# Patient Record
Sex: MALE | Race: BLACK OR AFRICAN AMERICAN | Employment: OTHER | URBAN - NONMETROPOLITAN AREA
[De-identification: names, ages, dates, MRNs, and addresses within clinical notes are randomized per-mention and may not be internally consistent; named-entity substitution may affect disease eponyms.]

---

## 2017-03-19 ENCOUNTER — APPOINTMENT (OUTPATIENT)
Dept: GENERAL RADIOLOGY | Age: 47
End: 2017-03-19
Payer: MEDICAID

## 2017-03-19 ENCOUNTER — HOSPITAL ENCOUNTER (EMERGENCY)
Age: 47
Discharge: HOME | End: 2017-03-19
Attending: EMERGENCY MEDICINE
Payer: MEDICAID

## 2017-03-19 VITALS
HEART RATE: 85 BPM | DIASTOLIC BLOOD PRESSURE: 87 MMHG | OXYGEN SATURATION: 96 % | BODY MASS INDEX: 36.37 KG/M2 | SYSTOLIC BLOOD PRESSURE: 131 MMHG | HEIGHT: 68 IN | RESPIRATION RATE: 18 BRPM | WEIGHT: 240 LBS | TEMPERATURE: 99.7 F

## 2017-03-19 DIAGNOSIS — J06.9 ACUTE UPPER RESPIRATORY INFECTION: Primary | ICD-10-CM

## 2017-03-19 DIAGNOSIS — J20.9 BRONCHITIS, ACUTE, WITH BRONCHOSPASM: ICD-10-CM

## 2017-03-19 PROCEDURE — 71020 XR CHEST STANDARD TWO VW: CPT

## 2017-03-19 PROCEDURE — 94640 AIRWAY INHALATION TREATMENT: CPT

## 2017-03-19 PROCEDURE — 6360000002 HC RX W HCPCS: Performed by: EMERGENCY MEDICINE

## 2017-03-19 PROCEDURE — 6370000000 HC RX 637 (ALT 250 FOR IP): Performed by: EMERGENCY MEDICINE

## 2017-03-19 PROCEDURE — 99283 EMERGENCY DEPT VISIT LOW MDM: CPT | Performed by: EMERGENCY MEDICINE

## 2017-03-19 PROCEDURE — 99283 EMERGENCY DEPT VISIT LOW MDM: CPT

## 2017-03-19 RX ORDER — ALBUTEROL SULFATE 2.5 MG/3ML
2.5 SOLUTION RESPIRATORY (INHALATION) EVERY 4 HOURS PRN
Status: DISCONTINUED | OUTPATIENT
Start: 2017-03-19 | End: 2017-03-19 | Stop reason: HOSPADM

## 2017-03-19 RX ORDER — LISINOPRIL 10 MG/1
40 TABLET ORAL DAILY
COMMUNITY
End: 2017-05-10

## 2017-03-19 RX ORDER — IPRATROPIUM BROMIDE AND ALBUTEROL SULFATE 2.5; .5 MG/3ML; MG/3ML
1 SOLUTION RESPIRATORY (INHALATION) EVERY 4 HOURS
Qty: 175 ML | Refills: 0 | Status: SHIPPED | OUTPATIENT
Start: 2017-03-19 | End: 2020-06-12 | Stop reason: ALTCHOICE

## 2017-03-19 RX ORDER — PREDNISONE 20 MG/1
40 TABLET ORAL ONCE
Status: COMPLETED | OUTPATIENT
Start: 2017-03-19 | End: 2017-03-19

## 2017-03-19 RX ORDER — PREDNISONE 10 MG/1
TABLET ORAL
Qty: 20 TABLET | Refills: 0 | Status: SHIPPED | OUTPATIENT
Start: 2017-03-19 | End: 2017-05-10 | Stop reason: CLARIF

## 2017-03-19 RX ADMIN — PREDNISONE 40 MG: 20 TABLET ORAL at 21:02

## 2017-03-19 RX ADMIN — ALBUTEROL SULFATE 2.5 MG: 2.5 SOLUTION RESPIRATORY (INHALATION) at 21:08

## 2017-03-19 RX ADMIN — IPRATROPIUM BROMIDE 0.5 MG: 0.5 SOLUTION RESPIRATORY (INHALATION) at 21:08

## 2017-03-19 ASSESSMENT — ENCOUNTER SYMPTOMS
SORE THROAT: 0
CHEST TIGHTNESS: 1
EYE REDNESS: 0
ABDOMINAL DISTENTION: 0
TROUBLE SWALLOWING: 0
EYE PAIN: 0
ABDOMINAL PAIN: 0
SINUS PRESSURE: 0
NAUSEA: 0
VOMITING: 1
PHOTOPHOBIA: 0
CONSTIPATION: 0
COUGH: 1
DIARRHEA: 0
COLOR CHANGE: 0
SHORTNESS OF BREATH: 1
RHINORRHEA: 0
BACK PAIN: 0
WHEEZING: 1

## 2017-05-10 ENCOUNTER — OFFICE VISIT (OUTPATIENT)
Dept: PRIMARY CARE CLINIC | Age: 47
End: 2017-05-10
Payer: MEDICAID

## 2017-05-10 VITALS
BODY MASS INDEX: 38.8 KG/M2 | HEIGHT: 68 IN | TEMPERATURE: 98.2 F | SYSTOLIC BLOOD PRESSURE: 130 MMHG | OXYGEN SATURATION: 98 % | RESPIRATION RATE: 18 BRPM | HEART RATE: 60 BPM | WEIGHT: 256 LBS | DIASTOLIC BLOOD PRESSURE: 89 MMHG

## 2017-05-10 DIAGNOSIS — Z11.4 SCREENING FOR HIV (HUMAN IMMUNODEFICIENCY VIRUS): ICD-10-CM

## 2017-05-10 DIAGNOSIS — J45.20 MILD INTERMITTENT ASTHMA WITHOUT COMPLICATION: ICD-10-CM

## 2017-05-10 DIAGNOSIS — E66.01 MORBID OBESITY DUE TO EXCESS CALORIES (HCC): ICD-10-CM

## 2017-05-10 DIAGNOSIS — I10 BENIGN ESSENTIAL HTN: ICD-10-CM

## 2017-05-10 DIAGNOSIS — Z23 NEED FOR STREPTOCOCCUS PNEUMONIAE VACCINATION: ICD-10-CM

## 2017-05-10 DIAGNOSIS — E11.9 TYPE 2 DIABETES MELLITUS WITHOUT COMPLICATION, UNSPECIFIED LONG TERM INSULIN USE STATUS: ICD-10-CM

## 2017-05-10 DIAGNOSIS — R05.8 COUGH DUE TO ACE INHIBITOR: ICD-10-CM

## 2017-05-10 DIAGNOSIS — Z00.00 ROUTINE GENERAL MEDICAL EXAMINATION AT A HEALTH CARE FACILITY: Primary | ICD-10-CM

## 2017-05-10 DIAGNOSIS — Z80.42 FAMILY HISTORY OF PROSTATE CANCER: ICD-10-CM

## 2017-05-10 DIAGNOSIS — T46.4X5A COUGH DUE TO ACE INHIBITOR: ICD-10-CM

## 2017-05-10 LAB
ALBUMIN SERPL-MCNC: 4 G/DL (ref 3.5–5.2)
ALP BLD-CCNC: 47 U/L (ref 40–130)
ALT SERPL-CCNC: 23 U/L (ref 5–41)
ANION GAP SERPL CALCULATED.3IONS-SCNC: 15 MMOL/L (ref 7–19)
AST SERPL-CCNC: 20 U/L (ref 5–40)
BACTERIA: NEGATIVE /HPF
BILIRUB SERPL-MCNC: 0.3 MG/DL (ref 0.2–1.2)
BILIRUBIN URINE: NEGATIVE
BLOOD, URINE: ABNORMAL
BUN BLDV-MCNC: 12 MG/DL (ref 6–20)
CALCIUM SERPL-MCNC: 9.2 MG/DL (ref 8.6–10)
CHLORIDE BLD-SCNC: 106 MMOL/L (ref 98–111)
CHOLESTEROL, TOTAL: 183 MG/DL (ref 160–199)
CLARITY: CLEAR
CO2: 24 MMOL/L (ref 22–29)
COLOR: YELLOW
CREAT SERPL-MCNC: 1.2 MG/DL (ref 0.5–1.2)
CREATININE URINE: 98.4 MG/DL (ref 4.2–622)
EPITHELIAL CELLS, UA: 0 /HPF (ref 0–5)
GFR NON-AFRICAN AMERICAN: >60
GLOBULIN: 2.9 G/DL
GLUCOSE BLD-MCNC: 114 MG/DL (ref 74–109)
GLUCOSE URINE: NEGATIVE MG/DL
HDLC SERPL-MCNC: 45 MG/DL (ref 55–121)
HYALINE CASTS: 2 /HPF (ref 0–8)
KETONES, URINE: NEGATIVE MG/DL
LDL CHOLESTEROL CALCULATED: 111 MG/DL
LEUKOCYTE ESTERASE, URINE: NEGATIVE
MICROALBUMIN UR-MCNC: 64.4 MG/DL (ref 0–19)
MICROALBUMIN/CREAT UR-RTO: 654.5 MG/G
NITRITE, URINE: NEGATIVE
PH UA: 6
POTASSIUM SERPL-SCNC: 4.5 MMOL/L (ref 3.5–5)
PROTEIN UA: 100 MG/DL
RAPID HIV 1&2: NORMAL
RBC UA: 0 /HPF (ref 0–4)
SODIUM BLD-SCNC: 145 MMOL/L (ref 136–145)
SPECIFIC GRAVITY UA: 1.01
TOTAL PROTEIN: 6.9 G/DL (ref 6.6–8.7)
TRIGL SERPL-MCNC: 135 MG/DL (ref 150–199)
TSH SERPL DL<=0.05 MIU/L-ACNC: 2.85 UIU/ML (ref 0.27–4.2)
UROBILINOGEN, URINE: 0.2 E.U./DL
WBC UA: 1 /HPF (ref 0–5)

## 2017-05-10 PROCEDURE — 90732 PPSV23 VACC 2 YRS+ SUBQ/IM: CPT | Performed by: FAMILY MEDICINE

## 2017-05-10 PROCEDURE — 99386 PREV VISIT NEW AGE 40-64: CPT | Performed by: FAMILY MEDICINE

## 2017-05-10 PROCEDURE — 90471 IMMUNIZATION ADMIN: CPT | Performed by: FAMILY MEDICINE

## 2017-05-10 PROCEDURE — 99204 OFFICE O/P NEW MOD 45 MIN: CPT | Performed by: FAMILY MEDICINE

## 2017-05-10 RX ORDER — AMLODIPINE BESYLATE 10 MG/1
10 TABLET ORAL DAILY
COMMUNITY
End: 2017-05-10 | Stop reason: SDUPTHER

## 2017-05-10 RX ORDER — HYDROCHLOROTHIAZIDE 12.5 MG/1
12.5 CAPSULE, GELATIN COATED ORAL DAILY
Qty: 90 CAPSULE | Refills: 1 | Status: SHIPPED | OUTPATIENT
Start: 2017-05-10 | End: 2018-05-20 | Stop reason: SDUPTHER

## 2017-05-10 RX ORDER — SIMVASTATIN 40 MG
40 TABLET ORAL NIGHTLY
Qty: 90 TABLET | Refills: 5 | Status: SHIPPED | OUTPATIENT
Start: 2017-05-10 | End: 2018-05-20 | Stop reason: SDUPTHER

## 2017-05-10 RX ORDER — ALBUTEROL SULFATE 90 UG/1
2 AEROSOL, METERED RESPIRATORY (INHALATION) EVERY 6 HOURS PRN
Qty: 1 INHALER | Refills: 2 | Status: SHIPPED | OUTPATIENT
Start: 2017-05-10 | End: 2022-03-09 | Stop reason: SDUPTHER

## 2017-05-10 RX ORDER — LOSARTAN POTASSIUM 25 MG/1
25 TABLET ORAL DAILY
Qty: 90 TABLET | Refills: 1 | Status: SHIPPED | OUTPATIENT
Start: 2017-05-10 | End: 2018-05-20 | Stop reason: SDUPTHER

## 2017-05-10 RX ORDER — HYDROCHLOROTHIAZIDE 12.5 MG/1
12.5 CAPSULE, GELATIN COATED ORAL DAILY
COMMUNITY
End: 2017-05-10 | Stop reason: SDUPTHER

## 2017-05-10 RX ORDER — AMLODIPINE BESYLATE 10 MG/1
10 TABLET ORAL DAILY
Qty: 90 TABLET | Refills: 1 | Status: SHIPPED | OUTPATIENT
Start: 2017-05-10 | End: 2018-05-20 | Stop reason: SDUPTHER

## 2017-05-10 RX ORDER — SIMVASTATIN 40 MG
40 TABLET ORAL NIGHTLY
COMMUNITY
End: 2017-05-10 | Stop reason: SDUPTHER

## 2017-05-10 RX ORDER — ASPIRIN 81 MG/1
81 TABLET ORAL DAILY
Qty: 90 TABLET | Refills: 5 | Status: SHIPPED | OUTPATIENT
Start: 2017-05-10 | End: 2020-04-14

## 2017-05-10 ASSESSMENT — ENCOUNTER SYMPTOMS
NAUSEA: 0
COUGH: 1
TROUBLE SWALLOWING: 0
CONSTIPATION: 0
ABDOMINAL PAIN: 0
RHINORRHEA: 0
CHEST TIGHTNESS: 0
EYE PAIN: 0
SORE THROAT: 0
DIARRHEA: 0
WHEEZING: 0
VOMITING: 0
PHOTOPHOBIA: 0
COLOR CHANGE: 0
SHORTNESS OF BREATH: 0

## 2017-08-16 ENCOUNTER — OFFICE VISIT (OUTPATIENT)
Dept: PRIMARY CARE CLINIC | Age: 47
End: 2017-08-16
Payer: MEDICAID

## 2017-08-16 VITALS
HEIGHT: 68 IN | BODY MASS INDEX: 41.68 KG/M2 | SYSTOLIC BLOOD PRESSURE: 132 MMHG | OXYGEN SATURATION: 95 % | TEMPERATURE: 97.7 F | DIASTOLIC BLOOD PRESSURE: 88 MMHG | HEART RATE: 65 BPM | WEIGHT: 275 LBS

## 2017-08-16 DIAGNOSIS — E11.9 TYPE 2 DIABETES MELLITUS WITHOUT COMPLICATION, UNSPECIFIED LONG TERM INSULIN USE STATUS: Primary | ICD-10-CM

## 2017-08-16 DIAGNOSIS — Z71.3 WEIGHT LOSS COUNSELING, ENCOUNTER FOR: ICD-10-CM

## 2017-08-16 DIAGNOSIS — I10 BENIGN ESSENTIAL HTN: ICD-10-CM

## 2017-08-16 DIAGNOSIS — E66.01 MORBID OBESITY DUE TO EXCESS CALORIES (HCC): ICD-10-CM

## 2017-08-16 DIAGNOSIS — N52.01 ERECTILE DYSFUNCTION DUE TO ARTERIAL INSUFFICIENCY: ICD-10-CM

## 2017-08-16 LAB — HBA1C MFR BLD: 7 %

## 2017-08-16 PROCEDURE — 83036 HEMOGLOBIN GLYCOSYLATED A1C: CPT | Performed by: FAMILY MEDICINE

## 2017-08-16 PROCEDURE — 99401 PREV MED CNSL INDIV APPRX 15: CPT | Performed by: FAMILY MEDICINE

## 2017-08-16 PROCEDURE — 99213 OFFICE O/P EST LOW 20 MIN: CPT | Performed by: FAMILY MEDICINE

## 2017-08-16 ASSESSMENT — ENCOUNTER SYMPTOMS
ABDOMINAL PAIN: 0
SHORTNESS OF BREATH: 0
SORE THROAT: 0
NAUSEA: 0
COUGH: 0
COLOR CHANGE: 0
RHINORRHEA: 0
EYE ITCHING: 0

## 2017-08-17 ENCOUNTER — TELEPHONE (OUTPATIENT)
Dept: PRIMARY CARE CLINIC | Age: 47
End: 2017-08-17

## 2017-08-18 RX ORDER — SILDENAFIL CITRATE 20 MG/1
20 TABLET ORAL PRN
Qty: 6 TABLET | Refills: 3 | Status: SHIPPED | OUTPATIENT
Start: 2017-08-18 | End: 2020-02-07

## 2017-09-24 ENCOUNTER — HOSPITAL ENCOUNTER (EMERGENCY)
Age: 47
Discharge: HOME | End: 2017-09-24
Attending: EMERGENCY MEDICINE
Payer: MEDICAID

## 2017-09-24 ENCOUNTER — APPOINTMENT (OUTPATIENT)
Dept: GENERAL RADIOLOGY | Age: 47
End: 2017-09-24
Payer: MEDICAID

## 2017-09-24 VITALS
BODY MASS INDEX: 37.13 KG/M2 | RESPIRATION RATE: 18 BRPM | DIASTOLIC BLOOD PRESSURE: 63 MMHG | TEMPERATURE: 98.9 F | HEIGHT: 68 IN | HEART RATE: 94 BPM | SYSTOLIC BLOOD PRESSURE: 132 MMHG | OXYGEN SATURATION: 92 % | WEIGHT: 245 LBS

## 2017-09-24 DIAGNOSIS — J45.20 MILD INTERMITTENT ASTHMA WITHOUT COMPLICATION: Primary | ICD-10-CM

## 2017-09-24 LAB
ALBUMIN SERPL-MCNC: 2.7 G/DL (ref 3.5–5.2)
ALP BLD-CCNC: 46 U/L (ref 40–130)
ALT SERPL-CCNC: 27 U/L (ref 5–41)
ANION GAP SERPL CALCULATED.3IONS-SCNC: 11 MMOL/L (ref 7–19)
AST SERPL-CCNC: 19 U/L (ref 5–40)
BILIRUB SERPL-MCNC: <0.2 MG/DL (ref 0.2–1.2)
BUN BLDV-MCNC: 15 MG/DL (ref 6–20)
CALCIUM SERPL-MCNC: 8.3 MG/DL (ref 8.6–10)
CHLORIDE BLD-SCNC: 105 MMOL/L (ref 98–111)
CO2: 26 MMOL/L (ref 22–29)
CREAT SERPL-MCNC: 1.2 MG/DL (ref 0.5–1.2)
GFR NON-AFRICAN AMERICAN: >60
GLUCOSE BLD-MCNC: 166 MG/DL (ref 74–109)
HCT VFR BLD CALC: 43 % (ref 42–52)
HEMOGLOBIN: 14.4 G/DL (ref 14–18)
MCH RBC QN AUTO: 29.3 PG (ref 27–31)
MCHC RBC AUTO-ENTMCNC: 33.5 G/DL (ref 33–37)
MCV RBC AUTO: 87.6 FL (ref 80–94)
PDW BLD-RTO: 13.1 % (ref 11.5–14.5)
PLATELET # BLD: 216 K/UL (ref 130–400)
PMV BLD AUTO: 9 FL (ref 9.4–12.4)
POTASSIUM SERPL-SCNC: 3.8 MMOL/L (ref 3.5–5)
RBC # BLD: 4.91 M/UL (ref 4.7–6.1)
SODIUM BLD-SCNC: 142 MMOL/L (ref 136–145)
TOTAL PROTEIN: 5.5 G/DL (ref 6.6–8.7)
TROPONIN: <0.01 NG/ML (ref 0–0.03)
WBC # BLD: 7.8 K/UL (ref 4.8–10.8)

## 2017-09-24 PROCEDURE — 94640 AIRWAY INHALATION TREATMENT: CPT

## 2017-09-24 PROCEDURE — 84484 ASSAY OF TROPONIN QUANT: CPT

## 2017-09-24 PROCEDURE — 80053 COMPREHEN METABOLIC PANEL: CPT

## 2017-09-24 PROCEDURE — 99283 EMERGENCY DEPT VISIT LOW MDM: CPT | Performed by: EMERGENCY MEDICINE

## 2017-09-24 PROCEDURE — 85027 COMPLETE CBC AUTOMATED: CPT

## 2017-09-24 PROCEDURE — 6360000002 HC RX W HCPCS: Performed by: EMERGENCY MEDICINE

## 2017-09-24 PROCEDURE — 93005 ELECTROCARDIOGRAM TRACING: CPT

## 2017-09-24 PROCEDURE — 96374 THER/PROPH/DIAG INJ IV PUSH: CPT

## 2017-09-24 PROCEDURE — 36415 COLL VENOUS BLD VENIPUNCTURE: CPT

## 2017-09-24 PROCEDURE — 99285 EMERGENCY DEPT VISIT HI MDM: CPT

## 2017-09-24 PROCEDURE — 71020 XR CHEST STANDARD TWO VW: CPT

## 2017-09-24 RX ORDER — METHYLPREDNISOLONE SODIUM SUCCINATE 125 MG/2ML
125 INJECTION, POWDER, LYOPHILIZED, FOR SOLUTION INTRAMUSCULAR; INTRAVENOUS ONCE
Status: COMPLETED | OUTPATIENT
Start: 2017-09-24 | End: 2017-09-24

## 2017-09-24 RX ORDER — AMOXICILLIN AND CLAVULANATE POTASSIUM 875; 125 MG/1; MG/1
1 TABLET, FILM COATED ORAL 2 TIMES DAILY
Qty: 20 TABLET | Refills: 0 | Status: SHIPPED | OUTPATIENT
Start: 2017-09-24 | End: 2017-10-04

## 2017-09-24 RX ORDER — ALBUTEROL SULFATE 2.5 MG/3ML
2.5 SOLUTION RESPIRATORY (INHALATION) EVERY 4 HOURS PRN
Status: DISCONTINUED | OUTPATIENT
Start: 2017-09-24 | End: 2017-09-24 | Stop reason: HOSPADM

## 2017-09-24 RX ORDER — PREDNISONE 10 MG/1
20 TABLET ORAL DAILY
Qty: 10 TABLET | Refills: 0 | Status: SHIPPED | OUTPATIENT
Start: 2017-09-24 | End: 2017-10-04

## 2017-09-24 RX ADMIN — METHYLPREDNISOLONE SODIUM SUCCINATE 125 MG: 125 INJECTION, POWDER, FOR SOLUTION INTRAMUSCULAR; INTRAVENOUS at 17:34

## 2017-09-24 RX ADMIN — ALBUTEROL SULFATE 2.5 MG: 2.5 SOLUTION RESPIRATORY (INHALATION) at 17:07

## 2017-09-24 RX ADMIN — IPRATROPIUM BROMIDE 0.5 MG: 0.5 SOLUTION RESPIRATORY (INHALATION) at 17:06

## 2017-09-24 ASSESSMENT — ENCOUNTER SYMPTOMS
EYES NEGATIVE: 1
EYE ITCHING: 0
BLOOD IN STOOL: 0
EYE DISCHARGE: 0
EYE PAIN: 0
ALLERGIC/IMMUNOLOGIC NEGATIVE: 1
APNEA: 0
SHORTNESS OF BREATH: 1
GASTROINTESTINAL NEGATIVE: 1
WHEEZING: 1
COUGH: 1
CHOKING: 0

## 2017-09-24 ASSESSMENT — PAIN DESCRIPTION - LOCATION: LOCATION: CHEST

## 2017-09-24 ASSESSMENT — PAIN SCALES - GENERAL: PAINLEVEL_OUTOF10: 4

## 2017-09-26 LAB
EKG P AXIS: 51 DEGREES
EKG P-R INTERVAL: 162 MS
EKG Q-T INTERVAL: 348 MS
EKG QRS DURATION: 84 MS
EKG QTC CALCULATION (BAZETT): 390 MS
EKG T AXIS: 26 DEGREES

## 2018-02-16 ENCOUNTER — OFFICE VISIT (OUTPATIENT)
Dept: PRIMARY CARE CLINIC | Age: 48
End: 2018-02-16
Payer: MEDICAID

## 2018-02-16 VITALS
RESPIRATION RATE: 20 BRPM | SYSTOLIC BLOOD PRESSURE: 150 MMHG | WEIGHT: 261 LBS | BODY MASS INDEX: 39.56 KG/M2 | HEIGHT: 68 IN | DIASTOLIC BLOOD PRESSURE: 88 MMHG | TEMPERATURE: 97.8 F | OXYGEN SATURATION: 96 %

## 2018-02-16 DIAGNOSIS — G57.62 MORTON'S NEUROMA OF LEFT FOOT: ICD-10-CM

## 2018-02-16 DIAGNOSIS — I10 BENIGN ESSENTIAL HTN: ICD-10-CM

## 2018-02-16 DIAGNOSIS — E11.9 TYPE 2 DIABETES MELLITUS WITHOUT COMPLICATION, WITHOUT LONG-TERM CURRENT USE OF INSULIN (HCC): Primary | ICD-10-CM

## 2018-02-16 DIAGNOSIS — E78.2 MIXED HYPERLIPIDEMIA: ICD-10-CM

## 2018-02-16 DIAGNOSIS — J45.20 MILD INTERMITTENT ASTHMA WITHOUT COMPLICATION: ICD-10-CM

## 2018-02-16 LAB — HBA1C MFR BLD: 6.5 %

## 2018-02-16 PROCEDURE — 83036 HEMOGLOBIN GLYCOSYLATED A1C: CPT | Performed by: FAMILY MEDICINE

## 2018-02-16 PROCEDURE — 99214 OFFICE O/P EST MOD 30 MIN: CPT | Performed by: FAMILY MEDICINE

## 2018-02-16 RX ORDER — SILDENAFIL 50 MG/1
TABLET, FILM COATED ORAL
Qty: 10 TABLET | Refills: 5 | Status: SHIPPED | OUTPATIENT
Start: 2018-02-16 | End: 2020-02-07

## 2018-02-16 RX ORDER — HYDROCHLOROTHIAZIDE 25 MG/1
25 TABLET ORAL DAILY
Qty: 90 TABLET | Refills: 3 | Status: SHIPPED | OUTPATIENT
Start: 2018-02-16 | End: 2018-08-22

## 2018-02-16 ASSESSMENT — ENCOUNTER SYMPTOMS
EYE ITCHING: 0
NAUSEA: 0
COLOR CHANGE: 0
COUGH: 0
SORE THROAT: 0
RHINORRHEA: 0
ABDOMINAL PAIN: 0
SHORTNESS OF BREATH: 0

## 2018-02-16 ASSESSMENT — PATIENT HEALTH QUESTIONNAIRE - PHQ9
1. LITTLE INTEREST OR PLEASURE IN DOING THINGS: 0
SUM OF ALL RESPONSES TO PHQ9 QUESTIONS 1 & 2: 0
SUM OF ALL RESPONSES TO PHQ QUESTIONS 1-9: 0
2. FEELING DOWN, DEPRESSED OR HOPELESS: 0

## 2018-02-16 NOTE — PROGRESS NOTES
appointment in 3 months or schedule an appointment sooner if needed. Patient given educational handouts and has had all questions answered. Patient voices understanding and agrees to plans along with risks and benefits of plan. Patient is instructed to continue prior meds, diet, and exercise plans unless instructed otherwise. Patient agrees to follow up as instructed and sooner if needed. Patient agrees to go to ER if condition becomes emergent. Notes may be completed with dictation device and spelling errors may occur. Return for DM, annual, POCT A1C.

## 2018-03-15 DIAGNOSIS — N52.9 ERECTILE DYSFUNCTION, UNSPECIFIED ERECTILE DYSFUNCTION TYPE: Primary | ICD-10-CM

## 2018-03-20 ENCOUNTER — OFFICE VISIT (OUTPATIENT)
Dept: UROLOGY | Age: 48
End: 2018-03-20
Payer: MEDICAID

## 2018-03-20 VITALS
DIASTOLIC BLOOD PRESSURE: 103 MMHG | WEIGHT: 261 LBS | SYSTOLIC BLOOD PRESSURE: 162 MMHG | HEART RATE: 63 BPM | TEMPERATURE: 97.9 F | BODY MASS INDEX: 39.56 KG/M2 | HEIGHT: 68 IN

## 2018-03-20 DIAGNOSIS — N52.9 ERECTILE DYSFUNCTION, UNSPECIFIED ERECTILE DYSFUNCTION TYPE: Primary | ICD-10-CM

## 2018-03-20 LAB
BACTERIA URINE, POC: ABNORMAL
BILIRUBIN URINE: 0 MG/DL
BLOOD, URINE: POSITIVE
CASTS URINE, POC: ABNORMAL
CLARITY: CLEAR
COLOR: YELLOW
CRYSTALS URINE, POC: ABNORMAL
EPI CELLS URINE, POC: ABNORMAL
GLUCOSE URINE: ABNORMAL
KETONES, URINE: NEGATIVE
LEUKOCYTE EST, POC: ABNORMAL
NITRITE, URINE: NEGATIVE
PH UA: 6 (ref 4.5–8)
PROTEIN UA: POSITIVE
RBC URINE, POC: ABNORMAL
SPECIFIC GRAVITY UA: 1.03 (ref 1–1.03)
UROBILINOGEN, URINE: NORMAL
WBC URINE, POC: ABNORMAL
YEAST URINE, POC: ABNORMAL

## 2018-03-20 PROCEDURE — 99213 OFFICE O/P EST LOW 20 MIN: CPT | Performed by: PHYSICIAN ASSISTANT

## 2018-03-20 PROCEDURE — 81001 URINALYSIS AUTO W/SCOPE: CPT | Performed by: PHYSICIAN ASSISTANT

## 2018-03-20 ASSESSMENT — ENCOUNTER SYMPTOMS
EYE PAIN: 0
WHEEZING: 0
SHORTNESS OF BREATH: 0
BACK PAIN: 0
ABDOMINAL PAIN: 0
VOMITING: 0
BLURRED VISION: 0
SINUS PAIN: 0

## 2018-03-20 NOTE — PROGRESS NOTES
time. He appears well-developed and well-nourished. No distress. HENT:   Head: Normocephalic. Mouth/Throat: No oropharyngeal exudate. Eyes: Left eye exhibits no discharge. No scleral icterus. Neck: Normal range of motion. Neck supple. No JVD present. No tracheal deviation present. No thyromegaly present. Cardiovascular: Normal rate and regular rhythm. Exam reveals no gallop and no friction rub. Pulmonary/Chest: Effort normal. No stridor. He has no rales. Abdominal: Soft. He exhibits no distension and no mass. There is no rebound and no guarding. Genitourinary: Rectum normal, prostate normal, testes normal and penis normal. Prostate is not enlarged. Musculoskeletal: Normal range of motion. He exhibits no edema. Neurological: He is alert and oriented to person, place, and time. No cranial nerve deficit. He exhibits normal muscle tone. Coordination normal.   Skin: Skin is warm and dry. He is not diaphoretic. No pallor. Psychiatric: He has a normal mood and affect. Nursing note and vitals reviewed. DATA:  U/A:    Lab Results   Component Value Date    COLORU Yellow 03/20/2018    PROTEINU Positive 03/20/2018    PHUR 6.0 03/20/2018    WBCUA 1 05/10/2017    RBCUA 0 05/10/2017    BACTERIA NEGATIVE 05/10/2017    CLARITYU Clear 03/20/2018    SPECGRAV 1.030 03/20/2018    LEUKOCYTESUR neg 03/20/2018    LEUKOCYTESUR Negative 05/10/2017    UROBILINOGEN Normal 03/20/2018    BILIRUBINUR 0 03/20/2018    BLOODU Positive 03/20/2018    GLUCOSEU neg 03/20/2018           1. Erectile dysfunction, unspecified erectile dysfunction type  Patient  referred to discuss other treatment options for erectile dysfunction. The patient states that he has had some improvement in erectile function after stopping metformin increasing exercise and losing weight which is still things he needs to do.  He was put on sildenafil 20 mg which she states didn't do anything and I feel the other oral options would not be feasible

## 2018-03-30 ENCOUNTER — OFFICE VISIT (OUTPATIENT)
Dept: UROLOGY | Age: 48
End: 2018-03-30
Payer: MEDICAID

## 2018-03-30 VITALS
HEIGHT: 68 IN | HEART RATE: 60 BPM | TEMPERATURE: 96.9 F | SYSTOLIC BLOOD PRESSURE: 137 MMHG | BODY MASS INDEX: 38.95 KG/M2 | DIASTOLIC BLOOD PRESSURE: 77 MMHG | WEIGHT: 257 LBS

## 2018-03-30 DIAGNOSIS — N52.9 ERECTILE DYSFUNCTION, UNSPECIFIED ERECTILE DYSFUNCTION TYPE: Primary | ICD-10-CM

## 2018-03-30 PROCEDURE — 54235 NJX CORPORA CAVERNOSA RX AGT: CPT | Performed by: PHYSICIAN ASSISTANT

## 2018-03-30 PROCEDURE — 99999 PR OFFICE/OUTPT VISIT,PROCEDURE ONLY: CPT | Performed by: PHYSICIAN ASSISTANT

## 2018-03-30 ASSESSMENT — ENCOUNTER SYMPTOMS
DOUBLE VISION: 0
BLURRED VISION: 0
WHEEZING: 0
HEARTBURN: 0
SORE THROAT: 0
SHORTNESS OF BREATH: 0
NAUSEA: 0

## 2018-07-25 ENCOUNTER — HOSPITAL ENCOUNTER (OUTPATIENT)
Dept: GENERAL RADIOLOGY | Facility: HOSPITAL | Age: 48
Discharge: HOME | End: 2018-07-25
Attending: FAMILY MEDICINE | Admitting: FAMILY MEDICINE

## 2018-07-25 PROCEDURE — 87086 URINE CULTURE/COLONY COUNT: CPT | Performed by: FAMILY MEDICINE

## 2018-07-25 PROCEDURE — 74018 RADEX ABDOMEN 1 VIEW: CPT

## 2018-08-03 ENCOUNTER — HOSPITAL ENCOUNTER (EMERGENCY)
Facility: HOSPITAL | Age: 48
Discharge: HOME | End: 2018-08-03
Admitting: EMERGENCY MEDICINE

## 2018-08-03 VITALS
SYSTOLIC BLOOD PRESSURE: 164 MMHG | DIASTOLIC BLOOD PRESSURE: 98 MMHG | HEIGHT: 67 IN | BODY MASS INDEX: 40.81 KG/M2 | RESPIRATION RATE: 16 BRPM | OXYGEN SATURATION: 98 % | WEIGHT: 260 LBS | HEART RATE: 74 BPM | TEMPERATURE: 97 F

## 2018-08-03 DIAGNOSIS — N48.33 DRUG-INDUCED PRIAPISM: Primary | ICD-10-CM

## 2018-08-03 PROCEDURE — 25010000002 PHENYLEPHRINE PER 1 ML: Performed by: PHYSICIAN ASSISTANT

## 2018-08-03 PROCEDURE — 99284 EMERGENCY DEPT VISIT MOD MDM: CPT

## 2018-08-03 RX ORDER — LIDOCAINE HYDROCHLORIDE 10 MG/ML
10 INJECTION, SOLUTION INFILTRATION; PERINEURAL ONCE
Status: COMPLETED | OUTPATIENT
Start: 2018-08-03 | End: 2018-08-03

## 2018-08-03 RX ORDER — PHENYLEPHRINE HCL IN 0.9% NACL 0.5 MG/5ML
2 SYRINGE (ML) INTRAVENOUS
Status: DISCONTINUED | OUTPATIENT
Start: 2018-08-03 | End: 2018-08-03 | Stop reason: SDUPTHER

## 2018-08-03 RX ORDER — PSEUDOEPHEDRINE HCL 30 MG
60 TABLET ORAL ONCE
Status: DISCONTINUED | OUTPATIENT
Start: 2018-08-03 | End: 2018-08-03

## 2018-08-03 RX ADMIN — LIDOCAINE HYDROCHLORIDE 10 ML: 10 INJECTION, SOLUTION INFILTRATION; PERINEURAL at 13:35

## 2018-08-03 RX ADMIN — PHENYLEPHRINE HYDROCHLORIDE 100 MCG: 10 INJECTION INTRAVENOUS at 13:35

## 2018-08-03 NOTE — ED PROVIDER NOTES
Subjective   47-year-old -American male presents to the emergency department with priapism onset 9:15 AM today.  Patient reports taking Trimex injection for erectile dysfunction.  Patient states he sees Dr. Arzate at  [facility] and was prescribed this approximately 3 months prior to arrival.  Patient states he has used the drug multiple times for the last 3 months without any issue.  Patient states he has recently took a 2 week break from the medication. Patient describes erection as painful and throbbing.  Patient admits to history of diabetes, hyperlipidemia, hypertension.  Patient has no other complaints at this time.        History provided by:  Patient   used: No        Review of Systems   Constitutional: Negative for chills, diaphoresis, fatigue and fever.   HENT: Negative for congestion and trouble swallowing.    Respiratory: Negative for shortness of breath and wheezing.    Cardiovascular: Negative for chest pain and palpitations.   Gastrointestinal: Negative for abdominal pain, diarrhea and nausea.   Genitourinary: Positive for penile pain. Negative for dysuria.        Pos: priapism   Musculoskeletal: Negative for arthralgias and myalgias.   Neurological: Negative for dizziness and numbness.   Hematological: Negative for adenopathy. Does not bruise/bleed easily.       Past Medical History:   Diagnosis Date   • Asthma    • Diabetes mellitus (CMS/[facility])    • Hyperlipidemia    • Hypertension        Allergies   Allergen Reactions   • Banana Anaphylaxis   • Lisinopril Other (See Comments)     Cough       History reviewed. No pertinent surgical history.    History reviewed. No pertinent family history.    Social History     Social History   • Marital status: Single     Social History Main Topics   • Smoking status: Never Smoker   • Smokeless tobacco: Never Used   • Alcohol use No   • Drug use: No   • Sexual activity: Defer     Other Topics Concern   • Not on file       Lab Results  "(last 24 hours)     ** No results found for the last 24 hours. **          Objective   Physical Exam   Constitutional: He is oriented to person, place, and time. He appears well-developed and well-nourished. No distress.   HENT:   Head: Normocephalic and atraumatic.   Right Ear: External ear normal.   Left Ear: External ear normal.   Neck: Normal range of motion. Neck supple.   Cardiovascular: Normal rate, regular rhythm, normal heart sounds and intact distal pulses.    No murmur heard.  Pulmonary/Chest: Effort normal and breath sounds normal. No respiratory distress. He has no wheezes.   Abdominal: Soft. Bowel sounds are normal. He exhibits no distension.   Genitourinary:   Genitourinary Comments: Accompanied by MARYBEL Langston and Dr. Smith. Priapism noted.    Musculoskeletal: Normal range of motion.   Neurological: He is alert and oriented to person, place, and time.   Skin: Skin is warm and dry. Capillary refill takes less than 2 seconds. He is not diaphoretic.   Psychiatric: He has a normal mood and affect. His behavior is normal.   Nursing note and vitals reviewed.      Procedures         No orders to display       BP (!) 167/102   Pulse 77   Temp 97 °F (36.1 °C) (Oral)   Resp 16   Ht 170.2 cm (67\")   Wt 118 kg (260 lb)   SpO2 96%   BMI 40.72 kg/m²     ED Course    ED Course as of Aug 03 1442   Fri Aug 03, 2018   1432 Pt observed for 30 minutes after procedure. Pt is doing well and symptoms have resolved. Pt states he is ready for d/c.   [CP]      ED Course User Index  [CP] Christiano Lucas PA-C       Medications   phenylephrine (WILLEM-SYNEPHRINE) injection 100 mcg (not administered)   lidocaine (XYLOCAINE) 1 % injection 10 mL (not administered)     2:00pm: Priapism procedure.  Verbal consent obtained given by patient discussed risks of bleeding pain and infection versus no treatment.  Location is the shaft of the penis skin prepared and claimed with Hibiclens use local infiltration with lidocaine " without epinephrine . The cavernosum was aspirated with 16 gauge needle and approx 20 cc of blood removed from each side. The symptoms began to resolve. .5 ml of phenylephrine mixed into 9.5 ml of saline solution prepared. 2.5 ml were injected into each side of the base of the penis at 9 and 3 oclock. Symptoms improved and patient tolerated procedure well. Catheters were removed and pressure was held for 60 seconds. This procedure was completed by Dr. Smith and Saulo Lucas PA-C.          MDM  Number of Diagnoses or Management Options  Diagnosis management comments: 47-year-old with treated for some after taking Trimex this morning at 9 AM. We were able to aspirate and inject phenylephrine and symptoms resolved here in the emergency department.  Patient tolerated procedure well.  At time of discharge patient is stable with normal vital signs. Dr. Smith was present, examined the patient and completed most of the procedure.  I discussed strict return precautions with the patient twitchy verbally agrees.    Risk of Complications, Morbidity, and/or Mortality  Presenting problems: moderate  Diagnostic procedures: moderate  Management options: moderate    Patient Progress  Patient progress: improved      Final diagnoses:   Drug-induced priapism          Christiano Lucas PA-C  08/03/18 1444

## 2018-08-14 ENCOUNTER — TELEPHONE (OUTPATIENT)
Dept: UROLOGY | Age: 48
End: 2018-08-14

## 2018-08-21 PROCEDURE — 87109 MYCOPLASMA: CPT | Performed by: NURSE PRACTITIONER

## 2018-08-21 PROCEDURE — 87591 N.GONORRHOEAE DNA AMP PROB: CPT | Performed by: NURSE PRACTITIONER

## 2018-08-21 PROCEDURE — 87491 CHLMYD TRACH DNA AMP PROBE: CPT | Performed by: NURSE PRACTITIONER

## 2018-08-21 PROCEDURE — 87661 TRICHOMONAS VAGINALIS AMPLIF: CPT | Performed by: NURSE PRACTITIONER

## 2018-08-22 RX ORDER — SIMVASTATIN 40 MG
TABLET ORAL
Qty: 30 TABLET | Refills: 0 | Status: SHIPPED | OUTPATIENT
Start: 2018-08-22 | End: 2018-10-25 | Stop reason: SDUPTHER

## 2018-08-22 RX ORDER — LOSARTAN POTASSIUM 25 MG/1
TABLET ORAL
Qty: 30 TABLET | Refills: 0 | Status: SHIPPED | OUTPATIENT
Start: 2018-08-22 | End: 2018-10-25 | Stop reason: SDUPTHER

## 2018-08-22 RX ORDER — AMLODIPINE BESYLATE 10 MG/1
TABLET ORAL
Qty: 30 TABLET | Refills: 0 | Status: SHIPPED | OUTPATIENT
Start: 2018-08-22 | End: 2018-10-25 | Stop reason: SDUPTHER

## 2018-08-22 RX ORDER — HYDROCHLOROTHIAZIDE 12.5 MG/1
CAPSULE, GELATIN COATED ORAL
Qty: 30 CAPSULE | Refills: 5 | Status: SHIPPED | OUTPATIENT
Start: 2018-08-22 | End: 2019-09-25 | Stop reason: SDUPTHER

## 2019-05-03 RX ORDER — AMLODIPINE BESYLATE 10 MG/1
TABLET ORAL
Qty: 30 TABLET | Refills: 0 | Status: SHIPPED | OUTPATIENT
Start: 2019-05-03 | End: 2019-07-03 | Stop reason: SDUPTHER

## 2019-05-03 RX ORDER — SIMVASTATIN 40 MG
TABLET ORAL
Qty: 30 TABLET | Refills: 0 | Status: SHIPPED | OUTPATIENT
Start: 2019-05-03 | End: 2019-10-29

## 2019-05-03 RX ORDER — LOSARTAN POTASSIUM 25 MG/1
TABLET ORAL
Qty: 30 TABLET | Refills: 0 | Status: SHIPPED | OUTPATIENT
Start: 2019-05-03 | End: 2019-07-03 | Stop reason: SDUPTHER

## 2019-07-05 RX ORDER — AMLODIPINE BESYLATE 10 MG/1
TABLET ORAL
Qty: 30 TABLET | Refills: 0 | Status: SHIPPED | OUTPATIENT
Start: 2019-07-05 | End: 2019-09-25 | Stop reason: SDUPTHER

## 2019-07-05 RX ORDER — LOSARTAN POTASSIUM 25 MG/1
TABLET ORAL
Qty: 30 TABLET | Refills: 0 | Status: SHIPPED | OUTPATIENT
Start: 2019-07-05 | End: 2019-09-25 | Stop reason: SDUPTHER

## 2019-07-05 NOTE — TELEPHONE ENCOUNTER
Charlena Libman called to request a refill on his medication.       Last office visit : 2/16/2018   Next office visit : Visit date not found     Requested Prescriptions     Signed Prescriptions Disp Refills    losartan (COZAAR) 25 MG tablet 30 tablet 0     Sig: TAKE 1 TABLET BY MOUTH ONCE DAILY     Authorizing Provider: Shyla Acuna     Ordering User: Olimpia MAXWELL    amLODIPine (NORVASC) 10 MG tablet 30 tablet 0     Sig: TAKE 1 TABLET BY MOUTH ONCE DAILY     Authorizing Provider: Shyla Acuna     Ordering User: Goldy Zuñiga

## 2019-09-25 RX ORDER — HYDROCHLOROTHIAZIDE 12.5 MG/1
12.5 CAPSULE, GELATIN COATED ORAL EVERY MORNING
Qty: 30 CAPSULE | Refills: 0 | Status: ON HOLD
Start: 2019-09-25 | End: 2020-02-05 | Stop reason: HOSPADM

## 2019-09-25 RX ORDER — AMLODIPINE BESYLATE 10 MG/1
10 TABLET ORAL DAILY
Qty: 30 TABLET | Refills: 0 | Status: SHIPPED | OUTPATIENT
Start: 2019-09-25 | End: 2019-11-29 | Stop reason: SDUPTHER

## 2019-09-25 RX ORDER — LOSARTAN POTASSIUM 25 MG/1
25 TABLET ORAL DAILY
Qty: 30 TABLET | Refills: 0 | Status: SHIPPED | OUTPATIENT
Start: 2019-09-25 | End: 2019-11-29 | Stop reason: SDUPTHER

## 2019-09-25 NOTE — TELEPHONE ENCOUNTER
Calvin Peabody called to request a refill on his medication.       Last office visit : 2/16/2018   Next office visit : Visit date not found     Requested Prescriptions     Pending Prescriptions Disp Refills    amLODIPine (NORVASC) 10 MG tablet [Pharmacy Med Name: AMLODIPINE 10MG TAB] 30 tablet 0     Sig: TAKE 1 TABLET BY MOUTH ONCE DAILY    hydrochlorothiazide (MICROZIDE) 12.5 MG capsule [Pharmacy Med Name: HYDROCHLOROTHIAZIDE 12.5MG CAP] 30 capsule 5     Sig: TAKE 1 CAPSULE BY MOUTH ONCE DAILY    metFORMIN (GLUCOPHAGE) 1000 MG tablet [Pharmacy Med Name: METFORMIN 1000MG TAB] 60 tablet 5     Sig: TAKE 1 TABLET BY MOUTH TWICE DAILY WITH MEALS    losartan (COZAAR) 25 MG tablet [Pharmacy Med Name: LOSARTAN 25MG   TAB] 30 tablet 0     Sig: TAKE 1 TABLET BY MOUTH ONCE DAILY            Tyra Silverman MA

## 2019-09-26 RX ORDER — SIMVASTATIN 40 MG
TABLET ORAL
Qty: 30 TABLET | Refills: 0 | Status: SHIPPED | OUTPATIENT
Start: 2019-09-26 | End: 2020-02-20

## 2019-10-29 ENCOUNTER — OFFICE VISIT (OUTPATIENT)
Dept: PRIMARY CARE CLINIC | Age: 49
End: 2019-10-29
Payer: COMMERCIAL

## 2019-10-29 VITALS
HEART RATE: 76 BPM | OXYGEN SATURATION: 98 % | RESPIRATION RATE: 14 BRPM | TEMPERATURE: 97.6 F | SYSTOLIC BLOOD PRESSURE: 156 MMHG | HEIGHT: 68 IN | WEIGHT: 275.6 LBS | DIASTOLIC BLOOD PRESSURE: 98 MMHG | BODY MASS INDEX: 41.77 KG/M2

## 2019-10-29 DIAGNOSIS — I10 BENIGN ESSENTIAL HTN: ICD-10-CM

## 2019-10-29 DIAGNOSIS — E11.9 TYPE 2 DIABETES MELLITUS WITHOUT COMPLICATION, WITHOUT LONG-TERM CURRENT USE OF INSULIN (HCC): Primary | ICD-10-CM

## 2019-10-29 DIAGNOSIS — Z80.42 FAMILY HISTORY OF PROSTATE CANCER IN FATHER: ICD-10-CM

## 2019-10-29 DIAGNOSIS — J45.20 MILD INTERMITTENT ASTHMA WITHOUT COMPLICATION: ICD-10-CM

## 2019-10-29 DIAGNOSIS — Z12.11 COLON CANCER SCREENING: ICD-10-CM

## 2019-10-29 LAB — HBA1C MFR BLD: 5.7 %

## 2019-10-29 PROCEDURE — 83036 HEMOGLOBIN GLYCOSYLATED A1C: CPT | Performed by: FAMILY MEDICINE

## 2019-10-29 PROCEDURE — 99214 OFFICE O/P EST MOD 30 MIN: CPT | Performed by: FAMILY MEDICINE

## 2019-10-29 ASSESSMENT — PATIENT HEALTH QUESTIONNAIRE - PHQ9
SUM OF ALL RESPONSES TO PHQ QUESTIONS 1-9: 0
SUM OF ALL RESPONSES TO PHQ9 QUESTIONS 1 & 2: 0
2. FEELING DOWN, DEPRESSED OR HOPELESS: 0
SUM OF ALL RESPONSES TO PHQ QUESTIONS 1-9: 0
1. LITTLE INTEREST OR PLEASURE IN DOING THINGS: 0

## 2019-10-29 ASSESSMENT — ENCOUNTER SYMPTOMS
VOMITING: 0
CONSTIPATION: 0
WHEEZING: 0
DIARRHEA: 0
COUGH: 0
CHEST TIGHTNESS: 0
SHORTNESS OF BREATH: 0
NAUSEA: 0
ABDOMINAL PAIN: 0

## 2019-11-15 ENCOUNTER — TELEPHONE (OUTPATIENT)
Dept: GASTROENTEROLOGY | Age: 49
End: 2019-11-15

## 2019-12-05 ENCOUNTER — ANESTHESIA EVENT (OUTPATIENT)
Dept: ENDOSCOPY | Age: 49
End: 2019-12-05
Payer: COMMERCIAL

## 2019-12-05 ENCOUNTER — HOSPITAL ENCOUNTER (OUTPATIENT)
Age: 49
Setting detail: OUTPATIENT SURGERY
Discharge: HOME | End: 2019-12-05
Attending: INTERNAL MEDICINE | Admitting: INTERNAL MEDICINE
Payer: COMMERCIAL

## 2019-12-05 ENCOUNTER — ANESTHESIA (OUTPATIENT)
Dept: ENDOSCOPY | Age: 49
End: 2019-12-05
Payer: COMMERCIAL

## 2019-12-05 VITALS
HEART RATE: 67 BPM | SYSTOLIC BLOOD PRESSURE: 148 MMHG | TEMPERATURE: 97.9 F | HEIGHT: 67 IN | DIASTOLIC BLOOD PRESSURE: 98 MMHG | RESPIRATION RATE: 18 BRPM | BODY MASS INDEX: 40.02 KG/M2 | WEIGHT: 255 LBS | OXYGEN SATURATION: 98 %

## 2019-12-05 VITALS
RESPIRATION RATE: 18 BRPM | SYSTOLIC BLOOD PRESSURE: 125 MMHG | DIASTOLIC BLOOD PRESSURE: 70 MMHG | OXYGEN SATURATION: 95 %

## 2019-12-05 LAB
GLUCOSE BLD-MCNC: 137 MG/DL (ref 70–99)
PERFORMED ON: ABNORMAL

## 2019-12-05 PROCEDURE — 2580000003 HC RX 258: Performed by: INTERNAL MEDICINE

## 2019-12-05 PROCEDURE — 2709999900 HC NON-CHARGEABLE SUPPLY: Performed by: INTERNAL MEDICINE

## 2019-12-05 PROCEDURE — 3609027000 HC COLONOSCOPY: Performed by: INTERNAL MEDICINE

## 2019-12-05 PROCEDURE — 88305 TISSUE EXAM BY PATHOLOGIST: CPT

## 2019-12-05 PROCEDURE — 82948 REAGENT STRIP/BLOOD GLUCOSE: CPT

## 2019-12-05 PROCEDURE — 3700000001 HC ADD 15 MINUTES (ANESTHESIA): Performed by: INTERNAL MEDICINE

## 2019-12-05 PROCEDURE — 2500000003 HC RX 250 WO HCPCS: Performed by: NURSE ANESTHETIST, CERTIFIED REGISTERED

## 2019-12-05 PROCEDURE — 3700000000 HC ANESTHESIA ATTENDED CARE: Performed by: INTERNAL MEDICINE

## 2019-12-05 PROCEDURE — 7100000010 HC PHASE II RECOVERY - FIRST 15 MIN: Performed by: INTERNAL MEDICINE

## 2019-12-05 PROCEDURE — 45385 COLONOSCOPY W/LESION REMOVAL: CPT | Performed by: INTERNAL MEDICINE

## 2019-12-05 PROCEDURE — 6360000002 HC RX W HCPCS: Performed by: NURSE ANESTHETIST, CERTIFIED REGISTERED

## 2019-12-05 PROCEDURE — 7100000011 HC PHASE II RECOVERY - ADDTL 15 MIN: Performed by: INTERNAL MEDICINE

## 2019-12-05 RX ORDER — SODIUM CHLORIDE, SODIUM LACTATE, POTASSIUM CHLORIDE, CALCIUM CHLORIDE 600; 310; 30; 20 MG/100ML; MG/100ML; MG/100ML; MG/100ML
INJECTION, SOLUTION INTRAVENOUS CONTINUOUS
Status: DISCONTINUED | OUTPATIENT
Start: 2019-12-05 | End: 2019-12-05 | Stop reason: HOSPADM

## 2019-12-05 RX ORDER — LIDOCAINE HYDROCHLORIDE 10 MG/ML
INJECTION, SOLUTION EPIDURAL; INFILTRATION; INTRACAUDAL; PERINEURAL PRN
Status: DISCONTINUED | OUTPATIENT
Start: 2019-12-05 | End: 2019-12-05 | Stop reason: SDUPTHER

## 2019-12-05 RX ORDER — PROPOFOL 10 MG/ML
INJECTION, EMULSION INTRAVENOUS PRN
Status: DISCONTINUED | OUTPATIENT
Start: 2019-12-05 | End: 2019-12-05 | Stop reason: SDUPTHER

## 2019-12-05 RX ADMIN — SODIUM CHLORIDE, POTASSIUM CHLORIDE, SODIUM LACTATE AND CALCIUM CHLORIDE: 600; 310; 30; 20 INJECTION, SOLUTION INTRAVENOUS at 08:40

## 2019-12-05 RX ADMIN — PROPOFOL 225 MG: 10 INJECTION, EMULSION INTRAVENOUS at 10:18

## 2019-12-05 RX ADMIN — LIDOCAINE HYDROCHLORIDE 20 MG: 10 INJECTION, SOLUTION EPIDURAL; INFILTRATION; INTRACAUDAL; PERINEURAL at 10:18

## 2019-12-05 ASSESSMENT — PAIN SCALES - GENERAL: PAINLEVEL_OUTOF10: 0

## 2020-01-29 ENCOUNTER — HOSPITAL ENCOUNTER (INPATIENT)
Age: 50
LOS: 7 days | Discharge: HOME | DRG: 673 | End: 2020-02-05
Attending: INTERNAL MEDICINE | Admitting: INTERNAL MEDICINE
Payer: COMMERCIAL

## 2020-01-29 ENCOUNTER — APPOINTMENT (OUTPATIENT)
Dept: ULTRASOUND IMAGING | Age: 50
DRG: 673 | End: 2020-01-29
Payer: COMMERCIAL

## 2020-01-29 ENCOUNTER — APPOINTMENT (OUTPATIENT)
Dept: GENERAL RADIOLOGY | Age: 50
DRG: 673 | End: 2020-01-29
Payer: COMMERCIAL

## 2020-01-29 PROBLEM — N17.9 ACUTE RENAL FAILURE (HCC): Status: ACTIVE | Noted: 2020-01-29

## 2020-01-29 PROBLEM — E11.9 DIABETES (HCC): Status: ACTIVE | Noted: 2020-01-29

## 2020-01-29 LAB
ABO/RH: NORMAL
ALBUMIN SERPL-MCNC: 3.5 G/DL (ref 3.5–5.2)
ALP BLD-CCNC: 59 U/L (ref 40–130)
ALT SERPL-CCNC: 9 U/L (ref 5–41)
ANION GAP SERPL CALCULATED.3IONS-SCNC: 19 MMOL/L (ref 7–19)
ANTIBODY SCREEN: NORMAL
APTT: 36.5 SEC (ref 26–36.2)
AST SERPL-CCNC: 12 U/L (ref 5–40)
BACTERIA: NEGATIVE /HPF
BASOPHILS ABSOLUTE: 0 K/UL (ref 0–0.2)
BASOPHILS RELATIVE PERCENT: 0.1 % (ref 0–1)
BILIRUB SERPL-MCNC: <0.2 MG/DL (ref 0.2–1.2)
BILIRUBIN URINE: NEGATIVE
BLOOD, URINE: ABNORMAL
BUN BLDV-MCNC: 114 MG/DL (ref 6–20)
CALCIUM SERPL-MCNC: 7.4 MG/DL (ref 8.6–10)
CHLORIDE BLD-SCNC: 103 MMOL/L (ref 98–111)
CHLORIDE URINE RANDOM: 72 MMOL/L
CLARITY: CLEAR
CO2: 17 MMOL/L (ref 22–29)
COLOR: YELLOW
CREAT SERPL-MCNC: 14.8 MG/DL (ref 0.5–1.2)
CREATININE URINE: 45 MG/DL (ref 4.2–622)
EOSINOPHIL,URINE: NORMAL
EOSINOPHILS ABSOLUTE: 0.3 K/UL (ref 0–0.6)
EOSINOPHILS RELATIVE PERCENT: 4.3 % (ref 0–5)
EPITHELIAL CELLS, UA: 1 /HPF (ref 0–5)
GFR NON-AFRICAN AMERICAN: 4
GLUCOSE BLD-MCNC: 103 MG/DL (ref 74–109)
GLUCOSE BLD-MCNC: 130 MG/DL (ref 70–99)
GLUCOSE URINE: 100 MG/DL
HCT VFR BLD CALC: 22.7 % (ref 42–52)
HEMOGLOBIN: 7.3 G/DL (ref 14–18)
HYALINE CASTS: 1 /HPF (ref 0–8)
IMMATURE GRANULOCYTES #: 0.1 K/UL
INR BLD: 1.26 (ref 0.88–1.18)
KETONES, URINE: NEGATIVE MG/DL
LACTIC ACID: 0.9 MMOL/L (ref 0.5–1.9)
LEUKOCYTE ESTERASE, URINE: NEGATIVE
LIPASE: 49 U/L (ref 13–60)
LYMPHOCYTES ABSOLUTE: 1.6 K/UL (ref 1.1–4.5)
LYMPHOCYTES RELATIVE PERCENT: 22 % (ref 20–40)
MCH RBC QN AUTO: 27.5 PG (ref 27–31)
MCHC RBC AUTO-ENTMCNC: 32.2 G/DL (ref 33–37)
MCV RBC AUTO: 85.7 FL (ref 80–94)
MICROALBUMIN UR-MCNC: 151.6 MG/DL (ref 0–19)
MICROALBUMIN/CREAT UR-RTO: 3368.9 MG/G
MONOCYTES ABSOLUTE: 1.3 K/UL (ref 0–0.9)
MONOCYTES RELATIVE PERCENT: 17.9 % (ref 0–10)
NEUTROPHILS ABSOLUTE: 4.1 K/UL (ref 1.5–7.5)
NEUTROPHILS RELATIVE PERCENT: 55 % (ref 50–65)
NITRITE, URINE: NEGATIVE
OSMOLALITY URINE: 305 MOSM/KG (ref 250–1200)
PDW BLD-RTO: 13.2 % (ref 11.5–14.5)
PERFORMED ON: ABNORMAL
PH UA: 6.5 (ref 5–8)
PLATELET # BLD: 175 K/UL (ref 130–400)
PMV BLD AUTO: 9.4 FL (ref 9.4–12.4)
POTASSIUM REFLEX MAGNESIUM: 4.6 MMOL/L (ref 3.5–5)
POTASSIUM, UR: 12.36 MMOL/L
PROTEIN PROTEIN: 247 MG/DL (ref 15–45)
PROTEIN UA: >=300 MG/DL
PROTHROMBIN TIME: 15.2 SEC (ref 12–14.6)
RAPID INFLUENZA  B AGN: NEGATIVE
RAPID INFLUENZA A AGN: NEGATIVE
RBC # BLD: 2.65 M/UL (ref 4.7–6.1)
RBC UA: 2 /HPF (ref 0–4)
S PYO AG THROAT QL: NEGATIVE
SODIUM BLD-SCNC: 139 MMOL/L (ref 136–145)
SODIUM URINE: 91 MMOL/L
SPECIFIC GRAVITY UA: 1.02 (ref 1–1.03)
TOTAL CK: 601 U/L (ref 39–308)
TOTAL PROTEIN: 6.8 G/DL (ref 6.6–8.7)
TROPONIN: 0.06 NG/ML (ref 0–0.03)
UREA NITROGEN, UR: 256 MG/DL
URINE REFLEX TO CULTURE: ABNORMAL
UROBILINOGEN, URINE: 0.2 E.U./DL
WBC # BLD: 7.4 K/UL (ref 4.8–10.8)
WBC UA: 3 /HPF (ref 0–5)

## 2020-01-29 PROCEDURE — 80053 COMPREHEN METABOLIC PANEL: CPT

## 2020-01-29 PROCEDURE — 2580000003 HC RX 258: Performed by: INTERNAL MEDICINE

## 2020-01-29 PROCEDURE — 82570 ASSAY OF URINE CREATININE: CPT

## 2020-01-29 PROCEDURE — 86900 BLOOD TYPING SEROLOGIC ABO: CPT

## 2020-01-29 PROCEDURE — 85730 THROMBOPLASTIN TIME PARTIAL: CPT

## 2020-01-29 PROCEDURE — 83935 ASSAY OF URINE OSMOLALITY: CPT

## 2020-01-29 PROCEDURE — 84156 ASSAY OF PROTEIN URINE: CPT

## 2020-01-29 PROCEDURE — P9016 RBC LEUKOCYTES REDUCED: HCPCS

## 2020-01-29 PROCEDURE — 84133 ASSAY OF URINE POTASSIUM: CPT

## 2020-01-29 PROCEDURE — 87205 SMEAR GRAM STAIN: CPT

## 2020-01-29 PROCEDURE — 87880 STREP A ASSAY W/OPTIC: CPT

## 2020-01-29 PROCEDURE — 84300 ASSAY OF URINE SODIUM: CPT

## 2020-01-29 PROCEDURE — 85610 PROTHROMBIN TIME: CPT

## 2020-01-29 PROCEDURE — 87081 CULTURE SCREEN ONLY: CPT

## 2020-01-29 PROCEDURE — 2100000000 HC CCU R&B

## 2020-01-29 PROCEDURE — 87804 INFLUENZA ASSAY W/OPTIC: CPT

## 2020-01-29 PROCEDURE — 81001 URINALYSIS AUTO W/SCOPE: CPT

## 2020-01-29 PROCEDURE — 2580000003 HC RX 258: Performed by: NURSE PRACTITIONER

## 2020-01-29 PROCEDURE — 86850 RBC ANTIBODY SCREEN: CPT

## 2020-01-29 PROCEDURE — 86901 BLOOD TYPING SEROLOGIC RH(D): CPT

## 2020-01-29 PROCEDURE — 93005 ELECTROCARDIOGRAM TRACING: CPT | Performed by: NURSE PRACTITIONER

## 2020-01-29 PROCEDURE — 6360000002 HC RX W HCPCS: Performed by: INTERNAL MEDICINE

## 2020-01-29 PROCEDURE — 94640 AIRWAY INHALATION TREATMENT: CPT

## 2020-01-29 PROCEDURE — 84540 ASSAY OF URINE/UREA-N: CPT

## 2020-01-29 PROCEDURE — 82436 ASSAY OF URINE CHLORIDE: CPT

## 2020-01-29 PROCEDURE — 83690 ASSAY OF LIPASE: CPT

## 2020-01-29 PROCEDURE — 2500000003 HC RX 250 WO HCPCS: Performed by: INTERNAL MEDICINE

## 2020-01-29 PROCEDURE — 71046 X-RAY EXAM CHEST 2 VIEWS: CPT

## 2020-01-29 PROCEDURE — 6370000000 HC RX 637 (ALT 250 FOR IP): Performed by: INTERNAL MEDICINE

## 2020-01-29 PROCEDURE — 82550 ASSAY OF CK (CPK): CPT

## 2020-01-29 PROCEDURE — 6370000000 HC RX 637 (ALT 250 FOR IP): Performed by: NURSE PRACTITIONER

## 2020-01-29 PROCEDURE — 83605 ASSAY OF LACTIC ACID: CPT

## 2020-01-29 PROCEDURE — 82043 UR ALBUMIN QUANTITATIVE: CPT

## 2020-01-29 PROCEDURE — 36415 COLL VENOUS BLD VENIPUNCTURE: CPT

## 2020-01-29 PROCEDURE — 86923 COMPATIBILITY TEST ELECTRIC: CPT

## 2020-01-29 PROCEDURE — 99285 EMERGENCY DEPT VISIT HI MDM: CPT

## 2020-01-29 PROCEDURE — 82948 REAGENT STRIP/BLOOD GLUCOSE: CPT

## 2020-01-29 PROCEDURE — 84484 ASSAY OF TROPONIN QUANT: CPT

## 2020-01-29 PROCEDURE — 76770 US EXAM ABDO BACK WALL COMP: CPT

## 2020-01-29 PROCEDURE — 85025 COMPLETE CBC W/AUTO DIFF WBC: CPT

## 2020-01-29 RX ORDER — DEXTROSE MONOHYDRATE 50 MG/ML
100 INJECTION, SOLUTION INTRAVENOUS PRN
Status: DISCONTINUED | OUTPATIENT
Start: 2020-01-29 | End: 2020-02-05 | Stop reason: HOSPADM

## 2020-01-29 RX ORDER — SODIUM CHLORIDE 0.9 % (FLUSH) 0.9 %
10 SYRINGE (ML) INJECTION EVERY 12 HOURS SCHEDULED
Status: DISCONTINUED | OUTPATIENT
Start: 2020-01-29 | End: 2020-02-05 | Stop reason: HOSPADM

## 2020-01-29 RX ORDER — HYDRALAZINE HYDROCHLORIDE 50 MG/1
50 TABLET, FILM COATED ORAL EVERY 8 HOURS SCHEDULED
Status: DISCONTINUED | OUTPATIENT
Start: 2020-01-29 | End: 2020-02-05 | Stop reason: HOSPADM

## 2020-01-29 RX ORDER — ONDANSETRON 2 MG/ML
4 INJECTION INTRAMUSCULAR; INTRAVENOUS EVERY 6 HOURS PRN
Status: DISCONTINUED | OUTPATIENT
Start: 2020-01-29 | End: 2020-01-31 | Stop reason: SDUPTHER

## 2020-01-29 RX ORDER — METOPROLOL TARTRATE 5 MG/5ML
10 INJECTION INTRAVENOUS EVERY 6 HOURS PRN
Status: DISCONTINUED | OUTPATIENT
Start: 2020-01-29 | End: 2020-01-29

## 2020-01-29 RX ORDER — AMLODIPINE BESYLATE 5 MG/1
10 TABLET ORAL DAILY
Status: DISCONTINUED | OUTPATIENT
Start: 2020-01-29 | End: 2020-01-30

## 2020-01-29 RX ORDER — 0.9 % SODIUM CHLORIDE 0.9 %
1000 INTRAVENOUS SOLUTION INTRAVENOUS ONCE
Status: COMPLETED | OUTPATIENT
Start: 2020-01-29 | End: 2020-01-29

## 2020-01-29 RX ORDER — DEXTROSE MONOHYDRATE 25 G/50ML
12.5 INJECTION, SOLUTION INTRAVENOUS PRN
Status: DISCONTINUED | OUTPATIENT
Start: 2020-01-29 | End: 2020-02-05 | Stop reason: HOSPADM

## 2020-01-29 RX ORDER — SODIUM CHLORIDE 0.9 % (FLUSH) 0.9 %
10 SYRINGE (ML) INJECTION PRN
Status: DISCONTINUED | OUTPATIENT
Start: 2020-01-29 | End: 2020-02-05 | Stop reason: HOSPADM

## 2020-01-29 RX ORDER — SENNA PLUS 8.6 MG/1
1 TABLET ORAL DAILY PRN
Status: DISCONTINUED | OUTPATIENT
Start: 2020-01-29 | End: 2020-02-05 | Stop reason: HOSPADM

## 2020-01-29 RX ORDER — NICOTINE POLACRILEX 4 MG
15 LOZENGE BUCCAL PRN
Status: DISCONTINUED | OUTPATIENT
Start: 2020-01-29 | End: 2020-02-05 | Stop reason: HOSPADM

## 2020-01-29 RX ORDER — ACETAMINOPHEN 325 MG/1
650 TABLET ORAL EVERY 4 HOURS PRN
Status: DISCONTINUED | OUTPATIENT
Start: 2020-01-29 | End: 2020-02-05 | Stop reason: HOSPADM

## 2020-01-29 RX ORDER — HEPARIN SODIUM 5000 [USP'U]/ML
5000 INJECTION, SOLUTION INTRAVENOUS; SUBCUTANEOUS EVERY 8 HOURS SCHEDULED
Status: DISCONTINUED | OUTPATIENT
Start: 2020-01-29 | End: 2020-02-05 | Stop reason: HOSPADM

## 2020-01-29 RX ORDER — IPRATROPIUM BROMIDE AND ALBUTEROL SULFATE 2.5; .5 MG/3ML; MG/3ML
1 SOLUTION RESPIRATORY (INHALATION)
Status: DISCONTINUED | OUTPATIENT
Start: 2020-01-29 | End: 2020-02-05 | Stop reason: HOSPADM

## 2020-01-29 RX ORDER — SODIUM BICARBONATE 650 MG/1
650 TABLET ORAL 4 TIMES DAILY
Status: DISCONTINUED | OUTPATIENT
Start: 2020-01-29 | End: 2020-01-30

## 2020-01-29 RX ORDER — IPRATROPIUM BROMIDE AND ALBUTEROL SULFATE 2.5; .5 MG/3ML; MG/3ML
1 SOLUTION RESPIRATORY (INHALATION) ONCE
Status: COMPLETED | OUTPATIENT
Start: 2020-01-29 | End: 2020-01-29

## 2020-01-29 RX ADMIN — IPRATROPIUM BROMIDE AND ALBUTEROL SULFATE 1 AMPULE: 2.5; .5 SOLUTION RESPIRATORY (INHALATION) at 19:15

## 2020-01-29 RX ADMIN — SODIUM CHLORIDE 1000 ML: 9 INJECTION, SOLUTION INTRAVENOUS at 17:45

## 2020-01-29 RX ADMIN — ONDANSETRON 4 MG: 2 INJECTION INTRAMUSCULAR; INTRAVENOUS at 22:56

## 2020-01-29 RX ADMIN — IPRATROPIUM BROMIDE AND ALBUTEROL SULFATE 1 AMPULE: .5; 3 SOLUTION RESPIRATORY (INHALATION) at 16:26

## 2020-01-29 RX ADMIN — FAMOTIDINE 20 MG: 10 INJECTION INTRAVENOUS at 22:59

## 2020-01-29 RX ADMIN — SODIUM CHLORIDE, PRESERVATIVE FREE 10 ML: 5 INJECTION INTRAVENOUS at 21:13

## 2020-01-29 RX ADMIN — HEPARIN SODIUM 5000 UNITS: 5000 INJECTION INTRAVENOUS; SUBCUTANEOUS at 21:13

## 2020-01-29 RX ADMIN — AMLODIPINE BESYLATE 10 MG: 5 TABLET ORAL at 18:10

## 2020-01-29 RX ADMIN — SODIUM BICARBONATE 650 MG: 650 TABLET ORAL at 21:14

## 2020-01-29 RX ADMIN — DEXTROSE MONOHYDRATE 2.5 MG/HR: 50 INJECTION, SOLUTION INTRAVENOUS at 20:13

## 2020-01-29 RX ADMIN — HYDRALAZINE HYDROCHLORIDE 50 MG: 50 TABLET, FILM COATED ORAL at 21:13

## 2020-01-29 RX ADMIN — SODIUM CHLORIDE 1000 ML: 9 INJECTION, SOLUTION INTRAVENOUS at 16:00

## 2020-01-29 ASSESSMENT — ENCOUNTER SYMPTOMS
ABDOMINAL PAIN: 0
NAUSEA: 1
VOMITING: 1
WHEEZING: 1
DIARRHEA: 1
COUGH: 1

## 2020-01-29 ASSESSMENT — PAIN SCALES - GENERAL: PAINLEVEL_OUTOF10: 3

## 2020-01-29 NOTE — ED PROVIDER NOTES
[facility] 3 KENN/VAS/MED  eMERGENCY dEPARTMENT eNCOUnter      Pt Name: Bret Kimball  MRN: [id number]  [place] 1970  Date of evaluation: 1/29/2020  Provider: Reji Seymour, [address]       Chief Complaint   Patient presents with    Emesis     Pt to ED with c/o N/V/D with fever/chills x5 days          HISTORY OF PRESENT ILLNESS   (Location/Symptom, Timing/Onset,Context/Setting, Quality, Duration, Modifying Factors, Severity)  Note limiting factors. Shy Holland a 47 y.o. male who presents to the emergency department for evaluation of vomiting. Pt tells me that he has had cough, congestion and sore throat over past 5 days. He relates that he has had intermittent episodes of vomiting associated with paroxysm of cough. He tells me that he had experienced diarrhea at onset of illness. He tells me that diarrhea has stopped over past couple of days. He denies abdominal pain. He has history of asthma tells me that since he has been ill he has had episodes of wheezing. He does not endorse any shortness of breath to me. He tells me that he has chest discomfort with episodes of coughing. He has had subjective fevers. HPI    Nursing Notes were reviewed. REVIEW OF SYSTEMS    (2-9 systems for level 4, 10 or more for level 5)     Review of Systems   Constitutional: Positive for fever. HENT: Positive for congestion. Respiratory: Positive for cough and wheezing. Cardiovascular: Positive for chest pain (associated with cough-currently denies chest pain to me). Gastrointestinal: Positive for diarrhea (resolved per patient ), nausea and vomiting. Negative for abdominal pain. Musculoskeletal: Positive for myalgias (generalized). All other systems reviewed and are negative. A complete review of systems was performed and is negative except as noted above in the HPI.        PAST MEDICAL HISTORY     Past Medical History:   Diagnosis Date    Asthma     Diabetes mellitus (HCC)     Erectile dysfunction Right Ear: External ear normal.      Left Ear: External ear normal.   Eyes:      Conjunctiva/sclera: Conjunctivae normal.      Pupils: Pupils are equal, round, and reactive to light. Neck:      Musculoskeletal: Normal range of motion. Cardiovascular:      Rate and Rhythm: Normal rate and regular rhythm. Heart sounds: Normal heart sounds. Pulmonary:      Effort: Pulmonary effort is normal.      Comments: Decreased breath sounds bilateral bases  Abdominal:      General: Bowel sounds are normal.      Palpations: Abdomen is soft. Genitourinary:     Rectum: Guaiac result negative. Comments: Brown stool on exam glove-chaperoned exam by SHARYN Saldana  Musculoskeletal: Normal range of motion. Comments: 1+ edema bilateral lower extremities    Skin:     General: Skin is warm and dry. Neurological:      Mental Status: He is alert and oriented to person, place, and time. DIAGNOSTIC RESULTS     EKG: All EKG's are interpreted by the Emergency Department Physician who either signs or Co-signs this chart in the absence of acardiologist.    There is a regular rate and rhythm. SR hr 83b/min Normal [place] interval and normal P waves. Normal QRS interval. Normal QT interval. No obvious ST elevation or ST depression. RADIOLOGY:   Non-plain film images such as CT, Ultrasound andMRI are read by the radiologist. Plain radiographic images are visualized and preliminarily interpreted by the emergency physician with the below findings:        Interpretation per the Radiologist below, if available at the time of this note:     [facility]   Final Result      XR CHEST PORTABLE   Final Result   1. Low lung volumes with new bilateral infiltrate. Signed by Dr Gunjan Fry on 2/1/2020 4:47 PM      US RENAL COMPLETE   Final Result   1. Increased echogenicity of the renal cortex bilaterally suggesting   medical renal disease.  There is no evidence of solid mass or hydronephrosis. 2. Small cortical cyst involving the mid to upper pole of the left   kidney. .   Signed by Dr Peggy Woodard on 1/29/2020 8:40 PM      XR CHEST STANDARD (2 VW)   Final Result   No acute findings or significant change. Signed by Dr Jose Orona on 1/29/2020 4:09 PM      IR TUNNELED CATHETER PLACEMENT GREATER THAN 5 YEARS    (Results Pending)         ED BEDSIDE ULTRASOUND:   Performed by ED Physician - none    LABS:  Labs Reviewed   CBC WITH AUTO DIFFERENTIAL - Abnormal; Notable for the following components:       Result Value    RBC 2.65 (*)     Hemoglobin 7.3 (*)     Hematocrit 22.7 (*)     MCHC 32.2 (*)     Monocytes % 17.9 (*)     Monocytes Absolute 1.30 (*)     All other components within normal limits   COMPREHENSIVE METABOLIC PANEL W/ REFLEX TO MG FOR LOW K - Abnormal; Notable for the following components:    CO2 17 (*)      (*)     CREATININE 14.8 (*)     GFR Non-African American 4 (*)     Calcium 7.4 (*)     All other components within normal limits   TROPONIN - Abnormal; Notable for the following components:    Troponin 0.06 (*)     All other components within normal limits   CK - Abnormal; Notable for the following components:     Total  (*)     All other components within normal limits   APTT - Abnormal; Notable for the following components:    aPTT 36.5 (*)     All other components within normal limits   PROTIME-INR - Abnormal; Notable for the following components:    Protime 15.2 (*)     INR 1.26 (*)     All other components within normal limits   PHOSPHORUS - Abnormal; Notable for the following components:    Phosphorus 6.6 (*)     All other components within normal limits   TROPONIN - Abnormal; Notable for the following components:    Troponin 0.05 (*)     All other components within normal limits   IRON AND TIBC - Abnormal; Notable for the following components:    Iron 22 (*)     TIBC 174 (*)     Iron Saturation 13 (*)     All other components within normal limits URINE RT REFLEX TO CULTURE - Abnormal; Notable for the following components:    Glucose, Ur 100 (*)     Blood, Urine MODERATE (*)     Protein, UA >=300 (*)     All other components within normal limits   PROTEIN, URINE, RANDOM - Abnormal; Notable for the following components:    Protein, Ur 247 (*)     All other components within normal limits   MICROALBUMIN / CREATININE URINE RATIO - Abnormal; Notable for the following components:    Microalbumin, Random Urine 151.60 (*)     All other components within normal limits   LIPID PANEL - Abnormal; Notable for the following components:    Cholesterol, Total 124 (*)     HDL 45 (*)     All other components within normal limits   CBC WITH AUTO DIFFERENTIAL - Abnormal; Notable for the following components:    RBC 2.66 (*)     Hemoglobin 7.3 (*)     Hematocrit 22.6 (*)     MCHC 32.3 (*)     MPV 9.3 (*)     Lymphocytes % 15.7 (*)     Monocytes % 18.2 (*)     Monocytes Absolute 1.30 (*)     All other components within normal limits   COMPREHENSIVE METABOLIC PANEL - Abnormal; Notable for the following components:    CO2 13 (*)     Anion Gap 25 (*)     Glucose 143 (*)      (*)     CREATININE 15.2 (*)     GFR Non-African American 3 (*)     Calcium 7.1 (*)     Total Protein 6.0 (*)     All other components within normal limits   BRAIN NATRIURETIC PEPTIDE - Abnormal; Notable for the following components:    Pro-BNP 1,247 (*)     All other components within normal limits   PTH, INTACT - Abnormal; Notable for the following components:    .1 (*)     All other components within normal limits   ELECROPHORESIS PROTEIN, SERUM WITHOUT REFLEX TO IMMUNOFIXATION - Abnormal; Notable for the following components:     Total Protein 6.2 (*)     Alb 3.19 (*)     Alpha-1-Globulin 0.47 (*)     All other components within normal limits   VITAMIN D 25 HYDROXY - Abnormal; Notable for the following components:    Vit D, 25-Hydroxy 7.9 (*)     All other components within normal limits   C4 COMPLEMENT - Abnormal; Notable for the following components:    C4 Complement 44 (*)     All other components within normal limits   CALCIUM - Abnormal; Notable for the following components:    Calcium 7.5 (*)     All other components within normal limits   FERRITIN - Abnormal; Notable for the following components:    Ferritin 425.6 (*)     All other components within normal limits   CBC WITH AUTO DIFFERENTIAL - Abnormal; Notable for the following components:    RBC 2.37 (*)     Hemoglobin 6.5 (*)     Hematocrit 20.4 (*)     MCHC 31.9 (*)     Lymphocytes % 17.7 (*)     Monocytes % 17.5 (*)     Monocytes Absolute 1.40 (*)     All other components within normal limits   COMPREHENSIVE METABOLIC PANEL - Abnormal; Notable for the following components:    CO2 15 (*)     Anion Gap 22 (*)     Glucose 126 (*)      (*)     CREATININE 15.2 (*)     GFR Non-African American 3 (*)     Calcium 7.0 (*)     Total Protein 6.3 (*)     Alb 2.9 (*)     All other components within normal limits    Narrative:     CALL  doctor   tel. [phone],  Chemistry results called to and read back by Fabian Castillo RN in CCU, 01/31/2020  04:52, by [facility]   CBC - Abnormal; Notable for the following components:    RBC 2.88 (*)     Hemoglobin 8.0 (*)     Hematocrit 24.3 (*)     MCHC 32.9 (*)     All other components within normal limits   CBC WITH AUTO DIFFERENTIAL - Abnormal; Notable for the following components:    RBC 2.81 (*)     Hemoglobin 7.9 (*)     Hematocrit 23.4 (*)     Neutrophils % 69.7 (*)     Lymphocytes % 11.8 (*)     Monocytes % 16.5 (*)     Monocytes Absolute 1.50 (*)     All other components within normal limits   COMPREHENSIVE METABOLIC PANEL - Abnormal; Notable for the following components:    Chloride 97 (*)     Anion Gap 20 (*)     Glucose 130 (*)     BUN 72 (*)     CREATININE 10.9 (*)     GFR Non-African American 5 (*)     Calcium 7.5 (*)     Total Protein 5.9 (*)     Alb 3.2 (*)     All other components within normal limits Monocytes % 11.0 (*)     Neutrophils Absolute 9.0 (*)     Monocytes Absolute 1.60 (*)     Polychromasia 1+ (*)     Hypochromia 1+ (*)     Ovalocytes Occasional (*)     All other components within normal limits   COMPREHENSIVE METABOLIC PANEL - Abnormal; Notable for the following components:    Chloride 96 (*)     Glucose 118 (*)     BUN 36 (*)     CREATININE 8.9 (*)     GFR Non-African American 6 (*)     Calcium 8.1 (*)     Total Protein 5.9 (*)     Alb 3.3 (*)     All other components within normal limits   PHOSPHORUS - Abnormal; Notable for the following components:    Phosphorus 5.5 (*)     All other components within normal limits   POCT GLUCOSE - Abnormal; Notable for the following components:    POC Glucose 130 (*)     All other components within normal limits   POCT GLUCOSE - Abnormal; Notable for the following components:    POC Glucose 157 (*)     All other components within normal limits   POCT GLUCOSE - Abnormal; Notable for the following components:    POC Glucose 159 (*)     All other components within normal limits   POCT GLUCOSE - Abnormal; Notable for the following components:    POC Glucose 187 (*)     All other components within normal limits   POCT GLUCOSE - Abnormal; Notable for the following components:    POC Glucose 172 (*)     All other components within normal limits   POCT GLUCOSE - Abnormal; Notable for the following components:    POC Glucose 128 (*)     All other components within normal limits   POCT GLUCOSE - Abnormal; Notable for the following components:    POC Glucose 164 (*)     All other components within normal limits   POCT GLUCOSE - Abnormal; Notable for the following components:    POC Glucose 123 (*)     All other components within normal limits   POCT GLUCOSE - Abnormal; Notable for the following components:    POC Glucose 136 (*)     All other components within normal limits   POCT GLUCOSE - Abnormal; Notable for the following components:    POC Glucose 112 (*)     All URINALYSIS   MELINDA TITER IGG BY IFA REFLEX   ANTI-NEUTROPHILIC CYTOPLASMIC ANTIBODY   C3 COMPLEMENT   HEPATITIS PANEL, ACUTE   HEPATITIS B SURFACE ANTIBODY   MAGNESIUM   ANTI-NUCLEAR AB (MELINDA), IGG   VANCOMYCIN, RANDOM    Narrative:     Collection has been rescheduled by BRAYDEN at 02/03/2020 14:42 Reason:   Collected   PROTEIN / CREATININE RATIO, URINE   TYPE AND SCREEN   PREPARE RBC (CROSSMATCH)       All other labs were within normal range or not returned as of this dictation. RE-ASSESSMENT     Discussed with hospitalist who will admit patient asked to consult nephrology  1700=>It is the end of my shift chart endorsed to Dr. Janice Ledesma. EMERGENCY DEPARTMENT COURSE and DIFFERENTIALDIAGNOSIS/MDM:   Vitals:    Vitals:    02/03/20 1133 02/03/20 1803 02/04/20 0024 02/04/20 0710   BP: 131/88 137/78 118/74 (!) 142/85   Pulse: 100 102 95 91   Resp: 16 18 16 16   Temp: 99 °F (37.2 °C) 99 °F (37.2 °C) 98.7 °F (37.1 °C) 98 °F (36.7 °C)   TempSrc: Temporal Temporal Temporal Temporal   SpO2: 90% (!) 89% 90% 91%   Weight:   248 lb 14.4 oz (112.9 kg)    Height:           MDM      CONSULTS:  IP CONSULT TO NEPHROLOGY  IP CONSULT TO VASCULAR SURGERY  IP CONSULT TO CASE MANAGEMENT  IP CONSULT TO PHARMACY    PROCEDURES:  Unless otherwise notedbelow, none     Procedures    FINAL IMPRESSION     1. RUPAL (acute kidney injury) ([facility] [address]    2. Anemia, unspecified type    3. Elevated troponin    4. Hypocalcemia    5.  Pre-op exam          DISPOSITION/PLAN   DISPOSITION        PATIENT REFERRED TO:  Benjy Pantoja MD  [address] [place] [address] [id number]  [phone]            DISCHARGE MEDICATIONS:       Current Discharge Medication List          (Pleasenote that portions of this note were completed with a voice recognition program.  Efforts were made to edit the dictations but occasionally words are mis-transcribed.)              Chanda Brown, APRN  02/04/20 0626

## 2020-01-29 NOTE — ED NOTES
Called Nephrology, Dr. Micheal Herron for this patient per request by Galen Owusu, [address]  01/29/20 6136

## 2020-01-29 NOTE — LETTER
[facility] 4 ONCOLOGY UNIT  [facility] [address]  Phone: [phone]        February 3, 2020     Patient: Carlene Cottrell   YOB: 1970   Date of Visit: 1/29/2020       To Whom it May Concern:    Carlene Cottrell was admitted to [facility] on 1/29/20 and has yet to be released. Please excuse him from work at this time. If you have any questions or concerns, please don't hesitate to call.     Sincerely,         Krzysztof Cronin RN

## 2020-01-30 LAB
ALBUMIN SERPL-MCNC: 3.7 G/DL (ref 3.5–5.2)
ALP BLD-CCNC: 60 U/L (ref 40–130)
ALT SERPL-CCNC: 10 U/L (ref 5–41)
ANION GAP SERPL CALCULATED.3IONS-SCNC: 25 MMOL/L (ref 7–19)
AST SERPL-CCNC: 13 U/L (ref 5–40)
BASOPHILS ABSOLUTE: 0 K/UL (ref 0–0.2)
BASOPHILS RELATIVE PERCENT: 0.3 % (ref 0–1)
BILIRUB SERPL-MCNC: <0.2 MG/DL (ref 0.2–1.2)
BUN BLDV-MCNC: 113 MG/DL (ref 6–20)
CALCIUM SERPL-MCNC: 7.1 MG/DL (ref 8.6–10)
CALCIUM SERPL-MCNC: 7.5 MG/DL (ref 8.6–10)
CHLORIDE BLD-SCNC: 103 MMOL/L (ref 98–111)
CHOLESTEROL, TOTAL: 124 MG/DL (ref 160–199)
CO2: 13 MMOL/L (ref 22–29)
CREAT SERPL-MCNC: 15.2 MG/DL (ref 0.5–1.2)
EKG P AXIS: 57 DEGREES
EKG P-R INTERVAL: 162 MS
EKG Q-T INTERVAL: 382 MS
EKG QRS DURATION: 86 MS
EKG QTC CALCULATION (BAZETT): 420 MS
EKG T AXIS: 35 DEGREES
EOSINOPHILS ABSOLUTE: 0.2 K/UL (ref 0–0.6)
EOSINOPHILS RELATIVE PERCENT: 2.3 % (ref 0–5)
FERRITIN: 425.6 NG/ML (ref 30–400)
FOLATE: 11.6 NG/ML (ref 4.5–32.2)
GFR NON-AFRICAN AMERICAN: 3
GLUCOSE BLD-MCNC: 143 MG/DL (ref 74–109)
GLUCOSE BLD-MCNC: 157 MG/DL (ref 70–99)
GLUCOSE BLD-MCNC: 159 MG/DL (ref 70–99)
GLUCOSE BLD-MCNC: 172 MG/DL (ref 70–99)
GLUCOSE BLD-MCNC: 187 MG/DL (ref 70–99)
HBA1C MFR BLD: 5.9 % (ref 4–6)
HCT VFR BLD CALC: 22.6 % (ref 42–52)
HDLC SERPL-MCNC: 45 MG/DL (ref 55–121)
HEMOGLOBIN: 7.3 G/DL (ref 14–18)
IMMATURE GRANULOCYTES #: 0 K/UL
IRON SATURATION: 13 % (ref 14–50)
IRON: 22 UG/DL (ref 59–158)
LDL CHOLESTEROL CALCULATED: 69 MG/DL
LV EF: 60 %
LVEF MODALITY: NORMAL
LYMPHOCYTES ABSOLUTE: 1.1 K/UL (ref 1.1–4.5)
LYMPHOCYTES RELATIVE PERCENT: 15.7 % (ref 20–40)
MCH RBC QN AUTO: 27.4 PG (ref 27–31)
MCHC RBC AUTO-ENTMCNC: 32.3 G/DL (ref 33–37)
MCV RBC AUTO: 85 FL (ref 80–94)
MONOCYTES ABSOLUTE: 1.3 K/UL (ref 0–0.9)
MONOCYTES RELATIVE PERCENT: 18.2 % (ref 0–10)
NEUTROPHILS ABSOLUTE: 4.4 K/UL (ref 1.5–7.5)
NEUTROPHILS RELATIVE PERCENT: 62.9 % (ref 50–65)
PARATHYROID HORMONE INTACT: 511.1 PG/ML (ref 15–65)
PDW BLD-RTO: 13.2 % (ref 11.5–14.5)
PERFORMED ON: ABNORMAL
PHOSPHORUS: 6.6 MG/DL (ref 2.5–4.5)
PLATELET # BLD: 178 K/UL (ref 130–400)
PMV BLD AUTO: 9.3 FL (ref 9.4–12.4)
POTASSIUM SERPL-SCNC: 4.4 MMOL/L (ref 3.5–5)
PRO-BNP: 1247 PG/ML (ref 0–900)
RBC # BLD: 2.66 M/UL (ref 4.7–6.1)
SODIUM BLD-SCNC: 141 MMOL/L (ref 136–145)
TOTAL IRON BINDING CAPACITY: 174 UG/DL (ref 250–400)
TOTAL PROTEIN: 6 G/DL (ref 6.6–8.7)
TRIGL SERPL-MCNC: 49 MG/DL (ref 0–149)
TROPONIN: 0.05 NG/ML (ref 0–0.03)
VITAMIN B-12: 448 PG/ML (ref 211–946)
VITAMIN D 25-HYDROXY: 7.9 NG/ML
WBC # BLD: 7 K/UL (ref 4.8–10.8)

## 2020-01-30 PROCEDURE — 6370000000 HC RX 637 (ALT 250 FOR IP): Performed by: INTERNAL MEDICINE

## 2020-01-30 PROCEDURE — 84100 ASSAY OF PHOSPHORUS: CPT

## 2020-01-30 PROCEDURE — 84165 PROTEIN E-PHORESIS SERUM: CPT

## 2020-01-30 PROCEDURE — 82728 ASSAY OF FERRITIN: CPT

## 2020-01-30 PROCEDURE — 83970 ASSAY OF PARATHORMONE: CPT

## 2020-01-30 PROCEDURE — 2500000003 HC RX 250 WO HCPCS: Performed by: INTERNAL MEDICINE

## 2020-01-30 PROCEDURE — 94640 AIRWAY INHALATION TREATMENT: CPT

## 2020-01-30 PROCEDURE — 84484 ASSAY OF TROPONIN QUANT: CPT

## 2020-01-30 PROCEDURE — C8929 TTE W OR WO FOL WCON,DOPPLER: HCPCS

## 2020-01-30 PROCEDURE — 82607 VITAMIN B-12: CPT

## 2020-01-30 PROCEDURE — 86160 COMPLEMENT ANTIGEN: CPT

## 2020-01-30 PROCEDURE — 83036 HEMOGLOBIN GLYCOSYLATED A1C: CPT

## 2020-01-30 PROCEDURE — 2100000000 HC CCU R&B

## 2020-01-30 PROCEDURE — 85025 COMPLETE CBC W/AUTO DIFF WBC: CPT

## 2020-01-30 PROCEDURE — 84155 ASSAY OF PROTEIN SERUM: CPT

## 2020-01-30 PROCEDURE — 82310 ASSAY OF CALCIUM: CPT

## 2020-01-30 PROCEDURE — 82948 REAGENT STRIP/BLOOD GLUCOSE: CPT

## 2020-01-30 PROCEDURE — 80053 COMPREHEN METABOLIC PANEL: CPT

## 2020-01-30 PROCEDURE — 86038 ANTINUCLEAR ANTIBODIES: CPT

## 2020-01-30 PROCEDURE — 36415 COLL VENOUS BLD VENIPUNCTURE: CPT

## 2020-01-30 PROCEDURE — 83550 IRON BINDING TEST: CPT

## 2020-01-30 PROCEDURE — 86431 RHEUMATOID FACTOR QUANT: CPT

## 2020-01-30 PROCEDURE — 82306 VITAMIN D 25 HYDROXY: CPT

## 2020-01-30 PROCEDURE — 83540 ASSAY OF IRON: CPT

## 2020-01-30 PROCEDURE — 82746 ASSAY OF FOLIC ACID SERUM: CPT

## 2020-01-30 PROCEDURE — 86039 ANTINUCLEAR ANTIBODIES (ANA): CPT

## 2020-01-30 PROCEDURE — 2580000003 HC RX 258: Performed by: INTERNAL MEDICINE

## 2020-01-30 PROCEDURE — 83880 ASSAY OF NATRIURETIC PEPTIDE: CPT

## 2020-01-30 PROCEDURE — 6360000002 HC RX W HCPCS: Performed by: INTERNAL MEDICINE

## 2020-01-30 PROCEDURE — 86255 FLUORESCENT ANTIBODY SCREEN: CPT

## 2020-01-30 PROCEDURE — 80061 LIPID PANEL: CPT

## 2020-01-30 RX ORDER — SODIUM CHLORIDE 9 MG/ML
INJECTION, SOLUTION INTRAVENOUS CONTINUOUS
Status: DISCONTINUED | OUTPATIENT
Start: 2020-01-30 | End: 2020-01-30

## 2020-01-30 RX ORDER — CLONIDINE HYDROCHLORIDE 0.1 MG/1
0.1 TABLET ORAL 3 TIMES DAILY
Status: DISCONTINUED | OUTPATIENT
Start: 2020-01-30 | End: 2020-02-03

## 2020-01-30 RX ORDER — NIFEDIPINE 60 MG/1
60 TABLET, EXTENDED RELEASE ORAL 2 TIMES DAILY
Status: DISCONTINUED | OUTPATIENT
Start: 2020-01-30 | End: 2020-02-05 | Stop reason: HOSPADM

## 2020-01-30 RX ADMIN — NIFEDIPINE 60 MG: 60 TABLET, FILM COATED, EXTENDED RELEASE ORAL at 11:19

## 2020-01-30 RX ADMIN — HYDRALAZINE HYDROCHLORIDE 50 MG: 50 TABLET, FILM COATED ORAL at 16:15

## 2020-01-30 RX ADMIN — HYDRALAZINE HYDROCHLORIDE 50 MG: 50 TABLET, FILM COATED ORAL at 07:24

## 2020-01-30 RX ADMIN — INSULIN LISPRO 1 UNITS: 100 INJECTION, SOLUTION INTRAVENOUS; SUBCUTANEOUS at 13:24

## 2020-01-30 RX ADMIN — CLONIDINE HYDROCHLORIDE 0.1 MG: 0.1 TABLET ORAL at 11:19

## 2020-01-30 RX ADMIN — AMLODIPINE BESYLATE 10 MG: 5 TABLET ORAL at 09:00

## 2020-01-30 RX ADMIN — HEPARIN SODIUM 5000 UNITS: 5000 INJECTION INTRAVENOUS; SUBCUTANEOUS at 22:02

## 2020-01-30 RX ADMIN — METOPROLOL TARTRATE 25 MG: 25 TABLET ORAL at 09:01

## 2020-01-30 RX ADMIN — HYDRALAZINE HYDROCHLORIDE 50 MG: 50 TABLET, FILM COATED ORAL at 22:02

## 2020-01-30 RX ADMIN — ACETAMINOPHEN 650 MG: 325 TABLET ORAL at 20:04

## 2020-01-30 RX ADMIN — NIFEDIPINE 60 MG: 60 TABLET, FILM COATED, EXTENDED RELEASE ORAL at 20:04

## 2020-01-30 RX ADMIN — IPRATROPIUM BROMIDE AND ALBUTEROL SULFATE 1 AMPULE: 2.5; .5 SOLUTION RESPIRATORY (INHALATION) at 15:39

## 2020-01-30 RX ADMIN — IPRATROPIUM BROMIDE AND ALBUTEROL SULFATE 1 AMPULE: 2.5; .5 SOLUTION RESPIRATORY (INHALATION) at 12:06

## 2020-01-30 RX ADMIN — CLONIDINE HYDROCHLORIDE 0.1 MG: 0.1 TABLET ORAL at 16:15

## 2020-01-30 RX ADMIN — CLONIDINE HYDROCHLORIDE 0.1 MG: 0.1 TABLET ORAL at 20:04

## 2020-01-30 RX ADMIN — DEXTROSE MONOHYDRATE 7.5 MG/HR: 50 INJECTION, SOLUTION INTRAVENOUS at 00:23

## 2020-01-30 RX ADMIN — Medication: at 18:45

## 2020-01-30 RX ADMIN — HEPARIN SODIUM 5000 UNITS: 5000 INJECTION INTRAVENOUS; SUBCUTANEOUS at 16:15

## 2020-01-30 RX ADMIN — FAMOTIDINE 20 MG: 10 INJECTION INTRAVENOUS at 11:20

## 2020-01-30 RX ADMIN — DEXTROSE MONOHYDRATE 10 MG/HR: 50 INJECTION, SOLUTION INTRAVENOUS at 07:22

## 2020-01-30 RX ADMIN — HEPARIN SODIUM 5000 UNITS: 5000 INJECTION INTRAVENOUS; SUBCUTANEOUS at 07:24

## 2020-01-30 RX ADMIN — Medication: at 11:19

## 2020-01-30 RX ADMIN — INSULIN LISPRO 1 UNITS: 100 INJECTION, SOLUTION INTRAVENOUS; SUBCUTANEOUS at 20:03

## 2020-01-30 RX ADMIN — DEXTROSE MONOHYDRATE 10 MG/HR: 50 INJECTION, SOLUTION INTRAVENOUS at 10:19

## 2020-01-30 RX ADMIN — SODIUM CHLORIDE, PRESERVATIVE FREE 10 ML: 5 INJECTION INTRAVENOUS at 20:04

## 2020-01-30 RX ADMIN — ACETAMINOPHEN 650 MG: 325 TABLET ORAL at 11:37

## 2020-01-30 RX ADMIN — INSULIN LISPRO 1 UNITS: 100 INJECTION, SOLUTION INTRAVENOUS; SUBCUTANEOUS at 17:43

## 2020-01-30 RX ADMIN — IPRATROPIUM BROMIDE AND ALBUTEROL SULFATE 1 AMPULE: 2.5; .5 SOLUTION RESPIRATORY (INHALATION) at 18:16

## 2020-01-30 RX ADMIN — METOPROLOL TARTRATE 25 MG: 25 TABLET ORAL at 20:04

## 2020-01-30 RX ADMIN — SODIUM BICARBONATE 650 MG: 650 TABLET ORAL at 09:01

## 2020-01-30 RX ADMIN — IPRATROPIUM BROMIDE AND ALBUTEROL SULFATE 1 AMPULE: 2.5; .5 SOLUTION RESPIRATORY (INHALATION) at 06:42

## 2020-01-30 ASSESSMENT — PAIN DESCRIPTION - PAIN TYPE
TYPE: ACUTE PAIN

## 2020-01-30 ASSESSMENT — PAIN DESCRIPTION - DESCRIPTORS
DESCRIPTORS: SPASM
DESCRIPTORS: SPASM

## 2020-01-30 ASSESSMENT — PAIN DESCRIPTION - LOCATION
LOCATION: BACK

## 2020-01-30 ASSESSMENT — PAIN SCALES - GENERAL
PAINLEVEL_OUTOF10: 5
PAINLEVEL_OUTOF10: 1
PAINLEVEL_OUTOF10: 5
PAINLEVEL_OUTOF10: 3
PAINLEVEL_OUTOF10: 3
PAINLEVEL_OUTOF10: 0
PAINLEVEL_OUTOF10: 6

## 2020-01-30 NOTE — PROGRESS NOTES
Cardene gtt has been turned off since 1300 today. SBP has been 110's-120's since then. Pt has been c/o back pain, muscle spasms specifically. Have applied heating pad which has provided intermittent relief. Pt has been ambulating around the room and changing positions from the bed to the bedside chair for relief.

## 2020-01-30 NOTE — ED NOTES
Pt BP elevated. Discussing treatment with Dr. Edi Collins. Pt to be medicated prior to leaving .      Sapna Vazquez, MARIE  01/29/20 4335

## 2020-01-30 NOTE — ED PROVIDER NOTES
See NP note. Reviewed HPI, PE, labs, imaging. NP discussed with hospitalist.  Agree with A/P. I personally discussed with nephrology.      Amaury Wagner MD  02/06/20 3727

## 2020-01-30 NOTE — H&P
[facility] - History & Physical    SAY/yOTF  PCP: Elle Jarvis MD  Date of Admission: 1/29/2020   Date of Service: Pt seen/examined on1/29/2020 and Admitted to Inpatient with expected LOS greater than two midnights due to medical therapy. Chief Complaint:  Cough and dyspnea    History Of Present Illness: The patient is a 47 y.o. male who presented to the ED complaining of cough productive of yellow phlegm associated with SOB that has been worsening for the last 2 days associated with 1 day of nonbilious nonbloody nausea and vomiting on Friday. Patient also had multiple episodes of watery nonbloody diarrhea on Friday which has now resolved. Patient otherwise denies chest pain, fevers, chills, sweats, dysuria, changes in urination, abdominal pain, sick contacts, recent travel, changes in medication, or NSAID usage. Patient denies history of kidney disease. Patient states compliance with home medications. Past Medical History:        Diagnosis Date    Asthma     Diabetes mellitus ([address]     Erectile dysfunction     Hypertension     Obesity        Past Surgical History:        Procedure Laterality Date    COLONOSCOPY N/A 12/5/2019    Dr Ana Cooley, internal hemorrhoids-Grade 1-2-HP, 3 yr recall       Home Medications:  Prior to Admission medications    Medication Sig Start Date End Date Taking?  Authorizing Provider   losartan (COZAAR) 25 MG tablet TAKE 1 TABLET BY MOUTH ONCE DAILY 11/30/19  Yes Brando Zee MD   amLODIPine (NORVASC) 10 MG tablet TAKE 1 TABLET BY MOUTH ONCE DAILY 11/30/19  Yes Brando Zee MD   simvastatin (ZOCOR) 40 MG tablet TAKE 1 TABLET BY MOUTH ONCE DAILY AT  NIGHT 9/26/19 1/29/20 Yes Brando Zee MD   hydrochlorothiazide (MICROZIDE) 12.5 MG capsule Take 1 capsule by mouth every morning NO MORE REFILLS UNTIL SEEN IN THE OFFICE 9/25/19  Yes Brando Zee MD   metFORMIN (GLUCOPHAGE) 1000 MG tablet Take 1 tablet by mouth 2 times no organomegaly  Extremities:Pedal edema 1+  Skin: Skin color, texture, turgor normal. No rashes or lesions  Neurologic: Alert and oriented X 3, normal strength and tone. Normal symmetric reflexes. Mental status: Alert, oriented, thought content appropriate  Cranial nerves:II-XII Grossly intact Sensory: normal Motor:grossly normal  Psychiatric:  Alert and oriented, thought content appropriate, normal insight, mood appropriate        Diagnostic Data:     CBC:  Recent Labs     01/29/20  1555   WBC 7.4   HGB 7.3*   HCT 22.7*        BMP:  Recent Labs     01/29/20  1703      K 4.6      CO2 17*   *   CREATININE 14.8*   CALCIUM 7.4*     Recent Labs     01/29/20  1703   AST 12   ALT 9   BILITOT <0.2   ALKPHOS 59     Coag Panel:   Recent Labs     01/29/20  1703   INR 1.26*   PROTIME 15.2*   APTT 36.5*     Cardiac Enzymes:   Recent Labs     01/29/20  1703   CKTOTAL 601*   TROPONINI 0.06*     ABGs:No results found for: PHART, PO2ART, XCK3QDF  Urinalysis:  Lab Results   Component Value Date    NITRU Negative 03/20/2018    WBCUA 1 05/10/2017    BACTERIA NEGATIVE 05/10/2017    RBCUA 0 05/10/2017    BLOODU Positive 03/20/2018    SPECGRAV 1.030 03/20/2018    GLUCOSEU neg 03/20/2018     RAD:     XR CHEST STANDARD (2 VW) [[id number]] Resulted: 01/29/20 1609      Order Status: Completed Specimen: Chest Updated: 01/29/20 1611     Narrative:       XR CHEST (2 VW)   1/29/2020 2:30 PM  Indication: Cough  Comparison: 9/24/2017  Findings:  Cardiac silhouette is borderline prominent but stable. 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 Mild central chronic bronchial wall thickening appears unchanged. No pleural effusion, visible pneumothorax, or focal consolidation. No suspicious focal bone lesion.     Impression:       No acute findings or significant change.   Signed by Dr Katherine Chavez on 1/29/2020 4:09 PM           Active Hospital Problems    Diagnosis Date Noted    Acute renal failure ([facility] [address] [N17.9]

## 2020-01-30 NOTE — CONSULTS
Intravenous Continuous Faina Stanley MD        NIFEdipine (PROCARDIA XL) extended release tablet 60 mg  60 mg Oral BID Faina Stanley MD        cloNIDine (CATAPRES) tablet 0.1 mg  0.1 mg Oral TID Faina Stanley MD        sodium chloride flush 0.9 % injection 10 mL  10 mL Intravenous 2 times per day Rossi Hu MD   10 mL at 01/29/20 2113    sodium chloride flush 0.9 % injection 10 mL  10 mL Intravenous PRN Rossi Hu MD        [facility] injection 4 mg  4 mg Intravenous Q6H PRN Rossi Hu MD   4 mg at 01/29/20 2256    heparin (porcine) injection 5,000 Units  5,000 Units Subcutaneous 3 times per day Rossi Hu MD   5,000 Units at 01/30/20 0724    famotidine (PEPCID) injection 20 mg  20 mg Intravenous Daily Rossi Hu MD   20 mg at 01/29/20 2259    senna (SENOKOT) tablet 8.6 mg  1 tablet Oral Daily PRN Rossi Hu MD        acetaminophen (TYLENOL) tablet 650 mg  650 mg Oral Q4H PRN Rossi Hu MD        insulin lispro (HUMALOG) injection vial 0-6 Units  0-6 Units Subcutaneous TID WC Rossi Hu MD        insulin lispro (HUMALOG) injection vial 0-3 Units  0-3 Units Subcutaneous Nightly Rossi Hu MD        glucose (GLUTOSE) 40 % oral gel 15 g  15 g Oral PRN Rossi Hu MD        dextrose 50 % IV solution  12.5 g Intravenous PRN Rossi Hu MD        glucagon (rDNA) injection 1 mg  1 mg Intramuscular PRN Rossi Hu MD        dextrose 5 % solution  100 mL/hr Intravenous PRN Rossi Hu MD        ipratropium-albuterol (DUONEB) nebulizer solution 1 ampule  1 ampule Inhalation Q4H WA Rossi Hu MD   1 ampule at 01/30/20 9989    hydrALAZINE (APRESOLINE) tablet 50 mg  50 mg Oral 3 times per day Rossi Hu MD   50 mg at 01/30/20 0724    niCARdipine (CARDENE) 25 mg in dextrose 5 % 250 mL infusion  5 mg/hr Intravenous Continuous Rossi Hu  mL/hr at 01/30/20 0722 10 mg/hr at 01/30/20 9556       Past Medical History:  Past Medical History:   Diagnosis Date  Asthma     Diabetes mellitus ([facility] [address]     Erectile dysfunction     Hypertension     Obesity        Past Surgical History:  Past Surgical History:   Procedure Laterality Date    COLONOSCOPY N/A 12/5/2019    Dr Shan Lerma, internal hemorrhoids-Grade 1-2-HP, 3 yr recall       Family History  History reviewed. No pertinent family history.     Social History  Social History     Socioeconomic History    Marital status: Single     Spouse name: Not on file    Number of children: Not on file    Years of education: Not on file    Highest education level: Not on file   Occupational History    Not on file   Social Needs    Financial resource strain: Not on file    Food insecurity:     Worry: Not on file     Inability: Not on file    Transportation needs:     Medical: Not on file     Non-medical: Not on file   Tobacco Use    Smoking status: Never Smoker    Smokeless tobacco: Never Used   Substance and Sexual Activity    Alcohol use: Yes     Comment: occ    Drug use: No    Sexual activity: Not on file   Lifestyle    Physical activity:     Days per week: Not on file     Minutes per session: Not on file    Stress: Not on file   Relationships    Social connections:     Talks on phone: Not on file     Gets together: Not on file     Attends Restorationist service: Not on file     Active member of club or organization: Not on file     Attends meetings of clubs or organizations: Not on file     Relationship status: Not on file    Intimate partner violence:     Fear of current or ex partner: Not on file     Emotionally abused: Not on file     Physically abused: Not on file     Forced sexual activity: Not on file   Other Topics Concern    Not on file   Social History Narrative    Not on file         Review of Systems:  History obtained from chart review and the patient  General ROS: No fever or chills  Respiratory ROS: No cough, shortness of breath, wheezing  Cardiovascular ROS: No chest pain or 01/29/20  2346    141   K 4.6 4.4    103   CO2 17* 13*   PHOS  --  6.6*   * 113*   CREATININE 14.8* 15.2*   CALCIUM 7.4* 7.1*     CBC:   Recent Labs     01/29/20  1555 01/30/20  0554   WBC 7.4 7.0   HGB 7.3* 7.3*   HCT 22.7* 22.6*   MCV 85.7 85.0    178     LIVER PROFILE:   Recent Labs     01/29/20  1703 01/29/20  2346   AST 12 13   ALT 9 10   LIPASE 49  --    BILITOT <0.2 <0.2   ALKPHOS 59 60     PT/INR:   Recent Labs     01/29/20  1703   PROTIME 15.2*   INR 1.26*     APTT:   Recent Labs     01/29/20  1703   APTT 36.5*     BNP:  No results for input(s): BNP in the last 72 hours. Ionized Calcium:No results for input(s): IONCA in the last 72 hours. Magnesium:No results for input(s): MG in the last 72 hours. Phosphorus:  Recent Labs     01/29/20 2346   PHOS 6.6*     HgbA1C:   Recent Labs     01/30/20  0554   LABA1C 5.9     Hepatic:   Recent Labs     01/29/20  1703 01/29/20  2346   ALKPHOS 59 60   ALT 9 10   AST 12 13   PROT 6.8 6.0*   BILITOT <0.2 <0.2   LABALBU 3.5 3.7     Lactic Acid:   Recent Labs     01/29/20  1555   LACTA 0.9     Troponin:   Recent Labs     01/29/20  1703   CKTOTAL 601*     ABGs: No results for input(s): PH, PCO2, PO2, HCO3, O2SAT in the last 72 hours. CRP:  No results for input(s): CRP in the last 72 hours. Sed Rate:  No results for input(s): SEDRATE in the last 72 hours. Cultures:   No results for input(s): CULTURE in the last 72 hours. No results for input(s): BC, Eda Junk in the last 72 hours. No results for input(s): CXSURG in the last 72 hours. Radiology reports as per the Radiologist  Radiology: Xr Chest Standard (2 Vw)    Result Date: 1/29/2020  XR CHEST (2 VW) 1/29/2020 2:30 PM Indication: Cough Comparison: 9/24/2017 Findings: Cardiac silhouette is borderline prominent but stable. 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 Mild central chronic bronchial wall thickening appears unchanged.  No pleural effusion, visible pneumothorax, or focal consolidation. No suspicious focal bone lesion. No acute findings or significant change. Signed by Dr Rohit Wagner on 1/29/2020 4:09 PM    Us Renal Complete    Result Date: 1/29/2020  RENAL ULTRASOUND COMPLETE 1/29/2020 5:00 PM REASON FOR EXAM: AK I  COMPARISON: None  TECHNIQUE: Multiple longitudinal and transverse realtime sonographic images of the kidneys and urinary bladder are obtained. FINDINGS: The right kidney measures 9.1 cm. The cortical thickness within the right kidney is 8 mm and 15 mm respectively in the upper and lower pole. The right kidney is normal in size, shape, contour and position. The cortical thickness is normal, with preservation of the corticomedullary differentiation. There is increased echogenicity of the renal cortex suggesting underlying medical renal disease. The central echo complex is compact with no evidence for hydronephrosis. No nephrolithiasis or abnormal perinephric fluid collections are seen. No hydroureter. The left kidney measures 9.0 cm. The cortical thickness within the left kidney is 13 mm  and 17 mm respectively in the upper and lower pole. The left kidney is normal in size, shape, contour and position. There is increased echogenicity of the renal parenchyma suggesting underlying medical renal disease. There is a cortical cyst involving the upper pole of the left kidney measuring 1.8 x 1.3 x 1.6 cm in size. The cortical thickness is normal, with preservation of the corticomedullary differentiation. The central echo complex is compact with no evidence for hydronephrosis. No nephrolithiasis or abnormal perinephric fluid collections are seen. No hydroureter. Scanning through the pelvis reveals the bladder to be moderately distended with anechoic urine. The wall thickness and contour are normal. There is no surrounding ascites. 1. Increased echogenicity of the renal cortex bilaterally suggesting medical renal disease.  There is no evidence of solid mass or

## 2020-01-31 ENCOUNTER — APPOINTMENT (OUTPATIENT)
Dept: INTERVENTIONAL RADIOLOGY/VASCULAR | Age: 50
DRG: 673 | End: 2020-01-31
Payer: COMMERCIAL

## 2020-01-31 PROBLEM — I50.31 ACUTE DIASTOLIC (CONGESTIVE) HEART FAILURE (HCC): Status: ACTIVE | Noted: 2020-01-31

## 2020-01-31 PROBLEM — E11.9 TYPE 2 DIABETES MELLITUS WITHOUT COMPLICATION, WITHOUT LONG-TERM CURRENT USE OF INSULIN (HCC): Chronic | Status: ACTIVE | Noted: 2020-01-29

## 2020-01-31 PROBLEM — I10 UNCONTROLLED HYPERTENSION: Chronic | Status: ACTIVE | Noted: 2017-05-10

## 2020-01-31 LAB
ALBUMIN SERPL-MCNC: 2.9 G/DL (ref 3.5–5.2)
ALP BLD-CCNC: 51 U/L (ref 40–130)
ALT SERPL-CCNC: 11 U/L (ref 5–41)
ANION GAP SERPL CALCULATED.3IONS-SCNC: 22 MMOL/L (ref 7–19)
AST SERPL-CCNC: 14 U/L (ref 5–40)
BASOPHILS ABSOLUTE: 0 K/UL (ref 0–0.2)
BASOPHILS RELATIVE PERCENT: 0.1 % (ref 0–1)
BILIRUB SERPL-MCNC: <0.2 MG/DL (ref 0.2–1.2)
BLOOD BANK DISPENSE STATUS: NORMAL
BLOOD BANK PRODUCT CODE: NORMAL
BPU ID: NORMAL
BUN BLDV-MCNC: 114 MG/DL (ref 6–20)
CALCIUM SERPL-MCNC: 7 MG/DL (ref 8.6–10)
CHLORIDE BLD-SCNC: 99 MMOL/L (ref 98–111)
CO2: 15 MMOL/L (ref 22–29)
CREAT SERPL-MCNC: 15.2 MG/DL (ref 0.5–1.2)
DESCRIPTION BLOOD BANK: NORMAL
EOSINOPHILS ABSOLUTE: 0.2 K/UL (ref 0–0.6)
EOSINOPHILS RELATIVE PERCENT: 2.8 % (ref 0–5)
GFR NON-AFRICAN AMERICAN: 3
GLUCOSE BLD-MCNC: 126 MG/DL (ref 74–109)
GLUCOSE BLD-MCNC: 128 MG/DL (ref 70–99)
GLUCOSE BLD-MCNC: 134 MG/DL (ref 70–99)
GLUCOSE BLD-MCNC: 136 MG/DL (ref 70–99)
GLUCOSE BLD-MCNC: 164 MG/DL (ref 70–99)
HAV IGM SER IA-ACNC: NORMAL
HBV SURFACE AB TITR SER: NORMAL {TITER}
HCT VFR BLD CALC: 20.4 % (ref 42–52)
HCT VFR BLD CALC: 24.3 % (ref 42–52)
HEMOGLOBIN: 6.5 G/DL (ref 14–18)
HEMOGLOBIN: 8 G/DL (ref 14–18)
HEPATITIS B CORE IGM ANTIBODY: NORMAL
HEPATITIS B SURFACE ANTIGEN INTERPRETATION: NORMAL
HEPATITIS C ANTIBODY INTERPRETATION: NORMAL
IMMATURE GRANULOCYTES #: 0.1 K/UL
LYMPHOCYTES ABSOLUTE: 1.4 K/UL (ref 1.1–4.5)
LYMPHOCYTES RELATIVE PERCENT: 17.7 % (ref 20–40)
MCH RBC QN AUTO: 27.4 PG (ref 27–31)
MCH RBC QN AUTO: 27.8 PG (ref 27–31)
MCHC RBC AUTO-ENTMCNC: 31.9 G/DL (ref 33–37)
MCHC RBC AUTO-ENTMCNC: 32.9 G/DL (ref 33–37)
MCV RBC AUTO: 84.4 FL (ref 80–94)
MCV RBC AUTO: 86.1 FL (ref 80–94)
MONOCYTES ABSOLUTE: 1.4 K/UL (ref 0–0.9)
MONOCYTES RELATIVE PERCENT: 17.5 % (ref 0–10)
NEUTROPHILS ABSOLUTE: 4.9 K/UL (ref 1.5–7.5)
NEUTROPHILS RELATIVE PERCENT: 61.1 % (ref 50–65)
PDW BLD-RTO: 13.1 % (ref 11.5–14.5)
PDW BLD-RTO: 13.2 % (ref 11.5–14.5)
PERFORMED ON: ABNORMAL
PLATELET # BLD: 188 K/UL (ref 130–400)
PLATELET # BLD: 213 K/UL (ref 130–400)
PMV BLD AUTO: 10 FL (ref 9.4–12.4)
PMV BLD AUTO: 9.9 FL (ref 9.4–12.4)
POTASSIUM SERPL-SCNC: 4.6 MMOL/L (ref 3.5–5)
RBC # BLD: 2.37 M/UL (ref 4.7–6.1)
RBC # BLD: 2.88 M/UL (ref 4.7–6.1)
S PYO THROAT QL CULT: NORMAL
SODIUM BLD-SCNC: 136 MMOL/L (ref 136–145)
TOTAL PROTEIN: 6.3 G/DL (ref 6.6–8.7)
WBC # BLD: 7.9 K/UL (ref 4.8–10.8)
WBC # BLD: 9.2 K/UL (ref 4.8–10.8)

## 2020-01-31 PROCEDURE — 6370000000 HC RX 637 (ALT 250 FOR IP): Performed by: INTERNAL MEDICINE

## 2020-01-31 PROCEDURE — 2580000003 HC RX 258: Performed by: INTERNAL MEDICINE

## 2020-01-31 PROCEDURE — 2100000000 HC CCU R&B

## 2020-01-31 PROCEDURE — 8010000000 HC HEMODIALYSIS ACUTE INPT

## 2020-01-31 PROCEDURE — 36558 INSERT TUNNELED CV CATH: CPT | Performed by: SURGERY

## 2020-01-31 PROCEDURE — 2580000003 HC RX 258: Performed by: NURSE PRACTITIONER

## 2020-01-31 PROCEDURE — 2500000003 HC RX 250 WO HCPCS: Performed by: INTERNAL MEDICINE

## 2020-01-31 PROCEDURE — 36430 TRANSFUSION BLD/BLD COMPNT: CPT

## 2020-01-31 PROCEDURE — 94640 AIRWAY INHALATION TREATMENT: CPT

## 2020-01-31 PROCEDURE — 85025 COMPLETE CBC W/AUTO DIFF WBC: CPT

## 2020-01-31 PROCEDURE — 02HV33Z INSERTION OF INFUSION DEVICE INTO SUPERIOR VENA CAVA, PERCUTANEOUS APPROACH: ICD-10-PCS | Performed by: SURGERY

## 2020-01-31 PROCEDURE — P9016 RBC LEUKOCYTES REDUCED: HCPCS

## 2020-01-31 PROCEDURE — 86706 HEP B SURFACE ANTIBODY: CPT

## 2020-01-31 PROCEDURE — 80053 COMPREHEN METABOLIC PANEL: CPT

## 2020-01-31 PROCEDURE — 80074 ACUTE HEPATITIS PANEL: CPT

## 2020-01-31 PROCEDURE — C1769 GUIDE WIRE: HCPCS

## 2020-01-31 PROCEDURE — 6360000002 HC RX W HCPCS: Performed by: SURGERY

## 2020-01-31 PROCEDURE — 77001 FLUOROGUIDE FOR VEIN DEVICE: CPT | Performed by: SURGERY

## 2020-01-31 PROCEDURE — 0JH63XZ INSERTION OF TUNNELED VASCULAR ACCESS DEVICE INTO CHEST SUBCUTANEOUS TISSUE AND FASCIA, PERCUTANEOUS APPROACH: ICD-10-PCS | Performed by: SURGERY

## 2020-01-31 PROCEDURE — 85027 COMPLETE CBC AUTOMATED: CPT

## 2020-01-31 PROCEDURE — 5A1D70Z PERFORMANCE OF URINARY FILTRATION, INTERMITTENT, LESS THAN 6 HOURS PER DAY: ICD-10-PCS | Performed by: INTERNAL MEDICINE

## 2020-01-31 PROCEDURE — 82948 REAGENT STRIP/BLOOD GLUCOSE: CPT

## 2020-01-31 PROCEDURE — 6360000002 HC RX W HCPCS: Performed by: INTERNAL MEDICINE

## 2020-01-31 PROCEDURE — 6360000002 HC RX W HCPCS

## 2020-01-31 PROCEDURE — 99254 IP/OBS CNSLTJ NEW/EST MOD 60: CPT | Performed by: NURSE PRACTITIONER

## 2020-01-31 PROCEDURE — 36415 COLL VENOUS BLD VENIPUNCTURE: CPT

## 2020-01-31 PROCEDURE — 6360000002 HC RX W HCPCS: Performed by: NURSE PRACTITIONER

## 2020-01-31 PROCEDURE — 2500000003 HC RX 250 WO HCPCS: Performed by: SURGERY

## 2020-01-31 PROCEDURE — 2700000000 HC OXYGEN THERAPY PER DAY

## 2020-01-31 PROCEDURE — 76937 US GUIDE VASCULAR ACCESS: CPT | Performed by: SURGERY

## 2020-01-31 RX ORDER — CHLORHEXIDINE GLUCONATE 4 G/100ML
SOLUTION TOPICAL ONCE
Status: DISCONTINUED | OUTPATIENT
Start: 2020-01-31 | End: 2020-02-05 | Stop reason: HOSPADM

## 2020-01-31 RX ORDER — FUROSEMIDE 10 MG/ML
INJECTION INTRAMUSCULAR; INTRAVENOUS
Status: COMPLETED
Start: 2020-01-31 | End: 2020-01-31

## 2020-01-31 RX ORDER — FUROSEMIDE 10 MG/ML
80 INJECTION INTRAMUSCULAR; INTRAVENOUS ONCE
Status: COMPLETED | OUTPATIENT
Start: 2020-01-31 | End: 2020-01-31

## 2020-01-31 RX ORDER — HYDROCODONE BITARTRATE AND ACETAMINOPHEN 5; 325 MG/1; MG/1
2 TABLET ORAL EVERY 4 HOURS PRN
Status: DISCONTINUED | OUTPATIENT
Start: 2020-01-31 | End: 2020-02-05 | Stop reason: HOSPADM

## 2020-01-31 RX ORDER — HYDROCODONE BITARTRATE AND ACETAMINOPHEN 5; 325 MG/1; MG/1
1 TABLET ORAL EVERY 4 HOURS PRN
Status: DISCONTINUED | OUTPATIENT
Start: 2020-01-31 | End: 2020-02-05 | Stop reason: HOSPADM

## 2020-01-31 RX ORDER — ONDANSETRON 2 MG/ML
4 INJECTION INTRAMUSCULAR; INTRAVENOUS EVERY 8 HOURS PRN
Status: DISCONTINUED | OUTPATIENT
Start: 2020-01-31 | End: 2020-02-05 | Stop reason: HOSPADM

## 2020-01-31 RX ORDER — HEPARIN SODIUM 5000 [USP'U]/ML
INJECTION, SOLUTION INTRAVENOUS; SUBCUTANEOUS
Status: COMPLETED | OUTPATIENT
Start: 2020-01-31 | End: 2020-01-31

## 2020-01-31 RX ORDER — 0.9 % SODIUM CHLORIDE 0.9 %
20 INTRAVENOUS SOLUTION INTRAVENOUS ONCE
Status: DISCONTINUED | OUTPATIENT
Start: 2020-01-31 | End: 2020-02-05 | Stop reason: HOSPADM

## 2020-01-31 RX ORDER — LIDOCAINE HYDROCHLORIDE 20 MG/ML
INJECTION, SOLUTION INFILTRATION; PERINEURAL
Status: COMPLETED | OUTPATIENT
Start: 2020-01-31 | End: 2020-01-31

## 2020-01-31 RX ADMIN — FAMOTIDINE 20 MG: 10 INJECTION INTRAVENOUS at 09:19

## 2020-01-31 RX ADMIN — IPRATROPIUM BROMIDE AND ALBUTEROL SULFATE 1 AMPULE: 2.5; .5 SOLUTION RESPIRATORY (INHALATION) at 06:28

## 2020-01-31 RX ADMIN — IPRATROPIUM BROMIDE AND ALBUTEROL SULFATE 1 AMPULE: 2.5; .5 SOLUTION RESPIRATORY (INHALATION) at 14:32

## 2020-01-31 RX ADMIN — IPRATROPIUM BROMIDE AND ALBUTEROL SULFATE 1 AMPULE: 2.5; .5 SOLUTION RESPIRATORY (INHALATION) at 18:02

## 2020-01-31 RX ADMIN — HEPARIN SODIUM 5000 UNITS: 5000 INJECTION, SOLUTION INTRAVENOUS; SUBCUTANEOUS at 15:43

## 2020-01-31 RX ADMIN — IPRATROPIUM BROMIDE AND ALBUTEROL SULFATE 1 AMPULE: 2.5; .5 SOLUTION RESPIRATORY (INHALATION) at 10:32

## 2020-01-31 RX ADMIN — CLONIDINE HYDROCHLORIDE 0.1 MG: 0.1 TABLET ORAL at 23:36

## 2020-01-31 RX ADMIN — HYDRALAZINE HYDROCHLORIDE 50 MG: 50 TABLET, FILM COATED ORAL at 07:48

## 2020-01-31 RX ADMIN — HEPARIN SODIUM 5000 UNITS: 5000 INJECTION INTRAVENOUS; SUBCUTANEOUS at 23:37

## 2020-01-31 RX ADMIN — HYDRALAZINE HYDROCHLORIDE 50 MG: 50 TABLET, FILM COATED ORAL at 14:22

## 2020-01-31 RX ADMIN — Medication: at 02:57

## 2020-01-31 RX ADMIN — FUROSEMIDE 40 MG: 10 INJECTION, SOLUTION INTRAMUSCULAR; INTRAVENOUS at 15:08

## 2020-01-31 RX ADMIN — FUROSEMIDE 80 MG: 10 INJECTION, SOLUTION INTRAMUSCULAR; INTRAVENOUS at 15:08

## 2020-01-31 RX ADMIN — HYDRALAZINE HYDROCHLORIDE 50 MG: 50 TABLET, FILM COATED ORAL at 23:36

## 2020-01-31 RX ADMIN — SODIUM CHLORIDE, PRESERVATIVE FREE 10 ML: 5 INJECTION INTRAVENOUS at 23:36

## 2020-01-31 RX ADMIN — HEPARIN SODIUM 5000 UNITS: 5000 INJECTION INTRAVENOUS; SUBCUTANEOUS at 07:48

## 2020-01-31 RX ADMIN — CLONIDINE HYDROCHLORIDE 0.1 MG: 0.1 TABLET ORAL at 09:19

## 2020-01-31 RX ADMIN — METOPROLOL TARTRATE 25 MG: 25 TABLET ORAL at 23:36

## 2020-01-31 RX ADMIN — CLONIDINE HYDROCHLORIDE 0.1 MG: 0.1 TABLET ORAL at 14:22

## 2020-01-31 RX ADMIN — NIFEDIPINE 60 MG: 60 TABLET, FILM COATED, EXTENDED RELEASE ORAL at 09:19

## 2020-01-31 RX ADMIN — NIFEDIPINE 60 MG: 60 TABLET, FILM COATED, EXTENDED RELEASE ORAL at 23:36

## 2020-01-31 RX ADMIN — METOPROLOL TARTRATE 25 MG: 25 TABLET ORAL at 09:19

## 2020-01-31 RX ADMIN — VANCOMYCIN HYDROCHLORIDE 1250 MG: 10 INJECTION, POWDER, LYOPHILIZED, FOR SOLUTION INTRAVENOUS at 14:45

## 2020-01-31 RX ADMIN — CALCIUM GLUCONATE 2 G: 98 INJECTION, SOLUTION INTRAVENOUS at 06:04

## 2020-01-31 RX ADMIN — HEPARIN SODIUM 5000 UNITS: 5000 INJECTION INTRAVENOUS; SUBCUTANEOUS at 14:22

## 2020-01-31 RX ADMIN — LIDOCAINE HYDROCHLORIDE 20 ML: 20 INJECTION, SOLUTION INFILTRATION; PERINEURAL at 15:47

## 2020-01-31 ASSESSMENT — PAIN SCALES - GENERAL
PAINLEVEL_OUTOF10: 0

## 2020-01-31 NOTE — PLAN OF CARE
Problem: Nausea/Vomiting:  Goal: Absence of nausea/vomiting  Description  Absence of nausea/vomiting  Outcome: Met This Shift  Goal: Able to drink  Description  Able to drink  Outcome: Met This Shift  Goal: Ability to achieve adequate nutritional intake will improve  Description  Ability to achieve adequate nutritional intake will improve  Outcome: Met This Shift     Problem: Pain:  Goal: Pain level will decrease  Description  Pain level will decrease  Outcome: Met This Shift  Goal: Control of chronic pain  Description  Control of chronic pain  Outcome: Met This Shift     Problem: Tissue Perfusion - Renal, Altered:  Goal: Electrolytes within specified parameters  Description  Electrolytes within specified parameters  Outcome: Ongoing  Goal: Urine creatinine clearance will be within specified parameters  Description  Urine creatinine clearance will be within specified parameters  Outcome: Ongoing  Goal: Serum creatinine will be within specified parameters  Description  Serum creatinine will be within specified parameters  Outcome: Ongoing  Goal: Ability to achieve a balanced intake and output will improve  Description  Ability to achieve a balanced intake and output will improve  Outcome: Ongoing     Problem: Nausea/Vomiting:  Goal: Able to eat  Description  Able to eat  Outcome: Ongoing     Problem: Pain:  Goal: Control of acute pain  Description  Control of acute pain  Outcome: Ongoing

## 2020-01-31 NOTE — OP NOTE
subcutaneous, 3-0 nylon sutures and dermabond skin adhesive. The exit site was secured around the catheter with 3-0 vicryl subcutaneous purstring suture. The catheter itself was secured to the chest with two 2-0 nylon sutures. Sterile dressings were placed. Both ports of the catheter aspirated and flushed easily with heparinized saline and heparin lock. The catheter is ready for use.

## 2020-01-31 NOTE — CONSULTS
ondansetron (ZOFRAN) injection 4 mg  4 mg Intravenous Q6H PRN Yogesh Tinoco MD   4 mg at 01/29/20 2256    heparin (porcine) injection 5,000 Units  5,000 Units Subcutaneous 3 times per day Yogesh Tinoco MD   5,000 Units at 01/31/20 0748    famotidine (PEPCID) injection 20 mg  20 mg Intravenous Daily Yogesh Tinoco MD   20 mg at 01/30/20 1120    senna (SENOKOT) tablet 8.6 mg  1 tablet Oral Daily PRN Yogesh Tinoco MD        acetaminophen (TYLENOL) tablet 650 mg  650 mg Oral Q4H PRN Yogesh Tinoco MD   650 mg at 01/30/20 2004    insulin lispro (HUMALOG) injection vial 0-6 Units  0-6 Units Subcutaneous TID WC Yogesh Tinoco MD   1 Units at 01/30/20 1743    insulin lispro (HUMALOG) injection vial 0-3 Units  0-3 Units Subcutaneous Nightly Yogesh Tinoco MD   1 Units at 01/30/20 2003    glucose (GLUTOSE) 40 % oral gel 15 g  15 g Oral PRN Yogesh Tinoco MD        dextrose 50 % IV solution  12.5 g Intravenous PRN Yogesh Tinoco MD        glucagon (rDNA) injection 1 mg  1 mg Intramuscular PRN Yogesh Tinoco MD        dextrose 5 % solution  100 mL/hr Intravenous PRN Yogesh Tinoco MD        ipratropium-albuterol (DUONEB) nebulizer solution 1 ampule  1 ampule Inhalation Q4H WA Yogesh Tinoco MD   1 ampule at 01/31/20 0664    hydrALAZINE (APRESOLINE) tablet 50 mg  50 mg Oral 3 times per day Yogesh Tinoco MD   50 mg at 01/31/20 2009     Allergies: Banana and Lisinopril  Past Medical History:   Diagnosis Date    Asthma     Diabetes mellitus ([facility] [address]     Erectile dysfunction     Hypertension     Obesity      Past Surgical History:   Procedure Laterality Date    COLONOSCOPY N/A 12/5/2019    Dr Zackary Alejo, internal hemorrhoids-Grade 1-2-HP, 3 yr recall     History reviewed. No pertinent family history.   Social History     Tobacco Use    Smoking status: Never Smoker    Smokeless tobacco: Never Used   Substance Use Topics    Alcohol use: Yes     Comment: occ       Old records have been obtained from the referring provider. These records have been reviewed and summarized. Review of Systems    Constitutional - No significant activity change, appetite change, or unexpected weight change. No fever or chills. No diaphoresis or significant fatigue. HENT - No significant rhinorrhea or epistaxis. No tinnitus or significant hearing loss. Eyes - No sudden vision change or amaurosis. Respiratory - No wheezing, or stridor. No apnea, cough, or chest tightness associated with shortness of breath. Shortness of breath on admission. Cardiovascular - No chest pain, syncope, or significant dizziness. No palpitations or significant leg swelling. No claudication. Gastrointestinal - No abdominal swelling or pain. No blood in stool. No further nausea/vomiting or diarrhea since admission. Genitourinary - No dysuria, frequency, or urgency. No flank pain or hematuria. Musculoskeletal - No back pain, gait disturbance, or myalgia. Skin - No color change, rash, pallor, or new wound. Neurologic - No dizziness, facial asymmetry, or light headedness. No seizures. No speech difficulty or lateralizing weakness. Hematologic - No easy bruising or excessive bleeding. Psychiatric - No severe anxiety or nervousness. No confusion. All other review of systems are negative. Physical Exam    /64   Pulse 95   Temp 98.3 °F (36.8 °C) (Temporal)   Resp 21   Ht 5' 8\" (1.727 m)   Wt 270 lb 8 oz (122.7 kg)   SpO2 92%   BMI 41.13 kg/m²     Constitutional - Well developed, well nourished. No diaphoresis or acute distress. HEENT - Normocephalic. Atraumatic. NIHARIKA. Neck - Supple. No JVD. Trachea midline. No carotid bruits. Chest - Clear bilateral breath sounds without wheezes or rhonchi. Cardiovascular - Normal HT with RRR. No murmurs, gallops or rubs. Abdomen - Large, soft, non-tender with active bowel sounds. Extremities - No cyanosis, clubbing, or significant edema. Pedal pulses palpated.   Musculoskeletal

## 2020-01-31 NOTE — PROGRESS NOTES
[facility]        Hospitalist Progress Note  1/31/2020 7:36 AM  Subjective:   Admit Date: 1/29/2020  PCP: Kimberly Shaikh MD    Chief Complaint: Cough/Dyspnea    Subjective: Patient seen and examined at bedside in the CCU. Denies chest pain or shortness of breath. NAD. Cumulative Hospital History: 58-year-old male presenting for cough and dyspnea found to have acute renal failure and hypertensive urgency requiring Cardene drip on admission. Nephrology consulted on admission. Weaned off cardene 01/30/2020 and transferred out of CCU.    ROS: 14 point review of systems is negative except as specifically addressed above. DIET RENAL;     Intake/Output Summary (Last 24 hours) at 1/31/2020 0736  Last data filed at 1/31/2020 0400  Gross per 24 hour   Intake 4879.83 ml   Output 1650 ml   Net 3229.83 ml     Medications:   IV infusion builder 150 mL/hr at 01/31/20 0257    dextrose       Current Facility-Administered Medications   Medication Dose Route Frequency Provider Last Rate Last Dose    0.9 % sodium chloride bolus  20 mL Intravenous Once Naveed Sexton MD        calcium gluconate 2 g in dextrose 5 % 100 mL IVPB  2 g Intravenous Once Naveed Sexton MD 50 mL/hr at 01/31/20 0604 2 g at 01/31/20 0604    metoprolol tartrate (LOPRESSOR) tablet 25 mg  25 mg Oral BID Alissa Cuellar MD   25 mg at 01/30/20 2004    NIFEdipine (PROCARDIA XL) extended release tablet 60 mg  60 mg Oral BID Sandor Chow MD   60 mg at 01/30/20 2004    cloNIDine (CATAPRES) tablet 0.1 mg  0.1 mg Oral TID Sandor Chow MD   0.1 mg at 01/30/20 2004    sodium bicarbonate 75 mEq in sodium chloride 0.45 % 1,000 mL infusion   Intravenous Continuous Sandor Chow  mL/hr at 01/31/20 0257      sodium chloride flush 0.9 % injection 10 mL  10 mL Intravenous 2 times per day Alissa Cuellar MD   10 mL at 01/30/20 2004    sodium chloride flush 0.9 % injection 10 mL  10 mL Intravenous PRN Alissa Cuellar MD        ondansetron [facility] injection 4 mg  4 mg Intravenous Q6H PRN Jennifer Suarez MD   4 mg at 01/29/20 2256    heparin (porcine) injection 5,000 Units  5,000 Units Subcutaneous 3 times per day Jennifer Suarez MD   5,000 Units at 01/30/20 2202    famotidine (PEPCID) injection 20 mg  20 mg Intravenous Daily Jennifer Suarez MD   20 mg at 01/30/20 1120    senna (SENOKOT) tablet 8.6 mg  1 tablet Oral Daily PRN Jennifer Suarez MD        acetaminophen (TYLENOL) tablet 650 mg  650 mg Oral Q4H PRN Jennifer Suarez MD   650 mg at 01/30/20 2004    insulin lispro (HUMALOG) injection vial 0-6 Units  0-6 Units Subcutaneous TID WC Jennifer Suarez MD   1 Units at 01/30/20 1743    insulin lispro (HUMALOG) injection vial 0-3 Units  0-3 Units Subcutaneous Nightly Jennifer Suarez MD   1 Units at 01/30/20 2003    glucose (GLUTOSE) 40 % oral gel 15 g  15 g Oral PRN Jennifer Suarez MD        dextrose 50 % IV solution  12.5 g Intravenous PRN Jennifer Suarez MD        glucagon (rDNA) injection 1 mg  1 mg Intramuscular PRN Jennifer Suarez MD        dextrose 5 % solution  100 mL/hr Intravenous PRN Jennifer Suarez MD        ipratropium-albuterol (DUONEB) nebulizer solution 1 ampule  1 ampule Inhalation Q4H WA Jennifer Suarez MD   1 ampule at 01/31/20 1268    hydrALAZINE (APRESOLINE) tablet 50 mg  50 mg Oral 3 times per day Jennifer Suarez MD   50 mg at 01/30/20 2202        Labs:     Recent Labs     01/29/20  1555 01/30/20  0554 01/31/20  0310   WBC 7.4 7.0 7.9   RBC 2.65* 2.66* 2.37*   HGB 7.3* 7.3* 6.5*   HCT 22.7* 22.6* 20.4*   MCV 85.7 85.0 86.1   MCH 27.5 27.4 27.4   MCHC 32.2* 32.3* 31.9*    178 188     Recent Labs     01/29/20  1703 01/29/20  2346 01/30/20  0944 01/31/20  0310    141  --  136   K 4.6 4.4  --  4.6   ANIONGAP 19 25*  --  22*    103  --  99   CO2 17* 13*  --  15*   * 113*  --  114*   CREATININE 14.8* 15.2*  --  15.2*   GLUCOSE 103 143*  --  126*   CALCIUM 7.4* 7.1* 7.5* 7.0*     Recent Labs     01/29/20  2346   PHOS 6.6*     Recent Labs

## 2020-01-31 NOTE — PROGRESS NOTES
Nephrology ([facility]) Progress Note      Patient:  Claudia Bray  YOB: 1970  Date of Service: 1/31/2020  MRN: [id number]   Acct: [de-identified]   Primary Care Physician: Nolan Celaya MD  Advance Directive: Full Code  Admit Date: 1/29/2020       Hospital Day: 2  Referring Provider: Jennifer Suarez MD    Patient independently seen and examined, Chart, Consults, Notes, Operative notes, Labs, Cardiology, and Radiology studies reviewed as able. Chief complaint: Abnormal labs. Subjective:  Claudia Bray is a 47 y.o. male  whom we were consulted for acute kidney injury. Patient denies any history of chronic kidney disease. He has history of benign essential hypertension, type 2 diabetes and hyperlipidemia. Presented to the emergency room complaining of fever productive cough and yellow sputum. Chest x-ray did not show any evidence of pneumonia or fluid overload. Patient denies any recent use of nonsteroidal agents. He reports watery bowel movements/diarrhea for couple days associated with nausea and vomiting. On arrival in the emergency room his blood pressure was 200/110 mmHg. Patient was also found to have a serum creatinine of 18 mg with blood urea nitrogen more than 100. Hospital course remarkable for IV fluid resuscitation in the emergency room and continuous IV fluid during the night. He has good urine output but no improvement of renal function. Renal ultrasound consistent with bilateral echogenic kidneys explaining chronic kidney disease. Patient has no respons to aggressive IV fluid resuscitation. This morning he is complaining of mild shortness of breath. His blood pressure has improved and has been off the Cardene. He agreed to proceed with dialysis.     Allergies:  Banana and Lisinopril    Medicines:  Current Facility-Administered Medications   Medication Dose Route Frequency Provider Last Rate Last Dose    0.9 % sodium chloride bolus  20 mL Mayelin Omalley MD        dextrose 5 % solution  100 mL/hr Intravenous PRN Rosette Osborne MD        ipratropium-albuterol (DUONEB) nebulizer solution 1 ampule  1 ampule Inhalation Q4H WA Rosette Osborne MD   1 ampule at 01/31/20 7531    hydrALAZINE (APRESOLINE) tablet 50 mg  50 mg Oral 3 times per day Rosette Osborne MD   50 mg at 01/31/20 8351       Past Medical History:  Past Medical History:   Diagnosis Date    Asthma     Diabetes mellitus ([facility] [address]     Erectile dysfunction     Hypertension     Obesity        Past Surgical History:  Past Surgical History:   Procedure Laterality Date    COLONOSCOPY N/A 12/5/2019    Dr Ender Barnard, internal hemorrhoids-Grade 1-2-HP, 3 yr recall       Family History  History reviewed. No pertinent family history.     Social History  Social History     Socioeconomic History    Marital status: Single     Spouse name: Not on file    Number of children: Not on file    Years of education: Not on file    Highest education level: Not on file   Occupational History    Not on file   Social Needs    Financial resource strain: Not on file    Food insecurity:     Worry: Not on file     Inability: Not on file    Transportation needs:     Medical: Not on file     Non-medical: Not on file   Tobacco Use    Smoking status: Never Smoker    Smokeless tobacco: Never Used   Substance and Sexual Activity    Alcohol use: Yes     Comment: occ    Drug use: No    Sexual activity: Not on file   Lifestyle    Physical activity:     Days per week: Not on file     Minutes per session: Not on file    Stress: Not on file   Relationships    Social connections:     Talks on phone: Not on file     Gets together: Not on file     Attends Baptism service: Not on file     Active member of club or organization: Not on file     Attends meetings of clubs or organizations: Not on file     Relationship status: Not on file    Intimate partner violence:     Fear of current or ex partner: Not on file Emotionally abused: Not on file     Physically abused: Not on file     Forced sexual activity: Not on file   Other Topics Concern    Not on file   Social History Narrative    Not on file         Review of Systems:  History obtained from chart review and the patient  General ROS: No fever or chills  Respiratory ROS: No cough, shortness of breath, wheezing  Cardiovascular ROS: No chest pain or palpitations  Gastrointestinal ROS: positive for - abdominal pain and nausea/vomiting  Genito-Urinary ROS: No dysuria or hematuria  Musculoskeletal ROS: No joint pain or swelling   14 point ROS reviewed with the patient and negative except as noted above and in the HPI unless unable to obtain.     Objective:  Patient Vitals for the past 24 hrs:   BP Temp Temp src Pulse Resp SpO2 Weight   01/31/20 0800 128/64 98.3 °F (36.8 °C) Temporal 95 21 92 % --   01/31/20 0721 (!) 168/72 98.1 °F (36.7 °C) -- 96 18 -- --   01/31/20 0700 (!) 165/74 -- -- 96 20 93 % --   01/31/20 0600 (!) 147/71 -- -- 89 19 92 % --   01/31/20 0500 122/68 -- -- 83 21 96 % --   01/31/20 0400 131/68 97.6 °F (36.4 °C) Temporal 85 19 91 % 270 lb 8 oz (122.7 kg)   01/31/20 0300 123/63 -- -- 78 15 92 % --   01/31/20 0200 132/62 -- -- 80 20 96 % --   01/31/20 0100 113/68 -- -- 85 23 94 % --   01/31/20 0000 102/62 97.8 °F (36.6 °C) Temporal 93 24 90 % --   01/30/20 2300 (!) 110/52 -- -- 82 19 92 % --   01/30/20 2245 -- -- -- -- -- (!) 88 % --   01/30/20 2202 121/67 -- -- -- -- -- --   01/30/20 2200 121/67 -- -- 77 26 (!) 89 % --   01/30/20 2100 122/68 -- -- 87 25 93 % --   01/30/20 2003 (!) 121/59 -- -- -- -- -- --   01/30/20 2000 (!) 121/59 98.2 °F (36.8 °C) Temporal 90 25 92 % --   01/30/20 1900 (!) 143/71 -- -- 92 23 95 % --   01/30/20 1800 (!) 130/98 -- -- 97 18 91 % --   01/30/20 1600 126/60 98.5 °F (36.9 °C) Temporal 86 26 91 % --   01/30/20 1500 (!) 113/57 -- -- 82 26 94 % --   01/30/20 1400 125/62 -- -- 90 15 90 % --   01/30/20 1300 116/69 -- -- 90 18 95 % --   01/30/20 1200 136/80 100.4 °F (38 °C) Temporal 96 24 95 % --   01/30/20 1100 134/72 -- -- 92 22 95 % --       Intake/Output Summary (Last 24 hours) at 1/31/2020 1022  Last data filed at 1/31/2020 0800  Gross per 24 hour   Intake 5339.83 ml   Output 1950 ml   Net 3389.83 ml     General: awake/alert   HEENT: Normocephalic atraumatic head  Neck: Supple with no JVD or carotid bruits. Chest:  clear to auscultation bilaterally without respiratory distress  CVS: regular rate and rhythm  Abdominal: soft, nontender, normal bowel sounds  Extremities: no cyanosis or edema  Skin: warm and dry without rash      Labs:  BMP:   Recent Labs     01/29/20 1703 01/29/20 2346 01/30/20  0944 01/31/20  0310    141  --  136   K 4.6 4.4  --  4.6    103  --  99   CO2 17* 13*  --  15*   PHOS  --  6.6*  --   --    * 113*  --  114*   CREATININE 14.8* 15.2*  --  15.2*   CALCIUM 7.4* 7.1* 7.5* 7.0*     CBC:   Recent Labs     01/29/20  1555 01/30/20  0554 01/31/20  0310   WBC 7.4 7.0 7.9   HGB 7.3* 7.3* 6.5*   HCT 22.7* 22.6* 20.4*   MCV 85.7 85.0 86.1    178 188     LIVER PROFILE:   Recent Labs     01/29/20 1703 01/29/20 2346 01/31/20  0310   AST 12 13 14   ALT 9 10 11   LIPASE 49  --   --    BILITOT <0.2 <0.2 <0.2   ALKPHOS 59 60 51     PT/INR:   Recent Labs     01/29/20 1703   PROTIME 15.2*   INR 1.26*     APTT:   Recent Labs     01/29/20 1703   APTT 36.5*     BNP:  No results for input(s): BNP in the last 72 hours. Ionized Calcium:No results for input(s): IONCA in the last 72 hours. Magnesium:No results for input(s): MG in the last 72 hours.   Phosphorus:  Recent Labs     01/29/20  2346   PHOS 6.6*     HgbA1C:   Recent Labs     01/30/20  0554   LABA1C 5.9     Hepatic:   Recent Labs     01/29/20  1703 01/29/20  2346 01/31/20  0310   ALKPHOS 59 60 51   ALT 9 10 11   AST 12 13 14   PROT 6.8 6.0* 6.3*   BILITOT <0.2 <0.2 <0.2   LABALBU 3.5 3.7 2.9*     Lactic Acid:   Recent Labs     01/29/20  1555   LACTA 0.9 Troponin:   Recent Labs     01/29/20  1703   CKTOTAL 601*     ABGs: No results for input(s): PH, PCO2, PO2, HCO3, O2SAT in the last 72 hours. CRP:  No results for input(s): CRP in the last 72 hours. Sed Rate:  No results for input(s): SEDRATE in the last 72 hours. Cultures:   No results for input(s): CULTURE in the last 72 hours. No results for input(s): BC, [place]  in the last 72 hours. No results for input(s): CXSURG in the last 72 hours. Radiology reports as per the Radiologist  Radiology: Xr Chest Standard (2 Vw)    Result Date: 1/29/2020  XR CHEST (2 VW) 1/29/2020 2:30 PM Indication: Cough Comparison: 9/24/2017 Findings: Cardiac silhouette is borderline prominent but stable. 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 Mild central chronic bronchial wall thickening appears unchanged. No pleural effusion, visible pneumothorax, or focal consolidation. No suspicious focal bone lesion. No acute findings or significant change. Signed by Dr Ivette Bui on 1/29/2020 4:09 PM    Us Renal Complete    Result Date: 1/29/2020  RENAL ULTRASOUND COMPLETE 1/29/2020 5:00 PM REASON FOR EXAM: AK I  COMPARISON: None  TECHNIQUE: Multiple longitudinal and transverse realtime sonographic images of the kidneys and urinary bladder are obtained. FINDINGS: The right kidney measures 9.1 cm. The cortical thickness within the right kidney is 8 mm and 15 mm respectively in the upper and lower pole. The right kidney is normal in size, shape, contour and position. The cortical thickness is normal, with preservation of the corticomedullary differentiation. There is increased echogenicity of the renal cortex suggesting underlying medical renal disease. The central echo complex is compact with no evidence for hydronephrosis. No nephrolithiasis or abnormal perinephric fluid collections are seen. No hydroureter. The left kidney measures 9.0 cm.  The cortical thickness within the left kidney is 13 mm  and 17 mm

## 2020-02-01 ENCOUNTER — APPOINTMENT (OUTPATIENT)
Dept: GENERAL RADIOLOGY | Age: 50
DRG: 673 | End: 2020-02-01
Payer: COMMERCIAL

## 2020-02-01 LAB
ALBUMIN SERPL-MCNC: 3.2 G/DL (ref 3.5–5.2)
ALP BLD-CCNC: 58 U/L (ref 40–130)
ALT SERPL-CCNC: 14 U/L (ref 5–41)
ANA IGG, ELISA: NORMAL
ANION GAP SERPL CALCULATED.3IONS-SCNC: 17 MMOL/L (ref 7–19)
ANION GAP SERPL CALCULATED.3IONS-SCNC: 20 MMOL/L (ref 7–19)
AST SERPL-CCNC: 19 U/L (ref 5–40)
BASOPHILS ABSOLUTE: 0 K/UL (ref 0–0.2)
BASOPHILS RELATIVE PERCENT: 0.1 % (ref 0–1)
BILIRUB SERPL-MCNC: 0.4 MG/DL (ref 0.2–1.2)
BUN BLDV-MCNC: 35 MG/DL (ref 6–20)
BUN BLDV-MCNC: 72 MG/DL (ref 6–20)
C3 COMPLEMENT: 119 MG/DL (ref 88–201)
C4 COMPLEMENT: 44 MG/DL (ref 10–40)
CALCIUM SERPL-MCNC: 7.5 MG/DL (ref 8.6–10)
CALCIUM SERPL-MCNC: 8.2 MG/DL (ref 8.6–10)
CHLORIDE BLD-SCNC: 95 MMOL/L (ref 98–111)
CHLORIDE BLD-SCNC: 97 MMOL/L (ref 98–111)
CO2: 23 MMOL/L (ref 22–29)
CO2: 23 MMOL/L (ref 22–29)
CREAT SERPL-MCNC: 10.9 MG/DL (ref 0.5–1.2)
CREAT SERPL-MCNC: 7 MG/DL (ref 0.5–1.2)
EOSINOPHILS ABSOLUTE: 0.1 K/UL (ref 0–0.6)
EOSINOPHILS RELATIVE PERCENT: 0.8 % (ref 0–5)
GFR NON-AFRICAN AMERICAN: 5
GFR NON-AFRICAN AMERICAN: 8
GLUCOSE BLD-MCNC: 112 MG/DL (ref 70–99)
GLUCOSE BLD-MCNC: 123 MG/DL (ref 70–99)
GLUCOSE BLD-MCNC: 130 MG/DL (ref 74–109)
GLUCOSE BLD-MCNC: 165 MG/DL (ref 70–99)
GLUCOSE BLD-MCNC: 197 MG/DL (ref 74–109)
GLUCOSE BLD-MCNC: 198 MG/DL (ref 70–99)
HCT VFR BLD CALC: 23.4 % (ref 42–52)
HEMOGLOBIN: 7.9 G/DL (ref 14–18)
IMMATURE GRANULOCYTES #: 0.1 K/UL
LYMPHOCYTES ABSOLUTE: 1.1 K/UL (ref 1.1–4.5)
LYMPHOCYTES RELATIVE PERCENT: 11.8 % (ref 20–40)
MAGNESIUM: 1.6 MG/DL (ref 1.6–2.6)
MCH RBC QN AUTO: 28.1 PG (ref 27–31)
MCHC RBC AUTO-ENTMCNC: 33.8 G/DL (ref 33–37)
MCV RBC AUTO: 83.3 FL (ref 80–94)
MONOCYTES ABSOLUTE: 1.5 K/UL (ref 0–0.9)
MONOCYTES RELATIVE PERCENT: 16.5 % (ref 0–10)
NEUTROPHILS ABSOLUTE: 6.3 K/UL (ref 1.5–7.5)
NEUTROPHILS RELATIVE PERCENT: 69.7 % (ref 50–65)
PDW BLD-RTO: 13.1 % (ref 11.5–14.5)
PERFORMED ON: ABNORMAL
PLATELET # BLD: 213 K/UL (ref 130–400)
PMV BLD AUTO: 10.2 FL (ref 9.4–12.4)
POTASSIUM SERPL-SCNC: 4 MMOL/L (ref 3.5–5)
POTASSIUM SERPL-SCNC: 4 MMOL/L (ref 3.5–5)
RBC # BLD: 2.81 M/UL (ref 4.7–6.1)
RHEUMATOID FACTOR: 15 IU/ML (ref 0–14)
SODIUM BLD-SCNC: 135 MMOL/L (ref 136–145)
SODIUM BLD-SCNC: 140 MMOL/L (ref 136–145)
TOTAL PROTEIN: 5.9 G/DL (ref 6.6–8.7)
WBC # BLD: 9 K/UL (ref 4.8–10.8)

## 2020-02-01 PROCEDURE — 6370000000 HC RX 637 (ALT 250 FOR IP): Performed by: INTERNAL MEDICINE

## 2020-02-01 PROCEDURE — 83735 ASSAY OF MAGNESIUM: CPT

## 2020-02-01 PROCEDURE — 93970 EXTREMITY STUDY: CPT

## 2020-02-01 PROCEDURE — 80053 COMPREHEN METABOLIC PANEL: CPT

## 2020-02-01 PROCEDURE — 6360000002 HC RX W HCPCS

## 2020-02-01 PROCEDURE — 2700000000 HC OXYGEN THERAPY PER DAY

## 2020-02-01 PROCEDURE — 6360000002 HC RX W HCPCS: Performed by: INTERNAL MEDICINE

## 2020-02-01 PROCEDURE — 8010000000 HC HEMODIALYSIS ACUTE INPT

## 2020-02-01 PROCEDURE — 2100000000 HC CCU R&B

## 2020-02-01 PROCEDURE — 85025 COMPLETE CBC W/AUTO DIFF WBC: CPT

## 2020-02-01 PROCEDURE — 82948 REAGENT STRIP/BLOOD GLUCOSE: CPT

## 2020-02-01 PROCEDURE — 99024 POSTOP FOLLOW-UP VISIT: CPT | Performed by: NURSE PRACTITIONER

## 2020-02-01 PROCEDURE — 94640 AIRWAY INHALATION TREATMENT: CPT

## 2020-02-01 PROCEDURE — 2580000003 HC RX 258: Performed by: INTERNAL MEDICINE

## 2020-02-01 PROCEDURE — 2500000003 HC RX 250 WO HCPCS: Performed by: INTERNAL MEDICINE

## 2020-02-01 PROCEDURE — 36415 COLL VENOUS BLD VENIPUNCTURE: CPT

## 2020-02-01 PROCEDURE — 71045 X-RAY EXAM CHEST 1 VIEW: CPT

## 2020-02-01 RX ORDER — CYCLOBENZAPRINE HCL 10 MG
10 TABLET ORAL 3 TIMES DAILY PRN
Status: DISCONTINUED | OUTPATIENT
Start: 2020-02-01 | End: 2020-02-05 | Stop reason: HOSPADM

## 2020-02-01 RX ORDER — MORPHINE SULFATE 4 MG/ML
INJECTION, SOLUTION INTRAMUSCULAR; INTRAVENOUS
Status: COMPLETED
Start: 2020-02-01 | End: 2020-02-01

## 2020-02-01 RX ORDER — BUDESONIDE 0.5 MG/2ML
0.5 INHALANT ORAL 2 TIMES DAILY
Status: DISCONTINUED | OUTPATIENT
Start: 2020-02-01 | End: 2020-02-05 | Stop reason: HOSPADM

## 2020-02-01 RX ORDER — MORPHINE SULFATE 4 MG/ML
2 INJECTION, SOLUTION INTRAMUSCULAR; INTRAVENOUS EVERY 4 HOURS PRN
Status: DISCONTINUED | OUTPATIENT
Start: 2020-02-01 | End: 2020-02-05 | Stop reason: HOSPADM

## 2020-02-01 RX ORDER — CARBOXYMETHYLCELLULOSE SODIUM 5 MG/ML
1 SOLUTION/ DROPS OPHTHALMIC 3 TIMES DAILY
Status: DISCONTINUED | OUTPATIENT
Start: 2020-02-01 | End: 2020-02-05 | Stop reason: HOSPADM

## 2020-02-01 RX ADMIN — CYCLOBENZAPRINE HYDROCHLORIDE 10 MG: 10 TABLET, FILM COATED ORAL at 20:20

## 2020-02-01 RX ADMIN — IPRATROPIUM BROMIDE AND ALBUTEROL SULFATE 1 AMPULE: 2.5; .5 SOLUTION RESPIRATORY (INHALATION) at 18:44

## 2020-02-01 RX ADMIN — IPRATROPIUM BROMIDE AND ALBUTEROL SULFATE 1 AMPULE: 2.5; .5 SOLUTION RESPIRATORY (INHALATION) at 14:03

## 2020-02-01 RX ADMIN — METOPROLOL TARTRATE 25 MG: 25 TABLET ORAL at 11:54

## 2020-02-01 RX ADMIN — VANCOMYCIN HYDROCHLORIDE 1500 MG: 1 INJECTION, POWDER, LYOPHILIZED, FOR SOLUTION INTRAVENOUS at 17:45

## 2020-02-01 RX ADMIN — FAMOTIDINE 20 MG: 10 INJECTION INTRAVENOUS at 11:57

## 2020-02-01 RX ADMIN — MORPHINE SULFATE 2 MG: 4 INJECTION, SOLUTION INTRAMUSCULAR; INTRAVENOUS at 15:58

## 2020-02-01 RX ADMIN — Medication 1 G: at 17:45

## 2020-02-01 RX ADMIN — NIFEDIPINE 60 MG: 60 TABLET, FILM COATED, EXTENDED RELEASE ORAL at 21:13

## 2020-02-01 RX ADMIN — NIFEDIPINE 60 MG: 60 TABLET, FILM COATED, EXTENDED RELEASE ORAL at 11:54

## 2020-02-01 RX ADMIN — IPRATROPIUM BROMIDE AND ALBUTEROL SULFATE 1 AMPULE: 2.5; .5 SOLUTION RESPIRATORY (INHALATION) at 09:45

## 2020-02-01 RX ADMIN — BUDESONIDE 500 MCG: 0.5 INHALANT RESPIRATORY (INHALATION) at 18:44

## 2020-02-01 RX ADMIN — CLONIDINE HYDROCHLORIDE 0.1 MG: 0.1 TABLET ORAL at 13:38

## 2020-02-01 RX ADMIN — CARBOXYMETHYLCELLULOSE SODIUM 1 DROP: 5 SOLUTION/ DROPS OPHTHALMIC at 21:14

## 2020-02-01 RX ADMIN — MORPHINE SULFATE 2 MG: 4 INJECTION, SOLUTION INTRAMUSCULAR; INTRAVENOUS at 21:04

## 2020-02-01 RX ADMIN — IPRATROPIUM BROMIDE AND ALBUTEROL SULFATE 1 AMPULE: 2.5; .5 SOLUTION RESPIRATORY (INHALATION) at 06:05

## 2020-02-01 RX ADMIN — SODIUM CHLORIDE, PRESERVATIVE FREE 10 ML: 5 INJECTION INTRAVENOUS at 21:10

## 2020-02-01 RX ADMIN — HEPARIN SODIUM 5000 UNITS: 5000 INJECTION INTRAVENOUS; SUBCUTANEOUS at 21:14

## 2020-02-01 RX ADMIN — METOPROLOL TARTRATE 25 MG: 25 TABLET ORAL at 21:14

## 2020-02-01 RX ADMIN — HYDRALAZINE HYDROCHLORIDE 50 MG: 50 TABLET, FILM COATED ORAL at 13:38

## 2020-02-01 RX ADMIN — HYDRALAZINE HYDROCHLORIDE 50 MG: 50 TABLET, FILM COATED ORAL at 05:56

## 2020-02-01 RX ADMIN — ACETAMINOPHEN 650 MG: 325 TABLET ORAL at 13:38

## 2020-02-01 RX ADMIN — CYCLOBENZAPRINE HYDROCHLORIDE 10 MG: 10 TABLET, FILM COATED ORAL at 14:13

## 2020-02-01 RX ADMIN — SODIUM CHLORIDE, PRESERVATIVE FREE 10 ML: 5 INJECTION INTRAVENOUS at 12:11

## 2020-02-01 RX ADMIN — HEPARIN SODIUM 5000 UNITS: 5000 INJECTION INTRAVENOUS; SUBCUTANEOUS at 05:56

## 2020-02-01 RX ADMIN — HEPARIN SODIUM 5000 UNITS: 5000 INJECTION INTRAVENOUS; SUBCUTANEOUS at 13:39

## 2020-02-01 ASSESSMENT — PAIN SCALES - GENERAL
PAINLEVEL_OUTOF10: 0
PAINLEVEL_OUTOF10: 9
PAINLEVEL_OUTOF10: 0
PAINLEVEL_OUTOF10: 0
PAINLEVEL_OUTOF10: 7
PAINLEVEL_OUTOF10: 8
PAINLEVEL_OUTOF10: 2
PAINLEVEL_OUTOF10: 0
PAINLEVEL_OUTOF10: 0

## 2020-02-01 ASSESSMENT — PAIN DESCRIPTION - DESCRIPTORS
DESCRIPTORS: SPASM;CRAMPING
DESCRIPTORS: CRAMPING;SPASM

## 2020-02-01 ASSESSMENT — PAIN DESCRIPTION - LOCATION: LOCATION: CHEST

## 2020-02-01 ASSESSMENT — PAIN DESCRIPTION - PAIN TYPE
TYPE: ACUTE PAIN
TYPE: ACUTE PAIN

## 2020-02-01 ASSESSMENT — PAIN DESCRIPTION - ONSET: ONSET: PROGRESSIVE

## 2020-02-01 ASSESSMENT — PAIN DESCRIPTION - FREQUENCY: FREQUENCY: INTERMITTENT

## 2020-02-01 NOTE — PROGRESS NOTES
Pt's cramps/pain to chest/back and sides progressively worsening despite tylenol and Flexeril. No pain or cramps noted to extremities. EKG indicated no acute changes. Dr. Edi Collins updated, orders received. .Electronically signed by Ashwini Asif RN on 2/1/2020 at 3:55 PM

## 2020-02-01 NOTE — PROGRESS NOTES
Pharmacy Note  Vancomycin Consult    Matt Mason is a 47 y.o. male started on Vancomycin for pneumonia; consult received from Dr. Atilio Mason to manage therapy. Also receiving the following antibiotics: Cefepime. Patient Active Problem List   Diagnosis    Uncontrolled hypertension    Mild intermittent asthma without complication    Family history of prostate cancer    Morbid obesity due to excess calories ([facility] [address]    Acute renal failure ([facility] [address]    Type 2 diabetes mellitus without complication, without long-term current use of insulin (HCC)    Acute diastolic (congestive) heart failure (HCC)    RUPAL (acute kidney injury) ([facility] [address]       Allergies:  Banana and Lisinopril     Temp max: 99.6    Recent Labs     02/01/20  0124 02/01/20  1555   BUN 72* 35*       Recent Labs     02/01/20  0124 02/01/20  1555   CREATININE 10.9* 7.0*       Recent Labs     01/31/20  1350 02/01/20  0304   WBC 9.2 9.0         Intake/Output Summary (Last 24 hours) at 2/1/2020 1715  Last data filed at 2/1/2020 1300  Gross per 24 hour   Intake 3400 ml   Output 5750 ml   Net -2350 ml       Culture Date Source Results    Respiratory                  Ht Readings from Last 1 Encounters:   01/30/20 5' 8\" (1.727 m)        Wt Readings from Last 1 Encounters:   02/01/20 268 lb 14.4 oz (122 kg)         Body mass index is 40.89 kg/m². Estimated Creatinine Clearance: 16 mL/min (A) (based on SCr of 7 mg/dL (H)). Assessment/Plan:  Will initiate vancomycin 1500 mg IV x 1 dose. Timing of trough level will be determined based on culture results, renal function, and clinical response. Thank you for the consult. Will continue to follow.     Electronically signed by Manohar Lozano [facility] on 2/1/2020 at 5:15 PM

## 2020-02-01 NOTE — PROGRESS NOTES
HD in progress at bedside in . VSS. Pt tolerating treatment well. Electronically signed by Anish Bear RN on 2/1/2020 at 9:04 AM

## 2020-02-01 NOTE — PROGRESS NOTES
AM BP meds held for HD. 3500 ml pulled off. pts SBP remained in 140's throughout HD. AM BP meds staggered after treatment. Clonidine held to evaluate impact on BP. Currently 136/66 . Second doses due at 1400. Siomara Mcdowell Electronically signed by Axel Lopez RN on 2/1/2020 at 1:14 PM

## 2020-02-01 NOTE — PROGRESS NOTES
ex partner: Not on file     Emotionally abused: Not on file     Physically abused: Not on file     Forced sexual activity: Not on file   Other Topics Concern    Not on file   Social History Narrative    Not on file         Review of Systems:  History obtained from chart review and the patient  General ROS: No fever or chills  Respiratory ROS: No cough, shortness of breath, wheezing  Cardiovascular ROS: No chest pain or palpitations  Gastrointestinal ROS: positive for - abdominal pain and nausea/vomiting  Genito-Urinary ROS: No dysuria or hematuria  Musculoskeletal ROS: No joint pain or swelling   14 point ROS reviewed with the patient and negative except as noted above and in the HPI unless unable to obtain.     Objective:  Patient Vitals for the past 24 hrs:   BP Temp Temp src Pulse Resp SpO2 Weight   02/01/20 0900 (!) 148/67 -- -- 94 -- 92 % --   02/01/20 0800 (!) 142/74 99.4 °F (37.4 °C) Temporal 100 20 92 % --   02/01/20 0700 122/75 99.4 °F (37.4 °C) Temporal 100 19 94 % --   02/01/20 0600 (!) 149/74 -- -- 98 21 90 % --   02/01/20 0556 119/61 -- -- -- -- -- --   02/01/20 0500 (!) 111/55 -- -- 90 29 90 % 268 lb 14.4 oz (122 kg)   02/01/20 0400 125/84 98.2 °F (36.8 °C) Temporal 98 25 92 % --   02/01/20 0300 (!) 140/66 -- -- 94 27 91 % --   02/01/20 0200 (!) 135/52 -- -- 92 28 91 % --   02/01/20 0100 (!) 123/56 -- -- 89 27 91 % --   02/01/20 0000 120/65 98.2 °F (36.8 °C) Temporal 101 25 93 % --   01/31/20 2336 (!) 167/43 -- -- 103 -- -- --   01/31/20 2300 129/73 -- -- 96 25 93 % --   01/31/20 2200 (!) 141/70 -- -- 95 25 (!) 89 % --   01/31/20 2100 126/87 -- -- 100 27 91 % --   01/31/20 2000 122/60 98.9 °F (37.2 °C) Temporal 92 29 90 % --   01/31/20 1900 (!) 153/82 -- -- 97 30 90 % --   01/31/20 1800 126/60 -- -- 96 23 (!) 88 % --   01/31/20 1700 (!) 143/83 -- -- 98 26 (!) 87 % --   01/31/20 1611 (!) 155/92 98.9 °F (37.2 °C) Temporal -- -- -- --   01/31/20 1601 -- -- -- 103 21 -- --   01/31/20 1600 -- -- -- 103 19 to 3500 cc. 3.  Renal biopsy on Monday. 4.  Follow-up on serology. 5.  P.o. calcitriol. 6.  Intravenous Venofer.         Jamie Salcedo MD  02/01/20  10:47 AM

## 2020-02-01 NOTE — PROGRESS NOTES
[facility]        Hospitalist Progress Note  2/1/2020 7:50 AM  Subjective:   Admit Date: 1/29/2020  PCP: Marco A Sidhu MD    Chief Complaint: Cough/Dyspnea    Subjective: Patient seen and examined at bedside in the CCU - receiving HD. Denies chest pain or shortness of breath. NAD. Cumulative Hospital History: 26-year-old male presenting for cough and dyspnea found to have acute renal failure and hypertensive urgency requiring Cardene drip on admission. Nephrology consulted on admission. Weaned off cardene 01/30/2020. Patient with perm-a-cath placed with vascular surgery 01/31/2020 with first HD session occurring the same day. ROS: 14 point review of systems is negative except as specifically addressed above. DIET RENAL;     Intake/Output Summary (Last 24 hours) at 2/1/2020 0750  Last data filed at 2/1/2020 0600  Gross per 24 hour   Intake 3393.33 ml   Output 2200 ml   Net 1193.33 ml     Medications:   dextrose       Current Facility-Administered Medications   Medication Dose Route Frequency Provider Last Rate Last Dose    0.9 % sodium chloride bolus  20 mL Intravenous Once Joe Perez MD        chlorhexidine (HIBICLENS) 4 % liquid   Topical Once [facility] [place]        HYDROcodone-acetaminophen (NORCO) 5-325 MG per tablet 1 tablet  1 tablet Oral Q4H PRN Axel Torrez MD        Or    HYDROcodone-acetaminophen (NORCO) 5-325 MG per tablet 2 tablet  2 tablet Oral Q4H PRN Axel Torrez MD        ondansetron [facility]) injection 4 mg  4 mg Intravenous Q8H PRN Axel Torrez MD        metoprolol tartrate (LOPRESSOR) tablet 25 mg  25 mg Oral BID Maria G Gonzalez MD   25 mg at 01/31/20 2336    NIFEdipine (PROCARDIA XL) extended release tablet 60 mg  60 mg Oral BID Connor Gonzalez MD   60 mg at 01/31/20 2336    cloNIDine (CATAPRES) tablet 0.1 mg  0.1 mg Oral TID Connor Gonzalez MD   0.1 mg at 01/31/20 2336    sodium chloride flush 0.9 % injection 10 mL  10 mL Intravenous 2 times per day Liz Nobles MD   10 mL at 01/31/20 2336    sodium chloride flush 0.9 % injection 10 mL  10 mL Intravenous PRN Liz Nobles MD        heparin (porcine) injection 5,000 Units  5,000 Units Subcutaneous 3 times per day Liz Nobles MD   5,000 Units at 02/01/20 0556    famotidine (PEPCID) injection 20 mg  20 mg Intravenous Daily Liz Nobles MD   20 mg at 01/31/20 0919    senna (SENOKOT) tablet 8.6 mg  1 tablet Oral Daily PRN Liz Nobles MD        acetaminophen (TYLENOL) tablet 650 mg  650 mg Oral Q4H PRN Liz Nobles MD   650 mg at 01/30/20 2004    insulin lispro (HUMALOG) injection vial 0-6 Units  0-6 Units Subcutaneous TID WC Liz Nobles MD   1 Units at 01/30/20 1743    insulin lispro (HUMALOG) injection vial 0-3 Units  0-3 Units Subcutaneous Nightly Liz Nobles MD   1 Units at 01/30/20 2003    glucose (GLUTOSE) 40 % oral gel 15 g  15 g Oral PRN Liz Nobles MD        dextrose 50 % IV solution  12.5 g Intravenous PRN Liz Nobles MD        glucagon (rDNA) injection 1 mg  1 mg Intramuscular PRN Liz Nobles MD        dextrose 5 % solution  100 mL/hr Intravenous PRN Liz Nobles MD        ipratropium-albuterol (DUONEB) nebulizer solution 1 ampule  1 ampule Inhalation Q4H WA Liz Nobles MD   1 ampule at 02/01/20 9679    hydrALAZINE (APRESOLINE) tablet 50 mg  50 mg Oral 3 times per day Liz Nobles MD   50 mg at 02/01/20 0556        Labs:     Recent Labs     01/31/20  0310 01/31/20  1350 02/01/20  0304   WBC 7.9 9.2 9.0   RBC 2.37* 2.88* 2.81*   HGB 6.5* 8.0* 7.9*   HCT 20.4* 24.3* 23.4*   MCV 86.1 84.4 83.3   MCH 27.4 27.8 28.1   MCHC 31.9* 32.9* 33.8    213 213     Recent Labs     01/29/20  2346 01/30/20  0944 01/31/20  0310 02/01/20  0124     --  136 140   K 4.4  --  4.6 4.0   ANIONGAP 25*  --  22* 20*     --  99 97*   CO2 13*  --  15* 23   *  --  114* 72*   CREATININE 15.2*  --  15.2* 10.9*   GLUCOSE 143*  --  126* 130*   CALCIUM 7.1* 7.5* 7.0* 7.5* Recent Labs     01/29/20  2346   PHOS 6.6*     Recent Labs     01/29/20  2346 01/31/20  0310 02/01/20  0124   AST 13 14 19   ALT 10 11 14   BILITOT <0.2 <0.2 0.4   ALKPHOS 60 51 58     ABGs:No results for input(s): PH, PO2, PCO2, HCO3, BE, O2SAT in the last 72 hours. Troponin T:   Recent Labs     01/29/20  1703 01/29/20  2346   TROPONINI 0.06* 0.05*     INR:   Recent Labs     01/29/20  1703   INR 1.26*     Lactic Acid:   Recent Labs     01/29/20  1555   LACTA 0.9       Objective:   Vitals: /75   Pulse 100   Temp 99.4 °F (37.4 °C) (Temporal)   Resp 19   Ht 5' 8\" (1.727 m)   Wt 268 lb 14.4 oz (122 kg)   SpO2 94%   BMI 40.89 kg/m²   24HR INTAKE/OUTPUT:      Intake/Output Summary (Last 24 hours) at 2/1/2020 0750  Last data filed at 2/1/2020 0600  Gross per 24 hour   Intake 3393.33 ml   Output 2200 ml   Net 1193.33 ml     General appearance: alert and cooperative with exam  HEENT: atraumatic, eyes with clear conjunctiva and normal lids, pupils and irises normal, external ears and nose are normal, lips normal  Neck without masses, lympadenopathy, bruit, thyroid normal  Lungs: no increased work of breathing, \"clear to auscultation bilaterally\" without rales, rhonchi or wheezes  Heart: tachycardia, regular rhythm, S1/S2+  Abdomen: soft, non-tender; bowel sounds normal; no masses,  no organomegaly  Extremities: edema 1+  Neurologic: no focal neurologic deficits, normal sensation, alert and oriented, affect and mood appropriate  Skin: warm    Assessment and Plan:   Principal Problem:    Acute renal failure (HCC)  Active Problems:    Uncontrolled hypertension    Mild intermittent asthma without complication    Morbid obesity due to excess calories (HCC)    Type 2 diabetes mellitus without complication, without long-term current use of insulin (HCC)    Acute diastolic (congestive) heart failure (HCC)    RUPAL (acute kidney injury) ([facility] [address]  Resolved Problems:    * No resolved hospital problems.  *    Acute renal failure/Metabolic Acidosis:  Permacath placed with vascular surgery 1/31/2020. HD as per nephrology. .     Normocytic Anemia: Guaiac negative in ED. Monitor H/H and transfuse PRN.     Hypertension: Nifedipine/Lopressor/Clonidine. Monitor BP and adjust medications as needed     Diabetes: HbA1c 5.9. ISS. Hypoglycemia protocol in place.     Asthma: Not in acute exacerbation. Duonebs PRN. O2 PRN. Supportive management. PT/OT. Case management for outpatient dialysis chair. Advance Directive: Full Code    DVT prophylaxis: Heparin    Discharge planning: Transfer out of CCU when bed available.       Signed:  Nereyda James MD 2/1/2020 7:50 AM  Rounding Hospitalist

## 2020-02-02 LAB
ALBUMIN SERPL-MCNC: 3.2 G/DL (ref 3.5–5.2)
ALP BLD-CCNC: 58 U/L (ref 40–130)
ALT SERPL-CCNC: 15 U/L (ref 5–41)
ANCA IFA: NORMAL
ANION GAP SERPL CALCULATED.3IONS-SCNC: 18 MMOL/L (ref 7–19)
ANTINUCLEAR AB INTERPRETIVE COMMENT: NORMAL
ANTINUCLEAR ANTIBODY, HEP-2, IGG: NORMAL
AST SERPL-CCNC: 24 U/L (ref 5–40)
BANDED NEUTROPHILS RELATIVE PERCENT: 2 % (ref 0–5)
BASOPHILS ABSOLUTE: 0 K/UL (ref 0–0.2)
BASOPHILS RELATIVE PERCENT: 0 % (ref 0–1)
BILIRUB SERPL-MCNC: 0.3 MG/DL (ref 0.2–1.2)
BUN BLDV-MCNC: 41 MG/DL (ref 6–20)
CALCIUM SERPL-MCNC: 8.2 MG/DL (ref 8.6–10)
CHLORIDE BLD-SCNC: 93 MMOL/L (ref 98–111)
CO2: 24 MMOL/L (ref 22–29)
CREAT SERPL-MCNC: 8.1 MG/DL (ref 0.5–1.2)
EOSINOPHILS ABSOLUTE: 0.11 K/UL (ref 0–0.6)
EOSINOPHILS RELATIVE PERCENT: 1 % (ref 0–5)
GFR NON-AFRICAN AMERICAN: 7
GLUCOSE BLD-MCNC: 125 MG/DL (ref 70–99)
GLUCOSE BLD-MCNC: 132 MG/DL (ref 70–99)
GLUCOSE BLD-MCNC: 132 MG/DL (ref 74–109)
GLUCOSE BLD-MCNC: 175 MG/DL (ref 70–99)
GLUCOSE BLD-MCNC: 229 MG/DL (ref 70–99)
HCT VFR BLD CALC: 23.8 % (ref 42–52)
HEMOGLOBIN: 7.7 G/DL (ref 14–18)
HYPOCHROMIA: ABNORMAL
IMMATURE GRANULOCYTES #: 0.2 K/UL
LYMPHOCYTES ABSOLUTE: 2.1 K/UL (ref 1.1–4.5)
LYMPHOCYTES RELATIVE PERCENT: 17 % (ref 20–40)
MCH RBC QN AUTO: 27.8 PG (ref 27–31)
MCHC RBC AUTO-ENTMCNC: 32.4 G/DL (ref 33–37)
MCV RBC AUTO: 85.9 FL (ref 80–94)
MONOCYTES ABSOLUTE: 0.7 K/UL (ref 0–0.9)
MONOCYTES RELATIVE PERCENT: 6 % (ref 0–10)
NEUTROPHILS ABSOLUTE: 8.4 K/UL (ref 1.5–7.5)
NEUTROPHILS RELATIVE PERCENT: 72 % (ref 50–65)
OVALOCYTES: ABNORMAL
PDW BLD-RTO: 13.2 % (ref 11.5–14.5)
PERFORMED ON: ABNORMAL
PLATELET # BLD: 221 K/UL (ref 130–400)
PLATELET SLIDE REVIEW: ADEQUATE
PMV BLD AUTO: 10.2 FL (ref 9.4–12.4)
POLYCHROMASIA: ABNORMAL
POTASSIUM SERPL-SCNC: 4.4 MMOL/L (ref 3.5–5)
RBC # BLD: 2.77 M/UL (ref 4.7–6.1)
SODIUM BLD-SCNC: 135 MMOL/L (ref 136–145)
TOTAL PROTEIN: 6.9 G/DL (ref 6.6–8.7)
WBC # BLD: 11.3 K/UL (ref 4.8–10.8)

## 2020-02-02 PROCEDURE — 6370000000 HC RX 637 (ALT 250 FOR IP): Performed by: SURGERY

## 2020-02-02 PROCEDURE — 2700000000 HC OXYGEN THERAPY PER DAY

## 2020-02-02 PROCEDURE — 94640 AIRWAY INHALATION TREATMENT: CPT

## 2020-02-02 PROCEDURE — 80053 COMPREHEN METABOLIC PANEL: CPT

## 2020-02-02 PROCEDURE — 6370000000 HC RX 637 (ALT 250 FOR IP): Performed by: INTERNAL MEDICINE

## 2020-02-02 PROCEDURE — 36415 COLL VENOUS BLD VENIPUNCTURE: CPT

## 2020-02-02 PROCEDURE — 1210000000 HC MED SURG R&B

## 2020-02-02 PROCEDURE — 6360000002 HC RX W HCPCS: Performed by: INTERNAL MEDICINE

## 2020-02-02 PROCEDURE — 2500000003 HC RX 250 WO HCPCS: Performed by: INTERNAL MEDICINE

## 2020-02-02 PROCEDURE — 2580000003 HC RX 258: Performed by: SURGERY

## 2020-02-02 PROCEDURE — 85025 COMPLETE CBC W/AUTO DIFF WBC: CPT

## 2020-02-02 PROCEDURE — 2580000003 HC RX 258: Performed by: INTERNAL MEDICINE

## 2020-02-02 PROCEDURE — 82948 REAGENT STRIP/BLOOD GLUCOSE: CPT

## 2020-02-02 RX ADMIN — METOPROLOL TARTRATE 25 MG: 25 TABLET ORAL at 22:34

## 2020-02-02 RX ADMIN — INSULIN LISPRO 1 UNITS: 100 INJECTION, SOLUTION INTRAVENOUS; SUBCUTANEOUS at 22:34

## 2020-02-02 RX ADMIN — IPRATROPIUM BROMIDE AND ALBUTEROL SULFATE 1 AMPULE: 2.5; .5 SOLUTION RESPIRATORY (INHALATION) at 19:46

## 2020-02-02 RX ADMIN — IPRATROPIUM BROMIDE AND ALBUTEROL SULFATE 1 AMPULE: 2.5; .5 SOLUTION RESPIRATORY (INHALATION) at 06:54

## 2020-02-02 RX ADMIN — HEPARIN SODIUM 5000 UNITS: 5000 INJECTION INTRAVENOUS; SUBCUTANEOUS at 05:37

## 2020-02-02 RX ADMIN — INSULIN LISPRO 1 UNITS: 100 INJECTION, SOLUTION INTRAVENOUS; SUBCUTANEOUS at 17:31

## 2020-02-02 RX ADMIN — HYDRALAZINE HYDROCHLORIDE 50 MG: 50 TABLET, FILM COATED ORAL at 13:51

## 2020-02-02 RX ADMIN — Medication 1 G: at 17:24

## 2020-02-02 RX ADMIN — HYDRALAZINE HYDROCHLORIDE 50 MG: 50 TABLET, FILM COATED ORAL at 22:34

## 2020-02-02 RX ADMIN — CLONIDINE HYDROCHLORIDE 0.1 MG: 0.1 TABLET ORAL at 22:34

## 2020-02-02 RX ADMIN — IPRATROPIUM BROMIDE AND ALBUTEROL SULFATE 1 AMPULE: 2.5; .5 SOLUTION RESPIRATORY (INHALATION) at 02:19

## 2020-02-02 RX ADMIN — BUDESONIDE 500 MCG: 0.5 INHALANT RESPIRATORY (INHALATION) at 06:53

## 2020-02-02 RX ADMIN — FAMOTIDINE 20 MG: 10 INJECTION INTRAVENOUS at 08:24

## 2020-02-02 RX ADMIN — HYDROCODONE BITARTRATE AND ACETAMINOPHEN 2 TABLET: 5; 325 TABLET ORAL at 17:29

## 2020-02-02 RX ADMIN — CLONIDINE HYDROCHLORIDE 0.1 MG: 0.1 TABLET ORAL at 17:24

## 2020-02-02 RX ADMIN — CARBOXYMETHYLCELLULOSE SODIUM 1 DROP: 5 SOLUTION/ DROPS OPHTHALMIC at 13:51

## 2020-02-02 RX ADMIN — NIFEDIPINE 60 MG: 60 TABLET, FILM COATED, EXTENDED RELEASE ORAL at 08:24

## 2020-02-02 RX ADMIN — HYDRALAZINE HYDROCHLORIDE 50 MG: 50 TABLET, FILM COATED ORAL at 06:34

## 2020-02-02 RX ADMIN — NIFEDIPINE 60 MG: 60 TABLET, FILM COATED, EXTENDED RELEASE ORAL at 22:33

## 2020-02-02 RX ADMIN — SODIUM CHLORIDE, PRESERVATIVE FREE 10 ML: 5 INJECTION INTRAVENOUS at 08:26

## 2020-02-02 RX ADMIN — IPRATROPIUM BROMIDE AND ALBUTEROL SULFATE 1 AMPULE: 2.5; .5 SOLUTION RESPIRATORY (INHALATION) at 10:06

## 2020-02-02 RX ADMIN — BUDESONIDE 500 MCG: 0.5 INHALANT RESPIRATORY (INHALATION) at 19:47

## 2020-02-02 RX ADMIN — SODIUM CHLORIDE, PRESERVATIVE FREE 10 ML: 5 INJECTION INTRAVENOUS at 22:35

## 2020-02-02 RX ADMIN — METOPROLOL TARTRATE 25 MG: 25 TABLET ORAL at 08:24

## 2020-02-02 RX ADMIN — CARBOXYMETHYLCELLULOSE SODIUM 1 DROP: 5 SOLUTION/ DROPS OPHTHALMIC at 08:24

## 2020-02-02 RX ADMIN — IPRATROPIUM BROMIDE AND ALBUTEROL SULFATE 1 AMPULE: 2.5; .5 SOLUTION RESPIRATORY (INHALATION) at 14:26

## 2020-02-02 RX ADMIN — CARBOXYMETHYLCELLULOSE SODIUM 1 DROP: 5 SOLUTION/ DROPS OPHTHALMIC at 22:41

## 2020-02-02 RX ADMIN — HEPARIN SODIUM 5000 UNITS: 5000 INJECTION INTRAVENOUS; SUBCUTANEOUS at 13:51

## 2020-02-02 ASSESSMENT — PAIN SCALES - GENERAL
PAINLEVEL_OUTOF10: 0
PAINLEVEL_OUTOF10: 7
PAINLEVEL_OUTOF10: 0
PAINLEVEL_OUTOF10: 0

## 2020-02-02 ASSESSMENT — PAIN DESCRIPTION - PAIN TYPE: TYPE: ACUTE PAIN

## 2020-02-02 NOTE — PROGRESS NOTES
[facility]        Hospitalist Progress Note  2/2/2020 9:17 AM  Subjective:   Admit Date: 1/29/2020  PCP: Kimberly Shaikh MD    Chief Complaint: Cough/Dyspnea    Subjective: Patient seen and examined at bedside in the CCU. Denies chest pain or shortness of breath. Still has NC O2 on. Cumulative Hospital History: 50-year-old male presenting for cough and dyspnea found to have acute renal failure and hypertensive urgency requiring Cardene drip on admission. Nephrology consulted on admission. Weaned off cardene 01/30/2020. Patient with perm-a-cath placed with vascular surgery 01/31/2020 with first HD session occurring the same day. ROS: 14 point review of systems is negative except as specifically addressed above. DIET RENAL;     Intake/Output Summary (Last 24 hours) at 2/2/2020 0045  Last data filed at 2/2/2020 0600  Gross per 24 hour   Intake 700 ml   Output 4950 ml   Net -4250 ml     Medications:   dextrose       Current Facility-Administered Medications   Medication Dose Route Frequency Provider Last Rate Last Dose    cyclobenzaprine (FLEXERIL) tablet 10 mg  10 mg Oral TID PRN Alissa Cuellar MD   10 mg at 02/01/20 2020    carboxymethylcellulose (REFRESH PLUS) 0.5 % ophthalmic solution 1 drop  1 drop Both Eyes TID Alissa Cuellar MD   1 drop at 02/02/20 0824    morphine injection 2 mg  2 mg Intravenous Q4H PRN Alissa Cuellar MD   2 mg at 02/01/20 2104    budesonide (PULMICORT) nebulizer suspension 500 mcg  0.5 mg Nebulization BID Alissa Cuellar MD   500 mcg at 02/02/20 2661    cefepime (MAXIPIME) 1 g in sodium chloride (PF) 10 mL IV syringe  1 g Intravenous Q24H Alissa Cuellar MD   1 g at 02/01/20 1745    vancomycin (VANCOCIN) intermittent dosing (placeholder)   Other RX Placeholder Alissa Cuellar MD        0.9 % sodium chloride bolus  20 mL Intravenous Once Naveed Sexton MD        chlorhexidine (HIBICLENS) 4 % liquid   Topical Once Dewaine Cap, APRN        HYDROcodone-acetaminophen

## 2020-02-02 NOTE — PROGRESS NOTES
(FLEXERIL) tablet 10 mg  10 mg Oral TID PRN Amalia Gaxiola MD   10 mg at 02/01/20 2020    carboxymethylcellulose (REFRESH PLUS) 0.5 % ophthalmic solution 1 drop  1 drop Both Eyes TID Amalia Gaxiola MD   1 drop at 02/02/20 0824    morphine injection 2 mg  2 mg Intravenous Q4H PRN Amalia Gaxiola MD   2 mg at 02/01/20 2104    budesonide (PULMICORT) nebulizer suspension 500 mcg  0.5 mg Nebulization BID Amalia Gaxiola MD   500 mcg at 02/02/20 5555    cefepime (MAXIPIME) 1 g in sodium chloride (PF) 10 mL IV syringe  1 g Intravenous Q24H Amalia Gaxiola MD   1 g at 02/01/20 1745    vancomycin (VANCOCIN) intermittent dosing (placeholder)   Other RX Placeholder Amalia Gaxiola MD        0.9 % sodium chloride bolus  20 mL Intravenous Once Donaciano Bamberger, MD        chlorhexidine (HIBICLENS) 4 % liquid   Topical Once [facility]        HYDROcodone-acetaminophen (NORCO) 5-325 MG per tablet 1 tablet  1 tablet Oral Q4H PRN Mendez Daniels MD        Or    HYDROcodone-acetaminophen (NORCO) 5-325 MG per tablet 2 tablet  2 tablet Oral Q4H PRN Mendez Daniels MD        ondansetron [facility]) injection 4 mg  4 mg Intravenous Q8H PRN Mendez Daniels MD        metoprolol tartrate (LOPRESSOR) tablet 25 mg  25 mg Oral BID Amalia Gaxiola MD   25 mg at 02/02/20 0824    NIFEdipine (PROCARDIA XL) extended release tablet 60 mg  60 mg Oral BID Goldy Camejo MD   60 mg at 02/02/20 4134    cloNIDine (CATAPRES) tablet 0.1 mg  0.1 mg Oral TID Goldy Camejo MD   0.1 mg at 02/01/20 1338    sodium chloride flush 0.9 % injection 10 mL  10 mL Intravenous 2 times per day Amalai Gaxiola MD   10 mL at 02/02/20 0826    sodium chloride flush 0.9 % injection 10 mL  10 mL Intravenous PRN Amalia Gaxiola MD        heparin (porcine) injection 5,000 Units  5,000 Units Subcutaneous 3 times per day Amalia Gaxiola MD   5,000 Units at 02/02/20 0537    famotidine (PEPCID) injection 20 mg  20 mg Intravenous Daily Amalia Gxaiola MD   20 mg at 02/02/20 8117  senna (SENOKOT) tablet 8.6 mg  1 tablet Oral Daily PRN Sabine Valle MD        acetaminophen (TYLENOL) tablet 650 mg  650 mg Oral Q4H PRN Sabine Valle MD   650 mg at 02/01/20 1338    insulin lispro (HUMALOG) injection vial 0-6 Units  0-6 Units Subcutaneous TID WC Sabine Valle MD   1 Units at 01/30/20 1743    insulin lispro (HUMALOG) injection vial 0-3 Units  0-3 Units Subcutaneous Nightly Sabine Valle MD   1 Units at 01/30/20 2003    glucose (GLUTOSE) 40 % oral gel 15 g  15 g Oral PRN Sabine Valle MD        dextrose 50 % IV solution  12.5 g Intravenous PRN Sabine Valle MD        glucagon (rDNA) injection 1 mg  1 mg Intramuscular PRN Sabine Valle MD        dextrose 5 % solution  100 mL/hr Intravenous PRN Sabine Valle MD        ipratropium-albuterol (DUONEB) nebulizer solution 1 ampule  1 ampule Inhalation Q4H WA Sabine Valle MD   1 ampule at 02/02/20 1006    hydrALAZINE (APRESOLINE) tablet 50 mg  50 mg Oral 3 times per day Sabine Valle MD   50 mg at 02/02/20 9425       Past Medical History:  Past Medical History:   Diagnosis Date    Asthma     Diabetes mellitus ([facility] [address]     Erectile dysfunction     Hypertension     Obesity        Past Surgical History:  Past Surgical History:   Procedure Laterality Date    COLONOSCOPY N/A 12/5/2019    Dr Joe Zendejas, internal hemorrhoids-Grade 1-2-HP, 3 yr recall       Family History  History reviewed. No pertinent family history.     Social History  Social History     Socioeconomic History    Marital status: Single     Spouse name: Not on file    Number of children: Not on file    Years of education: Not on file    Highest education level: Not on file   Occupational History    Not on file   Social Needs    Financial resource strain: Not on file    Food insecurity:     Worry: Not on file     Inability: Not on file    Transportation needs:     Medical: Not on file     Non-medical: Not on file   Tobacco Use    Smoking status: Never Smoker    Smokeless tobacco: Never Used   Substance and Sexual Activity    Alcohol use: Yes     Comment: occ    Drug use: No    Sexual activity: Not on file   Lifestyle    Physical activity:     Days per week: Not on file     Minutes per session: Not on file    Stress: Not on file   Relationships    Social connections:     Talks on phone: Not on file     Gets together: Not on file     Attends Hindu service: Not on file     Active member of club or organization: Not on file     Attends meetings of clubs or organizations: Not on file     Relationship status: Not on file    Intimate partner violence:     Fear of current or ex partner: Not on file     Emotionally abused: Not on file     Physically abused: Not on file     Forced sexual activity: Not on file   Other Topics Concern    Not on file   Social History Narrative    Not on file         Review of Systems:  History obtained from chart review and the patient  General ROS: No fever or chills  Respiratory ROS: No cough, shortness of breath, wheezing  Cardiovascular ROS: No chest pain or palpitations  Gastrointestinal ROS: positive for - abdominal pain and nausea/vomiting  Genito-Urinary ROS: No dysuria or hematuria  Musculoskeletal ROS: No joint pain or swelling   14 point ROS reviewed with the patient and negative except as noted above and in the HPI unless unable to obtain.     Objective:  Patient Vitals for the past 24 hrs:   BP Temp Temp src Pulse Resp SpO2 Weight   02/02/20 1000 123/73 -- -- 96 20 (!) 87 % --   02/02/20 0900 (!) 146/86 -- -- 98 18 (!) 88 % --   02/02/20 0800 (!) 149/61 100.4 °F (38 °C) Temporal 86 20 92 % --   02/02/20 0700 121/66 100.4 °F (38 °C) Temporal 88 14 100 % --   02/02/20 0634 135/75 -- -- -- -- -- --   02/02/20 0600 118/78 -- -- 90 26 92 % --   02/02/20 0556 -- -- -- -- -- -- 264 lb (119.7 kg)   02/02/20 0500 117/71 -- -- 89 23 91 % --   02/02/20 0400 121/67 99.1 °F (37.3 °C) Temporal 90 26 92 % --   02/02/20 0300 126/75 -- -- 90 25 91 % --   02/02/20 0219 -- -- -- -- 29 90 % --   02/02/20 0200 132/71 -- -- 90 30 92 % --   02/02/20 0100 116/64 -- -- 89 (!) 36 92 % --   02/02/20 0000 127/76 98.3 °F (36.8 °C) Temporal 88 (!) 31 91 % --   02/01/20 2333 118/74 -- -- -- -- -- --   02/01/20 2300 128/70 -- -- 81 26 95 % --   02/01/20 2205 117/64 -- -- 80 21 91 % --   02/01/20 2110 134/89 -- -- 95 -- -- --   02/01/20 2100 134/89 -- -- 101 24 90 % --   02/01/20 2036 -- -- -- -- 25 91 % --   02/01/20 2000 129/62 98.8 °F (37.1 °C) Temporal 92 21 91 % --   02/01/20 1902 (!) 123/58 -- -- 94 25 90 % --   02/01/20 1700 (!) 116/53 -- -- 93 25 90 % --   02/01/20 1604 132/61 99.6 °F (37.6 °C) Temporal 96 24 90 % --   02/01/20 1500 (!) 169/83 -- -- 101 -- 92 % --   02/01/20 1415 (!) 113/59 -- -- 100 -- 97 % --   02/01/20 1300 136/66 -- -- 106 -- (!) 89 % --   02/01/20 1200 (!) 161/70 -- -- 109 -- 92 % --   02/01/20 1100 (!) 144/68 -- -- 101 -- 93 % --       Intake/Output Summary (Last 24 hours) at 2/2/2020 1029  Last data filed at 2/2/2020 0600  Gross per 24 hour   Intake 700 ml   Output 4700 ml   Net -4000 ml     General: awake/alert   HEENT: Normocephalic atraumatic head  Neck: Supple with no JVD or carotid bruits.   Chest:  clear to auscultation bilaterally without respiratory distress  CVS: regular rate and rhythm  Abdominal: soft, nontender, normal bowel sounds  Extremities: no cyanosis or edema  Skin: warm and dry without rash      Labs:  BMP:   Recent Labs     02/01/20  0124 02/01/20  1555 02/02/20  0132    135* 135*   K 4.0 4.0 4.4   CL 97* 95* 93*   CO2 23 23 24   BUN 72* 35* 41*   CREATININE 10.9* 7.0* 8.1*   CALCIUM 7.5* 8.2* 8.2*     CBC:   Recent Labs     01/31/20  1350 02/01/20  0304 02/02/20  0132   WBC 9.2 9.0 11.3*   HGB 8.0* 7.9* 7.7*   HCT 24.3* 23.4* 23.8*   MCV 84.4 83.3 85.9    213 221     LIVER PROFILE:   Recent Labs     01/31/20  0310 02/01/20  0124 02/02/20  0132   AST 14 19 24   ALT 11 14 15   BILITOT <0.2 0.4 0.3 realtime sonographic images of the kidneys and urinary bladder are obtained. FINDINGS: The right kidney measures 9.1 cm. The cortical thickness within the right kidney is 8 mm and 15 mm respectively in the upper and lower pole. The right kidney is normal in size, shape, contour and position. The cortical thickness is normal, with preservation of the corticomedullary differentiation. There is increased echogenicity of the renal cortex suggesting underlying medical renal disease. The central echo complex is compact with no evidence for hydronephrosis. No nephrolithiasis or abnormal perinephric fluid collections are seen. No hydroureter. The left kidney measures 9.0 cm. The cortical thickness within the left kidney is 13 mm  and 17 mm respectively in the upper and lower pole. The left kidney is normal in size, shape, contour and position. There is increased echogenicity of the renal parenchyma suggesting underlying medical renal disease. There is a cortical cyst involving the upper pole of the left kidney measuring 1.8 x 1.3 x 1.6 cm in size. The cortical thickness is normal, with preservation of the corticomedullary differentiation. The central echo complex is compact with no evidence for hydronephrosis. No nephrolithiasis or abnormal perinephric fluid collections are seen. No hydroureter. Scanning through the pelvis reveals the bladder to be moderately distended with anechoic urine. The wall thickness and contour are normal. There is no surrounding ascites. 1. Increased echogenicity of the renal cortex bilaterally suggesting medical renal disease. There is no evidence of solid mass or hydronephrosis. 2. Small cortical cyst involving the mid to upper pole of the left kidney. . Signed by Dr Belen Naik on 1/29/2020 8:40 PM       Assessment   1. Acute kidney injury/ATN versus GN. He has dialysis dependent. 2.  Possible chronic kidney disease baseline. 3.  Severe systolic and diastolic hypertension.   4.

## 2020-02-03 LAB
ALBUMIN SERPL-MCNC: 2.8 G/DL (ref 3.5–5.2)
ALBUMIN SERPL-MCNC: 3.19 G/DL (ref 3.75–5.01)
ALP BLD-CCNC: 51 U/L (ref 40–130)
ALPHA-1-GLOBULIN: 0.47 G/DL (ref 0.19–0.46)
ALPHA-2-GLOBULIN: 0.91 G/DL (ref 0.48–1.05)
ALT SERPL-CCNC: 15 U/L (ref 5–41)
ANION GAP SERPL CALCULATED.3IONS-SCNC: 17 MMOL/L (ref 7–19)
AST SERPL-CCNC: 20 U/L (ref 5–40)
BASOPHILS ABSOLUTE: 0 K/UL (ref 0–0.2)
BASOPHILS RELATIVE PERCENT: 0.2 % (ref 0–1)
BETA GLOBULIN: 0.74 G/DL (ref 0.48–1.1)
BILIRUB SERPL-MCNC: <0.2 MG/DL (ref 0.2–1.2)
BUN BLDV-MCNC: 53 MG/DL (ref 6–20)
CALCIUM SERPL-MCNC: 8 MG/DL (ref 8.6–10)
CHLORIDE BLD-SCNC: 96 MMOL/L (ref 98–111)
CO2: 22 MMOL/L (ref 22–29)
CREAT SERPL-MCNC: 10.6 MG/DL (ref 0.5–1.2)
EOSINOPHILS ABSOLUTE: 0.4 K/UL (ref 0–0.6)
EOSINOPHILS RELATIVE PERCENT: 3.3 % (ref 0–5)
GAMMA GLOBULIN: 0.89 G/DL (ref 0.62–1.51)
GFR NON-AFRICAN AMERICAN: 5
GLUCOSE BLD-MCNC: 111 MG/DL (ref 70–99)
GLUCOSE BLD-MCNC: 112 MG/DL (ref 74–109)
GLUCOSE BLD-MCNC: 123 MG/DL (ref 70–99)
GLUCOSE BLD-MCNC: 142 MG/DL (ref 70–99)
HCT VFR BLD CALC: 22.8 % (ref 42–52)
HEMOGLOBIN: 7.3 G/DL (ref 14–18)
IMMATURE GRANULOCYTES #: 0.4 K/UL
LYMPHOCYTES ABSOLUTE: 2.2 K/UL (ref 1.1–4.5)
LYMPHOCYTES RELATIVE PERCENT: 16.8 % (ref 20–40)
MCH RBC QN AUTO: 28.1 PG (ref 27–31)
MCHC RBC AUTO-ENTMCNC: 32 G/DL (ref 33–37)
MCV RBC AUTO: 87.7 FL (ref 80–94)
MONOCYTES ABSOLUTE: 1.9 K/UL (ref 0–0.9)
MONOCYTES RELATIVE PERCENT: 14.7 % (ref 0–10)
NEUTROPHILS ABSOLUTE: 8 K/UL (ref 1.5–7.5)
NEUTROPHILS RELATIVE PERCENT: 62.1 % (ref 50–65)
PDW BLD-RTO: 13.4 % (ref 11.5–14.5)
PERFORMED ON: ABNORMAL
PLATELET # BLD: 208 K/UL (ref 130–400)
PMV BLD AUTO: 10 FL (ref 9.4–12.4)
POTASSIUM SERPL-SCNC: 4.6 MMOL/L (ref 3.5–5)
PROTEIN ELECTROPHORESIS, SERUM: ABNORMAL
RBC # BLD: 2.6 M/UL (ref 4.7–6.1)
SODIUM BLD-SCNC: 135 MMOL/L (ref 136–145)
SPE/IFE INTERPRETATION: ABNORMAL
TOTAL PROTEIN: 6.2 G/DL (ref 6.3–8.2)
TOTAL PROTEIN: 6.2 G/DL (ref 6.6–8.7)
VANCOMYCIN RANDOM: 10 UG/ML
WBC # BLD: 12.9 K/UL (ref 4.8–10.8)

## 2020-02-03 PROCEDURE — 1210000000 HC MED SURG R&B

## 2020-02-03 PROCEDURE — 2580000003 HC RX 258: Performed by: INTERNAL MEDICINE

## 2020-02-03 PROCEDURE — 2580000003 HC RX 258: Performed by: SURGERY

## 2020-02-03 PROCEDURE — 6360000002 HC RX W HCPCS: Performed by: SURGERY

## 2020-02-03 PROCEDURE — 6370000000 HC RX 637 (ALT 250 FOR IP): Performed by: SURGERY

## 2020-02-03 PROCEDURE — 6370000000 HC RX 637 (ALT 250 FOR IP): Performed by: INTERNAL MEDICINE

## 2020-02-03 PROCEDURE — 2500000003 HC RX 250 WO HCPCS: Performed by: SURGERY

## 2020-02-03 PROCEDURE — 6360000002 HC RX W HCPCS: Performed by: INTERNAL MEDICINE

## 2020-02-03 PROCEDURE — 97165 OT EVAL LOW COMPLEX 30 MIN: CPT

## 2020-02-03 PROCEDURE — 8010000000 HC HEMODIALYSIS ACUTE INPT

## 2020-02-03 PROCEDURE — 80202 ASSAY OF VANCOMYCIN: CPT

## 2020-02-03 PROCEDURE — 80053 COMPREHEN METABOLIC PANEL: CPT

## 2020-02-03 PROCEDURE — 97161 PT EVAL LOW COMPLEX 20 MIN: CPT

## 2020-02-03 PROCEDURE — 82948 REAGENT STRIP/BLOOD GLUCOSE: CPT

## 2020-02-03 PROCEDURE — 85025 COMPLETE CBC W/AUTO DIFF WBC: CPT

## 2020-02-03 PROCEDURE — 36415 COLL VENOUS BLD VENIPUNCTURE: CPT

## 2020-02-03 PROCEDURE — 94640 AIRWAY INHALATION TREATMENT: CPT

## 2020-02-03 RX ORDER — FERROUS SULFATE 325(65) MG
325 TABLET ORAL 2 TIMES DAILY WITH MEALS
Status: DISCONTINUED | OUTPATIENT
Start: 2020-02-03 | End: 2020-02-05 | Stop reason: HOSPADM

## 2020-02-03 RX ORDER — HEPARIN SODIUM 1000 [USP'U]/ML
3200 INJECTION, SOLUTION INTRAVENOUS; SUBCUTANEOUS ONCE
Status: COMPLETED | OUTPATIENT
Start: 2020-02-03 | End: 2020-02-03

## 2020-02-03 RX ORDER — CLONIDINE HYDROCHLORIDE 0.1 MG/1
0.1 TABLET ORAL 2 TIMES DAILY
Status: DISCONTINUED | OUTPATIENT
Start: 2020-02-03 | End: 2020-02-05 | Stop reason: HOSPADM

## 2020-02-03 RX ADMIN — CLONIDINE HYDROCHLORIDE 0.1 MG: 0.1 TABLET ORAL at 20:44

## 2020-02-03 RX ADMIN — SODIUM CHLORIDE, PRESERVATIVE FREE 10 ML: 5 INJECTION INTRAVENOUS at 11:57

## 2020-02-03 RX ADMIN — CARBOXYMETHYLCELLULOSE SODIUM 1 DROP: 5 SOLUTION/ DROPS OPHTHALMIC at 20:45

## 2020-02-03 RX ADMIN — HEPARIN SODIUM 5000 UNITS: 5000 INJECTION INTRAVENOUS; SUBCUTANEOUS at 14:33

## 2020-02-03 RX ADMIN — BUDESONIDE 500 MCG: 0.5 INHALANT RESPIRATORY (INHALATION) at 07:32

## 2020-02-03 RX ADMIN — METOPROLOL TARTRATE 25 MG: 25 TABLET ORAL at 20:44

## 2020-02-03 RX ADMIN — HEPARIN SODIUM 3200 UNITS: 1000 INJECTION, SOLUTION INTRAVENOUS; SUBCUTANEOUS at 11:10

## 2020-02-03 RX ADMIN — IPRATROPIUM BROMIDE AND ALBUTEROL SULFATE 1 AMPULE: 2.5; .5 SOLUTION RESPIRATORY (INHALATION) at 07:32

## 2020-02-03 RX ADMIN — VANCOMYCIN HYDROCHLORIDE 1250 MG: 10 INJECTION, POWDER, LYOPHILIZED, FOR SOLUTION INTRAVENOUS at 20:45

## 2020-02-03 RX ADMIN — Medication 1 G: at 16:37

## 2020-02-03 RX ADMIN — METOPROLOL TARTRATE 25 MG: 25 TABLET ORAL at 11:57

## 2020-02-03 RX ADMIN — IPRATROPIUM BROMIDE AND ALBUTEROL SULFATE 1 AMPULE: 2.5; .5 SOLUTION RESPIRATORY (INHALATION) at 11:50

## 2020-02-03 RX ADMIN — HYDRALAZINE HYDROCHLORIDE 50 MG: 50 TABLET, FILM COATED ORAL at 23:20

## 2020-02-03 RX ADMIN — IRON SUCROSE 200 MG: 20 INJECTION, SOLUTION INTRAVENOUS at 11:57

## 2020-02-03 RX ADMIN — HEPARIN SODIUM 5000 UNITS: 5000 INJECTION INTRAVENOUS; SUBCUTANEOUS at 22:08

## 2020-02-03 RX ADMIN — BUDESONIDE 500 MCG: 0.5 INHALANT RESPIRATORY (INHALATION) at 18:22

## 2020-02-03 RX ADMIN — NIFEDIPINE 60 MG: 60 TABLET, FILM COATED, EXTENDED RELEASE ORAL at 20:44

## 2020-02-03 RX ADMIN — SODIUM CHLORIDE, PRESERVATIVE FREE 10 ML: 5 INJECTION INTRAVENOUS at 20:44

## 2020-02-03 RX ADMIN — FAMOTIDINE 20 MG: 10 INJECTION INTRAVENOUS at 11:57

## 2020-02-03 RX ADMIN — IPRATROPIUM BROMIDE AND ALBUTEROL SULFATE 1 AMPULE: 2.5; .5 SOLUTION RESPIRATORY (INHALATION) at 14:51

## 2020-02-03 RX ADMIN — IPRATROPIUM BROMIDE AND ALBUTEROL SULFATE 1 AMPULE: 2.5; .5 SOLUTION RESPIRATORY (INHALATION) at 18:22

## 2020-02-03 RX ADMIN — FERROUS SULFATE TAB 325 MG (65 MG ELEMENTAL FE) 325 MG: 325 (65 FE) TAB at 16:37

## 2020-02-03 RX ADMIN — FERROUS SULFATE TAB 325 MG (65 MG ELEMENTAL FE) 325 MG: 325 (65 FE) TAB at 11:57

## 2020-02-03 RX ADMIN — HYDRALAZINE HYDROCHLORIDE 50 MG: 50 TABLET, FILM COATED ORAL at 14:34

## 2020-02-03 RX ADMIN — CARBOXYMETHYLCELLULOSE SODIUM 1 DROP: 5 SOLUTION/ DROPS OPHTHALMIC at 14:34

## 2020-02-03 ASSESSMENT — PAIN SCALES - GENERAL: PAINLEVEL_OUTOF10: 0

## 2020-02-03 NOTE — PROGRESS NOTES
[facility]        Hospitalist Progress Note  2/3/2020 10:10 AM  Subjective:   Admit Date: 1/29/2020  PCP: Nolan Celaya MD    Chief Complaint: Cough/Dyspnea    Subjective: Patient seen and examined at bedside in HD. Denies chest pain or shortness of breath. Cumulative Hospital History: 59-year-old male presenting for cough and dyspnea found to have acute renal failure and hypertensive urgency requiring Cardene drip on admission. Nephrology consulted on admission. Weaned off cardene 01/30/2020. Patient with perm-a-cath placed with vascular surgery 01/31/2020 with first HD session occurring the same day. ROS: 14 point review of systems is negative except as specifically addressed above. DIET RENAL;     Intake/Output Summary (Last 24 hours) at 2/3/2020 1010  Last data filed at 2/3/2020 0630  Gross per 24 hour   Intake 240 ml   Output 875 ml   Net -635 ml     Medications:   dextrose       Current Facility-Administered Medications   Medication Dose Route Frequency Provider Last Rate Last Dose    heparin (porcine) injection 3,200 Units  3,200 Units Intracatheter Once Merlinda Hawking, MD        cloNIDine (CATAPRES) tablet 0.1 mg  0.1 mg Oral BID Jennifer Suarez MD        ferrous sulfate tablet 325 mg  325 mg Oral BID WC Merlinda Hawking, MD        iron sucrose (VENOFER) injection 200 mg  200 mg Intravenous Once Merlinda Hawking, MD        cyclobenzaprine (FLEXERIL) tablet 10 mg  10 mg Oral TID PRN Jennifer Suarez MD   10 mg at 02/01/20 2020    carboxymethylcellulose (REFRESH PLUS) 0.5 % ophthalmic solution 1 drop  1 drop Both Eyes TID Jennifer Suarez MD   1 drop at 02/02/20 2241    morphine injection 2 mg  2 mg Intravenous Q4H PRN Jennifer Suarez MD   2 mg at 02/01/20 2104    budesonide (PULMICORT) nebulizer suspension 500 mcg  0.5 mg Nebulization BID eJnnifer Suarez MD   500 mcg at 02/03/20 0732    cefepime (MAXIPIME) 1 g in sodium chloride (PF) 10 mL IV syringe  1 g Intravenous Q24H Galen Merritt Rupesh Rodríguez MD        glucagon (rDNA) injection 1 mg  1 mg Intramuscular PRN Cathy Alejandra MD        dextrose 5 % solution  100 mL/hr Intravenous PRN Cathy Alejandra MD        ipratropium-albuterol (DUONEB) nebulizer solution 1 ampule  1 ampule Inhalation Q4H Jie Gaviria MD   1 ampule at 02/03/20 0732    hydrALAZINE (APRESOLINE) tablet 50 mg  50 mg Oral 3 times per day Cathy Alejandra MD   50 mg at 02/02/20 2234        Labs:     Recent Labs     02/01/20  0304 02/02/20  0132 02/03/20  0153   WBC 9.0 11.3* 12.9*   RBC 2.81* 2.77* 2.60*   HGB 7.9* 7.7* 7.3*   HCT 23.4* 23.8* 22.8*   MCV 83.3 85.9 87.7   MCH 28.1 27.8 28.1   MCHC 33.8 32.4* 32.0*    221 208     Recent Labs     02/01/20  1555 02/02/20  0132 02/03/20  0153   * 135* 135*   K 4.0 4.4 4.6   ANIONGAP 17 18 17   CL 95* 93* 96*   CO2 23 24 22   BUN 35* 41* 53*   CREATININE 7.0* 8.1* 10.6*   GLUCOSE 197* 132* 112*   CALCIUM 8.2* 8.2* 8.0*     Recent Labs     02/01/20  1555   MG 1.6     Recent Labs     02/01/20  0124 02/02/20  0132 02/03/20  0153   AST 19 24 20   ALT 14 15 15   BILITOT 0.4 0.3 <0.2   ALKPHOS 58 58 51     ABGs:No results for input(s): PH, PO2, PCO2, HCO3, BE, O2SAT in the last 72 hours. Troponin T:   No results for input(s): TROPONINI in the last 72 hours. INR:   No results for input(s): INR in the last 72 hours. Lactic Acid:   No results for input(s): LACTA in the last 72 hours.     Objective:   Vitals: BP (!) 146/78   Pulse 96   Temp 97.6 °F (36.4 °C)   Resp 20   Ht 5' 8\" (1.727 m)   Wt 264 lb 8 oz (120 kg)   SpO2 90%   BMI 40.22 kg/m²   24HR INTAKE/OUTPUT:      Intake/Output Summary (Last 24 hours) at 2/3/2020 1010  Last data filed at 2/3/2020 0630  Gross per 24 hour   Intake 240 ml   Output 875 ml   Net -635 ml     General appearance: alert and cooperative with exam  HEENT: atraumatic, eyes with clear conjunctiva and normal lids, pupils and irises normal, external ears and nose are normal, lips normal  Neck without masses, lympadenopathy, bruit, thyroid normal  Lungs: no increased work of breathing, \"clear to auscultation bilaterally\" without rales, rhonchi or wheezes  Heart: tachycardia, regular rhythm, S1/S2+  Abdomen: soft, non-tender; bowel sounds normal; no masses,  no organomegaly  Extremities: edema 1+  Neurologic: no focal neurologic deficits, normal sensation, alert and oriented, affect and mood appropriate  Skin: warm    Assessment and Plan:   Principal Problem:    Acute renal failure (HCC)  Active Problems:    Uncontrolled hypertension    Mild intermittent asthma without complication    Morbid obesity due to excess calories ([facility] [address]    Type 2 diabetes mellitus without complication, without long-term current use of insulin (HCC)    Acute diastolic (congestive) heart failure (HCC)    RUPAL (acute kidney injury) ([facility] [address]  Resolved Problems:    * No resolved hospital problems. *    Acute renal failure/Metabolic Acidosis:  Permacath placed with vascular surgery 1/31/2020. HD as per nephrology. Possible need for renal biopsy. HAP: Antibiotics. Cultures.     Normocytic Anemia: Guaiac negative in ED. Monitor H/H and transfuse PRN.     Hypertension: Nifedipine/Lopressor/Clonidine. Monitor BP and adjust medications as needed     Diabetes: HbA1c 5.9. ISS. Hypoglycemia protocol in place.     Asthma: Not in acute exacerbation. Duonebs PRN. O2 PRN. Supportive management. PT/OT. Case management for outpatient dialysis chair. Advance Directive: Full Code    DVT prophylaxis: Heparin    Discharge planning: TBD - Needs outpatient HD chair.       Signed:  Mark Anthony Saenz MD 2/3/2020 10:10 AM  Rounding Hospitalist

## 2020-02-03 NOTE — CARE COORDINATION
2/3/20: HD set-up in progress. [facility] Intake (dialysis)  [phone]  [phone]  Electronically signed by CARMEN Garcia on 2/3/2020 at 10:38 AM    **referral has been entered into the [employer] portal and faxed to central intake.   Electronically signed by CARMEN Garcia on 2/3/2020 at 12:12 PM

## 2020-02-03 NOTE — PROGRESS NOTES
Balance  Sitting Balance: Independent  Standing Balance: Independent  Functional Mobility  Functional - Mobility Device: No device  Assist Level: Independent  Toilet Transfers  Toilet Transfer: Independent  Shower Transfers  Shower Transfers: Independent  ADL  Feeding: Independent  Grooming: Independent  UE Bathing: Independent  LE Bathing: Independent  UE Dressing: Independent  LE Dressing: Independent  Toileting: Independent  Additional Comments: no ADAPTIVE EQUIPMENT needs identified  Quality of Movement Other  Comment: No FM deficits noted        Transfers  Stand Step Transfers: Independent     Cognition  Overall Cognitive Status: WNL        Sensation  Overall Sensation Status: WFL        LUE PROM (degrees)  LUE PROM: WNL  LUE AROM (degrees)  LUE AROM : WNL  RUE PROM (degrees)  RUE PROM: WNL  RUE AROM (degrees)  RUE AROM : WNL  LUE Strength  Gross LUE Strength: WNL  RUE Strength  Gross RUE Strength:  WNL                   Plan   Plan  Plan Comment: No further visits needed    G-Code     OutComes Score                                                  AM-PAC Score             Goals          Therapy Time   Individual Concurrent Group Co-treatment   Time In           Time Out 5000 Kristen Olmos OT Electronically signed by Margaret Crump OT on 2/3/2020 at 2:53 PM

## 2020-02-03 NOTE — PROGRESS NOTES
Nephrology ([facility]) Progress Note    Patient:  Claudia Bray  YOB: 1970  Date of Service: 2/3/2020  MRN: [id number]   Acct: [de-identified]   Primary Care Physician: Nolan Celaya MD  Advance Directive: Full Code  Admit Date: 1/29/2020       Hospital Day: 5  Referring Provider: Jennifer Suarez MD    Patient Seen, Chart, Consults, Notes, Labs, Radiology studies reviewed. Subjective:  Claudia Bray is a 47 y.o. male  whom we were consulted for acute kidney injury. Patient denies any history of chronic kidney disease. He has history of benign essential hypertension, type 2 diabetes and hyperlipidemia. Presented to the emergency room complaining of fever productive cough and yellow sputum. Patient denies any recent use of nonsteroidal agents. He reports watery bowel movements/diarrhea for couple days associated with nausea and vomiting. On arrival in the emergency room his blood pressure was 200/110 mmHg. Patient was also found to have a serum creatinine of 18 mg with blood urea nitrogen more than 100. Hospital course remarkable for starting dialysis. On January 31, patient underwent permacatheter placement followed by dialysis as he has developed pulmonary edema. Patient was seen and examined today on dialysis. He is non-oliguric. He offered no new complaints and denied much dyspnea.         Dialysis   Pt was seen on RRT  Modality: Hemodialysis  Access: Catheter  Location: right upper  QB: 400  QD: 600  UF: 4 liters    Allergies:  Banana and Lisinopril    Medicines:  Current Facility-Administered Medications   Medication Dose Route Frequency Provider Last Rate Last Dose    heparin (porcine) injection 3,200 Units  3,200 Units Intracatheter Once Merlinda Hawking, MD        cloNIDine (CATAPRES) tablet 0.1 mg  0.1 mg Oral BID Jennifer Suarez MD        cyclobenzaprine (FLEXERIL) tablet 10 mg  10 mg Oral TID PRN Jennifer Suarez MD   10 mg at 02/01/20 2020    hours.  Magnesium:  Recent Labs     02/01/20  1555   MG 1.6     Phosphorus:No results for input(s): PHOS in the last 72 hours. HgbA1C: No results for input(s): LABA1C in the last 72 hours. Hepatic:   Recent Labs     02/01/20  0124 02/02/20  0132 02/03/20  0153   ALKPHOS 58 58 51   ALT 14 15 15   AST 19 24 20   PROT 5.9* 6.9 6.2*   BILITOT 0.4 0.3 <0.2   LABALBU 3.2* 3.2* 2.8*     Lactic Acid: No results for input(s): LACTA in the last 72 hours. Troponin: No results for input(s): CKTOTAL, CKMB, TROPONINT in the last 72 hours. ABGs: No results for input(s): PH, PCO2, PO2, HCO3, O2SAT in the last 72 hours. CRP:  No results for input(s): CRP in the last 72 hours. Sed Rate:  No results for input(s): SEDRATE in the last 72 hours. Cultures:   No results for input(s): CULTURE in the last 72 hours. No results for input(s): BC, [place] in the last 72 hours. No results for input(s): CXSURG in the last 72 hours. Radiology reports as per the Radiologist  Radiology: Xr Chest Standard (2 Vw)    Result Date: 1/29/2020  XR CHEST (2 VW) 1/29/2020 2:30 PM Indication: Cough Comparison: 9/24/2017 Findings: Cardiac silhouette is borderline prominent but stable. 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 Mild central chronic bronchial wall thickening appears unchanged. No pleural effusion, visible pneumothorax, or focal consolidation. No suspicious focal bone lesion. No acute findings or significant change. Signed by Dr Pablito Mccartney on 1/29/2020 4:09 PM    Us Renal Complete    Result Date: 1/29/2020  RENAL ULTRASOUND COMPLETE 1/29/2020 5:00 PM REASON FOR EXAM: AK I  COMPARISON: None  TECHNIQUE: Multiple longitudinal and transverse realtime sonographic images of the kidneys and urinary bladder are obtained. FINDINGS: The right kidney measures 9.1 cm. The cortical thickness within the right kidney is 8 mm and 15 mm respectively in the upper and lower pole.   The right kidney is normal in size, shape, contour and

## 2020-02-03 NOTE — PROGRESS NOTES
Physical Therapy    Facility/Department: [facility] 4 ONCOLOGY UNIT  Initial Assessment    NAME: Laure Castillo  : 1970  MRN: [id number]    Date of Service: 2/3/2020    Discharge Recommendations:  Continue to assess pending progress   PT Equipment Recommendations  Other: NO NEEDS IDENTIFIED    Assessment   Assessment: Pt DEMONSTRATES ABILITY TO RETURN HOME AND CARE FOR SELF AS PREVIOUS, USING ENERGY CONSERVATION TECHNIQUES AS NEEDED  Prognosis: Good  Decision Making: Low Complexity  PT Education: General Safety  REQUIRES PT FOLLOW UP: No  Activity Tolerance  Activity Tolerance: Patient Tolerated treatment well       Patient Diagnosis(es): The primary encounter diagnosis was RUPAL (acute kidney injury) ([facility] [address] Diagnoses of Anemia, unspecified type, Elevated troponin, Hypocalcemia, and Pre-op exam were also pertinent to this visit. has a past medical history of Asthma, Diabetes mellitus ([facility] [address] Erectile dysfunction, Hypertension, and Obesity. has a past surgical history that includes Colonoscopy (N/A, 2019). Restrictions  Restrictions/Precautions  Restrictions/Precautions: Fall Risk  Vision/Hearing        Subjective  General  Patient assessed for rehabilitation services?: Yes  Diagnosis: ARF  Follows Commands: Within Functional Limits  General Comment  Comments: pt WAS NOT WEARING 02 WHEN PT ENTERED ROOM AND pt STATES HE DOES NOT NEED AT THIS TIME  Subjective  Subjective: pt STATES HE PLANS TO RETURN HOME UPON D/C AND DOES NOT ANTCIPATE HAVING ANY DIFFICULTY RETURNING TO PLOF. NOTED HGB TO BE 7.3 BUT pt HAD NO C/O DIZZINESS OR SOA WITH AMB.   Pain Screening  Patient Currently in Pain: No          Orientation  Orientation  Overall Orientation Status: Within Functional Limits  Social/Functional History  Social/Functional History  Lives With: Daughter  Type of Home: House  Home Layout: One level  Home Access: Stairs to enter with rails  Entrance Stairs - Number of Steps: 3  Bathroom Shower/Tub: Tub/Shower unit  Bathroom Toilet: Standard  ADL Assistance: Independent  Homemaking Assistance: Independent  Ambulation Assistance: Independent  Transfer Assistance: Independent  Active : Yes  Cognition        Objective          AROM RLE (degrees)  RLE AROM: WFL  AROM LLE (degrees)  LLE AROM : WFL  Strength Other  Other: SUPPORTS WEIGHT WELL THRU LE'S      Sensation  Overall Sensation Status: WFL  Bed mobility  Supine to Sit: Independent  Sit to Supine: Independent  Transfers  Sit to Stand: Supervision  Stand to sit: Supervision  Comment: SUPERIVSED FOR EVAL BUT APPEARS READY TO BE IND AS ALLOWED BY NURSING STAFF  Ambulation  Ambulation?: Yes  Ambulation 1  Surface: level tile  Device: No Device  Assistance: Supervision  Quality of Gait: NO DEVIATIONS NOTED. NO LOB.  Pt APPEARS READY TO BEGIN IND AMB   Distance: 130 FT  Comments: NO SOA OR FATIGUE NOTED     Balance  Comments: pt ABLE TO REACH OVER HEAD AND DOWN TO KNEES TO SIMULATE DRESSING WITHOUT LOB OR DIZZINESS        Plan   Plan  Times per week: EVAL ONLY  Safety Devices  Type of devices: Call light within reach    G-Code       OutComes Score                                                  AM-PAC Score             Goals  Short term goals  Short term goal 1: GOAL MET TO EVAL FOR NEED FOR SKILLED PT SERVICES IN THIS SETTING       Therapy Time   Individual Concurrent Group Co-treatment   Time In           Time Out           Minutes                   Gini Trimble PT   Electronically signed by Gini Trimble PT on 2/3/2020 at 1:52 PM

## 2020-02-03 NOTE — PLAN OF CARE
Problem: Tissue Perfusion - Renal, Altered:  Goal: Electrolytes within specified parameters  Description  Electrolytes within specified parameters  Outcome: Ongoing  Goal: Urine creatinine clearance will be within specified parameters  Description  Urine creatinine clearance will be within specified parameters  Outcome: Ongoing  Goal: Serum creatinine will be within specified parameters  Description  Serum creatinine will be within specified parameters  Outcome: Ongoing  Goal: Ability to achieve a balanced intake and output will improve  Description  Ability to achieve a balanced intake and output will improve  Outcome: Ongoing     Problem: Nausea/Vomiting:  Goal: Absence of nausea/vomiting  Description  Absence of nausea/vomiting  Outcome: Ongoing  Goal: Able to drink  Description  Able to drink  Outcome: Ongoing  Goal: Able to eat  Description  Able to eat  Outcome: Ongoing  Goal: Ability to achieve adequate nutritional intake will improve  Description  Ability to achieve adequate nutritional intake will improve  Outcome: Ongoing     Problem: Pain:  Description  Pain management should include both nonpharmacologic and pharmacologic interventions.   Goal: Pain level will decrease  Description  Pain level will decrease  Outcome: Ongoing  Goal: Control of acute pain  Description  Control of acute pain  Outcome: Ongoing  Goal: Control of chronic pain  Description  Control of chronic pain  Outcome: Ongoing

## 2020-02-04 LAB
ALBUMIN SERPL-MCNC: 3.3 G/DL (ref 3.5–5.2)
ALP BLD-CCNC: 58 U/L (ref 40–130)
ALT SERPL-CCNC: 17 U/L (ref 5–41)
ANION GAP SERPL CALCULATED.3IONS-SCNC: 19 MMOL/L (ref 7–19)
AST SERPL-CCNC: 21 U/L (ref 5–40)
BASOPHILS ABSOLUTE: 0.1 K/UL (ref 0–0.2)
BASOPHILS RELATIVE PERCENT: 1 % (ref 0–1)
BILIRUB SERPL-MCNC: <0.2 MG/DL (ref 0.2–1.2)
BUN BLDV-MCNC: 36 MG/DL (ref 6–20)
CALCIUM SERPL-MCNC: 8.1 MG/DL (ref 8.6–10)
CHLORIDE BLD-SCNC: 96 MMOL/L (ref 98–111)
CO2: 23 MMOL/L (ref 22–29)
CREAT SERPL-MCNC: 8.9 MG/DL (ref 0.5–1.2)
EOSINOPHILS ABSOLUTE: 0.42 K/UL (ref 0–0.6)
EOSINOPHILS RELATIVE PERCENT: 3 % (ref 0–5)
GFR NON-AFRICAN AMERICAN: 6
GLUCOSE BLD-MCNC: 114 MG/DL (ref 70–99)
GLUCOSE BLD-MCNC: 118 MG/DL (ref 74–109)
GLUCOSE BLD-MCNC: 121 MG/DL (ref 70–99)
GLUCOSE BLD-MCNC: 136 MG/DL (ref 70–99)
GLUCOSE BLD-MCNC: 178 MG/DL (ref 70–99)
HCT VFR BLD CALC: 24.7 % (ref 42–52)
HEMOGLOBIN: 7.7 G/DL (ref 14–18)
HYPOCHROMIA: ABNORMAL
IMMATURE GRANULOCYTES #: 0.5 K/UL
LYMPHOCYTES ABSOLUTE: 3 K/UL (ref 1.1–4.5)
LYMPHOCYTES RELATIVE PERCENT: 20 % (ref 20–40)
MCH RBC QN AUTO: 27.7 PG (ref 27–31)
MCHC RBC AUTO-ENTMCNC: 31.2 G/DL (ref 33–37)
MCV RBC AUTO: 88.8 FL (ref 80–94)
MONOCYTES ABSOLUTE: 1.6 K/UL (ref 0–0.9)
MONOCYTES RELATIVE PERCENT: 11 % (ref 0–10)
NEUTROPHILS ABSOLUTE: 9 K/UL (ref 1.5–7.5)
NEUTROPHILS RELATIVE PERCENT: 64 % (ref 50–65)
OVALOCYTES: ABNORMAL
PDW BLD-RTO: 13.2 % (ref 11.5–14.5)
PERFORMED ON: ABNORMAL
PHOSPHORUS: 5.5 MG/DL (ref 2.5–4.5)
PLATELET # BLD: 246 K/UL (ref 130–400)
PLATELET SLIDE REVIEW: ADEQUATE
PMV BLD AUTO: 10.1 FL (ref 9.4–12.4)
POLYCHROMASIA: ABNORMAL
POTASSIUM SERPL-SCNC: 4.4 MMOL/L (ref 3.5–5)
RBC # BLD: 2.78 M/UL (ref 4.7–6.1)
SODIUM BLD-SCNC: 138 MMOL/L (ref 136–145)
TOTAL PROTEIN: 5.9 G/DL (ref 6.6–8.7)
WBC # BLD: 14.1 K/UL (ref 4.8–10.8)

## 2020-02-04 PROCEDURE — 6370000000 HC RX 637 (ALT 250 FOR IP): Performed by: SURGERY

## 2020-02-04 PROCEDURE — 87040 BLOOD CULTURE FOR BACTERIA: CPT

## 2020-02-04 PROCEDURE — 84100 ASSAY OF PHOSPHORUS: CPT

## 2020-02-04 PROCEDURE — 2580000003 HC RX 258: Performed by: SURGERY

## 2020-02-04 PROCEDURE — 6360000002 HC RX W HCPCS: Performed by: INTERNAL MEDICINE

## 2020-02-04 PROCEDURE — 85025 COMPLETE CBC W/AUTO DIFF WBC: CPT

## 2020-02-04 PROCEDURE — 80053 COMPREHEN METABOLIC PANEL: CPT

## 2020-02-04 PROCEDURE — 82948 REAGENT STRIP/BLOOD GLUCOSE: CPT

## 2020-02-04 PROCEDURE — 2580000003 HC RX 258: Performed by: INTERNAL MEDICINE

## 2020-02-04 PROCEDURE — 94640 AIRWAY INHALATION TREATMENT: CPT

## 2020-02-04 PROCEDURE — 6370000000 HC RX 637 (ALT 250 FOR IP): Performed by: INTERNAL MEDICINE

## 2020-02-04 PROCEDURE — 2700000000 HC OXYGEN THERAPY PER DAY

## 2020-02-04 PROCEDURE — 6360000002 HC RX W HCPCS: Performed by: SURGERY

## 2020-02-04 PROCEDURE — 1210000000 HC MED SURG R&B

## 2020-02-04 PROCEDURE — 36415 COLL VENOUS BLD VENIPUNCTURE: CPT

## 2020-02-04 PROCEDURE — 2500000003 HC RX 250 WO HCPCS: Performed by: SURGERY

## 2020-02-04 RX ADMIN — BUDESONIDE 500 MCG: 0.5 INHALANT RESPIRATORY (INHALATION) at 20:03

## 2020-02-04 RX ADMIN — IPRATROPIUM BROMIDE AND ALBUTEROL SULFATE 1 AMPULE: 2.5; .5 SOLUTION RESPIRATORY (INHALATION) at 20:03

## 2020-02-04 RX ADMIN — FAMOTIDINE 20 MG: 10 INJECTION INTRAVENOUS at 09:34

## 2020-02-04 RX ADMIN — HEPARIN SODIUM 5000 UNITS: 5000 INJECTION INTRAVENOUS; SUBCUTANEOUS at 16:06

## 2020-02-04 RX ADMIN — SODIUM CHLORIDE, PRESERVATIVE FREE 10 ML: 5 INJECTION INTRAVENOUS at 20:26

## 2020-02-04 RX ADMIN — FERROUS SULFATE TAB 325 MG (65 MG ELEMENTAL FE) 325 MG: 325 (65 FE) TAB at 16:06

## 2020-02-04 RX ADMIN — HEPARIN SODIUM 5000 UNITS: 5000 INJECTION INTRAVENOUS; SUBCUTANEOUS at 22:07

## 2020-02-04 RX ADMIN — Medication 1 G: at 19:16

## 2020-02-04 RX ADMIN — HEPARIN SODIUM 5000 UNITS: 5000 INJECTION INTRAVENOUS; SUBCUTANEOUS at 05:42

## 2020-02-04 RX ADMIN — SODIUM CHLORIDE, PRESERVATIVE FREE 10 ML: 5 INJECTION INTRAVENOUS at 09:35

## 2020-02-04 RX ADMIN — METOPROLOL TARTRATE 25 MG: 25 TABLET ORAL at 09:34

## 2020-02-04 RX ADMIN — HYDRALAZINE HYDROCHLORIDE 50 MG: 50 TABLET, FILM COATED ORAL at 05:42

## 2020-02-04 RX ADMIN — CLONIDINE HYDROCHLORIDE 0.1 MG: 0.1 TABLET ORAL at 20:26

## 2020-02-04 RX ADMIN — FERROUS SULFATE TAB 325 MG (65 MG ELEMENTAL FE) 325 MG: 325 (65 FE) TAB at 09:34

## 2020-02-04 RX ADMIN — IPRATROPIUM BROMIDE AND ALBUTEROL SULFATE 1 AMPULE: 2.5; .5 SOLUTION RESPIRATORY (INHALATION) at 15:24

## 2020-02-04 RX ADMIN — CLONIDINE HYDROCHLORIDE 0.1 MG: 0.1 TABLET ORAL at 09:34

## 2020-02-04 RX ADMIN — NIFEDIPINE 60 MG: 60 TABLET, FILM COATED, EXTENDED RELEASE ORAL at 20:26

## 2020-02-04 RX ADMIN — NIFEDIPINE 60 MG: 60 TABLET, FILM COATED, EXTENDED RELEASE ORAL at 09:34

## 2020-02-04 RX ADMIN — IPRATROPIUM BROMIDE AND ALBUTEROL SULFATE 1 AMPULE: 2.5; .5 SOLUTION RESPIRATORY (INHALATION) at 07:28

## 2020-02-04 RX ADMIN — HYDRALAZINE HYDROCHLORIDE 50 MG: 50 TABLET, FILM COATED ORAL at 22:07

## 2020-02-04 RX ADMIN — INSULIN LISPRO 1 UNITS: 100 INJECTION, SOLUTION INTRAVENOUS; SUBCUTANEOUS at 20:26

## 2020-02-04 RX ADMIN — HYDRALAZINE HYDROCHLORIDE 50 MG: 50 TABLET, FILM COATED ORAL at 16:06

## 2020-02-04 RX ADMIN — IPRATROPIUM BROMIDE AND ALBUTEROL SULFATE 1 AMPULE: 2.5; .5 SOLUTION RESPIRATORY (INHALATION) at 11:03

## 2020-02-04 RX ADMIN — BUDESONIDE 500 MCG: 0.5 INHALANT RESPIRATORY (INHALATION) at 07:28

## 2020-02-04 RX ADMIN — CARBOXYMETHYLCELLULOSE SODIUM 1 DROP: 5 SOLUTION/ DROPS OPHTHALMIC at 16:41

## 2020-02-04 RX ADMIN — METOPROLOL TARTRATE 25 MG: 25 TABLET ORAL at 20:26

## 2020-02-04 ASSESSMENT — PAIN SCALES - GENERAL
PAINLEVEL_OUTOF10: 0
PAINLEVEL_OUTOF10: 0

## 2020-02-04 NOTE — PROGRESS NOTES
glucagon (rDNA) injection 1 mg  1 mg Intramuscular PRN Caryle Likes, MD        dextrose 5 % solution  100 mL/hr Intravenous PRN Caryle Likes, MD        ipratropium-albuterol (DUONEB) nebulizer solution 1 ampule  1 ampule Inhalation Q4H Millie Guerrero MD   1 ampule at 02/04/20 4154    hydrALAZINE (APRESOLINE) tablet 50 mg  50 mg Oral 3 times per day Caryle Likes, MD   50 mg at 02/04/20 0542        Labs:     Recent Labs     02/02/20  0132 02/03/20  0153 02/04/20  0335   WBC 11.3* 12.9* 14.1*   RBC 2.77* 2.60* 2.78*   HGB 7.7* 7.3* 7.7*   HCT 23.8* 22.8* 24.7*   MCV 85.9 87.7 88.8   MCH 27.8 28.1 27.7   MCHC 32.4* 32.0* 31.2*    208 246     Recent Labs     02/02/20  0132 02/03/20  0153 02/04/20  0335   * 135* 138   K 4.4 4.6 4.4   ANIONGAP 18 17 19   CL 93* 96* 96*   CO2 24 22 23   BUN 41* 53* 36*   CREATININE 8.1* 10.6* 8.9*   GLUCOSE 132* 112* 118*   CALCIUM 8.2* 8.0* 8.1*     Recent Labs     02/01/20  1555 02/04/20  0335   MG 1.6  --    PHOS  --  5.5*     Recent Labs     02/02/20  0132 02/03/20  0153 02/04/20  0335   AST 24 20 21   ALT 15 15 17   BILITOT 0.3 <0.2 <0.2   ALKPHOS 58 51 58     ABGs:No results for input(s): PH, PO2, PCO2, HCO3, BE, O2SAT in the last 72 hours. Troponin T:   No results for input(s): TROPONINI in the last 72 hours. INR:   No results for input(s): INR in the last 72 hours. Lactic Acid:   No results for input(s): LACTA in the last 72 hours.     Objective:   Vitals: BP (!) 142/85   Pulse 91   Temp 98 °F (36.7 °C) (Temporal)   Resp 16   Ht 5' 8\" (1.727 m)   Wt 248 lb 14.4 oz (112.9 kg)   SpO2 91%   BMI 37.85 kg/m²   24HR INTAKE/OUTPUT:      Intake/Output Summary (Last 24 hours) at 2/4/2020 0910  Last data filed at 2/3/2020 1530  Gross per 24 hour   Intake --   Output 4800 ml   Net -4800 ml     General appearance: alert and cooperative with exam  HEENT: atraumatic, eyes with clear conjunctiva and normal lids, pupils and irises normal, external ears and nose are normal, lips normal  Neck without masses, lympadenopathy, bruit, thyroid normal  Lungs: no increased work of breathing, \"clear to auscultation bilaterally\" without rales, rhonchi or wheezes  Heart: RRR  Abdomen: soft, non-tender; bowel sounds normal; no masses,  no organomegaly  Extremities: edema 1+  Neurologic: no focal neurologic deficits, normal sensation, alert and oriented, affect and mood appropriate  Skin: warm    Assessment and Plan:   Principal Problem:    Acute renal failure (HCC)  Active Problems:    Uncontrolled hypertension    Mild intermittent asthma without complication    Morbid obesity due to excess calories ([facility] [address]    Type 2 diabetes mellitus without complication, without long-term current use of insulin (HCC)    Acute diastolic (congestive) heart failure (HCC)    RUPAL (acute kidney injury) ([facility] [address]  Resolved Problems:    * No resolved hospital problems. *    Acute renal failure/Metabolic Acidosis:  Permacath placed with vascular surgery 1/31/2020. HD as per nephrology. Possible need for renal biopsy. HAP: Antibiotics. Cultures.     Normocytic Anemia: Guaiac negative in ED. Monitor H/H and transfuse PRN.     Hypertension: Nifedipine/Lopressor/Clonidine. Monitor BP and adjust medications as needed     Diabetes: HbA1c 5.9. ISS. Hypoglycemia protocol in place.     Asthma: Not in acute exacerbation. Duonebs PRN. O2 PRN. Supportive management. PT/OT. Case management for outpatient dialysis chair.     Advance Directive: Full Code    DVT prophylaxis: Heparin    Discharge planning: TBD - needs outpatient HD chair      Signed:  Laura Brennan MD 2/4/2020 9:10 AM  Rounding Hospitalist

## 2020-02-04 NOTE — PROGRESS NOTES
Nephrology ([facility]) Progress Note    Patient:  Matt Mason  YOB: 1970  Date of Service: 2/4/2020  MRN: [id number]   Acct: [de-identified]   Primary Care Physician: Rochelle Whittaker MD  Advance Directive: Full Code  Admit Date: 1/29/2020       Hospital Day: 6  Referring Provider: Sabine Valle MD    Patient Seen, Chart, Consults notes, Labs, Radiology studies reviewed. Subjective:  Jonna Aceves a 47 y. o. male  whom we were consulted for acute kidney injury.  Patient denies any history of chronic kidney disease.  He has history of benign essential hypertension, type 2 diabetes and hyperlipidemia.  Presented to the emergency room complaining of fever productive cough and yellow sputum.  Patient denies any recent use of nonsteroidal agents.  He reports watery bowel movements/diarrhea for couple days associated with nausea and vomiting.  On arrival in the emergency room his blood pressure was 200/110 mmHg.  Patient was also found to have a serum creatinine of 18 mg with blood urea nitrogen more than 100.  Hospital course remarkable for starting dialysis. On January 31, patient underwent permacatheter placement followed by dialysis as he has developed pulmonary edema. He is non-oliguric. He offered no new complaints and denied much dyspnea. He is s/p dialysis on 2/3.     Allergies:  Banana and Lisinopril    Medicines:  Current Facility-Administered Medications   Medication Dose Route Frequency Provider Last Rate Last Dose    cloNIDine (CATAPRES) tablet 0.1 mg  0.1 mg Oral BID Sabine Valle MD   0.1 mg at 02/04/20 0934    ferrous sulfate tablet 325 mg  325 mg Oral BID  Janessa Luna MD   325 mg at 02/04/20 7056    cyclobenzaprine (FLEXERIL) tablet 10 mg  10 mg Oral TID PRN Sabine Valle MD   10 mg at 02/01/20 2020    carboxymethylcellulose (REFRESH PLUS) 0.5 % ophthalmic solution 1 drop  1 drop Both Eyes TID Sabine Valle MD   1 drop at 02/03/20 2045    morphine MCV 85.9 87.7 88.8    208 246     LIVER PROFILE:   Recent Labs     02/02/20  0132 02/03/20  0153 02/04/20  0335   AST 24 20 21   ALT 15 15 17   BILITOT 0.3 <0.2 <0.2   ALKPHOS 58 51 58     PT/INR: No results for input(s): PROTIME, INR in the last 72 hours. APTT: No results for input(s): APTT in the last 72 hours. BNP:  No results for input(s): BNP in the last 72 hours. Ionized Calcium:No results for input(s): IONCA in the last 72 hours. Magnesium:  Recent Labs     02/01/20  1555   MG 1.6     Phosphorus:  Recent Labs     02/04/20  0335   PHOS 5.5*     HgbA1C: No results for input(s): LABA1C in the last 72 hours. Hepatic:   Recent Labs     02/02/20  0132 02/03/20  0153 02/04/20  0335   ALKPHOS 58 51 58   ALT 15 15 17   AST 24 20 21   PROT 6.9 6.2* 5.9*   BILITOT 0.3 <0.2 <0.2   LABALBU 3.2* 2.8* 3.3*     Lactic Acid: No results for input(s): LACTA in the last 72 hours. Troponin: No results for input(s): CKTOTAL, CKMB, TROPONINT in the last 72 hours. ABGs: No results for input(s): PH, PCO2, PO2, HCO3, O2SAT in the last 72 hours. CRP:  No results for input(s): CRP in the last 72 hours. Sed Rate:  No results for input(s): SEDRATE in the last 72 hours. Cultures:   No results for input(s): CULTURE in the last 72 hours. Radiology reports as per the Radiologist  Radiology: Xr Chest Standard (2 Vw)    Result Date: 1/29/2020  XR CHEST (2 VW) 1/29/2020 2:30 PM Indication: Cough Comparison: 9/24/2017 Findings: Cardiac silhouette is borderline prominent but stable. 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 Mild central chronic bronchial wall thickening appears unchanged. No pleural effusion, visible pneumothorax, or focal consolidation. No suspicious focal bone lesion. No acute findings or significant change.  Signed by Dr Parvez Nguyen on 1/29/2020 4:09 PM    Us Renal Complete    Result Date: 1/29/2020  RENAL ULTRASOUND COMPLETE 1/29/2020 5:00 PM REASON FOR EXAM: AK I  COMPARISON: None  TECHNIQUE: diastolic hypertension. 4.  Metabolic acidemia. 5.  Hyperphosphatemia. 6.  Nephrotic range proteinuria. 7.  Iron deficiency anemia. 8.  Secondary hyperparathyroidism      Plan: Will order a renal biopsy at this stage. Arrange dialysis in AM and follow up labs.

## 2020-02-04 NOTE — CARE COORDINATION
ROMINA visited with Pt this AM to verify his work schedule; he stated he works 9:30PM-7:30AM [employer]; [employer] has options of TTS 1:15 or 3:45; MWF won't be until 4:55 or 6PM, thus not compatible with his work schedule; ROMINA visited with Pt re: choices; he decided on TTS at [facility] mound 3:45PM; advised he can work with the clinic to see if any other times become available there or at the [address] [place] [address] clinic that would be more accommodating with his work schedule.     Electronically signed by CARMEN Lozada on 2/4/2020 at 10:25 AM

## 2020-02-05 VITALS
WEIGHT: 248.2 LBS | OXYGEN SATURATION: 90 % | RESPIRATION RATE: 16 BRPM | DIASTOLIC BLOOD PRESSURE: 90 MMHG | HEART RATE: 102 BPM | HEIGHT: 68 IN | TEMPERATURE: 97.8 F | BODY MASS INDEX: 37.62 KG/M2 | SYSTOLIC BLOOD PRESSURE: 162 MMHG

## 2020-02-05 LAB
ALBUMIN SERPL-MCNC: 3.3 G/DL (ref 3.5–5.2)
ALP BLD-CCNC: 51 U/L (ref 40–130)
ALT SERPL-CCNC: 19 U/L (ref 5–41)
ANION GAP SERPL CALCULATED.3IONS-SCNC: 19 MMOL/L (ref 7–19)
APTT: 36.8 SEC (ref 26–36.2)
AST SERPL-CCNC: 24 U/L (ref 5–40)
BASOPHILS ABSOLUTE: 0.1 K/UL (ref 0–0.2)
BASOPHILS RELATIVE PERCENT: 0.5 % (ref 0–1)
BILIRUB SERPL-MCNC: <0.2 MG/DL (ref 0.2–1.2)
BUN BLDV-MCNC: 50 MG/DL (ref 6–20)
CALCIUM SERPL-MCNC: 8.5 MG/DL (ref 8.6–10)
CHLORIDE BLD-SCNC: 96 MMOL/L (ref 98–111)
CO2: 23 MMOL/L (ref 22–29)
CREAT SERPL-MCNC: 11.2 MG/DL (ref 0.5–1.2)
EOSINOPHILS ABSOLUTE: 0.5 K/UL (ref 0–0.6)
EOSINOPHILS RELATIVE PERCENT: 3.9 % (ref 0–5)
GFR NON-AFRICAN AMERICAN: 5
GLUCOSE BLD-MCNC: 121 MG/DL (ref 70–99)
GLUCOSE BLD-MCNC: 128 MG/DL (ref 74–109)
HCT VFR BLD CALC: 24.1 % (ref 42–52)
HEMOGLOBIN: 7.6 G/DL (ref 14–18)
IMMATURE GRANULOCYTES #: 0.4 K/UL
INR BLD: 1.12 (ref 0.88–1.18)
LYMPHOCYTES ABSOLUTE: 2.4 K/UL (ref 1.1–4.5)
LYMPHOCYTES RELATIVE PERCENT: 19 % (ref 20–40)
MCH RBC QN AUTO: 27.6 PG (ref 27–31)
MCHC RBC AUTO-ENTMCNC: 31.5 G/DL (ref 33–37)
MCV RBC AUTO: 87.6 FL (ref 80–94)
MONOCYTES ABSOLUTE: 1.2 K/UL (ref 0–0.9)
MONOCYTES RELATIVE PERCENT: 9.8 % (ref 0–10)
NEUTROPHILS ABSOLUTE: 8 K/UL (ref 1.5–7.5)
NEUTROPHILS RELATIVE PERCENT: 63.3 % (ref 50–65)
PDW BLD-RTO: 13.1 % (ref 11.5–14.5)
PERFORMED ON: ABNORMAL
PLATELET # BLD: 258 K/UL (ref 130–400)
PMV BLD AUTO: 9.8 FL (ref 9.4–12.4)
POTASSIUM SERPL-SCNC: 4.4 MMOL/L (ref 3.5–5)
PROTHROMBIN TIME: 13.8 SEC (ref 12–14.6)
RBC # BLD: 2.75 M/UL (ref 4.7–6.1)
SODIUM BLD-SCNC: 138 MMOL/L (ref 136–145)
TOTAL PROTEIN: 6.7 G/DL (ref 6.6–8.7)
VANCOMYCIN RANDOM: 16.6 UG/ML
WBC # BLD: 12.6 K/UL (ref 4.8–10.8)

## 2020-02-05 PROCEDURE — 80202 ASSAY OF VANCOMYCIN: CPT

## 2020-02-05 PROCEDURE — 82948 REAGENT STRIP/BLOOD GLUCOSE: CPT

## 2020-02-05 PROCEDURE — 6370000000 HC RX 637 (ALT 250 FOR IP): Performed by: SURGERY

## 2020-02-05 PROCEDURE — 6360000002 HC RX W HCPCS: Performed by: INTERNAL MEDICINE

## 2020-02-05 PROCEDURE — 80053 COMPREHEN METABOLIC PANEL: CPT

## 2020-02-05 PROCEDURE — 85610 PROTHROMBIN TIME: CPT

## 2020-02-05 PROCEDURE — 85730 THROMBOPLASTIN TIME PARTIAL: CPT

## 2020-02-05 PROCEDURE — 36415 COLL VENOUS BLD VENIPUNCTURE: CPT

## 2020-02-05 PROCEDURE — 94640 AIRWAY INHALATION TREATMENT: CPT

## 2020-02-05 PROCEDURE — 85025 COMPLETE CBC W/AUTO DIFF WBC: CPT

## 2020-02-05 PROCEDURE — 8010000000 HC HEMODIALYSIS ACUTE INPT

## 2020-02-05 RX ORDER — FERROUS SULFATE 325(65) MG
325 TABLET ORAL 2 TIMES DAILY WITH MEALS
Qty: 30 TABLET | Refills: 3 | Status: SHIPPED | OUTPATIENT
Start: 2020-02-05 | End: 2020-04-14

## 2020-02-05 RX ORDER — HYDRALAZINE HYDROCHLORIDE 50 MG/1
50 TABLET, FILM COATED ORAL EVERY 8 HOURS SCHEDULED
Qty: 90 TABLET | Refills: 3 | Status: SHIPPED | OUTPATIENT
Start: 2020-02-05 | End: 2020-08-11

## 2020-02-05 RX ORDER — LEVOFLOXACIN 500 MG/1
500 TABLET, FILM COATED ORAL
Qty: 5 TABLET | Refills: 0 | Status: SHIPPED | OUTPATIENT
Start: 2020-02-05 | End: 2020-02-15

## 2020-02-05 RX ORDER — CLONIDINE HYDROCHLORIDE 0.1 MG/1
0.1 TABLET ORAL 2 TIMES DAILY
Qty: 60 TABLET | Refills: 3 | Status: SHIPPED | OUTPATIENT
Start: 2020-02-05 | End: 2020-08-11

## 2020-02-05 RX ORDER — HEPARIN SODIUM 1000 [USP'U]/ML
3600 INJECTION, SOLUTION INTRAVENOUS; SUBCUTANEOUS
Status: DISCONTINUED | OUTPATIENT
Start: 2020-02-05 | End: 2020-02-05 | Stop reason: HOSPADM

## 2020-02-05 RX ORDER — NIFEDIPINE 60 MG/1
60 TABLET, FILM COATED, EXTENDED RELEASE ORAL 2 TIMES DAILY
Qty: 30 TABLET | Refills: 3 | Status: SHIPPED | OUTPATIENT
Start: 2020-02-05 | End: 2020-08-11

## 2020-02-05 RX ADMIN — BUDESONIDE 500 MCG: 0.5 INHALANT RESPIRATORY (INHALATION) at 06:51

## 2020-02-05 RX ADMIN — IPRATROPIUM BROMIDE AND ALBUTEROL SULFATE 1 AMPULE: 2.5; .5 SOLUTION RESPIRATORY (INHALATION) at 06:51

## 2020-02-05 RX ADMIN — IPRATROPIUM BROMIDE AND ALBUTEROL SULFATE 1 AMPULE: 2.5; .5 SOLUTION RESPIRATORY (INHALATION) at 11:10

## 2020-02-05 RX ADMIN — IPRATROPIUM BROMIDE AND ALBUTEROL SULFATE 1 AMPULE: 2.5; .5 SOLUTION RESPIRATORY (INHALATION) at 15:23

## 2020-02-05 NOTE — PROGRESS NOTES
Nephrology ([facility]) Progress Note    Patient:  Judd Jefferson  YOB: 1970  Date of Service: 2/5/2020  MRN: [id number]   Acct: [de-identified]   Primary Care Physician: Fátima Lawler MD  Advance Directive: Full Code  Admit Date: 1/29/2020       Hospital Day: 7  Referring Provider: Mariposa Carmona MD    Patient Seen, Chart, Consults notes, Labs, Radiology studies reviewed. Subjective:  Pablito Devoid a 47 y. o. male  whom we were consulted for acute kidney injury.  Patient denies any history of chronic kidney disease.  He has history of benign essential hypertension, type 2 diabetes and hyperlipidemia.  Presented to the emergency room complaining of fever productive cough and yellow sputum.  Patient denies any recent use of nonsteroidal agents.  He reports watery bowel movements/diarrhea for couple days associated with nausea and vomiting.  On arrival in the emergency room his blood pressure was 200/110 mmHg.  Patient was also found to have a serum creatinine of 18 mg with blood urea nitrogen more than 100.  Hospital course remarkable for starting dialysis. On January 31, patient underwent permacatheter placement followed by dialysis as he has developed pulmonary edema. He is non-oliguric. He offered no new complaints and denied much dyspnea. He is s/p dialysis today.     Allergies:  Banana and Lisinopril    Medicines:  Current Facility-Administered Medications   Medication Dose Route Frequency Provider Last Rate Last Dose    heparin (porcine) injection 3,600 Units  3,600 Units Intracatheter Once in dialysis Lucia Pollack MD        cloNIDine (CATAPRES) tablet 0.1 mg  0.1 mg Oral BID Mariposa Carmona MD   0.1 mg at 02/04/20 2026    ferrous sulfate tablet 325 mg  325 mg Oral BID  Lucia Pollack MD   325 mg at 02/04/20 1606    cyclobenzaprine (FLEXERIL) tablet 10 mg  10 mg Oral TID PRN Mariposa Carmona MD   10 mg at 02/01/20 2020    carboxymethylcellulose (REFRESH PLUS) 0.5 % ophthalmic solution 1 drop  1 drop Both Eyes TID Meena Mendosa MD   1 drop at 02/04/20 1641    morphine injection 2 mg  2 mg Intravenous Q4H PRN Meena Mendosa MD   2 mg at 02/01/20 2104    budesonide (PULMICORT) nebulizer suspension 500 mcg  0.5 mg Nebulization BID Meena Mendosa MD   500 mcg at 02/05/20 5939    cefepime (MAXIPIME) 1 g in sodium chloride (PF) 10 mL IV syringe  1 g Intravenous Q24H Meena Mendosa MD   1 g at 02/04/20 1916    vancomycin (VANCOCIN) intermittent dosing (placeholder)   Other RX Cindy Holm MD        0.9 % sodium chloride bolus  20 mL Intravenous Once Mely Bailey MD        chlorhexidine (HIBICLENS) 4 % liquid   Topical Once Mely Bailey MD        HYDROcodone-acetaminophen [facility]) 5-325 MG per tablet 1 tablet  1 tablet Oral Q4H PRN Mely Bailey MD        Or    HYDROcodone-acetaminophen [facility]) 5-325 MG per tablet 2 tablet  2 tablet Oral Q4H PRN Mely Bailey MD   2 tablet at 02/02/20 1729    ondansetron (ZOFRAN) injection 4 mg  4 mg Intravenous Q8H PRN Mely Bailey MD        metoprolol tartrate (LOPRESSOR) tablet 25 mg  25 mg Oral BID Mely Bailey MD   25 mg at 02/04/20 2026    NIFEdipine (PROCARDIA XL) extended release tablet 60 mg  60 mg Oral BID Mely Bailey MD   60 mg at 02/04/20 2026    sodium chloride flush 0.9 % injection 10 mL  10 mL Intravenous 2 times per day Mely Bailey MD   10 mL at 02/04/20 2026    sodium chloride flush 0.9 % injection 10 mL  10 mL Intravenous PRN Mely Bailey MD        heparin (porcine) injection 5,000 Units  5,000 Units Subcutaneous 3 times per day Mely Bailey MD   Stopped at 02/05/20 0447    famotidine (PEPCID) injection 20 mg  20 mg Intravenous Daily Mely Bailey MD   20 mg at 02/04/20 4038    senna (SENOKOT) tablet 8.6 mg  1 tablet Oral Daily PRN Mely Bailey MD        acetaminophen (TYLENOL) tablet 650 mg  650 mg Oral Q4H PRN Mely Form, MD   650 mg at 02/01/20 6069    Lifestyle    Physical activity:     Days per week: Not on file     Minutes per session: Not on file    Stress: Not on file   Relationships    Social connections:     Talks on phone: Not on file     Gets together: Not on file     Attends Zoroastrian service: Not on file     Active member of club or organization: Not on file     Attends meetings of clubs or organizations: Not on file     Relationship status: Not on file    Intimate partner violence:     Fear of current or ex partner: Not on file     Emotionally abused: Not on file     Physically abused: Not on file     Forced sexual activity: Not on file   Other Topics Concern    Not on file   Social History Narrative    Not on file         Review of Systems:  History obtained from chart review and the patient  General ROS: No fever or chills  Respiratory ROS: No cough, shortness of breath, wheezing  Cardiovascular ROS: no chest pain or dyspnea on exertion  Gastrointestinal ROS: No abdominal pain or melena  Genito-Urinary ROS: No dysuria or hematuria  Musculoskeletal ROS: No joint pain or swelling         Objective:  Blood pressure 134/86, pulse 87, temperature 97.1 °F (36.2 °C), temperature source Temporal, resp. rate 16, height 5' 8\" (1.727 m), weight 248 lb 3.2 oz (112.6 kg), SpO2 97 %.     Intake/Output Summary (Last 24 hours) at 2/5/2020 1551  Last data filed at 2/4/2020 1900  Gross per 24 hour   Intake 240 ml   Output --   Net 240 ml     General: alert and oriented x3   Chest:  clear to auscultation bilaterally without respiratory distress  CVS: regular rate and rhythm  Abdominal: soft, nontender, normal bowel sounds  Extremities: no cyanosis or edema  Skin: warm and dry without rash    Labs:  BMP:   Recent Labs     02/03/20  0153 02/04/20  0335 02/05/20  0412   * 138 138   K 4.6 4.4 4.4   CL 96* 96* 96*   CO2 22 23 23   PHOS  --  5.5*  --    BUN 53* 36* 50*   CREATININE 10.6* 8.9* 11.2*   CALCIUM 8.0* 8.1* 8.5*     CBC:   Recent Labs Acute kidney injury/GN versus hypertensive renal disease/   2. Possible chronic kidney disease baseline. 3.  Severe systolic and diastolic hypertension. 4.  Metabolic acidemia. 5.  Hyperphosphatemia. 6.  Nephrotic range proteinuria. 7.  Iron deficiency anemia. 8.  Secondary hyperparathyroidism      Plan:  Renal biopsy was cancelled due to severe anemia. May continue dialysis as outpatient and arrange biopsy once more stable.

## 2020-02-05 NOTE — PROGRESS NOTES
nose are normal, lips normal  Neck without masses, lympadenopathy, bruit, thyroid normal  Lungs: no increased work of breathing, \"clear to auscultation bilaterally\" without rales, rhonchi or wheezes  Heart: RRR  Abdomen: soft, non-tender; bowel sounds normal; no masses,  no organomegaly  Extremities: edema 1+  Neurologic: no focal neurologic deficits, normal sensation, alert and oriented, affect and mood appropriate  Skin: warm    Assessment and Plan:   Principal Problem:    Acute renal failure (HCC)  Active Problems:    Uncontrolled hypertension    Mild intermittent asthma without complication    Morbid obesity due to excess calories ([facility] [address]    Type 2 diabetes mellitus without complication, without long-term current use of insulin (HCC)    Acute diastolic (congestive) heart failure (HCC)    RUPAL (acute kidney injury) ([facility] [address]  Resolved Problems:    * No resolved hospital problems. *    Acute renal failure/Metabolic Acidosis:  Permacath placed with vascular surgery 1/31/2020. HD as per nephrology. Renal biopsy planned for today. HAP: Antibiotics. Cultures.     Normocytic Anemia: Guaiac negative in ED. Monitor H/H and transfuse PRN.     Hypertension: Nifedipine/Lopressor/Clonidine. Monitor BP and adjust medications as needed     Diabetes: HbA1c 5.9. ISS. Hypoglycemia protocol in place.     Asthma: Not in acute exacerbation. Duonebs PRN. O2 PRN. Supportive management. PT/OT. HD chair has been set-up. Patient currently awaiting renal biopsy. Advance Directive: Full Code    DVT prophylaxis: Heparin    Discharge planning: After renal biopsy.       Signed:  Mariposa Carmona MD 2/5/2020 9:02 AM  Rounding Hospitalist

## 2020-02-05 NOTE — DISCHARGE SUMMARY
Home Medication Instructions JESSEE:[id number]    Printed on:02/05/20 1030   Medication Information                      albuterol sulfate  (90 BASE) MCG/ACT inhaler  Inhale 2 puffs into the lungs every 6 hours as needed for Wheezing             albuterol-ipratropium (COMBIVENT RESPIMAT)  MCG/ACT AERS inhaler  Inhale 1 puff into the lungs every 6 hours             aspirin EC 81 MG EC tablet  Take 1 tablet by mouth daily             cloNIDine (CATAPRES) 0.1 MG tablet  Take 1 tablet by mouth 2 times daily             ferrous sulfate 325 (65 Fe) MG tablet  Take 1 tablet by mouth 2 times daily (with meals)             hydrALAZINE (APRESOLINE) 50 MG tablet  Take 1 tablet by mouth every 8 hours             ipratropium-albuterol (DUONEB) 0.5-2.5 (3) MG/3ML SOLN nebulizer solution  Take 3 mLs by nebulization every 4 hours             levofloxacin (LEVAQUIN) 500 MG tablet  Take 1 tablet by mouth every 48 hours for 10 days             metoprolol tartrate (LOPRESSOR) 25 MG tablet  Take 1 tablet by mouth 2 times daily             NIFEdipine (ADALAT CC) 60 MG extended release tablet  Take 1 tablet by mouth 2 times daily             sildenafil (REVATIO) 20 MG tablet  Take 1 tablet by mouth as needed (sexual activity)             sildenafil (VIAGRA) 50 MG tablet  Substitute for garth, highest mg             simvastatin (ZOCOR) 40 MG tablet  TAKE 1 TABLET BY MOUTH ONCE DAILY AT  NIGHT                 Discharge Instructions: Follow up with Cara Esparza MD in 7 days. Take medications as directed. Resume activity as tolerated. Diet: Diet NPO, After Midnight     Disposition: Patient is medically stable and will be discharged Home. Time spent on discharge 35 minutes.     Signed:  Lee Pierce MD 2/5/2020 10:30 AM

## 2020-02-05 NOTE — CARE COORDINATION
2/5/20: Per Janell Donis at [employer], they are putting Pt into their TCU program, thus Pt will be [facility] MWF 4:50P and TT 4:00PM- for 2.5 hour runs; Pt is aware and agreeable.    Electronically signed by Annabel Cheadle, LSW on 2/5/2020 at 10:43 AM

## 2020-02-05 NOTE — PROGRESS NOTES
Nutrition Assessment    Type and Reason for Visit: Initial    Nutrition Recommendations: follow for increased diet and tolerance    Nutrition Assessment: Following for LOS x 7 days. Pt appears adequately nourished AEB fat and muscle mass. Pt does not appear to be at risk for nutritional compromise d/t adequately po intake -75 to 100%, weight decrease with dialysis. Pt NPO at present time for possible biopsy. Malnutrition Assessment:  · Malnutrition Status: No malnutrition  · Context: Acute illness or injury  · Findings of the 6 clinical characteristics of malnutrition (Minimum of 2 out of 6 clinical characteristics is required to make the diagnosis of moderate or severe Protein Calorie Malnutrition based on AND/ASPEN Guidelines):  1. Energy Intake-Greater than 75% of estimated energy requirement, Greater than or equal to 5 days    2. Weight Loss-10% loss or greater, (4 months)  3. Fat Loss-No significant subcutaneous fat loss,    4. Muscle Loss-No significant muscle mass loss,    5. Fluid Accumulation-No significant fluid accumulation, Extremities  6.   Strength-Not measured    Nutrition Risk Level: Low    Nutrition Diagnosis:   · Problem: Altered nutrition-related lab values  · Etiology: related to Cardiac dysfunction, Endocrine dysfunction     Signs and symptoms:  as evidenced by Lab values    Objective Information:  · Nutrition-Focused Physical Findings: well nourished  · Wound Type: None  · Current Nutrition Therapies:  · Oral Diet Orders: NPO   · Oral Diet intake: %  · Oral Nutrition Supplement (ONS) Orders: None    · Anthropometric Measures:  · Ht: 5' 8\" (172.7 cm)   · Current Body Wt: 248 lb 3 oz (112.6 kg)  · Admission Body Wt: 260 lb (117.9 kg)(stated)  · Usual Body Wt: 279 lb 5 oz (126.7 kg)  · % Weight Change:  ,  11.2% weight decreased--now receiving dialysis  · Ideal Body Wt: 154 lb (69.9 kg), % Ideal Body 161.2%  · BMI Classification: BMI 35.0 - 39.9 Obese Class II    Nutrition Interventions:   Continue NPO  Continued Inpatient Monitoring    Nutrition Evaluation:   · Evaluation: Goals set   · Goals: po intake 75% or greater. Weight stable.   Accuchek's 150 or less    · Monitoring: Nutrition Progression, Diet Tolerance, Skin Integrity, Weight, Pertinent Labs      Electronically signed by Kathy Arellano MS, RD, LD on 2/5/20 at 10:43 AM    Contact Number: [phone]

## 2020-02-05 NOTE — PLAN OF CARE
Problem: Tissue Perfusion - Renal, Altered:  Goal: Electrolytes within specified parameters  Description  Electrolytes within specified parameters  Outcome: Ongoing  Goal: Urine creatinine clearance will be within specified parameters  Description  Urine creatinine clearance will be within specified parameters  Outcome: Ongoing  Goal: Serum creatinine will be within specified parameters  Description  Serum creatinine will be within specified parameters  Outcome: Ongoing  Goal: Ability to achieve a balanced intake and output will improve  Description  Ability to achieve a balanced intake and output will improve  Outcome: Ongoing

## 2020-02-05 NOTE — PROGRESS NOTES
CLINICAL PHARMACY NOTE: MEDS TO [address] [place] [address] Drive Select Patient?: No  Total # of Prescriptions Filled: 6   The following medications were delivered to the patient:  · Ferrous sulfate 325  · Nifedipine er 60mg  · Clonidine 0.1mg  · Levofloxacin 500  · Hydralazine 50mg  · Metoprolol tartrate 25mg  Total # of Interventions Completed: 0  Time Spent (min): 45    Additional Documentation:

## 2020-02-06 ENCOUNTER — TELEPHONE (OUTPATIENT)
Dept: PRIMARY CARE CLINIC | Age: 50
End: 2020-02-06

## 2020-02-06 ENCOUNTER — TELEPHONE (OUTPATIENT)
Dept: INTERNAL MEDICINE | Age: 50
End: 2020-02-06

## 2020-02-06 NOTE — TELEPHONE ENCOUNTER
Pt contacted office stating that he had just gotten out of the hospital 8 days ago and was needing Dr. Shayne Kirkpatrick to fill out [place] paperwork and didn't know if he dropped it off if it would be completed today. I advised pt that it would not, explained policy within office that we have 2 weeks for any paperwork but that if he would bring it in I would get a message to his medical assistant. Pt verbalized understanding.

## 2020-02-06 NOTE — TELEPHONE ENCOUNTER
[facility] Transitions Initial Follow Up Call    Outreach made within 2 business days of discharge: Yes    Patient: Suman Gonzales Patient : 1970   MRN: [id number]  Reason for Admission: Patient was admitted on 2020 due to acute renal failure. Discharge Diagnoses:  Principal Problem:    Acute renal failure ([facility]  Active Problems:    Uncontrolled hypertension    Mild intermittent asthma without complication    Morbid obesity due to excess calories (HCC)    Type 2 diabetes mellitus without complication, without long-term current use of insulin (HCC)    Acute diastolic (congestive) heart failure (HCC)    RUPAL (acute kidney injury) ([facility]  Resolved Problems:    * No resolved hospital problems. *    Discharge Date: 20       Spoke with: patient. [facility] [address] Interactive Patient Contact:  Was patient able to fill all prescriptions: Yes  Was patient instructed to bring all medications to the follow-up visit: Yes  Is patient taking all medications as directed in the discharge summary? Yes  Does patient understand their discharge instructions: Yes  Does patient have questions or concerns that need addressed prior to 7-14 day follow up office visit: no  Patient states he feels great. No problems today. Patient states he has been going to dialysis as scheduled. Bowels working. Appetite is good. Slept well. Patient to bring all medications to HFU and will contact office with any concerns.      Scheduled appointment with PCP within 7-14 days    Follow Up  Future Appointments   Date Time Provider Hakeem Thomas   2020 11:00 AM [facility] -  [facility] Trang Tolentino LPN

## 2020-02-07 ENCOUNTER — OFFICE VISIT (OUTPATIENT)
Dept: PRIMARY CARE CLINIC | Age: 50
End: 2020-02-07
Payer: COMMERCIAL

## 2020-02-07 VITALS
WEIGHT: 253 LBS | TEMPERATURE: 98 F | BODY MASS INDEX: 39.71 KG/M2 | SYSTOLIC BLOOD PRESSURE: 118 MMHG | DIASTOLIC BLOOD PRESSURE: 70 MMHG | HEIGHT: 67 IN | OXYGEN SATURATION: 93 % | HEART RATE: 85 BPM

## 2020-02-07 PROCEDURE — 99495 TRANSJ CARE MGMT MOD F2F 14D: CPT | Performed by: NURSE PRACTITIONER

## 2020-02-07 PROCEDURE — 1111F DSCHRG MED/CURRENT MED MERGE: CPT | Performed by: NURSE PRACTITIONER

## 2020-02-07 ASSESSMENT — ENCOUNTER SYMPTOMS
SINUS PRESSURE: 0
VOMITING: 0
SORE THROAT: 0
DIARRHEA: 0
COUGH: 0
EYE DISCHARGE: 0
GASTROINTESTINAL NEGATIVE: 1
BLOOD IN STOOL: 0
TROUBLE SWALLOWING: 0
EYES NEGATIVE: 1
NAUSEA: 0
SHORTNESS OF BREATH: 0
RESPIRATORY NEGATIVE: 1
WHEEZING: 0
ALLERGIC/IMMUNOLOGIC NEGATIVE: 1

## 2020-02-07 ASSESSMENT — PATIENT HEALTH QUESTIONNAIRE - PHQ9
1. LITTLE INTEREST OR PLEASURE IN DOING THINGS: 0
2. FEELING DOWN, DEPRESSED OR HOPELESS: 0
SUM OF ALL RESPONSES TO PHQ9 QUESTIONS 1 & 2: 0
SUM OF ALL RESPONSES TO PHQ QUESTIONS 1-9: 0
SUM OF ALL RESPONSES TO PHQ QUESTIONS 1-9: 0

## 2020-02-07 NOTE — PROGRESS NOTES
kidneys       Inpatient course: Discharge summary reviewed- see chart. Interval history/Current status: stable   Patient reports he was seen in the ER for fatigue and muscles aches. Patient reports he thought he had influenza. After labs returned, it appears creatinine and BUN where significantly elevated, while GFR was low. Patient denies history of kidney disease. Patient was discharged home with dialysis access in there right internal jugular vein. Patient reports he elected to have dialysis completed Monday through Friday for two hours. Patient reports he started dialysis yesterday. Review of Systems   Constitutional: Negative. Negative for chills, fever and unexpected weight change. HENT: Negative. Negative for sinus pressure, sore throat and trouble swallowing. Eyes: Negative. Negative for discharge and visual disturbance. Respiratory: Negative. Negative for cough, shortness of breath and wheezing. Cardiovascular: Negative. Negative for chest pain and palpitations. Gastrointestinal: Negative. Negative for blood in stool, diarrhea, nausea and vomiting. Endocrine: Negative. Negative for polydipsia, polyphagia and polyuria. Genitourinary: Negative for difficulty urinating, dysuria and flank pain. Dark yellow urine   Musculoskeletal: Negative. Negative for gait problem, neck pain and neck stiffness. Skin: Negative for rash and wound. Dialysis access right internal jugular vein   Allergic/Immunologic: Negative. Negative for environmental allergies. Neurological: Negative. Negative for syncope, speech difficulty, weakness, light-headedness and headaches. Hematological: Negative. Does not bruise/bleed easily. Psychiatric/Behavioral: Negative. Negative for self-injury and suicidal ideas.        Vitals:    02/07/20 1056   BP: 118/70   Pulse: 85   Temp: 98 °F (36.7 °C)   SpO2: 93%   Weight: 253 lb (114.8 kg)   Height: 5' 7\" (1.702 m)     Body mass index is 39.63 kg/m². Wt Readings from Last 3 Encounters:   02/07/20 253 lb (114.8 kg)   02/05/20 248 lb 3.2 oz (112.6 kg)   12/05/19 255 lb (115.7 kg)     BP Readings from Last 3 Encounters:   02/07/20 118/70   02/05/20 (!) 162/90   12/05/19 (!) 148/98       Physical Exam  Vitals signs and nursing note reviewed. Constitutional:       General: He is not in acute distress. Appearance: He is well-developed. He is obese. He is not diaphoretic. HENT:      Head: Normocephalic and atraumatic. Right Ear: External ear normal.      Left Ear: External ear normal.      Nose: Nose normal. No congestion or rhinorrhea. Mouth/Throat:      Mouth: Mucous membranes are moist.      Pharynx: Oropharynx is clear. No oropharyngeal exudate. Eyes:      General:         Right eye: No discharge. Left eye: No discharge. Conjunctiva/sclera: Conjunctivae normal.      Pupils: Pupils are equal, round, and reactive to light. Neck:      Musculoskeletal: Normal range of motion. Trachea: No tracheal deviation. Cardiovascular:      Rate and Rhythm: Normal rate and regular rhythm. Pulses: Normal pulses. Heart sounds: Normal heart sounds. No murmur. Pulmonary:      Effort: Pulmonary effort is normal. No respiratory distress. Breath sounds: Normal breath sounds. No stridor. Abdominal:      General: Bowel sounds are normal.      Palpations: Abdomen is soft. Tenderness: There is no abdominal tenderness. Musculoskeletal: Normal range of motion. General: No tenderness or deformity. Lymphadenopathy:      Cervical: No cervical adenopathy. Skin:     General: Skin is warm and dry. Capillary Refill: Capillary refill takes less than 2 seconds. Findings: No rash. Neurological:      Mental Status: He is alert and oriented to person, place, and time. Cranial Nerves: No cranial nerve deficit.    Psychiatric:         Behavior: Behavior normal.         Thought Content: Thought content normal.         Judgment: Judgment normal.             Assessment/Plan:  1. Hospital discharge follow-up  - TX DISCHARGE MEDS RECONCILED W/ CURRENT OUTPATIENT MED LIST    2. Acute renal failure, unspecified acute renal failure type ([facility]  - Continue to follow-up with nephrologist     3.  Hemodialysis patient [facility])  - Continue dialysis as recommended by nephrologist  - Patient will be seen by [facility] NP or MD during dialysis         Medical Decision Making: moderate complexity

## 2020-02-07 NOTE — TELEPHONE ENCOUNTER
Pt will need an appointment to get these [facility] paperwork filled out      Pt has not been seen in the office since 10/2019 by Dr Devi Overall.      Will forward to  to schedule

## 2020-02-09 LAB
BLOOD CULTURE, ROUTINE: NORMAL
CULTURE, BLOOD 2: NORMAL

## 2020-02-10 ENCOUNTER — TELEPHONE (OUTPATIENT)
Dept: PRIMARY CARE CLINIC | Age: 50
End: 2020-02-10

## 2020-02-13 ENCOUNTER — HOSPITAL ENCOUNTER (EMERGENCY)
Age: 50
Discharge: HOME | End: 2020-02-13
Payer: COMMERCIAL

## 2020-02-13 VITALS
HEART RATE: 79 BPM | TEMPERATURE: 98 F | DIASTOLIC BLOOD PRESSURE: 75 MMHG | WEIGHT: 240 LBS | BODY MASS INDEX: 36.37 KG/M2 | RESPIRATION RATE: 16 BRPM | HEIGHT: 68 IN | OXYGEN SATURATION: 95 % | SYSTOLIC BLOOD PRESSURE: 116 MMHG

## 2020-02-13 PROCEDURE — 99282 EMERGENCY DEPT VISIT SF MDM: CPT

## 2020-02-13 RX ORDER — HYDROXYZINE PAMOATE 25 MG/1
25 CAPSULE ORAL 3 TIMES DAILY PRN
Qty: 15 CAPSULE | Refills: 0 | Status: SHIPPED | OUTPATIENT
Start: 2020-02-13 | End: 2021-01-24 | Stop reason: ALTCHOICE

## 2020-02-13 RX ORDER — TRIAMCINOLONE ACETONIDE 1 MG/G
CREAM TOPICAL
Qty: 45 G | Refills: 0 | Status: SHIPPED | OUTPATIENT
Start: 2020-02-13 | End: 2020-04-14

## 2020-02-13 NOTE — ED PROVIDER NOTES
[facility] EM[facility] DE[facility]  eMERGENCY dEPARTMENT eNCOUnter      Pt Name: Gabe Aburto  MRN: [id number]  Gradygfjuan 1970  Date of evaluation: 2/13/2020  Provider: Sade Renee, [address]       Chief Complaint   Patient presents with    Rash         HISTORY OF PRESENT ILLNESS   (Location/Symptom, Timing/Onset,Context/Setting, Quality, Duration, Modifying Factors, Severity)  Note limiting factors. Christopher Ramirez a 47 y.o. male who presents to the emergency department for evaluation of rash. Pt tells me that he developed pruritic \"bumps\" to right thumb area and right neck two days ago. He denies shortness of breath, swelling of tongue as well as any problems swallowing. He relates that he is a diabetic. He tells me that he has recently started a new medication and thought that he may be having an allergic reaction. He denies fever as well as vomiting. He tells me that he was treated last week for PNA. He relates that symptoms are improving. HPI    Nursing Notes were reviewed. REVIEW OF SYSTEMS    (2-9 systems for level 4, 10 or more for level 5)     Review of Systems   Constitutional: Negative. Skin: Positive for rash (localized rash to right thumb and left side of anterior neck). A complete review of systems was performed and is negative except as noted above in the HPI.        PAST MEDICAL HISTORY     Past Medical History:   Diagnosis Date    Asthma     Diabetes mellitus ([address]     Erectile dysfunction     Hypertension     Obesity          SURGICAL HISTORY       Past Surgical History:   Procedure Laterality Date    COLONOSCOPY N/A 12/5/2019    Dr Cynthia Buckley, internal hemorrhoids-Grade 1-2-HP, 3 yr recall         CURRENT MEDICATIONS       Discharge Medication List as of 2/13/2020 11:33 AM      CONTINUE these medications which have NOT CHANGED    Details   cloNIDine (CATAPRES) 0.1 MG tablet Take 1 tablet by mouth 2 times daily, Disp-60 tablet, R-3Normal      hydrALAZINE ULTRASOUND:   Performed by ED Physician - none    LABS:  Labs Reviewed - No data to display    All other labs were within normal range or not returned as of this dictation. RE-ASSESSMENT           EMERGENCY DEPARTMENT COURSE and DIFFERENTIALDIAGNOSIS/MDM:   Vitals:    Vitals:    02/13/20 1017 02/13/20 1019   BP:  116/75   Pulse:  79   Resp:  16   Temp:  98 °F (36.7 °C)   SpO2:  95%   Weight: 240 lb (108.9 kg)    Height: 5' 8\" (1.727 m)        MDM      CONSULTS:  None    PROCEDURES:  Unless otherwise notedbelow, none     Procedures    FINAL IMPRESSION     1. Rash and other nonspecific skin eruption          DISPOSITION/PLAN   DISPOSITION Decision To Discharge 02/13/2020 11:16:49 AM      PATIENT REFERRED TO:  Yolanda Warner MD  [address] [place] [address] [address]  [employer]  [phone]    Schedule an appointment as soon as possible for a visit in 5 days  fail to improve      DISCHARGE MEDICATIONS:       Discharge Medication List as of 2/13/2020 11:33 AM           Medication List      START taking these medications    hydrOXYzine 25 MG capsule  Commonly known as:  Vistaril  Take 1 capsule by mouth 3 times daily as needed for Itching     triamcinolone 0.1 % cream  Commonly known as:  KENALOG  Apply topically 2 times daily affected area of rash until healed.         ASK your doctor about these medications    albuterol sulfate  (90 Base) MCG/ACT inhaler  Inhale 2 puffs into the lungs every 6 hours as needed for Wheezing     aspirin EC 81 MG EC tablet  Take 1 tablet by mouth daily     cloNIDine 0.1 MG tablet  Commonly known as:  CATAPRES  Take 1 tablet by mouth 2 times daily     ferrous sulfate 325 (65 Fe) MG tablet  Take 1 tablet by mouth 2 times daily (with meals)     hydrALAZINE 50 MG tablet  Commonly known as:  APRESOLINE  Take 1 tablet by mouth every 8 hours     * ipratropium-albuterol 0.5-2.5 (3) MG/3ML Soln nebulizer solution  Commonly known as:  DuoNeb  Take 3 mLs by nebulization every 4 hours     *

## 2020-02-20 ENCOUNTER — OFFICE VISIT (OUTPATIENT)
Dept: UROLOGY | Age: 50
End: 2020-02-20
Payer: COMMERCIAL

## 2020-02-20 VITALS
HEART RATE: 76 BPM | BODY MASS INDEX: 36.68 KG/M2 | HEIGHT: 68 IN | DIASTOLIC BLOOD PRESSURE: 74 MMHG | TEMPERATURE: 96.6 F | SYSTOLIC BLOOD PRESSURE: 117 MMHG | WEIGHT: 242 LBS

## 2020-02-20 PROCEDURE — 99213 OFFICE O/P EST LOW 20 MIN: CPT | Performed by: PHYSICIAN ASSISTANT

## 2020-02-20 ASSESSMENT — ENCOUNTER SYMPTOMS
COUGH: 0
SHORTNESS OF BREATH: 0
BACK PAIN: 0
EYE REDNESS: 0
DIARRHEA: 0
EYE DISCHARGE: 0
ABDOMINAL PAIN: 0
CONSTIPATION: 0
WHEEZING: 0

## 2020-02-20 NOTE — PROGRESS NOTES
Thais Calvin is a 47 y.o. male who presents today   Chief Complaint   Patient presents with    Follow-up     I am here for a follow up on ED     Erectile Dysfunction        Erectile Dysfunction  Patient complains of erectile dysfunction. Onset of dysfunction was a few years ago and was gradual in onset. Patient states the nature of difficulty is both attaining and maintaining erection. Full erections occur never. Partial erections occur spontaneously. Libido is not affected. Risk factors for ED include diabetes mellitus and beta blocker use. Patient failed oral erectile dysfunction medications when I saw him in 2018 he did penile injection trial and when he tried it he had increase the dose and had to go to [facility] emergency department due to prior prism. This was August 2018. Injections and is at risk for prior prism with patients on dialysis. He is interested in penile prosthesis as an option. Past Medical History:   Diagnosis Date    Asthma     Diabetes mellitus ([address]     Erectile dysfunction     Hypertension     Obesity        Past Surgical History:   Procedure Laterality Date    COLONOSCOPY N/A 12/5/2019    Dr Hanane Church, internal hemorrhoids-Grade 1-2-HP, 3 yr recall       Current Outpatient Medications   Medication Sig Dispense Refill    triamcinolone (KENALOG) 0.1 % cream Apply topically 2 times daily affected area of rash until healed.  45 g 0    hydrOXYzine (VISTARIL) 25 MG capsule Take 1 capsule by mouth 3 times daily as needed for Itching 15 capsule 0    cloNIDine (CATAPRES) 0.1 MG tablet Take 1 tablet by mouth 2 times daily 60 tablet 3    hydrALAZINE (APRESOLINE) 50 MG tablet Take 1 tablet by mouth every 8 hours 90 tablet 3    metoprolol tartrate (LOPRESSOR) 25 MG tablet Take 1 tablet by mouth 2 times daily 60 tablet 3    NIFEdipine (ADALAT CC) 60 MG extended release tablet Take 1 tablet by mouth 2 times daily 30 tablet 3    ferrous sulfate 325 (65 Fe) MG tablet Take 1 tablet by mouth 2 times daily (with meals) 30 tablet 3    albuterol-ipratropium (COMBIVENT RESPIMAT)  MCG/ACT AERS inhaler Inhale 1 puff into the lungs every 6 hours 1 Inhaler 0    albuterol sulfate  (90 BASE) MCG/ACT inhaler Inhale 2 puffs into the lungs every 6 hours as needed for Wheezing 1 Inhaler 2    ipratropium-albuterol (DUONEB) 0.5-2.5 (3) MG/3ML SOLN nebulizer solution Take 3 mLs by nebulization every 4 hours 175 mL 0    aspirin EC 81 MG EC tablet Take 1 tablet by mouth daily (Patient not taking: Reported on 2/20/2020) 90 tablet 5     No current facility-administered medications for this visit.         Allergies   Allergen Reactions    Banana     Lisinopril Other (See Comments)     Cough       Social History     Socioeconomic History    Marital status: Single     Spouse name: None    Number of children: None    Years of education: None    Highest education level: None   Occupational History    None   Social Needs    Financial resource strain: None    Food insecurity:     Worry: None     Inability: None    Transportation needs:     Medical: None     Non-medical: None   Tobacco Use    Smoking status: Never Smoker    Smokeless tobacco: Never Used   Substance and Sexual Activity    Alcohol use: Yes     Comment: occ    Drug use: No    Sexual activity: None   Lifestyle    Physical activity:     Days per week: None     Minutes per session: None    Stress: None   Relationships    Social connections:     Talks on phone: None     Gets together: None     Attends Taoism service: None     Active member of club or organization: None     Attends meetings of clubs or organizations: None     Relationship status: None    Intimate partner violence:     Fear of current or ex partner: None     Emotionally abused: None     Physically abused: None     Forced sexual activity: None   Other Topics Concern    None   Social History Narrative    None       No family history on file.    REVIEW OF SYSTEMS:  Review of Systems   Constitutional: Negative for chills and fever. HENT: Negative for congestion and hearing loss. Eyes: Negative for discharge and redness. Respiratory: Negative for cough, shortness of breath and wheezing. Cardiovascular: Negative for leg swelling. Gastrointestinal: Negative for abdominal pain, constipation and diarrhea. Endocrine: Negative for cold intolerance and heat intolerance. Genitourinary: Negative for decreased urine volume, difficulty urinating, dysuria, enuresis, flank pain, frequency, genital sores, hematuria and urgency. Musculoskeletal: Negative for back pain and gait problem. Skin: Negative for rash. Allergic/Immunologic: Negative for environmental allergies. Neurological: Negative for dizziness, weakness and headaches. Hematological: Does not bruise/bleed easily. Psychiatric/Behavioral: Negative for behavioral problems, confusion and sleep disturbance. PHYSICAL EXAM:  /74   Pulse 76   Temp 96.6 °F (35.9 °C) (Temporal)   Ht 5' 8\" (1.727 m)   Wt 242 lb (109.8 kg)   BMI 36.80 kg/m²   Physical Exam        DATA:  U/A:    Lab Results   Component Value Date    COLORU Yellow 01/29/2020    PROTEINU >=300 01/29/2020    PHUR 6.5 01/29/2020    WBCUA 3 01/29/2020    RBCUA 2 01/29/2020    BACTERIA NEGATIVE 01/29/2020    CLARITYU Clear 01/29/2020    SPECGRAV 1.020 01/29/2020    LEUKOCYTESUR Negative 01/29/2020    UROBILINOGEN 0.2 01/29/2020    BILIRUBINUR Negative 01/29/2020    BLOODU MODERATE 01/29/2020    GLUCOSEU 100 01/29/2020           1. Erectile dysfunction, unspecified erectile dysfunction type  Patient with several years of erectile dysfunction he had failed on oral medications I had seen him in 2018 for penile injection trial he had used it at home however he developed a prior prism after injecting and had to go to [facility] emergency department this was in 2018.   Probably not a good option for him due to the risk

## 2020-03-25 PROBLEM — N17.9 ACUTE RENAL FAILURE (HCC): Status: RESOLVED | Noted: 2020-01-29 | Resolved: 2020-03-24

## 2020-04-14 ENCOUNTER — APPOINTMENT (OUTPATIENT)
Dept: GENERAL RADIOLOGY | Age: 50
End: 2020-04-14
Payer: COMMERCIAL

## 2020-04-14 ENCOUNTER — HOSPITAL ENCOUNTER (EMERGENCY)
Age: 50
Discharge: HOME | End: 2020-04-14
Attending: EMERGENCY MEDICINE
Payer: COMMERCIAL

## 2020-04-14 VITALS
DIASTOLIC BLOOD PRESSURE: 59 MMHG | TEMPERATURE: 98.9 F | HEIGHT: 68 IN | RESPIRATION RATE: 20 BRPM | OXYGEN SATURATION: 95 % | HEART RATE: 88 BPM | SYSTOLIC BLOOD PRESSURE: 113 MMHG | BODY MASS INDEX: 36.37 KG/M2 | WEIGHT: 240 LBS

## 2020-04-14 LAB
ALBUMIN SERPL-MCNC: 3.5 G/DL (ref 3.5–5.2)
ALP BLD-CCNC: 57 U/L (ref 40–130)
ALT SERPL-CCNC: 9 U/L (ref 5–41)
ANION GAP SERPL CALCULATED.3IONS-SCNC: 15 MMOL/L (ref 7–19)
AST SERPL-CCNC: 13 U/L (ref 5–40)
BASOPHILS ABSOLUTE: 0 K/UL (ref 0–0.2)
BASOPHILS RELATIVE PERCENT: 0.2 % (ref 0–1)
BILIRUB SERPL-MCNC: 0.4 MG/DL (ref 0.2–1.2)
BUN BLDV-MCNC: 28 MG/DL (ref 6–20)
CALCIUM SERPL-MCNC: 9 MG/DL (ref 8.6–10)
CHLORIDE BLD-SCNC: 94 MMOL/L (ref 98–111)
CO2: 25 MMOL/L (ref 22–29)
CREAT SERPL-MCNC: 6.8 MG/DL (ref 0.5–1.2)
EOSINOPHILS ABSOLUTE: 0 K/UL (ref 0–0.6)
EOSINOPHILS RELATIVE PERCENT: 0.1 % (ref 0–5)
GFR NON-AFRICAN AMERICAN: 9
GLUCOSE BLD-MCNC: 140 MG/DL (ref 74–109)
HCT VFR BLD CALC: 29.8 % (ref 42–52)
HEMOGLOBIN: 10.1 G/DL (ref 14–18)
IMMATURE GRANULOCYTES #: 0.1 K/UL
LACTIC ACID: 1.1 MMOL/L (ref 0.5–1.9)
LYMPHOCYTES ABSOLUTE: 0.8 K/UL (ref 1.1–4.5)
LYMPHOCYTES RELATIVE PERCENT: 6.1 % (ref 20–40)
MCH RBC QN AUTO: 29.4 PG (ref 27–31)
MCHC RBC AUTO-ENTMCNC: 33.9 G/DL (ref 33–37)
MCV RBC AUTO: 86.6 FL (ref 80–94)
MONOCYTES ABSOLUTE: 1.7 K/UL (ref 0–0.9)
MONOCYTES RELATIVE PERCENT: 13.3 % (ref 0–10)
NEUTROPHILS ABSOLUTE: 10.2 K/UL (ref 1.5–7.5)
NEUTROPHILS RELATIVE PERCENT: 79.8 % (ref 50–65)
PDW BLD-RTO: 14.6 % (ref 11.5–14.5)
PLATELET # BLD: 169 K/UL (ref 130–400)
PMV BLD AUTO: 10.2 FL (ref 9.4–12.4)
POTASSIUM SERPL-SCNC: 4.2 MMOL/L (ref 3.5–5)
RBC # BLD: 3.44 M/UL (ref 4.7–6.1)
SODIUM BLD-SCNC: 134 MMOL/L (ref 136–145)
TOTAL PROTEIN: 7.2 G/DL (ref 6.6–8.7)
WBC # BLD: 12.8 K/UL (ref 4.8–10.8)

## 2020-04-14 PROCEDURE — 99283 EMERGENCY DEPT VISIT LOW MDM: CPT

## 2020-04-14 PROCEDURE — 71045 X-RAY EXAM CHEST 1 VIEW: CPT

## 2020-04-14 PROCEDURE — 36415 COLL VENOUS BLD VENIPUNCTURE: CPT

## 2020-04-14 PROCEDURE — 83605 ASSAY OF LACTIC ACID: CPT

## 2020-04-14 PROCEDURE — 85025 COMPLETE CBC W/AUTO DIFF WBC: CPT

## 2020-04-14 PROCEDURE — 80053 COMPREHEN METABOLIC PANEL: CPT

## 2020-04-14 NOTE — ED NOTES
Bed: 03  Expected date:   Expected time:   Means of arrival:   Comments:  [address] Nguyen RN  04/14/20 0766

## 2020-04-14 NOTE — ED PROVIDER NOTES
[facility] EMERGENCY DEPT  eMERGENCY dEPARTMENT eNCOUnter      Pt Name: Vinod Davis  MRN: [id number]  [place] 1970  Date of evaluation: 4/14/2020  Provider: Brody Vaz MD    [address] [place] [address]       Chief Complaint   Patient presents with    Fatigue     yesterday, and post dialysis today    Fever     100.8 at dialysis, 104 with ems         HISTORY OF PRESENT ILLNESS   (Location/Symptom, Timing/Onset,Context/Setting, Quality, Duration, Modifying Factors, Severity)  Note limiting factors. Vinod Davis is a 47 y.o. male who presents to the emergency department valuation of fever that was noted while he was undergoing hemodialysis. It was reported the patient had a temperature of 100.8 while he was at dialysis. States that he has not had any recent illnesses, fever or chills that he is aware of. Reports he is not had any vomiting or diarrhea, cough or shortness of breath. States that other than going to dialysis he is really been staying at home. Reports that he is not felt any chills. He underwent his usual hemodialysis treatment today. HPI    NursingNotes were reviewed. REVIEW OF SYSTEMS    (2-9 systems for level 4, 10 or more for level 5)     Review of Systems   Constitutional: Negative for chills and fever. Respiratory: Negative for cough and shortness of breath. Cardiovascular: Negative for chest pain and palpitations. Gastrointestinal: Negative for abdominal pain and vomiting. Neurological: Negative for dizziness and syncope. All other systems reviewed and are negative.            PAST MEDICALHISTORY     Past Medical History:   Diagnosis Date    Asthma     Diabetes mellitus ([address]     Erectile dysfunction     Hypertension     Obesity          SURGICAL HISTORY       Past Surgical History:   Procedure Laterality Date    COLONOSCOPY N/A 12/5/2019    Dr Debrah Gosselin, internal hemorrhoids-Grade 1-2-HP, 3 yr recall         CURRENT MEDICATIONS     Discharge Medication RADIOLOGY:  Non-plain film images such as CT, Ultrasound and MRI are read by the radiologist. Plain radiographic images are visualized and preliminarily interpreted bythe emergency physician with the below findings:        XR CHEST PORTABLE   Final Result   1. Very mild hazy opacities are likely due to soft tissue attenuation. There is no focal consolidation or sizable pleural effusion. Signed by Dr Samuel Macias on 4/14/2020 4:28 PM              LABS:  Labs Reviewed   COMPREHENSIVE METABOLIC PANEL - Abnormal; Notable for the following components:       Result Value    Sodium 134 (*)     Chloride 94 (*)     Glucose 140 (*)     BUN 28 (*)     CREATININE 6.8 (*)     GFR Non- 9 (*)     All other components within normal limits   CBC WITH AUTO DIFFERENTIAL - Abnormal; Notable for the following components:    WBC 12.8 (*)     RBC 3.44 (*)     Hemoglobin 10.1 (*)     Hematocrit 29.8 (*)     RDW 14.6 (*)     Neutrophils % 79.8 (*)     Lymphocytes % 6.1 (*)     Monocytes % 13.3 (*)     Neutrophils Absolute 10.2 (*)     Lymphocytes Absolute 0.8 (*)     Monocytes Absolute 1.70 (*)     All other components within normal limits   LACTIC ACID, PLASMA       All other labs were within normal range or not returned as of this dictation. EMERGENCY DEPARTMENT COURSE and DIFFERENTIAL DIAGNOSIS/MDM:   Vitals:    Vitals:    04/14/20 1523 04/14/20 1905   BP: 129/68 (!) 113/59   Pulse: 103 88   Resp: 18 20   Temp: 98.9 °F (37.2 °C) 98.9 °F (37.2 °C)   TempSrc: Oral Oral   SpO2: 94% 95%   Weight: 240 lb (108.9 kg)    Height: 5' 8\" (1.727 m)        MDM    Reassessment    Patient is afebrile here in the ED. Her serum lactic acid and WBC count are normal.  He does not appear ill, his other vital signs are stable. We did perform a chest radiograph which was negative for evidence of pneumonia. I do not see an indication for hospitalization at this time. We will plan to discharge him home.       PROCEDURES:  Unless

## 2020-04-15 ENCOUNTER — APPOINTMENT (OUTPATIENT)
Dept: GENERAL RADIOLOGY | Facility: HOSPITAL | Age: 50
End: 2020-04-15

## 2020-04-15 ENCOUNTER — HOSPITAL ENCOUNTER (INPATIENT)
Facility: HOSPITAL | Age: 50
LOS: 10 days | Discharge: HOME | End: 2020-04-25
Attending: EMERGENCY MEDICINE | Admitting: FAMILY MEDICINE

## 2020-04-15 ENCOUNTER — CARE COORDINATION (OUTPATIENT)
Dept: CARE COORDINATION | Age: 50
End: 2020-04-15

## 2020-04-15 DIAGNOSIS — I10 ESSENTIAL HYPERTENSION: ICD-10-CM

## 2020-04-15 DIAGNOSIS — Z20.822 SUSPECTED 2019 NOVEL CORONAVIRUS INFECTION: ICD-10-CM

## 2020-04-15 DIAGNOSIS — R78.81 STAPHYLOCOCCUS AUREUS BACTEREMIA: ICD-10-CM

## 2020-04-15 DIAGNOSIS — R50.9 FEBRILE ILLNESS: Primary | ICD-10-CM

## 2020-04-15 DIAGNOSIS — Z74.09 IMPAIRED FUNCTIONAL MOBILITY, BALANCE, GAIT, AND ENDURANCE: ICD-10-CM

## 2020-04-15 DIAGNOSIS — N18.6 ESRD (END STAGE RENAL DISEASE) (HCC): ICD-10-CM

## 2020-04-15 DIAGNOSIS — A49.02 MRSA INFECTION: ICD-10-CM

## 2020-04-15 DIAGNOSIS — B95.61 STAPHYLOCOCCUS AUREUS BACTEREMIA: ICD-10-CM

## 2020-04-15 DIAGNOSIS — N18.9 CHRONIC KIDNEY DISEASE, UNSPECIFIED CKD STAGE: ICD-10-CM

## 2020-04-15 DIAGNOSIS — E66.9 OBESITY (BMI 30-39.9): ICD-10-CM

## 2020-04-15 PROBLEM — R65.10 SIRS (SYSTEMIC INFLAMMATORY RESPONSE SYNDROME) (HCC): Status: ACTIVE | Noted: 2020-04-15

## 2020-04-15 PROBLEM — E11.9 TYPE 2 DIABETES MELLITUS: Status: ACTIVE | Noted: 2020-04-15

## 2020-04-15 PROBLEM — D64.9 ANEMIA: Status: ACTIVE | Noted: 2020-04-15

## 2020-04-15 LAB
ALBUMIN SERPL-MCNC: 3.2 G/DL (ref 3.5–5.2)
ALBUMIN/GLOB SERPL: 1 G/DL
ALP SERPL-CCNC: 58 U/L (ref 39–117)
ALT SERPL W P-5'-P-CCNC: 13 U/L (ref 1–41)
AMORPH URATE CRY URNS QL MICRO: ABNORMAL /HPF
AMPHET+METHAMPHET UR QL: NEGATIVE
AMPHETAMINES UR QL: POSITIVE
ANION GAP SERPL CALCULATED.3IONS-SCNC: 16 MMOL/L (ref 5–15)
AST SERPL-CCNC: 20 U/L (ref 1–40)
B PARAPERT DNA SPEC QL NAA+PROBE: NOT DETECTED
B PERT DNA SPEC QL NAA+PROBE: NOT DETECTED
BACTERIA UR QL AUTO: ABNORMAL /HPF
BARBITURATES UR QL SCN: NEGATIVE
BENZODIAZ UR QL SCN: NEGATIVE
BILIRUB SERPL-MCNC: 0.4 MG/DL (ref 0.2–1.2)
BILIRUB UR QL STRIP: NEGATIVE
BUN BLD-MCNC: 46 MG/DL (ref 6–20)
BUN/CREAT SERPL: 4.7 (ref 7–25)
BUPRENORPHINE SERPL-MCNC: NEGATIVE NG/ML
C PNEUM DNA NPH QL NAA+NON-PROBE: NOT DETECTED
CALCIUM SPEC-SCNC: 8.7 MG/DL (ref 8.6–10.5)
CANNABINOIDS SERPL QL: NEGATIVE
CHLORIDE SERPL-SCNC: 95 MMOL/L (ref 98–107)
CLARITY UR: ABNORMAL
CO2 SERPL-SCNC: 24 MMOL/L (ref 22–29)
COCAINE UR QL: NEGATIVE
COLOR UR: YELLOW
CREAT BLD-MCNC: 9.84 MG/DL (ref 0.76–1.27)
CRP SERPL-MCNC: 29.65 MG/DL (ref 0–0.5)
D-LACTATE SERPL-SCNC: 1.6 MMOL/L (ref 0.5–2)
DEPRECATED RDW RBC AUTO: 44.9 FL (ref 37–54)
ERYTHROCYTE [DISTWIDTH] IN BLOOD BY AUTOMATED COUNT: 14.6 % (ref 12.3–15.4)
FERRITIN SERPL-MCNC: 317.7 NG/ML (ref 30–400)
FLUAV H1 2009 PAND RNA NPH QL NAA+PROBE: NOT DETECTED
FLUAV H1 HA GENE NPH QL NAA+PROBE: NOT DETECTED
FLUAV H3 RNA NPH QL NAA+PROBE: NOT DETECTED
FLUAV SUBTYP SPEC NAA+PROBE: NOT DETECTED
FLUBV RNA ISLT QL NAA+PROBE: NOT DETECTED
GFR SERPL CREATININE-BSD FRML MDRD: 7 ML/MIN/1.73
GFR SERPL CREATININE-BSD FRML MDRD: ABNORMAL ML/MIN/{1.73_M2}
GIANT PLATELETS: ABNORMAL
GLOBULIN UR ELPH-MCNC: 3.3 GM/DL
GLUCOSE BLD-MCNC: 127 MG/DL (ref 65–99)
GLUCOSE UR STRIP-MCNC: NEGATIVE MG/DL
HADV DNA SPEC NAA+PROBE: NOT DETECTED
HCOV 229E RNA SPEC QL NAA+PROBE: NOT DETECTED
HCOV HKU1 RNA SPEC QL NAA+PROBE: NOT DETECTED
HCOV NL63 RNA SPEC QL NAA+PROBE: NOT DETECTED
HCOV OC43 RNA SPEC QL NAA+PROBE: NOT DETECTED
HCT VFR BLD AUTO: 26.8 % (ref 37.5–51)
HGB BLD-MCNC: 9.2 G/DL (ref 13–17.7)
HGB UR QL STRIP.AUTO: ABNORMAL
HMPV RNA NPH QL NAA+NON-PROBE: NOT DETECTED
HOLD SPECIMEN: NORMAL
HPIV1 RNA SPEC QL NAA+PROBE: NOT DETECTED
HPIV2 RNA SPEC QL NAA+PROBE: NOT DETECTED
HPIV3 RNA NPH QL NAA+PROBE: NOT DETECTED
HPIV4 P GENE NPH QL NAA+PROBE: NOT DETECTED
HYALINE CASTS UR QL AUTO: ABNORMAL /LPF
KETONES UR QL STRIP: ABNORMAL
L PNEUMO1 AG UR QL IA: NEGATIVE
LDH SERPL-CCNC: 182 U/L (ref 135–225)
LEUKOCYTE ESTERASE UR QL STRIP.AUTO: NEGATIVE
LYMPHOCYTES # BLD MANUAL: 0.54 10*3/MM3 (ref 0.7–3.1)
LYMPHOCYTES NFR BLD MANUAL: 4 % (ref 5–12)
LYMPHOCYTES NFR BLD MANUAL: 5 % (ref 19.6–45.3)
M PNEUMO IGG SER IA-ACNC: NOT DETECTED
MCH RBC QN AUTO: 28.8 PG (ref 26.6–33)
MCHC RBC AUTO-ENTMCNC: 34.3 G/DL (ref 31.5–35.7)
MCV RBC AUTO: 84 FL (ref 79–97)
METHADONE UR QL SCN: NEGATIVE
MONOCYTES # BLD AUTO: 0.43 10*3/MM3 (ref 0.1–0.9)
NEUTROPHILS # BLD AUTO: 9.86 10*3/MM3 (ref 1.7–7)
NEUTROPHILS NFR BLD MANUAL: 90 % (ref 42.7–76)
NEUTS BAND NFR BLD MANUAL: 1 % (ref 0–5)
NEUTS VAC BLD QL SMEAR: ABNORMAL
NITRITE UR QL STRIP: NEGATIVE
OPIATES UR QL: NEGATIVE
OXYCODONE UR QL SCN: NEGATIVE
PCP UR QL SCN: NEGATIVE
PH UR STRIP.AUTO: 5.5 [PH] (ref 5–8)
PLATELET # BLD AUTO: 170 10*3/MM3 (ref 140–450)
PMV BLD AUTO: 10.3 FL (ref 6–12)
POTASSIUM BLD-SCNC: 4.1 MMOL/L (ref 3.5–5.2)
PROCALCITONIN SERPL-MCNC: 192.48 NG/ML (ref 0.1–0.25)
PROPOXYPH UR QL: NEGATIVE
PROT SERPL-MCNC: 6.5 G/DL (ref 6–8.5)
PROT UR QL STRIP: ABNORMAL
RBC # BLD AUTO: 3.19 10*6/MM3 (ref 4.14–5.8)
RBC # UR: ABNORMAL /HPF
RBC MORPH BLD: NORMAL
REF LAB TEST METHOD: ABNORMAL
RHINOVIRUS RNA SPEC NAA+PROBE: NOT DETECTED
RSV RNA NPH QL NAA+NON-PROBE: NOT DETECTED
S PNEUM AG SPEC QL LA: NEGATIVE
SODIUM BLD-SCNC: 135 MMOL/L (ref 136–145)
SP GR UR STRIP: 1.02 (ref 1–1.03)
SQUAMOUS #/AREA URNS HPF: ABNORMAL /HPF
TRICYCLICS UR QL SCN: NEGATIVE
UROBILINOGEN UR QL STRIP: ABNORMAL
WBC NRBC COR # BLD: 10.83 10*3/MM3 (ref 3.4–10.8)
WBC UR QL AUTO: ABNORMAL /HPF
WHOLE BLOOD HOLD SPECIMEN: NORMAL
WHOLE BLOOD HOLD SPECIMEN: NORMAL

## 2020-04-15 PROCEDURE — 25010000002 ENOXAPARIN PER 10 MG: Performed by: INTERNAL MEDICINE

## 2020-04-15 PROCEDURE — 80306 DRUG TEST PRSMV INSTRMNT: CPT | Performed by: EMERGENCY MEDICINE

## 2020-04-15 PROCEDURE — 85025 COMPLETE CBC W/AUTO DIFF WBC: CPT | Performed by: EMERGENCY MEDICINE

## 2020-04-15 PROCEDURE — 80053 COMPREHEN METABOLIC PANEL: CPT | Performed by: EMERGENCY MEDICINE

## 2020-04-15 PROCEDURE — 25010000002 CEFEPIME PER 500 MG: Performed by: INTERNAL MEDICINE

## 2020-04-15 PROCEDURE — 0100U HC BIOFIRE FILMARRAY RESP PANEL 2: CPT | Performed by: EMERGENCY MEDICINE

## 2020-04-15 PROCEDURE — 85007 BL SMEAR W/DIFF WBC COUNT: CPT | Performed by: EMERGENCY MEDICINE

## 2020-04-15 PROCEDURE — 87040 BLOOD CULTURE FOR BACTERIA: CPT | Performed by: EMERGENCY MEDICINE

## 2020-04-15 PROCEDURE — 87077 CULTURE AEROBIC IDENTIFY: CPT | Performed by: EMERGENCY MEDICINE

## 2020-04-15 PROCEDURE — 87147 CULTURE TYPE IMMUNOLOGIC: CPT | Performed by: EMERGENCY MEDICINE

## 2020-04-15 PROCEDURE — 87899 AGENT NOS ASSAY W/OPTIC: CPT | Performed by: EMERGENCY MEDICINE

## 2020-04-15 PROCEDURE — 87150 DNA/RNA AMPLIFIED PROBE: CPT | Performed by: EMERGENCY MEDICINE

## 2020-04-15 PROCEDURE — 83605 ASSAY OF LACTIC ACID: CPT | Performed by: EMERGENCY MEDICINE

## 2020-04-15 PROCEDURE — 84145 PROCALCITONIN (PCT): CPT | Performed by: EMERGENCY MEDICINE

## 2020-04-15 PROCEDURE — 93010 ELECTROCARDIOGRAM REPORT: CPT | Performed by: INTERNAL MEDICINE

## 2020-04-15 PROCEDURE — 82728 ASSAY OF FERRITIN: CPT | Performed by: EMERGENCY MEDICINE

## 2020-04-15 PROCEDURE — 83615 LACTATE (LD) (LDH) ENZYME: CPT | Performed by: EMERGENCY MEDICINE

## 2020-04-15 PROCEDURE — 71045 X-RAY EXAM CHEST 1 VIEW: CPT

## 2020-04-15 PROCEDURE — 25010000002 PIPERACILLIN SOD-TAZOBACTAM PER 1 G: Performed by: EMERGENCY MEDICINE

## 2020-04-15 PROCEDURE — 81001 URINALYSIS AUTO W/SCOPE: CPT | Performed by: EMERGENCY MEDICINE

## 2020-04-15 PROCEDURE — 25010000002 VANCOMYCIN 1 G RECONSTITUTED SOLUTION: Performed by: EMERGENCY MEDICINE

## 2020-04-15 PROCEDURE — 87635 SARS-COV-2 COVID-19 AMP PRB: CPT | Performed by: INTERNAL MEDICINE

## 2020-04-15 PROCEDURE — 99285 EMERGENCY DEPT VISIT HI MDM: CPT

## 2020-04-15 PROCEDURE — 87186 SC STD MICRODIL/AGAR DIL: CPT | Performed by: EMERGENCY MEDICINE

## 2020-04-15 PROCEDURE — 93005 ELECTROCARDIOGRAM TRACING: CPT | Performed by: EMERGENCY MEDICINE

## 2020-04-15 PROCEDURE — 86140 C-REACTIVE PROTEIN: CPT | Performed by: EMERGENCY MEDICINE

## 2020-04-15 RX ORDER — SODIUM CHLORIDE 0.9 % (FLUSH) 0.9 %
10 SYRINGE (ML) INJECTION AS NEEDED
Status: DISCONTINUED | OUTPATIENT
Start: 2020-04-15 | End: 2020-04-25 | Stop reason: HOSPADM

## 2020-04-15 RX ORDER — CLONIDINE HYDROCHLORIDE 0.1 MG/1
0.1 TABLET ORAL 2 TIMES DAILY
COMMUNITY

## 2020-04-15 RX ORDER — ACETAMINOPHEN 500 MG
1000 TABLET ORAL ONCE
Status: COMPLETED | OUTPATIENT
Start: 2020-04-15 | End: 2020-04-15

## 2020-04-15 RX ORDER — FERROUS SULFATE 325(65) MG
325 TABLET ORAL
COMMUNITY
End: 2020-07-13

## 2020-04-15 RX ORDER — NICOTINE POLACRILEX 4 MG
15 LOZENGE BUCCAL
Status: DISCONTINUED | OUTPATIENT
Start: 2020-04-15 | End: 2020-04-25 | Stop reason: HOSPADM

## 2020-04-15 RX ORDER — CYCLOBENZAPRINE HCL 10 MG
10 TABLET ORAL 3 TIMES DAILY PRN
Status: DISCONTINUED | OUTPATIENT
Start: 2020-04-15 | End: 2020-04-25 | Stop reason: HOSPADM

## 2020-04-15 RX ORDER — ONDANSETRON 2 MG/ML
4 INJECTION INTRAMUSCULAR; INTRAVENOUS EVERY 6 HOURS PRN
Status: DISCONTINUED | OUTPATIENT
Start: 2020-04-15 | End: 2020-04-25 | Stop reason: HOSPADM

## 2020-04-15 RX ORDER — ATORVASTATIN CALCIUM 10 MG/1
20 TABLET, FILM COATED ORAL NIGHTLY
Status: DISCONTINUED | OUTPATIENT
Start: 2020-04-15 | End: 2020-04-25 | Stop reason: HOSPADM

## 2020-04-15 RX ORDER — DEXTROSE MONOHYDRATE 25 G/50ML
25 INJECTION, SOLUTION INTRAVENOUS
Status: DISCONTINUED | OUTPATIENT
Start: 2020-04-15 | End: 2020-04-25 | Stop reason: HOSPADM

## 2020-04-15 RX ORDER — ASPIRIN 81 MG/1
81 TABLET ORAL DAILY
Status: DISCONTINUED | OUTPATIENT
Start: 2020-04-15 | End: 2020-04-25 | Stop reason: HOSPADM

## 2020-04-15 RX ORDER — NIFEDIPINE 60 MG/1
60 TABLET, EXTENDED RELEASE ORAL 2 TIMES DAILY
COMMUNITY

## 2020-04-15 RX ORDER — SODIUM CHLORIDE 0.9 % (FLUSH) 0.9 %
10 SYRINGE (ML) INJECTION EVERY 12 HOURS SCHEDULED
Status: DISCONTINUED | OUTPATIENT
Start: 2020-04-15 | End: 2020-04-25 | Stop reason: HOSPADM

## 2020-04-15 RX ORDER — ACETAMINOPHEN 325 MG/1
650 TABLET ORAL EVERY 4 HOURS PRN
Status: DISCONTINUED | OUTPATIENT
Start: 2020-04-15 | End: 2020-04-25 | Stop reason: HOSPADM

## 2020-04-15 RX ORDER — HYDRALAZINE HYDROCHLORIDE 50 MG/1
50 TABLET, FILM COATED ORAL EVERY 8 HOURS
COMMUNITY

## 2020-04-15 RX ADMIN — ACETAMINOPHEN 650 MG: 325 TABLET, FILM COATED ORAL at 22:21

## 2020-04-15 RX ADMIN — CEFEPIME HYDROCHLORIDE 2 G: 2 INJECTION, POWDER, FOR SOLUTION INTRAVENOUS at 16:40

## 2020-04-15 RX ADMIN — ASPIRIN 81 MG: 81 TABLET ORAL at 16:40

## 2020-04-15 RX ADMIN — SODIUM CHLORIDE 500 ML: 9 INJECTION, SOLUTION INTRAVENOUS at 11:05

## 2020-04-15 RX ADMIN — SODIUM CHLORIDE, PRESERVATIVE FREE 10 ML: 5 INJECTION INTRAVENOUS at 20:07

## 2020-04-15 RX ADMIN — SODIUM CHLORIDE 1000 ML: 9 INJECTION, SOLUTION INTRAVENOUS at 13:43

## 2020-04-15 RX ADMIN — ENOXAPARIN SODIUM 30 MG: 30 INJECTION SUBCUTANEOUS at 16:41

## 2020-04-15 RX ADMIN — VANCOMYCIN HYDROCHLORIDE 1000 MG: 1 INJECTION, POWDER, LYOPHILIZED, FOR SOLUTION INTRAVENOUS at 11:17

## 2020-04-15 RX ADMIN — PIPERACILLIN SODIUM AND TAZOBACTAM SODIUM 4.5 G: 4; .5 INJECTION, POWDER, LYOPHILIZED, FOR SOLUTION INTRAVENOUS at 11:17

## 2020-04-15 RX ADMIN — ACETAMINOPHEN 1000 MG: 500 TABLET, FILM COATED ORAL at 11:16

## 2020-04-16 ENCOUNTER — APPOINTMENT (OUTPATIENT)
Dept: MRI IMAGING | Facility: HOSPITAL | Age: 50
End: 2020-04-16

## 2020-04-16 PROBLEM — R78.81 STAPHYLOCOCCUS AUREUS BACTEREMIA: Status: ACTIVE | Noted: 2020-04-16

## 2020-04-16 PROBLEM — B95.61 STAPHYLOCOCCUS AUREUS BACTEREMIA: Status: ACTIVE | Noted: 2020-04-16

## 2020-04-16 PROBLEM — A41.9 SEPSIS: Status: ACTIVE | Noted: 2020-04-16

## 2020-04-16 PROBLEM — M54.9 BACK PAIN: Status: ACTIVE | Noted: 2020-04-16

## 2020-04-16 LAB
ANION GAP SERPL CALCULATED.3IONS-SCNC: 17 MMOL/L (ref 5–15)
BACTERIA BLD CULT: ABNORMAL
BASOPHILS # BLD AUTO: 0.03 10*3/MM3 (ref 0–0.2)
BASOPHILS NFR BLD AUTO: 0.2 % (ref 0–1.5)
BUN BLD-MCNC: 57 MG/DL (ref 6–20)
BUN/CREAT SERPL: 5.1 (ref 7–25)
CALCIUM SPEC-SCNC: 9.1 MG/DL (ref 8.6–10.5)
CHLORIDE SERPL-SCNC: 96 MMOL/L (ref 98–107)
CO2 SERPL-SCNC: 22 MMOL/L (ref 22–29)
CREAT BLD-MCNC: 11.15 MG/DL (ref 0.76–1.27)
DEPRECATED RDW RBC AUTO: 47.5 FL (ref 37–54)
EOSINOPHIL # BLD AUTO: 0.02 10*3/MM3 (ref 0–0.4)
EOSINOPHIL NFR BLD AUTO: 0.1 % (ref 0.3–6.2)
ERYTHROCYTE [DISTWIDTH] IN BLOOD BY AUTOMATED COUNT: 15 % (ref 12.3–15.4)
GFR SERPL CREATININE-BSD FRML MDRD: 6 ML/MIN/1.73
GFR SERPL CREATININE-BSD FRML MDRD: ABNORMAL ML/MIN/{1.73_M2}
GLUCOSE BLD-MCNC: 128 MG/DL (ref 65–99)
GLUCOSE BLDC GLUCOMTR-MCNC: 124 MG/DL (ref 70–130)
GLUCOSE BLDC GLUCOMTR-MCNC: 175 MG/DL (ref 70–130)
HAV IGM SERPL QL IA: NORMAL
HBV CORE IGM SERPL QL IA: NORMAL
HBV SURFACE AG SERPL QL IA: NORMAL
HBV SURFACE AG SERPL QL IA: NORMAL
HCT VFR BLD AUTO: 28.2 % (ref 37.5–51)
HCV AB SER DONR QL: NORMAL
HGB BLD-MCNC: 9.4 G/DL (ref 13–17.7)
IMM GRANULOCYTES # BLD AUTO: 0.07 10*3/MM3 (ref 0–0.05)
IMM GRANULOCYTES NFR BLD AUTO: 0.5 % (ref 0–0.5)
LYMPHOCYTES # BLD AUTO: 1.65 10*3/MM3 (ref 0.7–3.1)
LYMPHOCYTES NFR BLD AUTO: 12.2 % (ref 19.6–45.3)
MCH RBC QN AUTO: 28.7 PG (ref 26.6–33)
MCHC RBC AUTO-ENTMCNC: 33.3 G/DL (ref 31.5–35.7)
MCV RBC AUTO: 86 FL (ref 79–97)
MONOCYTES # BLD AUTO: 1.81 10*3/MM3 (ref 0.1–0.9)
MONOCYTES NFR BLD AUTO: 13.4 % (ref 5–12)
NEUTROPHILS # BLD AUTO: 9.95 10*3/MM3 (ref 1.7–7)
NEUTROPHILS NFR BLD AUTO: 73.6 % (ref 42.7–76)
NRBC BLD AUTO-RTO: 0 /100 WBC (ref 0–0.2)
PLATELET # BLD AUTO: 166 10*3/MM3 (ref 140–450)
PMV BLD AUTO: 10.4 FL (ref 6–12)
POTASSIUM BLD-SCNC: 4.4 MMOL/L (ref 3.5–5.2)
RBC # BLD AUTO: 3.28 10*6/MM3 (ref 4.14–5.8)
SARS-COV-2 RNA RESP QL NAA+PROBE: NOT DETECTED
SODIUM BLD-SCNC: 135 MMOL/L (ref 136–145)
WBC NRBC COR # BLD: 13.53 10*3/MM3 (ref 3.4–10.8)

## 2020-04-16 PROCEDURE — 80048 BASIC METABOLIC PNL TOTAL CA: CPT | Performed by: INTERNAL MEDICINE

## 2020-04-16 PROCEDURE — 87147 CULTURE TYPE IMMUNOLOGIC: CPT | Performed by: INTERNAL MEDICINE

## 2020-04-16 PROCEDURE — 87186 SC STD MICRODIL/AGAR DIL: CPT | Performed by: INTERNAL MEDICINE

## 2020-04-16 PROCEDURE — 87340 HEPATITIS B SURFACE AG IA: CPT | Performed by: INTERNAL MEDICINE

## 2020-04-16 PROCEDURE — 87040 BLOOD CULTURE FOR BACTERIA: CPT | Performed by: INTERNAL MEDICINE

## 2020-04-16 PROCEDURE — 85025 COMPLETE CBC W/AUTO DIFF WBC: CPT | Performed by: INTERNAL MEDICINE

## 2020-04-16 PROCEDURE — 72148 MRI LUMBAR SPINE W/O DYE: CPT

## 2020-04-16 PROCEDURE — 80074 ACUTE HEPATITIS PANEL: CPT | Performed by: INTERNAL MEDICINE

## 2020-04-16 PROCEDURE — 82962 GLUCOSE BLOOD TEST: CPT

## 2020-04-16 PROCEDURE — 25010000002 ENOXAPARIN PER 10 MG: Performed by: INTERNAL MEDICINE

## 2020-04-16 RX ORDER — CLONIDINE HYDROCHLORIDE 0.1 MG/1
0.1 TABLET ORAL EVERY 12 HOURS SCHEDULED
Status: DISCONTINUED | OUTPATIENT
Start: 2020-04-16 | End: 2020-04-25 | Stop reason: HOSPADM

## 2020-04-16 RX ORDER — HYDROCODONE BITARTRATE AND ACETAMINOPHEN 7.5; 325 MG/1; MG/1
1 TABLET ORAL EVERY 4 HOURS PRN
Status: DISCONTINUED | OUTPATIENT
Start: 2020-04-16 | End: 2020-04-19 | Stop reason: SDUPTHER

## 2020-04-16 RX ORDER — HEPARIN SODIUM 1000 [USP'U]/ML
4000 INJECTION, SOLUTION INTRAVENOUS; SUBCUTANEOUS ONCE
Status: COMPLETED | OUTPATIENT
Start: 2020-04-16 | End: 2020-04-18

## 2020-04-16 RX ADMIN — HYDROCODONE BITARTRATE AND ACETAMINOPHEN 1 TABLET: 7.5; 325 TABLET ORAL at 20:34

## 2020-04-16 RX ADMIN — NAFCILLIN SODIUM 2 G: 2 INJECTION, POWDER, LYOPHILIZED, FOR SOLUTION INTRAMUSCULAR; INTRAVENOUS at 20:35

## 2020-04-16 RX ADMIN — METOPROLOL TARTRATE 25 MG: 25 TABLET, FILM COATED ORAL at 20:35

## 2020-04-16 RX ADMIN — CYCLOBENZAPRINE 10 MG: 10 TABLET, FILM COATED ORAL at 02:49

## 2020-04-16 RX ADMIN — METOPROLOL TARTRATE 25 MG: 25 TABLET, FILM COATED ORAL at 00:43

## 2020-04-16 RX ADMIN — ACETAMINOPHEN 650 MG: 325 TABLET, FILM COATED ORAL at 01:00

## 2020-04-16 RX ADMIN — ASPIRIN 81 MG: 81 TABLET ORAL at 08:02

## 2020-04-16 RX ADMIN — ATORVASTATIN CALCIUM 20 MG: 10 TABLET, FILM COATED ORAL at 20:35

## 2020-04-16 RX ADMIN — NAFCILLIN SODIUM 2 G: 2 INJECTION, POWDER, LYOPHILIZED, FOR SOLUTION INTRAMUSCULAR; INTRAVENOUS at 11:09

## 2020-04-16 RX ADMIN — CLONIDINE HYDROCHLORIDE 0.1 MG: 0.1 TABLET ORAL at 20:35

## 2020-04-16 RX ADMIN — CLONIDINE HYDROCHLORIDE 0.1 MG: 0.1 TABLET ORAL at 00:42

## 2020-04-16 RX ADMIN — SODIUM CHLORIDE, PRESERVATIVE FREE 10 ML: 5 INJECTION INTRAVENOUS at 08:02

## 2020-04-16 RX ADMIN — METOPROLOL TARTRATE 25 MG: 25 TABLET, FILM COATED ORAL at 08:02

## 2020-04-16 RX ADMIN — NAFCILLIN SODIUM 2 G: 2 INJECTION, POWDER, LYOPHILIZED, FOR SOLUTION INTRAMUSCULAR; INTRAVENOUS at 18:55

## 2020-04-16 RX ADMIN — HYDROCODONE BITARTRATE AND ACETAMINOPHEN 1 TABLET: 7.5; 325 TABLET ORAL at 11:21

## 2020-04-16 RX ADMIN — ENOXAPARIN SODIUM 30 MG: 30 INJECTION SUBCUTANEOUS at 18:55

## 2020-04-16 RX ADMIN — SODIUM CHLORIDE, PRESERVATIVE FREE 10 ML: 5 INJECTION INTRAVENOUS at 20:35

## 2020-04-16 RX ADMIN — CLONIDINE HYDROCHLORIDE 0.1 MG: 0.1 TABLET ORAL at 08:01

## 2020-04-17 LAB
ANION GAP SERPL CALCULATED.3IONS-SCNC: 17 MMOL/L (ref 5–15)
BACTERIA SPEC AEROBE CULT: ABNORMAL
BACTERIA SPEC AEROBE CULT: ABNORMAL
BUN BLD-MCNC: 37 MG/DL (ref 6–20)
BUN/CREAT SERPL: 4.9 (ref 7–25)
CALCIUM SPEC-SCNC: 8.9 MG/DL (ref 8.6–10.5)
CHLORIDE SERPL-SCNC: 93 MMOL/L (ref 98–107)
CO2 SERPL-SCNC: 22 MMOL/L (ref 22–29)
CREAT BLD-MCNC: 7.51 MG/DL (ref 0.76–1.27)
DEPRECATED RDW RBC AUTO: 46.5 FL (ref 37–54)
ERYTHROCYTE [DISTWIDTH] IN BLOOD BY AUTOMATED COUNT: 15 % (ref 12.3–15.4)
GFR SERPL CREATININE-BSD FRML MDRD: 9 ML/MIN/1.73
GFR SERPL CREATININE-BSD FRML MDRD: ABNORMAL ML/MIN/{1.73_M2}
GLUCOSE BLD-MCNC: 137 MG/DL (ref 65–99)
GLUCOSE BLDC GLUCOMTR-MCNC: 129 MG/DL (ref 70–130)
GLUCOSE BLDC GLUCOMTR-MCNC: 139 MG/DL (ref 70–130)
GLUCOSE BLDC GLUCOMTR-MCNC: 141 MG/DL (ref 70–130)
GLUCOSE BLDC GLUCOMTR-MCNC: 167 MG/DL (ref 70–130)
GRAM STN SPEC: ABNORMAL
HCT VFR BLD AUTO: 27.8 % (ref 37.5–51)
HGB BLD-MCNC: 9.4 G/DL (ref 13–17.7)
ISOLATED FROM: ABNORMAL
ISOLATED FROM: ABNORMAL
MCH RBC QN AUTO: 28.7 PG (ref 26.6–33)
MCHC RBC AUTO-ENTMCNC: 33.8 G/DL (ref 31.5–35.7)
MCV RBC AUTO: 84.8 FL (ref 79–97)
PLATELET # BLD AUTO: 176 10*3/MM3 (ref 140–450)
PMV BLD AUTO: 10.3 FL (ref 6–12)
POTASSIUM BLD-SCNC: 4.2 MMOL/L (ref 3.5–5.2)
RBC # BLD AUTO: 3.28 10*6/MM3 (ref 4.14–5.8)
SODIUM BLD-SCNC: 132 MMOL/L (ref 136–145)
WBC NRBC COR # BLD: 11.87 10*3/MM3 (ref 3.4–10.8)

## 2020-04-17 PROCEDURE — 25010000002 ENOXAPARIN PER 10 MG: Performed by: INTERNAL MEDICINE

## 2020-04-17 PROCEDURE — 87147 CULTURE TYPE IMMUNOLOGIC: CPT | Performed by: INTERNAL MEDICINE

## 2020-04-17 PROCEDURE — 80048 BASIC METABOLIC PNL TOTAL CA: CPT | Performed by: INTERNAL MEDICINE

## 2020-04-17 PROCEDURE — 82962 GLUCOSE BLOOD TEST: CPT

## 2020-04-17 PROCEDURE — 85027 COMPLETE CBC AUTOMATED: CPT | Performed by: INTERNAL MEDICINE

## 2020-04-17 PROCEDURE — 82803 BLOOD GASES ANY COMBINATION: CPT

## 2020-04-17 PROCEDURE — 87040 BLOOD CULTURE FOR BACTERIA: CPT | Performed by: INTERNAL MEDICINE

## 2020-04-17 PROCEDURE — 36600 WITHDRAWAL OF ARTERIAL BLOOD: CPT

## 2020-04-17 RX ORDER — ALBUTEROL SULFATE 2.5 MG/3ML
2.5 SOLUTION RESPIRATORY (INHALATION)
Status: DISCONTINUED | OUTPATIENT
Start: 2020-04-18 | End: 2020-04-24

## 2020-04-17 RX ORDER — ALBUTEROL SULFATE 90 UG/1
2 AEROSOL, METERED RESPIRATORY (INHALATION)
Status: DISCONTINUED | OUTPATIENT
Start: 2020-04-18 | End: 2020-04-17 | Stop reason: CLARIF

## 2020-04-17 RX ADMIN — HYDROCODONE BITARTRATE AND ACETAMINOPHEN 1 TABLET: 7.5; 325 TABLET ORAL at 23:15

## 2020-04-17 RX ADMIN — NAFCILLIN SODIUM 2 G: 2 INJECTION, POWDER, LYOPHILIZED, FOR SOLUTION INTRAMUSCULAR; INTRAVENOUS at 17:53

## 2020-04-17 RX ADMIN — NAFCILLIN SODIUM 2 G: 2 INJECTION, POWDER, LYOPHILIZED, FOR SOLUTION INTRAMUSCULAR; INTRAVENOUS at 04:46

## 2020-04-17 RX ADMIN — HYDROCODONE BITARTRATE AND ACETAMINOPHEN 1 TABLET: 7.5; 325 TABLET ORAL at 04:45

## 2020-04-17 RX ADMIN — ATORVASTATIN CALCIUM 20 MG: 10 TABLET, FILM COATED ORAL at 20:17

## 2020-04-17 RX ADMIN — NAFCILLIN SODIUM 2 G: 2 INJECTION, POWDER, LYOPHILIZED, FOR SOLUTION INTRAMUSCULAR; INTRAVENOUS at 12:17

## 2020-04-17 RX ADMIN — SODIUM CHLORIDE, PRESERVATIVE FREE 10 ML: 5 INJECTION INTRAVENOUS at 08:15

## 2020-04-17 RX ADMIN — SODIUM CHLORIDE, PRESERVATIVE FREE 10 ML: 5 INJECTION INTRAVENOUS at 20:18

## 2020-04-17 RX ADMIN — ASPIRIN 81 MG: 81 TABLET ORAL at 08:15

## 2020-04-17 RX ADMIN — CLONIDINE HYDROCHLORIDE 0.1 MG: 0.1 TABLET ORAL at 20:18

## 2020-04-17 RX ADMIN — HYDROCODONE BITARTRATE AND ACETAMINOPHEN 1 TABLET: 7.5; 325 TABLET ORAL at 09:12

## 2020-04-17 RX ADMIN — METOPROLOL TARTRATE 25 MG: 25 TABLET, FILM COATED ORAL at 20:18

## 2020-04-17 RX ADMIN — NAFCILLIN SODIUM 2 G: 2 INJECTION, POWDER, LYOPHILIZED, FOR SOLUTION INTRAMUSCULAR; INTRAVENOUS at 20:18

## 2020-04-17 RX ADMIN — CLONIDINE HYDROCHLORIDE 0.1 MG: 0.1 TABLET ORAL at 08:15

## 2020-04-17 RX ADMIN — NAFCILLIN SODIUM 2 G: 2 INJECTION, POWDER, LYOPHILIZED, FOR SOLUTION INTRAMUSCULAR; INTRAVENOUS at 08:15

## 2020-04-17 RX ADMIN — METOPROLOL TARTRATE 25 MG: 25 TABLET, FILM COATED ORAL at 08:15

## 2020-04-17 RX ADMIN — NAFCILLIN SODIUM 2 G: 2 INJECTION, POWDER, LYOPHILIZED, FOR SOLUTION INTRAMUSCULAR; INTRAVENOUS at 00:37

## 2020-04-17 RX ADMIN — ACETAMINOPHEN 650 MG: 325 TABLET, FILM COATED ORAL at 20:18

## 2020-04-17 RX ADMIN — HYDROCODONE BITARTRATE AND ACETAMINOPHEN 1 TABLET: 7.5; 325 TABLET ORAL at 17:53

## 2020-04-17 RX ADMIN — ENOXAPARIN SODIUM 30 MG: 30 INJECTION SUBCUTANEOUS at 17:53

## 2020-04-17 NOTE — CARE COORDINATION
Ambulatory Care Coordination Note  4/15/2020  CM Risk Score: 3  Charlson 10 Year Mortality Risk Score: 47%     ACC: Marianne Kate, RN    Summary Note: ACM attempted to reach patient for ER follow up. Unable to reach pt. Prior to Admission medications    Medication Sig Start Date End Date Taking?  Authorizing Provider   albuterol-ipratropium (COMBIVENT RESPIMAT)  MCG/ACT AERS inhaler Inhale 1 puff into the lungs every 6 hours 3/9/20   Barrera Dickinson MD   hydrOXYzine (VISTARIL) 25 MG capsule Take 1 capsule by mouth 3 times daily as needed for Itching 2/13/20   SOSA Hobbs   cloNIDine (CATAPRES) 0.1 MG tablet Take 1 tablet by mouth 2 times daily 2/5/20   Danay Del Castillo MD   hydrALAZINE (APRESOLINE) 50 MG tablet Take 1 tablet by mouth every 8 hours 2/5/20   Danay Del Castillo MD   metoprolol tartrate (LOPRESSOR) 25 MG tablet Take 1 tablet by mouth 2 times daily 2/5/20   Danay Del Castillo MD   NIFEdipine (ADALAT CC) 60 MG extended release tablet Take 1 tablet by mouth 2 times daily 2/5/20   Danay Del Castillo MD   albuterol sulfate  (90 BASE) MCG/ACT inhaler Inhale 2 puffs into the lungs every 6 hours as needed for Wheezing 5/10/17   Barrera Dickinson MD   ipratropium-albuterol (DUONEB) 0.5-2.5 (3) MG/3ML SOLN nebulizer solution Take 3 mLs by nebulization every 4 hours 3/19/17   Carson Howard MD       Future Appointments   Date Time Provider Hakeem Thomas   6/4/2020 11:15 AM Laura Gaviria MD [facility]

## 2020-04-18 ENCOUNTER — APPOINTMENT (OUTPATIENT)
Dept: GENERAL RADIOLOGY | Facility: HOSPITAL | Age: 50
End: 2020-04-18

## 2020-04-18 LAB
ANION GAP SERPL CALCULATED.3IONS-SCNC: 18 MMOL/L (ref 5–15)
ARTERIAL PATENCY WRIST A: POSITIVE
ATMOSPHERIC PRESS: 752 MMHG
BASE EXCESS BLDA CALC-SCNC: -0.2 MMOL/L (ref 0–2)
BDY SITE: ABNORMAL
BODY TEMPERATURE: 37 C
BUN BLD-MCNC: 55 MG/DL (ref 6–20)
BUN/CREAT SERPL: 5.3 (ref 7–25)
CALCIUM SPEC-SCNC: 9 MG/DL (ref 8.6–10.5)
CHLORIDE SERPL-SCNC: 93 MMOL/L (ref 98–107)
CO2 SERPL-SCNC: 21 MMOL/L (ref 22–29)
CREAT BLD-MCNC: 10.41 MG/DL (ref 0.76–1.27)
CRP SERPL-MCNC: 26.11 MG/DL (ref 0–0.5)
DEPRECATED RDW RBC AUTO: 46.5 FL (ref 37–54)
ERYTHROCYTE [DISTWIDTH] IN BLOOD BY AUTOMATED COUNT: 15.1 % (ref 12.3–15.4)
GAS FLOW AIRWAY: 1 LPM
GFR SERPL CREATININE-BSD FRML MDRD: 6 ML/MIN/1.73
GFR SERPL CREATININE-BSD FRML MDRD: ABNORMAL ML/MIN/{1.73_M2}
GLUCOSE BLD-MCNC: 122 MG/DL (ref 65–99)
GLUCOSE BLDC GLUCOMTR-MCNC: 113 MG/DL (ref 70–130)
GLUCOSE BLDC GLUCOMTR-MCNC: 129 MG/DL (ref 70–130)
GLUCOSE BLDC GLUCOMTR-MCNC: 171 MG/DL (ref 70–130)
HCO3 BLDA-SCNC: 23.7 MMOL/L (ref 20–26)
HCT VFR BLD AUTO: 28.1 % (ref 37.5–51)
HGB BLD-MCNC: 9.4 G/DL (ref 13–17.7)
Lab: ABNORMAL
MCH RBC QN AUTO: 28.3 PG (ref 26.6–33)
MCHC RBC AUTO-ENTMCNC: 33.5 G/DL (ref 31.5–35.7)
MCV RBC AUTO: 84.6 FL (ref 79–97)
MODALITY: ABNORMAL
PCO2 BLDA: 34.9 MM HG (ref 35–45)
PH BLDA: 7.44 PH UNITS (ref 7.35–7.45)
PLATELET # BLD AUTO: 198 10*3/MM3 (ref 140–450)
PMV BLD AUTO: 11.1 FL (ref 6–12)
PO2 BLDA: 79.6 MM HG (ref 83–108)
POTASSIUM BLD-SCNC: 4.2 MMOL/L (ref 3.5–5.2)
PROCALCITONIN SERPL-MCNC: 333.12 NG/ML (ref 0.1–0.25)
RBC # BLD AUTO: 3.32 10*6/MM3 (ref 4.14–5.8)
SAO2 % BLDCOA: 96.4 % (ref 94–99)
SODIUM BLD-SCNC: 132 MMOL/L (ref 136–145)
VENTILATOR MODE: ABNORMAL
WBC NRBC COR # BLD: 10.14 10*3/MM3 (ref 3.4–10.8)

## 2020-04-18 PROCEDURE — 94799 UNLISTED PULMONARY SVC/PX: CPT

## 2020-04-18 PROCEDURE — 87147 CULTURE TYPE IMMUNOLOGIC: CPT | Performed by: INTERNAL MEDICINE

## 2020-04-18 PROCEDURE — 71045 X-RAY EXAM CHEST 1 VIEW: CPT

## 2020-04-18 PROCEDURE — 85027 COMPLETE CBC AUTOMATED: CPT | Performed by: INTERNAL MEDICINE

## 2020-04-18 PROCEDURE — 84145 PROCALCITONIN (PCT): CPT | Performed by: INTERNAL MEDICINE

## 2020-04-18 PROCEDURE — 25010000002 HEPARIN (PORCINE) PER 1000 UNITS: Performed by: INTERNAL MEDICINE

## 2020-04-18 PROCEDURE — 80048 BASIC METABOLIC PNL TOTAL CA: CPT | Performed by: INTERNAL MEDICINE

## 2020-04-18 PROCEDURE — 94640 AIRWAY INHALATION TREATMENT: CPT

## 2020-04-18 PROCEDURE — 82962 GLUCOSE BLOOD TEST: CPT

## 2020-04-18 PROCEDURE — 86140 C-REACTIVE PROTEIN: CPT | Performed by: INTERNAL MEDICINE

## 2020-04-18 PROCEDURE — 87040 BLOOD CULTURE FOR BACTERIA: CPT | Performed by: INTERNAL MEDICINE

## 2020-04-18 PROCEDURE — 25010000002 ENOXAPARIN PER 10 MG: Performed by: INTERNAL MEDICINE

## 2020-04-18 RX ORDER — FAMOTIDINE 20 MG/1
20 TABLET, FILM COATED ORAL DAILY
Status: DISCONTINUED | OUTPATIENT
Start: 2020-04-18 | End: 2020-04-25 | Stop reason: HOSPADM

## 2020-04-18 RX ORDER — HEPARIN SODIUM 1000 [USP'U]/ML
4000 INJECTION, SOLUTION INTRAVENOUS; SUBCUTANEOUS ONCE
Status: DISCONTINUED | OUTPATIENT
Start: 2020-04-18 | End: 2020-04-18 | Stop reason: HOSPADM

## 2020-04-18 RX ADMIN — ALBUTEROL SULFATE 2.5 MG: 2.5 SOLUTION RESPIRATORY (INHALATION) at 00:20

## 2020-04-18 RX ADMIN — ENOXAPARIN SODIUM 30 MG: 30 INJECTION SUBCUTANEOUS at 17:57

## 2020-04-18 RX ADMIN — ALBUTEROL SULFATE 2.5 MG: 2.5 SOLUTION RESPIRATORY (INHALATION) at 06:47

## 2020-04-18 RX ADMIN — ALBUTEROL SULFATE 2.5 MG: 2.5 SOLUTION RESPIRATORY (INHALATION) at 20:15

## 2020-04-18 RX ADMIN — NAFCILLIN SODIUM 2 G: 2 INJECTION, POWDER, LYOPHILIZED, FOR SOLUTION INTRAMUSCULAR; INTRAVENOUS at 20:40

## 2020-04-18 RX ADMIN — METOPROLOL TARTRATE 25 MG: 25 TABLET, FILM COATED ORAL at 20:40

## 2020-04-18 RX ADMIN — HYDROCODONE BITARTRATE AND ACETAMINOPHEN 1 TABLET: 7.5; 325 TABLET ORAL at 20:39

## 2020-04-18 RX ADMIN — METOPROLOL TARTRATE 25 MG: 25 TABLET, FILM COATED ORAL at 09:12

## 2020-04-18 RX ADMIN — HEPARIN SODIUM 4000 UNITS: 1000 INJECTION, SOLUTION INTRAVENOUS; SUBCUTANEOUS at 17:09

## 2020-04-18 RX ADMIN — SODIUM CHLORIDE, PRESERVATIVE FREE 10 ML: 5 INJECTION INTRAVENOUS at 20:39

## 2020-04-18 RX ADMIN — NAFCILLIN SODIUM 2 G: 2 INJECTION, POWDER, LYOPHILIZED, FOR SOLUTION INTRAMUSCULAR; INTRAVENOUS at 00:54

## 2020-04-18 RX ADMIN — ATORVASTATIN CALCIUM 20 MG: 10 TABLET, FILM COATED ORAL at 20:39

## 2020-04-18 RX ADMIN — ASPIRIN 81 MG: 81 TABLET ORAL at 09:12

## 2020-04-18 RX ADMIN — SODIUM CHLORIDE, PRESERVATIVE FREE 10 ML: 5 INJECTION INTRAVENOUS at 09:13

## 2020-04-18 RX ADMIN — NAFCILLIN SODIUM 2 G: 2 INJECTION, POWDER, LYOPHILIZED, FOR SOLUTION INTRAMUSCULAR; INTRAVENOUS at 12:33

## 2020-04-18 RX ADMIN — ALBUTEROL SULFATE 2.5 MG: 2.5 SOLUTION RESPIRATORY (INHALATION) at 10:58

## 2020-04-18 RX ADMIN — NAFCILLIN SODIUM 2 G: 2 INJECTION, POWDER, LYOPHILIZED, FOR SOLUTION INTRAMUSCULAR; INTRAVENOUS at 09:12

## 2020-04-18 RX ADMIN — FAMOTIDINE 20 MG: 20 TABLET, FILM COATED ORAL at 17:57

## 2020-04-18 RX ADMIN — NAFCILLIN SODIUM 2 G: 2 INJECTION, POWDER, LYOPHILIZED, FOR SOLUTION INTRAMUSCULAR; INTRAVENOUS at 17:57

## 2020-04-18 RX ADMIN — CLONIDINE HYDROCHLORIDE 0.1 MG: 0.1 TABLET ORAL at 09:12

## 2020-04-18 RX ADMIN — NAFCILLIN SODIUM 2 G: 2 INJECTION, POWDER, LYOPHILIZED, FOR SOLUTION INTRAMUSCULAR; INTRAVENOUS at 04:12

## 2020-04-18 RX ADMIN — CLONIDINE HYDROCHLORIDE 0.1 MG: 0.1 TABLET ORAL at 20:39

## 2020-04-19 ENCOUNTER — ANESTHESIA (OUTPATIENT)
Dept: PERIOP | Facility: HOSPITAL | Age: 50
End: 2020-04-19

## 2020-04-19 ENCOUNTER — ANESTHESIA EVENT (OUTPATIENT)
Dept: PERIOP | Facility: HOSPITAL | Age: 50
End: 2020-04-19

## 2020-04-19 ENCOUNTER — APPOINTMENT (OUTPATIENT)
Dept: GENERAL RADIOLOGY | Facility: HOSPITAL | Age: 50
End: 2020-04-19

## 2020-04-19 PROBLEM — T82.7XXA SEPSIS DUE TO INFECTED INTRAVENOUS CATHETER: Status: ACTIVE | Noted: 2020-04-19

## 2020-04-19 PROBLEM — A41.9 SEPSIS DUE TO INFECTED INTRAVENOUS CATHETER (HCC): Status: ACTIVE | Noted: 2020-04-19

## 2020-04-19 LAB
ALBUMIN SERPL-MCNC: 2.8 G/DL (ref 3.5–5.2)
ALBUMIN/GLOB SERPL: 0.7 G/DL
ALP SERPL-CCNC: 59 U/L (ref 39–117)
ALT SERPL W P-5'-P-CCNC: 14 U/L (ref 1–41)
ANION GAP SERPL CALCULATED.3IONS-SCNC: 18 MMOL/L (ref 5–15)
AST SERPL-CCNC: 20 U/L (ref 1–40)
BASOPHILS # BLD AUTO: 0.05 10*3/MM3 (ref 0–0.2)
BASOPHILS NFR BLD AUTO: 0.4 % (ref 0–1.5)
BILIRUB SERPL-MCNC: 0.3 MG/DL (ref 0.2–1.2)
BUN BLD-MCNC: 36 MG/DL (ref 6–20)
BUN/CREAT SERPL: 4.5 (ref 7–25)
CALCIUM SPEC-SCNC: 8.7 MG/DL (ref 8.6–10.5)
CHLORIDE SERPL-SCNC: 95 MMOL/L (ref 98–107)
CHOLEST SERPL-MCNC: 127 MG/DL (ref 0–200)
CO2 SERPL-SCNC: 22 MMOL/L (ref 22–29)
CREAT BLD-MCNC: 8 MG/DL (ref 0.76–1.27)
DEPRECATED RDW RBC AUTO: 45.9 FL (ref 37–54)
EOSINOPHIL # BLD AUTO: 0.25 10*3/MM3 (ref 0–0.4)
EOSINOPHIL NFR BLD AUTO: 2.2 % (ref 0.3–6.2)
ERYTHROCYTE [DISTWIDTH] IN BLOOD BY AUTOMATED COUNT: 15.1 % (ref 12.3–15.4)
GFR SERPL CREATININE-BSD FRML MDRD: 9 ML/MIN/1.73
GFR SERPL CREATININE-BSD FRML MDRD: ABNORMAL ML/MIN/{1.73_M2}
GLOBULIN UR ELPH-MCNC: 4.2 GM/DL
GLUCOSE BLD-MCNC: 136 MG/DL (ref 65–99)
GLUCOSE BLDC GLUCOMTR-MCNC: 143 MG/DL (ref 70–130)
GLUCOSE BLDC GLUCOMTR-MCNC: 160 MG/DL (ref 70–130)
GLUCOSE BLDC GLUCOMTR-MCNC: 170 MG/DL (ref 70–130)
HBA1C MFR BLD: 5.6 % (ref 4.8–5.6)
HCT VFR BLD AUTO: 28.3 % (ref 37.5–51)
HDLC SERPL-MCNC: 12 MG/DL (ref 40–60)
HGB BLD-MCNC: 9.5 G/DL (ref 13–17.7)
IMM GRANULOCYTES # BLD AUTO: 0.29 10*3/MM3 (ref 0–0.05)
IMM GRANULOCYTES NFR BLD AUTO: 2.5 % (ref 0–0.5)
LDLC SERPL CALC-MCNC: 42 MG/DL (ref 0–100)
LDLC/HDLC SERPL: 3.5 {RATIO}
LYMPHOCYTES # BLD AUTO: 2.13 10*3/MM3 (ref 0.7–3.1)
LYMPHOCYTES NFR BLD AUTO: 18.6 % (ref 19.6–45.3)
MCH RBC QN AUTO: 28.3 PG (ref 26.6–33)
MCHC RBC AUTO-ENTMCNC: 33.6 G/DL (ref 31.5–35.7)
MCV RBC AUTO: 84.2 FL (ref 79–97)
MONOCYTES # BLD AUTO: 1.92 10*3/MM3 (ref 0.1–0.9)
MONOCYTES NFR BLD AUTO: 16.8 % (ref 5–12)
NEUTROPHILS # BLD AUTO: 6.79 10*3/MM3 (ref 1.7–7)
NEUTROPHILS NFR BLD AUTO: 59.5 % (ref 42.7–76)
NRBC BLD AUTO-RTO: 0 /100 WBC (ref 0–0.2)
PLATELET # BLD AUTO: 215 10*3/MM3 (ref 140–450)
PMV BLD AUTO: 11 FL (ref 6–12)
POTASSIUM BLD-SCNC: 4.3 MMOL/L (ref 3.5–5.2)
PROT SERPL-MCNC: 7 G/DL (ref 6–8.5)
RBC # BLD AUTO: 3.36 10*6/MM3 (ref 4.14–5.8)
SODIUM BLD-SCNC: 135 MMOL/L (ref 136–145)
TRIGL SERPL-MCNC: 365 MG/DL (ref 0–150)
TSH SERPL DL<=0.05 MIU/L-ACNC: 1.26 UIU/ML (ref 0.27–4.2)
VLDLC SERPL-MCNC: 73 MG/DL
WBC NRBC COR # BLD: 11.43 10*3/MM3 (ref 3.4–10.8)

## 2020-04-19 PROCEDURE — 84443 ASSAY THYROID STIM HORMONE: CPT | Performed by: FAMILY MEDICINE

## 2020-04-19 PROCEDURE — 25010000002 PROPOFOL 10 MG/ML EMULSION: Performed by: NURSE ANESTHETIST, CERTIFIED REGISTERED

## 2020-04-19 PROCEDURE — 87186 SC STD MICRODIL/AGAR DIL: CPT | Performed by: SPECIALIST

## 2020-04-19 PROCEDURE — 05PY03Z REMOVAL OF INFUSION DEVICE FROM UPPER VEIN, OPEN APPROACH: ICD-10-PCS | Performed by: SPECIALIST

## 2020-04-19 PROCEDURE — 94799 UNLISTED PULMONARY SVC/PX: CPT

## 2020-04-19 PROCEDURE — 87070 CULTURE OTHR SPECIMN AEROBIC: CPT | Performed by: SPECIALIST

## 2020-04-19 PROCEDURE — 82962 GLUCOSE BLOOD TEST: CPT

## 2020-04-19 PROCEDURE — 85025 COMPLETE CBC W/AUTO DIFF WBC: CPT | Performed by: FAMILY MEDICINE

## 2020-04-19 PROCEDURE — 87147 CULTURE TYPE IMMUNOLOGIC: CPT | Performed by: SPECIALIST

## 2020-04-19 PROCEDURE — 71046 X-RAY EXAM CHEST 2 VIEWS: CPT

## 2020-04-19 PROCEDURE — 87040 BLOOD CULTURE FOR BACTERIA: CPT | Performed by: INTERNAL MEDICINE

## 2020-04-19 PROCEDURE — 80061 LIPID PANEL: CPT | Performed by: FAMILY MEDICINE

## 2020-04-19 PROCEDURE — 97161 PT EVAL LOW COMPLEX 20 MIN: CPT | Performed by: PHYSICAL THERAPIST

## 2020-04-19 PROCEDURE — 80053 COMPREHEN METABOLIC PANEL: CPT | Performed by: FAMILY MEDICINE

## 2020-04-19 PROCEDURE — 25010000002 ENOXAPARIN PER 10 MG: Performed by: SPECIALIST

## 2020-04-19 PROCEDURE — 83036 HEMOGLOBIN GLYCOSYLATED A1C: CPT | Performed by: FAMILY MEDICINE

## 2020-04-19 RX ORDER — MIDAZOLAM HYDROCHLORIDE 1 MG/ML
1 INJECTION INTRAMUSCULAR; INTRAVENOUS
Status: CANCELLED | OUTPATIENT
Start: 2020-04-19

## 2020-04-19 RX ORDER — ACETAMINOPHEN 500 MG
1000 TABLET ORAL ONCE
Status: CANCELLED | OUTPATIENT
Start: 2020-04-19 | End: 2020-04-19

## 2020-04-19 RX ORDER — HYDRALAZINE HYDROCHLORIDE 50 MG/1
50 TABLET, FILM COATED ORAL EVERY 8 HOURS SCHEDULED
Status: DISCONTINUED | OUTPATIENT
Start: 2020-04-19 | End: 2020-04-25 | Stop reason: HOSPADM

## 2020-04-19 RX ORDER — SODIUM CHLORIDE 0.9 % (FLUSH) 0.9 %
3 SYRINGE (ML) INJECTION EVERY 12 HOURS SCHEDULED
Status: CANCELLED | OUTPATIENT
Start: 2020-04-19

## 2020-04-19 RX ORDER — ONDANSETRON 2 MG/ML
4 INJECTION INTRAMUSCULAR; INTRAVENOUS AS NEEDED
Status: DISCONTINUED | OUTPATIENT
Start: 2020-04-19 | End: 2020-04-19 | Stop reason: HOSPADM

## 2020-04-19 RX ORDER — OXYCODONE AND ACETAMINOPHEN 10; 325 MG/1; MG/1
1 TABLET ORAL ONCE AS NEEDED
Status: DISCONTINUED | OUTPATIENT
Start: 2020-04-19 | End: 2020-04-19 | Stop reason: HOSPADM

## 2020-04-19 RX ORDER — SIMETHICONE 80 MG
80 TABLET,CHEWABLE ORAL 4 TIMES DAILY PRN
Status: DISCONTINUED | OUTPATIENT
Start: 2020-04-19 | End: 2020-04-25 | Stop reason: HOSPADM

## 2020-04-19 RX ORDER — FENTANYL CITRATE 50 UG/ML
25 INJECTION, SOLUTION INTRAMUSCULAR; INTRAVENOUS
Status: DISCONTINUED | OUTPATIENT
Start: 2020-04-19 | End: 2020-04-19 | Stop reason: HOSPADM

## 2020-04-19 RX ORDER — FERROUS SULFATE 325(65) MG
325 TABLET ORAL
Status: DISCONTINUED | OUTPATIENT
Start: 2020-04-20 | End: 2020-04-25 | Stop reason: HOSPADM

## 2020-04-19 RX ORDER — SODIUM CHLORIDE 9 MG/ML
INJECTION, SOLUTION INTRAVENOUS CONTINUOUS PRN
Status: DISCONTINUED | OUTPATIENT
Start: 2020-04-19 | End: 2020-04-19 | Stop reason: SURG

## 2020-04-19 RX ORDER — SUCRALFATE ORAL 1 G/10ML
1 SUSPENSION ORAL EVERY 6 HOURS SCHEDULED
Status: DISCONTINUED | OUTPATIENT
Start: 2020-04-20 | End: 2020-04-25 | Stop reason: HOSPADM

## 2020-04-19 RX ORDER — NALOXONE HCL 0.4 MG/ML
0.04 VIAL (ML) INJECTION AS NEEDED
Status: DISCONTINUED | OUTPATIENT
Start: 2020-04-19 | End: 2020-04-19 | Stop reason: HOSPADM

## 2020-04-19 RX ORDER — LABETALOL HYDROCHLORIDE 5 MG/ML
5 INJECTION, SOLUTION INTRAVENOUS
Status: DISCONTINUED | OUTPATIENT
Start: 2020-04-19 | End: 2020-04-19 | Stop reason: HOSPADM

## 2020-04-19 RX ORDER — HYDROCODONE BITARTRATE AND ACETAMINOPHEN 7.5; 325 MG/1; MG/1
1 TABLET ORAL EVERY 4 HOURS PRN
Status: DISCONTINUED | OUTPATIENT
Start: 2020-04-19 | End: 2020-04-25 | Stop reason: HOSPADM

## 2020-04-19 RX ORDER — KETAMINE HYDROCHLORIDE 50 MG/ML
INJECTION, SOLUTION, CONCENTRATE INTRAMUSCULAR; INTRAVENOUS AS NEEDED
Status: DISCONTINUED | OUTPATIENT
Start: 2020-04-19 | End: 2020-04-19 | Stop reason: SURG

## 2020-04-19 RX ORDER — MAGNESIUM HYDROXIDE 1200 MG/15ML
LIQUID ORAL AS NEEDED
Status: DISCONTINUED | OUTPATIENT
Start: 2020-04-19 | End: 2020-04-19 | Stop reason: HOSPADM

## 2020-04-19 RX ORDER — LIDOCAINE HYDROCHLORIDE 10 MG/ML
0.5 INJECTION, SOLUTION EPIDURAL; INFILTRATION; INTRACAUDAL; PERINEURAL ONCE AS NEEDED
Status: CANCELLED | OUTPATIENT
Start: 2020-04-19

## 2020-04-19 RX ORDER — SODIUM CHLORIDE, SODIUM LACTATE, POTASSIUM CHLORIDE, CALCIUM CHLORIDE 600; 310; 30; 20 MG/100ML; MG/100ML; MG/100ML; MG/100ML
100 INJECTION, SOLUTION INTRAVENOUS CONTINUOUS
Status: CANCELLED | OUTPATIENT
Start: 2020-04-19

## 2020-04-19 RX ORDER — MORPHINE SULFATE 2 MG/ML
2 INJECTION, SOLUTION INTRAMUSCULAR; INTRAVENOUS
Status: DISCONTINUED | OUTPATIENT
Start: 2020-04-19 | End: 2020-04-19 | Stop reason: HOSPADM

## 2020-04-19 RX ORDER — LIDOCAINE HYDROCHLORIDE AND EPINEPHRINE 10; 10 MG/ML; UG/ML
INJECTION, SOLUTION INFILTRATION; PERINEURAL AS NEEDED
Status: DISCONTINUED | OUTPATIENT
Start: 2020-04-19 | End: 2020-04-19 | Stop reason: HOSPADM

## 2020-04-19 RX ORDER — OXYCODONE AND ACETAMINOPHEN 7.5; 325 MG/1; MG/1
2 TABLET ORAL ONCE AS NEEDED
Status: DISCONTINUED | OUTPATIENT
Start: 2020-04-19 | End: 2020-04-19 | Stop reason: HOSPADM

## 2020-04-19 RX ORDER — SODIUM CHLORIDE 0.9 % (FLUSH) 0.9 %
3-10 SYRINGE (ML) INJECTION AS NEEDED
Status: CANCELLED | OUTPATIENT
Start: 2020-04-19

## 2020-04-19 RX ORDER — PROPOFOL 10 MG/ML
VIAL (ML) INTRAVENOUS AS NEEDED
Status: DISCONTINUED | OUTPATIENT
Start: 2020-04-19 | End: 2020-04-19 | Stop reason: SURG

## 2020-04-19 RX ORDER — FLUMAZENIL 0.1 MG/ML
0.2 INJECTION INTRAVENOUS AS NEEDED
Status: DISCONTINUED | OUTPATIENT
Start: 2020-04-19 | End: 2020-04-19 | Stop reason: HOSPADM

## 2020-04-19 RX ORDER — NIFEDIPINE 60 MG/1
60 TABLET, EXTENDED RELEASE ORAL 2 TIMES DAILY
Status: DISCONTINUED | OUTPATIENT
Start: 2020-04-19 | End: 2020-04-25 | Stop reason: HOSPADM

## 2020-04-19 RX ORDER — MIDAZOLAM HYDROCHLORIDE 1 MG/ML
2 INJECTION INTRAMUSCULAR; INTRAVENOUS
Status: CANCELLED | OUTPATIENT
Start: 2020-04-19

## 2020-04-19 RX ADMIN — NAFCILLIN SODIUM 2 G: 2 INJECTION, POWDER, LYOPHILIZED, FOR SOLUTION INTRAMUSCULAR; INTRAVENOUS at 04:16

## 2020-04-19 RX ADMIN — ALBUTEROL SULFATE 2.5 MG: 2.5 SOLUTION RESPIRATORY (INHALATION) at 06:54

## 2020-04-19 RX ADMIN — METOPROLOL TARTRATE 25 MG: 25 TABLET, FILM COATED ORAL at 20:12

## 2020-04-19 RX ADMIN — PROPOFOL 25 MG: 10 INJECTION, EMULSION INTRAVENOUS at 17:25

## 2020-04-19 RX ADMIN — ASPIRIN 81 MG: 81 TABLET ORAL at 08:50

## 2020-04-19 RX ADMIN — SODIUM CHLORIDE, PRESERVATIVE FREE 10 ML: 5 INJECTION INTRAVENOUS at 08:49

## 2020-04-19 RX ADMIN — ALBUTEROL SULFATE 2.5 MG: 2.5 SOLUTION RESPIRATORY (INHALATION) at 11:04

## 2020-04-19 RX ADMIN — NIFEDIPINE 60 MG: 60 TABLET, FILM COATED, EXTENDED RELEASE ORAL at 23:04

## 2020-04-19 RX ADMIN — METOPROLOL TARTRATE 25 MG: 25 TABLET, FILM COATED ORAL at 08:50

## 2020-04-19 RX ADMIN — PROPOFOL 25 MG: 10 INJECTION, EMULSION INTRAVENOUS at 17:30

## 2020-04-19 RX ADMIN — LIDOCAINE HYDROCHLORIDE 200 MG: 20 INJECTION, SOLUTION INTRAVENOUS at 17:25

## 2020-04-19 RX ADMIN — METHYLCELLULOSE 1000 MG: 500 TABLET ORAL at 23:04

## 2020-04-19 RX ADMIN — KETAMINE HYDROCHLORIDE 25 MG: 50 INJECTION, SOLUTION INTRAMUSCULAR; INTRAVENOUS at 17:25

## 2020-04-19 RX ADMIN — SUCRALFATE 1 G: 1 SUSPENSION ORAL at 23:03

## 2020-04-19 RX ADMIN — HYDRALAZINE HYDROCHLORIDE 50 MG: 50 TABLET ORAL at 23:04

## 2020-04-19 RX ADMIN — SODIUM CHLORIDE: 9 INJECTION, SOLUTION INTRAVENOUS at 17:17

## 2020-04-19 RX ADMIN — NAFCILLIN SODIUM 2 G: 2 INJECTION, POWDER, LYOPHILIZED, FOR SOLUTION INTRAMUSCULAR; INTRAVENOUS at 20:11

## 2020-04-19 RX ADMIN — LABETALOL HYDROCHLORIDE 5 MG: 5 INJECTION, SOLUTION INTRAVENOUS at 18:42

## 2020-04-19 RX ADMIN — LABETALOL HYDROCHLORIDE 5 MG: 5 INJECTION, SOLUTION INTRAVENOUS at 18:21

## 2020-04-19 RX ADMIN — NAFCILLIN SODIUM 2 G: 2 INJECTION, POWDER, LYOPHILIZED, FOR SOLUTION INTRAMUSCULAR; INTRAVENOUS at 14:31

## 2020-04-19 RX ADMIN — LABETALOL HYDROCHLORIDE 5 MG: 5 INJECTION, SOLUTION INTRAVENOUS at 18:30

## 2020-04-19 RX ADMIN — ALBUTEROL SULFATE 2.5 MG: 2.5 SOLUTION RESPIRATORY (INHALATION) at 19:37

## 2020-04-19 RX ADMIN — ATORVASTATIN CALCIUM 20 MG: 10 TABLET, FILM COATED ORAL at 20:12

## 2020-04-19 RX ADMIN — NAFCILLIN SODIUM 2 G: 2 INJECTION, POWDER, LYOPHILIZED, FOR SOLUTION INTRAMUSCULAR; INTRAVENOUS at 08:50

## 2020-04-19 RX ADMIN — NAFCILLIN SODIUM 2 G: 2 INJECTION, POWDER, LYOPHILIZED, FOR SOLUTION INTRAMUSCULAR; INTRAVENOUS at 23:03

## 2020-04-19 RX ADMIN — ENOXAPARIN SODIUM 30 MG: 30 INJECTION SUBCUTANEOUS at 20:11

## 2020-04-19 RX ADMIN — FAMOTIDINE 20 MG: 20 TABLET, FILM COATED ORAL at 08:50

## 2020-04-19 RX ADMIN — NAFCILLIN SODIUM 2 G: 2 INJECTION, POWDER, LYOPHILIZED, FOR SOLUTION INTRAMUSCULAR; INTRAVENOUS at 00:29

## 2020-04-19 RX ADMIN — ALBUTEROL SULFATE 2.5 MG: 2.5 SOLUTION RESPIRATORY (INHALATION) at 14:41

## 2020-04-19 RX ADMIN — CLONIDINE HYDROCHLORIDE 0.1 MG: 0.1 TABLET ORAL at 20:12

## 2020-04-19 RX ADMIN — CLONIDINE HYDROCHLORIDE 0.1 MG: 0.1 TABLET ORAL at 08:50

## 2020-04-19 NOTE — ANESTHESIA POSTPROCEDURE EVALUATION
"Patient: Craig Swanson    Procedure Summary     Date:  04/19/20 Room / Location:   [facility] OR  /  [facility] OR    Anesthesia Start:  1717 Anesthesia Stop:  1809    Procedure:  REMOVAL OF PERMA CATH (N/A Chest) Diagnosis:      Surgeon:  Christopher Son MD Provider:      Anesthesia Type:  MAC ASA Status:  3 - Emergent          Anesthesia Type: MAC    Vitals  No vitals data found for the desired time range.          Post Anesthesia Care and Evaluation    Patient location during evaluation: PACU  Patient participation: complete - patient participated  Level of consciousness: awake and alert  Pain management: adequate  Airway patency: patent  Anesthetic complications: No anesthetic complications    Cardiovascular status: acceptable  Respiratory status: acceptable  Hydration status: acceptable    Comments: Blood pressure 146/73, pulse 82, temperature 99.2 °F (37.3 °C), temperature source Oral, resp. rate 16, height 172.7 cm (67.99\"), weight 110 kg (242 lb 9.6 oz), SpO2 100 %.    Pt discharged from PACU based on rogelio score >8      "

## 2020-04-19 NOTE — ANESTHESIA PREPROCEDURE EVALUATION
Anesthesia Evaluation     Patient summary reviewed and Nursing notes reviewed   NPO Solid Status: > 6 hours  NPO Liquid Status: > 6 hours           Airway   Mallampati: I  TM distance: >3 FB  Neck ROM: full  No difficulty expected  Dental - normal exam     Pulmonary    (+) asthma,decreased breath sounds,   Cardiovascular - normal exam  Exercise tolerance: poor (<4 METS)    Patient on routine beta blocker and Beta blocker given within 24 hours of surgery    (+) hypertension well controlled 2 medications or greater, hyperlipidemia,       Neuro/Psych- negative ROS  GI/Hepatic/Renal/Endo    (+) morbid obesity,  renal disease dialysis and ESRD, diabetes mellitus type 2 well controlled,     Musculoskeletal     (+) back pain,   Abdominal   (+) obese,    Substance History - negative use     OB/GYN          Other - negative ROS                       Anesthesia Plan    ASA 3 - emergent     MAC   (Surgeon declares case emergency due to infection and potential sepsis. Proceed with mild sedation)  intravenous induction     Anesthetic plan, all risks, benefits, and alternatives have been provided, discussed and informed consent has been obtained with: patient and spouse/significant other.

## 2020-04-20 ENCOUNTER — APPOINTMENT (OUTPATIENT)
Dept: CARDIOLOGY | Facility: HOSPITAL | Age: 50
End: 2020-04-20

## 2020-04-20 LAB
ALBUMIN SERPL-MCNC: 3 G/DL (ref 3.5–5.2)
ALBUMIN/GLOB SERPL: 0.8 G/DL
ALP SERPL-CCNC: 61 U/L (ref 39–117)
ALT SERPL W P-5'-P-CCNC: 12 U/L (ref 1–41)
ANION GAP SERPL CALCULATED.3IONS-SCNC: 16 MMOL/L (ref 5–15)
AST SERPL-CCNC: 16 U/L (ref 1–40)
BACTERIA SPEC AEROBE CULT: ABNORMAL
BASOPHILS # BLD AUTO: 0.03 10*3/MM3 (ref 0–0.2)
BASOPHILS NFR BLD AUTO: 0.2 % (ref 0–1.5)
BH CV ECHO MEAS - AO MAX PG (FULL): 5.3 MMHG
BH CV ECHO MEAS - AO MAX PG: 12 MMHG
BH CV ECHO MEAS - AO MEAN PG (FULL): 3 MMHG
BH CV ECHO MEAS - AO MEAN PG: 7 MMHG
BH CV ECHO MEAS - AO ROOT AREA (BSA CORRECTED): 1.6
BH CV ECHO MEAS - AO ROOT AREA: 10.2 CM^2
BH CV ECHO MEAS - AO ROOT DIAM: 3.6 CM
BH CV ECHO MEAS - AO V2 MAX: 173 CM/SEC
BH CV ECHO MEAS - AO V2 MEAN: 125 CM/SEC
BH CV ECHO MEAS - AO V2 VTI: 33.1 CM
BH CV ECHO MEAS - AVA(I,A): 2.8 CM^2
BH CV ECHO MEAS - AVA(I,D): 2.8 CM^2
BH CV ECHO MEAS - AVA(V,A): 2.6 CM^2
BH CV ECHO MEAS - AVA(V,D): 2.6 CM^2
BH CV ECHO MEAS - BSA(HAYCOCK): 2.3 M^2
BH CV ECHO MEAS - BSA: 2.2 M^2
BH CV ECHO MEAS - BZI_BMI: 37.9 KILOGRAMS/M^2
BH CV ECHO MEAS - BZI_METRIC_HEIGHT: 170.2 CM
BH CV ECHO MEAS - BZI_METRIC_WEIGHT: 109.8 KG
BH CV ECHO MEAS - EDV(CUBED): 73 ML
BH CV ECHO MEAS - EDV(MOD-SP4): 141 ML
BH CV ECHO MEAS - EDV(TEICH): 77.7 ML
BH CV ECHO MEAS - EF(CUBED): 76.5 %
BH CV ECHO MEAS - EF(MOD-SP4): 63.7 %
BH CV ECHO MEAS - EF(TEICH): 68.9 %
BH CV ECHO MEAS - ESV(CUBED): 17.2 ML
BH CV ECHO MEAS - ESV(MOD-SP4): 51.2 ML
BH CV ECHO MEAS - ESV(TEICH): 24.1 ML
BH CV ECHO MEAS - FS: 38.3 %
BH CV ECHO MEAS - IVS/LVPW: 1.1
BH CV ECHO MEAS - IVSD: 1.8 CM
BH CV ECHO MEAS - LA DIMENSION: 4 CM
BH CV ECHO MEAS - LA/AO: 1.1
BH CV ECHO MEAS - LAT PEAK E' VEL: 9.7 CM/SEC
BH CV ECHO MEAS - LV DIASTOLIC VOL/BSA (35-75): 64.3 ML/M^2
BH CV ECHO MEAS - LV MASS(C)D: 296.5 GRAMS
BH CV ECHO MEAS - LV MASS(C)DI: 135.2 GRAMS/M^2
BH CV ECHO MEAS - LV MAX PG: 6.7 MMHG
BH CV ECHO MEAS - LV MEAN PG: 4 MMHG
BH CV ECHO MEAS - LV SYSTOLIC VOL/BSA (12-30): 23.4 ML/M^2
BH CV ECHO MEAS - LV V1 MAX: 129 CM/SEC
BH CV ECHO MEAS - LV V1 MEAN: 95.8 CM/SEC
BH CV ECHO MEAS - LV V1 VTI: 26.5 CM
BH CV ECHO MEAS - LVIDD: 4.2 CM
BH CV ECHO MEAS - LVIDS: 2.6 CM
BH CV ECHO MEAS - LVLD AP4: 9.2 CM
BH CV ECHO MEAS - LVLS AP4: 8.1 CM
BH CV ECHO MEAS - LVOT AREA (M): 3.5 CM^2
BH CV ECHO MEAS - LVOT AREA: 3.5 CM^2
BH CV ECHO MEAS - LVOT DIAM: 2.1 CM
BH CV ECHO MEAS - LVPWD: 1.6 CM
BH CV ECHO MEAS - MED PEAK E' VEL: 7.8 CM/SEC
BH CV ECHO MEAS - MV A MAX VEL: 70.8 CM/SEC
BH CV ECHO MEAS - MV DEC SLOPE: 388 CM/SEC^2
BH CV ECHO MEAS - MV DEC TIME: 0.24 SEC
BH CV ECHO MEAS - MV E MAX VEL: 94.2 CM/SEC
BH CV ECHO MEAS - MV E/A: 1.3
BH CV ECHO MEAS - RAP SYSTOLE: 10 MMHG
BH CV ECHO MEAS - RVSP: 36.6 MMHG
BH CV ECHO MEAS - SI(AO): 153.7 ML/M^2
BH CV ECHO MEAS - SI(CUBED): 25.5 ML/M^2
BH CV ECHO MEAS - SI(LVOT): 41.9 ML/M^2
BH CV ECHO MEAS - SI(MOD-SP4): 41 ML/M^2
BH CV ECHO MEAS - SI(TEICH): 24.4 ML/M^2
BH CV ECHO MEAS - SV(AO): 336.9 ML
BH CV ECHO MEAS - SV(CUBED): 55.9 ML
BH CV ECHO MEAS - SV(LVOT): 91.8 ML
BH CV ECHO MEAS - SV(MOD-SP4): 89.8 ML
BH CV ECHO MEAS - SV(TEICH): 53.6 ML
BH CV ECHO MEAS - TR MAX VEL: 258 CM/SEC
BH CV ECHO MEASUREMENTS AVERAGE E/E' RATIO: 10.77
BILIRUB SERPL-MCNC: 0.2 MG/DL (ref 0.2–1.2)
BUN BLD-MCNC: 48 MG/DL (ref 6–20)
BUN/CREAT SERPL: 4.3 (ref 7–25)
CALCIUM SPEC-SCNC: 9.1 MG/DL (ref 8.6–10.5)
CHLORIDE SERPL-SCNC: 97 MMOL/L (ref 98–107)
CO2 SERPL-SCNC: 22 MMOL/L (ref 22–29)
CREAT BLD-MCNC: 11.11 MG/DL (ref 0.76–1.27)
DEPRECATED RDW RBC AUTO: 47.8 FL (ref 37–54)
EOSINOPHIL # BLD AUTO: 0.21 10*3/MM3 (ref 0–0.4)
EOSINOPHIL NFR BLD AUTO: 1.4 % (ref 0.3–6.2)
ERYTHROCYTE [DISTWIDTH] IN BLOOD BY AUTOMATED COUNT: 15.3 % (ref 12.3–15.4)
GFR SERPL CREATININE-BSD FRML MDRD: 6 ML/MIN/1.73
GFR SERPL CREATININE-BSD FRML MDRD: ABNORMAL ML/MIN/{1.73_M2}
GLOBULIN UR ELPH-MCNC: 4 GM/DL
GLUCOSE BLD-MCNC: 132 MG/DL (ref 65–99)
GLUCOSE BLDC GLUCOMTR-MCNC: 111 MG/DL (ref 70–130)
GLUCOSE BLDC GLUCOMTR-MCNC: 134 MG/DL (ref 70–130)
GLUCOSE BLDC GLUCOMTR-MCNC: 140 MG/DL (ref 70–130)
GLUCOSE BLDC GLUCOMTR-MCNC: 147 MG/DL (ref 70–130)
GRAM STN SPEC: ABNORMAL
HCT VFR BLD AUTO: 25.1 % (ref 37.5–51)
HGB BLD-MCNC: 8.3 G/DL (ref 13–17.7)
IMM GRANULOCYTES # BLD AUTO: 0.45 10*3/MM3 (ref 0–0.05)
IMM GRANULOCYTES NFR BLD AUTO: 3 % (ref 0–0.5)
ISOLATED FROM: ABNORMAL
LEFT ATRIUM VOLUME INDEX: 17.5 ML/M2
LEFT ATRIUM VOLUME: 38.4 CM3
LYMPHOCYTES # BLD AUTO: 2.38 10*3/MM3 (ref 0.7–3.1)
LYMPHOCYTES NFR BLD AUTO: 15.9 % (ref 19.6–45.3)
MAXIMAL PREDICTED HEART RATE: 170 BPM
MCH RBC QN AUTO: 28.1 PG (ref 26.6–33)
MCHC RBC AUTO-ENTMCNC: 33.1 G/DL (ref 31.5–35.7)
MCV RBC AUTO: 85.1 FL (ref 79–97)
MONOCYTES # BLD AUTO: 1.69 10*3/MM3 (ref 0.1–0.9)
MONOCYTES NFR BLD AUTO: 11.3 % (ref 5–12)
NEUTROPHILS # BLD AUTO: 10.25 10*3/MM3 (ref 1.7–7)
NEUTROPHILS NFR BLD AUTO: 68.2 % (ref 42.7–76)
NRBC BLD AUTO-RTO: 0 /100 WBC (ref 0–0.2)
PLATELET # BLD AUTO: 247 10*3/MM3 (ref 140–450)
PMV BLD AUTO: 10.6 FL (ref 6–12)
POTASSIUM BLD-SCNC: 3.8 MMOL/L (ref 3.5–5.2)
PROT SERPL-MCNC: 7 G/DL (ref 6–8.5)
RBC # BLD AUTO: 2.95 10*6/MM3 (ref 4.14–5.8)
SODIUM BLD-SCNC: 135 MMOL/L (ref 136–145)
STRESS TARGET HR: 145 BPM
WBC NRBC COR # BLD: 15.01 10*3/MM3 (ref 3.4–10.8)

## 2020-04-20 PROCEDURE — 94799 UNLISTED PULMONARY SVC/PX: CPT

## 2020-04-20 PROCEDURE — 85025 COMPLETE CBC W/AUTO DIFF WBC: CPT | Performed by: SPECIALIST

## 2020-04-20 PROCEDURE — 82962 GLUCOSE BLOOD TEST: CPT

## 2020-04-20 PROCEDURE — 93306 TTE W/DOPPLER COMPLETE: CPT

## 2020-04-20 PROCEDURE — 97116 GAIT TRAINING THERAPY: CPT

## 2020-04-20 PROCEDURE — 80053 COMPREHEN METABOLIC PANEL: CPT | Performed by: SPECIALIST

## 2020-04-20 PROCEDURE — 93306 TTE W/DOPPLER COMPLETE: CPT | Performed by: INTERNAL MEDICINE

## 2020-04-20 PROCEDURE — 87040 BLOOD CULTURE FOR BACTERIA: CPT | Performed by: SPECIALIST

## 2020-04-20 PROCEDURE — 25010000002 PERFLUTREN 6.52 MG/ML SUSPENSION: Performed by: FAMILY MEDICINE

## 2020-04-20 PROCEDURE — 99253 IP/OBS CNSLTJ NEW/EST LOW 45: CPT | Performed by: SURGERY

## 2020-04-20 PROCEDURE — 25010000002 ENOXAPARIN PER 10 MG: Performed by: SPECIALIST

## 2020-04-20 RX ADMIN — CLONIDINE HYDROCHLORIDE 0.1 MG: 0.1 TABLET ORAL at 09:20

## 2020-04-20 RX ADMIN — NIFEDIPINE 60 MG: 60 TABLET, FILM COATED, EXTENDED RELEASE ORAL at 09:20

## 2020-04-20 RX ADMIN — FERROUS SULFATE TAB 325 MG (65 MG ELEMENTAL FE) 325 MG: 325 (65 FE) TAB at 09:21

## 2020-04-20 RX ADMIN — NAFCILLIN SODIUM 2 G: 2 INJECTION, POWDER, LYOPHILIZED, FOR SOLUTION INTRAMUSCULAR; INTRAVENOUS at 11:48

## 2020-04-20 RX ADMIN — METOPROLOL TARTRATE 25 MG: 25 TABLET, FILM COATED ORAL at 21:02

## 2020-04-20 RX ADMIN — ALBUTEROL SULFATE 2.5 MG: 2.5 SOLUTION RESPIRATORY (INHALATION) at 20:27

## 2020-04-20 RX ADMIN — NIFEDIPINE 60 MG: 60 TABLET, FILM COATED, EXTENDED RELEASE ORAL at 21:03

## 2020-04-20 RX ADMIN — ACETAMINOPHEN 650 MG: 325 TABLET, FILM COATED ORAL at 00:58

## 2020-04-20 RX ADMIN — NAFCILLIN SODIUM 2 G: 2 INJECTION, POWDER, LYOPHILIZED, FOR SOLUTION INTRAMUSCULAR; INTRAVENOUS at 04:42

## 2020-04-20 RX ADMIN — ENOXAPARIN SODIUM 30 MG: 30 INJECTION SUBCUTANEOUS at 17:24

## 2020-04-20 RX ADMIN — NAFCILLIN SODIUM 2 G: 2 INJECTION, POWDER, LYOPHILIZED, FOR SOLUTION INTRAMUSCULAR; INTRAVENOUS at 08:47

## 2020-04-20 RX ADMIN — CLONIDINE HYDROCHLORIDE 0.1 MG: 0.1 TABLET ORAL at 21:02

## 2020-04-20 RX ADMIN — NAFCILLIN SODIUM 2 G: 2 INJECTION, POWDER, LYOPHILIZED, FOR SOLUTION INTRAMUSCULAR; INTRAVENOUS at 22:13

## 2020-04-20 RX ADMIN — ASPIRIN 81 MG: 81 TABLET ORAL at 08:47

## 2020-04-20 RX ADMIN — SUCRALFATE 1 G: 1 SUSPENSION ORAL at 17:25

## 2020-04-20 RX ADMIN — HYDRALAZINE HYDROCHLORIDE 50 MG: 50 TABLET ORAL at 16:41

## 2020-04-20 RX ADMIN — SUCRALFATE 1 G: 1 SUSPENSION ORAL at 04:43

## 2020-04-20 RX ADMIN — HYDROCODONE BITARTRATE AND ACETAMINOPHEN 1 TABLET: 7.5; 325 TABLET ORAL at 09:20

## 2020-04-20 RX ADMIN — METOPROLOL TARTRATE 25 MG: 25 TABLET, FILM COATED ORAL at 09:20

## 2020-04-20 RX ADMIN — ATORVASTATIN CALCIUM 20 MG: 10 TABLET, FILM COATED ORAL at 21:02

## 2020-04-20 RX ADMIN — HYDROCODONE BITARTRATE AND ACETAMINOPHEN 1 TABLET: 7.5; 325 TABLET ORAL at 21:01

## 2020-04-20 RX ADMIN — ALBUTEROL SULFATE 2.5 MG: 2.5 SOLUTION RESPIRATORY (INHALATION) at 06:23

## 2020-04-20 RX ADMIN — HYDRALAZINE HYDROCHLORIDE 50 MG: 50 TABLET ORAL at 21:02

## 2020-04-20 RX ADMIN — ALBUTEROL SULFATE 2.5 MG: 2.5 SOLUTION RESPIRATORY (INHALATION) at 14:17

## 2020-04-20 RX ADMIN — CYCLOBENZAPRINE 10 MG: 10 TABLET, FILM COATED ORAL at 18:19

## 2020-04-20 RX ADMIN — FAMOTIDINE 20 MG: 20 TABLET, FILM COATED ORAL at 09:20

## 2020-04-20 RX ADMIN — METHYLCELLULOSE 1000 MG: 500 TABLET ORAL at 09:20

## 2020-04-20 RX ADMIN — HYDRALAZINE HYDROCHLORIDE 50 MG: 50 TABLET ORAL at 04:42

## 2020-04-20 RX ADMIN — HYDROCODONE BITARTRATE AND ACETAMINOPHEN 1 TABLET: 7.5; 325 TABLET ORAL at 14:59

## 2020-04-20 RX ADMIN — SODIUM CHLORIDE, PRESERVATIVE FREE 10 ML: 5 INJECTION INTRAVENOUS at 21:03

## 2020-04-20 RX ADMIN — METHYLCELLULOSE 1000 MG: 500 TABLET ORAL at 21:04

## 2020-04-20 RX ADMIN — PERFLUTREN 8.48 MG: 6.52 INJECTION, SUSPENSION INTRAVENOUS at 11:11

## 2020-04-20 RX ADMIN — NAFCILLIN SODIUM 2 G: 2 INJECTION, POWDER, LYOPHILIZED, FOR SOLUTION INTRAMUSCULAR; INTRAVENOUS at 16:42

## 2020-04-21 ENCOUNTER — APPOINTMENT (OUTPATIENT)
Dept: GENERAL RADIOLOGY | Facility: HOSPITAL | Age: 50
End: 2020-04-21

## 2020-04-21 LAB
ALBUMIN SERPL-MCNC: 2.9 G/DL (ref 3.5–5.2)
ALBUMIN/GLOB SERPL: 0.7 G/DL
ALP SERPL-CCNC: 53 U/L (ref 39–117)
ALT SERPL W P-5'-P-CCNC: 9 U/L (ref 1–41)
ANION GAP SERPL CALCULATED.3IONS-SCNC: 19 MMOL/L (ref 5–15)
AST SERPL-CCNC: 13 U/L (ref 1–40)
BASOPHILS # BLD AUTO: 0.02 10*3/MM3 (ref 0–0.2)
BASOPHILS NFR BLD AUTO: 0.1 % (ref 0–1.5)
BILIRUB SERPL-MCNC: 0.2 MG/DL (ref 0.2–1.2)
BUN BLD-MCNC: 61 MG/DL (ref 6–20)
BUN/CREAT SERPL: 4.6 (ref 7–25)
CALCIUM SPEC-SCNC: 8.6 MG/DL (ref 8.6–10.5)
CHLORIDE SERPL-SCNC: 99 MMOL/L (ref 98–107)
CO2 SERPL-SCNC: 18 MMOL/L (ref 22–29)
CREAT BLD-MCNC: 13.13 MG/DL (ref 0.76–1.27)
DEPRECATED RDW RBC AUTO: 46.3 FL (ref 37–54)
EOSINOPHIL # BLD AUTO: 0.29 10*3/MM3 (ref 0–0.4)
EOSINOPHIL NFR BLD AUTO: 1.9 % (ref 0.3–6.2)
ERYTHROCYTE [DISTWIDTH] IN BLOOD BY AUTOMATED COUNT: 15.2 % (ref 12.3–15.4)
GFR SERPL CREATININE-BSD FRML MDRD: 5 ML/MIN/1.73
GFR SERPL CREATININE-BSD FRML MDRD: ABNORMAL ML/MIN/{1.73_M2}
GLOBULIN UR ELPH-MCNC: 4 GM/DL
GLUCOSE BLD-MCNC: 143 MG/DL (ref 65–99)
GLUCOSE BLDC GLUCOMTR-MCNC: 110 MG/DL (ref 70–130)
GLUCOSE BLDC GLUCOMTR-MCNC: 124 MG/DL (ref 70–130)
GLUCOSE BLDC GLUCOMTR-MCNC: 232 MG/DL (ref 70–130)
HCT VFR BLD AUTO: 22.4 % (ref 37.5–51)
HGB BLD-MCNC: 7.5 G/DL (ref 13–17.7)
IMM GRANULOCYTES # BLD AUTO: 0.31 10*3/MM3 (ref 0–0.05)
IMM GRANULOCYTES NFR BLD AUTO: 2.1 % (ref 0–0.5)
LYMPHOCYTES # BLD AUTO: 1.81 10*3/MM3 (ref 0.7–3.1)
LYMPHOCYTES NFR BLD AUTO: 12.1 % (ref 19.6–45.3)
MCH RBC QN AUTO: 28.2 PG (ref 26.6–33)
MCHC RBC AUTO-ENTMCNC: 33.5 G/DL (ref 31.5–35.7)
MCV RBC AUTO: 84.2 FL (ref 79–97)
MONOCYTES # BLD AUTO: 1.39 10*3/MM3 (ref 0.1–0.9)
MONOCYTES NFR BLD AUTO: 9.3 % (ref 5–12)
NEUTROPHILS # BLD AUTO: 11.18 10*3/MM3 (ref 1.7–7)
NEUTROPHILS NFR BLD AUTO: 74.5 % (ref 42.7–76)
NRBC BLD AUTO-RTO: 0 /100 WBC (ref 0–0.2)
PLATELET # BLD AUTO: 291 10*3/MM3 (ref 140–450)
PMV BLD AUTO: 10.2 FL (ref 6–12)
POTASSIUM BLD-SCNC: 4 MMOL/L (ref 3.5–5.2)
PROT SERPL-MCNC: 6.9 G/DL (ref 6–8.5)
RBC # BLD AUTO: 2.66 10*6/MM3 (ref 4.14–5.8)
SODIUM BLD-SCNC: 136 MMOL/L (ref 136–145)
WBC NRBC COR # BLD: 15 10*3/MM3 (ref 3.4–10.8)

## 2020-04-21 PROCEDURE — 97116 GAIT TRAINING THERAPY: CPT

## 2020-04-21 PROCEDURE — 82962 GLUCOSE BLOOD TEST: CPT

## 2020-04-21 PROCEDURE — 85025 COMPLETE CBC W/AUTO DIFF WBC: CPT | Performed by: INTERNAL MEDICINE

## 2020-04-21 PROCEDURE — 25010000002 MORPHINE PER 10 MG: Performed by: FAMILY MEDICINE

## 2020-04-21 PROCEDURE — 87040 BLOOD CULTURE FOR BACTERIA: CPT | Performed by: INTERNAL MEDICINE

## 2020-04-21 PROCEDURE — 99232 SBSQ HOSP IP/OBS MODERATE 35: CPT | Performed by: SURGERY

## 2020-04-21 PROCEDURE — 94799 UNLISTED PULMONARY SVC/PX: CPT

## 2020-04-21 PROCEDURE — 06HY33Z INSERTION OF INFUSION DEVICE INTO LOWER VEIN, PERCUTANEOUS APPROACH: ICD-10-PCS | Performed by: INTERNAL MEDICINE

## 2020-04-21 PROCEDURE — 71046 X-RAY EXAM CHEST 2 VIEWS: CPT

## 2020-04-21 PROCEDURE — 97110 THERAPEUTIC EXERCISES: CPT

## 2020-04-21 PROCEDURE — 80053 COMPREHEN METABOLIC PANEL: CPT | Performed by: INTERNAL MEDICINE

## 2020-04-21 RX ORDER — ACETAMINOPHEN 325 MG/1
650 TABLET ORAL ONCE AS NEEDED
Status: COMPLETED | OUTPATIENT
Start: 2020-04-21 | End: 2020-04-21

## 2020-04-21 RX ADMIN — SODIUM CHLORIDE, PRESERVATIVE FREE 10 ML: 5 INJECTION INTRAVENOUS at 09:37

## 2020-04-21 RX ADMIN — CYCLOBENZAPRINE 10 MG: 10 TABLET, FILM COATED ORAL at 06:43

## 2020-04-21 RX ADMIN — ALBUTEROL SULFATE 2.5 MG: 2.5 SOLUTION RESPIRATORY (INHALATION) at 06:14

## 2020-04-21 RX ADMIN — MORPHINE SULFATE 4 MG: 4 INJECTION, SOLUTION INTRAMUSCULAR; INTRAVENOUS at 00:08

## 2020-04-21 RX ADMIN — NAFCILLIN SODIUM 2 G: 2 INJECTION, POWDER, LYOPHILIZED, FOR SOLUTION INTRAMUSCULAR; INTRAVENOUS at 09:37

## 2020-04-21 RX ADMIN — CLONIDINE HYDROCHLORIDE 0.1 MG: 0.1 TABLET ORAL at 09:38

## 2020-04-21 RX ADMIN — SUCRALFATE 1 G: 1 SUSPENSION ORAL at 23:28

## 2020-04-21 RX ADMIN — HYDROCODONE BITARTRATE AND ACETAMINOPHEN 1 TABLET: 7.5; 325 TABLET ORAL at 06:43

## 2020-04-21 RX ADMIN — NAFCILLIN SODIUM 2 G: 2 INJECTION, POWDER, LYOPHILIZED, FOR SOLUTION INTRAMUSCULAR; INTRAVENOUS at 21:37

## 2020-04-21 RX ADMIN — ALBUTEROL SULFATE 2.5 MG: 2.5 SOLUTION RESPIRATORY (INHALATION) at 10:06

## 2020-04-21 RX ADMIN — ALBUTEROL SULFATE 2.5 MG: 2.5 SOLUTION RESPIRATORY (INHALATION) at 19:27

## 2020-04-21 RX ADMIN — ASPIRIN 81 MG: 81 TABLET ORAL at 09:38

## 2020-04-21 RX ADMIN — METOPROLOL TARTRATE 25 MG: 25 TABLET, FILM COATED ORAL at 09:38

## 2020-04-21 RX ADMIN — METHYLCELLULOSE 1000 MG: 500 TABLET ORAL at 20:35

## 2020-04-21 RX ADMIN — METOPROLOL TARTRATE 25 MG: 25 TABLET, FILM COATED ORAL at 20:35

## 2020-04-21 RX ADMIN — HYDRALAZINE HYDROCHLORIDE 50 MG: 50 TABLET ORAL at 21:37

## 2020-04-21 RX ADMIN — NIFEDIPINE 60 MG: 60 TABLET, FILM COATED, EXTENDED RELEASE ORAL at 20:35

## 2020-04-21 RX ADMIN — ATORVASTATIN CALCIUM 20 MG: 10 TABLET, FILM COATED ORAL at 20:35

## 2020-04-21 RX ADMIN — SUCRALFATE 1 G: 1 SUSPENSION ORAL at 06:44

## 2020-04-21 RX ADMIN — FAMOTIDINE 20 MG: 20 TABLET, FILM COATED ORAL at 17:21

## 2020-04-21 RX ADMIN — SUCRALFATE 1 G: 1 SUSPENSION ORAL at 17:21

## 2020-04-21 RX ADMIN — SODIUM CHLORIDE, PRESERVATIVE FREE 10 ML: 5 INJECTION INTRAVENOUS at 20:35

## 2020-04-21 RX ADMIN — FERROUS SULFATE TAB 325 MG (65 MG ELEMENTAL FE) 325 MG: 325 (65 FE) TAB at 09:38

## 2020-04-21 RX ADMIN — MORPHINE SULFATE 4 MG: 4 INJECTION, SOLUTION INTRAMUSCULAR; INTRAVENOUS at 04:27

## 2020-04-21 RX ADMIN — NAFCILLIN SODIUM 2 G: 2 INJECTION, POWDER, LYOPHILIZED, FOR SOLUTION INTRAMUSCULAR; INTRAVENOUS at 17:21

## 2020-04-21 RX ADMIN — ACETAMINOPHEN 650 MG: 325 TABLET, FILM COATED ORAL at 21:36

## 2020-04-21 RX ADMIN — CLONIDINE HYDROCHLORIDE 0.1 MG: 0.1 TABLET ORAL at 20:35

## 2020-04-21 RX ADMIN — NIFEDIPINE 60 MG: 60 TABLET, FILM COATED, EXTENDED RELEASE ORAL at 09:38

## 2020-04-21 RX ADMIN — SUCRALFATE 1 G: 1 SUSPENSION ORAL at 11:56

## 2020-04-21 RX ADMIN — ALBUTEROL SULFATE 2.5 MG: 2.5 SOLUTION RESPIRATORY (INHALATION) at 14:25

## 2020-04-21 RX ADMIN — NAFCILLIN SODIUM 2 G: 2 INJECTION, POWDER, LYOPHILIZED, FOR SOLUTION INTRAMUSCULAR; INTRAVENOUS at 03:02

## 2020-04-21 RX ADMIN — HYDRALAZINE HYDROCHLORIDE 50 MG: 50 TABLET ORAL at 06:44

## 2020-04-21 RX ADMIN — SUCRALFATE 1 G: 1 SUSPENSION ORAL at 03:03

## 2020-04-21 RX ADMIN — NAFCILLIN SODIUM 2 G: 2 INJECTION, POWDER, LYOPHILIZED, FOR SOLUTION INTRAMUSCULAR; INTRAVENOUS at 06:44

## 2020-04-22 ENCOUNTER — APPOINTMENT (OUTPATIENT)
Dept: CT IMAGING | Facility: HOSPITAL | Age: 50
End: 2020-04-22

## 2020-04-22 LAB
CATHETER CULTURE: ABNORMAL
GLUCOSE BLDC GLUCOMTR-MCNC: 174 MG/DL (ref 70–130)
GLUCOSE BLDC GLUCOMTR-MCNC: 94 MG/DL (ref 70–130)

## 2020-04-22 PROCEDURE — 74177 CT ABD & PELVIS W/CONTRAST: CPT

## 2020-04-22 PROCEDURE — 94799 UNLISTED PULMONARY SVC/PX: CPT

## 2020-04-22 PROCEDURE — 82962 GLUCOSE BLOOD TEST: CPT

## 2020-04-22 PROCEDURE — 25010000002 IOPAMIDOL 61 % SOLUTION: Performed by: FAMILY MEDICINE

## 2020-04-22 PROCEDURE — 97116 GAIT TRAINING THERAPY: CPT

## 2020-04-22 PROCEDURE — 97110 THERAPEUTIC EXERCISES: CPT

## 2020-04-22 PROCEDURE — 25010000002 IOPAMIDOL 61 % SOLUTION: Performed by: INTERNAL MEDICINE

## 2020-04-22 PROCEDURE — 25010000002 ENOXAPARIN PER 10 MG: Performed by: SPECIALIST

## 2020-04-22 RX ADMIN — ALBUTEROL SULFATE 2.5 MG: 2.5 SOLUTION RESPIRATORY (INHALATION) at 14:12

## 2020-04-22 RX ADMIN — METOPROLOL TARTRATE 25 MG: 25 TABLET, FILM COATED ORAL at 11:45

## 2020-04-22 RX ADMIN — NAFCILLIN SODIUM 2 G: 2 INJECTION, POWDER, LYOPHILIZED, FOR SOLUTION INTRAMUSCULAR; INTRAVENOUS at 13:10

## 2020-04-22 RX ADMIN — HYDRALAZINE HYDROCHLORIDE 50 MG: 50 TABLET ORAL at 05:26

## 2020-04-22 RX ADMIN — NIFEDIPINE 60 MG: 60 TABLET, FILM COATED, EXTENDED RELEASE ORAL at 20:53

## 2020-04-22 RX ADMIN — SUCRALFATE 1 G: 1 SUSPENSION ORAL at 11:45

## 2020-04-22 RX ADMIN — CLONIDINE HYDROCHLORIDE 0.1 MG: 0.1 TABLET ORAL at 20:52

## 2020-04-22 RX ADMIN — NIFEDIPINE 60 MG: 60 TABLET, FILM COATED, EXTENDED RELEASE ORAL at 11:45

## 2020-04-22 RX ADMIN — NAFCILLIN SODIUM 2 G: 2 INJECTION, POWDER, LYOPHILIZED, FOR SOLUTION INTRAMUSCULAR; INTRAVENOUS at 02:21

## 2020-04-22 RX ADMIN — ENOXAPARIN SODIUM 30 MG: 30 INJECTION SUBCUTANEOUS at 16:36

## 2020-04-22 RX ADMIN — METOPROLOL TARTRATE 25 MG: 25 TABLET, FILM COATED ORAL at 20:52

## 2020-04-22 RX ADMIN — NAFCILLIN SODIUM 2 G: 2 INJECTION, POWDER, LYOPHILIZED, FOR SOLUTION INTRAMUSCULAR; INTRAVENOUS at 16:37

## 2020-04-22 RX ADMIN — HYDRALAZINE HYDROCHLORIDE 50 MG: 50 TABLET ORAL at 20:53

## 2020-04-22 RX ADMIN — SODIUM CHLORIDE, PRESERVATIVE FREE 10 ML: 5 INJECTION INTRAVENOUS at 20:52

## 2020-04-22 RX ADMIN — ALBUTEROL SULFATE 2.5 MG: 2.5 SOLUTION RESPIRATORY (INHALATION) at 06:22

## 2020-04-22 RX ADMIN — ASPIRIN 81 MG: 81 TABLET ORAL at 11:44

## 2020-04-22 RX ADMIN — FERROUS SULFATE TAB 325 MG (65 MG ELEMENTAL FE) 325 MG: 325 (65 FE) TAB at 11:45

## 2020-04-22 RX ADMIN — IOPAMIDOL 100 ML: 612 INJECTION, SOLUTION INTRAVENOUS at 15:47

## 2020-04-22 RX ADMIN — IOPAMIDOL 50 ML: 612 INJECTION, SOLUTION INTRAVENOUS at 12:32

## 2020-04-22 RX ADMIN — NAFCILLIN SODIUM 2 G: 2 INJECTION, POWDER, LYOPHILIZED, FOR SOLUTION INTRAMUSCULAR; INTRAVENOUS at 23:54

## 2020-04-22 RX ADMIN — NAFCILLIN SODIUM 2 G: 2 INJECTION, POWDER, LYOPHILIZED, FOR SOLUTION INTRAMUSCULAR; INTRAVENOUS at 20:52

## 2020-04-22 RX ADMIN — SUCRALFATE 1 G: 1 SUSPENSION ORAL at 23:54

## 2020-04-22 RX ADMIN — SUCRALFATE 1 G: 1 SUSPENSION ORAL at 05:26

## 2020-04-22 RX ADMIN — HYDRALAZINE HYDROCHLORIDE 50 MG: 50 TABLET ORAL at 13:14

## 2020-04-22 RX ADMIN — SODIUM CHLORIDE, PRESERVATIVE FREE 10 ML: 5 INJECTION INTRAVENOUS at 11:46

## 2020-04-22 RX ADMIN — ALBUTEROL SULFATE 2.5 MG: 2.5 SOLUTION RESPIRATORY (INHALATION) at 19:37

## 2020-04-22 RX ADMIN — SUCRALFATE 1 G: 1 SUSPENSION ORAL at 17:04

## 2020-04-22 RX ADMIN — ATORVASTATIN CALCIUM 20 MG: 10 TABLET, FILM COATED ORAL at 20:52

## 2020-04-22 RX ADMIN — NAFCILLIN SODIUM 2 G: 2 INJECTION, POWDER, LYOPHILIZED, FOR SOLUTION INTRAMUSCULAR; INTRAVENOUS at 05:26

## 2020-04-22 RX ADMIN — FAMOTIDINE 20 MG: 20 TABLET, FILM COATED ORAL at 17:04

## 2020-04-22 RX ADMIN — CLONIDINE HYDROCHLORIDE 0.1 MG: 0.1 TABLET ORAL at 11:45

## 2020-04-23 ENCOUNTER — ANESTHESIA EVENT (OUTPATIENT)
Dept: PERIOP | Facility: HOSPITAL | Age: 50
End: 2020-04-23

## 2020-04-23 LAB
ALBUMIN SERPL-MCNC: 2.7 G/DL (ref 3.5–5.2)
ALBUMIN/GLOB SERPL: 0.5 G/DL
ALP SERPL-CCNC: 58 U/L (ref 39–117)
ALT SERPL W P-5'-P-CCNC: 10 U/L (ref 1–41)
ANION GAP SERPL CALCULATED.3IONS-SCNC: 20 MMOL/L (ref 5–15)
AST SERPL-CCNC: 17 U/L (ref 1–40)
BASOPHILS # BLD AUTO: 0.03 10*3/MM3 (ref 0–0.2)
BASOPHILS NFR BLD AUTO: 0.2 % (ref 0–1.5)
BILIRUB SERPL-MCNC: 0.2 MG/DL (ref 0.2–1.2)
BUN BLD-MCNC: 50 MG/DL (ref 6–20)
BUN/CREAT SERPL: 4.5 (ref 7–25)
CALCIUM SPEC-SCNC: 9 MG/DL (ref 8.6–10.5)
CHLORIDE SERPL-SCNC: 95 MMOL/L (ref 98–107)
CO2 SERPL-SCNC: 20 MMOL/L (ref 22–29)
CREAT BLD-MCNC: 11.01 MG/DL (ref 0.76–1.27)
DEPRECATED RDW RBC AUTO: 46.5 FL (ref 37–54)
EOSINOPHIL # BLD AUTO: 0.26 10*3/MM3 (ref 0–0.4)
EOSINOPHIL NFR BLD AUTO: 1.9 % (ref 0.3–6.2)
ERYTHROCYTE [DISTWIDTH] IN BLOOD BY AUTOMATED COUNT: 15.4 % (ref 12.3–15.4)
GFR SERPL CREATININE-BSD FRML MDRD: 6 ML/MIN/1.73
GFR SERPL CREATININE-BSD FRML MDRD: ABNORMAL ML/MIN/{1.73_M2}
GLOBULIN UR ELPH-MCNC: 5 GM/DL
GLUCOSE BLD-MCNC: 127 MG/DL (ref 65–99)
GLUCOSE BLDC GLUCOMTR-MCNC: 179 MG/DL (ref 70–130)
GLUCOSE BLDC GLUCOMTR-MCNC: 209 MG/DL (ref 70–130)
HCT VFR BLD AUTO: 23.7 % (ref 37.5–51)
HGB BLD-MCNC: 8 G/DL (ref 13–17.7)
IMM GRANULOCYTES # BLD AUTO: 0.11 10*3/MM3 (ref 0–0.05)
IMM GRANULOCYTES NFR BLD AUTO: 0.8 % (ref 0–0.5)
LYMPHOCYTES # BLD AUTO: 1.46 10*3/MM3 (ref 0.7–3.1)
LYMPHOCYTES NFR BLD AUTO: 10.5 % (ref 19.6–45.3)
MCH RBC QN AUTO: 27.9 PG (ref 26.6–33)
MCHC RBC AUTO-ENTMCNC: 33.8 G/DL (ref 31.5–35.7)
MCV RBC AUTO: 82.6 FL (ref 79–97)
MONOCYTES # BLD AUTO: 1.24 10*3/MM3 (ref 0.1–0.9)
MONOCYTES NFR BLD AUTO: 8.9 % (ref 5–12)
NEUTROPHILS # BLD AUTO: 10.81 10*3/MM3 (ref 1.7–7)
NEUTROPHILS NFR BLD AUTO: 77.7 % (ref 42.7–76)
NRBC BLD AUTO-RTO: 0 /100 WBC (ref 0–0.2)
PLATELET # BLD AUTO: 438 10*3/MM3 (ref 140–450)
PMV BLD AUTO: 9.2 FL (ref 6–12)
POTASSIUM BLD-SCNC: 3.9 MMOL/L (ref 3.5–5.2)
PROT SERPL-MCNC: 7.7 G/DL (ref 6–8.5)
RBC # BLD AUTO: 2.87 10*6/MM3 (ref 4.14–5.8)
SODIUM BLD-SCNC: 135 MMOL/L (ref 136–145)
WBC NRBC COR # BLD: 13.91 10*3/MM3 (ref 3.4–10.8)

## 2020-04-23 PROCEDURE — 80053 COMPREHEN METABOLIC PANEL: CPT | Performed by: FAMILY MEDICINE

## 2020-04-23 PROCEDURE — 25010000002 ALTEPLASE 2 MG RECONSTITUTED SOLUTION: Performed by: INTERNAL MEDICINE

## 2020-04-23 PROCEDURE — 94799 UNLISTED PULMONARY SVC/PX: CPT

## 2020-04-23 PROCEDURE — 25010000002 HEPARIN (PORCINE) PER 1000 UNITS: Performed by: INTERNAL MEDICINE

## 2020-04-23 PROCEDURE — 85025 COMPLETE CBC W/AUTO DIFF WBC: CPT | Performed by: FAMILY MEDICINE

## 2020-04-23 PROCEDURE — 99232 SBSQ HOSP IP/OBS MODERATE 35: CPT | Performed by: SURGERY

## 2020-04-23 PROCEDURE — 25010000002 ENOXAPARIN PER 10 MG: Performed by: SPECIALIST

## 2020-04-23 PROCEDURE — 82962 GLUCOSE BLOOD TEST: CPT

## 2020-04-23 RX ORDER — HEPARIN SODIUM 1000 [USP'U]/ML
3000 INJECTION, SOLUTION INTRAVENOUS; SUBCUTANEOUS AS NEEDED
Status: DISCONTINUED | OUTPATIENT
Start: 2020-04-23 | End: 2020-04-25 | Stop reason: HOSPADM

## 2020-04-23 RX ORDER — CEFAZOLIN SODIUM IN 0.9 % NACL 3 G/100 ML
3 INTRAVENOUS SOLUTION, PIGGYBACK (ML) INTRAVENOUS
Status: DISCONTINUED | OUTPATIENT
Start: 2020-04-25 | End: 2020-04-25 | Stop reason: HOSPADM

## 2020-04-23 RX ORDER — CEFAZOLIN SODIUM 2 G/50ML
2 SOLUTION INTRAVENOUS
Status: DISCONTINUED | OUTPATIENT
Start: 2020-04-23 | End: 2020-04-25 | Stop reason: HOSPADM

## 2020-04-23 RX ADMIN — HEPARIN SODIUM 3000 UNITS: 1000 INJECTION, SOLUTION INTRAVENOUS; SUBCUTANEOUS at 09:59

## 2020-04-23 RX ADMIN — SUCRALFATE 1 G: 1 SUSPENSION ORAL at 17:22

## 2020-04-23 RX ADMIN — ALBUTEROL SULFATE 2.5 MG: 2.5 SOLUTION RESPIRATORY (INHALATION) at 10:24

## 2020-04-23 RX ADMIN — HYDRALAZINE HYDROCHLORIDE 50 MG: 50 TABLET ORAL at 13:47

## 2020-04-23 RX ADMIN — SODIUM CHLORIDE, PRESERVATIVE FREE 10 ML: 5 INJECTION INTRAVENOUS at 20:45

## 2020-04-23 RX ADMIN — CLONIDINE HYDROCHLORIDE 0.1 MG: 0.1 TABLET ORAL at 20:45

## 2020-04-23 RX ADMIN — METOPROLOL TARTRATE 25 MG: 25 TABLET, FILM COATED ORAL at 10:22

## 2020-04-23 RX ADMIN — SUCRALFATE 1 G: 1 SUSPENSION ORAL at 06:06

## 2020-04-23 RX ADMIN — ALBUTEROL SULFATE 2.5 MG: 2.5 SOLUTION RESPIRATORY (INHALATION) at 18:45

## 2020-04-23 RX ADMIN — SODIUM CHLORIDE, PRESERVATIVE FREE 10 ML: 5 INJECTION INTRAVENOUS at 10:22

## 2020-04-23 RX ADMIN — NIFEDIPINE 60 MG: 60 TABLET, FILM COATED, EXTENDED RELEASE ORAL at 10:21

## 2020-04-23 RX ADMIN — ATORVASTATIN CALCIUM 20 MG: 10 TABLET, FILM COATED ORAL at 20:44

## 2020-04-23 RX ADMIN — METOPROLOL TARTRATE 25 MG: 25 TABLET, FILM COATED ORAL at 20:45

## 2020-04-23 RX ADMIN — ENOXAPARIN SODIUM 30 MG: 30 INJECTION SUBCUTANEOUS at 17:22

## 2020-04-23 RX ADMIN — FAMOTIDINE 20 MG: 20 TABLET, FILM COATED ORAL at 17:22

## 2020-04-23 RX ADMIN — CLONIDINE HYDROCHLORIDE 0.1 MG: 0.1 TABLET ORAL at 10:22

## 2020-04-23 RX ADMIN — NAFCILLIN SODIUM 2 G: 2 INJECTION, POWDER, LYOPHILIZED, FOR SOLUTION INTRAMUSCULAR; INTRAVENOUS at 17:22

## 2020-04-23 RX ADMIN — HYDRALAZINE HYDROCHLORIDE 50 MG: 50 TABLET ORAL at 06:06

## 2020-04-23 RX ADMIN — NAFCILLIN SODIUM 2 G: 2 INJECTION, POWDER, LYOPHILIZED, FOR SOLUTION INTRAMUSCULAR; INTRAVENOUS at 06:06

## 2020-04-23 RX ADMIN — ALTEPLASE: 2.2 INJECTION, POWDER, LYOPHILIZED, FOR SOLUTION INTRAVENOUS at 08:15

## 2020-04-23 RX ADMIN — FERROUS SULFATE TAB 325 MG (65 MG ELEMENTAL FE) 325 MG: 325 (65 FE) TAB at 10:22

## 2020-04-23 RX ADMIN — ALBUTEROL SULFATE 2.5 MG: 2.5 SOLUTION RESPIRATORY (INHALATION) at 14:20

## 2020-04-23 RX ADMIN — NAFCILLIN SODIUM 2 G: 2 INJECTION, POWDER, LYOPHILIZED, FOR SOLUTION INTRAMUSCULAR; INTRAVENOUS at 10:22

## 2020-04-23 RX ADMIN — SUCRALFATE 1 G: 1 SUSPENSION ORAL at 11:43

## 2020-04-23 RX ADMIN — ASPIRIN 81 MG: 81 TABLET ORAL at 10:22

## 2020-04-23 RX ADMIN — HYDRALAZINE HYDROCHLORIDE 50 MG: 50 TABLET ORAL at 20:44

## 2020-04-23 RX ADMIN — ALBUTEROL SULFATE 2.5 MG: 2.5 SOLUTION RESPIRATORY (INHALATION) at 06:38

## 2020-04-23 RX ADMIN — NIFEDIPINE 60 MG: 60 TABLET, FILM COATED, EXTENDED RELEASE ORAL at 20:45

## 2020-04-23 RX ADMIN — NAFCILLIN SODIUM 2 G: 2 INJECTION, POWDER, LYOPHILIZED, FOR SOLUTION INTRAMUSCULAR; INTRAVENOUS at 13:47

## 2020-04-23 NOTE — ADDENDUM NOTE
Addendum  created 04/23/20 1204 by Chucky Bains CRNA    Visit Navigator Flowsheet section accepted

## 2020-04-24 ENCOUNTER — APPOINTMENT (OUTPATIENT)
Dept: INTERVENTIONAL RADIOLOGY/VASCULAR | Facility: HOSPITAL | Age: 50
End: 2020-04-24

## 2020-04-24 ENCOUNTER — APPOINTMENT (OUTPATIENT)
Dept: GENERAL RADIOLOGY | Facility: HOSPITAL | Age: 50
End: 2020-04-24

## 2020-04-24 ENCOUNTER — ANESTHESIA (OUTPATIENT)
Dept: PERIOP | Facility: HOSPITAL | Age: 50
End: 2020-04-24

## 2020-04-24 PROBLEM — R04.2 HEMOPTYSIS: Status: ACTIVE | Noted: 2020-04-24

## 2020-04-24 LAB
ALBUMIN SERPL-MCNC: 2.8 G/DL (ref 3.5–5.2)
ALBUMIN/GLOB SERPL: 0.6 G/DL
ALP SERPL-CCNC: 57 U/L (ref 39–117)
ALT SERPL W P-5'-P-CCNC: 8 U/L (ref 1–41)
ANION GAP SERPL CALCULATED.3IONS-SCNC: 21 MMOL/L (ref 5–15)
APTT PPP: 40.7 SECONDS (ref 24.1–35)
ARTERIAL PATENCY WRIST A: POSITIVE
ARTERIAL PATENCY WRIST A: POSITIVE
AST SERPL-CCNC: 16 U/L (ref 1–40)
ATMOSPHERIC PRESS: 746 MMHG
ATMOSPHERIC PRESS: 747 MMHG
BACTERIA SPEC AEROBE CULT: NORMAL
BASE EXCESS BLDA CALC-SCNC: -2.6 MMOL/L (ref 0–2)
BASE EXCESS BLDA CALC-SCNC: 0.7 MMOL/L (ref 0–2)
BASOPHILS # BLD AUTO: 0.03 10*3/MM3 (ref 0–0.2)
BASOPHILS NFR BLD AUTO: 0.2 % (ref 0–1.5)
BDY SITE: ABNORMAL
BDY SITE: ABNORMAL
BILIRUB SERPL-MCNC: 0.2 MG/DL (ref 0.2–1.2)
BODY TEMPERATURE: 37 C
BODY TEMPERATURE: 37 C
BUN BLD-MCNC: 56 MG/DL (ref 6–20)
BUN/CREAT SERPL: 4.2 (ref 7–25)
CALCIUM SPEC-SCNC: 9 MG/DL (ref 8.6–10.5)
CHLORIDE SERPL-SCNC: 95 MMOL/L (ref 98–107)
CO2 SERPL-SCNC: 19 MMOL/L (ref 22–29)
CREAT BLD-MCNC: 13.35 MG/DL (ref 0.76–1.27)
DEPRECATED RDW RBC AUTO: 45.7 FL (ref 37–54)
EOSINOPHIL # BLD AUTO: 0.37 10*3/MM3 (ref 0–0.4)
EOSINOPHIL NFR BLD AUTO: 2.8 % (ref 0.3–6.2)
ERYTHROCYTE [DISTWIDTH] IN BLOOD BY AUTOMATED COUNT: 15.4 % (ref 12.3–15.4)
GAS FLOW AIRWAY: 3 LPM
GAS FLOW AIRWAY: 4 LPM
GFR SERPL CREATININE-BSD FRML MDRD: 5 ML/MIN/1.73
GFR SERPL CREATININE-BSD FRML MDRD: ABNORMAL ML/MIN/{1.73_M2}
GLOBULIN UR ELPH-MCNC: 4.5 GM/DL
GLUCOSE BLD-MCNC: 114 MG/DL (ref 65–99)
GLUCOSE BLDC GLUCOMTR-MCNC: 134 MG/DL (ref 70–130)
GLUCOSE BLDC GLUCOMTR-MCNC: 137 MG/DL (ref 70–130)
HCO3 BLDA-SCNC: 21.2 MMOL/L (ref 20–26)
HCO3 BLDA-SCNC: 24.6 MMOL/L (ref 20–26)
HCT VFR BLD AUTO: 21.1 % (ref 37.5–51)
HCT VFR BLD AUTO: 25.8 % (ref 37.5–51)
HGB BLD-MCNC: 7.2 G/DL (ref 13–17.7)
HGB BLD-MCNC: 8.7 G/DL (ref 13–17.7)
IMM GRANULOCYTES # BLD AUTO: 0.11 10*3/MM3 (ref 0–0.05)
IMM GRANULOCYTES NFR BLD AUTO: 0.8 % (ref 0–0.5)
INR PPP: 1.23 (ref 0.91–1.09)
IRON 24H UR-MRATE: 30 MCG/DL (ref 59–158)
IRON SATN MFR SERPL: 19 % (ref 20–50)
LYMPHOCYTES # BLD AUTO: 1.7 10*3/MM3 (ref 0.7–3.1)
LYMPHOCYTES NFR BLD AUTO: 12.8 % (ref 19.6–45.3)
Lab: ABNORMAL
MCH RBC QN AUTO: 28 PG (ref 26.6–33)
MCHC RBC AUTO-ENTMCNC: 34.1 G/DL (ref 31.5–35.7)
MCV RBC AUTO: 82.1 FL (ref 79–97)
MODALITY: ABNORMAL
MODALITY: ABNORMAL
MONOCYTES # BLD AUTO: 1.05 10*3/MM3 (ref 0.1–0.9)
MONOCYTES NFR BLD AUTO: 7.9 % (ref 5–12)
NEUTROPHILS # BLD AUTO: 10.07 10*3/MM3 (ref 1.7–7)
NEUTROPHILS NFR BLD AUTO: 75.5 % (ref 42.7–76)
NOTIFIED BY: ABNORMAL
NOTIFIED WHO: ABNORMAL
NRBC BLD AUTO-RTO: 0 /100 WBC (ref 0–0.2)
PCO2 BLDA: 31.9 MM HG (ref 35–45)
PCO2 BLDA: 35.3 MM HG (ref 35–45)
PH BLDA: 7.43 PH UNITS (ref 7.35–7.45)
PH BLDA: 7.45 PH UNITS (ref 7.35–7.45)
PLATELET # BLD AUTO: 451 10*3/MM3 (ref 140–450)
PMV BLD AUTO: 9.3 FL (ref 6–12)
PO2 BLDA: 50.9 MM HG (ref 83–108)
PO2 BLDA: 68.9 MM HG (ref 83–108)
POTASSIUM BLD-SCNC: 4 MMOL/L (ref 3.5–5.2)
PROT SERPL-MCNC: 7.3 G/DL (ref 6–8.5)
PROTHROMBIN TIME: 15.4 SECONDS (ref 11.9–14.6)
RBC # BLD AUTO: 2.57 10*6/MM3 (ref 4.14–5.8)
SAO2 % BLDCOA: 85.7 % (ref 94–99)
SAO2 % BLDCOA: 94.2 % (ref 94–99)
SODIUM BLD-SCNC: 135 MMOL/L (ref 136–145)
TIBC SERPL-MCNC: 161 MCG/DL (ref 298–536)
TRANSFERRIN SERPL-MCNC: 108 MG/DL (ref 200–360)
VENTILATOR MODE: ABNORMAL
VENTILATOR MODE: ABNORMAL
WBC NRBC COR # BLD: 13.33 10*3/MM3 (ref 3.4–10.8)

## 2020-04-24 PROCEDURE — 94799 UNLISTED PULMONARY SVC/PX: CPT

## 2020-04-24 PROCEDURE — 80053 COMPREHEN METABOLIC PANEL: CPT | Performed by: FAMILY MEDICINE

## 2020-04-24 PROCEDURE — 85014 HEMATOCRIT: CPT | Performed by: FAMILY MEDICINE

## 2020-04-24 PROCEDURE — 25010000002 MIDAZOLAM PER 1 MG: Performed by: NURSE ANESTHETIST, CERTIFIED REGISTERED

## 2020-04-24 PROCEDURE — 25010000002 PROPOFOL 10 MG/ML EMULSION: Performed by: NURSE ANESTHETIST, CERTIFIED REGISTERED

## 2020-04-24 PROCEDURE — 0JH63XZ INSERTION OF TUNNELED VASCULAR ACCESS DEVICE INTO CHEST SUBCUTANEOUS TISSUE AND FASCIA, PERCUTANEOUS APPROACH: ICD-10-PCS | Performed by: SURGERY

## 2020-04-24 PROCEDURE — 84466 ASSAY OF TRANSFERRIN: CPT | Performed by: INTERNAL MEDICINE

## 2020-04-24 PROCEDURE — 76937 US GUIDE VASCULAR ACCESS: CPT | Performed by: SURGERY

## 2020-04-24 PROCEDURE — C1769 GUIDE WIRE: HCPCS | Performed by: SURGERY

## 2020-04-24 PROCEDURE — 71045 X-RAY EXAM CHEST 1 VIEW: CPT

## 2020-04-24 PROCEDURE — 76000 FLUOROSCOPY <1 HR PHYS/QHP: CPT

## 2020-04-24 PROCEDURE — 83540 ASSAY OF IRON: CPT | Performed by: INTERNAL MEDICINE

## 2020-04-24 PROCEDURE — 25010000002 HEPARIN (PORCINE) PER 1000 UNITS: Performed by: SURGERY

## 2020-04-24 PROCEDURE — 36558 INSERT TUNNELED CV CATH: CPT | Performed by: SURGERY

## 2020-04-24 PROCEDURE — B5181ZA FLUOROSCOPY OF SUPERIOR VENA CAVA USING LOW OSMOLAR CONTRAST, GUIDANCE: ICD-10-PCS | Performed by: SURGERY

## 2020-04-24 PROCEDURE — 25010000002 EPOETIN ALFA-EPBX 10000 UNIT/ML SOLUTION: Performed by: INTERNAL MEDICINE

## 2020-04-24 PROCEDURE — 36558 INSERT TUNNELED CV CATH: CPT

## 2020-04-24 PROCEDURE — C1750 CATH, HEMODIALYSIS,LONG-TERM: HCPCS | Performed by: SURGERY

## 2020-04-24 PROCEDURE — 02HV33Z INSERTION OF INFUSION DEVICE INTO SUPERIOR VENA CAVA, PERCUTANEOUS APPROACH: ICD-10-PCS | Performed by: SURGERY

## 2020-04-24 PROCEDURE — 85610 PROTHROMBIN TIME: CPT | Performed by: FAMILY MEDICINE

## 2020-04-24 PROCEDURE — 85025 COMPLETE CBC W/AUTO DIFF WBC: CPT | Performed by: FAMILY MEDICINE

## 2020-04-24 PROCEDURE — 82962 GLUCOSE BLOOD TEST: CPT

## 2020-04-24 PROCEDURE — 25010000002 METHYLPREDNISOLONE PER 125 MG: Performed by: FAMILY MEDICINE

## 2020-04-24 PROCEDURE — 82803 BLOOD GASES ANY COMBINATION: CPT

## 2020-04-24 PROCEDURE — 77001 FLUOROGUIDE FOR VEIN DEVICE: CPT

## 2020-04-24 PROCEDURE — 76937 US GUIDE VASCULAR ACCESS: CPT

## 2020-04-24 PROCEDURE — 85018 HEMOGLOBIN: CPT | Performed by: FAMILY MEDICINE

## 2020-04-24 PROCEDURE — 36600 WITHDRAWAL OF ARTERIAL BLOOD: CPT

## 2020-04-24 PROCEDURE — 85730 THROMBOPLASTIN TIME PARTIAL: CPT | Performed by: FAMILY MEDICINE

## 2020-04-24 PROCEDURE — 25010000002 HEPARIN (PORCINE) PER 1000 UNITS: Performed by: INTERNAL MEDICINE

## 2020-04-24 RX ORDER — SODIUM CHLORIDE 0.9 % (FLUSH) 0.9 %
3 SYRINGE (ML) INJECTION EVERY 12 HOURS SCHEDULED
Status: DISCONTINUED | OUTPATIENT
Start: 2020-04-24 | End: 2020-04-24 | Stop reason: HOSPADM

## 2020-04-24 RX ORDER — SODIUM CHLORIDE 9 MG/ML
50 INJECTION, SOLUTION INTRAVENOUS CONTINUOUS
Status: DISCONTINUED | OUTPATIENT
Start: 2020-04-24 | End: 2020-04-24

## 2020-04-24 RX ORDER — MIDAZOLAM HYDROCHLORIDE 1 MG/ML
INJECTION INTRAMUSCULAR; INTRAVENOUS AS NEEDED
Status: DISCONTINUED | OUTPATIENT
Start: 2020-04-24 | End: 2020-04-24 | Stop reason: SURG

## 2020-04-24 RX ORDER — ONDANSETRON 2 MG/ML
4 INJECTION INTRAMUSCULAR; INTRAVENOUS AS NEEDED
Status: DISCONTINUED | OUTPATIENT
Start: 2020-04-24 | End: 2020-04-24 | Stop reason: HOSPADM

## 2020-04-24 RX ORDER — GUAIFENESIN 600 MG/1
1200 TABLET, EXTENDED RELEASE ORAL EVERY 12 HOURS SCHEDULED
Status: DISCONTINUED | OUTPATIENT
Start: 2020-04-24 | End: 2020-04-25 | Stop reason: HOSPADM

## 2020-04-24 RX ORDER — OXYCODONE AND ACETAMINOPHEN 10; 325 MG/1; MG/1
1 TABLET ORAL ONCE AS NEEDED
Status: DISCONTINUED | OUTPATIENT
Start: 2020-04-24 | End: 2020-04-24 | Stop reason: HOSPADM

## 2020-04-24 RX ORDER — SODIUM CHLORIDE 0.9 % (FLUSH) 0.9 %
3-10 SYRINGE (ML) INJECTION AS NEEDED
Status: DISCONTINUED | OUTPATIENT
Start: 2020-04-24 | End: 2020-04-24 | Stop reason: HOSPADM

## 2020-04-24 RX ORDER — NALOXONE HCL 0.4 MG/ML
0.04 VIAL (ML) INJECTION AS NEEDED
Status: DISCONTINUED | OUTPATIENT
Start: 2020-04-24 | End: 2020-04-24 | Stop reason: HOSPADM

## 2020-04-24 RX ORDER — HEPARIN SODIUM 1000 [USP'U]/ML
4200 INJECTION, SOLUTION INTRAVENOUS; SUBCUTANEOUS AS NEEDED
Status: DISCONTINUED | OUTPATIENT
Start: 2020-04-24 | End: 2020-04-25 | Stop reason: HOSPADM

## 2020-04-24 RX ORDER — FLUMAZENIL 0.1 MG/ML
0.2 INJECTION INTRAVENOUS AS NEEDED
Status: DISCONTINUED | OUTPATIENT
Start: 2020-04-24 | End: 2020-04-24 | Stop reason: HOSPADM

## 2020-04-24 RX ORDER — METHYLPREDNISOLONE SODIUM SUCCINATE 125 MG/2ML
80 INJECTION, POWDER, LYOPHILIZED, FOR SOLUTION INTRAMUSCULAR; INTRAVENOUS EVERY 8 HOURS
Status: DISCONTINUED | OUTPATIENT
Start: 2020-04-24 | End: 2020-04-24

## 2020-04-24 RX ORDER — MIDAZOLAM HYDROCHLORIDE 1 MG/ML
1 INJECTION INTRAMUSCULAR; INTRAVENOUS
Status: DISCONTINUED | OUTPATIENT
Start: 2020-04-24 | End: 2020-04-24 | Stop reason: HOSPADM

## 2020-04-24 RX ORDER — MIDAZOLAM HYDROCHLORIDE 1 MG/ML
2 INJECTION INTRAMUSCULAR; INTRAVENOUS
Status: DISCONTINUED | OUTPATIENT
Start: 2020-04-24 | End: 2020-04-24 | Stop reason: HOSPADM

## 2020-04-24 RX ORDER — OXYCODONE AND ACETAMINOPHEN 7.5; 325 MG/1; MG/1
2 TABLET ORAL ONCE AS NEEDED
Status: COMPLETED | OUTPATIENT
Start: 2020-04-24 | End: 2020-04-24

## 2020-04-24 RX ORDER — HYDROMORPHONE HYDROCHLORIDE 1 MG/ML
0.25 INJECTION, SOLUTION INTRAMUSCULAR; INTRAVENOUS; SUBCUTANEOUS
Status: DISCONTINUED | OUTPATIENT
Start: 2020-04-24 | End: 2020-04-24 | Stop reason: HOSPADM

## 2020-04-24 RX ORDER — LIDOCAINE HYDROCHLORIDE 10 MG/ML
INJECTION, SOLUTION EPIDURAL; INFILTRATION; INTRACAUDAL; PERINEURAL AS NEEDED
Status: DISCONTINUED | OUTPATIENT
Start: 2020-04-24 | End: 2020-04-24 | Stop reason: HOSPADM

## 2020-04-24 RX ORDER — LIDOCAINE HYDROCHLORIDE 10 MG/ML
0.5 INJECTION, SOLUTION EPIDURAL; INFILTRATION; INTRACAUDAL; PERINEURAL ONCE AS NEEDED
Status: DISCONTINUED | OUTPATIENT
Start: 2020-04-24 | End: 2020-04-24 | Stop reason: HOSPADM

## 2020-04-24 RX ORDER — METHYLPREDNISOLONE SODIUM SUCCINATE 125 MG/2ML
125 INJECTION, POWDER, LYOPHILIZED, FOR SOLUTION INTRAMUSCULAR; INTRAVENOUS ONCE
Status: COMPLETED | OUTPATIENT
Start: 2020-04-24 | End: 2020-04-24

## 2020-04-24 RX ORDER — LABETALOL HYDROCHLORIDE 5 MG/ML
5 INJECTION, SOLUTION INTRAVENOUS
Status: DISCONTINUED | OUTPATIENT
Start: 2020-04-24 | End: 2020-04-24 | Stop reason: HOSPADM

## 2020-04-24 RX ORDER — IPRATROPIUM BROMIDE AND ALBUTEROL SULFATE 2.5; .5 MG/3ML; MG/3ML
3 SOLUTION RESPIRATORY (INHALATION)
Status: DISCONTINUED | OUTPATIENT
Start: 2020-04-24 | End: 2020-04-25 | Stop reason: HOSPADM

## 2020-04-24 RX ORDER — HEPARIN SODIUM 1000 [USP'U]/ML
INJECTION, SOLUTION INTRAVENOUS; SUBCUTANEOUS AS NEEDED
Status: DISCONTINUED | OUTPATIENT
Start: 2020-04-24 | End: 2020-04-24 | Stop reason: HOSPADM

## 2020-04-24 RX ORDER — SODIUM CHLORIDE, SODIUM LACTATE, POTASSIUM CHLORIDE, CALCIUM CHLORIDE 600; 310; 30; 20 MG/100ML; MG/100ML; MG/100ML; MG/100ML
100 INJECTION, SOLUTION INTRAVENOUS CONTINUOUS
Status: DISCONTINUED | OUTPATIENT
Start: 2020-04-24 | End: 2020-04-24

## 2020-04-24 RX ORDER — OXYMETAZOLINE HYDROCHLORIDE 0.05 G/100ML
2 SPRAY NASAL 2 TIMES DAILY
Status: DISCONTINUED | OUTPATIENT
Start: 2020-04-24 | End: 2020-04-25 | Stop reason: HOSPADM

## 2020-04-24 RX ORDER — PROPOFOL 10 MG/ML
VIAL (ML) INTRAVENOUS AS NEEDED
Status: DISCONTINUED | OUTPATIENT
Start: 2020-04-24 | End: 2020-04-24 | Stop reason: SURG

## 2020-04-24 RX ORDER — FENTANYL CITRATE 50 UG/ML
25 INJECTION, SOLUTION INTRAMUSCULAR; INTRAVENOUS
Status: DISCONTINUED | OUTPATIENT
Start: 2020-04-24 | End: 2020-04-24 | Stop reason: HOSPADM

## 2020-04-24 RX ORDER — CEFAZOLIN SODIUM 2 G/50ML
2 SOLUTION INTRAVENOUS ONCE
Status: DISCONTINUED | OUTPATIENT
Start: 2020-04-24 | End: 2020-04-24 | Stop reason: HOSPADM

## 2020-04-24 RX ADMIN — PROPOFOL 50 MG: 10 INJECTION, EMULSION INTRAVENOUS at 08:17

## 2020-04-24 RX ADMIN — SUCRALFATE 1 G: 1 SUSPENSION ORAL at 00:13

## 2020-04-24 RX ADMIN — METHYLPREDNISOLONE SODIUM SUCCINATE 125 MG: 125 INJECTION, POWDER, FOR SOLUTION INTRAMUSCULAR; INTRAVENOUS at 15:42

## 2020-04-24 RX ADMIN — HYDRALAZINE HYDROCHLORIDE 50 MG: 50 TABLET ORAL at 20:01

## 2020-04-24 RX ADMIN — HYDRALAZINE HYDROCHLORIDE 50 MG: 50 TABLET ORAL at 15:42

## 2020-04-24 RX ADMIN — CEFAZOLIN 2 G: 1 INJECTION, POWDER, FOR SOLUTION INTRAMUSCULAR; INTRAVENOUS; PARENTERAL at 07:50

## 2020-04-24 RX ADMIN — Medication 2 SPRAY: at 14:26

## 2020-04-24 RX ADMIN — CLONIDINE HYDROCHLORIDE 0.1 MG: 0.1 TABLET ORAL at 20:01

## 2020-04-24 RX ADMIN — PROPOFOL 50 MG: 10 INJECTION, EMULSION INTRAVENOUS at 08:02

## 2020-04-24 RX ADMIN — HYDROCODONE BITARTRATE AND ACETAMINOPHEN 1 TABLET: 7.5; 325 TABLET ORAL at 22:52

## 2020-04-24 RX ADMIN — PROPOFOL 50 MG: 10 INJECTION, EMULSION INTRAVENOUS at 08:42

## 2020-04-24 RX ADMIN — MIDAZOLAM HYDROCHLORIDE 3 MG: 1 INJECTION, SOLUTION INTRAMUSCULAR; INTRAVENOUS at 08:22

## 2020-04-24 RX ADMIN — METOPROLOL TARTRATE 25 MG: 25 TABLET, FILM COATED ORAL at 20:01

## 2020-04-24 RX ADMIN — Medication 1 APPLICATION: at 07:11

## 2020-04-24 RX ADMIN — ALBUTEROL SULFATE 2.5 MG: 2.5 SOLUTION RESPIRATORY (INHALATION) at 06:38

## 2020-04-24 RX ADMIN — PROPOFOL 20 MG: 10 INJECTION, EMULSION INTRAVENOUS at 07:58

## 2020-04-24 RX ADMIN — MIDAZOLAM HYDROCHLORIDE 2 MG: 1 INJECTION, SOLUTION INTRAMUSCULAR; INTRAVENOUS at 08:45

## 2020-04-24 RX ADMIN — ATORVASTATIN CALCIUM 20 MG: 10 TABLET, FILM COATED ORAL at 20:01

## 2020-04-24 RX ADMIN — EPOETIN ALFA-EPBX 10000 UNITS: 10000 INJECTION, SOLUTION INTRAVENOUS; SUBCUTANEOUS at 15:43

## 2020-04-24 RX ADMIN — Medication 2 SPRAY: at 20:01

## 2020-04-24 RX ADMIN — NIFEDIPINE 60 MG: 60 TABLET, FILM COATED, EXTENDED RELEASE ORAL at 20:01

## 2020-04-24 RX ADMIN — OXYCODONE HYDROCHLORIDE AND ACETAMINOPHEN 2 TABLET: 7.5; 325 TABLET ORAL at 09:38

## 2020-04-24 RX ADMIN — PROPOFOL 30 MG: 10 INJECTION, EMULSION INTRAVENOUS at 07:50

## 2020-04-24 RX ADMIN — PROPOFOL 50 MG: 10 INJECTION, EMULSION INTRAVENOUS at 08:58

## 2020-04-24 RX ADMIN — LIDOCAINE HYDROCHLORIDE 60 MG: 20 INJECTION, SOLUTION INTRAVENOUS at 07:50

## 2020-04-24 RX ADMIN — SUCRALFATE 1 G: 1 SUSPENSION ORAL at 17:30

## 2020-04-24 RX ADMIN — PROPOFOL 50 MG: 10 INJECTION, EMULSION INTRAVENOUS at 07:53

## 2020-04-24 RX ADMIN — FAMOTIDINE 20 MG: 20 TABLET, FILM COATED ORAL at 17:30

## 2020-04-24 RX ADMIN — PROPOFOL 50 MG: 10 INJECTION, EMULSION INTRAVENOUS at 08:36

## 2020-04-24 RX ADMIN — PROPOFOL 50 MG: 10 INJECTION, EMULSION INTRAVENOUS at 08:34

## 2020-04-24 RX ADMIN — SODIUM CHLORIDE 50 ML/HR: 9 INJECTION, SOLUTION INTRAVENOUS at 07:09

## 2020-04-24 RX ADMIN — IPRATROPIUM BROMIDE AND ALBUTEROL SULFATE 3 ML: 2.5; .5 SOLUTION RESPIRATORY (INHALATION) at 20:11

## 2020-04-24 RX ADMIN — PROPOFOL 50 MG: 10 INJECTION, EMULSION INTRAVENOUS at 08:00

## 2020-04-24 RX ADMIN — PROPOFOL 50 MG: 10 INJECTION, EMULSION INTRAVENOUS at 08:13

## 2020-04-24 RX ADMIN — PROPOFOL 50 MG: 10 INJECTION, EMULSION INTRAVENOUS at 08:20

## 2020-04-24 RX ADMIN — GUAIFENESIN 1200 MG: 600 TABLET, EXTENDED RELEASE ORAL at 20:01

## 2020-04-24 RX ADMIN — PROPOFOL 50 MG: 10 INJECTION, EMULSION INTRAVENOUS at 08:24

## 2020-04-24 RX ADMIN — PROPOFOL 50 MG: 10 INJECTION, EMULSION INTRAVENOUS at 08:06

## 2020-04-24 RX ADMIN — HEPARIN SODIUM 4200 UNITS: 1000 INJECTION, SOLUTION INTRAVENOUS; SUBCUTANEOUS at 12:53

## 2020-04-24 RX ADMIN — PROPOFOL 50 MG: 10 INJECTION, EMULSION INTRAVENOUS at 08:30

## 2020-04-24 RX ADMIN — SODIUM CHLORIDE, PRESERVATIVE FREE 10 ML: 5 INJECTION INTRAVENOUS at 20:02

## 2020-04-24 RX ADMIN — PROPOFOL 50 MG: 10 INJECTION, EMULSION INTRAVENOUS at 07:56

## 2020-04-24 RX ADMIN — IPRATROPIUM BROMIDE AND ALBUTEROL SULFATE 3 ML: 2.5; .5 SOLUTION RESPIRATORY (INHALATION) at 14:31

## 2020-04-24 RX ADMIN — PROPOFOL 50 MG: 10 INJECTION, EMULSION INTRAVENOUS at 08:50

## 2020-04-24 NOTE — ANESTHESIA PREPROCEDURE EVALUATION
Anesthesia Evaluation     Patient summary reviewed   no history of anesthetic complications:  NPO Solid Status: > 8 hours  NPO Liquid Status: > 8 hours           Airway   Mallampati: II  TM distance: >3 FB  Neck ROM: full  Dental      Comment: Gold crowns x3    Pulmonary    (+) pneumonia , asthma,  (-) sleep apnea, not a smoker  Cardiovascular   Exercise tolerance: good (4-7 METS)    ECG reviewed  Patient on routine beta blocker and Beta blocker given within 24 hours of surgery    (+) hypertension, hyperlipidemia,   (-) past MI, CAD, dysrhythmias, cardiac stents    ROS comment: Echo 04/20/20:  ·Left ventricular wall thickness is consistent with mild-to-moderate concentric hypertrophy.  ·Left ventricular systolic function is normal. Estimated ejection fraction is 61 to 65%.  ·There is no significant (greater than mild) valvular dysfunction.  ·There is no evidence of any valvular vegetations.    Neuro/Psych  (-) seizures, TIA, CVA  GI/Hepatic/Renal/Endo    (+) obesity,   renal disease dialysis and ESRD, diabetes mellitus,   (-) liver disease    Musculoskeletal     Abdominal    Substance History      OB/GYN          Other   blood dyscrasia anemia,     ROS/Med Hx Other: S/P staph aureus bacteremia from infected permacath at presentation, 4/20 no growth x 3 days.                 Anesthesia Plan    ASA 3     MAC     intravenous induction     Anesthetic plan, all risks, benefits, and alternatives have been provided, discussed and informed consent has been obtained with: patient.

## 2020-04-24 NOTE — ANESTHESIA POSTPROCEDURE EVALUATION
"Patient: Craig Swanson    Procedure Summary     Date:  04/24/20 Room / Location:  [facility] OR  /  [facility] HYBRID OR 12    Anesthesia Start:  0741 Anesthesia Stop:  0919    Procedure:  HEMODIALYSIS CATHETER INSERTION (Right Neck) Diagnosis:       Febrile illness      ESRD (end stage renal disease) (CMS/[facility])      Essential hypertension      Obesity (BMI 30-39.9)      Staphylococcus aureus bacteremia      (Febrile illness [R50.9])      (ESRD (end stage renal disease) (CMS/[facility]) [N18.6])      (Essential hypertension [I10])      (Obesity (BMI 30-39.9) [E66.9])      (Staphylococcus aureus bacteremia [R78.81])    Surgeon:  Robbin Cook MD Provider:  Eve Bauer CRNA    Anesthesia Type:  MAC ASA Status:  3          Anesthesia Type: MAC    Vitals  Vitals Value Taken Time   /69 4/24/2020  9:35 AM   Temp 97.1 °F (36.2 °C) 4/24/2020  9:40 AM   Pulse 93 4/24/2020  9:42 AM   Resp 16 4/24/2020  9:40 AM   SpO2 91 % 4/24/2020  9:42 AM   Vitals shown include unvalidated device data.        Post Anesthesia Care and Evaluation    Patient location during evaluation: PACU  Patient participation: complete - patient participated  Level of consciousness: awake and alert  Pain management: adequate  Airway patency: patent  Anesthetic complications: No anesthetic complications  PONV Status: none  Cardiovascular status: acceptable and hemodynamically stable  Respiratory status: acceptable  Hydration status: acceptable    Comments: Blood pressure 122/69, pulse 94, temperature 97.1 °F (36.2 °C), temperature source Temporal, resp. rate 16, height 172.7 cm (68\"), weight 109 kg (240 lb), SpO2 93 %.    Patient discharged from PACU based upon Abiel score. Please see RN notes for further details      "

## 2020-04-25 ENCOUNTER — READMISSION MANAGEMENT (OUTPATIENT)
Dept: CALL CENTER | Facility: HOSPITAL | Age: 50
End: 2020-04-25

## 2020-04-25 VITALS
HEIGHT: 68 IN | BODY MASS INDEX: 36.37 KG/M2 | RESPIRATION RATE: 16 BRPM | DIASTOLIC BLOOD PRESSURE: 60 MMHG | TEMPERATURE: 97.7 F | WEIGHT: 240 LBS | HEART RATE: 89 BPM | SYSTOLIC BLOOD PRESSURE: 126 MMHG | OXYGEN SATURATION: 96 %

## 2020-04-25 LAB
ABO GROUP BLD: NORMAL
ALBUMIN SERPL-MCNC: 2.8 G/DL (ref 3.5–5.2)
ALBUMIN/GLOB SERPL: 0.6 G/DL
ALP SERPL-CCNC: 50 U/L (ref 39–117)
ALT SERPL W P-5'-P-CCNC: 6 U/L (ref 1–41)
ANION GAP SERPL CALCULATED.3IONS-SCNC: 20 MMOL/L (ref 5–15)
AST SERPL-CCNC: 18 U/L (ref 1–40)
BACTERIA SPEC AEROBE CULT: NORMAL
BASOPHILS # BLD AUTO: 0.03 10*3/MM3 (ref 0–0.2)
BASOPHILS NFR BLD AUTO: 0.2 % (ref 0–1.5)
BILIRUB SERPL-MCNC: 0.2 MG/DL (ref 0.2–1.2)
BLD GP AB SCN SERPL QL: NEGATIVE
BUN BLD-MCNC: 47 MG/DL (ref 6–20)
BUN/CREAT SERPL: 4.5 (ref 7–25)
CALCIUM SPEC-SCNC: 8.9 MG/DL (ref 8.6–10.5)
CHLORIDE SERPL-SCNC: 95 MMOL/L (ref 98–107)
CO2 SERPL-SCNC: 22 MMOL/L (ref 22–29)
CREAT BLD-MCNC: 10.36 MG/DL (ref 0.76–1.27)
DEPRECATED RDW RBC AUTO: 46 FL (ref 37–54)
EOSINOPHIL # BLD AUTO: 0.35 10*3/MM3 (ref 0–0.4)
EOSINOPHIL NFR BLD AUTO: 2.5 % (ref 0.3–6.2)
ERYTHROCYTE [DISTWIDTH] IN BLOOD BY AUTOMATED COUNT: 15.5 % (ref 12.3–15.4)
GFR SERPL CREATININE-BSD FRML MDRD: 6 ML/MIN/1.73
GFR SERPL CREATININE-BSD FRML MDRD: ABNORMAL ML/MIN/{1.73_M2}
GLOBULIN UR ELPH-MCNC: 4.7 GM/DL
GLUCOSE BLD-MCNC: 118 MG/DL (ref 65–99)
GLUCOSE BLDC GLUCOMTR-MCNC: 131 MG/DL (ref 70–130)
GLUCOSE BLDC GLUCOMTR-MCNC: 165 MG/DL (ref 70–130)
HCT VFR BLD AUTO: 20.5 % (ref 37.5–51)
HCT VFR BLD AUTO: 24.9 % (ref 37.5–51)
HGB BLD-MCNC: 7 G/DL (ref 13–17.7)
HGB BLD-MCNC: 8.5 G/DL (ref 13–17.7)
IMM GRANULOCYTES # BLD AUTO: 0.1 10*3/MM3 (ref 0–0.05)
IMM GRANULOCYTES NFR BLD AUTO: 0.7 % (ref 0–0.5)
LYMPHOCYTES # BLD AUTO: 1.79 10*3/MM3 (ref 0.7–3.1)
LYMPHOCYTES NFR BLD AUTO: 12.7 % (ref 19.6–45.3)
MCH RBC QN AUTO: 28.1 PG (ref 26.6–33)
MCHC RBC AUTO-ENTMCNC: 34.1 G/DL (ref 31.5–35.7)
MCV RBC AUTO: 82.3 FL (ref 79–97)
MONOCYTES # BLD AUTO: 1.01 10*3/MM3 (ref 0.1–0.9)
MONOCYTES NFR BLD AUTO: 7.2 % (ref 5–12)
NEUTROPHILS # BLD AUTO: 10.8 10*3/MM3 (ref 1.7–7)
NEUTROPHILS NFR BLD AUTO: 76.7 % (ref 42.7–76)
NRBC BLD AUTO-RTO: 0 /100 WBC (ref 0–0.2)
PLATELET # BLD AUTO: 471 10*3/MM3 (ref 140–450)
PMV BLD AUTO: 9.7 FL (ref 6–12)
POTASSIUM BLD-SCNC: 3.7 MMOL/L (ref 3.5–5.2)
PROT SERPL-MCNC: 7.5 G/DL (ref 6–8.5)
RBC # BLD AUTO: 2.49 10*6/MM3 (ref 4.14–5.8)
RH BLD: POSITIVE
SODIUM BLD-SCNC: 137 MMOL/L (ref 136–145)
T&S EXPIRATION DATE: NORMAL
WBC NRBC COR # BLD: 14.08 10*3/MM3 (ref 3.4–10.8)

## 2020-04-25 PROCEDURE — 86900 BLOOD TYPING SEROLOGIC ABO: CPT

## 2020-04-25 PROCEDURE — 86901 BLOOD TYPING SEROLOGIC RH(D): CPT | Performed by: FAMILY MEDICINE

## 2020-04-25 PROCEDURE — 85025 COMPLETE CBC W/AUTO DIFF WBC: CPT | Performed by: SURGERY

## 2020-04-25 PROCEDURE — 94799 UNLISTED PULMONARY SVC/PX: CPT

## 2020-04-25 PROCEDURE — P9016 RBC LEUKOCYTES REDUCED: HCPCS

## 2020-04-25 PROCEDURE — 86900 BLOOD TYPING SEROLOGIC ABO: CPT | Performed by: FAMILY MEDICINE

## 2020-04-25 PROCEDURE — 80053 COMPREHEN METABOLIC PANEL: CPT | Performed by: SURGERY

## 2020-04-25 PROCEDURE — 85014 HEMATOCRIT: CPT | Performed by: FAMILY MEDICINE

## 2020-04-25 PROCEDURE — 85018 HEMOGLOBIN: CPT | Performed by: FAMILY MEDICINE

## 2020-04-25 PROCEDURE — 86920 COMPATIBILITY TEST SPIN: CPT

## 2020-04-25 PROCEDURE — 86850 RBC ANTIBODY SCREEN: CPT | Performed by: FAMILY MEDICINE

## 2020-04-25 PROCEDURE — 82962 GLUCOSE BLOOD TEST: CPT

## 2020-04-25 RX ORDER — SUCRALFATE ORAL 1 G/10ML
1 SUSPENSION ORAL EVERY 6 HOURS SCHEDULED
Qty: 420 ML | Refills: 0 | Status: SHIPPED | OUTPATIENT
Start: 2020-04-25

## 2020-04-25 RX ORDER — OXYMETAZOLINE HYDROCHLORIDE 0.05 G/100ML
2 SPRAY NASAL 2 TIMES DAILY PRN
Qty: 1 ML | Refills: 0 | Status: SHIPPED | OUTPATIENT
Start: 2020-04-25

## 2020-04-25 RX ORDER — CEFAZOLIN SODIUM IN 0.9 % NACL 3 G/100 ML
INTRAVENOUS SOLUTION, PIGGYBACK (ML) INTRAVENOUS
Qty: 3000 ML | Refills: 0
Start: 2020-04-25 | End: 2020-07-13

## 2020-04-25 RX ORDER — ATORVASTATIN CALCIUM 20 MG/1
20 TABLET, FILM COATED ORAL NIGHTLY
Qty: 30 TABLET | Refills: 0 | Status: SHIPPED | OUTPATIENT
Start: 2020-04-25

## 2020-04-25 RX ADMIN — SUCRALFATE 1 G: 1 SUSPENSION ORAL at 06:25

## 2020-04-25 RX ADMIN — IPRATROPIUM BROMIDE AND ALBUTEROL SULFATE 3 ML: 2.5; .5 SOLUTION RESPIRATORY (INHALATION) at 06:33

## 2020-04-25 RX ADMIN — HYDRALAZINE HYDROCHLORIDE 50 MG: 50 TABLET ORAL at 06:25

## 2020-04-25 RX ADMIN — Medication 2 SPRAY: at 08:08

## 2020-04-25 RX ADMIN — GUAIFENESIN 1200 MG: 600 TABLET, EXTENDED RELEASE ORAL at 08:07

## 2020-04-25 RX ADMIN — SODIUM CHLORIDE, PRESERVATIVE FREE 10 ML: 5 INJECTION INTRAVENOUS at 08:09

## 2020-04-25 RX ADMIN — FERROUS SULFATE TAB 325 MG (65 MG ELEMENTAL FE) 325 MG: 325 (65 FE) TAB at 08:07

## 2020-04-25 RX ADMIN — SUCRALFATE 1 G: 1 SUSPENSION ORAL at 11:56

## 2020-04-25 RX ADMIN — SUCRALFATE 1 G: 1 SUSPENSION ORAL at 00:29

## 2020-04-25 RX ADMIN — HYDRALAZINE HYDROCHLORIDE 50 MG: 50 TABLET ORAL at 14:02

## 2020-04-25 RX ADMIN — ASPIRIN 81 MG: 81 TABLET ORAL at 08:07

## 2020-04-25 RX ADMIN — CLONIDINE HYDROCHLORIDE 0.1 MG: 0.1 TABLET ORAL at 08:07

## 2020-04-25 RX ADMIN — METOPROLOL TARTRATE 25 MG: 25 TABLET, FILM COATED ORAL at 08:07

## 2020-04-25 RX ADMIN — NIFEDIPINE 60 MG: 60 TABLET, FILM COATED, EXTENDED RELEASE ORAL at 08:07

## 2020-04-25 NOTE — OUTREACH NOTE
Prep Survey      Responses   [facility] facility patient discharged from?  [place]   Is LACE score < 7 ?  No   Eligibility  Readm Mgmt   Discharge diagnosis  Sepsis due to infected IV   COVID-19 Test Status  Not tested   Does the patient have one of the following disease processes/diagnoses(primary or secondary)?  Sepsis   Does the patient have Home health ordered?  No   Is there a DME ordered?  No   Prep survey completed?  Yes          Zoila Garcia RN

## 2020-04-26 LAB
BACTERIA SPEC AEROBE CULT: NORMAL
BH BB BLOOD EXPIRATION DATE: NORMAL
BH BB BLOOD TYPE BARCODE: 6200
BH BB DISPENSE STATUS: NORMAL
BH BB PRODUCT CODE: NORMAL
BH BB UNIT NUMBER: NORMAL
CROSSMATCH INTERPRETATION: NORMAL
UNIT  ABO: NORMAL
UNIT  RH: NORMAL

## 2020-04-27 ENCOUNTER — READMISSION MANAGEMENT (OUTPATIENT)
Dept: CALL CENTER | Facility: HOSPITAL | Age: 50
End: 2020-04-27

## 2020-04-27 ENCOUNTER — TELEPHONE (OUTPATIENT)
Dept: PRIMARY CARE CLINIC | Age: 50
End: 2020-04-27

## 2020-04-27 ENCOUNTER — TELEPHONE (OUTPATIENT)
Dept: INTERNAL MEDICINE | Age: 50
End: 2020-04-27

## 2020-04-27 NOTE — OUTREACH NOTE
Sepsis Week 1 Survey      Responses   [facility] patient discharged from?  [place]   COVID-19 Test Status  Not tested   Does the patient have one of the following disease processes/diagnoses(primary or secondary)?  Sepsis   Is there a successful TCM telephone encounter documented?  Yes          Roberth Gonzalez RN

## 2020-04-28 ENCOUNTER — VIRTUAL VISIT (OUTPATIENT)
Dept: PRIMARY CARE CLINIC | Age: 50
End: 2020-04-28
Payer: COMMERCIAL

## 2020-04-28 PROCEDURE — 1111F DSCHRG MED/CURRENT MED MERGE: CPT | Performed by: FAMILY MEDICINE

## 2020-04-28 PROCEDURE — 99496 TRANSJ CARE MGMT HIGH F2F 7D: CPT | Performed by: FAMILY MEDICINE

## 2020-04-28 RX ORDER — ATORVASTATIN CALCIUM 20 MG/1
20 TABLET, FILM COATED ORAL DAILY
Qty: 90 TABLET | Refills: 1
Start: 2020-04-28 | End: 2020-06-12

## 2020-04-28 NOTE — PROGRESS NOTES
2020    TELEHEALTH EVALUATION -- Audio/Visual (During LCJER-42 public health emergency)    HPI:    Aurora Novoa (:  1970) has requested an audio/video evaluation for the following concern(s):    Patient presents today via video visit for sepsis from infected catheter. His HD catheter became in fected in R neck. Had it removed and placed temporary femoral and then had placed in L jugular. He notes he is feeling better and was weak when dc. He is done w/ antibiotics. incsion is healing. He is getting his appetite back. He was placed on atorvastatin and colace for other maintenance. Had S. Aureus bacteremia and was tx w/ IV abx. He is eating, drinking, and voiding BMs well. Back to usual HD via L IJ    Review of Systems   Constitutional: Positive for fatigue. Negative for chills and fever. Respiratory: Negative for cough, chest tightness, shortness of breath and wheezing. Cardiovascular: Negative for chest pain, palpitations and leg swelling. Gastrointestinal: Negative for abdominal pain, constipation, diarrhea, nausea and vomiting. Genitourinary: Negative for difficulty urinating, dysuria and frequency. Skin: Positive for wound (L neck). Negative for rash. Prior to Visit Medications    Medication Sig Taking?  Authorizing Provider   atorvastatin (LIPITOR) 20 MG tablet Take 1 tablet by mouth daily Yes Maricarmen Herring MD   albuterol-ipratropium (COMBIVENT RESPIMAT)  MCG/ACT AERS inhaler Inhale 1 puff into the lungs every 6 hours Yes Maricarmen Herring MD   hydrOXYzine (VISTARIL) 25 MG capsule Take 1 capsule by mouth 3 times daily as needed for Itching Yes SOSA Duong   cloNIDine (CATAPRES) 0.1 MG tablet Take 1 tablet by mouth 2 times daily Yes Rosario Lowry MD   hydrALAZINE (APRESOLINE) 50 MG tablet Take 1 tablet by mouth every 8 hours Yes Rosario Lowry MD   metoprolol tartrate (LOPRESSOR) 25 MG tablet Take 1 tablet by mouth 2 times daily Yes Rosario Lowry MD   NIFEdipine Patient and provider were located at their individual homes or provider at secure site for business. --Benigno Chang MD on 4/30/2020 at 6:33 PM    An electronic signature was used to authenticate this note. Post-Discharge Transitional Care Management Services or Hospital Follow Up      Bailee He   YOB: 1970    Date of Office Visit:  4/28/2020  Date of Hospital Admission: 4/14/20  Date of Hospital Discharge: 4/14/20  Risk of hospital readmission (high >=14%.  Medium >=10%) :Readmission Risk Score: 22      Care management risk score Rising risk (score 2-5) and Complex Care (Scores >=6): 3     Non face to face  following discharge, date last encounter closed (first attempt may have been earlier): 4/27/2020 11:53 AM    Call initiated 2 business days of discharge: Yes    Patient Active Problem List   Diagnosis    Uncontrolled hypertension    Mild intermittent asthma without complication    Family history of prostate cancer    Morbid obesity due to excess calories ([address]    Type 2 diabetes mellitus with chronic kidney disease on chronic dialysis, without long-term current use of insulin ([address]    RUPAL (acute kidney injury) ([address]    Chronic diastolic congestive heart failure ([address]    Hemodialysis catheter infection, sequela       Allergies   Allergen Reactions    Banana     Lisinopril Other (See Comments)     Cough       Medications listed as ordered at the time of discharge from [facility] Medication Instructions JESSEE:    Printed on:04/30/20 5744   Medication Information                      albuterol sulfate  (90 BASE) MCG/ACT inhaler  Inhale 2 puffs into the lungs every 6 hours as needed for Wheezing             albuterol-ipratropium (COMBIVENT RESPIMAT)  MCG/ACT AERS inhaler  Inhale 1 puff into the lungs every 6 hours             atorvastatin (LIPITOR) 20 MG tablet  Take 1 tablet by mouth daily             cloNIDine (CATAPRES) 0.1 MG tablet  Take 1 tablet by mouth 2 times daily             hydrALAZINE (APRESOLINE) 50 MG tablet  Take 1 tablet by mouth every 8 hours             hydrOXYzine (VISTARIL) 25 MG capsule  Take 1 capsule by mouth 3 times daily as needed for Itching             ipratropium-albuterol (DUONEB) 0.5-2.5 (3) MG/3ML SOLN nebulizer solution  Take 3 mLs by nebulization every 4 hours             metoprolol tartrate (LOPRESSOR) 25 MG tablet  Take 1 tablet by mouth 2 times daily             NIFEdipine (ADALAT CC) 60 MG extended release tablet  Take 1 tablet by mouth 2 times daily                   Medications marked \"taking\" at this time  Outpatient Medications Marked as Taking for the 4/28/20 encounter (Virtual Visit) with Becky Jeffery MD   Medication Sig Dispense Refill    atorvastatin (LIPITOR) 20 MG tablet Take 1 tablet by mouth daily 90 tablet 1    albuterol-ipratropium (COMBIVENT RESPIMAT)  MCG/ACT AERS inhaler Inhale 1 puff into the lungs every 6 hours 1 Inhaler 0    hydrOXYzine (VISTARIL) 25 MG capsule Take 1 capsule by mouth 3 times daily as needed for Itching 15 capsule 0    cloNIDine (CATAPRES) 0.1 MG tablet Take 1 tablet by mouth 2 times daily 60 tablet 3    hydrALAZINE (APRESOLINE) 50 MG tablet Take 1 tablet by mouth every 8 hours 90 tablet 3    metoprolol tartrate (LOPRESSOR) 25 MG tablet Take 1 tablet by mouth 2 times daily 60 tablet 3    NIFEdipine (ADALAT CC) 60 MG extended release tablet Take 1 tablet by mouth 2 times daily 30 tablet 3    ipratropium-albuterol (DUONEB) 0.5-2.5 (3) MG/3ML SOLN nebulizer solution Take 3 mLs by nebulization every 4 hours 175 mL 0        Medications patient taking as of now reconciled against medications ordered at time of hospital discharge: Yes    Chief Complaint   Patient presents with    Follow-Up from [address] [place] [address]       History of Present illness - Follow up of Hospital diagnosis(es): sepsis,     Inpatient course: Discharge summary reviewed- see chart.     Interval history/Current status: fair, stable        There were no vitals filed for this visit. There is no height or weight on file to calculate BMI.    Wt Readings from Last 3 Encounters:   04/14/20 240 lb (108.9 kg)   02/20/20 242 lb (109.8 kg)   02/13/20 240 lb (108.9 kg)     BP Readings from Last 3 Encounters:   04/14/20 (!) 113/59   02/20/20 117/74   02/13/20 116/75        Medical Decision Making: high complexity

## 2020-04-30 PROBLEM — I50.32 CHRONIC DIASTOLIC CONGESTIVE HEART FAILURE (HCC): Status: ACTIVE | Noted: 2020-04-30

## 2020-04-30 PROBLEM — I50.31 ACUTE DIASTOLIC (CONGESTIVE) HEART FAILURE (HCC): Status: RESOLVED | Noted: 2020-01-31 | Resolved: 2020-04-30

## 2020-04-30 PROBLEM — E66.01 MORBID OBESITY DUE TO EXCESS CALORIES (HCC): Chronic | Status: ACTIVE | Noted: 2017-05-10

## 2020-04-30 PROBLEM — N18.6 TYPE 2 DIABETES MELLITUS WITH CHRONIC KIDNEY DISEASE ON CHRONIC DIALYSIS, WITHOUT LONG-TERM CURRENT USE OF INSULIN (HCC): Chronic | Status: ACTIVE | Noted: 2020-01-29

## 2020-04-30 PROBLEM — Z99.2 TYPE 2 DIABETES MELLITUS WITH CHRONIC KIDNEY DISEASE ON CHRONIC DIALYSIS, WITHOUT LONG-TERM CURRENT USE OF INSULIN (HCC): Chronic | Status: ACTIVE | Noted: 2020-01-29

## 2020-04-30 PROBLEM — I50.32 CHRONIC DIASTOLIC CONGESTIVE HEART FAILURE (HCC): Chronic | Status: ACTIVE | Noted: 2020-04-30

## 2020-04-30 PROBLEM — E11.22 TYPE 2 DIABETES MELLITUS WITH CHRONIC KIDNEY DISEASE ON CHRONIC DIALYSIS, WITHOUT LONG-TERM CURRENT USE OF INSULIN (HCC): Chronic | Status: ACTIVE | Noted: 2020-01-29

## 2020-04-30 PROBLEM — T82.7XXS: Chronic | Status: ACTIVE | Noted: 2020-04-30

## 2020-04-30 PROBLEM — T82.7XXS: Status: ACTIVE | Noted: 2020-04-30

## 2020-04-30 ASSESSMENT — ENCOUNTER SYMPTOMS
VOMITING: 0
CHEST TIGHTNESS: 0
NAUSEA: 0
WHEEZING: 0
DIARRHEA: 0
SHORTNESS OF BREATH: 0
CONSTIPATION: 0
COUGH: 0
ABDOMINAL PAIN: 0

## 2020-05-02 LAB — HEMOGLOBIN: 7 G/DL (ref 14–18)

## 2020-05-04 ENCOUNTER — READMISSION MANAGEMENT (OUTPATIENT)
Dept: CALL CENTER | Facility: HOSPITAL | Age: 50
End: 2020-05-04

## 2020-05-05 ENCOUNTER — HOSPITAL ENCOUNTER (OUTPATIENT)
Dept: INFUSION THERAPY | Age: 50
Setting detail: INFUSION SERIES
Discharge: HOME | End: 2020-05-05
Payer: COMMERCIAL

## 2020-05-05 VITALS
SYSTOLIC BLOOD PRESSURE: 121 MMHG | TEMPERATURE: 98.4 F | HEART RATE: 71 BPM | OXYGEN SATURATION: 98 % | DIASTOLIC BLOOD PRESSURE: 77 MMHG | RESPIRATION RATE: 16 BRPM

## 2020-05-05 LAB
ABO/RH: NORMAL
ANTIBODY SCREEN: NORMAL
BLOOD BANK DISPENSE STATUS: NORMAL
BLOOD BANK PRODUCT CODE: NORMAL
BPU ID: NORMAL
DESCRIPTION BLOOD BANK: NORMAL

## 2020-05-05 PROCEDURE — 36430 TRANSFUSION BLD/BLD COMPNT: CPT

## 2020-05-05 PROCEDURE — 86901 BLOOD TYPING SEROLOGIC RH(D): CPT

## 2020-05-05 PROCEDURE — 86900 BLOOD TYPING SEROLOGIC ABO: CPT

## 2020-05-05 PROCEDURE — P9016 RBC LEUKOCYTES REDUCED: HCPCS

## 2020-05-05 PROCEDURE — 86923 COMPATIBILITY TEST ELECTRIC: CPT

## 2020-05-05 PROCEDURE — 86850 RBC ANTIBODY SCREEN: CPT

## 2020-05-05 PROCEDURE — 2580000003 HC RX 258: Performed by: CLINICAL NURSE SPECIALIST

## 2020-05-05 RX ORDER — 0.9 % SODIUM CHLORIDE 0.9 %
20 INTRAVENOUS SOLUTION INTRAVENOUS ONCE
Status: COMPLETED | OUTPATIENT
Start: 2020-05-05 | End: 2020-05-05

## 2020-05-05 RX ADMIN — SODIUM CHLORIDE 250 ML: 9 INJECTION, SOLUTION INTRAVENOUS at 08:27

## 2020-05-05 ASSESSMENT — ENCOUNTER SYMPTOMS
ABDOMINAL PAIN: 0
VOMITING: 0
COUGH: 0
SHORTNESS OF BREATH: 0

## 2020-05-09 ENCOUNTER — HOSPITAL ENCOUNTER (OUTPATIENT)
Dept: LAB | Age: 50
Discharge: HOME | End: 2020-05-09
Payer: COMMERCIAL

## 2020-05-09 LAB — HEMOGLOBIN: 7.1 G/DL (ref 14–18)

## 2020-05-09 PROCEDURE — 36415 COLL VENOUS BLD VENIPUNCTURE: CPT

## 2020-05-09 PROCEDURE — 85018 HEMOGLOBIN: CPT

## 2020-05-11 ENCOUNTER — HOSPITAL ENCOUNTER (OUTPATIENT)
Dept: INFUSION THERAPY | Age: 50
Setting detail: INFUSION SERIES
Discharge: HOME | End: 2020-05-11
Payer: COMMERCIAL

## 2020-05-11 VITALS
TEMPERATURE: 98.8 F | SYSTOLIC BLOOD PRESSURE: 172 MMHG | RESPIRATION RATE: 16 BRPM | DIASTOLIC BLOOD PRESSURE: 88 MMHG | OXYGEN SATURATION: 98 % | HEART RATE: 72 BPM

## 2020-05-11 PROCEDURE — 86923 COMPATIBILITY TEST ELECTRIC: CPT

## 2020-05-11 PROCEDURE — P9016 RBC LEUKOCYTES REDUCED: HCPCS

## 2020-05-11 PROCEDURE — 86850 RBC ANTIBODY SCREEN: CPT

## 2020-05-11 PROCEDURE — 86901 BLOOD TYPING SEROLOGIC RH(D): CPT

## 2020-05-11 PROCEDURE — 2580000003 HC RX 258: Performed by: NURSE PRACTITIONER

## 2020-05-11 PROCEDURE — 86900 BLOOD TYPING SEROLOGIC ABO: CPT

## 2020-05-11 PROCEDURE — 36430 TRANSFUSION BLD/BLD COMPNT: CPT

## 2020-05-11 RX ORDER — 0.9 % SODIUM CHLORIDE 0.9 %
250 INTRAVENOUS SOLUTION INTRAVENOUS ONCE
Status: COMPLETED | OUTPATIENT
Start: 2020-05-11 | End: 2020-05-11

## 2020-05-11 RX ADMIN — SODIUM CHLORIDE 250 ML: 9 INJECTION, SOLUTION INTRAVENOUS at 10:10

## 2020-05-12 ENCOUNTER — READMISSION MANAGEMENT (OUTPATIENT)
Dept: CALL CENTER | Facility: HOSPITAL | Age: 50
End: 2020-05-12

## 2020-05-12 NOTE — OUTREACH NOTE
Sepsis Week 3 Survey      Responses   [facility] patient discharged from?  [place]   COVID-19 Test Status  Not tested   Does the patient have one of the following disease processes/diagnoses(primary or secondary)?  Sepsis   Week 3 attempt successful?  Yes   Call start time  1435   Call end time  1438   Discharge diagnosis  Sepsis due to infected IV   Meds reviewed with patient/caregiver?  Yes   Is the patient taking all medications as directed (includes completed medication regime)?  Yes   Has the patient kept scheduled appointments due by today?  Yes   Pulse Ox monitoring  None   Did the patient receive a copy of their discharge instructions?  Yes   Nursing interventions  Reviewed instructions with patient   What is the patient's perception of their health status since discharge?  Improving   Week 3 call completed?  Yes   Wrap up additional comments  At dialysis today.  Feels tired sometimes but overall is better he states.  Denies fever or other symptoms of sepsis.  Still taking IV ATBX.           Licha Khan RN

## 2020-05-19 ENCOUNTER — READMISSION MANAGEMENT (OUTPATIENT)
Dept: CALL CENTER | Facility: HOSPITAL | Age: 50
End: 2020-05-19

## 2020-05-19 NOTE — OUTREACH NOTE
Sepsis Week 4 Survey      Responses   [facility] patient discharged from?  [place]   COVID-19 Test Status  Not tested   Does the patient have one of the following disease processes/diagnoses(primary or secondary)?  Sepsis   Week 4 attempt successful?  Yes   Call start time  1703   Call end time  1705   Discharge diagnosis  Sepsis due to infected IV   Meds reviewed with patient/caregiver?  Yes   Is the patient having any side effects they believe may be caused by any medication additions or changes?  No   Is the patient taking all medications as directed (includes completed medication regime)?  Yes   Has the patient kept scheduled appointments due by today?  Yes   Is the patient still receiving Home Health Services?  N/A   Pulse Ox monitoring  None   Psychosocial issues?  No   What is the patient's perception of their health status since discharge?  Improving   Nursing interventions  Nurse provided patient education   Is the patient/caregiver able to teach back Sepsis?  S - Shivering,fever or very cold, E - Extreme pain or generalized discomfort (worst ever,especially abdomen), S - Short of breath   Nursing interventions  Nurse provided patient education   Is patient/caregiver able to teach back steps to recovery at home?  Set small, achievable goals for return to baseline health, Rest and regain strength   Is the patient/caregiver able to teach back signs and symptoms of worsening condition:  Fever, Hyperthermia, Shortness of breath/rapid respiratory rate, Rapid heart rate (>90)   Is the patient/caregiver able to teach back the hierarchy of who to call/visit for symptoms/problems? PCP, Specialist, Home health nurse, Urgent Care, ED, 911  Yes   Additional teach back comments  Patient reports he is doing well.    Week 4 call completed?  Yes   Would the patient like one additional call?  No   Graduated  Yes   Did the patient feel the follow up calls were helpful during their recovery period?  Yes   Was the number  of calls appropriate?  Yes          King Medley RN

## 2020-05-21 ENCOUNTER — VIRTUAL VISIT (OUTPATIENT)
Dept: VASCULAR SURGERY | Age: 50
End: 2020-05-21
Payer: COMMERCIAL

## 2020-05-21 PROBLEM — N18.6 ESRD (END STAGE RENAL DISEASE) (HCC): Status: ACTIVE | Noted: 2020-05-21

## 2020-05-21 PROCEDURE — 99214 OFFICE O/P EST MOD 30 MIN: CPT | Performed by: NURSE PRACTITIONER

## 2020-05-21 RX ORDER — SUCRALFATE 1 G/1
1 TABLET ORAL 2 TIMES DAILY
COMMUNITY
End: 2021-01-24 | Stop reason: ALTCHOICE

## 2020-05-21 RX ORDER — FERROUS SULFATE 325(65) MG
325 TABLET ORAL
COMMUNITY
End: 2020-08-11

## 2020-05-21 RX ORDER — IBUPROFEN 200 MG
1 CAPSULE ORAL DAILY
COMMUNITY

## 2020-05-28 ENCOUNTER — VIRTUAL VISIT (OUTPATIENT)
Dept: PRIMARY CARE CLINIC | Age: 50
End: 2020-05-28
Payer: COMMERCIAL

## 2020-05-28 PROBLEM — I10 UNCONTROLLED HYPERTENSION: Chronic | Status: RESOLVED | Noted: 2017-05-10 | Resolved: 2020-05-28

## 2020-05-28 PROCEDURE — 99396 PREV VISIT EST AGE 40-64: CPT | Performed by: FAMILY MEDICINE

## 2020-05-28 RX ORDER — SIMVASTATIN 10 MG
10 TABLET ORAL NIGHTLY
Qty: 90 TABLET | Refills: 1 | Status: SHIPPED | OUTPATIENT
Start: 2020-05-28 | End: 2021-02-08 | Stop reason: SDUPTHER

## 2020-05-28 ASSESSMENT — ENCOUNTER SYMPTOMS
NAUSEA: 0
COUGH: 0
DIARRHEA: 0
CONSTIPATION: 0
SHORTNESS OF BREATH: 0
WHEEZING: 0
CHEST TIGHTNESS: 0
ABDOMINAL PAIN: 0
VOMITING: 0

## 2020-06-04 ENCOUNTER — TELEPHONE (OUTPATIENT)
Dept: VASCULAR SURGERY | Age: 50
End: 2020-06-04

## 2020-06-12 ENCOUNTER — OFFICE VISIT (OUTPATIENT)
Dept: UROLOGY | Age: 50
End: 2020-06-12
Payer: COMMERCIAL

## 2020-06-12 VITALS
DIASTOLIC BLOOD PRESSURE: 113 MMHG | HEIGHT: 68 IN | BODY MASS INDEX: 37.74 KG/M2 | HEART RATE: 64 BPM | WEIGHT: 249 LBS | SYSTOLIC BLOOD PRESSURE: 178 MMHG | TEMPERATURE: 97.7 F

## 2020-06-12 PROCEDURE — 99214 OFFICE O/P EST MOD 30 MIN: CPT | Performed by: UROLOGY

## 2020-06-12 ASSESSMENT — ENCOUNTER SYMPTOMS
COLOR CHANGE: 0
COUGH: 0
VOMITING: 0
BLOOD IN STOOL: 0
SINUS PRESSURE: 0
BACK PAIN: 0
CONSTIPATION: 0
ABDOMINAL DISTENTION: 0
ABDOMINAL PAIN: 0
DIARRHEA: 0
SORE THROAT: 0
NAUSEA: 0
EYE DISCHARGE: 0
RHINORRHEA: 0
FACIAL SWELLING: 0
EYE REDNESS: 0
WHEEZING: 0
SHORTNESS OF BREATH: 0
EYE PAIN: 0

## 2020-06-12 NOTE — PROGRESS NOTES
not limited to: mechanical failure and need for revision,  malposition of pump and need for revision, infection and need for removal of the device. Chronic pain. Floppy glans, and STS deformity. Erosion of cylinders. Loss of penile length or shortening of penis. He also understands after placing a device he will not be able to have erection with any other means without an implanted prosthesis. Post OP Hematoma. Deformity and scaring.  - NC INSERT,INFLATABLE PENILE PROSTHESIS  - COVID-19 Ambulatory; Future      Orders Placed This Encounter   Procedures    COVID-19 Ambulatory     Standing Status:   Future     Standing Expiration Date:   6/12/2021     Scheduling Instructions:      Saline media preferred given current shortage of viral transport media but both acceptable     Order Specific Question:   Status     Answer:   Asymptomatic/Surveillance (e.g. pre-op/pre-procedure, pre-delivery, transfer)     Order Specific Question:   Reason for Test     Answer:   Upcoming elective surgery/procedure/delivery, return to work, or discharge to another facility   [address] [place] [address]     Placement of  LGX inflatable penile prosthesis infrapubic approach        Return for PT to be scheduled for Surgery. The patient was counseled about the risks of tariq Covid-19 during their perioperative period and any recovery window from their procedure. The patient was made aware that tariq Covid-19 may worsen their prognosis for recovering from their procedure and lend to a higher morbidity and/or mortality risk. All material risks, benefits, and reasonable alternatives including postponing the procedure were discussed.  The patient does wish to proceed with their procedure at this time    All information inputted into the note by the MA to include chief complaint, past medical history, past surgical history, medications, allergies, social and family history and review of systems has been reviewed and updated as needed by me. EMR Dragon/transcription disclaimer: Much of this documentt is electronic  transcription/translation of spoken language to printed text. The  electronic translation of spoken language may be erroneous, or at times,  nonsensical words or phrases may be inadvertently transcribed.  Although I  have reviewed the document for such errors, some may still exist.

## 2020-06-14 ENCOUNTER — OFFICE VISIT (OUTPATIENT)
Age: 50
End: 2020-06-14

## 2020-06-14 VITALS — TEMPERATURE: 98.1 F | OXYGEN SATURATION: 96 %

## 2020-06-14 NOTE — PATIENT INSTRUCTIONS
Preventing the Spread of Coronavirus Disease 2019 in Homes and Residential Communities   For the most recent information go to [email or web address]    Prevention steps for People with confirmed or suspected COVID-19 (including persons under investigation) who do not need to be hospitalized  and   People with confirmed COVID-19 who were hospitalized and determined to be medically stable to go home    Your healthcare provider and public health staff will evaluate whether you can be cared for at home. If it is determined that you do not need to be hospitalized and can be isolated at home, you will be monitored by staff from your local or state health department. You should follow the prevention steps below until a healthcare provider or local or state health department says you can return to your normal activities. Stay home except to get medical care  People who are mildly ill with COVID-19 are able to isolate at home during their illness. You should restrict activities outside your home, except for getting medical care. Do not go to work, school, or public areas. Avoid using public transportation, ride-sharing, or taxis. Separate yourself from other people and animals in your home  People: As much as possible, you should stay in a specific room and away from other people in your home. Also, you should use a separate bathroom, if available. Animals: You should restrict contact with pets and other animals while you are sick with COVID-19, just like you would around other people. Although there have not been reports of pets or other animals becoming sick with COVID-19, it is still recommended that people sick with COVID-19 limit contact with animals until more information is known about the virus. When possible, have another member of your household care for your animals while you are sick.  If you are sick with COVID-19, avoid contact with your pet, including petting, snuggling, being kissed or licked, and sharing food. If you must care for your pet or be around animals while you are sick, wash your hands before and after you interact with pets and wear a facemask. Call ahead before visiting your doctor  If you have a medical appointment, call the healthcare provider and tell them that you have or may have COVID-19. This will help the healthcare providers office take steps to keep other people from getting infected or exposed. Wear a facemask  You should wear a facemask when you are around other people (e.g., sharing a room or vehicle) or pets and before you enter a healthcare providers office. If you are not able to wear a facemask (for example, because it causes trouble breathing), then people who live with you should not stay in the same room with you, or they should wear a facemask if they enter your room. Cover your coughs and sneezes  Cover your mouth and nose with a tissue when you cough or sneeze. Throw used tissues in a lined trash can. Immediately wash your hands with soap and water for at least 20 seconds or, if soap and water are not available, clean your hands with an alcohol-based hand  that contains at least 60% alcohol. Clean your hands often  Wash your hands often with soap and water for at least 20 seconds, especially after blowing your nose, coughing, or sneezing; going to the bathroom; and before eating or preparing food. If soap and water are not readily available, use an alcohol-based hand  with at least 60% alcohol, covering all surfaces of your hands and rubbing them together until they feel dry. Soap and water are the best option if hands are visibly dirty. Avoid touching your eyes, nose, and mouth with unwashed hands. Avoid sharing personal household items  You should not share dishes, drinking glasses, cups, eating utensils, towels, or bedding with other people or pets in your home.  After using these items, they should

## 2020-06-14 NOTE — PROGRESS NOTES
2020    Randi Rodriguez (:  1970) is a 47 y.o. male, here requesting COVID-19 testing    History of Present Illness  Pt presents today for pre-op testing for COVID-19. Pt not evaluated in clinic. Vitals:    20 1248   Temp: 98.1 °F (36.7 °C)   SpO2: 96%       ASSESSMENT  Screening for COVID-19/ Viral disease    PLAN  COVID-19 sample collected and submitted  Patient given detailed CDC instructions contained within After Visit Summary       An  electronic signature was used to authenticate this note.     --SOSA Kingston on 2020 at 12:55 PM

## 2020-06-16 ENCOUNTER — HOSPITAL ENCOUNTER (OUTPATIENT)
Dept: PREADMISSION TESTING | Age: 50
Discharge: HOME | End: 2020-06-20
Payer: COMMERCIAL

## 2020-06-16 ENCOUNTER — HOSPITAL ENCOUNTER (OUTPATIENT)
Dept: GENERAL RADIOLOGY | Age: 50
Discharge: HOME | End: 2020-06-16
Payer: COMMERCIAL

## 2020-06-16 VITALS — BODY MASS INDEX: 38.92 KG/M2 | HEIGHT: 67 IN | WEIGHT: 248 LBS

## 2020-06-16 LAB
ANION GAP SERPL CALCULATED.3IONS-SCNC: 12 MMOL/L (ref 7–19)
APTT: 28.4 SEC (ref 26–36.2)
BACTERIA: ABNORMAL /HPF
BASOPHILS ABSOLUTE: 0 K/UL (ref 0–0.2)
BASOPHILS RELATIVE PERCENT: 0.4 % (ref 0–1)
BILIRUBIN URINE: NEGATIVE
BLOOD, URINE: ABNORMAL
BUN BLDV-MCNC: 37 MG/DL (ref 6–20)
CALCIUM SERPL-MCNC: 8.8 MG/DL (ref 8.6–10)
CHLORIDE BLD-SCNC: 98 MMOL/L (ref 98–111)
CLARITY: ABNORMAL
CO2: 30 MMOL/L (ref 22–29)
COLOR: YELLOW
CREAT SERPL-MCNC: 7.8 MG/DL (ref 0.5–1.2)
EKG P AXIS: 56 DEGREES
EKG P-R INTERVAL: 174 MS
EKG Q-T INTERVAL: 450 MS
EKG QRS DURATION: 92 MS
EKG QTC CALCULATION (BAZETT): 453 MS
EKG T AXIS: 33 DEGREES
EOSINOPHILS ABSOLUTE: 0.4 K/UL (ref 0–0.6)
EOSINOPHILS RELATIVE PERCENT: 6.5 % (ref 0–5)
EPITHELIAL CELLS, UA: ABNORMAL /HPF
GFR NON-AFRICAN AMERICAN: 7
GLUCOSE BLD-MCNC: 125 MG/DL (ref 74–109)
GLUCOSE URINE: NEGATIVE MG/DL
HCT VFR BLD CALC: 29.8 % (ref 42–52)
HEMOGLOBIN: 9.7 G/DL (ref 14–18)
IMMATURE GRANULOCYTES #: 0 K/UL
INR BLD: 0.96 (ref 0.88–1.18)
KETONES, URINE: NEGATIVE MG/DL
LEUKOCYTE ESTERASE, URINE: ABNORMAL
LYMPHOCYTES ABSOLUTE: 2.1 K/UL (ref 1.1–4.5)
LYMPHOCYTES RELATIVE PERCENT: 38.8 % (ref 20–40)
MCH RBC QN AUTO: 28.4 PG (ref 27–31)
MCHC RBC AUTO-ENTMCNC: 32.6 G/DL (ref 33–37)
MCV RBC AUTO: 87.1 FL (ref 80–94)
MONOCYTES ABSOLUTE: 0.5 K/UL (ref 0–0.9)
MONOCYTES RELATIVE PERCENT: 9.9 % (ref 0–10)
NEUTROPHILS ABSOLUTE: 2.4 K/UL (ref 1.5–7.5)
NEUTROPHILS RELATIVE PERCENT: 44 % (ref 50–65)
NITRITE, URINE: NEGATIVE
PDW BLD-RTO: 17.2 % (ref 11.5–14.5)
PH UA: 7.5 (ref 5–8)
PLATELET # BLD: 212 K/UL (ref 130–400)
PMV BLD AUTO: 9.6 FL (ref 9.4–12.4)
POTASSIUM SERPL-SCNC: 4.1 MMOL/L (ref 3.5–5)
PROTEIN UA: >=1000 MG/DL
PROTHROMBIN TIME: 12.6 SEC (ref 12–14.6)
RBC # BLD: 3.42 M/UL (ref 4.7–6.1)
RBC UA: ABNORMAL /HPF (ref 0–2)
REPORT: NORMAL
SARS-COV-2: NOT DETECTED
SODIUM BLD-SCNC: 140 MMOL/L (ref 136–145)
SPECIFIC GRAVITY UA: 1.02 (ref 1–1.03)
THIS TEST SENT TO: NORMAL
UROBILINOGEN, URINE: 0.2 E.U./DL
WBC # BLD: 5.4 K/UL (ref 4.8–10.8)
WBC UA: ABNORMAL /HPF (ref 0–5)

## 2020-06-16 PROCEDURE — 87086 URINE CULTURE/COLONY COUNT: CPT

## 2020-06-16 PROCEDURE — 85730 THROMBOPLASTIN TIME PARTIAL: CPT

## 2020-06-16 PROCEDURE — 85025 COMPLETE CBC W/AUTO DIFF WBC: CPT

## 2020-06-16 PROCEDURE — 81001 URINALYSIS AUTO W/SCOPE: CPT

## 2020-06-16 PROCEDURE — 87081 CULTURE SCREEN ONLY: CPT

## 2020-06-16 PROCEDURE — 93010 ELECTROCARDIOGRAM REPORT: CPT | Performed by: INTERNAL MEDICINE

## 2020-06-16 PROCEDURE — 85610 PROTHROMBIN TIME: CPT

## 2020-06-16 PROCEDURE — 71046 X-RAY EXAM CHEST 2 VIEWS: CPT

## 2020-06-16 PROCEDURE — 93005 ELECTROCARDIOGRAM TRACING: CPT

## 2020-06-16 PROCEDURE — 80048 BASIC METABOLIC PNL TOTAL CA: CPT

## 2020-06-17 ENCOUNTER — ANESTHESIA (OUTPATIENT)
Dept: OPERATING ROOM | Age: 50
End: 2020-06-17
Payer: COMMERCIAL

## 2020-06-17 ENCOUNTER — ANESTHESIA EVENT (OUTPATIENT)
Dept: OPERATING ROOM | Age: 50
End: 2020-06-17
Payer: COMMERCIAL

## 2020-06-17 ENCOUNTER — HOSPITAL ENCOUNTER (OUTPATIENT)
Age: 50
Setting detail: OUTPATIENT SURGERY
Discharge: HOME | End: 2020-06-17
Attending: UROLOGY | Admitting: UROLOGY
Payer: COMMERCIAL

## 2020-06-17 VITALS
OXYGEN SATURATION: 98 % | SYSTOLIC BLOOD PRESSURE: 152 MMHG | BODY MASS INDEX: 38.92 KG/M2 | DIASTOLIC BLOOD PRESSURE: 96 MMHG | WEIGHT: 248 LBS | RESPIRATION RATE: 16 BRPM | HEIGHT: 67 IN | HEART RATE: 65 BPM | TEMPERATURE: 97.8 F

## 2020-06-17 LAB
ABO/RH: NORMAL
ANION GAP SERPL CALCULATED.3IONS-SCNC: 15 MMOL/L (ref 7–19)
ANTIBODY SCREEN: NORMAL
APTT: 29.2 SEC (ref 26–36.2)
BUN BLDV-MCNC: 56 MG/DL (ref 6–20)
CALCIUM SERPL-MCNC: 8.9 MG/DL (ref 8.6–10)
CHLORIDE BLD-SCNC: 99 MMOL/L (ref 98–111)
CO2: 29 MMOL/L (ref 22–29)
CREAT SERPL-MCNC: 10.3 MG/DL (ref 0.5–1.2)
GFR NON-AFRICAN AMERICAN: 5
GLUCOSE BLD-MCNC: 105 MG/DL (ref 74–109)
HCT VFR BLD CALC: 31.1 % (ref 42–52)
HEMOGLOBIN: 10.4 G/DL (ref 14–18)
INR BLD: 1.01 (ref 0.88–1.18)
MCH RBC QN AUTO: 28.7 PG (ref 27–31)
MCHC RBC AUTO-ENTMCNC: 33.4 G/DL (ref 33–37)
MCV RBC AUTO: 85.7 FL (ref 80–94)
PDW BLD-RTO: 17.1 % (ref 11.5–14.5)
PLATELET # BLD: 198 K/UL (ref 130–400)
PMV BLD AUTO: 8.8 FL (ref 9.4–12.4)
POTASSIUM SERPL-SCNC: 4.7 MMOL/L (ref 3.5–4.9)
PROTHROMBIN TIME: 13.2 SEC (ref 12–14.6)
RBC # BLD: 3.63 M/UL (ref 4.7–6.1)
SODIUM BLD-SCNC: 143 MMOL/L (ref 136–145)
WBC # BLD: 7.8 K/UL (ref 4.8–10.8)

## 2020-06-17 PROCEDURE — 2580000003 HC RX 258: Performed by: UROLOGY

## 2020-06-17 PROCEDURE — 86901 BLOOD TYPING SEROLOGIC RH(D): CPT

## 2020-06-17 PROCEDURE — 80048 BASIC METABOLIC PNL TOTAL CA: CPT

## 2020-06-17 PROCEDURE — 2580000003 HC RX 258: Performed by: ANESTHESIOLOGY

## 2020-06-17 PROCEDURE — 85730 THROMBOPLASTIN TIME PARTIAL: CPT

## 2020-06-17 PROCEDURE — 85027 COMPLETE CBC AUTOMATED: CPT

## 2020-06-17 PROCEDURE — 85610 PROTHROMBIN TIME: CPT

## 2020-06-17 PROCEDURE — 6360000002 HC RX W HCPCS: Performed by: UROLOGY

## 2020-06-17 PROCEDURE — 86900 BLOOD TYPING SEROLOGIC ABO: CPT

## 2020-06-17 PROCEDURE — 86850 RBC ANTIBODY SCREEN: CPT

## 2020-06-17 PROCEDURE — 36415 COLL VENOUS BLD VENIPUNCTURE: CPT

## 2020-06-17 RX ORDER — SODIUM CHLORIDE 9 MG/ML
INJECTION, SOLUTION INTRAVENOUS CONTINUOUS
Status: DISCONTINUED | OUTPATIENT
Start: 2020-06-17 | End: 2020-06-22 | Stop reason: HOSPADM

## 2020-06-17 RX ORDER — MIDAZOLAM HYDROCHLORIDE 1 MG/ML
2 INJECTION INTRAMUSCULAR; INTRAVENOUS
Status: DISCONTINUED | OUTPATIENT
Start: 2020-06-17 | End: 2020-06-17 | Stop reason: HOSPADM

## 2020-06-17 RX ORDER — LIDOCAINE HYDROCHLORIDE 10 MG/ML
1 INJECTION, SOLUTION EPIDURAL; INFILTRATION; INTRACAUDAL; PERINEURAL
Status: DISCONTINUED | OUTPATIENT
Start: 2020-06-17 | End: 2020-06-17 | Stop reason: HOSPADM

## 2020-06-17 RX ORDER — SODIUM CHLORIDE 450 MG/100ML
INJECTION, SOLUTION INTRAVENOUS CONTINUOUS
Status: DISCONTINUED | OUTPATIENT
Start: 2020-06-17 | End: 2020-06-22 | Stop reason: HOSPADM

## 2020-06-17 RX ORDER — FENTANYL CITRATE 50 UG/ML
25 INJECTION, SOLUTION INTRAMUSCULAR; INTRAVENOUS
Status: DISCONTINUED | OUTPATIENT
Start: 2020-06-17 | End: 2020-06-22 | Stop reason: HOSPADM

## 2020-06-17 RX ORDER — SODIUM CHLORIDE 0.9 % (FLUSH) 0.9 %
10 SYRINGE (ML) INJECTION EVERY 12 HOURS SCHEDULED
Status: DISCONTINUED | OUTPATIENT
Start: 2020-06-17 | End: 2020-06-22 | Stop reason: HOSPADM

## 2020-06-17 RX ORDER — FENTANYL CITRATE 50 UG/ML
50 INJECTION, SOLUTION INTRAMUSCULAR; INTRAVENOUS
Status: DISCONTINUED | OUTPATIENT
Start: 2020-06-17 | End: 2020-06-22 | Stop reason: HOSPADM

## 2020-06-17 RX ORDER — SODIUM CHLORIDE, SODIUM LACTATE, POTASSIUM CHLORIDE, CALCIUM CHLORIDE 600; 310; 30; 20 MG/100ML; MG/100ML; MG/100ML; MG/100ML
INJECTION, SOLUTION INTRAVENOUS CONTINUOUS
Status: DISCONTINUED | OUTPATIENT
Start: 2020-06-17 | End: 2020-06-22 | Stop reason: HOSPADM

## 2020-06-17 RX ORDER — SODIUM CHLORIDE 0.9 % (FLUSH) 0.9 %
10 SYRINGE (ML) INJECTION PRN
Status: DISCONTINUED | OUTPATIENT
Start: 2020-06-17 | End: 2020-06-22 | Stop reason: HOSPADM

## 2020-06-17 RX ADMIN — SODIUM CHLORIDE: 4.5 INJECTION, SOLUTION INTRAVENOUS at 09:27

## 2020-06-17 RX ADMIN — DEXTROSE MONOHYDRATE 1250 MG: 50 INJECTION, SOLUTION INTRAVENOUS at 09:28

## 2020-06-17 ASSESSMENT — PAIN - FUNCTIONAL ASSESSMENT: PAIN_FUNCTIONAL_ASSESSMENT: 0-10

## 2020-06-17 ASSESSMENT — LIFESTYLE VARIABLES: SMOKING_STATUS: 0

## 2020-06-17 ASSESSMENT — ENCOUNTER SYMPTOMS: SHORTNESS OF BREATH: 0

## 2020-06-17 NOTE — PROGRESS NOTES
Pt requesting to leave and cancel surgery. Doctor notified and here to speak with patient. Patient will be discharged and consult with the office when ready to proceed with the surgery. Patient verbalized understanding of this. Iv d/c'd and patient discharged in care of father.

## 2020-06-17 NOTE — ANESTHESIA PRE PROCEDURE
Department of Anesthesiology  Preprocedure Note       Name:  Ana Lilia Ivory   Age:  47 y.o.  :  1970                                          MRN:  [id number]         Date:  2020      Surgeon: Preeti Sotelo):  Nae Villegas MD    Procedure: Procedure(s):  PLACEMENT  INFLATABLE PENILE PROSTHESIS    Medications prior to admission:   Prior to Admission medications    Medication Sig Start Date End Date Taking?  Authorizing Provider   simvastatin (ZOCOR) 10 MG tablet Take 1 tablet by mouth nightly 20   Yaneli Laws MD   ferrous sulfate (IRON 325) 325 (65 Fe) MG tablet Take 325 mg by mouth daily (with breakfast)    Historical Provider, MD   sucralfate (CARAFATE) 1 GM tablet Take 1 g by mouth 2 times daily     Historical Provider, MD   VITAMIN D PO Take 1 capsule by mouth daily     Historical Provider, MD   Cyanocobalamin (VITAMIN B 12 PO) Take 1 tablet by mouth daily     Historical Provider, MD   Ferric Citrate (AURYXIA PO) Take 1 tablet by mouth daily     Historical Provider, MD   calcium carbonate (OYSTER SHELL CALCIUM 500 MG) 1250 (500 Ca) MG tablet Take 1 tablet by mouth daily    Historical Provider, MD   hydrOXYzine (VISTARIL) 25 MG capsule Take 1 capsule by mouth 3 times daily as needed for Itching 20   SOSA Pugh   cloNIDine (CATAPRES) 0.1 MG tablet Take 1 tablet by mouth 2 times daily 20   Justin Arevalo MD   hydrALAZINE (APRESOLINE) 50 MG tablet Take 1 tablet by mouth every 8 hours 20   Justin Arevalo MD   metoprolol tartrate (LOPRESSOR) 25 MG tablet Take 1 tablet by mouth 2 times daily 20   Justin Arevalo MD   NIFEdipine (ADALAT CC) 60 MG extended release tablet Take 1 tablet by mouth 2 times daily 20   Justin Arevalo MD   albuterol sulfate  (90 BASE) MCG/ACT inhaler Inhale 2 puffs into the lungs every 6 hours as needed for Wheezing 5/10/17   Yaneli Laws MD       Current medications:    Current Facility-Administered Medications   Medication Dose Route Frequency Provider Last Rate Last Dose    piperacillin-tazobactam (ZOSYN) 3.375 g in dextrose 5 % 50 mL IVPB (mini-bag)  3.375 g Intravenous Once Fadia Brown MD        vancomycin (VANCOCIN) 1,250 mg in dextrose 5 % 250 mL IVPB  1,250 mg Intravenous Once Fadia Brown MD           Allergies: Allergies   Allergen Reactions    Atorvastatin     Banana     Lisinopril Other (See Comments)     Cough  Cough       Problem List:    Patient Active Problem List   Diagnosis Code    Mild intermittent asthma without complication G06.30    Family history of prostate cancer Z80.42    Morbid obesity due to excess calories ([address] E66.01    Type 2 diabetes mellitus with chronic kidney disease on chronic dialysis, without long-term current use of insulin (HCC) E11.22, N18.6, Z99.2    RUPAL (acute kidney injury) ([address] N17.9    Chronic diastolic congestive heart failure ([address] I50.32    Hemodialysis catheter infection, sequela T82. 7XXS    ESRD (end stage renal disease) ([address] N18.6       Past Medical History:        Diagnosis Date    Asthma     Diabetes mellitus ([address]     [facility]    Erectile dysfunction     Hypertension     Obesity        Past Surgical History:        Procedure Laterality Date    COLONOSCOPY N/A 12/5/2019    Dr Teresa Luque, internal hemorrhoids-Grade 1-2-HP, 3 yr recall    TUNNELED VENOUS CATHETER PLACEMENT      [facility] - removal of first cath due to infection and placement of new one    TUNNELED VENOUS CATHETER PLACEMENT  01/2020    sjs       Social History:    Social History     Tobacco Use    Smoking status: Never Smoker    Smokeless tobacco: Never Used   Substance Use Topics    Alcohol use: Yes     Comment: occ                                Counseling given: Not Answered      Vital Signs (Current): There were no vitals filed for this visit.                                            BP Readings from Last 3 Encounters:   06/12/20 (!) 178/113   05/11/20 (!) patient. Use of blood products discussed with patient whom consented to blood products.                    Naman Novoa DO   6/17/2020

## 2020-06-17 NOTE — H&P
Vinod Davis is a 47 y.o. male who presents today   Chief Complaint   Patient presents with    Follow-up       patient here to discuss a penile prosthesis for ED      Erectile Dysfunction:  Patient is here today for erectile dysfunction which started  year(s) ago. Recently his ED symptoms: are worsening  Currently sexually active? Yes  Sex drive/libido: normal  Current medical Rx for ED: Patient has tried oral medication without success. He tried penile injections which has worked but he has had problems with priapism. He comes in today to discuss other options for management. He has trouble attaining and maintaining erections he only gets partial erections. He denies any curvature or pain  He has underlying risk factors of hypertension end-stage renal disease on dialysis and diabetes mellitus.   He is on a beta-blocker     Past Medical History        Past Medical History:   Diagnosis Date    Asthma      Diabetes mellitus ([address]      Erectile dysfunction      Hypertension      Obesity              Past Surgical History         Past Surgical History:   Procedure Laterality Date    COLONOSCOPY N/A 12/5/2019     Dr Debrah Gosselin, internal hemorrhoids-Grade 1-2-HP, 3 yr recall    TUNNELED VENOUS CATHETER PLACEMENT         [facility] - removal of first cath due to infection and placement of new one    TUNNELED VENOUS CATHETER PLACEMENT   01/2020     sjs            Current Facility-Administered Medications          Current Outpatient Medications   Medication Sig Dispense Refill    simvastatin (ZOCOR) 10 MG tablet Take 1 tablet by mouth nightly 90 tablet 1    ferrous sulfate (IRON 325) 325 (65 Fe) MG tablet Take 325 mg by mouth daily (with breakfast)        sucralfate (CARAFATE) 1 GM tablet Take 1 g by mouth 4 times daily        VITAMIN D PO Take by mouth        Cyanocobalamin (VITAMIN B 12 PO) Take by mouth        Ferric Citrate (AURYXIA PO) Take by mouth        calcium carbonate (OYSTER SHELL CALCIUM 500 MG) 1250 (500 Ca) MG tablet Take 1 tablet by mouth daily        hydrOXYzine (VISTARIL) 25 MG capsule Take 1 capsule by mouth 3 times daily as needed for Itching 15 capsule 0    cloNIDine (CATAPRES) 0.1 MG tablet Take 1 tablet by mouth 2 times daily 60 tablet 3    hydrALAZINE (APRESOLINE) 50 MG tablet Take 1 tablet by mouth every 8 hours 90 tablet 3    metoprolol tartrate (LOPRESSOR) 25 MG tablet Take 1 tablet by mouth 2 times daily 60 tablet 3    NIFEdipine (ADALAT CC) 60 MG extended release tablet Take 1 tablet by mouth 2 times daily 30 tablet 3    albuterol sulfate  (90 BASE) MCG/ACT inhaler Inhale 2 puffs into the lungs every 6 hours as needed for Wheezing 1 Inhaler 2      No current facility-administered medications for this visit.                   Allergies   Allergen Reactions    Atorvastatin      Banana      Lisinopril Other (See Comments)       Cough  Cough         Social History   Social History            Socioeconomic History    Marital status: Single       Spouse name: None    Number of children: None    Years of education: None    Highest education level: None   Occupational History    None   Social Needs    Financial resource strain: None    Food insecurity       Worry: None       Inability: None    Transportation needs       Medical: None       Non-medical: None   Tobacco Use    Smoking status: Never Smoker    Smokeless tobacco: Never Used   Substance and Sexual Activity    Alcohol use:  Yes       Comment: occ    Drug use: No    Sexual activity: None   Lifestyle    Physical activity       Days per week: None       Minutes per session: None    Stress: None   Relationships    Social connections       Talks on phone: None       Gets together: None       Attends Quaker service: None       Active member of club or organization: None       Attends meetings of clubs or organizations: None       Relationship status: None    Intimate partner violence       Fear insufficiency  Patient has failed surgical treatment options for ED he would like to proceed with a penile prosthesis. We will plan for an AMS 700LGX infrapubic.     We discussed at length the risk, complication, and alternative to placement of penile prosthesis for ED. Patient understands the risk to include but not limited to: mechanical failure and need for revision,  malposition of pump and need for revision, infection and need for removal of the device. Chronic pain. Floppy glans, and STS deformity. Erosion of cylinders. Loss of penile length or shortening of penis. He also understands after placing a device he will not be able to have erection with any other means without an implanted prosthesis. Post OP Hematoma. Deformity and scaring.  - [place] INSERT,INFLATABLE PENILE PROSTHESIS  - COVID-19 Ambulatory; Future        Orders Placed This Encounter   Procedures    COVID-19 Ambulatory       Standing Status:   Future       Standing Expiration Date:   6/12/2021       Scheduling Instructions:         [place] media preferred given current shortage of viral transport media but both acceptable       Order Specific Question:   Status       Answer:   Asymptomatic/Surveillance (e.g. pre-op/pre-procedure, pre-delivery, transfer)       Order Specific Question:   Reason for Test       Answer:   Upcoming elective surgery/procedure/delivery, return to work, or discharge to another facility    [place] [address]       Placement of  LGX inflatable penile prosthesis infrapubic approach         Return for PT to be scheduled for Surgery.      The patient was counseled about the risks of tariq Covid-19 during their perioperative period and any recovery window from their procedure. The patient was made aware that tariq Covid-19 may worsen their prognosis for recovering from their procedure and lend to a higher morbidity and/or mortality risk.  All material risks, benefits, and reasonable alternatives including postponing the procedure were discussed. The patient does wish to proceed with their procedure at this time     All information inputted into the note by the MA to include chief complaint, past medical history, past surgical history, medications, allergies, social and family history and review of systems has been reviewed and updated as needed by me.     EMR Dragon/transcription disclaimer: Much of this documentt is electronic  transcription/translation of spoken language to printed text. The  electronic translation of spoken language may be erroneous, or at times,  nonsensical words or phrases may be inadvertently transcribed.  Although I  have reviewed the document for such errors, some may still exist.

## 2020-06-18 LAB
MRSA CULTURE ONLY: NORMAL
URINE CULTURE, ROUTINE: NORMAL

## 2020-06-22 ENCOUNTER — OFFICE VISIT (OUTPATIENT)
Age: 50
End: 2020-06-22

## 2020-06-22 VITALS — OXYGEN SATURATION: 97 % | HEART RATE: 67 BPM | TEMPERATURE: 97.8 F

## 2020-06-22 NOTE — PROGRESS NOTES
2020    Maria Luisa Mac (:  1970) is a 47 y.o. male, here requesting COVID-19 testing    History of Present Illness  Pt is here for pre-op COVID, not evaluated in this clinic    Vitals:    20 1408   Pulse: 67   Temp: 97.8 °F (36.6 °C)   TempSrc: Infrared   SpO2: 97%       ASSESSMENT  Screening for COVID-19/ Viral disease    PLAN    COVID-19 sample collected and submitted  Patient given detailed CDC instructions contained within After Visit Summary       An  electronic signature was used to authenticate this note.     --SOSA Scales - CNP on 2020 at 2:09 PM

## 2020-06-24 LAB
REPORT: NORMAL
SARS-COV-2: NOT DETECTED
THIS TEST SENT TO: NORMAL

## 2020-06-25 ENCOUNTER — OFFICE VISIT (OUTPATIENT)
Dept: UROLOGY | Age: 50
End: 2020-06-25
Payer: COMMERCIAL

## 2020-06-25 ENCOUNTER — PREP FOR PROCEDURE (OUTPATIENT)
Dept: VASCULAR SURGERY | Age: 50
End: 2020-06-25

## 2020-06-25 VITALS — WEIGHT: 249 LBS | HEIGHT: 68 IN | TEMPERATURE: 97.1 F | BODY MASS INDEX: 37.74 KG/M2

## 2020-06-25 LAB
BACTERIA URINE, POC: 0
BILIRUBIN URINE: 0 MG/DL
BLOOD, URINE: POSITIVE
CASTS URINE, POC: 0
CLARITY: CLEAR
COLOR: YELLOW
CRYSTALS URINE, POC: 0
EPI CELLS URINE, POC: 0
GLUCOSE URINE: ABNORMAL
KETONES, URINE: NEGATIVE
LEUKOCYTE EST, POC: ABNORMAL
NITRITE, URINE: NEGATIVE
PH UA: 8.5 (ref 4.5–8)
PROTEIN UA: POSITIVE
RBC URINE, POC: 3
SPECIFIC GRAVITY UA: 1.02 (ref 1–1.03)
UROBILINOGEN, URINE: NORMAL
WBC URINE, POC: 2
YEAST URINE, POC: 0

## 2020-06-25 PROCEDURE — 81001 URINALYSIS AUTO W/SCOPE: CPT | Performed by: UROLOGY

## 2020-06-25 PROCEDURE — 99214 OFFICE O/P EST MOD 30 MIN: CPT | Performed by: UROLOGY

## 2020-06-25 RX ORDER — SODIUM CHLORIDE 0.9 % (FLUSH) 0.9 %
10 SYRINGE (ML) INJECTION EVERY 12 HOURS SCHEDULED
Status: CANCELLED | OUTPATIENT
Start: 2020-06-25

## 2020-06-25 RX ORDER — SODIUM CHLORIDE 0.9 % (FLUSH) 0.9 %
10 SYRINGE (ML) INJECTION PRN
Status: CANCELLED | OUTPATIENT
Start: 2020-06-25

## 2020-06-25 RX ORDER — ASPIRIN 81 MG/1
81 TABLET ORAL ONCE
Status: CANCELLED | OUTPATIENT
Start: 2020-06-25 | End: 2020-06-25

## 2020-06-25 ASSESSMENT — ENCOUNTER SYMPTOMS
CONSTIPATION: 0
DIARRHEA: 0
BACK PAIN: 0
WHEEZING: 0
EYE DISCHARGE: 0
EYE REDNESS: 0
SORE THROAT: 0
COUGH: 0
SINUS PRESSURE: 0
ABDOMINAL PAIN: 0
NAUSEA: 0
EYE PAIN: 0
ABDOMINAL DISTENTION: 0
VOMITING: 0
FACIAL SWELLING: 0
RHINORRHEA: 0
SHORTNESS OF BREATH: 0
BLOOD IN STOOL: 0
COLOR CHANGE: 0

## 2020-06-25 NOTE — PROGRESS NOTES
Hypertension     Obesity        Past Surgical History:   Procedure Laterality Date    COLONOSCOPY N/A 12/5/2019    Dr Kay Wagner, internal hemorrhoids-Grade 1-2-HP, 3 yr recall    TUNNELED VENOUS CATHETER PLACEMENT      [facility] - removal of first cath due to infection and placement of new one    TUNNELED VENOUS CATHETER PLACEMENT  01/2020    sjs       Current Outpatient Medications   Medication Sig Dispense Refill    simvastatin (ZOCOR) 10 MG tablet Take 1 tablet by mouth nightly 90 tablet 1    ferrous sulfate (IRON 325) 325 (65 Fe) MG tablet Take 325 mg by mouth daily (with breakfast)      sucralfate (CARAFATE) 1 GM tablet Take 1 g by mouth 2 times daily       VITAMIN D PO Take 1 capsule by mouth daily       Cyanocobalamin (VITAMIN B 12 PO) Take 1 tablet by mouth daily       Ferric Citrate (AURYXIA PO) Take 1 tablet by mouth daily       calcium carbonate (OYSTER SHELL CALCIUM 500 MG) 1250 (500 Ca) MG tablet Take 1 tablet by mouth daily      hydrOXYzine (VISTARIL) 25 MG capsule Take 1 capsule by mouth 3 times daily as needed for Itching 15 capsule 0    cloNIDine (CATAPRES) 0.1 MG tablet Take 1 tablet by mouth 2 times daily 60 tablet 3    hydrALAZINE (APRESOLINE) 50 MG tablet Take 1 tablet by mouth every 8 hours 90 tablet 3    metoprolol tartrate (LOPRESSOR) 25 MG tablet Take 1 tablet by mouth 2 times daily 60 tablet 3    NIFEdipine (ADALAT CC) 60 MG extended release tablet Take 1 tablet by mouth 2 times daily 30 tablet 3    albuterol sulfate  (90 BASE) MCG/ACT inhaler Inhale 2 puffs into the lungs every 6 hours as needed for Wheezing 1 Inhaler 2     No current facility-administered medications for this visit.         Allergies   Allergen Reactions    Atorvastatin     Banana     Lisinopril Other (See Comments)     Cough  Cough       Social History     Socioeconomic History    Marital status: Single     Spouse name: None    Number of children: None    Years of education: None  Highest education level: None   Occupational History    None   Social Needs    Financial resource strain: None    Food insecurity     Worry: None     Inability: None    Transportation needs     Medical: None     Non-medical: None   Tobacco Use    Smoking status: Never Smoker    Smokeless tobacco: Never Used   Substance and Sexual Activity    Alcohol use: Yes     Comment: occ    Drug use: No    Sexual activity: None   Lifestyle    Physical activity     Days per week: None     Minutes per session: None    Stress: None   Relationships    Social connections     Talks on phone: None     Gets together: None     Attends Mandaen service: None     Active member of club or organization: None     Attends meetings of clubs or organizations: None     Relationship status: None    Intimate partner violence     Fear of current or ex partner: None     Emotionally abused: None     Physically abused: None     Forced sexual activity: None   Other Topics Concern    None   Social History Narrative    None       Family History   Problem Relation Age of Onset    Cancer Father         prostate    Kidney Disease Brother        REVIEW OF SYSTEMS:  Review of Systems   Constitutional: Negative for activity change, chills, fatigue and fever. HENT: Negative for congestion, ear discharge, ear pain, facial swelling, mouth sores, rhinorrhea, sinus pressure and sore throat. Eyes: Negative for pain, discharge and redness. Respiratory: Negative for cough, shortness of breath and wheezing. Cardiovascular: Negative for chest pain, palpitations and leg swelling. Gastrointestinal: Negative for abdominal distention, abdominal pain, blood in stool, constipation, diarrhea, nausea and vomiting. Endocrine: Negative for polydipsia, polyphagia and polyuria. Genitourinary: Negative for decreased urine volume, difficulty urinating, dysuria, enuresis, flank pain, frequency, genital sores, hematuria and urgency. Musculoskeletal: Negative for back pain, gait problem, joint swelling, neck pain and neck stiffness. Skin: Negative for color change, rash and wound. Allergic/Immunologic: Negative for environmental allergies and immunocompromised state. Neurological: Negative for dizziness, syncope, weakness, light-headedness, numbness and headaches. Psychiatric/Behavioral: Negative for agitation, confusion, dysphoric mood, self-injury, sleep disturbance and suicidal ideas. The patient is not hyperactive. PHYSICAL EXAM:  Temp 97.1 °F (36.2 °C) (Temporal)   Ht 5' 8\" (1.727 m)   Wt 249 lb (112.9 kg)   BMI 37.86 kg/m²   Physical Exam  Constitutional:       General: He is not in acute distress. Appearance: Normal appearance. He is well-developed. HENT:      Head: Normocephalic and atraumatic. Nose: Nose normal.   Eyes:      General: No scleral icterus. Conjunctiva/sclera: Conjunctivae normal.      Pupils: Pupils are equal, round, and reactive to light. Neck:      Musculoskeletal: Normal range of motion and neck supple. Trachea: No tracheal deviation. Cardiovascular:      Rate and Rhythm: Normal rate and regular rhythm. Pulmonary:      Effort: Pulmonary effort is normal. No respiratory distress. Breath sounds: No stridor. Abdominal:      General: There is no distension. Palpations: Abdomen is soft. There is no mass. Tenderness: There is no abdominal tenderness. Musculoskeletal: Normal range of motion. General: No tenderness. Lymphadenopathy:      Cervical: No cervical adenopathy. Skin:     General: Skin is warm and dry. Findings: No erythema. Neurological:      Mental Status: He is alert and oriented to person, place, and time.    Psychiatric:         Behavior: Behavior normal.         Judgment: Judgment normal.             DATA:  CBC:   Lab Results   Component Value Date    WBC 7.8 06/17/2020    RBC 3.63 06/17/2020    HGB 10.4 06/17/2020    HCT 31.1 06/17/2020    MCV 85.7 06/17/2020    MCH 28.7 06/17/2020    MCHC 33.4 06/17/2020    RDW 17.1 06/17/2020     06/17/2020    MPV 8.8 06/17/2020     CMP:    Lab Results   Component Value Date     06/17/2020    K 4.7 06/17/2020    K 4.6 01/29/2020    CL 99 06/17/2020    CO2 29 06/17/2020    BUN 56 06/17/2020    CREATININE 10.3 06/17/2020    LABGLOM 5 06/17/2020    GLUCOSE 105 06/17/2020    PROT 7.2 04/14/2020    LABALBU 3.5 04/14/2020    CALCIUM 8.9 06/17/2020    BILITOT 0.4 04/14/2020    ALKPHOS 57 04/14/2020    AST 13 04/14/2020    ALT 9 04/14/2020     Results for orders placed or performed in visit on 06/25/20   POCT Urinalysis Dipstick w/ Micro (Auto)   Result Value Ref Range    Color, UA Yellow     Clarity, UA Clear Clear    Glucose, Ur 100mg/dl     Bilirubin Urine 0 mg/dL    Ketones, Urine Negative     Specific Gravity, UA 1.020 1.005 - 1.030    Blood, Urine Positive     pH, UA 8.5 (A) 4.5 - 8.0    Protein, UA Positive (A) Negative    Nitrite, Urine Negative     Leukocytes, UA neg     Urobilinogen, Urine Normal     rbc urine, poc 3     wbc urine, poc 2     bacteria urine, poc 0     yeast urine, poc 0     casts urine, poc 0     epi cells urine, poc 0     crystals urine, poc 0      No results found for: PSA  No results found for: PSAFREEPCT       1. Erectile dysfunction due to arterial insufficiency  Patient decided he did not want to proceed with penile prosthesis and want to go back to intracorporeal injection. He had stopped doing these previously because he had one episode of priapism but this is because he self escalated the dosage. He was prescribed 0.15 mL Trimix injection by Jameson Thomas back in 2018 we will start him on the same dose. He was given instructions where to inject he understands that should he have a erection lasting more than 4 hours he needs to come and have this reversed. He signed the consent and was given the instructions. - POCT Urinalysis Dipstick w/ Micro (Auto)    2.

## 2020-06-25 NOTE — H&P (VIEW-ONLY)
Vaccine (1 of 2) 11/15/2019    Lipid screen  01/29/2021    A1C test (Diabetic or Prediabetic)  01/30/2021    Colon cancer screen colonoscopy  12/05/2029    Flu vaccine  Completed    HIV screen  Completed    Hepatitis A vaccine  Aged Out    Hib vaccine  Aged Out    Meningococcal (ACWY) vaccine  Aged Out         Review of Systems     Constitutional - no significant activity change, appetite change, or unexpected weight change. No fever or chills. No diaphoresis or significant fatigue. HENT - no significant rhinorrhea or epistaxis. No tinnitus or significant hearing loss. Eyes - no sudden vision change or amaurosis. Respiratory - no significant shortness of breath, wheezing, or stridor. No apnea, cough, or chest tightness associated with shortness of breath. Cardiovascular - no chest pain, syncope, or significant dizziness. No palpitations or significant leg swelling.  has not had claudication. Gastrointestinal -  has not had abdominal swelling or pain. No blood in stool. No severe constipation, diarrhea, nausea, or vomiting. Genitourinary - No difficulty urinating, dysuria, frequency, or urgency. No flank pain or hematuria. Musculoskeletal - has not had back pain, gait disturbance, or myalgia. Skin - no color change, rash, pallor, or new wound. Neurologic - no dizziness, facial asymmetry, or light headedness. No seizures. No speech difficulty or lateralizing weakness. Hematologic - no easy bruising or excessive bleeding. Psychiatric - no severe anxiety or nervousness. No confusion. All other review of systems are negative.     PHYSICAL EXAMINATION:     Due to this being a TeleHealth encounter, evaluation of the following organ systems is limited: Vitals/Constitutional/EENT/Resp/CV/GI//MS/Neuro/Skin/Heme-Lymph-Imm. Constitutional - well developed, well nourished. No diaphoresis or acute distress. HENT - head normocephalic.   Right external ear canal appears normal.  Left external ear

## 2020-06-25 NOTE — H&P
TELEHEALTH EVALUATION -- Audio/Visual (During UXXHQ-26 public health emergency)     HPI:     Patient is located at home  Provider is located at [facility]   Also present during call is friend     Amanda Randall (:  1970) has requested an audio/video evaluation for the following concern(s):     Serg Neri has a history of chronic kidney disease for a duration of < 1 year. This is believed to be caused by hypertensive nephrosclerosis. He has experienced no problems. Serg Neri is now stage V and is on hemodiaylsis. He is right handed. He has had previous permacath. He is in need of permanent access.                 Prior to Visit Medications    Medication Sig Taking?  Authorizing Provider   ferrous sulfate (IRON 325) 325 (65 Fe) MG tablet Take 325 mg by mouth daily (with breakfast) Yes Historical Provider, MD   sucralfate (CARAFATE) 1 GM tablet Take 1 g by mouth 4 times daily Yes Historical Provider, MD   VITAMIN D PO Take by mouth Yes Historical Provider, MD   Cyanocobalamin (VITAMIN B 12 PO) Take by mouth Yes Historical Provider, MD   Ferric Citrate (AURYXIA PO) Take by mouth Yes Historical Provider, MD   calcium carbonate (OYSTER SHELL CALCIUM 500 MG) 1250 (500 Ca) MG tablet Take 1 tablet by mouth daily Yes Historical Provider, MD   albuterol-ipratropium (COMBIVENT RESPIMAT)  MCG/ACT AERS inhaler Inhale 1 puff into the lungs every 6 hours Yes Segundo Hills MD   cloNIDine (CATAPRES) 0.1 MG tablet Take 1 tablet by mouth 2 times daily Yes Jaguar Cervantes MD   hydrALAZINE (APRESOLINE) 50 MG tablet Take 1 tablet by mouth every 8 hours Yes Jaguar Cervantes MD   metoprolol tartrate (LOPRESSOR) 25 MG tablet Take 1 tablet by mouth 2 times daily Yes Jaguar Cervantes MD   NIFEdipine (ADALAT CC) 60 MG extended release tablet Take 1 tablet by mouth 2 times daily Yes Jaguar Cervantes MD   albuterol sulfate  (90 BASE) MCG/ACT inhaler Inhale 2 puffs into the lungs every 6 hours as needed for Wheezing Yes Segundo Hills MD ipratropium-albuterol (DUONEB) 0.5-2.5 (3) MG/3ML SOLN nebulizer solution Take 3 mLs by nebulization every 4 hours Yes Sharri Samuel MD   atorvastatin (LIPITOR) 20 MG tablet Take 1 tablet by mouth daily  Patient not taking: Reported on 5/21/2020   Segundo Hills MD   hydrOXYzine (VISTARIL) 25 MG capsule Take 1 capsule by mouth 3 times daily as needed for Itching  Patient not taking: Reported on 5/21/2020   SOSA Bush         Social History            Tobacco Use    Smoking status: Never Smoker    Smokeless tobacco: Never Used   Substance Use Topics    Alcohol use: Yes       Comment: occ    Drug use: No               Allergies   Allergen Reactions    Banana      Lisinopril Other (See Comments)       Cough  Cough   ,   Past Medical History        Past Medical History:   Diagnosis Date    Asthma      Diabetes mellitus ([address]      Erectile dysfunction      Hypertension      Obesity        ,   Past Surgical History         Past Surgical History:   Procedure Laterality Date    COLONOSCOPY N/A 12/5/2019     Dr Han Messina, internal hemorrhoids-Grade 1-2-HP, 3 yr recall    TUNNELED VENOUS CATHETER PLACEMENT         [facility] - removal of first cath due to infection and placement of new one    TUNNELED VENOUS CATHETER PLACEMENT   01/2020     sjs      ,   Social History            Tobacco Use    Smoking status: Never Smoker    Smokeless tobacco: Never Used   Substance Use Topics    Alcohol use:  Yes       Comment: occ    Drug use: No   ,   Family History         Family History   Problem Relation Age of Onset    Cancer Father           prostate    Kidney Disease Brother        ,        Health Maintenance   Topic Date Due    Diabetic retinal exam  11/15/1979    Hepatitis B vaccine (1 of 3 - Risk 3-dose series) 11/15/1988    DTaP/Tdap/Td vaccine (1 - Tdap) 11/15/1988    Pneumococcal 0-64 years Vaccine (2 of 3 - PCV13) 05/10/2018    Diabetic foot exam  02/16/2019    Shingles canal appears normal.  Septum appears midline. Eyes - conjunctiva normal.   No exudate. No icterus. Neck- ROM appears normal, no tracheal deviation. Extremities -No cyanosis, clubbing, no edema. No signs atheroembolic event. Pulmonary - effort appears normal.  No respiratory distress. No accessory muscle use  Musculoskeletal -   Motor grossly intact in visible lower extremities and upper extremities  Neurologic - alert and oriented X 3. Cranial Nerves II-XII grossly intact  Skin - intact. No rash, erythema, or pallor. Psychiatric - mood, affect, and behavior appear normal.  Judgment and thought processes appear normal.     Vein mapping - reviewed all images     Options have been discussed with the patient including continued medical management vs. proceeding with left arm av access. Patient has opted to proceed with this. Risks have been discussed with the patient including but not limited to MI, death, CVA, bleed, nerve injury, and infection.          ASSESSMENT/PLAN:  1. ESRD (end stage renal disease) ([facility]          No follow-ups on file.           Proceed with left arm brachial cephalic vs brachial basilic av fistula  Strongly encouraged start/continue statin therapy  Recommended no smoking  An  electronic signature was used to authenticate this note.     --SOSA Nguyễn on 5/21/2020 at 9:20 AM        Pursuant to the emergency declaration under the [facility] waiver authority and the [employer] Act, this Virtual  Visit was conducted, with patient's consent, to reduce the patient's risk of exposure to COVID-19 and provide continuity of care for an established patient.     Services were provided through a video synchronous discussion virtually to substitute for in-person clinic visit.

## 2020-06-26 ENCOUNTER — HOSPITAL ENCOUNTER (OUTPATIENT)
Age: 50
Setting detail: OUTPATIENT SURGERY
Discharge: HOME | End: 2020-06-26
Attending: SURGERY | Admitting: SURGERY
Payer: COMMERCIAL

## 2020-06-26 ENCOUNTER — ANESTHESIA (OUTPATIENT)
Dept: OPERATING ROOM | Age: 50
End: 2020-06-26
Payer: COMMERCIAL

## 2020-06-26 ENCOUNTER — ANESTHESIA EVENT (OUTPATIENT)
Dept: OPERATING ROOM | Age: 50
End: 2020-06-26
Payer: COMMERCIAL

## 2020-06-26 VITALS
DIASTOLIC BLOOD PRESSURE: 55 MMHG | OXYGEN SATURATION: 100 % | TEMPERATURE: 98.6 F | RESPIRATION RATE: 18 BRPM | SYSTOLIC BLOOD PRESSURE: 104 MMHG

## 2020-06-26 VITALS
RESPIRATION RATE: 16 BRPM | WEIGHT: 248 LBS | OXYGEN SATURATION: 95 % | DIASTOLIC BLOOD PRESSURE: 72 MMHG | HEIGHT: 67 IN | HEART RATE: 60 BPM | SYSTOLIC BLOOD PRESSURE: 128 MMHG | TEMPERATURE: 97.6 F | BODY MASS INDEX: 38.92 KG/M2

## 2020-06-26 PROBLEM — T82.7XXS: Chronic | Status: RESOLVED | Noted: 2020-04-30 | Resolved: 2020-06-26

## 2020-06-26 LAB
ANION GAP SERPL CALCULATED.3IONS-SCNC: 14 MMOL/L (ref 7–19)
APTT: 28.7 SEC (ref 26–36.2)
BUN BLDV-MCNC: 39 MG/DL (ref 6–20)
CALCIUM SERPL-MCNC: 9.5 MG/DL (ref 8.6–10)
CHLORIDE BLD-SCNC: 99 MMOL/L (ref 98–111)
CO2: 30 MMOL/L (ref 22–29)
CREAT SERPL-MCNC: 9.6 MG/DL (ref 0.5–1.2)
GFR NON-AFRICAN AMERICAN: 6
GLUCOSE BLD-MCNC: 113 MG/DL (ref 74–109)
HCT VFR BLD CALC: 33.4 % (ref 42–52)
HEMOGLOBIN: 11 G/DL (ref 14–18)
INR BLD: 1.03 (ref 0.88–1.18)
MCH RBC QN AUTO: 28.9 PG (ref 27–31)
MCHC RBC AUTO-ENTMCNC: 32.9 G/DL (ref 33–37)
MCV RBC AUTO: 87.9 FL (ref 80–94)
PDW BLD-RTO: 17.3 % (ref 11.5–14.5)
PLATELET # BLD: 272 K/UL (ref 130–400)
PMV BLD AUTO: 9.1 FL (ref 9.4–12.4)
POTASSIUM REFLEX MAGNESIUM: 4.4 MMOL/L (ref 3.5–4.9)
PROTHROMBIN TIME: 13.4 SEC (ref 12–14.6)
RBC # BLD: 3.8 M/UL (ref 4.7–6.1)
SODIUM BLD-SCNC: 143 MMOL/L (ref 136–145)
WBC # BLD: 8.3 K/UL (ref 4.8–10.8)

## 2020-06-26 PROCEDURE — 3700000001 HC ADD 15 MINUTES (ANESTHESIA): Performed by: SURGERY

## 2020-06-26 PROCEDURE — 80048 BASIC METABOLIC PNL TOTAL CA: CPT

## 2020-06-26 PROCEDURE — 2709999900 HC NON-CHARGEABLE SUPPLY: Performed by: SURGERY

## 2020-06-26 PROCEDURE — 6360000002 HC RX W HCPCS: Performed by: SURGERY

## 2020-06-26 PROCEDURE — 36415 COLL VENOUS BLD VENIPUNCTURE: CPT

## 2020-06-26 PROCEDURE — 3700000000 HC ANESTHESIA ATTENDED CARE: Performed by: SURGERY

## 2020-06-26 PROCEDURE — 7100000011 HC PHASE II RECOVERY - ADDTL 15 MIN: Performed by: SURGERY

## 2020-06-26 PROCEDURE — 64415 NJX AA&/STRD BRCH PLXS IMG: CPT | Performed by: NURSE ANESTHETIST, CERTIFIED REGISTERED

## 2020-06-26 PROCEDURE — 7100000010 HC PHASE II RECOVERY - FIRST 15 MIN: Performed by: SURGERY

## 2020-06-26 PROCEDURE — 2580000003 HC RX 258: Performed by: SURGERY

## 2020-06-26 PROCEDURE — 3600000004 HC SURGERY LEVEL 4 BASE: Performed by: SURGERY

## 2020-06-26 PROCEDURE — 6360000002 HC RX W HCPCS: Performed by: ANESTHESIOLOGY

## 2020-06-26 PROCEDURE — 36821 AV FUSION DIRECT ANY SITE: CPT | Performed by: SURGERY

## 2020-06-26 PROCEDURE — 2500000003 HC RX 250 WO HCPCS: Performed by: NURSE ANESTHETIST, CERTIFIED REGISTERED

## 2020-06-26 PROCEDURE — 2580000003 HC RX 258: Performed by: NURSE ANESTHETIST, CERTIFIED REGISTERED

## 2020-06-26 PROCEDURE — 7100000000 HC PACU RECOVERY - FIRST 15 MIN: Performed by: SURGERY

## 2020-06-26 PROCEDURE — 6360000002 HC RX W HCPCS: Performed by: NURSE ANESTHETIST, CERTIFIED REGISTERED

## 2020-06-26 PROCEDURE — 7100000001 HC PACU RECOVERY - ADDTL 15 MIN: Performed by: SURGERY

## 2020-06-26 PROCEDURE — 6370000000 HC RX 637 (ALT 250 FOR IP): Performed by: NURSE PRACTITIONER

## 2020-06-26 PROCEDURE — 3600000014 HC SURGERY LEVEL 4 ADDTL 15MIN: Performed by: SURGERY

## 2020-06-26 PROCEDURE — 85730 THROMBOPLASTIN TIME PARTIAL: CPT

## 2020-06-26 PROCEDURE — 85027 COMPLETE CBC AUTOMATED: CPT

## 2020-06-26 PROCEDURE — 6360000002 HC RX W HCPCS: Performed by: NURSE PRACTITIONER

## 2020-06-26 PROCEDURE — 85610 PROTHROMBIN TIME: CPT

## 2020-06-26 PROCEDURE — 2580000003 HC RX 258: Performed by: NURSE PRACTITIONER

## 2020-06-26 RX ORDER — ASPIRIN 81 MG/1
81 TABLET ORAL ONCE
Status: COMPLETED | OUTPATIENT
Start: 2020-06-26 | End: 2020-06-26

## 2020-06-26 RX ORDER — PROPOFOL 10 MG/ML
INJECTION, EMULSION INTRAVENOUS PRN
Status: DISCONTINUED | OUTPATIENT
Start: 2020-06-26 | End: 2020-06-26 | Stop reason: SDUPTHER

## 2020-06-26 RX ORDER — MEPERIDINE HYDROCHLORIDE 50 MG/ML
12.5 INJECTION INTRAMUSCULAR; INTRAVENOUS; SUBCUTANEOUS EVERY 5 MIN PRN
Status: DISCONTINUED | OUTPATIENT
Start: 2020-06-26 | End: 2020-06-26 | Stop reason: HOSPADM

## 2020-06-26 RX ORDER — SODIUM CHLORIDE 9 MG/ML
INJECTION, SOLUTION INTRAVENOUS CONTINUOUS
Status: DISCONTINUED | OUTPATIENT
Start: 2020-06-26 | End: 2020-06-26 | Stop reason: HOSPADM

## 2020-06-26 RX ORDER — DEXAMETHASONE SODIUM PHOSPHATE 10 MG/ML
INJECTION, SOLUTION INTRAMUSCULAR; INTRAVENOUS PRN
Status: DISCONTINUED | OUTPATIENT
Start: 2020-06-26 | End: 2020-06-26 | Stop reason: SDUPTHER

## 2020-06-26 RX ORDER — HYDROMORPHONE HYDROCHLORIDE 1 MG/ML
0.25 INJECTION, SOLUTION INTRAMUSCULAR; INTRAVENOUS; SUBCUTANEOUS EVERY 5 MIN PRN
Status: DISCONTINUED | OUTPATIENT
Start: 2020-06-26 | End: 2020-06-26 | Stop reason: HOSPADM

## 2020-06-26 RX ORDER — SODIUM CHLORIDE 0.9 % (FLUSH) 0.9 %
10 SYRINGE (ML) INJECTION EVERY 12 HOURS SCHEDULED
Status: DISCONTINUED | OUTPATIENT
Start: 2020-06-26 | End: 2020-06-26 | Stop reason: HOSPADM

## 2020-06-26 RX ORDER — FENTANYL CITRATE 50 UG/ML
50 INJECTION, SOLUTION INTRAMUSCULAR; INTRAVENOUS
Status: DISCONTINUED | OUTPATIENT
Start: 2020-06-26 | End: 2020-06-26 | Stop reason: HOSPADM

## 2020-06-26 RX ORDER — ROPIVACAINE HYDROCHLORIDE 5 MG/ML
INJECTION, SOLUTION EPIDURAL; INFILTRATION; PERINEURAL
Status: COMPLETED | OUTPATIENT
Start: 2020-06-26 | End: 2020-06-26

## 2020-06-26 RX ORDER — ONDANSETRON 2 MG/ML
INJECTION INTRAMUSCULAR; INTRAVENOUS PRN
Status: DISCONTINUED | OUTPATIENT
Start: 2020-06-26 | End: 2020-06-26 | Stop reason: SDUPTHER

## 2020-06-26 RX ORDER — DIPHENHYDRAMINE HYDROCHLORIDE 50 MG/ML
12.5 INJECTION INTRAMUSCULAR; INTRAVENOUS
Status: DISCONTINUED | OUTPATIENT
Start: 2020-06-26 | End: 2020-06-26 | Stop reason: HOSPADM

## 2020-06-26 RX ORDER — HYDROCODONE BITARTRATE AND ACETAMINOPHEN 5; 325 MG/1; MG/1
1 TABLET ORAL EVERY 4 HOURS PRN
Status: DISCONTINUED | OUTPATIENT
Start: 2020-06-26 | End: 2020-06-26 | Stop reason: HOSPADM

## 2020-06-26 RX ORDER — HYDRALAZINE HYDROCHLORIDE 20 MG/ML
5 INJECTION INTRAMUSCULAR; INTRAVENOUS EVERY 10 MIN PRN
Status: DISCONTINUED | OUTPATIENT
Start: 2020-06-26 | End: 2020-06-26 | Stop reason: HOSPADM

## 2020-06-26 RX ORDER — MIDAZOLAM HYDROCHLORIDE 1 MG/ML
INJECTION INTRAMUSCULAR; INTRAVENOUS
Status: DISCONTINUED
Start: 2020-06-26 | End: 2020-06-26 | Stop reason: HOSPADM

## 2020-06-26 RX ORDER — SODIUM CHLORIDE 450 MG/100ML
INJECTION, SOLUTION INTRAVENOUS CONTINUOUS
Status: DISCONTINUED | OUTPATIENT
Start: 2020-06-26 | End: 2020-06-26 | Stop reason: HOSPADM

## 2020-06-26 RX ORDER — HYDROCODONE BITARTRATE AND ACETAMINOPHEN 5; 325 MG/1; MG/1
1 TABLET ORAL EVERY 8 HOURS PRN
Qty: 20 TABLET | Refills: 0 | Status: SHIPPED | OUTPATIENT
Start: 2020-06-26 | End: 2020-07-03

## 2020-06-26 RX ORDER — HYDROMORPHONE HYDROCHLORIDE 1 MG/ML
0.5 INJECTION, SOLUTION INTRAMUSCULAR; INTRAVENOUS; SUBCUTANEOUS EVERY 5 MIN PRN
Status: DISCONTINUED | OUTPATIENT
Start: 2020-06-26 | End: 2020-06-26 | Stop reason: HOSPADM

## 2020-06-26 RX ORDER — ACETAMINOPHEN 325 MG/1
650 TABLET ORAL EVERY 4 HOURS PRN
Status: DISCONTINUED | OUTPATIENT
Start: 2020-06-26 | End: 2020-06-26 | Stop reason: HOSPADM

## 2020-06-26 RX ORDER — MORPHINE SULFATE 4 MG/ML
4 INJECTION, SOLUTION INTRAMUSCULAR; INTRAVENOUS EVERY 5 MIN PRN
Status: DISCONTINUED | OUTPATIENT
Start: 2020-06-26 | End: 2020-06-26 | Stop reason: HOSPADM

## 2020-06-26 RX ORDER — LABETALOL HYDROCHLORIDE 5 MG/ML
5 INJECTION, SOLUTION INTRAVENOUS EVERY 10 MIN PRN
Status: DISCONTINUED | OUTPATIENT
Start: 2020-06-26 | End: 2020-06-26 | Stop reason: HOSPADM

## 2020-06-26 RX ORDER — FENTANYL CITRATE 50 UG/ML
25 INJECTION, SOLUTION INTRAMUSCULAR; INTRAVENOUS
Status: DISCONTINUED | OUTPATIENT
Start: 2020-06-26 | End: 2020-06-26 | Stop reason: HOSPADM

## 2020-06-26 RX ORDER — LIDOCAINE HYDROCHLORIDE 10 MG/ML
INJECTION, SOLUTION EPIDURAL; INFILTRATION; INTRACAUDAL; PERINEURAL PRN
Status: DISCONTINUED | OUTPATIENT
Start: 2020-06-26 | End: 2020-06-26 | Stop reason: SDUPTHER

## 2020-06-26 RX ORDER — LIDOCAINE HYDROCHLORIDE 10 MG/ML
1 INJECTION, SOLUTION EPIDURAL; INFILTRATION; INTRACAUDAL; PERINEURAL
Status: DISCONTINUED | OUTPATIENT
Start: 2020-06-26 | End: 2020-06-26 | Stop reason: HOSPADM

## 2020-06-26 RX ORDER — MORPHINE SULFATE 4 MG/ML
2 INJECTION, SOLUTION INTRAMUSCULAR; INTRAVENOUS EVERY 5 MIN PRN
Status: DISCONTINUED | OUTPATIENT
Start: 2020-06-26 | End: 2020-06-26 | Stop reason: HOSPADM

## 2020-06-26 RX ORDER — HYDROCODONE BITARTRATE AND ACETAMINOPHEN 5; 325 MG/1; MG/1
2 TABLET ORAL EVERY 4 HOURS PRN
Status: DISCONTINUED | OUTPATIENT
Start: 2020-06-26 | End: 2020-06-26 | Stop reason: HOSPADM

## 2020-06-26 RX ORDER — MIDAZOLAM HYDROCHLORIDE 1 MG/ML
2 INJECTION INTRAMUSCULAR; INTRAVENOUS
Status: DISCONTINUED | OUTPATIENT
Start: 2020-06-26 | End: 2020-06-26 | Stop reason: HOSPADM

## 2020-06-26 RX ORDER — MIDAZOLAM HYDROCHLORIDE 1 MG/ML
2 INJECTION INTRAMUSCULAR; INTRAVENOUS ONCE
Status: COMPLETED | OUTPATIENT
Start: 2020-06-26 | End: 2020-06-26

## 2020-06-26 RX ORDER — SODIUM CHLORIDE 0.9 % (FLUSH) 0.9 %
10 SYRINGE (ML) INJECTION PRN
Status: DISCONTINUED | OUTPATIENT
Start: 2020-06-26 | End: 2020-06-26 | Stop reason: HOSPADM

## 2020-06-26 RX ORDER — PROMETHAZINE HYDROCHLORIDE 25 MG/ML
6.25 INJECTION, SOLUTION INTRAMUSCULAR; INTRAVENOUS
Status: DISCONTINUED | OUTPATIENT
Start: 2020-06-26 | End: 2020-06-26 | Stop reason: HOSPADM

## 2020-06-26 RX ORDER — ENALAPRILAT 2.5 MG/2ML
1.25 INJECTION INTRAVENOUS
Status: DISCONTINUED | OUTPATIENT
Start: 2020-06-26 | End: 2020-06-26 | Stop reason: HOSPADM

## 2020-06-26 RX ORDER — SODIUM CHLORIDE 450 MG/100ML
INJECTION, SOLUTION INTRAVENOUS CONTINUOUS PRN
Status: DISCONTINUED | OUTPATIENT
Start: 2020-06-26 | End: 2020-06-26 | Stop reason: SDUPTHER

## 2020-06-26 RX ORDER — HEPARIN SODIUM 1000 [USP'U]/ML
INJECTION, SOLUTION INTRAVENOUS; SUBCUTANEOUS PRN
Status: DISCONTINUED | OUTPATIENT
Start: 2020-06-26 | End: 2020-06-26 | Stop reason: SDUPTHER

## 2020-06-26 RX ORDER — KETAMINE HYDROCHLORIDE 50 MG/ML
INJECTION, SOLUTION, CONCENTRATE INTRAMUSCULAR; INTRAVENOUS PRN
Status: DISCONTINUED | OUTPATIENT
Start: 2020-06-26 | End: 2020-06-26 | Stop reason: SDUPTHER

## 2020-06-26 RX ORDER — ONDANSETRON 2 MG/ML
4 INJECTION INTRAMUSCULAR; INTRAVENOUS EVERY 8 HOURS PRN
Status: DISCONTINUED | OUTPATIENT
Start: 2020-06-26 | End: 2020-06-26 | Stop reason: HOSPADM

## 2020-06-26 RX ORDER — METOCLOPRAMIDE HYDROCHLORIDE 5 MG/ML
10 INJECTION INTRAMUSCULAR; INTRAVENOUS
Status: DISCONTINUED | OUTPATIENT
Start: 2020-06-26 | End: 2020-06-26 | Stop reason: HOSPADM

## 2020-06-26 RX ORDER — SODIUM CHLORIDE, SODIUM LACTATE, POTASSIUM CHLORIDE, CALCIUM CHLORIDE 600; 310; 30; 20 MG/100ML; MG/100ML; MG/100ML; MG/100ML
INJECTION, SOLUTION INTRAVENOUS CONTINUOUS
Status: DISCONTINUED | OUTPATIENT
Start: 2020-06-26 | End: 2020-06-26 | Stop reason: HOSPADM

## 2020-06-26 RX ADMIN — PROPOFOL 100 MG: 10 INJECTION, EMULSION INTRAVENOUS at 08:15

## 2020-06-26 RX ADMIN — PHENYLEPHRINE HYDROCHLORIDE 80 MCG: 10 INJECTION INTRAVENOUS at 08:44

## 2020-06-26 RX ADMIN — Medication 25 MG: at 08:15

## 2020-06-26 RX ADMIN — PHENYLEPHRINE HYDROCHLORIDE 80 MCG: 10 INJECTION INTRAVENOUS at 08:57

## 2020-06-26 RX ADMIN — VANCOMYCIN HYDROCHLORIDE 1500 MG: 10 INJECTION, POWDER, LYOPHILIZED, FOR SOLUTION INTRAVENOUS at 07:42

## 2020-06-26 RX ADMIN — DEXAMETHASONE SODIUM PHOSPHATE 5 MG: 10 INJECTION, SOLUTION INTRAMUSCULAR; INTRAVENOUS at 08:29

## 2020-06-26 RX ADMIN — MIDAZOLAM HYDROCHLORIDE 2 MG: 2 INJECTION, SOLUTION INTRAMUSCULAR; INTRAVENOUS at 07:59

## 2020-06-26 RX ADMIN — SODIUM CHLORIDE: 4.5 INJECTION, SOLUTION INTRAVENOUS at 08:24

## 2020-06-26 RX ADMIN — ROPIVACAINE HYDROCHLORIDE 15 ML: 5 INJECTION, SOLUTION EPIDURAL; INFILTRATION; PERINEURAL at 08:04

## 2020-06-26 RX ADMIN — HEPARIN SODIUM 3000 UNITS: 1000 INJECTION, SOLUTION INTRAVENOUS; SUBCUTANEOUS at 08:41

## 2020-06-26 RX ADMIN — LIDOCAINE HYDROCHLORIDE 50 MG: 10 INJECTION, SOLUTION EPIDURAL; INFILTRATION; INTRACAUDAL; PERINEURAL at 08:15

## 2020-06-26 RX ADMIN — SODIUM CHLORIDE: 4.5 INJECTION, SOLUTION INTRAVENOUS at 07:36

## 2020-06-26 RX ADMIN — ASPIRIN 81 MG: 81 TABLET, COATED ORAL at 07:36

## 2020-06-26 RX ADMIN — Medication 25 MG: at 08:29

## 2020-06-26 RX ADMIN — ONDANSETRON HYDROCHLORIDE 4 MG: 2 INJECTION, SOLUTION INTRAMUSCULAR; INTRAVENOUS at 09:06

## 2020-06-26 RX ADMIN — VANCOMYCIN HYDROCHLORIDE 1500 MG: 10 INJECTION, POWDER, LYOPHILIZED, FOR SOLUTION INTRAVENOUS at 08:11

## 2020-06-26 RX ADMIN — SODIUM CHLORIDE: 4.5 INJECTION, SOLUTION INTRAVENOUS at 07:57

## 2020-06-26 ASSESSMENT — LIFESTYLE VARIABLES: SMOKING_STATUS: 0

## 2020-06-26 ASSESSMENT — ENCOUNTER SYMPTOMS: SHORTNESS OF BREATH: 0

## 2020-06-26 NOTE — ANESTHESIA POSTPROCEDURE EVALUATION
Department of Anesthesiology  Postprocedure Note    Patient: Josh Zaidi  MRN: [id number]  YOB: 1970  Date of evaluation: 6/26/2020  Time:  9:18 AM     Procedure Summary     Date:  06/26/20 Room / Location:  [address]    Anesthesia Start:  7962 Anesthesia Stop:      Procedure:  LEFT BRACHIAL / CEPHALIC AV FISTULA (Left ) Diagnosis:  (N18.6)    Surgeon:  Brenna Diaz MD Responsible Provider:  SOSA Baron CRNA    Anesthesia Type:  general, regional ASA Status:  3          Anesthesia Type: general, regional    Kendall Phase I: Kendall Score: 10    Kendall Phase II:      Last vitals: Reviewed and per EMR flowsheets.        Anesthesia Post Evaluation    Patient location during evaluation: PACU  Patient participation: complete - patient participated  Level of consciousness: awake  Pain score: 0  Airway patency: patent  Nausea & Vomiting: no nausea and no vomiting  Complications: no  Cardiovascular status: hemodynamically stable  Respiratory status: acceptable and room air  Hydration status: euvolemic

## 2020-06-26 NOTE — ANESTHESIA PRE PROCEDURE
current outpatient medications on file. Facility-Administered Medications Ordered in Other Visits   Medication Dose Route Frequency Provider Last Rate Last Dose    sodium chloride flush 0.9 % injection 10 mL  10 mL Intravenous 2 times per day SOSA Sanchez        sodium chloride flush 0.9 % injection 10 mL  10 mL Intravenous PRN SOSA Sanchez        vancomycin (VANCOCIN) 1,500 mg in dextrose 5 % 500 mL IVPB  1,500 mg Intravenous On Call to [facility], APRN        aspirin EC tablet 81 mg  81 mg Oral Once SOSA Sanchez        0.45 % sodium chloride infusion   Intravenous Continuous Phyllis Valdez MD        midazolam (VERSED) injection 2 mg  2 mg Intravenous Once Domitila Red, DO        midazolam (VERSED) 2 MG/2ML injection                Allergies: Allergies   Allergen Reactions    Atorvastatin     Banana     Lisinopril Other (See Comments)     Cough  Cough       Problem List:    Patient Active Problem List   Diagnosis Code    Mild intermittent asthma without complication C68.90    Family history of prostate cancer Z80.42    Morbid obesity due to excess calories ([facility] E66.01    Type 2 diabetes mellitus with chronic kidney disease on chronic dialysis, without long-term current use of insulin ([facility]) E11.22, N18.6, Z99.2    RUPAL (acute kidney injury) ([facility] N17.9    Chronic diastolic congestive heart failure ([address] I50.32    Hemodialysis catheter infection, sequela T82. 7XXS    ESRD (end stage renal disease) ([address] N18.6       Past Medical History:        Diagnosis Date    Asthma     Diabetes mellitus ([facility]     [facility]sat at [facility]    Erectile dysfunction     Hemodialysis patient ([facility]     t,th,s at [facility]    Hypertension     Obesity        Past Surgical History:        Procedure Laterality Date    COLONOSCOPY N/A 12/5/2019    Dr Ivelisse Lamb, internal hemorrhoids-Grade 1-2-HP, 3 yr recall    TUNNELED VENOUS CATHETER PLACEMENT [facility] - removal of first cath due to infection and placement of new one    TUNNELED VENOUS CATHETER PLACEMENT  01/2020    sjs       Social History:    Social History     Tobacco Use    Smoking status: Never Smoker    Smokeless tobacco: Never Used   Substance Use Topics    Alcohol use: Yes     Comment: occ                                Counseling given: Not Answered      Vital Signs (Current): There were no vitals filed for this visit. BP Readings from Last 3 Encounters:   06/26/20 124/87   06/17/20 (!) 152/96   06/12/20 (!) 178/113       NPO Status:                                                                                 BMI:   Wt Readings from Last 3 Encounters:   06/26/20 248 lb (112.5 kg)   06/25/20 249 lb (112.9 kg)   06/16/20 248 lb (112.5 kg)     There is no height or weight on file to calculate BMI.    CBC:   Lab Results   Component Value Date    WBC 7.8 06/17/2020    RBC 3.63 06/17/2020    HGB 10.4 06/17/2020    HCT 31.1 06/17/2020    MCV 85.7 06/17/2020    RDW 17.1 06/17/2020     06/17/2020       CMP:   Lab Results   Component Value Date     06/17/2020    K 4.7 06/17/2020    K 4.6 01/29/2020    CL 99 06/17/2020    CO2 29 06/17/2020    BUN 56 06/17/2020    CREATININE 10.3 06/17/2020    LABGLOM 5 06/17/2020    GLUCOSE 105 06/17/2020    PROT 7.2 04/14/2020    CALCIUM 8.9 06/17/2020    BILITOT 0.4 04/14/2020    ALKPHOS 57 04/14/2020    AST 13 04/14/2020    ALT 9 04/14/2020       POC Tests: No results for input(s): POCGLU, POCNA, POCK, POCCL, POCBUN, POCHEMO, POCHCT in the last 72 hours.     Coags:   Lab Results   Component Value Date    PROTIME 13.2 06/17/2020    INR 1.01 06/17/2020    APTT 29.2 06/17/2020       HCG (If Applicable): No results found for: PREGTESTUR, PREGSERUM, HCG, HCGQUANT     ABGs: No results found for: PHART, PO2ART, NLJ2JRK, LHT5MBL, BEART, B7HOJJWB     Type & Screen (If Applicable):  No results found for: LABABO,

## 2020-06-26 NOTE — ANESTHESIA POSTPROCEDURE EVALUATION
Department of Anesthesiology  Postprocedure Note    Patient: Ana Lilia Ivory  MRN: [id number]  YOB: 1970  Date of evaluation: 6/26/2020  Time:  9:20 AM     Procedure Summary     Date:  06/26/20 Room / Location:  [address]    Anesthesia Start:  7319 Anesthesia Stop:  6824    Procedure:  LEFT BRACHIAL / CEPHALIC AV FISTULA (Left ) Diagnosis:  (N18.6)    Surgeon:  Patrice Estrada MD Responsible Provider:  SOSA Brambila CRNA    Anesthesia Type:  general, regional ASA Status:  3          Anesthesia Type: general, regional    Kendall Phase I: Kendall Score: 10    Kendall Phase II:      Last vitals: Reviewed and per EMR flowsheets.        Anesthesia Post Evaluation

## 2020-06-26 NOTE — ANESTHESIA PROCEDURE NOTES
Peripheral Block    Patient location during procedure: holding area  Start time: 6/26/2020 8:04 AM  End time: 6/26/2020 8:04 AM  Staffing  Anesthesiologist: Alee Rosen DO  Performed: anesthesiologist   Preanesthetic Checklist  Completed: patient identified, site marked, surgical consent, pre-op evaluation, timeout performed, IV checked, risks and benefits discussed, monitors and equipment checked, anesthesia consent given, oxygen available and patient being monitored  Peripheral Block  Patient position: supine  Prep: ChloraPrep  Patient monitoring: cardiac monitor, continuous pulse ox, frequent blood pressure checks and IV access  Block type: Brachial plexus  Laterality: left  Injection technique: single-shot  Procedures: ultrasound guided  Local infiltration: lidocaine  Infiltration strength: 2 %  Dose: 15 mL  Supraclavicular  Provider prep: mask and sterile gloves  Local infiltration: lidocaine  Needle  Needle type: combined needle/nerve stimulator   Needle gauge: 22 G  Needle length: 5 cm  Needle localization: ultrasound guidance  Assessment  Injection assessment: negative aspiration for heme, no paresthesia on injection and local visualized surrounding nerve on ultrasound  Paresthesia pain: none  Slow fractionated injection: yes  Hemodynamics: stable  Additional Notes  Normal appearing plexus without evidence of pathology.   Medications Administered  Ropivacaine (NAROPIN) injection 0.5%, 15 mL  Reason for block: post-op pain management and at surgeon's request

## 2020-06-26 NOTE — OP NOTE
from the native vessels. Hemostasis was excellent and there is a nicely palpable thrill in the fistula. The wound is closed in layers with 3-0 PDS subcutaneous sutures, 4-0 monocryl subcuticular running suture and dermabond skin adhesive. He tolerated the procedure well and was brought to the recovery room in good condition.

## 2020-07-13 ENCOUNTER — OFFICE VISIT (OUTPATIENT)
Dept: VASCULAR SURGERY | Facility: CLINIC | Age: 50
End: 2020-07-13

## 2020-07-13 VITALS
WEIGHT: 248 LBS | BODY MASS INDEX: 37.59 KG/M2 | OXYGEN SATURATION: 98 % | SYSTOLIC BLOOD PRESSURE: 126 MMHG | DIASTOLIC BLOOD PRESSURE: 82 MMHG | HEIGHT: 68 IN | HEART RATE: 60 BPM

## 2020-07-13 DIAGNOSIS — N18.6 ESRD (END STAGE RENAL DISEASE) (HCC): Primary | ICD-10-CM

## 2020-07-13 DIAGNOSIS — T82.41XA HEMODIALYSIS CATHETER DYSFUNCTION, INITIAL ENCOUNTER (HCC): ICD-10-CM

## 2020-07-13 PROCEDURE — 99213 OFFICE O/P EST LOW 20 MIN: CPT | Performed by: SURGERY

## 2020-07-13 NOTE — PATIENT INSTRUCTIONS

## 2020-07-13 NOTE — PROGRESS NOTES
"07/13/2020      Aron Medley MD  [address] [place] [address] [address] [place] [address]        Craig Swanson  1970    Chief Complaint   Patient presents with   • Follow-up     Pinhole in catheter just above the HUB.  Dialysis called and stated that it has blood coming out of it.        Dear Aron Medley MD:    HPI     I had the pleasure of seeing your patient in the office today for follow up.  As you recall, the patient is a 49 y.o. male who we are currently following for end-stage renal disease.  He had previously been admitted here at [facility] and had worsening renal failure and required hemodialysis and so a left internal jugular vein permacath was placed.  He never followed up here with our office.  He has been undergoing outpatient dialysis but also about 3 weeks ago I been seen by the vascular surgeons across [facility] at [facility] and a left brachiocephalic AV fistula has been created.  However this fistula has not yet been accessed.  Dialysis center called last week stating that at 1 of his dialysis sessions he was noted to have some bleeding from a \"pinhole\" in 1 of the catheter arms just above the connection site.  He returns here today for evaluation of this.  The patient reports however dialysis on Thursday and Saturday he had no issues with the catheter and there was no bleeding.  He reports the day there was some bleeding he felt as though the catheter was placed under excessive tension at the time of dialysis and that is when the small amount of bleeding occurred.  Otherwise the catheter is been functioning well with no issue.  He has no other acute complaints today.      Review of Systems   Constitutional: Negative.  Negative for activity change, appetite change, chills, diaphoresis, fatigue and fever.   HENT: Negative.  Negative for congestion, sneezing, sore throat and trouble swallowing.    Eyes: Negative.  Negative for visual disturbance.   Respiratory: Negative.  Negative for chest tightness and " "shortness of breath.    Cardiovascular: Negative.  Negative for chest pain, palpitations and leg swelling.   Gastrointestinal: Negative.  Negative for abdominal distention, abdominal pain, nausea and vomiting.   Endocrine: Negative.    Genitourinary: Negative.    Musculoskeletal: Negative.    Skin: Negative.    Allergic/Immunologic: Negative.    Neurological: Negative.    Hematological: Negative.    Psychiatric/Behavioral: Negative.        /82   Pulse 60   Ht 172.7 cm (68\")   Wt 112 kg (248 lb)   SpO2 98%   BMI 37.71 kg/m²   Physical Exam   Constitutional: He is oriented to person, place, and time. He appears well-developed and well-nourished.   HENT:   Head: Normocephalic and atraumatic.   Eyes: Pupils are equal, round, and reactive to light. EOM are normal.   Neck: Normal range of motion. Neck supple. No JVD present.   Cardiovascular: Normal rate, regular rhythm, intact distal pulses and normal pulses.   Pulses:       Carotid pulses are 2+ on the right side, and 2+ on the left side.       Radial pulses are 2+ on the right side, and 2+ on the left side.   He has a left brachiocephalic AV fistula in place with good thrill along the fistula.  The incision site just distal to the antecubital fossa appears well-healed.    He has a left IJ permacath in place.  The exit site of the skin is clean and there is no other issue apparent with the catheter.   Pulmonary/Chest: Effort normal. No respiratory distress.   Abdominal: Soft. He exhibits no distension and no mass. There is no tenderness.   Musculoskeletal: Normal range of motion. He exhibits no edema, tenderness or deformity.   Neurological: He is alert and oriented to person, place, and time. No sensory deficit. He exhibits normal muscle tone.   Skin: Skin is warm and dry. Capillary refill takes less than 2 seconds.   Psychiatric: He has a normal mood and affect. His behavior is normal. Judgment and thought content normal.   Vitals reviewed.      DIAGNOSTIC " DATA:    No results found.    Patient Active Problem List   Diagnosis   • Febrile illness   • ESRD (end stage renal disease) (CMS/[facility])   • Type 2 diabetes mellitus (CMS/[facility])   • Essential hypertension   • SIRS (systemic inflammatory response syndrome) (CMS/[facility])   • Anemia   • Obesity (BMI 30-39.9)   • Staphylococcus aureus bacteremia   • Sepsis (CMS/[facility])   • Back pain   • Sepsis due to infected intravenous catheter (CMS/[facility])   • Hemoptysis         ICD-10-CM ICD-9-CM   1. ESRD (end stage renal disease) (CMS/[facility]) N18.6 585.6   2. Hemodialysis catheter dysfunction, initial encounter (CMS/HCC) T82.41XA 996.1           PLAN: After thoroughly evaluating Craig Swanson, I believe the best course of action is to remain conservative from a vascular standpoint.  His catheter appears intact today with no issue.  There was a report of some bleeding from the catheter at one dialysis session last week however in subsequent sessions on Thursday and Saturday last week the patient reports that there was no further bleeding and no other issue and the catheter worked well.  As such no plan for any invention at this point.  He can continue using the catheter as needed for hemodialysis moving forward.  He did have a recently created fistula in the left arm at an outside institution and has follow-up with that provider coming up in once that access has been successfully used and the permacath can plan to be removed.  The patient is to continue taking their medications as previously discussed.   I did discuss vascular risk factors as they pertain to the progression of vascular disease including controlling hypertension, and hyperlipidemia. These factors remain stable. Patient's Body mass index is 37.71 kg/m². BMI is above normal parameters. Recommendations include: educational material. This was all discussed in full with complete understanding.  Thank you for allowing me to participate in the care of your patient.  Please do not hesitate  to call with any questions or concerns.  We will keep you aware of any further encounters with Craig Swanson.      Sincerely Yours,      Robbin Cook MD

## 2020-07-20 ENCOUNTER — OFFICE VISIT (OUTPATIENT)
Dept: VASCULAR SURGERY | Age: 50
End: 2020-07-20

## 2020-07-20 VITALS
OXYGEN SATURATION: 98 % | RESPIRATION RATE: 16 BRPM | TEMPERATURE: 97.3 F | WEIGHT: 248 LBS | HEIGHT: 68 IN | SYSTOLIC BLOOD PRESSURE: 183 MMHG | DIASTOLIC BLOOD PRESSURE: 94 MMHG | HEART RATE: 70 BPM | BODY MASS INDEX: 37.59 KG/M2

## 2020-07-20 PROCEDURE — 99024 POSTOP FOLLOW-UP VISIT: CPT | Performed by: PHYSICIAN ASSISTANT

## 2020-07-27 ENCOUNTER — TELEPHONE (OUTPATIENT)
Dept: VASCULAR SURGERY | Age: 50
End: 2020-07-27

## 2020-07-27 NOTE — TELEPHONE ENCOUNTER
I spoke with Anna Brandt this morning to go over instructions for his upcomming Fistulogram/BAM. He will come to [facility] on Monday 8/3/20 to have this procedure done with Dr. Nan Higgins. He will need to having his COVID test done at the [facility] on Thursday 7/30/20. He will then strict self quarantine until his procedure with the exception of his Dialysis treatments. Patient verbally understood all instructions given (Please see letter for instructions). Anna Brandt will call if he has any questions. I faxed a copy if his instructions to [facility] to give Anna Brandt a copy of the instructions.

## 2020-07-31 ENCOUNTER — TELEPHONE (OUTPATIENT)
Dept: VASCULAR SURGERY | Age: 50
End: 2020-07-31

## 2020-08-04 ENCOUNTER — TELEPHONE (OUTPATIENT)
Dept: UROLOGY | Age: 50
End: 2020-08-04

## 2020-08-04 NOTE — TELEPHONE ENCOUNTER
Called patient to let him know we were going to have to reschd his cysto due to him not having his CT scan done. He said that was fine and asked that I mailed him the new appts. I did let him know it would be sometimes in Sept when he was schd he confirmed understanding.

## 2020-08-06 ENCOUNTER — TELEPHONE (OUTPATIENT)
Dept: VASCULAR SURGERY | Age: 50
End: 2020-08-06

## 2020-08-06 NOTE — TELEPHONE ENCOUNTER
Magi Emanuel with [facility] called wanting to know the status of when they can use his AV access. Per Rajendra Moncada, he is scheduled for f'gram/BAM on 8/10/20 and she needs to do a VV 2 weeks after the fistuogram/BAM. Determination will be made after the VV. Informed Magi Emanuel that I would call the patient to schedule the VV.    8/6/20 11:20am Spoke with patient to inform him that we need to schedule a VV visit with SOSA Summers 2 weeks after his fistulogram on 8/10/20.  VV appointment was given to patient for 8/24/20 at 1:30pm.

## 2020-08-07 ENCOUNTER — TELEPHONE (OUTPATIENT)
Dept: VASCULAR SURGERY | Age: 50
End: 2020-08-07

## 2020-08-07 NOTE — TELEPHONE ENCOUNTER
Spoke with patient, he did not go for his COVID-19 test yesterday, informed him that we will need to cancel the procedure for Monday 8/10/20. We will call him next week to get it rescheduled. He states that he has an appointment in [place] on [facility] [address]. 8/12/20 so do not schedule anything to be done on that day.

## 2020-08-11 RX ORDER — NIFEDIPINE 60 MG/1
TABLET, FILM COATED, EXTENDED RELEASE ORAL
Qty: 60 TABLET | Refills: 3 | Status: SHIPPED | OUTPATIENT
Start: 2020-08-11 | End: 2021-02-08 | Stop reason: SDUPTHER

## 2020-08-11 RX ORDER — FERROUS SULFATE 325(65) MG
TABLET ORAL
Qty: 60 TABLET | Refills: 3 | Status: SHIPPED | OUTPATIENT
Start: 2020-08-11 | End: 2021-02-19 | Stop reason: SDUPTHER

## 2020-08-11 RX ORDER — HYDRALAZINE HYDROCHLORIDE 50 MG/1
TABLET, FILM COATED ORAL
Qty: 90 TABLET | Refills: 3 | Status: SHIPPED | OUTPATIENT
Start: 2020-08-11 | End: 2021-02-08 | Stop reason: SDUPTHER

## 2020-08-11 RX ORDER — CLONIDINE HYDROCHLORIDE 0.1 MG/1
TABLET ORAL
Qty: 60 TABLET | Refills: 3 | Status: SHIPPED | OUTPATIENT
Start: 2020-08-11 | End: 2021-02-08 | Stop reason: SDUPTHER

## 2020-08-11 NOTE — TELEPHONE ENCOUNTER
Rajendralakhwinder  called to request a refill on his medication.       Last office visit : 5/28/2020   Next office visit : 8/11/2020     Requested Prescriptions     Pending Prescriptions Disp Refills    metoprolol tartrate (LOPRESSOR) 25 MG tablet [Pharmacy Med Name: METOPROLOL TARTRATE 25MG TABS] 60 tablet 3     Sig: TAKE ONE TABLET TWO TIMES A DAY    hydrALAZINE (APRESOLINE) 50 MG tablet [Pharmacy Med Name: hydrALAZINE 50MG TAB] 90 tablet 3     Sig: TAKE ONE TABLET EVERY 8 HOURS    ferrous sulfate (IRON 325) 325 (65 Fe) MG tablet [Pharmacy Med Name: FERROUS SULFATE 325 (65 FE)MG TABS] 60 tablet 3     Sig: TAKE ONE TABLET TWO TIMES A DAY WITH MEALS    cloNIDine (CATAPRES) 0.1 MG tablet [Pharmacy Med Name: CLONIDINE HCL 0.1MG TABS] 60 tablet 3     Sig: TAKE ONE TABLET TWO TIMES A DAY    NIFEdipine (ADALAT CC) 60 MG extended release tablet [Pharmacy Med Name: NIFEDIPINE ER 60MG TB24] 60 tablet 3     Sig: TAKE ONE TABLET TWO TIMES A DAY            Tyra Antonino Matsu, [place]

## 2020-08-24 ENCOUNTER — VIRTUAL VISIT (OUTPATIENT)
Dept: VASCULAR SURGERY | Age: 50
End: 2020-08-24

## 2020-08-24 ENCOUNTER — TELEPHONE (OUTPATIENT)
Dept: VASCULAR SURGERY | Age: 50
End: 2020-08-24

## 2020-08-24 PROCEDURE — 99024 POSTOP FOLLOW-UP VISIT: CPT | Performed by: NURSE PRACTITIONER

## 2020-08-24 RX ORDER — CLONIDINE HYDROCHLORIDE 0.1 MG/1
0.1 TABLET ORAL PRN
Status: CANCELLED | OUTPATIENT
Start: 2020-08-24

## 2020-08-24 RX ORDER — SODIUM CHLORIDE 0.9 % (FLUSH) 0.9 %
10 SYRINGE (ML) INJECTION PRN
Status: CANCELLED | OUTPATIENT
Start: 2020-08-24

## 2020-08-24 RX ORDER — ASPIRIN 81 MG/1
81 TABLET ORAL ONCE
Status: CANCELLED | OUTPATIENT
Start: 2020-08-24 | End: 2020-08-24

## 2020-08-24 RX ORDER — SODIUM CHLORIDE 9 MG/ML
INJECTION, SOLUTION INTRAVENOUS CONTINUOUS
Status: CANCELLED | OUTPATIENT
Start: 2020-08-24

## 2020-08-24 NOTE — H&P (VIEW-ONLY)
2020    TELEHEALTH EVALUATION -- Audio/Visual (During [id number] public health emergency)    HPI:    Patient is located at home  Provider is located at [facility] [place]   Also present during call is no one    Charity Craig (:  1970) has requested an audio/video evaluation for the following concern(s):    He has a history of chronic kidney disease for a duration of 1 - 5 years. This is believed to be caused by unknown etiology. He has experienced no problems. He is now stage V and is on hemodialysis. He has had previous permacath and previous  Left AV fistula. They dialyze at [facility]. They have not infiltrations. They have not had increased venous pressure. They have not had increased arterial pressure. They have not post procedure bleeding. They have not had inadequate flow rates. They report has not had increased arm swelling. Prior to Visit Medications    Medication Sig Taking?  Authorizing Provider   metoprolol tartrate (LOPRESSOR) 25 MG tablet TAKE ONE TABLET TWO TIMES A DAY Yes Daniel Neri MD   hydrALAZINE (APRESOLINE) 50 MG tablet TAKE ONE TABLET EVERY 8 HOURS Yes Daniel Neri MD   ferrous sulfate (IRON 325) 325 (65 Fe) MG tablet TAKE ONE TABLET TWO TIMES A DAY WITH MEALS Yes Daniel Neri MD   cloNIDine (CATAPRES) 0.1 MG tablet TAKE ONE TABLET TWO TIMES A DAY Yes Daniel Neri MD   NIFEdipine (ADALAT CC) 60 MG extended release tablet TAKE ONE TABLET TWO TIMES A DAY Yes Daniel Neri MD   simvastatin (ZOCOR) 10 MG tablet Take 1 tablet by mouth nightly Yes Daniel Neri MD   sucralfate (CARAFATE) 1 GM tablet Take 1 g by mouth 2 times daily  Yes Historical Provider, MD   VITAMIN D PO Take 1 capsule by mouth daily  Yes Historical Provider, MD   Cyanocobalamin (VITAMIN B 12 PO) Take 1 tablet by mouth daily  Yes Historical Provider, MD   Ferric Citrate (AURYXIA PO) Take 1 tablet by mouth daily  Yes Historical Provider, MD   calcium carbonate (OYSTER SHELL CALCIUM 500 MG) 1250 (500 Ca) MG tablet Take 1 tablet by mouth daily Yes Historical Provider, MD   hydrOXYzine (VISTARIL) 25 MG capsule Take 1 capsule by mouth 3 times daily as needed for Itching Yes SOSA Whiting   albuterol sulfate  (90 BASE) MCG/ACT inhaler Inhale 2 puffs into the lungs every 6 hours as needed for Wheezing Yes Can Omalley MD       Social History     Tobacco Use    Smoking status: Never Smoker    Smokeless tobacco: Never Used   Substance Use Topics    Alcohol use: Not Currently     Comment: occ    Drug use: No        Allergies   Allergen Reactions    Atorvastatin     Banana     Lisinopril Other (See Comments)     Cough  Cough   ,   Past Medical History:   Diagnosis Date    Asthma     Diabetes mellitus ([facility] [address]     [facility]    Erectile dysfunction     Hemodialysis patient ([facility]     t,th,s at [facility]    Hypertension     Obesity    ,   Past Surgical History:   Procedure Laterality Date    COLONOSCOPY N/A 12/5/2019    Dr Zuleima Butler, internal hemorrhoids-Grade 1-2-HP, 3 yr recall    DIALYSIS FISTULA CREATION Left 6/26/2020    LEFT BRACHIAL / CEPHALIC AV FISTULA performed by Cecil Holland MD at [address] TUNNELED VENOUS CATHETER PLACEMENT      [facility] - removal of first cath due to infection and placement of new one    TUNNELED VENOUS CATHETER PLACEMENT  01/2020    sjs   ,   Social History     Tobacco Use    Smoking status: Never Smoker    Smokeless tobacco: Never Used   Substance Use Topics    Alcohol use: Not Currently     Comment: occ    Drug use: No   ,   Family History   Problem Relation Age of Onset    Cancer Father         prostate    Kidney Disease Brother    ,   Health Maintenance   Topic Date Due    Diabetic retinal exam  11/15/1979    Hepatitis B vaccine (1 of 3 - Risk 3-dose series) 11/15/1988    DTaP/Tdap/Td vaccine (1 - Tdap) 11/15/1988    Pneumococcal 0-64 years Vaccine (2 of 3 - PCV13) 05/10/2018    Diabetic foot exam  02/16/2019    Shingles clubbing, no edema. No signs atheroembolic event. Slightly pulsatile left upper arm av fistula (brachial cephalic)   Pulmonary - effort appears normal.  No respiratory distress. No accessory muscle use  Musculoskeletal -   Motor grossly intact in visible lower extremities and upper extremities  Neurologic - alert and oriented X 3. Cranial Nerves II-XII grossly intact  Skin - intact. No rash, erythema, or pallor.  Fistula is not mature  Psychiatric - mood, affect, and behavior appear normal.  Judgment and thought processes appear normal.      ASSESSMENT/PLAN:  ESRD  Non mature, slightly pulsatile left upper arm av fistula      Left upper extremity arteriovenous fistulogram with possible angioplasty or stent

## 2020-08-24 NOTE — PROGRESS NOTES
2020    TELEHEALTH EVALUATION -- Audio/Visual (During [id number]-37 public health emergency)    HPI:    Patient is located at home  Provider is located at [facility] [place]   Also present during call is no one    James Weiner (:  1970) has requested an audio/video evaluation for the following concern(s):    He has a history of chronic kidney disease for a duration of 1 - 5 years. This is believed to be caused by unknown etiology. He has experienced no problems. He is now stage V and is on hemodialysis. He has had previous permacath and previous  Left AV fistula. They dialyze at [place]. They have not infiltrations. They have not had increased venous pressure. They have not had increased arterial pressure. They have not post procedure bleeding. They have not had inadequate flow rates. They report has not had increased arm swelling. Patient reports they have not had hand pain. He is in need of BAM.     Prior to Visit Medications    Medication Sig Taking?  Authorizing Provider   metoprolol tartrate (LOPRESSOR) 25 MG tablet TAKE ONE TABLET TWO TIMES A DAY Yes Claudetta Devon, MD   hydrALAZINE (APRESOLINE) 50 MG tablet TAKE ONE TABLET EVERY 8 HOURS Yes Claudetta Devon, MD   ferrous sulfate (IRON 325) 325 (65 Fe) MG tablet TAKE ONE TABLET TWO TIMES A DAY WITH MEALS Yes Claudetta Devon, MD   cloNIDine (CATAPRES) 0.1 MG tablet TAKE ONE TABLET TWO TIMES A DAY Yes Claudetta Devon, MD   NIFEdipine (ADALAT CC) 60 MG extended release tablet TAKE ONE TABLET TWO TIMES A DAY Yes Claudetta Devon, MD   simvastatin (ZOCOR) 10 MG tablet Take 1 tablet by mouth nightly Yes Claudetta Devon, MD   sucralfate (CARAFATE) 1 GM tablet Take 1 g by mouth 2 times daily  Yes Historical Provider, MD   VITAMIN D PO Take 1 capsule by mouth daily  Yes Historical Provider, MD   Cyanocobalamin (VITAMIN B 12 PO) Take 1 tablet by mouth daily  Yes Historical Provider, MD   Ferric Citrate (AURYXIA PO) Take 1 tablet by mouth daily  Yes Historical Provider, MD   calcium carbonate (OYSTER SHELL CALCIUM 500 MG) 1250 (500 Ca) MG tablet Take 1 tablet by mouth daily Yes Historical Provider, MD   hydrOXYzine (VISTARIL) 25 MG capsule Take 1 capsule by mouth 3 times daily as needed for Itching Yes SOSA Mcfarland   albuterol sulfate  (90 BASE) MCG/ACT inhaler Inhale 2 puffs into the lungs every 6 hours as needed for Wheezing Yes Navin Antunez MD       Social History     Tobacco Use    Smoking status: Never Smoker    Smokeless tobacco: Never Used   Substance Use Topics    Alcohol use: Not Currently     Comment: occ    Drug use: No        Allergies   Allergen Reactions    Atorvastatin     Banana     Lisinopril Other (See Comments)     Cough  Cough   ,   Past Medical History:   Diagnosis Date    Asthma     Diabetes mellitus ([facility] [address]     [facility]sat [facility]    Erectile dysfunction     Hemodialysis patient ([facility] [address]     t[facility]th,s at [facility]    Hypertension     Obesity    ,   Past Surgical History:   Procedure Laterality Date    COLONOSCOPY N/A 12/5/2019    Dr Anjali Ugalde, internal hemorrhoids-Grade 1-2-HP, 3 yr recall    DIALYSIS FISTULA CREATION Left 6/26/2020    LEFT BRACHIAL / CEPHALIC AV FISTULA performed by Roland Jansen MD at [address] TUNNELED VENOUS CATHETER PLACEMENT      [facility] - removal of first cath due to infection and placement of new one    TUNNELED VENOUS CATHETER PLACEMENT  01/2020    sjs   ,   Social History     Tobacco Use    Smoking status: Never Smoker    Smokeless tobacco: Never Used   Substance Use Topics    Alcohol use: Not Currently     Comment: occ    Drug use: No   ,   Family History   Problem Relation Age of Onset    Cancer Father         prostate    Kidney Disease Brother    ,   Health Maintenance   Topic Date Due    Diabetic retinal exam  11/15/1979    Hepatitis B vaccine (1 of 3 - Risk 3-dose series) 11/15/1988    DTaP/Tdap/Td vaccine (1 - Tdap) 11/15/1988    Pneumococcal 0-64 years Vaccine (2 of 3 - PCV13) 05/10/2018    Diabetic foot exam  02/16/2019    Shingles Vaccine (1 of 2) 11/15/2019    Flu vaccine (1) 09/01/2020    Lipid screen  01/29/2021    A1C test (Diabetic or Prediabetic)  01/30/2021    Colon cancer screen colonoscopy  12/05/2029    HIV screen  Completed    Hepatitis A vaccine  Aged Out    Hib vaccine  Aged Out    Meningococcal (ACWY) vaccine  Aged Out       Review of Systems    Constitutional - no significant activity change, appetite change, or unexpected weight change. No fever or chills. No diaphoresis or significant fatigue. HENT - no significant rhinorrhea or epistaxis. No tinnitus or significant hearing loss. Eyes - no sudden vision change or amaurosis. Respiratory - no significant shortness of breath, wheezing, or stridor. No apnea, cough, or chest tightness associated with shortness of breath. Cardiovascular - no chest pain, syncope, or significant dizziness. No palpitations or significant leg swelling. No claudication. Gastrointestinal -  has not had abdominal swelling or pain. No blood in stool. No severe constipation, diarrhea, nausea, or vomiting. Genitourinary - No difficulty urinating, dysuria, frequency, or urgency. No flank pain or hematuria. Musculoskeletal - has not had back pain, gait disturbance, or myalgia. Skin - no color change, rash, pallor, or new wound. Neurologic - no dizziness, facial asymmetry, or light headedness. No seizures. No speech difficulty or lateralizing weakness. Hematologic - no easy bruising or excessive bleeding. Psychiatric - no severe anxiety or nervousness. No confusion. All other review of systems are negative. PHYSICAL EXAMINATION:    Constitutional - well developed, well nourished. No diaphoresis or acute distress. HENT - head normocephalic. Right external ear canal appears normal.  Left external ear canal appears normal.  Septum appears midline. Eyes - conjunctiva normal.   No exudate.   No icterus. Neck- ROM appears normal, no tracheal deviation. Extremities -No cyanosis, clubbing, no edema. No signs atheroembolic event. Pulmonary - effort appears normal.  No respiratory distress. No accessory muscle use  Musculoskeletal -   Motor grossly intact in visible lower extremities and upper extremities  Neurologic - alert and oriented X 3. Cranial Nerves II-XII grossly intact  Skin - intact. No rash, erythema, or pallor. Fistula is not mature  Psychiatric - mood, affect, and behavior appear normal.  Judgment and thought processes appear normal.    Due to this being a TeleHealth encounter, evaluation of the following organ systems is limited: Vitals/Constitutional/EENT/Resp/CV/GI//MS/Neuro/Skin/Heme-Lymph-Imm. Options were discussed with the patient including continued medical management versus proceeding with fistulogram and possible angioplasty/stent. Patient has opted to proceed with this. Risks have been discussed with the patient including but not limited to MI, death, CVA, bleed, nerve injury, infection, contrast nephropathy, possible need for dialysis, and need for further surgery. ASSESSMENT/PLAN:  No diagnosis found. No follow-ups on file. fistulogram with possible angioplasty or stent           Strongly encouraged start/continue statin therapy  Recommended no smoking  An  electronic signature was used to authenticate this note. --SOSA Brambila on 8/24/2020 at 2:12 PM        Pursuant to the emergency declaration under the [facility] waiver authority and the [employer] Act, this Virtual  Visit was conducted, with patient's consent, to reduce the patient's risk of exposure to COVID-19 and provide continuity of care for an established patient. Services were provided through a video synchronous discussion virtually to substitute for in-person clinic visit.

## 2020-08-24 NOTE — H&P
2020    TELEHEALTH EVALUATION -- Audio/Visual (During OHBSV-61 public health emergency)    HPI:    Patient is located at home  Provider is located at [facility] [place]   Also present during call is no one    Hubert Singletary (:  1970) has requested an audio/video evaluation for the following concern(s):    He has a history of chronic kidney disease for a duration of 1 - 5 years. This is believed to be caused by unknown etiology. He has experienced no problems. He is now stage V and is on hemodialysis. He has had previous permacath and previous  Left AV fistula. They dialyze at [facility]. They have not infiltrations. They have not had increased venous pressure. They have not had increased arterial pressure. They have not post procedure bleeding. They have not had inadequate flow rates. They report has not had increased arm swelling. Prior to Visit Medications    Medication Sig Taking?  Authorizing Provider   metoprolol tartrate (LOPRESSOR) 25 MG tablet TAKE ONE TABLET TWO TIMES A DAY Yes Liam Donnelly MD   hydrALAZINE (APRESOLINE) 50 MG tablet TAKE ONE TABLET EVERY 8 HOURS Yes Liam Donnelly MD   ferrous sulfate (IRON 325) 325 (65 Fe) MG tablet TAKE ONE TABLET TWO TIMES A DAY WITH MEALS Yes Liam Donnelly MD   cloNIDine (CATAPRES) 0.1 MG tablet TAKE ONE TABLET TWO TIMES A DAY Yes Liam Donnelly MD   NIFEdipine (ADALAT CC) 60 MG extended release tablet TAKE ONE TABLET TWO TIMES A DAY Yes Liam Donnelly MD   simvastatin (ZOCOR) 10 MG tablet Take 1 tablet by mouth nightly Yes Liam Donnelly MD   sucralfate (CARAFATE) 1 GM tablet Take 1 g by mouth 2 times daily  Yes Historical Provider, MD   VITAMIN D PO Take 1 capsule by mouth daily  Yes Historical Provider, MD   Cyanocobalamin (VITAMIN B 12 PO) Take 1 tablet by mouth daily  Yes Historical Provider, MD   Ferric Citrate (AURYXIA PO) Take 1 tablet by mouth daily  Yes Historical Provider, MD   calcium carbonate (OYSTER SHELL CALCIUM 500 MG) 1250 (500 Ca) MG tablet Take 1 tablet by mouth daily Yes Historical Provider, MD   hydrOXYzine (VISTARIL) 25 MG capsule Take 1 capsule by mouth 3 times daily as needed for Itching Yes SOSA Silva   albuterol sulfate  (90 BASE) MCG/ACT inhaler Inhale 2 puffs into the lungs every 6 hours as needed for Wheezing Yes Sean García MD       Social History     Tobacco Use    Smoking status: Never Smoker    Smokeless tobacco: Never Used   Substance Use Topics    Alcohol use: Not Currently     Comment: occ    Drug use: No        Allergies   Allergen Reactions    Atorvastatin     Banana     Lisinopril Other (See Comments)     Cough  Cough   ,   Past Medical History:   Diagnosis Date    Asthma     Diabetes mellitus ([facility] [address]     [facility]    Erectile dysfunction     Hemodialysis patient ([facility] [address]     t[facility]th,s at [facility]    Hypertension     Obesity    ,   Past Surgical History:   Procedure Laterality Date    COLONOSCOPY N/A 12/5/2019    Dr Dave Lobo, internal hemorrhoids-Grade 1-2-HP, 3 yr recall    DIALYSIS FISTULA CREATION Left 6/26/2020    LEFT BRACHIAL / CEPHALIC AV FISTULA performed by Delmar Jeffries MD at [address] [facility] TUNNELED VENOUS CATHETER PLACEMENT      [facility] - removal of first cath due to infection and placement of new one    TUNNELED VENOUS CATHETER PLACEMENT  01/2020    sjs   ,   Social History     Tobacco Use    Smoking status: Never Smoker    Smokeless tobacco: Never Used   Substance Use Topics    Alcohol use: Not Currently     Comment: occ    Drug use: No   ,   Family History   Problem Relation Age of Onset    Cancer Father         prostate    Kidney Disease Brother    ,   Health Maintenance   Topic Date Due    Diabetic retinal exam  11/15/1979    Hepatitis B vaccine (1 of 3 - Risk 3-dose series) 11/15/1988    DTaP/Tdap/Td vaccine (1 - Tdap) 11/15/1988    Pneumococcal 0-64 years Vaccine (2 of 3 - PCV13) 05/10/2018    Diabetic foot exam  02/16/2019    Shingles clubbing, no edema. No signs atheroembolic event. Slightly pulsatile left upper arm av fistula (brachial cephalic)   Pulmonary - effort appears normal.  No respiratory distress. No accessory muscle use  Musculoskeletal -   Motor grossly intact in visible lower extremities and upper extremities  Neurologic - alert and oriented X 3. Cranial Nerves II-XII grossly intact  Skin - intact. No rash, erythema, or pallor.  Fistula is not mature  Psychiatric - mood, affect, and behavior appear normal.  Judgment and thought processes appear normal.      ASSESSMENT/PLAN:  ESRD  Non mature, slightly pulsatile left upper arm av fistula      Left upper extremity arteriovenous fistulogram with possible angioplasty or stent

## 2020-08-25 ENCOUNTER — TELEPHONE (OUTPATIENT)
Dept: VASCULAR SURGERY | Age: 50
End: 2020-08-25

## 2020-08-25 NOTE — TELEPHONE ENCOUNTER
I have been unsuccessful at getting a hold of patient. I have left VM's with no response. I sent patients procedure instructions to his dialysis clinic. Alissa Villalobos from [facility] will give patient a copy of instructions and go over instructions with pt. I have addressed medications and instructions in the letter (please see letter for instructions).

## 2020-08-26 ENCOUNTER — TELEPHONE (OUTPATIENT)
Dept: VASCULAR SURGERY | Facility: CLINIC | Age: 50
End: 2020-08-26

## 2020-08-27 ENCOUNTER — OFFICE VISIT (OUTPATIENT)
Age: 50
End: 2020-08-27

## 2020-08-27 VITALS — OXYGEN SATURATION: 98 % | HEART RATE: 64 BPM | TEMPERATURE: 97.8 F

## 2020-08-28 ENCOUNTER — TELEPHONE (OUTPATIENT)
Dept: VASCULAR SURGERY | Facility: CLINIC | Age: 50
End: 2020-08-28

## 2020-08-28 LAB — SARS-COV-2, NAA: NOT DETECTED

## 2020-08-28 NOTE — TELEPHONE ENCOUNTER
Called the patient to see if he wanted to reschedule his appt he missed today.  Pt stated he couldn't come in on Monday because he was having surgery on his arm.  I asked him who was doing surgery.  He said some doctor at [facility].  He said that it was for a fistula.  I told him that is what we have been seeing him for.  I told him that if he was going to be seeing them, there was no reason to see us.  He said that he would just be going there.

## 2020-08-31 ENCOUNTER — HOSPITAL ENCOUNTER (OUTPATIENT)
Dept: INTERVENTIONAL RADIOLOGY/VASCULAR | Age: 50
Discharge: HOME | End: 2020-08-31
Payer: COMMERCIAL

## 2020-08-31 VITALS
BODY MASS INDEX: 36.83 KG/M2 | DIASTOLIC BLOOD PRESSURE: 87 MMHG | RESPIRATION RATE: 16 BRPM | SYSTOLIC BLOOD PRESSURE: 174 MMHG | OXYGEN SATURATION: 100 % | HEIGHT: 68 IN | WEIGHT: 243 LBS | TEMPERATURE: 97.2 F | HEART RATE: 57 BPM

## 2020-08-31 PROCEDURE — C1894 INTRO/SHEATH, NON-LASER: HCPCS

## 2020-08-31 PROCEDURE — 2580000003 HC RX 258: Performed by: NURSE PRACTITIONER

## 2020-08-31 PROCEDURE — 6360000004 HC RX CONTRAST MEDICATION: Performed by: SURGERY

## 2020-08-31 PROCEDURE — 36901 INTRO CATH DIALYSIS CIRCUIT: CPT | Performed by: SURGERY

## 2020-08-31 PROCEDURE — 2500000003 HC RX 250 WO HCPCS: Performed by: SURGERY

## 2020-08-31 PROCEDURE — 6360000002 HC RX W HCPCS: Performed by: SURGERY

## 2020-08-31 PROCEDURE — 6370000000 HC RX 637 (ALT 250 FOR IP): Performed by: NURSE PRACTITIONER

## 2020-08-31 PROCEDURE — 6360000002 HC RX W HCPCS: Performed by: NURSE PRACTITIONER

## 2020-08-31 RX ORDER — ACETAMINOPHEN 325 MG/1
650 TABLET ORAL EVERY 4 HOURS PRN
Status: DISCONTINUED | OUTPATIENT
Start: 2020-08-31 | End: 2020-09-02 | Stop reason: HOSPADM

## 2020-08-31 RX ORDER — FENTANYL CITRATE 50 UG/ML
INJECTION, SOLUTION INTRAMUSCULAR; INTRAVENOUS
Status: COMPLETED | OUTPATIENT
Start: 2020-08-31 | End: 2020-08-31

## 2020-08-31 RX ORDER — CLONIDINE HYDROCHLORIDE 0.1 MG/1
0.1 TABLET ORAL PRN
Status: DISCONTINUED | OUTPATIENT
Start: 2020-08-31 | End: 2020-09-02 | Stop reason: HOSPADM

## 2020-08-31 RX ORDER — ONDANSETRON 2 MG/ML
4 INJECTION INTRAMUSCULAR; INTRAVENOUS EVERY 8 HOURS PRN
Status: DISCONTINUED | OUTPATIENT
Start: 2020-08-31 | End: 2020-09-02 | Stop reason: HOSPADM

## 2020-08-31 RX ORDER — MIDAZOLAM HYDROCHLORIDE 1 MG/ML
INJECTION INTRAMUSCULAR; INTRAVENOUS
Status: COMPLETED | OUTPATIENT
Start: 2020-08-31 | End: 2020-08-31

## 2020-08-31 RX ORDER — HYDROCODONE BITARTRATE AND ACETAMINOPHEN 5; 325 MG/1; MG/1
2 TABLET ORAL EVERY 4 HOURS PRN
Status: DISCONTINUED | OUTPATIENT
Start: 2020-08-31 | End: 2020-09-02 | Stop reason: HOSPADM

## 2020-08-31 RX ORDER — HYDROCODONE BITARTRATE AND ACETAMINOPHEN 5; 325 MG/1; MG/1
1 TABLET ORAL EVERY 4 HOURS PRN
Status: DISCONTINUED | OUTPATIENT
Start: 2020-08-31 | End: 2020-09-02 | Stop reason: HOSPADM

## 2020-08-31 RX ORDER — SODIUM CHLORIDE 0.9 % (FLUSH) 0.9 %
10 SYRINGE (ML) INJECTION PRN
Status: DISCONTINUED | OUTPATIENT
Start: 2020-08-31 | End: 2020-09-02 | Stop reason: HOSPADM

## 2020-08-31 RX ORDER — SODIUM CHLORIDE 9 MG/ML
INJECTION, SOLUTION INTRAVENOUS CONTINUOUS
Status: DISCONTINUED | OUTPATIENT
Start: 2020-08-31 | End: 2020-09-02 | Stop reason: HOSPADM

## 2020-08-31 RX ORDER — HEPARIN SODIUM 5000 [USP'U]/ML
INJECTION, SOLUTION INTRAVENOUS; SUBCUTANEOUS
Status: COMPLETED | OUTPATIENT
Start: 2020-08-31 | End: 2020-08-31

## 2020-08-31 RX ORDER — ASPIRIN 81 MG/1
81 TABLET ORAL ONCE
Status: COMPLETED | OUTPATIENT
Start: 2020-08-31 | End: 2020-08-31

## 2020-08-31 RX ORDER — LIDOCAINE HYDROCHLORIDE 20 MG/ML
INJECTION, SOLUTION INFILTRATION; PERINEURAL
Status: COMPLETED | OUTPATIENT
Start: 2020-08-31 | End: 2020-08-31

## 2020-08-31 RX ORDER — SEVELAMER CARBONATE 800 MG/1
800 TABLET, FILM COATED ORAL
COMMUNITY
Start: 2020-08-17 | End: 2022-07-05 | Stop reason: ALTCHOICE

## 2020-08-31 RX ADMIN — IOPAMIDOL 15 ML: 612 INJECTION, SOLUTION INTRATHECAL at 16:17

## 2020-08-31 RX ADMIN — HEPARIN SODIUM 5000 UNITS: 5000 INJECTION, SOLUTION INTRAVENOUS; SUBCUTANEOUS at 16:05

## 2020-08-31 RX ADMIN — FENTANYL CITRATE 25 MCG: 50 INJECTION, SOLUTION INTRAMUSCULAR; INTRAVENOUS at 16:05

## 2020-08-31 RX ADMIN — MIDAZOLAM 1 MG: 1 INJECTION INTRAMUSCULAR; INTRAVENOUS at 16:05

## 2020-08-31 RX ADMIN — ASPIRIN 81 MG: 81 TABLET, COATED ORAL at 11:47

## 2020-08-31 RX ADMIN — CEFAZOLIN SODIUM 1 G: 1 INJECTION, POWDER, FOR SOLUTION INTRAMUSCULAR; INTRAVENOUS at 15:42

## 2020-08-31 RX ADMIN — LIDOCAINE HYDROCHLORIDE 5 ML: 20 INJECTION, SOLUTION INFILTRATION; PERINEURAL at 16:05

## 2020-08-31 RX ADMIN — SODIUM CHLORIDE: 9 INJECTION, SOLUTION INTRAVENOUS at 11:43

## 2020-08-31 NOTE — INTERVAL H&P NOTE
ASA 3, Mallepati score 3  I agree with the history and physical exam in chart. In addition, today's complete ROS was performed and the only positives are noted in the HPI. I examined the patient preoperatively and discussed the surgery, including the risks, benefits, and alternatives. They seem to understand and agree to proceed.

## 2020-08-31 NOTE — OP NOTE
Preprocedure diagnosis:  1. End-stage renal disease on hemodialysis  2. Pulsatile left upper extremity arteriovenous fistula    Post procedure diagnosis: Same    Procedure:  1. Left upper extremity fistulogram's including venography the superior vena cava    Surgeon: Prince Reyes MD    Anesthesia: Intravenous/moderate sedation plus local    Estimated blood loss: Less than 50 mL    Findings:  1. The patient has a patent left brachial artery to cephalic vein arteriovenous fistula. The arteriovenous anastomosis is widely patent. The cephalic vein is around 6 to 7 mm in diameter. There are 230% stenoses in the proximal upper arm cephalic vein. The left subclavian, innominate, and superior vena cava are all fairly widely patent. Procedure in detail:    After the patient was consented and given intravenous antibiotics, he was brought to angiography and placed on the angiography suite table supine position. Intravenous/moderate sedation was administered and the patient's left arm was prepped and draped in usual sterile procedure. Skin the subcutaneous tissues around the elbow were anesthetized lidocaine. Micropuncture needle was used to cannulate cephalic vein without difficulty. Seldinger technique was utilized to place a 4 [place] glide sheath. Left upper extremity fistulogram's including venography to the superior vena cava were performed with the above findings. The sheath was removed and pressure applied for hemostasis. Sterile dressings were placed. He was brought back to cath holding in good condition. The fistula should be ready for use later this week. After its functioning for a couple of weeks, we will remove his left internal jugular vein tunneled dialysis catheter.

## 2020-08-31 NOTE — PROGRESS NOTES
Returned post fistulogram.  Awake and alert. Puncture site to left arm clear with bandaid in place. Denies any c/o pain. Mother at bedside.

## 2020-09-16 ENCOUNTER — HOSPITAL ENCOUNTER (OUTPATIENT)
Dept: CT IMAGING | Age: 50
Discharge: HOME | End: 2020-09-16
Payer: COMMERCIAL

## 2020-09-16 PROCEDURE — 74178 CT ABD&PLV WO CNTR FLWD CNTR: CPT

## 2020-09-16 PROCEDURE — 6360000004 HC RX CONTRAST MEDICATION: Performed by: UROLOGY

## 2020-09-16 RX ADMIN — IOPAMIDOL 90 ML: 755 INJECTION, SOLUTION INTRAVENOUS at 10:01

## 2020-09-21 ENCOUNTER — PROCEDURE VISIT (OUTPATIENT)
Dept: UROLOGY | Age: 50
End: 2020-09-21
Payer: COMMERCIAL

## 2020-09-21 VITALS — TEMPERATURE: 97 F | BODY MASS INDEX: 38.61 KG/M2 | WEIGHT: 246 LBS | HEIGHT: 67 IN

## 2020-09-21 LAB
BACTERIA URINE, POC: 0
BILIRUBIN URINE: 0 MG/DL
BLOOD, URINE: POSITIVE
CASTS URINE, POC: 0
CLARITY: CLEAR
COLOR: YELLOW
CRYSTALS URINE, POC: 0
EPI CELLS URINE, POC: 0
GLUCOSE URINE: ABNORMAL
KETONES, URINE: NEGATIVE
LEUKOCYTE EST, POC: ABNORMAL
NITRITE, URINE: NEGATIVE
PH UA: 8.5 (ref 4.5–8)
PROTEIN UA: POSITIVE
RBC URINE, POC: 5
SPECIFIC GRAVITY UA: 1.01 (ref 1–1.03)
UROBILINOGEN, URINE: NORMAL
WBC URINE, POC: 0
YEAST URINE, POC: 0

## 2020-09-21 PROCEDURE — 81001 URINALYSIS AUTO W/SCOPE: CPT | Performed by: UROLOGY

## 2020-09-21 PROCEDURE — 52000 CYSTOURETHROSCOPY: CPT | Performed by: UROLOGY

## 2020-09-21 NOTE — PROGRESS NOTES
Hematuria  Patient complains of microscopic hematuria. Onset of hematuria was 1 month ago and was unknown in onset. There is not a history of nephrolithiasis. There is not a history of urologic trauma. Other urologic symptoms include none. Patient admits to history of no risk factors for cancer. Patient denies history of occupational exposure and tobacco use. Prior workup has been UA'S. Patient has end-stage renal disease on hemodialysis  Patient here for cystoscopy for evaluation of microscopic hematuria he is otherwise asymptomatic he had a CT urogram unfortunately delayed images were not part of the protocol for what ever reason    Cystoscopy Procedure Note    Indications: Diagnosis    Pre-operative Diagnosis: Hematuria    Post-operative Diagnosis: Hematuria    Surgeon: Christina Banerjee MD     Assistants: staff    Anesthesia: Local anesthesia topical 2% lidocaine gel    Procedure Details   The risks, benefits, complications, treatment options, and expected outcomes were discussed with the patient. The patient concurred with the proposed plan, giving informed consent. Cystoscopy was performed today under local anesthesia, using sterile technique. The patient was placed in the supine position, prepped with Hibiclens, and draped in the usual sterile fashion. A 17 [place] sheath flexible cystoscope was used to inspect both the urethra and bladder using the flexible scope. Findings:  Anterior urethra: normal without strictures and without scarring. Prostate:  Prostatic urethra: 1+ mild prostatic hyperplasia. Bladder: Normal mucosa, without lesions no papillary tumors seen normal-appearing bladder. Ureteral orifice(s) was/were seen both. Ureteral orifice(s) both were in the normal location and both ureteral orifices were effluxing clear urine. Unremarkable cystoscopy                            Complications:  None; patient tolerated the procedure well. Disposition: To home after observation. Condition: stable      DATA:    Results for orders placed or performed in visit on 09/21/20   POCT Urinalysis Dipstick w/ Micro (Auto)   Result Value Ref Range    Color, UA Yellow     Clarity, UA Clear Clear    Glucose, Ur 100mg     Bilirubin Urine 0 mg/dL    Ketones, Urine Negative     Specific Gravity, UA 1.015 1.005 - 1.030    Blood, Urine Positive     pH, UA 8.5 (A) 4.5 - 8.0    Protein, UA Positive (A) Negative    Nitrite, Urine Negative     Leukocytes, UA neg     Urobilinogen, Urine Normal     rbc urine, poc 5     wbc urine, poc 0     bacteria urine, poc 0     yeast urine, poc 0     casts urine, poc 0     epi cells urine, poc 0     crystals urine, poc 0            IMAGING:  CT urogram reviewed. He has small kidneys bilaterally he has end-stage renal disease so there is delayed excretion so I did not get a good feel for his collecting system but there is no hydronephrosis in the course of the ureters or is no stones and there is no dilation or other soft tissue abnormality seen. He does have a cyst off the lower pole the right kidney measures 7 Hounsfield units without contrast on the delayed images the goes up to 14. This is consistent with a nonenhancing probable cyst.  In the upper pole of the left kidney there is a hyperdense lesion measuring 55 Hounsfield units without contrast immediate arterial phase goes up to 63 but on the delayed images it goes back down to 58 again no significant enhancement consistent with probably hyperdense cyst.    1. Asymptomatic microscopic hematuria  We will monitor otherwise unremarkable work-up so far  - Cystoscopy  - POCT Urinalysis Dipstick w/ Micro (Auto)    2. Hyperdense renal cyst  These lesions seen in the right and left kidney do not represent solid enhancing masses most likely are Bosniak type II hyperdense cyst can be monitored with serial imaging.   We will repeat CT scan in 3 months  - CT ABDOMEN PELVIS W WO CONTRAST Additional Contrast? Radiologist Recommendation (renal mass protocol); Future        Orders Placed This Encounter   Procedures    CT ABDOMEN PELVIS W WO CONTRAST Additional Contrast? Radiologist Recommendation (renal mass protocol)     Renal mass protocol     Standing Status:   Future     Standing Expiration Date:   9/21/2021     Order Specific Question:   Additional Contrast?     Answer:   Radiologist Recommendation     Comments:   renal mass protocol     Order Specific Question:   Reason for exam:     Answer:   renal mass, left hyperdens cyst, ESRD    POCT Urinalysis Dipstick w/ Micro (Auto)        Return in about 3 months (around 12/21/2020) for office visit after xray study.

## 2020-10-08 ENCOUNTER — TELEPHONE (OUTPATIENT)
Dept: VASCULAR SURGERY | Age: 50
End: 2020-10-08

## 2020-10-08 NOTE — TELEPHONE ENCOUNTER
I left a VM letting Andra Ventura know that I have sent his instructions for his perm-cath removal to his Dialysis clinic, they will go over it with him when he goes to dialysis again. I asked that he call our office back with any questions or concerns (Please see letter for instructions given).

## 2020-10-11 ENCOUNTER — OFFICE VISIT (OUTPATIENT)
Age: 50
End: 2020-10-11

## 2020-10-11 VITALS — HEART RATE: 74 BPM | TEMPERATURE: 97.4 F | OXYGEN SATURATION: 95 %

## 2020-10-12 LAB — SARS-COV-2, PCR: NOT DETECTED

## 2020-10-13 ENCOUNTER — TELEPHONE (OUTPATIENT)
Dept: VASCULAR SURGERY | Age: 50
End: 2020-10-13

## 2020-10-26 ENCOUNTER — TELEPHONE (OUTPATIENT)
Dept: UROLOGY | Age: 50
End: 2020-10-26

## 2020-10-26 ENCOUNTER — TELEPHONE (OUTPATIENT)
Dept: VASCULAR SURGERY | Age: 50
End: 2020-10-26

## 2020-10-26 NOTE — TELEPHONE ENCOUNTER
Juany Salazar called requesting PSA, needs order. Advised by leanne to be checked due to high levels.  Please call pt to clarify

## 2020-10-27 ENCOUNTER — TELEPHONE (OUTPATIENT)
Dept: VASCULAR SURGERY | Age: 50
End: 2020-10-27

## 2020-10-27 NOTE — TELEPHONE ENCOUNTER
I left a  for Tiffanie Leblanc asking him to call our office back at his earliest convenience I would like to go over the new date/time for his perm-cath removal we have scheduled pt for with Dr. Naman Mays.

## 2020-10-29 ENCOUNTER — TELEPHONE (OUTPATIENT)
Dept: VASCULAR SURGERY | Age: 50
End: 2020-10-29

## 2020-10-29 NOTE — TELEPHONE ENCOUNTER
I spoke with Todd Patten and made him aware we have rescheuled his perm-cath removal with Dr Masoud Archibald to [facility]. 11/11/20, I will call him the day before with an arrival time. I also made pt aware he will need to have a COVID test done at [facility] on Friday 11/6/20 by 11:00am. I addressed medications and instructions via phone (Please see letter for inst given). Patient verbally understood all instructions and will call with any questions or concerns. I have faxed a copy of instructions to patients dialysis clinic.

## 2020-11-07 ENCOUNTER — OFFICE VISIT (OUTPATIENT)
Age: 50
End: 2020-11-07

## 2020-11-07 VITALS — OXYGEN SATURATION: 97 % | TEMPERATURE: 97.5 F | HEART RATE: 67 BPM

## 2020-11-07 LAB — SARS-COV-2, PCR: NOT DETECTED

## 2020-11-07 PROCEDURE — 99999 PR OFFICE/OUTPT VISIT,PROCEDURE ONLY: CPT | Performed by: NURSE PRACTITIONER

## 2020-11-10 ENCOUNTER — PREP FOR PROCEDURE (OUTPATIENT)
Dept: VASCULAR SURGERY | Age: 50
End: 2020-11-10

## 2020-11-10 ENCOUNTER — TELEPHONE (OUTPATIENT)
Dept: VASCULAR SURGERY | Age: 50
End: 2020-11-10

## 2020-11-10 RX ORDER — LIDOCAINE HYDROCHLORIDE 10 MG/ML
20 INJECTION, SOLUTION EPIDURAL; INFILTRATION; INTRACAUDAL; PERINEURAL ONCE
Status: CANCELLED | OUTPATIENT
Start: 2020-11-10 | End: 2020-11-10

## 2020-11-10 NOTE — H&P
Patient Care Team:  Alicia Ruiz MD as PCP - General (Family Medicine)  Alicia Ruiz MD as PCP - [facility]        Kala Dennis 47 y.o. male with a history of renal failure requiring hemodialysis access. Patient is in need of permacath removal    Patient Active Problem List   Diagnosis    Mild intermittent asthma without complication    Family history of prostate cancer    Morbid obesity due to excess calories ([address]    Type 2 diabetes mellitus with chronic kidney disease on chronic dialysis, without long-term current use of insulin ([address]    Chronic diastolic congestive heart failure ([address]    ESRD (end stage renal disease) ([address]      See attached meds  Allergies:  Atorvastatin; Banana; and Lisinopril  Past Medical History:   Diagnosis Date    Asthma     CHF (congestive heart failure) ([address]     Diabetes mellitus ([address]     [facility]sat [facility]    Erectile dysfunction     Hemodialysis patient ([address]     [facility],th,s at [facility]    History of blood transfusion     Hypertension     Obesity       Past Surgical History:   Procedure Laterality Date    COLONOSCOPY N/A 12/5/2019    Dr Kiki Rodriguez, internal hemorrhoids-Grade 1-2-HP, 3 yr recall    DIALYSIS FISTULA CREATION Left 6/26/2020    LEFT BRACHIAL / CEPHALIC AV FISTULA performed by Thanh Ledezma MD at [address] TUNNELED VENOUS CATHETER PLACEMENT      [facility] - removal of first cath due to infection and placement of new one    TUNNELED VENOUS CATHETER PLACEMENT  01/2020    sjs      Family History   Problem Relation Age of Onset    Cancer Father         prostate    Kidney Disease Brother       Social History     Tobacco Use    Smoking status: Never Smoker    Smokeless tobacco: Never Used   Substance Use Topics    Alcohol use: Not Currently     Comment: occ     Thrill in fistula    A/P:    Diagnosis Stage V CKD  Functional left upper extremity av fistula.   No longer needs permcath    Permit for removal of permcath  Risks have been reviewed with the patient including but not limited to heart attack, death, CVA, bleeding, nerve injury, infection, pain, and need for further surgery.       _______________________________________________________________________  Signature     Date    Time

## 2020-11-10 NOTE — TELEPHONE ENCOUNTER
I spoke with Lenward Goodpasture and confirmed his arrival time for his perm-cath removal as 6:00am and patient verbally confirmed.

## 2020-11-10 NOTE — H&P (VIEW-ONLY)
Patient Care Team:  Leesa Keller MD as PCP - General (Family Medicine)  Leesa Kellre MD as PCP - [facility] Empaneled Provider        Jonah Dominguez 47 y.o. male with a history of renal failure requiring hemodialysis access. Patient is in need of permacath removal    Patient Active Problem List   Diagnosis    Mild intermittent asthma without complication    Family history of prostate cancer    Morbid obesity due to excess calories ([address]    Type 2 diabetes mellitus with chronic kidney disease on chronic dialysis, without long-term current use of insulin ([address]    Chronic diastolic congestive heart failure ([address]    ESRD (end stage renal disease) ([address]      See attached meds  Allergies:  Atorvastatin; Banana; and Lisinopril  Past Medical History:   Diagnosis Date    Asthma     CHF (congestive heart failure) ([address]     Diabetes mellitus ([address]     [facility]sat at [facility]    Erectile dysfunction     [facility]od[address] [address]     [facility]th,[facility] at [facility]    History of blood transfusion     Hypertension     Obesity       Past Surgical History:   Procedure Laterality Date    COLONOSCOPY N/A 12/5/2019    Dr Ari Zaragoza, internal hemorrhoids-Grade 1-2-HP, 3 yr recall    DIALYSIS FISTULA CREATION Left 6/26/2020    LEFT BRACHIAL / CEPHALIC AV FISTULA performed by Martinez Zimmerman MD at [address] TUNNELED VENOUS CATHETER PLACEMENT      [facility] - removal of first cath due to infection and placement of new one    TUNNELED VENOUS CATHETER PLACEMENT  01/2020    sjs      Family History   Problem Relation Age of Onset    Cancer Father         prostate    Kidney Disease Brother       Social History     Tobacco Use    Smoking status: Never Smoker    Smokeless tobacco: Never Used   Substance Use Topics    Alcohol use: Not Currently     Comment: occ     Thrill in fistula    A/P:    Diagnosis Stage V CKD  Functional left upper extremity av fistula.   No longer needs permcath    Permit for removal of permcath  Risks have been reviewed with the patient including but not limited to heart attack, death, CVA, bleeding, nerve injury, infection, pain, and need for further surgery.       _______________________________________________________________________  Signature     Date    Time

## 2020-11-11 ENCOUNTER — HOSPITAL ENCOUNTER (OUTPATIENT)
Dept: OTHER | Age: 50
Discharge: HOME | End: 2020-11-11
Payer: COMMERCIAL

## 2020-11-11 VITALS
HEIGHT: 68 IN | TEMPERATURE: 96.9 F | SYSTOLIC BLOOD PRESSURE: 133 MMHG | WEIGHT: 237 LBS | RESPIRATION RATE: 18 BRPM | BODY MASS INDEX: 35.92 KG/M2 | DIASTOLIC BLOOD PRESSURE: 93 MMHG | OXYGEN SATURATION: 98 % | HEART RATE: 56 BPM

## 2020-11-11 PROCEDURE — 2500000003 HC RX 250 WO HCPCS: Performed by: SURGERY

## 2020-11-11 PROCEDURE — 36589 REMOVAL TUNNELED CV CATH: CPT | Performed by: SURGERY

## 2020-11-11 PROCEDURE — 36590 REMOVAL TUNNELED CV CATH: CPT

## 2020-11-11 RX ORDER — ONDANSETRON 2 MG/ML
4 INJECTION INTRAMUSCULAR; INTRAVENOUS EVERY 8 HOURS PRN
Status: DISCONTINUED | OUTPATIENT
Start: 2020-11-11 | End: 2020-11-13 | Stop reason: HOSPADM

## 2020-11-11 RX ORDER — LIDOCAINE HYDROCHLORIDE 10 MG/ML
30 INJECTION, SOLUTION EPIDURAL; INFILTRATION; INTRACAUDAL; PERINEURAL ONCE
Status: COMPLETED | OUTPATIENT
Start: 2020-11-11 | End: 2020-11-11

## 2020-11-11 RX ORDER — HYDROCODONE BITARTRATE AND ACETAMINOPHEN 5; 325 MG/1; MG/1
2 TABLET ORAL EVERY 4 HOURS PRN
Status: DISCONTINUED | OUTPATIENT
Start: 2020-11-11 | End: 2020-11-13 | Stop reason: HOSPADM

## 2020-11-11 RX ORDER — HYDROCODONE BITARTRATE AND ACETAMINOPHEN 5; 325 MG/1; MG/1
1 TABLET ORAL EVERY 4 HOURS PRN
Status: DISCONTINUED | OUTPATIENT
Start: 2020-11-11 | End: 2020-11-13 | Stop reason: HOSPADM

## 2020-11-11 RX ORDER — LIDOCAINE HYDROCHLORIDE 10 MG/ML
20 INJECTION, SOLUTION EPIDURAL; INFILTRATION; INTRACAUDAL; PERINEURAL ONCE
Status: DISCONTINUED | OUTPATIENT
Start: 2020-11-11 | End: 2020-11-11

## 2020-11-11 RX ORDER — ACETAMINOPHEN 325 MG/1
650 TABLET ORAL EVERY 4 HOURS PRN
Status: DISCONTINUED | OUTPATIENT
Start: 2020-11-11 | End: 2020-11-13 | Stop reason: HOSPADM

## 2020-11-11 RX ADMIN — LIDOCAINE HYDROCHLORIDE 10 ML: 10 INJECTION, SOLUTION EPIDURAL; INFILTRATION; INTRACAUDAL; PERINEURAL at 07:49

## 2020-11-11 NOTE — PROGRESS NOTES
Patient and dad instructed on care of left IJ site post perm cath removal.  Given written instructions. Both stated understanding.

## 2020-11-11 NOTE — OP NOTE
Preprocedure Diagnosis:    1. patient has working fistula  2. CKD (stage V requiring hemodialysis)       Postprocedure Diagnosis:  Same    Procedure:  Removal of tunneled dialysis catheter left internal jugular vein    Surgeon:  Teresa Sánchez. Tawanna García M.D. Anesthesia:  Local (1% lidocaine without epinephrine)    EBL:  Less than 50 ml    Findings: The cuff and catheter were completely removed. There were no signs of infection. Procedure in Detail:      After the patient was consented, the left neck and chest were prepped and draped in the usual sterile fashion. Skin and subcutaneous tissues around the exit site and over the cuff were anesthetized with lidocaine. Using a #15 blade, an incision was made around the exit site and the cuff is dissected away from the well incorporated subcutaneous tissues with sharp dissection. The catheter and cuff were then completely removed and pressure was held at the base of the neck for hemostasis. The wound/exit site was then closed with interrupted 3-0 nylon sutures. A sterile dressing was applied. The patient tolerated the procedure well. Sutures can be removed at the dialysis clinic in 1-2 weeks. We can remove the sutures if the patient no longer requires dialysis.

## 2020-11-11 NOTE — PROGRESS NOTES
Dr. Morris Oats here and perm cath removed from left IJ site without difficulty. Manual pressure then held at site per Adamaris Torres RN for 10 minutes and bulky gauze dressing applied. HOB elevated.

## 2020-11-16 ENCOUNTER — OFFICE VISIT (OUTPATIENT)
Age: 50
End: 2020-11-16

## 2020-11-16 VITALS — HEART RATE: 69 BPM | TEMPERATURE: 97.3 F | OXYGEN SATURATION: 94 %

## 2020-11-19 LAB — SARS-COV-2, NAA: NOT DETECTED

## 2020-11-24 DIAGNOSIS — Z80.42 FAMILY HISTORY OF PROSTATE CANCER IN FATHER: ICD-10-CM

## 2020-11-24 DIAGNOSIS — E11.9 TYPE 2 DIABETES MELLITUS WITHOUT COMPLICATION, WITHOUT LONG-TERM CURRENT USE OF INSULIN (HCC): ICD-10-CM

## 2020-11-24 DIAGNOSIS — I10 BENIGN ESSENTIAL HTN: ICD-10-CM

## 2020-11-24 LAB
ALBUMIN SERPL-MCNC: 4.5 G/DL (ref 3.5–5.2)
ALP BLD-CCNC: 48 U/L (ref 40–130)
ALT SERPL-CCNC: 8 U/L (ref 5–41)
ANION GAP SERPL CALCULATED.3IONS-SCNC: 17 MMOL/L (ref 7–19)
AST SERPL-CCNC: 10 U/L (ref 5–40)
BILIRUB SERPL-MCNC: 0.5 MG/DL (ref 0.2–1.2)
BUN BLDV-MCNC: 42 MG/DL (ref 6–20)
CALCIUM SERPL-MCNC: 9.5 MG/DL (ref 8.6–10)
CHLORIDE BLD-SCNC: 95 MMOL/L (ref 98–111)
CHOLESTEROL, TOTAL: 121 MG/DL (ref 160–199)
CO2: 29 MMOL/L (ref 22–29)
CREAT SERPL-MCNC: 11.1 MG/DL (ref 0.5–1.2)
GFR AFRICAN AMERICAN: 6
GFR NON-AFRICAN AMERICAN: 5
GLUCOSE BLD-MCNC: 124 MG/DL (ref 74–109)
HBA1C MFR BLD: 5.1 % (ref 4–6)
HCT VFR BLD CALC: 36.9 % (ref 42–52)
HDLC SERPL-MCNC: 42 MG/DL (ref 55–121)
HEMOGLOBIN: 12 G/DL (ref 14–18)
LDL CHOLESTEROL CALCULATED: 64 MG/DL
MCH RBC QN AUTO: 29.4 PG (ref 27–31)
MCHC RBC AUTO-ENTMCNC: 32.5 G/DL (ref 33–37)
MCV RBC AUTO: 90.4 FL (ref 80–94)
PDW BLD-RTO: 16.1 % (ref 11.5–14.5)
PLATELET # BLD: 255 K/UL (ref 130–400)
PMV BLD AUTO: 10.1 FL (ref 9.4–12.4)
POTASSIUM SERPL-SCNC: 4.8 MMOL/L (ref 3.5–5)
PROSTATE SPECIFIC ANTIGEN: 5.35 NG/ML (ref 0–4)
RBC # BLD: 4.08 M/UL (ref 4.7–6.1)
SODIUM BLD-SCNC: 141 MMOL/L (ref 136–145)
TOTAL PROTEIN: 7.4 G/DL (ref 6.6–8.7)
TRIGL SERPL-MCNC: 77 MG/DL (ref 0–149)
WBC # BLD: 7 K/UL (ref 4.8–10.8)

## 2020-11-25 ENCOUNTER — TELEPHONE (OUTPATIENT)
Dept: PRIMARY CARE CLINIC | Age: 50
End: 2020-11-25

## 2020-11-25 NOTE — TELEPHONE ENCOUNTER
----- Message from Alannah Zavala MD sent at 11/25/2020  2:36 PM CST -----  [employer] message sent to patient about results and plan. Can we also please call the patient to let them know? Thank you! PSA is high. This may be influenced by kidney disease though. He needs to recheck a PSA in 4 weeks. If still high we will set him up with urology.

## 2020-12-23 ENCOUNTER — HOSPITAL ENCOUNTER (OUTPATIENT)
Dept: CT IMAGING | Age: 50
Discharge: HOME | End: 2020-12-23
Payer: COMMERCIAL

## 2020-12-23 ENCOUNTER — OFFICE VISIT (OUTPATIENT)
Dept: UROLOGY | Age: 50
End: 2020-12-23
Payer: COMMERCIAL

## 2020-12-23 VITALS — HEIGHT: 68 IN | WEIGHT: 244 LBS | BODY MASS INDEX: 36.98 KG/M2 | TEMPERATURE: 98 F

## 2020-12-23 LAB
BACTERIA URINE, POC: ABNORMAL
BILIRUBIN URINE: 0 MG/DL
BLOOD, URINE: POSITIVE
CASTS URINE, POC: ABNORMAL
CLARITY: CLEAR
COLOR: YELLOW
CRYSTALS URINE, POC: ABNORMAL
EPI CELLS URINE, POC: ABNORMAL
GLUCOSE URINE: ABNORMAL
KETONES, URINE: NEGATIVE
LEUKOCYTE EST, POC: ABNORMAL
NITRITE, URINE: NEGATIVE
PH UA: 8.5 (ref 4.5–8)
PROTEIN UA: POSITIVE
RBC URINE, POC: 4
SPECIFIC GRAVITY UA: 1.02 (ref 1–1.03)
UROBILINOGEN, URINE: NORMAL
WBC URINE, POC: 1
YEAST URINE, POC: ABNORMAL

## 2020-12-23 PROCEDURE — 99214 OFFICE O/P EST MOD 30 MIN: CPT | Performed by: UROLOGY

## 2020-12-23 PROCEDURE — 81000 URINALYSIS NONAUTO W/SCOPE: CPT | Performed by: UROLOGY

## 2020-12-23 PROCEDURE — 6360000004 HC RX CONTRAST MEDICATION: Performed by: UROLOGY

## 2020-12-23 PROCEDURE — 74178 CT ABD&PLV WO CNTR FLWD CNTR: CPT

## 2020-12-23 RX ORDER — CIPROFLOXACIN 500 MG/1
500 TABLET, FILM COATED ORAL DAILY
Qty: 4 TABLET | Refills: 0 | Status: SHIPPED | OUTPATIENT
Start: 2020-12-23 | End: 2021-01-24 | Stop reason: ALTCHOICE

## 2020-12-23 RX ADMIN — IOPAMIDOL 90 ML: 755 INJECTION, SOLUTION INTRAVENOUS at 11:23

## 2020-12-23 ASSESSMENT — ENCOUNTER SYMPTOMS
COUGH: 0
BACK PAIN: 0
SORE THROAT: 0
NAUSEA: 0
VOMITING: 0
WHEEZING: 0
EYE PAIN: 0

## 2020-12-23 NOTE — PROGRESS NOTES
Gwen Mosqueda is a 45 y.o. male who presents today   Chief Complaint   Patient presents with    Follow-up     I am here to follow up on my PSA and Renal Mass. I had my CT done this morning. Elevated PSA:  Patient is here today for history of an elevated PSA. His last several PSA values are as follows:  Lab Results   Component Value Date    PSA 5.35 (H) 11/24/2020   Patient has end-stage renal disease and had a PSA done at [facility] through the transplant program it was reported on 9/30/2020 to be 61.91. He now follows up to see me with a repeat PSA. He denies having any problems with urinary retention or infections at the time. Overall the PSA level is: decreased  Recent history of urinary tract infection/prostatitis? no  Previous prostate biopsy? no  Lower urinary tract symptoms: He states he has no significant urinary symptoms      Patient has seen me previously for microscopic hematuria asymptomatic. He has had the cystoscopy done 9/21/2020 which was unremarkable. CT urogram showed a left lower pole right renal cyst.  And a hyperdense cyst in the upper pole of the left kidney. He has follow-up imaging completed today.     Past Medical History:   Diagnosis Date    Asthma     CHF (congestive heart failure) (HCC)     [facility] mellitus [address]     [facility]sat [facility]    Erectile dysfunction     Hemodialysis patient ([facility]     [facility]th[facility]    History of blood transfusion     Hypertension     Obesity        Past Surgical History:   Procedure Laterality Date    COLONOSCOPY N/A 12/5/2019    Dr Alber Farris, internal hemorrhoids-Grade 1-2-HP, 3 yr recall    DIALYSIS FISTULA CREATION Left 6/26/2020    LEFT BRACHIAL / CEPHALIC AV FISTULA performed by Janel Suresh MD at [address] [place] [address]      [facility] - removal of first cath due to infection and placement of new one    TUNNELED VENOUS CATHETER PLACEMENT  01/2020    sjs       Current Outpatient needs     Medical: None     Non-medical: None   Tobacco Use    Smoking status: Never Smoker    Smokeless tobacco: Never Used   Substance and Sexual Activity    Alcohol use: Not Currently     Comment: occ    Drug use: No    Sexual activity: None   Lifestyle    Physical activity     Days per week: None     Minutes per session: None    Stress: None   Relationships    Social connections     Talks on phone: None     Gets together: None     Attends [employer] service: None     Active member of club or organization: None     Attends meetings of clubs or organizations: None     Relationship status: None    Intimate partner violence     Fear of current or ex partner: None     Emotionally abused: None     Physically abused: None     Forced sexual activity: None   Other Topics Concern    None   Social History Narrative    None       Family History   Problem Relation Age of Onset    Cancer Father         prostate    Kidney Disease Brother        REVIEW OF SYSTEMS:  Review of Systems   Constitutional: Negative for chills and fever. HENT: Negative for congestion and sore throat. Eyes: Negative for pain and visual disturbance. Respiratory: Negative for cough and wheezing. Cardiovascular: Negative for chest pain and palpitations. Gastrointestinal: Negative for nausea and vomiting. Endocrine: Negative for polyphagia and polyuria. Genitourinary: Positive for hematuria (micro only). Negative for decreased urine volume, difficulty urinating, discharge, dysuria, enuresis, flank pain, frequency, genital sores, penile pain, penile swelling, scrotal swelling, testicular pain and urgency. Musculoskeletal: Negative for back pain and neck pain. Skin: Negative for rash and wound. Allergic/Immunologic: Negative for environmental allergies and food allergies. Neurological: Negative for dizziness and headaches. Hematological: Negative for adenopathy. Does not bruise/bleed easily.    Psychiatric/Behavioral: Negative for confusion and hallucinations. All other systems reviewed and are negative. PHYSICAL EXAM:  Temp 98 °F (36.7 °C) (Temporal)   Ht 5' 8\" (1.727 m)   Wt 244 lb (110.7 kg)   BMI 37.10 kg/m²   Physical Exam  Constitutional:       General: He is not in acute distress. Appearance: Normal appearance. He is well-developed. HENT:      Head: Normocephalic and atraumatic. Nose: Nose normal.   Eyes:      General: No scleral icterus. Conjunctiva/sclera: Conjunctivae normal.      Pupils: Pupils are equal, round, and reactive to light. Neck:      Musculoskeletal: Normal range of motion and neck supple. Trachea: No tracheal deviation. Cardiovascular:      Rate and Rhythm: Normal rate and regular rhythm. Pulmonary:      Effort: Pulmonary effort is normal. No respiratory distress. Breath sounds: No stridor. Abdominal:      General: There is no distension. Palpations: Abdomen is soft. There is no mass. Tenderness: There is no abdominal tenderness. Genitourinary:     Penis: Normal.       Testes: Normal.      Prostate: Normal. Not enlarged and no nodules present. Musculoskeletal: Normal range of motion. General: No tenderness. Lymphadenopathy:      Cervical: No cervical adenopathy. Skin:     General: Skin is warm and dry. Findings: No erythema. Neurological:      Mental Status: He is alert and oriented to person, place, and time.    Psychiatric:         Behavior: Behavior normal.         Judgment: Judgment normal.             DATA:  CBC:   Lab Results   Component Value Date    WBC 7.0 11/24/2020    RBC 4.08 11/24/2020    HGB 12.0 11/24/2020    HCT 36.9 11/24/2020    MCV 90.4 11/24/2020    MCH 29.4 11/24/2020    MCHC 32.5 11/24/2020    RDW 16.1 11/24/2020     11/24/2020    MPV 10.1 11/24/2020     BMP:    Lab Results   Component Value Date     11/24/2020    K 4.8 11/24/2020    K 4.4 06/26/2020    CL 95 11/24/2020    CO2 29 11/24/2020 BUN 42 11/24/2020    LABALBU 4.5 11/24/2020    CREATININE 11.1 11/24/2020    CALCIUM 9.5 11/24/2020    GFRAA 6 11/24/2020    LABGLOM 5 11/24/2020    GLUCOSE 124 11/24/2020     Results for orders placed or performed in visit on 12/23/20   POC URINE with Microscopic   Result Value Ref Range    Color, UA Yellow     Clarity, UA Clear Clear    Glucose, Ur 100 MG     Bilirubin Urine 0 mg/dL    Ketones, Urine Negative     Specific Gravity, UA 1.020 1.005 - 1.030    Blood, Urine Positive     pH, UA 8.5 (A) 4.5 - 8.0    Protein, UA Positive (A) Negative    Nitrite, Urine Negative     Leukocytes, UA TRACE     Urobilinogen, Urine Normal     rbc urine, poc 4 (A)     wbc urine, poc 1 (A)     bacteria urine, poc      yeast urine, poc      casts urine, poc      epi cells urine, poc      crystals urine, poc       Lab Results   Component Value Date    PSA 5.35 (H) 11/24/2020     IMAGING:  CT done on 12/23/2020 again shows this 14 mm left upper pole hyperdense nodule with no significant contrast-enhancement felt to be consistent with a hyperdense cyst.  Precontrast Hounsfield units is 40 postcontrast Hounsfield units 46.    1. Hyperdense renal cyst  No change in left upper pole hyperdense cyst.  There is no significant enhancement I do not agree with the radiologist that this is a solid nodule does not meet the criteria. Will ask a different radiologist to review the film. Otherwise we will continue to monitor with serial imaging    2. Elevated PSA  His PSA is down from this significant elevation back in September but it is not normal for his age therefore we will plan to proceed with prostate transrectal ultrasound biopsy. Risk and complication including risk of infection have been discussed with the patient. We will give him renal dose Cipro for antibiotic coverage  - ciprofloxacin (CIPRO) 500 MG tablet; Take 1 tablet by mouth daily Begin taking the day before the biopsy is scheduled. Dispense: 4 tablet;  Refill: 0      Orders Placed This Encounter   Procedures    POC URINE with Microscopic        Return for PT to be scheduled for TRUS Biopsy of prostate. All information inputted into the note by the MA to include chief complaint, past medical history, past surgical history, medications, allergies, social and family history and review of systems has been reviewed and updated as needed by me. EMR Dragon/transcription disclaimer: Much of this documentt is electronic  transcription/translation of spoken language to printed text. The  electronic translation of spoken language may be erroneous, or at times,  nonsensical words or phrases may be inadvertently transcribed.  Although I  have reviewed the document for such errors, some may still exist.

## 2020-12-31 ENCOUNTER — OFFICE VISIT (OUTPATIENT)
Age: 50
End: 2020-12-31

## 2020-12-31 VITALS — OXYGEN SATURATION: 96 % | HEART RATE: 66 BPM | TEMPERATURE: 98.5 F

## 2021-01-01 LAB — SARS-COV-2, PCR: NOT DETECTED

## 2021-01-04 ENCOUNTER — PROCEDURE VISIT (OUTPATIENT)
Dept: UROLOGY | Age: 51
End: 2021-01-04
Payer: MEDICARE

## 2021-01-04 DIAGNOSIS — R97.20 ELEVATED PSA: Primary | ICD-10-CM

## 2021-01-04 PROCEDURE — 76872 US TRANSRECTAL: CPT | Performed by: UROLOGY

## 2021-01-04 PROCEDURE — 96372 THER/PROPH/DIAG INJ SC/IM: CPT | Performed by: UROLOGY

## 2021-01-04 PROCEDURE — 55700 PR BIOPSY OF PROSTATE,NEEDLE/PUNCH: CPT | Performed by: UROLOGY

## 2021-01-04 RX ORDER — GENTAMICIN SULFATE 40 MG/ML
80 INJECTION, SOLUTION INTRAMUSCULAR; INTRAVENOUS ONCE
Status: COMPLETED | OUTPATIENT
Start: 2021-01-04 | End: 2021-01-04

## 2021-01-04 RX ADMIN — GENTAMICIN SULFATE 80 MG: 40 INJECTION, SOLUTION INTRAMUSCULAR; INTRAVENOUS at 09:01

## 2021-01-04 NOTE — PROGRESS NOTES
Khurram Garza is a 45 y.o. male who presents today   Chief Complaint   Patient presents with    Procedure     I am here for my prostate biopsy this morning for my elevated psa. Elevated PSA:  Patient is here today for history of an elevated PSA. HPI  Elevated PSA:  Patient is here today for history of an elevated PSA. His last several PSA values are as follows:  Lab Results   Component Value Date    PSA 5.35 (H) 11/24/2020   Patient has end-stage renal disease and had a PSA done at [address] [place] [address] through the transplant program it was reported on 9/30/2020 to be 61.91. He now follows up to see me with a repeat PSA. He denies having any problems with urinary retention or infections at the time. Overall the PSA level is: decreased  Recent history of urinary tract infection/prostatitis? no  Previous prostate biopsy? no  Lower urinary tract symptoms: He states he has no significant urinary symptoms      Patient has seen me previously for microscopic hematuria asymptomatic. He has had the cystoscopy done 9/21/2020 which was unremarkable. CT urogram showed a left lower pole right renal cyst.  And a hyperdense cyst in the upper pole of the left kidney. He has follow-up imaging completed CT done 12/23/2020 showed stable appearing 14-month manner left upper pole hyperdense cyst.      PROSTATE U/S AND BIOPSY  As per my instructions, the patient took an antibiotic Beginning 1 day prior to this procedure. To be continued daily until 2 days post biopsy. Gentamicin 80mg IM was given today. He also had a Fleets enema.     Surgeon: Sean Smith MD  1/4/21  Indications for exam: Elevated PSA  Anesthesia: 1% Lidocaine periprostatic block bilaterally at junction of base of prostate and seminal vesicle, and apex   Ultrasound Findings:  Seminal Vesicles: intact bilaterally  Prostate Measurement: 22 grams;  PSAD 0.24  Transitional Zone: Normal echogenic structure  Peripheral Zone: Normal echogenic structure  No lesions noted  Bladder: Minimal postvoid residual  Biopsy: Trans Rectal Ultra Sound was used for Needle Guidance to direct the location of the needle for biopsy. Biopsy cores were taken from the left and right  lobe in a sequential fashion using the standard 12 core template (lateral base; base; lateral mid; mid; lateral apex; apex). Six Biopsy were taken from the right and 6  were taken from the left. All specimens were sent for pathological examination. Biopsy done transrectal w/ transrectal U/S done. Postop medications: Cipro 500 mg po bid for 2 more days. Recommendations: Will call patient with biopsy results and arrange for follow up. Postop Prostate Biopsy Instructions:  Patient told to drink plenty of fluids and to take it easy the day of the prostate biopsy, and the next few days. He was encouraged to use sitz baths as needed for relief of rectal discomfort after the biopsy. He was told he may notice blood or a rust color in his semen for several months after the biopsy. He was told to avoid resuming blood thinners or aspirin until the rectal bleeding or visible blood in urine has stopped for at least 48 hours. He should take his antibiotics to completion as prescribed. He was instructed to call our office immediately or to go to the Emergency Room if he experiences a fever over 101, shaking chills or an inability to urinate.       Lab Results   Component Value Date    PSA 5.35 (H) 11/24/2020               1. Elevated PSA  We will contact patient with results  - [facility]  - Surgical Pathology      Orders Placed This Encounter   Procedures    Surgical Pathology     Order Specific Question:   PREVIOUS BIOPSY     Answer:   No     Order Specific Question:   PREOP DIAGNOSIS     Answer:   elevated psa     Order Specific Question:   FROZEN SECTION - NO OR YES/SPECIMEN     Answer:   No    MA ECHO,TRANSRECTAL    MA BIOPSY OF PROSTATE,NEEDLE/PUNCH Return for [facility] call patient with results. 13

## 2021-01-15 ENCOUNTER — TELEPHONE (OUTPATIENT)
Dept: UROLOGY | Age: 51
End: 2021-01-15

## 2021-01-15 DIAGNOSIS — R97.20 ELEVATED PSA: Primary | ICD-10-CM

## 2021-01-15 NOTE — TELEPHONE ENCOUNTER
Patient was contacted with results of his prostate biopsy that showed no evidence of cancer. He will need to follow-up to see me in 3 months with a PSA prior.

## 2021-01-19 NOTE — TELEPHONE ENCOUNTER
I emailed a copy to him, left him a message to look for it so he can send it to who needs a copy.  I also mailed him a copy of the path report, along with the PSA order for his next appt (and a copy of his next appt)

## 2021-01-24 ENCOUNTER — HOSPITAL ENCOUNTER (INPATIENT)
Age: 51
LOS: 2 days | Discharge: HOME HEALTH | DRG: 177 | End: 2021-01-26
Attending: EMERGENCY MEDICINE
Payer: MEDICARE

## 2021-01-24 ENCOUNTER — APPOINTMENT (OUTPATIENT)
Dept: GENERAL RADIOLOGY | Age: 51
DRG: 177 | End: 2021-01-24
Payer: MEDICARE

## 2021-01-24 ENCOUNTER — APPOINTMENT (OUTPATIENT)
Dept: CT IMAGING | Age: 51
DRG: 177 | End: 2021-01-24
Payer: MEDICARE

## 2021-01-24 DIAGNOSIS — U07.1 PNEUMONIA DUE TO COVID-19 VIRUS: Primary | ICD-10-CM

## 2021-01-24 DIAGNOSIS — R11.2 NAUSEA VOMITING AND DIARRHEA: ICD-10-CM

## 2021-01-24 DIAGNOSIS — R19.7 NAUSEA VOMITING AND DIARRHEA: ICD-10-CM

## 2021-01-24 DIAGNOSIS — N18.6 ESRD ON DIALYSIS (HCC): ICD-10-CM

## 2021-01-24 DIAGNOSIS — I16.0 HYPERTENSIVE URGENCY: ICD-10-CM

## 2021-01-24 DIAGNOSIS — J12.82 PNEUMONIA DUE TO COVID-19 VIRUS: Primary | ICD-10-CM

## 2021-01-24 DIAGNOSIS — Z99.2 ESRD ON DIALYSIS (HCC): ICD-10-CM

## 2021-01-24 LAB
ALBUMIN SERPL-MCNC: 3.7 G/DL (ref 3.5–5.2)
ALP BLD-CCNC: 41 U/L (ref 40–130)
ALT SERPL-CCNC: 12 U/L (ref 5–41)
ANION GAP SERPL CALCULATED.3IONS-SCNC: 16 MMOL/L (ref 7–19)
APTT: 38.6 SEC (ref 26–36.2)
AST SERPL-CCNC: 20 U/L (ref 5–40)
BASOPHILS ABSOLUTE: 0 K/UL (ref 0–0.2)
BASOPHILS RELATIVE PERCENT: 0.3 % (ref 0–1)
BILIRUB SERPL-MCNC: 0.3 MG/DL (ref 0.2–1.2)
BUN BLDV-MCNC: 69 MG/DL (ref 6–20)
CALCIUM SERPL-MCNC: 9.4 MG/DL (ref 8.6–10)
CHLORIDE BLD-SCNC: 91 MMOL/L (ref 98–111)
CO2: 31 MMOL/L (ref 22–29)
CREAT SERPL-MCNC: 16.8 MG/DL (ref 0.5–1.2)
D DIMER: 0.7 UG/ML FEU (ref 0–0.48)
EOSINOPHILS ABSOLUTE: 0 K/UL (ref 0–0.6)
EOSINOPHILS RELATIVE PERCENT: 1 % (ref 0–5)
FIBRINOGEN: 634 MG/DL (ref 238–505)
GFR AFRICAN AMERICAN: 4
GFR NON-AFRICAN AMERICAN: 3
GLUCOSE BLD-MCNC: 107 MG/DL (ref 74–109)
HCT VFR BLD CALC: 34.7 % (ref 42–52)
HEMOGLOBIN: 12 G/DL (ref 14–18)
IMMATURE GRANULOCYTES #: 0 K/UL
INR BLD: 1 (ref 0.88–1.18)
LIPASE: 93 U/L (ref 13–60)
LYMPHOCYTES ABSOLUTE: 1.4 K/UL (ref 1.1–4.5)
LYMPHOCYTES RELATIVE PERCENT: 35.9 % (ref 20–40)
MCH RBC QN AUTO: 29.5 PG (ref 27–31)
MCHC RBC AUTO-ENTMCNC: 34.6 G/DL (ref 33–37)
MCV RBC AUTO: 85.3 FL (ref 80–94)
MONOCYTES ABSOLUTE: 0.3 K/UL (ref 0–0.9)
MONOCYTES RELATIVE PERCENT: 7 % (ref 0–10)
NEUTROPHILS ABSOLUTE: 2.2 K/UL (ref 1.5–7.5)
NEUTROPHILS RELATIVE PERCENT: 55.5 % (ref 50–65)
PDW BLD-RTO: 14.8 % (ref 11.5–14.5)
PLATELET # BLD: 135 K/UL (ref 130–400)
PMV BLD AUTO: 10.1 FL (ref 9.4–12.4)
POTASSIUM SERPL-SCNC: 4.9 MMOL/L (ref 3.5–5)
PRO-BNP: 4760 PG/ML (ref 0–900)
PROTHROMBIN TIME: 13.1 SEC (ref 12–14.6)
RBC # BLD: 4.07 M/UL (ref 4.7–6.1)
SARS-COV-2, NAAT: DETECTED
SODIUM BLD-SCNC: 138 MMOL/L (ref 136–145)
TOTAL PROTEIN: 7.5 G/DL (ref 6.6–8.7)
TROPONIN: 0.06 NG/ML (ref 0–0.03)
WBC # BLD: 3.9 K/UL (ref 4.8–10.8)

## 2021-01-24 PROCEDURE — 6360000002 HC RX W HCPCS: Performed by: EMERGENCY MEDICINE

## 2021-01-24 PROCEDURE — 85730 THROMBOPLASTIN TIME PARTIAL: CPT

## 2021-01-24 PROCEDURE — 85379 FIBRIN DEGRADATION QUANT: CPT

## 2021-01-24 PROCEDURE — 2580000003 HC RX 258: Performed by: HOSPITALIST

## 2021-01-24 PROCEDURE — 83880 ASSAY OF NATRIURETIC PEPTIDE: CPT

## 2021-01-24 PROCEDURE — 85610 PROTHROMBIN TIME: CPT

## 2021-01-24 PROCEDURE — 36415 COLL VENOUS BLD VENIPUNCTURE: CPT

## 2021-01-24 PROCEDURE — 85025 COMPLETE CBC W/AUTO DIFF WBC: CPT

## 2021-01-24 PROCEDURE — 71045 X-RAY EXAM CHEST 1 VIEW: CPT

## 2021-01-24 PROCEDURE — 99285 EMERGENCY DEPT VISIT HI MDM: CPT

## 2021-01-24 PROCEDURE — 96375 TX/PRO/DX INJ NEW DRUG ADDON: CPT

## 2021-01-24 PROCEDURE — 83690 ASSAY OF LIPASE: CPT

## 2021-01-24 PROCEDURE — 85384 FIBRINOGEN ACTIVITY: CPT

## 2021-01-24 PROCEDURE — 6360000002 HC RX W HCPCS: Performed by: HOSPITALIST

## 2021-01-24 PROCEDURE — 96374 THER/PROPH/DIAG INJ IV PUSH: CPT

## 2021-01-24 PROCEDURE — 6370000000 HC RX 637 (ALT 250 FOR IP): Performed by: EMERGENCY MEDICINE

## 2021-01-24 PROCEDURE — 1210000000 HC MED SURG R&B

## 2021-01-24 PROCEDURE — U0002 COVID-19 LAB TEST NON-CDC: HCPCS

## 2021-01-24 PROCEDURE — 6370000000 HC RX 637 (ALT 250 FOR IP): Performed by: HOSPITALIST

## 2021-01-24 PROCEDURE — 70450 CT HEAD/BRAIN W/O DYE: CPT

## 2021-01-24 PROCEDURE — 84484 ASSAY OF TROPONIN QUANT: CPT

## 2021-01-24 PROCEDURE — 80053 COMPREHEN METABOLIC PANEL: CPT

## 2021-01-24 PROCEDURE — 93005 ELECTROCARDIOGRAM TRACING: CPT | Performed by: HOSPITALIST

## 2021-01-24 RX ORDER — ACETAMINOPHEN 650 MG/1
650 SUPPOSITORY RECTAL EVERY 6 HOURS PRN
Status: DISCONTINUED | OUTPATIENT
Start: 2021-01-24 | End: 2021-01-26 | Stop reason: HOSPADM

## 2021-01-24 RX ORDER — HYDRALAZINE HYDROCHLORIDE 50 MG/1
50 TABLET, FILM COATED ORAL EVERY 8 HOURS SCHEDULED
Status: DISCONTINUED | OUTPATIENT
Start: 2021-01-24 | End: 2021-01-26 | Stop reason: HOSPADM

## 2021-01-24 RX ORDER — FERROUS SULFATE 325(65) MG
325 TABLET ORAL
Status: DISCONTINUED | OUTPATIENT
Start: 2021-01-25 | End: 2021-01-26 | Stop reason: HOSPADM

## 2021-01-24 RX ORDER — ALBUTEROL SULFATE 2.5 MG/3ML
2.5 SOLUTION RESPIRATORY (INHALATION) EVERY 4 HOURS PRN
Status: DISCONTINUED | OUTPATIENT
Start: 2021-01-24 | End: 2021-01-25

## 2021-01-24 RX ORDER — MECLIZINE HCL 12.5 MG/1
25 TABLET ORAL ONCE
Status: COMPLETED | OUTPATIENT
Start: 2021-01-24 | End: 2021-01-24

## 2021-01-24 RX ORDER — ATORVASTATIN CALCIUM 10 MG/1
10 TABLET, FILM COATED ORAL NIGHTLY
Status: DISCONTINUED | OUTPATIENT
Start: 2021-01-24 | End: 2021-01-24

## 2021-01-24 RX ORDER — SEVELAMER CARBONATE 800 MG/1
800 TABLET, FILM COATED ORAL
Status: DISCONTINUED | OUTPATIENT
Start: 2021-01-25 | End: 2021-01-26 | Stop reason: HOSPADM

## 2021-01-24 RX ORDER — SIMVASTATIN 10 MG
10 TABLET ORAL NIGHTLY
Status: DISCONTINUED | OUTPATIENT
Start: 2021-01-25 | End: 2021-01-26 | Stop reason: HOSPADM

## 2021-01-24 RX ORDER — CALCIUM CARBONATE 500(1250)
500 TABLET ORAL DAILY
Status: DISCONTINUED | OUTPATIENT
Start: 2021-01-25 | End: 2021-01-26 | Stop reason: HOSPADM

## 2021-01-24 RX ORDER — ONDANSETRON 2 MG/ML
4 INJECTION INTRAMUSCULAR; INTRAVENOUS ONCE
Status: COMPLETED | OUTPATIENT
Start: 2021-01-24 | End: 2021-01-24

## 2021-01-24 RX ORDER — VITAMIN B COMPLEX
5000 TABLET ORAL 2 TIMES DAILY
Status: DISCONTINUED | OUTPATIENT
Start: 2021-01-24 | End: 2021-01-26 | Stop reason: HOSPADM

## 2021-01-24 RX ORDER — METOPROLOL TARTRATE 5 MG/5ML
2.5 INJECTION INTRAVENOUS ONCE
Status: DISCONTINUED | OUTPATIENT
Start: 2021-01-24 | End: 2021-01-24

## 2021-01-24 RX ORDER — ONDANSETRON 4 MG/1
4 TABLET, ORALLY DISINTEGRATING ORAL EVERY 8 HOURS PRN
Status: DISCONTINUED | OUTPATIENT
Start: 2021-01-24 | End: 2021-01-26 | Stop reason: HOSPADM

## 2021-01-24 RX ORDER — POLYETHYLENE GLYCOL 3350 17 G/17G
17 POWDER, FOR SOLUTION ORAL DAILY PRN
Status: DISCONTINUED | OUTPATIENT
Start: 2021-01-24 | End: 2021-01-26 | Stop reason: HOSPADM

## 2021-01-24 RX ORDER — NIFEDIPINE 60 MG/1
60 TABLET, EXTENDED RELEASE ORAL 2 TIMES DAILY
Status: DISCONTINUED | OUTPATIENT
Start: 2021-01-24 | End: 2021-01-26 | Stop reason: HOSPADM

## 2021-01-24 RX ORDER — ONDANSETRON 2 MG/ML
4 INJECTION INTRAMUSCULAR; INTRAVENOUS EVERY 6 HOURS PRN
Status: DISCONTINUED | OUTPATIENT
Start: 2021-01-24 | End: 2021-01-26 | Stop reason: HOSPADM

## 2021-01-24 RX ORDER — CLONIDINE HYDROCHLORIDE 0.1 MG/1
0.1 TABLET ORAL 2 TIMES DAILY
Status: DISCONTINUED | OUTPATIENT
Start: 2021-01-24 | End: 2021-01-26 | Stop reason: HOSPADM

## 2021-01-24 RX ORDER — SODIUM CHLORIDE 0.9 % (FLUSH) 0.9 %
10 SYRINGE (ML) INJECTION PRN
Status: DISCONTINUED | OUTPATIENT
Start: 2021-01-24 | End: 2021-01-26 | Stop reason: HOSPADM

## 2021-01-24 RX ORDER — HEPARIN SODIUM 5000 [USP'U]/ML
5000 INJECTION, SOLUTION INTRAVENOUS; SUBCUTANEOUS EVERY 8 HOURS SCHEDULED
Status: DISCONTINUED | OUTPATIENT
Start: 2021-01-24 | End: 2021-01-26 | Stop reason: HOSPADM

## 2021-01-24 RX ORDER — UBIDECARENONE 75 MG
100 CAPSULE ORAL DAILY
Status: DISCONTINUED | OUTPATIENT
Start: 2021-01-25 | End: 2021-01-26 | Stop reason: HOSPADM

## 2021-01-24 RX ORDER — FUROSEMIDE 10 MG/ML
80 INJECTION INTRAMUSCULAR; INTRAVENOUS ONCE
Status: COMPLETED | OUTPATIENT
Start: 2021-01-24 | End: 2021-01-24

## 2021-01-24 RX ORDER — GUAIFENESIN/DEXTROMETHORPHAN 100-10MG/5
5 SYRUP ORAL EVERY 4 HOURS PRN
Status: DISCONTINUED | OUTPATIENT
Start: 2021-01-24 | End: 2021-01-26 | Stop reason: HOSPADM

## 2021-01-24 RX ORDER — SODIUM CHLORIDE 0.9 % (FLUSH) 0.9 %
10 SYRINGE (ML) INJECTION EVERY 12 HOURS SCHEDULED
Status: DISCONTINUED | OUTPATIENT
Start: 2021-01-24 | End: 2021-01-26 | Stop reason: HOSPADM

## 2021-01-24 RX ORDER — METOPROLOL TARTRATE 50 MG/1
25 TABLET, FILM COATED ORAL 2 TIMES DAILY
Status: DISCONTINUED | OUTPATIENT
Start: 2021-01-24 | End: 2021-01-26 | Stop reason: HOSPADM

## 2021-01-24 RX ORDER — ACETAMINOPHEN 325 MG/1
650 TABLET ORAL EVERY 6 HOURS PRN
Status: DISCONTINUED | OUTPATIENT
Start: 2021-01-24 | End: 2021-01-26 | Stop reason: HOSPADM

## 2021-01-24 RX ADMIN — NIFEDIPINE 60 MG: 60 TABLET, FILM COATED, EXTENDED RELEASE ORAL at 23:50

## 2021-01-24 RX ADMIN — SODIUM CHLORIDE, PRESERVATIVE FREE 10 ML: 5 INJECTION INTRAVENOUS at 23:45

## 2021-01-24 RX ADMIN — FUROSEMIDE 80 MG: 10 INJECTION, SOLUTION INTRAMUSCULAR; INTRAVENOUS at 20:56

## 2021-01-24 RX ADMIN — HEPARIN SODIUM 5000 UNITS: 5000 INJECTION INTRAVENOUS; SUBCUTANEOUS at 23:50

## 2021-01-24 RX ADMIN — MECLIZINE 25 MG: 12.5 TABLET ORAL at 20:51

## 2021-01-24 RX ADMIN — METOPROLOL TARTRATE 25 MG: 50 TABLET, FILM COATED ORAL at 23:50

## 2021-01-24 RX ADMIN — ONDANSETRON HYDROCHLORIDE 4 MG: 2 SOLUTION INTRAMUSCULAR; INTRAVENOUS at 20:53

## 2021-01-24 RX ADMIN — Medication 5000 UNITS: at 23:49

## 2021-01-24 RX ADMIN — CLONIDINE HYDROCHLORIDE 0.1 MG: 0.1 TABLET ORAL at 23:50

## 2021-01-24 RX ADMIN — HYDRALAZINE HYDROCHLORIDE 50 MG: 50 TABLET, FILM COATED ORAL at 23:50

## 2021-01-24 ASSESSMENT — ENCOUNTER SYMPTOMS
BACK PAIN: 0
ABDOMINAL PAIN: 0
SHORTNESS OF BREATH: 1
SORE THROAT: 0
NAUSEA: 1
RHINORRHEA: 0
VOMITING: 1
DIARRHEA: 1
COUGH: 0

## 2021-01-24 NOTE — Clinical Note
Patient Class: Inpatient [101]   REQUIRED: Diagnosis: Pneumonia due to severe acute respiratory syndrome coronavirus 2 (SARS-CoV-2) [[id number]]   Estimated Length of Stay: Estimated stay of more than 2 midnights   Future Attending Provider: Steve Goldberg [[id number]]   Admitting Provider: Steve Goldberg [[id number]]   Preferred Department: 4th floor, needs HD tomorrow - fluid volume overload s/p stated 3.5 of 4 hours HD Saturday

## 2021-01-25 ENCOUNTER — TELEPHONE (OUTPATIENT)
Dept: INTERNAL MEDICINE CLINIC | Age: 51
End: 2021-01-25

## 2021-01-25 PROBLEM — E87.70 VOLUME OVERLOAD: Status: ACTIVE | Noted: 2021-01-25

## 2021-01-25 PROBLEM — Z51.5 PALLIATIVE CARE PATIENT: Status: ACTIVE | Noted: 2021-01-25

## 2021-01-25 PROBLEM — J81.1 PULMONARY EDEMA: Status: ACTIVE | Noted: 2021-01-25

## 2021-01-25 LAB
ALBUMIN SERPL-MCNC: 3.5 G/DL (ref 3.5–5.2)
ALP BLD-CCNC: 36 U/L (ref 40–130)
ALT SERPL-CCNC: 10 U/L (ref 5–41)
ANION GAP SERPL CALCULATED.3IONS-SCNC: 20 MMOL/L (ref 7–19)
APTT: 40.2 SEC (ref 26–36.2)
AST SERPL-CCNC: 16 U/L (ref 5–40)
BASOPHILS ABSOLUTE: 0 K/UL (ref 0–0.2)
BASOPHILS RELATIVE PERCENT: 0 % (ref 0–1)
BILIRUB SERPL-MCNC: 0.3 MG/DL (ref 0.2–1.2)
BUN BLDV-MCNC: 77 MG/DL (ref 6–20)
CALCIUM SERPL-MCNC: 8.8 MG/DL (ref 8.6–10)
CHLORIDE BLD-SCNC: 94 MMOL/L (ref 98–111)
CO2: 28 MMOL/L (ref 22–29)
CREAT SERPL-MCNC: 17.5 MG/DL (ref 0.5–1.2)
D DIMER: 0.93 UG/ML FEU (ref 0–0.48)
EKG P AXIS: 46 DEGREES
EKG P AXIS: 61 DEGREES
EKG P-R INTERVAL: 164 MS
EKG P-R INTERVAL: 170 MS
EKG Q-T INTERVAL: 402 MS
EKG Q-T INTERVAL: 404 MS
EKG QRS DURATION: 84 MS
EKG QRS DURATION: 84 MS
EKG QTC CALCULATION (BAZETT): 418 MS
EKG QTC CALCULATION (BAZETT): 419 MS
EKG T AXIS: 26 DEGREES
EKG T AXIS: 30 DEGREES
EOSINOPHILS ABSOLUTE: 0 K/UL (ref 0–0.6)
EOSINOPHILS RELATIVE PERCENT: 1.1 % (ref 0–5)
FIBRINOGEN: 568 MG/DL (ref 238–505)
GFR AFRICAN AMERICAN: 4
GFR NON-AFRICAN AMERICAN: 3
GLUCOSE BLD-MCNC: 92 MG/DL (ref 74–109)
HCT VFR BLD CALC: 30.6 % (ref 42–52)
HEMOGLOBIN: 10.1 G/DL (ref 14–18)
IMMATURE GRANULOCYTES #: 0 K/UL
INR BLD: 1.05 (ref 0.88–1.18)
LYMPHOCYTES ABSOLUTE: 1 K/UL (ref 1.1–4.5)
LYMPHOCYTES RELATIVE PERCENT: 27.4 % (ref 20–40)
MCH RBC QN AUTO: 29.1 PG (ref 27–31)
MCHC RBC AUTO-ENTMCNC: 33 G/DL (ref 33–37)
MCV RBC AUTO: 88.2 FL (ref 80–94)
MONOCYTES ABSOLUTE: 0.3 K/UL (ref 0–0.9)
MONOCYTES RELATIVE PERCENT: 7.9 % (ref 0–10)
NEUTROPHILS ABSOLUTE: 2.4 K/UL (ref 1.5–7.5)
NEUTROPHILS RELATIVE PERCENT: 63.3 % (ref 50–65)
PDW BLD-RTO: 14.9 % (ref 11.5–14.5)
PLATELET # BLD: 120 K/UL (ref 130–400)
PMV BLD AUTO: 10.3 FL (ref 9.4–12.4)
POTASSIUM REFLEX MAGNESIUM: 5.7 MMOL/L (ref 3.5–5)
POTASSIUM SERPL-SCNC: 5.9 MMOL/L (ref 3.5–5)
PROTHROMBIN TIME: 13.6 SEC (ref 12–14.6)
RBC # BLD: 3.47 M/UL (ref 4.7–6.1)
SODIUM BLD-SCNC: 142 MMOL/L (ref 136–145)
TOTAL PROTEIN: 5.9 G/DL (ref 6.6–8.7)
WBC # BLD: 3.8 K/UL (ref 4.8–10.8)

## 2021-01-25 PROCEDURE — 6370000000 HC RX 637 (ALT 250 FOR IP): Performed by: HOSPITALIST

## 2021-01-25 PROCEDURE — 8010000000 HC HEMODIALYSIS ACUTE INPT

## 2021-01-25 PROCEDURE — 6370000000 HC RX 637 (ALT 250 FOR IP): Performed by: STUDENT IN AN ORGANIZED HEALTH CARE EDUCATION/TRAINING PROGRAM

## 2021-01-25 PROCEDURE — 93005 ELECTROCARDIOGRAM TRACING: CPT | Performed by: EMERGENCY MEDICINE

## 2021-01-25 PROCEDURE — 5A1D70Z PERFORMANCE OF URINARY FILTRATION, INTERMITTENT, LESS THAN 6 HOURS PER DAY: ICD-10-PCS | Performed by: INTERNAL MEDICINE

## 2021-01-25 PROCEDURE — 85730 THROMBOPLASTIN TIME PARTIAL: CPT

## 2021-01-25 PROCEDURE — 84132 ASSAY OF SERUM POTASSIUM: CPT

## 2021-01-25 PROCEDURE — 2580000003 HC RX 258: Performed by: HOSPITALIST

## 2021-01-25 PROCEDURE — 93010 ELECTROCARDIOGRAM REPORT: CPT | Performed by: INTERNAL MEDICINE

## 2021-01-25 PROCEDURE — 6360000002 HC RX W HCPCS: Performed by: HOSPITALIST

## 2021-01-25 PROCEDURE — 36415 COLL VENOUS BLD VENIPUNCTURE: CPT

## 2021-01-25 PROCEDURE — 85610 PROTHROMBIN TIME: CPT

## 2021-01-25 PROCEDURE — 1210000000 HC MED SURG R&B

## 2021-01-25 PROCEDURE — 6360000002 HC RX W HCPCS: Performed by: STUDENT IN AN ORGANIZED HEALTH CARE EDUCATION/TRAINING PROGRAM

## 2021-01-25 PROCEDURE — 85384 FIBRINOGEN ACTIVITY: CPT

## 2021-01-25 PROCEDURE — 80053 COMPREHEN METABOLIC PANEL: CPT

## 2021-01-25 PROCEDURE — 85379 FIBRIN DEGRADATION QUANT: CPT

## 2021-01-25 PROCEDURE — 85025 COMPLETE CBC W/AUTO DIFF WBC: CPT

## 2021-01-25 RX ORDER — ALBUTEROL SULFATE 90 UG/1
2 AEROSOL, METERED RESPIRATORY (INHALATION) 4 TIMES DAILY
Status: DISCONTINUED | OUTPATIENT
Start: 2021-01-25 | End: 2021-01-26 | Stop reason: HOSPADM

## 2021-01-25 RX ORDER — ALBUTEROL SULFATE 2.5 MG/3ML
2.5 SOLUTION RESPIRATORY (INHALATION) 4 TIMES DAILY
Status: DISCONTINUED | OUTPATIENT
Start: 2021-01-25 | End: 2021-01-25

## 2021-01-25 RX ADMIN — HEPARIN SODIUM 5000 UNITS: 5000 INJECTION INTRAVENOUS; SUBCUTANEOUS at 06:10

## 2021-01-25 RX ADMIN — HYDRALAZINE HYDROCHLORIDE 50 MG: 50 TABLET, FILM COATED ORAL at 22:18

## 2021-01-25 RX ADMIN — SODIUM CHLORIDE, PRESERVATIVE FREE 10 ML: 5 INJECTION INTRAVENOUS at 20:55

## 2021-01-25 RX ADMIN — CLONIDINE HYDROCHLORIDE 0.1 MG: 0.1 TABLET ORAL at 20:55

## 2021-01-25 RX ADMIN — METOPROLOL TARTRATE 25 MG: 50 TABLET, FILM COATED ORAL at 20:55

## 2021-01-25 RX ADMIN — FERROUS SULFATE TAB 325 MG (65 MG ELEMENTAL FE) 325 MG: 325 (65 FE) TAB at 13:01

## 2021-01-25 RX ADMIN — DEXAMETHASONE 6 MG: 2 TABLET ORAL at 13:14

## 2021-01-25 RX ADMIN — NIFEDIPINE 60 MG: 60 TABLET, FILM COATED, EXTENDED RELEASE ORAL at 20:55

## 2021-01-25 RX ADMIN — HYDRALAZINE HYDROCHLORIDE 50 MG: 50 TABLET, FILM COATED ORAL at 18:18

## 2021-01-25 RX ADMIN — ALBUTEROL SULFATE 2 PUFF: 90 AEROSOL, METERED RESPIRATORY (INHALATION) at 18:17

## 2021-01-25 RX ADMIN — Medication 5000 UNITS: at 13:00

## 2021-01-25 RX ADMIN — CALCIUM 500 MG: 500 TABLET ORAL at 13:01

## 2021-01-25 RX ADMIN — METOPROLOL TARTRATE 25 MG: 50 TABLET, FILM COATED ORAL at 12:59

## 2021-01-25 RX ADMIN — HEPARIN SODIUM 5000 UNITS: 5000 INJECTION INTRAVENOUS; SUBCUTANEOUS at 18:17

## 2021-01-25 RX ADMIN — SODIUM CHLORIDE, PRESERVATIVE FREE 10 ML: 5 INJECTION INTRAVENOUS at 13:02

## 2021-01-25 RX ADMIN — SIMVASTATIN 10 MG: 10 TABLET, FILM COATED ORAL at 20:54

## 2021-01-25 RX ADMIN — Medication 100 MCG: at 13:02

## 2021-01-25 RX ADMIN — CLONIDINE HYDROCHLORIDE 0.1 MG: 0.1 TABLET ORAL at 13:01

## 2021-01-25 RX ADMIN — Medication 5000 UNITS: at 20:55

## 2021-01-25 RX ADMIN — ACETAMINOPHEN 650 MG: 325 TABLET ORAL at 13:14

## 2021-01-25 RX ADMIN — SEVELAMER CARBONATE 800 MG: 800 TABLET, FILM COATED ORAL at 13:01

## 2021-01-25 RX ADMIN — HEPARIN SODIUM 5000 UNITS: 5000 INJECTION INTRAVENOUS; SUBCUTANEOUS at 22:21

## 2021-01-25 RX ADMIN — SEVELAMER CARBONATE 800 MG: 800 TABLET, FILM COATED ORAL at 18:18

## 2021-01-25 RX ADMIN — SIMVASTATIN 10 MG: 10 TABLET, FILM COATED ORAL at 00:47

## 2021-01-25 RX ADMIN — ALBUTEROL SULFATE 2 PUFF: 90 AEROSOL, METERED RESPIRATORY (INHALATION) at 20:55

## 2021-01-25 RX ADMIN — HYDRALAZINE HYDROCHLORIDE 50 MG: 50 TABLET, FILM COATED ORAL at 06:07

## 2021-01-25 RX ADMIN — NIFEDIPINE 60 MG: 60 TABLET, FILM COATED, EXTENDED RELEASE ORAL at 13:02

## 2021-01-25 ASSESSMENT — PAIN SCALES - GENERAL
PAINLEVEL_OUTOF10: 3
PAINLEVEL_OUTOF10: 0

## 2021-01-25 ASSESSMENT — ENCOUNTER SYMPTOMS
ABDOMINAL PAIN: 0
COUGH: 1
SHORTNESS OF BREATH: 1
CHEST TIGHTNESS: 0
WHEEZING: 0
CHOKING: 0
NAUSEA: 0

## 2021-01-25 ASSESSMENT — PAIN DESCRIPTION - DESCRIPTORS: DESCRIPTORS: HEADACHE

## 2021-01-25 NOTE — ACP (ADVANCE CARE PLANNING)
Attempted to call patient via phone. There was no response. Plan to attempt again - when appropriate.     Electronically signed by Shelby Duke  [employer] on 1/25/2021 at 10:50 AM

## 2021-01-25 NOTE — CARE COORDINATION
Spoke with patient regarding MD orders for [facility] services. Patient agreeable and has chosen [facility]. Referral Faxed. [address] [place] [address] [phone]. [phone]. Please notify [facility] when patient discharges and fax DC Summary,  DC med list and any new [facility] orders. The Patient was provided with a choice of provider and agrees   with the discharge plan. [x] Yes [] No    Freedom of choice list was provided with basic dialogue that supports the patient's individualized plan of care/goals, treatment preferences and shares the quality data associated with the providers.  [x] Yes [] No  Electronically signed by Andree Solis on 1/25/2021 at 2:10 PM

## 2021-01-25 NOTE — ED PROVIDER NOTES
[facility]  eMERGENCY dEPARTMENT eNCOUnter      Pt Name: Khurram Garza  MRN: [id number]  [place] 1970  Date of evaluation: 1/24/2021  Provider: Stephon Shahid MD    [address] [place] [address]       Chief Complaint   Patient presents with    Shortness of Breath     only had 30 mins of dialysis yesterday    Dizziness     x 2 days    Hypertension         HISTORY OF PRESENT ILLNESS   (Location/Symptom, Timing/Onset,Context/Setting, Quality, Duration, Modifying Factors, Severity)  Note limiting factors. Khurram Garza is a 45 y.o. male who presents to the emergency department for dizziness and shortness of breath as well as nausea vomiting and diarrhea. He tells me been sick since Tuesday with intermittent vomiting and diarrhea but he denies any abdominal pain. He also reports that he has had some mild shortness of breath but denies any chest pain. He has felt dizzy and off balance when he stands up and as well as had some vertigo spinning type sensation. He denies any focal neurologic symptoms. He has had some tinnitus in both of his ears as well as some mild bilateral blurry vision. He denies any headache though. He is hypertensive here on arrival.  He is a dialysis patient and last dialyzed on Saturday approximately 3-1/2 of his 4 hours due to not feeling well. He is due this coming Tuesday. HPI    NursingNotes were reviewed. REVIEW OF SYSTEMS    (2-9 systems for level 4, 10 or more for level 5)     Review of Systems   Constitutional: Positive for fatigue. Negative for chills and fever. HENT: Negative for rhinorrhea and sore throat. Eyes: Positive for visual disturbance. Respiratory: Positive for shortness of breath. Negative for cough. Cardiovascular: Negative for chest pain and leg swelling. Gastrointestinal: Positive for diarrhea, nausea and vomiting. Negative for abdominal pain. Genitourinary: Negative for dysuria and frequency. Musculoskeletal: Negative for back pain and neck pain. Neurological: Positive for dizziness. Negative for speech difficulty, weakness, numbness and headaches. All other systems reviewed and are negative. PAST MEDICALHISTORY     Past Medical History:   Diagnosis Date    Asthma     [facility]     Diabetes mellitus ([facility]     t[facility]-thur-sat at [facility]    Erectile dysfunction     Hemodialysis patient ([facility]     t,th,s at [facility]    History of blood transfusion     Hypertension     Obesity          SURGICAL HISTORY       Past Surgical History:   Procedure Laterality Date    COLONOSCOPY N/A 12/05/2019    Dr Olivia Moreno, internal hemorrhoids-Grade 1-2-HP, 3 yr recall    DIALYSIS FISTULA CREATION Left 06/26/2020    LEFT BRACHIAL / CEPHALIC AV FISTULA performed by Jose Florentino MD at [address] [place] [address]      wb - removal of first cath due to infection and placement of new one    TUNNELED VENOUS CATHETER PLACEMENT  01/2020    hospitals    VASCULAR SURGERY  11/11/2020    [facility].  Removal of tunneled dialysis catheter left internal jugular vein         CURRENT MEDICATIONS     Previous Medications    ALBUTEROL SULFATE  (90 BASE) MCG/ACT INHALER    Inhale 2 puffs into the lungs every 6 hours as needed for Wheezing    CALCIUM CARBONATE (OYSTER SHELL CALCIUM 500 MG) 1250 (500 CA) MG TABLET    Take 1 tablet by mouth daily    CLONIDINE (CATAPRES) 0.1 MG TABLET    TAKE ONE TABLET TWO TIMES A DAY    CYANOCOBALAMIN (VITAMIN B 12 PO)    Take 1 tablet by mouth daily     FERROUS SULFATE (IRON 325) 325 (65 FE) MG TABLET    TAKE ONE TABLET TWO TIMES A DAY WITH MEALS    HYDRALAZINE (APRESOLINE) 50 MG TABLET    TAKE ONE TABLET EVERY 8 HOURS    METOPROLOL TARTRATE (LOPRESSOR) 25 MG TABLET    TAKE ONE TABLET TWO TIMES A DAY    NIFEDIPINE (ADALAT CC) 60 MG EXTENDED RELEASE TABLET    TAKE ONE TABLET TWO TIMES A DAY    SEVELAMER (RENVELA) 800 MG TABLET    Take 800 mg by mouth 3 times daily (with meals) (1.727 m) 235 lb (106.6 kg)       Physical Exam  Vitals signs and nursing note reviewed. Constitutional:       General: He is not in acute distress. Appearance: Normal appearance. He is well-developed. He is ill-appearing. He is not diaphoretic. HENT:      Head: Normocephalic and atraumatic. Nose: Nose normal.      Mouth/Throat:      Mouth: Mucous membranes are moist.   Eyes:      Conjunctiva/sclera: Conjunctivae normal.   Neck:      Musculoskeletal: Normal range of motion and neck supple. Trachea: No tracheal deviation. Cardiovascular:      Rate and Rhythm: Normal rate and regular rhythm. Heart sounds: Normal heart sounds. No murmur. Pulmonary:      Breath sounds: Examination of the right-lower field reveals decreased breath sounds. Examination of the left-lower field reveals decreased breath sounds. Decreased breath sounds present. No wheezing or rales. Abdominal:      Palpations: Abdomen is soft. There is no mass. Tenderness: There is no abdominal tenderness. Musculoskeletal: Normal range of motion. Right lower leg: He exhibits no tenderness. Left lower leg: He exhibits no tenderness. Skin:     General: Skin is warm and dry. Neurological:      Mental Status: He is alert and oriented to person, place, and time. GCS: GCS eye subscore is 4. GCS verbal subscore is 5. GCS motor subscore is 6. Cranial Nerves: No cranial nerve deficit, dysarthria or facial asymmetry. Motor: No abnormal muscle tone.       Coordination: Finger-Nose-Finger Test normal.         DIAGNOSTIC RESULTS     EKG: All EKG's areinterpreted by the Emergency Department Physician who either signs or Co-signs this chart in the absence of a cardiologist.    68 normal sinus rhythm Q waves in leads III and aVF no obvious ST changes nondiagnostic EKG    RADIOLOGY:  Non-plain film images such as CT, Ultrasound and MRI are read by the radiologist. Plain radiographic images are visualized and LIPASE - Abnormal; Notable for the following components:    Lipase 93 (*)     All other components within normal limits   D-DIMER, QUANTITATIVE - Abnormal; Notable for the following components:    D-Dimer, Quant 0.70 (*)     All other components within normal limits   FIBRINOGEN - Abnormal; Notable for the following components:    Fibrinogen 634 (*)     All other components within normal limits   APTT - Abnormal; Notable for the following components:    aPTT 38.6 (*)     All other components within normal limits   PROTIME-INR       All other labs were within normal range or not returned as of this dictation. EMERGENCY DEPARTMENT COURSE and DIFFERENTIAL DIAGNOSIS/MDM:   Vitals:    Vitals:    01/24/21 2037 01/24/21 2057 01/24/21 2108 01/24/21 2115   BP:  (!) 192/92  (!) 147/90   Pulse: 72 66  72   Resp:  20 22 20   Temp:       TempSrc:       SpO2:  95% 94% 98%   Weight:       Height:           MDM  Number of Diagnoses or Management Options     Amount and/or Complexity of Data Reviewed  Clinical lab tests: ordered and reviewed  Tests in the radiology section of CPT®: ordered and reviewed  Discuss the patient with other providers: yes  Independent visualization of images, tracings, or specimens: yes      VSS, quite hypertensive on arrival but has improved, multiple symptoms including dizziness/vertigo/lightheaded, non focal exam, neg head ct,  N/v/d no abd pain, sob, found to be covid positive, did not have full dialysis on Saturday, likely mix of covid and some volume overload on xray I suspect, d/w Dr. Sivakumar Wood for admission      CONSULTS:  IP CONSULT TO NEPHROLOGY    PROCEDURES:  Unless otherwise noted below, none     Procedures    FINAL IMPRESSION      1. Pneumonia due to COVID-19 virus    2. ESRD on dialysis ([facility]    3. Hypertensive urgency    4.  Nausea vomiting and diarrhea          DISPOSITION/PLAN   DISPOSITION Decision To Admit 01/24/2021 10:49:12 PM      PATIENT REFERRED TO:  No follow-up provider specified.     DISCHARGE MEDICATIONS:  New Prescriptions    No medications on file          (Please note that portions of this note were completed with a voice recognition program.  Efforts were made to edit thedictations but occasionally words are mis-transcribed.)    Grace Nam MD (electronically signed)  Attending Emergency Physician        Tangela Desir MD  01/24/21 1949

## 2021-01-25 NOTE — PROGRESS NOTES
Dr. Garima Irwin made aware of pt 5.7 potassium. EGK and repeat potassium ordered. Dr. Garima Irwin made aware of repeat 5.9 potassium and sent a picture of the ordered EKG.      Electronically signed by Umm Ochoa RN on 1/25/2021 at 6:35 AM

## 2021-01-25 NOTE — PLAN OF CARE
Delayed entry      EKG sent to me did not show t wave peaking  HD staff / nephro team updated by RN team  Pt for HD tomorrow if none done today

## 2021-01-25 NOTE — PROGRESS NOTES
Palliative Care/Spiritual Care: Spoke with pt's mom Nicoletta Koyanagi. Pt has ESRD, and tested positive for Covid with pneumonia. Pt's mom says pt had headaches, and was very weak. Advance Directives: Pt does not have an AD/LW but may be interested in completing. Pt's mom says pt does have two grown children but pt has his mom listed as primary decision maker. Pain/other symptoms: Pt was having severe headaches. Social/Spiritual: Pt does not have a Buddhist preference but mom says I need to speak with the pt. Pt/family discussion r/t goals: Pt lives at home and ambulates by scooting around the house, according to mom. Pt has two daughters. Pt is able to care for  Himself most of the time and his goal is to return home with previous quality of life and previous daily living skills. Provided spiritual care with sustaining presence, nurtured hope, and prayer. Pt's mom expressed gratitude for spiritual care.     Electronically signed by Roshan Mendes on 1/25/2021 at 12:08 PM

## 2021-01-25 NOTE — PROGRESS NOTES
Daily Progress Note    Date:2021  Patient: Rima Armas  : 1970  RLW:[id number]  CODE:Full Code No additional code details  Nawaf Gómez MD    Admit Date: 2021  8:21 PM   LOS: 1 day       Subjective:   21-year-old male with ESRD on HD (Tuesday/Thursday/Saturday schedule), presented with worsening dyspnea, fatigue, dry cough, malaise since last Wednesday. His most recent dialysis session was not completed due to him not feeling well. He subsequently tested positive for COVID-19 here and chest x-ray shows bilateral opacities consistent with pulmonary edema, but possibly related to COVID-19 pneumonia. Initially on room air but has intermittently required supplemental oxygen. According to family patient has had home O2 intermittently. Nephrology following for dialytic needs and plans for dialysis tomorrow on .       Review of Systems    Comprehensive ROS completed and is negative except as otherwise noted    Objective:      Vital signs in last 24 hours:  Patient Vitals for the past 24 hrs:   BP Temp Temp src Pulse Resp SpO2 Height Weight   21 0742 128/78 98.5 °F (36.9 °C) -- 74 18 91 % -- --   21 0451 124/83 98.9 °F (37.2 °C) Temporal 71 15 90 % -- --   21 0215 (!) 161/88 98.4 °F (36.9 °C) Temporal 72 16 94 % -- --   21 0214 -- -- -- -- -- (!) 88 % -- --   21 2327 (!) 193/97 98.3 °F (36.8 °C) Temporal 72 16 91 % 5' 8\" (1.727 m) 235 lb (106.6 kg)   21 2249 (!) 179/93 97.8 °F (36.6 °C) Temporal 70 16 91 % -- --   21 2115 (!) 147/90 -- -- 72 20 98 % -- --   21 -- -- -- -- 22 94 % -- --   21 (!) 192/92 -- -- 66 20 95 % -- --   21 -- -- -- 72 -- -- -- --   21 (!) 204/91 98 °F (36.7 °C) Oral 78 20 97 % 5' 8\" (1.727 m) 235 lb (106.6 kg)     Physical exam    General: No acute distress, awake and alert  HEENT: Normocephalic/atraumatic, no scleral icterus  Neck: Trachea midline  Cardiovascular: Normal rate with regular rhythm  Respiratory: Equal and symmetric chest rise, no accessory muscle use  Abdomen: No abdominal distention observed  Musculoskeletal: No observed deformities  Neurologic: No focal weakness  Extremities: No cyanosis    Lab Review   Recent Results (from the past 24 hour(s))   Brain Natriuretic Peptide    Collection Time: 01/24/21  8:27 PM   Result Value Ref Range    Pro-BNP 4,760 (H) 0 - 900 pg/mL   CBC Auto Differential    Collection Time: 01/24/21  8:27 PM   Result Value Ref Range    WBC 3.9 (L) 4.8 - 10.8 K/uL    RBC 4.07 (L) 4.70 - 6.10 M/uL    Hemoglobin 12.0 (L) 14.0 - 18.0 g/dL    Hematocrit 34.7 (L) 42.0 - 52.0 %    MCV 85.3 80.0 - 94.0 fL    MCH 29.5 27.0 - 31.0 pg    MCHC 34.6 33.0 - 37.0 g/dL    RDW 14.8 (H) 11.5 - 14.5 %    Platelets 582 753 - 796 K/uL    MPV 10.1 9.4 - 12.4 fL    Neutrophils % 55.5 50.0 - 65.0 %    Lymphocytes % 35.9 20.0 - 40.0 %    Monocytes % 7.0 0.0 - 10.0 %    Eosinophils % 1.0 0.0 - 5.0 %    Basophils % 0.3 0.0 - 1.0 %    Neutrophils Absolute 2.2 1.5 - 7.5 K/uL    Immature Granulocytes # 0.0 K/uL    Lymphocytes Absolute 1.4 1.1 - 4.5 K/uL    Monocytes Absolute 0.30 0.00 - 0.90 K/uL    Eosinophils Absolute 0.00 0.00 - 0.60 K/uL    Basophils Absolute 0.00 0.00 - 0.20 K/uL   Comprehensive Metabolic Panel    Collection Time: 01/24/21  8:27 PM   Result Value Ref Range    Sodium 138 136 - 145 mmol/L    Potassium 4.9 3.5 - 5.0 mmol/L    Chloride 91 (L) 98 - 111 mmol/L    CO2 31 (H) 22 - 29 mmol/L    Anion Gap 16 7 - 19 mmol/L    Glucose 107 74 - 109 mg/dL    BUN 69 (H) 6 - 20 mg/dL    CREATININE 16.8 (H) 0.5 - 1.2 mg/dL    GFR Non-African American 3 (A) >60    GFR  4 (L) >59    Calcium 9.4 8.6 - 10.0 mg/dL    Total Protein 7.5 6.6 - 8.7 g/dL    Alb 3.7 3.5 - 5.2 g/dL    Total Bilirubin 0.3 0.2 - 1.2 mg/dL    Alkaline Phosphatase 41 40 - 130 U/L    ALT 12 5 - 41 U/L    AST 20 5 - 40 U/L   Troponin    Collection Time: 01/24/21  8:27 PM   Result Value Ref Range Troponin 0.06 (H) 0.00 - 0.03 ng/mL   Lipase    Collection Time: 01/24/21  8:27 PM   Result Value Ref Range    Lipase 93 (H) 13 - 60 U/L   D-Dimer, Quantitative    Collection Time: 01/24/21  8:27 PM   Result Value Ref Range    D-Dimer, Quant 0.70 (H) 0.00 - 0.48 ug/mL FEU   Fibrinogen    Collection Time: 01/24/21  8:27 PM   Result Value Ref Range    Fibrinogen 634 (H) 238 - 505 mg/dL   PROTIME-INR    Collection Time: 01/24/21  8:27 PM   Result Value Ref Range    Protime 13.1 12.0 - 14.6 sec    INR 1.00 0.88 - 1.18   APTT    Collection Time: 01/24/21  8:27 PM   Result Value Ref Range    aPTT 38.6 (H) 26.0 - 36.2 sec   COVID-19    Collection Time: 01/24/21  8:58 PM   Result Value Ref Range    SARS-CoV-2, NAAT DETECTED (AA) Not Detected   CBC auto differential    Collection Time: 01/25/21  2:51 AM   Result Value Ref Range    WBC 3.8 (L) 4.8 - 10.8 K/uL    RBC 3.47 (L) 4.70 - 6.10 M/uL    Hemoglobin 10.1 (L) 14.0 - 18.0 g/dL    Hematocrit 30.6 (L) 42.0 - 52.0 %    MCV 88.2 80.0 - 94.0 fL    MCH 29.1 27.0 - 31.0 pg    MCHC 33.0 33.0 - 37.0 g/dL    RDW 14.9 (H) 11.5 - 14.5 %    Platelets 497 (L) 209 - 400 K/uL    MPV 10.3 9.4 - 12.4 fL    Neutrophils % 63.3 50.0 - 65.0 %    Lymphocytes % 27.4 20.0 - 40.0 %    Monocytes % 7.9 0.0 - 10.0 %    Eosinophils % 1.1 0.0 - 5.0 %    Basophils % 0.0 0.0 - 1.0 %    Neutrophils Absolute 2.4 1.5 - 7.5 K/uL    Immature Granulocytes # 0.0 K/uL    Lymphocytes Absolute 1.0 (L) 1.1 - 4.5 K/uL    Monocytes Absolute 0.30 0.00 - 0.90 K/uL    Eosinophils Absolute 0.00 0.00 - 0.60 K/uL    Basophils Absolute 0.00 0.00 - 0.20 K/uL   Comprehensive Metabolic Panel w/ Reflex to MG    Collection Time: 01/25/21  2:51 AM   Result Value Ref Range    Sodium 142 136 - 145 mmol/L    Potassium reflex Magnesium 5.7 (H) 3.5 - 5.0 mmol/L    Chloride 94 (L) 98 - 111 mmol/L    CO2 28 22 - 29 mmol/L    Anion Gap 20 (H) 7 - 19 mmol/L    Glucose 92 74 - 109 mg/dL    BUN 77 (H) 6 - 20 mg/dL    CREATININE 17.5 (H) 0.5 - 1.2 mg/dL    GFR Non-African American 3 (A) >60    GFR  4 (L) >59    Calcium 8.8 8.6 - 10.0 mg/dL    Total Protein 5.9 (L) 6.6 - 8.7 g/dL    Alb 3.5 3.5 - 5.2 g/dL    Total Bilirubin 0.3 0.2 - 1.2 mg/dL    Alkaline Phosphatase 36 (L) 40 - 130 U/L    ALT 10 5 - 41 U/L    AST 16 5 - 40 U/L   Protime-INR    Collection Time: 01/25/21  2:51 AM   Result Value Ref Range    Protime 13.6 12.0 - 14.6 sec    INR 1.05 0.88 - 1.18   APTT    Collection Time: 01/25/21  2:51 AM   Result Value Ref Range    aPTT 40.2 (H) 26.0 - 36.2 sec   Fibrinogen    Collection Time: 01/25/21  2:51 AM   Result Value Ref Range    Fibrinogen 568 (H) 238 - 505 mg/dL   D-Dimer, Quantitative    Collection Time: 01/25/21  2:51 AM   Result Value Ref Range    D-Dimer, Quant 0.93 (H) 0.00 - 0.48 ug/mL FEU   Potassium    Collection Time: 01/25/21  5:40 AM   Result Value Ref Range    Potassium 5.9 (H) 3.5 - 5.0 mmol/L   EKG 12 Lead    Collection Time: 01/25/21  7:08 AM   Result Value Ref Range    P-R Interval 164 ms    QRS Duration 84 ms    Q-T Interval 402 ms    QTc Calculation (Bazett) 419 ms    P Axis 46 degrees    T Axis 26 degrees       I/O last 3 completed shifts:   In: 10 [I.V.:10]  Out: -   I/O this shift:  In: 150 [P.O.:150]  Out: -       Current Facility-Administered Medications:     sodium chloride flush 0.9 % injection 10 mL, 10 mL, Intravenous, 2 times per day, Jeff Hernandez MD, 10 mL at 01/25/21 1302    sodium chloride flush 0.9 % injection 10 mL, 10 mL, Intravenous, PRN, Jeff Hernandez MD    heparin (porcine) injection 5,000 Units, 5,000 Units, Subcutaneous, 3 times per day, Jeff Hernandez MD, 5,000 Units at 01/25/21 0610    acetaminophen (TYLENOL) tablet 650 mg, 650 mg, Oral, Q6H PRN **OR** acetaminophen (TYLENOL) suppository 650 mg, 650 mg, Rectal, Q6H PRN, Jeff Hernandez MD    ondansetron (ZOFRAN-ODT) disintegrating tablet 4 mg, 4 mg, Oral, Q8H PRN **OR** ondansetron (ZOFRAN) injection 4 mg, 4 mg, Intravenous, Q6H PRN, Floresita Morrow MD    polyethylene glycol [facility]) packet 17 g, 17 g, Oral, Daily PRN, Floresita Morrow MD    albuterol (PROVENTIL) nebulizer solution 2.5 mg, 2.5 mg, Nebulization, Q4H PRN, Floresita Morrow MD    calcium elemental (OSCAL) tablet 500 mg, 500 mg, Oral, Daily, Floresita Morrow MD, 500 mg at 01/25/21 1301    cloNIDine (CATAPRES) tablet 0.1 mg, 0.1 mg, Oral, BID, Floresita Morrow MD, 0.1 mg at 01/25/21 1301    vitamin B-12 (CYANOCOBALAMIN) tablet 100 mcg, 100 mcg, Oral, Daily, Floresita Morrow MD, 100 mcg at 01/25/21 1302    ferrous sulfate (IRON 325) tablet 325 mg, 325 mg, Oral, Daily with breakfast, Floresita Morrow MD, 325 mg at 01/25/21 1301    hydrALAZINE (APRESOLINE) tablet 50 mg, 50 mg, Oral, 3 times per day, Floresita Morrow MD, 50 mg at 01/25/21 0607    metoprolol tartrate (LOPRESSOR) tablet 25 mg, 25 mg, Oral, BID, Floresita Morrow MD, 25 mg at 01/25/21 1259    NIFEdipine (PROCARDIA XL) extended release tablet 60 mg, 60 mg, Oral, BID, Floresita Morrow MD, 60 mg at 01/25/21 1302    sevelamer (RENVELA) tablet 800 mg, 800 mg, Oral, TID WC, Floresita Morrow MD, 800 mg at 01/25/21 1301    Vitamin D (CHOLECALCIFEROL) tablet 5,000 Units, 5,000 Units, Oral, BID, Floresita Morrow MD, 5,000 Units at 01/25/21 1300    guaiFENesin-dextromethorphan (ROBITUSSIN DM) 100-10 MG/5ML syrup 5 mL, 5 mL, Oral, Q4H PRN, Floresita Morrow MD    simvastatin (ZOCOR) tablet 10 mg, 10 mg, Oral, Nightly, Floresita Morrow MD, 10 mg at 01/25/21 0047      Assessment/plan  Principal Problem:    Volume overload  Active Problems:    Pulmonary edema    COVID-19    Mild intermittent asthma without complication    Morbid obesity due to excess calories (HCC)    Type 2 diabetes mellitus with chronic kidney disease on chronic dialysis, without long-term current use of insulin (HCC)    ESRD (end stage renal disease) ([facility]    Palliative care patient  Resolved Problems:    * No resolved hospital problems.  *      Pulmonary edema  ESRD on HD  -Volume

## 2021-01-25 NOTE — H&P
Patient Information:  Patient: Elyse Sotelo  MRN: [id number]   Acct: [de-identified]  YOB: 1970  Admit Date: 1/24/2021      Date of Service: 1/24/2021  Primary Care Physician: Dwayne Young MD  Advance Directive: Full Code        SUBJECTIVE:    Chief Complaint   Patient presents with    Shortness of Breath     only had 30 mins of dialysis yesterday    Dizziness     x 2 days    Hypertension     EP Sign Out:  COVID+  Possible fluid vacscular congestion of lungs  ESRD on HD and had last HD on Saturday, got 3.5/4 hours only as he was feeling ill    HPI:  Mr. Elyse Sotelo is a pleasant 45year old AA gent rom, home. He states that he is very weak with standing and has had trouble getting up going. Even making it to the bathroom is a challenge. He has not had anything like this before. He has been producing phlegm that has slight blood tinge at times, but does not taste any copperiness to it. He can not taste at all right now. Review of Systems:   Review of Systems   Constitutional: Negative for chills, diaphoresis, fatigue and fever. HENT: Negative for sneezing. Respiratory: Positive for cough and shortness of breath. Negative for choking, chest tightness and wheezing. Has phlegm   Cardiovascular: Negative for chest pain. Gastrointestinal: Negative for abdominal pain and nausea. Neurological: Positive for weakness. Negative for light-headedness.    Psychiatric/Behavioral: Negative for confusion.     (following subjective sections as per chart review, other than allergies)    Past Medical History:   Diagnosis Date    Asthma     CHF (congestive heart failure) (HCC)     Diabetes mellitus ([facility]     [facility]sat [facility]    Erectile dysfunction     Hemodialysis patient ([facility]     t,th,s at [facility]    History of blood transfusion     Hypertension     Obesity      Past Surgical History:   Procedure Laterality Date    COLONOSCOPY N/A 12/05/2019    Dr Margarita Laura, internal hemorrhoids-Grade 1-2-HP, 3 yr recall    DIALYSIS FISTULA CREATION Left 06/26/2020    LEFT BRACHIAL / CEPHALIC AV FISTULA performed by Nando Schneider MD at [address]      [facility] - removal of first cath due to infection and placement of new one    TUNNELED VENOUS CATHETER PLACEMENT  01/2020    [facility] [place]    VASCULAR SURGERY  11/11/2020    [facility]  Removal of tunneled dialysis catheter left internal jugular vein     Social History     Tobacco History     Smoking Status  Never Smoker    Smokeless Tobacco Use  Never Used          Alcohol History     Alcohol Use Status  Not Currently Comment  occ          Drug Use     Drug Use Status  No          Sexual Activity     Sexually Active  Not Asked           Family History   Problem Relation Age of Onset    Cancer Father         prostate    Kidney Disease Brother      Allergies   Allergen Reactions    Banana Anaphylaxis, Shortness Of Breath and Swelling    Atorvastatin Other (See Comments)     Chest pain, no muslce pains elsewhere    Lisinopril Other (See Comments)     Cough     Current Facility-Administered Medications   Medication Dose Route Frequency Provider Last Rate Last Admin    sodium chloride flush 0.9 % injection 10 mL  10 mL Intravenous 2 times per day Harry Cintron MD   10 mL at 01/24/21 2345    sodium chloride flush 0.9 % injection 10 mL  10 mL Intravenous PRN Harry Cintron MD        heparin (porcine) injection 5,000 Units  5,000 Units Subcutaneous 3 times per day Harry Cintron MD   5,000 Units at 01/25/21 0610    acetaminophen (TYLENOL) tablet 650 mg  650 mg Oral Q6H PRN Harry Cintron MD        Or    acetaminophen (TYLENOL) suppository 650 mg  650 mg Rectal Q6H PRN Harry Cintron MD        ondansetron (ZOFRAN-ODT) disintegrating tablet 4 mg  4 mg Oral Q8H PRN Harry Cintron MD        Or    ondansetron [facility]) injection 4 mg  4 mg Intravenous Q6H PRN Harry Cintron MD        polyethylene glycol [facility]) packet 17 g  17 g Oral Daily PRN Norma Beltran MD        albuterol (PROVENTIL) nebulizer solution 2.5 mg  2.5 mg Nebulization Q4H PRN Norma Beltran MD        calcium elemental (OSCAL) tablet 500 mg  500 mg Oral Daily Norma Beltran MD        cloNIDine (CATAPRES) tablet 0.1 mg  0.1 mg Oral BID Norma Beltran MD   0.1 mg at 01/24/21 2350    vitamin B-12 (CYANOCOBALAMIN) tablet 100 mcg  100 mcg Oral Daily Norma Beltran MD        ferrous sulfate (IRON 325) tablet 325 mg  325 mg Oral Daily with breakfast Norma Beltran MD        hydrALAZINE (APRESOLINE) tablet 50 mg  50 mg Oral 3 times per day Norma Beltran MD   50 mg at 01/25/21 4976    metoprolol tartrate (LOPRESSOR) tablet 25 mg  25 mg Oral BID Norma Beltran MD   25 mg at 01/24/21 2350    NIFEdipine (PROCARDIA XL) extended release tablet 60 mg  60 mg Oral BID Norma Beltran MD   60 mg at 01/24/21 2350    sevelamer (RENVELA) tablet 800 mg  800 mg Oral TID WC Norma Beltran MD        Vitamin D (CHOLECALCIFEROL) tablet 5,000 Units  5,000 Units Oral BID Norma Beltran MD   5,000 Units at 01/24/21 2349    guaiFENesin-dextromethorphan (ROBITUSSIN DM) 100-10 MG/5ML syrup 5 mL  5 mL Oral Q4H PRN Norma Beltran MD        simvastatin (ZOCOR) tablet 10 mg  10 mg Oral Nightly Norma Beltran MD   10 mg at 01/25/21 0047     Home Medications:  Prior to Admission medications    Medication Sig Start Date End Date Taking?  Authorizing Provider   sevelamer (RENVELA) 800 MG tablet Take 800 mg by mouth 3 times daily (with meals) Indications: takes 2 tablets tid with meals  8/17/20  Yes Historical Provider, MD   metoprolol tartrate (LOPRESSOR) 25 MG tablet TAKE ONE TABLET TWO TIMES A DAY 8/11/20  Yes Clif Kwon MD   hydrALAZINE (APRESOLINE) 50 MG tablet TAKE ONE TABLET EVERY 8 HOURS 8/11/20  Yes Clif Kwon MD   ferrous sulfate (IRON 325) 325 (65 Fe) MG tablet TAKE ONE TABLET TWO TIMES A DAY WITH MEALS 8/11/20  Yes Clif Kwon MD   cloNIDine (CATAPRES) 0.1 MG tablet TAKE ONE TABLET TWO TIMES A DAY 8/11/20  Yes Mary Heaton MD   NIFEdipine (ADALAT CC) 60 MG extended release tablet TAKE ONE TABLET TWO TIMES A DAY 8/11/20  Yes Mary Heaton MD   simvastatin (ZOCOR) 10 MG tablet Take 1 tablet by mouth nightly 5/28/20  Yes Mary Heaton MD   VITAMIN D PO Take 5,000 Units by mouth 2 times daily    Yes Historical Provider, MD   Cyanocobalamin (VITAMIN B 12 PO) Take 1 tablet by mouth daily    Yes Historical Provider, MD   calcium carbonate (OYSTER SHELL CALCIUM 500 MG) 1250 (500 Ca) MG tablet Take 1 tablet by mouth daily   Yes Historical Provider, MD   albuterol sulfate  (90 BASE) MCG/ACT inhaler Inhale 2 puffs into the lungs every 6 hours as needed for Wheezing 5/10/17  Yes Mary Heaton MD         OBJECTIVE:    Vitals:    01/24/21 2115   BP: (!) 147/90   Pulse: 72   Resp: 20   Temp:    SpO2: 98%   breathing on NC    BP (!) 147/90   Pulse 72   Temp 98 °F (36.7 °C) (Oral)   Resp 20   Ht 5' 8\" (1.727 m)   Wt 235 lb (106.6 kg)   SpO2 98%   BMI 35.73 kg/m²     No intake or output data in the 24 hours ending 01/24/21 2208    Physical Exam  Vitals signs reviewed. Constitutional:       General: He is not in acute distress. Appearance: Normal appearance. He is normal weight. He is not ill-appearing or toxic-appearing. HENT:      Head: Normocephalic and atraumatic. Nose: No congestion or rhinorrhea. Eyes:      General:         Right eye: No discharge. Left eye: No discharge. Neck:      Musculoskeletal: Neck supple. Comments: Trachea appears midline  Cardiovascular:      Rate and Rhythm: Normal rate and regular rhythm. Heart sounds: No murmur. No friction rub. No gallop. Pulmonary:      Effort: No respiratory distress. Breath sounds: No stridor. No wheezing, rhonchi or rales. Chest:      Chest wall: No tenderness. Abdominal:      General: Bowel sounds are normal.      Tenderness: There is no abdominal tenderness.  There is no guarding or rebound. Musculoskeletal:      Right lower leg: No edema. Left lower leg: No edema. Skin:     General: Skin is warm. Comments: dipahoretic   Neurological:      Mental Status: He is alert and oriented to person, place, and time. Cranial Nerves: No dysarthria. Motor: No tremor or seizure activity. Psychiatric:         Mood and Affect: Mood normal.         Behavior: Behavior normal.        LABORATORY DATA:    CBC:   Recent Labs     01/24/21 2027   WBC 3.9*   HGB 12.0*   HCT 34.7*        BMP:   Recent Labs     01/24/21 2027      K 4.9   CL 91*   CO2 31*   BUN 69*   CREATININE 16.8*   CALCIUM 9.4     Hepatic Profile:   Recent Labs     01/24/21 2027   AST 20   ALT 12   BILITOT 0.3   ALKPHOS 41     Coag Panel: No results for input(s): INR, PROTIME, APTT in the last 72 hours. Cardiac Enzymes:   Recent Labs     01/24/21 2027   TROPONINI 0.06*     Pro-BNP:   Recent Labs     01/24/21 2027   PROBNP 4,760*       Urinalysis:   Lab Results   Component Value Date    NITRU Negative 12/23/2020    WBCUA 2-4 06/16/2020    BACTERIA 0 09/21/2020    BACTERIA 1+ 06/16/2020    RBCUA 4 12/23/2020    RBCUA 21-30 06/16/2020    BLOODU Positive 12/23/2020    SPECGRAV 1.020 12/23/2020    GLUCOSEU 100 MG 12/23/2020       IMAGING:  Xr Chest Portable  Result Date: 1/24/2021  Cardiomegaly with pulmonary vascular congestion and findings suggestive of pulmonary edema. While Covid pneumonia could have this appearance, history of incomplete dialysis favors pulmonary edema.  Signed by Dr Delilah Padron on 1/24/2021 8:42 PM        ASESSMENTS & PLANS:    COVID-19/SARS-2 PNA:  Admit to the medical COVID [place] / unit under hopsitalist team  Labs as per COVID protocols  Decadron not indicated as on Room Air  \"PPE Instructions\" and \"Droplet +\" Precautions as per COVID protocol  AC with heparin as per ESRD  Oxygen as per protocol is ordered should it be needed in future    ESRD on HD with fluid volume overload and possible pulmonary edema:  Elevate HoB  Elevate Legs  Strict Is and Os  Fluid Restrict  Faily Weight  Nephro consult for HD in AM    Chronic Medical Problems:  Continue home regimen as indicated  Puffers will NOT be subbed to NEBS, rather the opposite if indicated as per COVID protocols   [START ON 1/25/2021] calcium carbonate  1,250 mg Oral Daily    cloNIDine  0.1 mg Oral BID    [START ON 1/25/2021] Vitamin B 12  1 tablet Oral Daily    [START ON 1/25/2021] ferrous sulfate  325 mg Oral Daily with breakfast    hydrALAZINE  50 mg Oral 3 times per day    metoprolol tartrate  25 mg Oral BID    NIFEdipine  60 mg Oral BID    [START ON 1/25/2021] sevelamer  800 mg Oral TID WC    Vitamin D  5,000 Units Oral BID    atorvastatin  10 mg Oral Nightly       Supoportive and Prophylactic Txx:  DVT Prophylaxis: heparin SQ  GI (PUD) Ppx: not indicated  PT consult for evalutation and Txx as indicated: not indicated  Renal-Cardiac Diet with 1 liter lfuid restriction  acetaminophen **OR** acetaminophen, ondansetron **OR** ondansetron, polyethylene glycol, albuterol      Care time of >35 minutes  Pt seen/examined and admitted to inpatient status. Inpatient status is used for patients with an expected LOS extending past two midnights due to medical therapy and or critical care needs, otherwise patients are placed to OBServation status. Signed:  Electronically signed by Fariha Olivo MD on 1/25/21 at 09:30 CST.

## 2021-01-25 NOTE — ED NOTES
Patient placed in a gown Patient placed on cardiac monitor, continuous pulse oximeter, and NIBP monitor.  Monitor alarms on.       Graciela Kim RN  01/24/21 2032

## 2021-01-25 NOTE — ACP (ADVANCE CARE PLANNING)
Advance Care Planning     Advance Care Planning Activator (Inpatient)  Conversation Note      Date of ACP Conversation: 1/25/2021    Conversation Conducted with: Patient's mom Nicoletta Koyanagi    ACP Activator: Roshan Mendes      Current Designated Health Care Decision Maker:   Primary Decision Maker: Aldair Campbell - Parent - [phone]    Secondary Decision Maker: Desiree Larry - Parent - [phone]      Care Preferences    Ventilation: \"If you were in your present state of health and suddenly became very ill and were unable to breathe on your own, what would your preference be about the use of a ventilator (breathing machine) if it were available to you? \"      Would the patient desire the use of ventilator (breathing machine)?: Yes    \"If your health worsens and it becomes clear that your chance of recovery is unlikely, what would your preference be about the use of a ventilator (breathing machine) if it were available to you? \"     Would the patient desire the use of ventilator (breathing machine)?: Yes      Resuscitation  \"CPR works best to restart the heart when there is a sudden event, like a heart attack, in someone who is otherwise healthy. Unfortunately, CPR does not typically restart the heart for people who have serious health conditions or who are very sick. \"    \"In the event your heart stopped as a result of an underlying serious health condition, would you want attempts to be made to restart your heart (answer \"yes\" for attempt to resuscitate) or would you prefer a natural death (answer \"no\" for do not attempt to resuscitate)? \" Yes         [] Yes   [x] No   Educated Patient / Solmon Grand regarding differences between Advance Directives and portable DNR orders.           Conversation Outcomes:  [x] ACP discussion completed      Electronically signed by Roshan Mendes on 1/25/2021 at 12:10 PM

## 2021-01-25 NOTE — PLAN OF CARE
Problem: Airway Clearance - Ineffective  Goal: Achieve or maintain patent airway  Outcome: Ongoing     Problem: Gas Exchange - Impaired  Goal: Absence of hypoxia  Outcome: Ongoing  Goal: Promote optimal lung function  Outcome: Ongoing     Problem: Breathing Pattern - Ineffective  Goal: Ability to achieve and maintain a regular respiratory rate  Outcome: Ongoing     Problem:  Body Temperature -  Risk of, Imbalanced  Goal: Ability to maintain a body temperature within defined limits  Outcome: Ongoing  Goal: Will regain or maintain usual level of consciousness  Outcome: Ongoing  Goal: Complications related to the disease process, condition or treatment will be avoided or minimized  Outcome: Ongoing     Problem: Isolation Precautions - Risk of Spread of Infection  Goal: Prevent transmission of infection  Outcome: Ongoing     Problem: Nutrition Deficits  Goal: Optimize nutritional status  Outcome: Ongoing     Problem: Risk for Fluid Volume Deficit  Goal: Maintain normal heart rhythm  Outcome: Ongoing  Goal: Maintain absence of muscle cramping  Outcome: Ongoing  Goal: Maintain normal serum potassium, sodium, calcium, phosphorus, and pH  Outcome: Ongoing     Problem: Loneliness or Risk for Loneliness  Goal: Demonstrate positive use of time alone when socialization is not possible  Outcome: Ongoing     Problem: Fatigue  Goal: Verbalize increase energy and improved vitality  Outcome: Ongoing     Problem: Patient Education: Go to Patient Education Activity  Goal: Patient/Family Education  Outcome: Ongoing     Problem: Falls - Risk of:  Goal: Will remain free from falls  Description: Will remain free from falls  Outcome: Ongoing  Goal: Absence of physical injury  Description: Absence of physical injury  Outcome: Ongoing

## 2021-01-26 VITALS
RESPIRATION RATE: 18 BRPM | BODY MASS INDEX: 36.9 KG/M2 | WEIGHT: 243.5 LBS | HEIGHT: 68 IN | TEMPERATURE: 97.2 F | HEART RATE: 58 BPM | SYSTOLIC BLOOD PRESSURE: 122 MMHG | OXYGEN SATURATION: 90 % | DIASTOLIC BLOOD PRESSURE: 70 MMHG

## 2021-01-26 LAB
ALBUMIN SERPL-MCNC: 3.4 G/DL (ref 3.5–5.2)
ALP BLD-CCNC: 38 U/L (ref 40–130)
ALT SERPL-CCNC: 9 U/L (ref 5–41)
ANION GAP SERPL CALCULATED.3IONS-SCNC: 15 MMOL/L (ref 7–19)
APTT: 45.5 SEC (ref 26–36.2)
AST SERPL-CCNC: 14 U/L (ref 5–40)
ATYPICAL LYMPHOCYTE RELATIVE PERCENT: 1 % (ref 0–8)
BASOPHILS ABSOLUTE: 0 K/UL (ref 0–0.2)
BASOPHILS RELATIVE PERCENT: 0 % (ref 0–1)
BILIRUB SERPL-MCNC: 0.3 MG/DL (ref 0.2–1.2)
BUN BLDV-MCNC: 70 MG/DL (ref 6–20)
CALCIUM SERPL-MCNC: 9.3 MG/DL (ref 8.6–10)
CHLORIDE BLD-SCNC: 97 MMOL/L (ref 98–111)
CO2: 28 MMOL/L (ref 22–29)
CREAT SERPL-MCNC: 16.8 MG/DL (ref 0.5–1.2)
D DIMER: 0.77 UG/ML FEU (ref 0–0.48)
EOSINOPHILS ABSOLUTE: 0 K/UL (ref 0–0.6)
EOSINOPHILS RELATIVE PERCENT: 0 % (ref 0–5)
FIBRINOGEN: 610 MG/DL (ref 238–505)
GFR AFRICAN AMERICAN: 4
GFR NON-AFRICAN AMERICAN: 3
GLUCOSE BLD-MCNC: 144 MG/DL (ref 74–109)
HCT VFR BLD CALC: 32.1 % (ref 42–52)
HEMOGLOBIN: 10.4 G/DL (ref 14–18)
IMMATURE GRANULOCYTES #: 0 K/UL
INR BLD: 1.08 (ref 0.88–1.18)
LYMPHOCYTES ABSOLUTE: 0.5 K/UL (ref 1.1–4.5)
LYMPHOCYTES RELATIVE PERCENT: 26 % (ref 20–40)
MCH RBC QN AUTO: 28.6 PG (ref 27–31)
MCHC RBC AUTO-ENTMCNC: 32.4 G/DL (ref 33–37)
MCV RBC AUTO: 88.2 FL (ref 80–94)
MONOCYTES ABSOLUTE: 0 K/UL (ref 0–0.9)
MONOCYTES RELATIVE PERCENT: 2 % (ref 0–10)
NEUTROPHILS ABSOLUTE: 1.3 K/UL (ref 1.5–7.5)
NEUTROPHILS RELATIVE PERCENT: 71 % (ref 50–65)
PDW BLD-RTO: 14.7 % (ref 11.5–14.5)
PLATELET # BLD: 161 K/UL (ref 130–400)
PLATELET SLIDE REVIEW: ADEQUATE
PMV BLD AUTO: 10.2 FL (ref 9.4–12.4)
POTASSIUM SERPL-SCNC: 5.5 MMOL/L (ref 3.5–5)
PROTHROMBIN TIME: 13.9 SEC (ref 12–14.6)
RBC # BLD: 3.64 M/UL (ref 4.7–6.1)
SODIUM BLD-SCNC: 140 MMOL/L (ref 136–145)
TOTAL PROTEIN: 6.2 G/DL (ref 6.6–8.7)
WBC # BLD: 1.8 K/UL (ref 4.8–10.8)

## 2021-01-26 PROCEDURE — 6360000002 HC RX W HCPCS: Performed by: HOSPITALIST

## 2021-01-26 PROCEDURE — 85025 COMPLETE CBC W/AUTO DIFF WBC: CPT

## 2021-01-26 PROCEDURE — 85384 FIBRINOGEN ACTIVITY: CPT

## 2021-01-26 PROCEDURE — 85379 FIBRIN DEGRADATION QUANT: CPT

## 2021-01-26 PROCEDURE — 2700000000 HC OXYGEN THERAPY PER DAY

## 2021-01-26 PROCEDURE — 6370000000 HC RX 637 (ALT 250 FOR IP): Performed by: HOSPITALIST

## 2021-01-26 PROCEDURE — 36415 COLL VENOUS BLD VENIPUNCTURE: CPT

## 2021-01-26 PROCEDURE — 85610 PROTHROMBIN TIME: CPT

## 2021-01-26 PROCEDURE — 85730 THROMBOPLASTIN TIME PARTIAL: CPT

## 2021-01-26 PROCEDURE — 8010000000 HC HEMODIALYSIS ACUTE INPT

## 2021-01-26 PROCEDURE — 80053 COMPREHEN METABOLIC PANEL: CPT

## 2021-01-26 PROCEDURE — 6360000002 HC RX W HCPCS: Performed by: STUDENT IN AN ORGANIZED HEALTH CARE EDUCATION/TRAINING PROGRAM

## 2021-01-26 PROCEDURE — 94761 N-INVAS EAR/PLS OXIMETRY MLT: CPT

## 2021-01-26 PROCEDURE — 6370000000 HC RX 637 (ALT 250 FOR IP): Performed by: STUDENT IN AN ORGANIZED HEALTH CARE EDUCATION/TRAINING PROGRAM

## 2021-01-26 RX ORDER — DEXAMETHASONE 6 MG/1
6 TABLET ORAL DAILY
Qty: 8 TABLET | Refills: 0 | Status: SHIPPED | OUTPATIENT
Start: 2021-01-26 | End: 2021-02-03

## 2021-01-26 RX ORDER — FAMOTIDINE 20 MG/1
20 TABLET, FILM COATED ORAL DAILY
Status: DISCONTINUED | OUTPATIENT
Start: 2021-01-26 | End: 2021-01-26 | Stop reason: HOSPADM

## 2021-01-26 RX ORDER — CETIRIZINE HYDROCHLORIDE 10 MG/1
10 TABLET ORAL DAILY
Status: DISCONTINUED | OUTPATIENT
Start: 2021-01-26 | End: 2021-01-26 | Stop reason: HOSPADM

## 2021-01-26 RX ADMIN — HYDRALAZINE HYDROCHLORIDE 50 MG: 50 TABLET, FILM COATED ORAL at 05:46

## 2021-01-26 RX ADMIN — CLONIDINE HYDROCHLORIDE 0.1 MG: 0.1 TABLET ORAL at 12:38

## 2021-01-26 RX ADMIN — DEXAMETHASONE 6 MG: 2 TABLET ORAL at 12:38

## 2021-01-26 RX ADMIN — HEPARIN SODIUM 5000 UNITS: 5000 INJECTION INTRAVENOUS; SUBCUTANEOUS at 05:46

## 2021-01-26 RX ADMIN — SEVELAMER CARBONATE 800 MG: 800 TABLET, FILM COATED ORAL at 12:39

## 2021-01-26 RX ADMIN — METOPROLOL TARTRATE 25 MG: 50 TABLET, FILM COATED ORAL at 12:37

## 2021-01-26 RX ADMIN — Medication 5000 UNITS: at 12:38

## 2021-01-26 RX ADMIN — ALBUTEROL SULFATE 2 PUFF: 90 AEROSOL, METERED RESPIRATORY (INHALATION) at 12:39

## 2021-01-26 RX ADMIN — CALCIUM 500 MG: 500 TABLET ORAL at 12:38

## 2021-01-26 RX ADMIN — FERROUS SULFATE TAB 325 MG (65 MG ELEMENTAL FE) 325 MG: 325 (65 FE) TAB at 12:38

## 2021-01-26 RX ADMIN — NIFEDIPINE 60 MG: 60 TABLET, FILM COATED, EXTENDED RELEASE ORAL at 12:37

## 2021-01-26 RX ADMIN — Medication 100 MCG: at 12:38

## 2021-01-26 RX ADMIN — CETIRIZINE HYDROCHLORIDE 10 MG: 10 TABLET, FILM COATED ORAL at 12:37

## 2021-01-26 RX ADMIN — FAMOTIDINE 20 MG: 20 TABLET, FILM COATED ORAL at 12:37

## 2021-01-26 NOTE — PROGRESS NOTES
Nephrology ([address] [facility]) progress Note      Patient:  Dilia Diana  YOB: 1970  Date of Service: 1/26/2021  MRN: [id number]   Acct: [de-identified]   Primary Care Physician: Lela Cobian MD  Advance Directive: Full Code  Admit Date: 1/24/2021       Hospital Day: 2  Referring Provider: Rja Walker MD    Patient independently seen and examined, Chart, Consults, Notes, Operative notes, Labs, Cardiology, and Radiology studies reviewed as available. Chief complaint: Increasing shortness of breath/end-stage renal disease. .    Subjective:  Dilia Diana is a 45 y.o. male  whom we were consulted for end-stage renal disease. Patient gets dialysis on Tuesday Thursday Saturday. Treatment was shortened as he was not feeling well. Presented with increasing shortness of breath, dizziness with nausea and vomiting. Patient was diagnosed with COVID-19 pneumonia. He is now admitted to COVID-19 floor. Patient is currently on 2 L nasal cannula. His hemodynamics and oxygenation saturation is fairly well. Patient has received short hemodialysis treatment yesterday. This was done for correction of fluid status as well as hyperkalemia. Currently seen on hemodialysis  Hemodialysis access: AV fistula  Hemodialysis: 3-1/2-hour  Ultrafiltration: 3000 cc  2K bath  Blood flow rate is 450 cc/min.     Allergies:  Banana, Atorvastatin, and Lisinopril    Medicines:  Current Facility-Administered Medications   Medication Dose Route Frequency Provider Last Rate Last Admin    famotidine (PEPCID) tablet 20 mg  20 mg Oral Daily Raj Walker MD        cetirizine (ZYRTEC) tablet 10 mg  10 mg Oral Daily Raj Walker MD        dexamethasone (DECADRON) tablet 6 mg  6 mg Oral Daily Raj Walker MD   6 mg at 01/25/21 1314    albuterol sulfate  (90 Base) MCG/ACT inhaler 2 puff  2 puff Inhalation 4x daily Raj Walker MD   2 puff at 01/25/21 2055    sodium chloride flush 0.9 % injection 10 mL  10 mL Intravenous 2 times per day Massimo Mccann MD   10 mL at 01/25/21 2055    sodium chloride flush 0.9 % injection 10 mL  10 mL Intravenous PRN Massimo Mccann MD        heparin (porcine) injection 5,000 Units  5,000 Units Subcutaneous 3 times per day Massimo Mccann MD   5,000 Units at 01/26/21 0546    acetaminophen (TYLENOL) tablet 650 mg  650 mg Oral Q6H PRN Massimo Mccann MD   650 mg at 01/25/21 1314    Or    acetaminophen (TYLENOL) suppository 650 mg  650 mg Rectal Q6H PRN Massimo Mccann MD        ondansetron (ZOFRAN-ODT) disintegrating tablet 4 mg  4 mg Oral Q8H PRN Massimo Mccann MD        Or    ondansetron [facility]) injection 4 mg  4 mg Intravenous Q6H PRN Massimo Mccann MD        polyethylene glycol [facility]) packet 17 g  17 g Oral Daily PRN Massimo Mccann MD        calcium elemental (OSCAL) tablet 500 mg  500 mg Oral Daily Massimo Mccann MD   500 mg at 01/25/21 1301    cloNIDine (CATAPRES) tablet 0.1 mg  0.1 mg Oral BID Massimo Mccann MD   0.1 mg at 01/25/21 2055    vitamin B-12 (CYANOCOBALAMIN) tablet 100 mcg  100 mcg Oral Daily Massimo Mccann MD   100 mcg at 01/25/21 1302    ferrous sulfate (IRON 325) tablet 325 mg  325 mg Oral Daily with breakfast Massimo Mccann MD   325 mg at 01/25/21 1301    hydrALAZINE (APRESOLINE) tablet 50 mg  50 mg Oral 3 times per day Massimo Mccann MD   50 mg at 01/26/21 0546    metoprolol tartrate (LOPRESSOR) tablet 25 mg  25 mg Oral BID Massimo Mccann MD   25 mg at 01/25/21 2055    NIFEdipine (PROCARDIA XL) extended release tablet 60 mg  60 mg Oral BID Massimo Mccann MD   60 mg at 01/25/21 2055    sevelamer (RENVELA) tablet 800 mg  800 mg Oral TID WC Massimo Mccann MD   800 mg at 01/25/21 1818    Vitamin D (CHOLECALCIFEROL) tablet 5,000 Units  5,000 Units Oral BID Massimo Mccann MD   5,000 Units at 01/25/21 2055    guaiFENesin-dextromethorphan (ROBITUSSIN DM) 100-10 MG/5ML syrup 5 mL  5 mL Oral Q4H PRN Massimo Mccann MD        simvastatin (ZOCOR) tablet 10 mg  10 mg Oral Nightly Norma Beltran MD   10 mg at 01/25/21 2054       Past Medical History:  Past Medical History:   Diagnosis Date    Asthma     CH[facility]congestive [facility] failure) ([facility]     Diabetes mellitus ([facility]     [facility]    Erectile dysfunction     Hemodialysis patient ([facility]     t,th,s at [facility]    History of blood transfusion     Hypertension     Obesity     Palliative care patient 01/25/2021       Past Surgical History:  Past Surgical History:   Procedure Laterality Date    COLONOSCOPY N/A 12/05/2019    Dr Dajuan Dickens, internal hemorrhoids-Grade 1-2-HP, 3 yr recall    DIALYSIS FISTULA CREATION Left 06/26/2020    LEFT BRACHIAL / CEPHALIC AV FISTULA performed by Alfonso Valenzuela MD at [facility] beds at the [facility] [address]ELED [address]      wbh - removal of first cath due to infection and placement of new one    TUNNELED VENOUS CATHETER PLACEMENT  01/2020    [facility]    VASCULAR SURGERY  11/11/2020    [facility].  Removal of tunneled dialysis catheter left internal jugular vein       Family History  Family History   Problem Relation Age of Onset    Cancer Father         prostate    Kidney Disease Brother        Social History  Social History     Socioeconomic History    Marital status: Single     Spouse name: Not on file    Number of children: Not on file    Years of education: Not on file    Highest education level: Not on file   Occupational History    Not on file   Social Needs    Financial resource strain: Not on file    Food insecurity     Worry: Not on file     Inability: Not on file    Transportation needs     Medical: Not on file     Non-medical: Not on file   Tobacco Use    Smoking status: Never Smoker    Smokeless tobacco: Never Used   Substance and Sexual Activity    Alcohol use: Not Currently     Comment: occ    Drug use: No    Sexual activity: Not on file   Lifestyle    Physical activity     Days per week: Not on file     Minutes per session: Not on file    Stress: Not on file   Relationships    Social connections     Talks on phone: Not on file     Gets together: Not on file     Attends Orthodoxy service: Not on file     Active member of club or organization: Not on file     Attends meetings of clubs or organizations: Not on file     Relationship status: Not on file    Intimate partner violence     Fear of current or ex partner: Not on file     Emotionally abused: Not on file     Physically abused: Not on file     Forced sexual activity: Not on file   Other Topics Concern    Not on file   Social History Narrative    Not on file         Review of Systems:  History obtained from chart review and the patient  General ROS: No fever or chills  Respiratory ROS: positive for - shortness of breath  Cardiovascular ROS: No chest pain or palpitations  Gastrointestinal ROS: No abdominal pain or melena  Genito-Urinary ROS: No dysuria or hematuria  Musculoskeletal ROS: No joint pain or swelling   14 point ROS reviewed with the patient and negative except as noted above and in the HPI unless unable to obtain. Objective:  Patient Vitals for the past 24 hrs:   BP Temp Temp src Pulse Resp SpO2 Weight   01/26/21 0625 130/80 97.2 °F (36.2 °C) Temporal 72 18 90 % --   01/26/21 0546 138/81 -- -- -- -- -- --   01/26/21 0021 133/80 97.3 °F (36.3 °C) Temporal 70 16 92 % --   01/25/21 2054 (!) 142/86 -- -- 88 -- -- --   01/25/21 1856 132/76 97.6 °F (36.4 °C) Temporal 86 16 90 % 243 lb 8 oz (110.5 kg)   01/25/21 1710 118/69 97.8 °F (36.6 °C) Temporal 64 18 -- --       Intake/Output Summary (Last 24 hours) at 1/26/2021 3332  Last data filed at 1/25/2021 2059  Gross per 24 hour   Intake 1760 ml   Output --   Net 1760 ml   In person clinical exam was not done as he is currently in isolation for COVID-19 pneumonia. This was to prevent unnecessary use of PPE and unnecessary exposure to COVID-19. However information and review of system was obtained at the door. results for input(s): SEDRATE in the last 72 hours. Cultures:   No results for input(s): CULTURE in the last 72 hours. No results for input(s): BC, Sarah Killer in the last 72 hours. No results for input(s): CXSURG in the last 72 hours. Radiology reports as per the Radiologist  Radiology: Ct Head Wo Contrast    Result Date: 1/25/2021  Examination. CT HEAD WO CONTRAST 1/24/2021 8:57 PM History: The patient complains of dizziness. DLP: 1159 mGycm. The CT scan of the head is performed without intravenous contrast enhancement. The images are acquired in axial plane with subsequent reconstruction in coronal and sagittal planes. There is no previous study for comparison. There is no evidence of a mass or midline shift. There is no evidence of intracranial hemorrhage or hematoma. The ventricles, the basal cistern and the cortical sulci are unremarkable. The gray-white matter differentiation is maintained. The images reviewed in bone window show no acute bony abnormality. Visualized paranasal sinuses and mastoid air cells are clear. A negative unenhanced CT scan of the head. The above study was initially reviewed and reported by stat rads. I do not find any discrepancies. Signed by Dr Yogesh Pierson on 1/25/2021 6:40 AM    Xr Chest Portable    Result Date: 1/24/2021  EXAMINATION: XR CHEST PORTABLE 1/24/2021 8:40 PM HISTORY: Shortness of breath COMPARISON: 6/16/2020 FINDINGS: There has been interval removal of the double lumen venous catheter. The heart appears enlarged. Mediastinal contours otherwise appear normal. The pulmonary vasculature is indistinct. Diffuse bilateral interstitial opacities are present with developing alveolar opacities in the lung bases. No definite pneumothorax or pleural effusion. Cardiomegaly with pulmonary vascular congestion and findings suggestive of pulmonary edema. While Covid pneumonia could have this appearance, history of incomplete dialysis favors pulmonary edema.  Signed by Dr Maicol Hernadez on 1/24/2021 8:42 PM       Assessment   1. End-stage renal disease/currently seen on hemodialysis. 2.  Type II diabetic nephropathy. 3.  Hyperkalemia. 4.  Bilateral COVID-19 pneumonia. 5.  Anemia of chronic kidney disease  6. Neutropenia mild. Plan:  1. Tolerating hemodialysis very well. 2.  IV steroids. 3.  Possible discharge home soon.         Christian Davenport MD  01/26/21  9:25 AM

## 2021-01-26 NOTE — PLAN OF CARE
Problem: Airway Clearance - Ineffective  Goal: Achieve or maintain patent airway  1/25/2021 2116 by Juanito Paris RN  Outcome: Ongoing  1/25/2021 1631 by Reid Tellez RN  Outcome: Ongoing     Problem: Gas Exchange - Impaired  Goal: Absence of hypoxia  1/25/2021 2116 by Juanito Paris RN  Outcome: Ongoing  1/25/2021 1631 by Reid Tellez RN  Outcome: Ongoing  Goal: Promote optimal lung function  Outcome: Ongoing     Problem: Breathing Pattern - Ineffective  Goal: Ability to achieve and maintain a regular respiratory rate  Outcome: Ongoing     Problem:  Body Temperature -  Risk of, Imbalanced  Goal: Ability to maintain a body temperature within defined limits  Outcome: Ongoing  Goal: Will regain or maintain usual level of consciousness  Outcome: Ongoing  Goal: Complications related to the disease process, condition or treatment will be avoided or minimized  Outcome: Ongoing     Problem: Isolation Precautions - Risk of Spread of Infection  Goal: Prevent transmission of infection  Outcome: Ongoing     Problem: Nutrition Deficits  Goal: Optimize nutritional status  Outcome: Ongoing     Problem: Risk for Fluid Volume Deficit  Goal: Maintain normal heart rhythm  Outcome: Ongoing  Goal: Maintain absence of muscle cramping  Outcome: Ongoing  Goal: Maintain normal serum potassium, sodium, calcium, phosphorus, and pH  Outcome: Ongoing     Problem: Loneliness or Risk for Loneliness  Goal: Demonstrate positive use of time alone when socialization is not possible  Outcome: Ongoing     Problem: Fatigue  Goal: Verbalize increase energy and improved vitality  Outcome: Ongoing     Problem: Patient Education: Go to Patient Education Activity  Goal: Patient/Family Education  Outcome: Ongoing     Problem: Falls - Risk of:  Goal: Will remain free from falls  Description: Will remain free from falls  Outcome: Ongoing  Goal: Absence of physical injury  Description: Absence of physical injury  Outcome: Ongoing

## 2021-01-26 NOTE — PLAN OF CARE
Problem: Airway Clearance - Ineffective  Goal: Achieve or maintain patent airway  Outcome: Completed     Problem: Gas Exchange - Impaired  Goal: Absence of hypoxia  Outcome: Completed  Goal: Promote optimal lung function  Outcome: Completed     Problem: Breathing Pattern - Ineffective  Goal: Ability to achieve and maintain a regular respiratory rate  Outcome: Completed     Problem:  Body Temperature -  Risk of, Imbalanced  Goal: Ability to maintain a body temperature within defined limits  Outcome: Completed  Goal: Will regain or maintain usual level of consciousness  Outcome: Completed  Goal: Complications related to the disease process, condition or treatment will be avoided or minimized  Outcome: Completed     Problem: Isolation Precautions - Risk of Spread of Infection  Goal: Prevent transmission of infection  Outcome: Completed     Problem: Nutrition Deficits  Goal: Optimize nutritional status  Outcome: Completed     Problem: Risk for Fluid Volume Deficit  Goal: Maintain normal heart rhythm  Outcome: Completed  Goal: Maintain absence of muscle cramping  Outcome: Completed  Goal: Maintain normal serum potassium, sodium, calcium, phosphorus, and pH  Outcome: Completed     Problem: Loneliness or Risk for Loneliness  Goal: Demonstrate positive use of time alone when socialization is not possible  Outcome: Completed     Problem: Fatigue  Goal: Verbalize increase energy and improved vitality  Outcome: Completed     Problem: Patient Education: Go to Patient Education Activity  Goal: Patient/Family Education  Outcome: Completed     Problem: Falls - Risk of:  Goal: Will remain free from falls  Description: Will remain free from falls  Outcome: Completed  Goal: Absence of physical injury  Description: Absence of physical injury  Outcome: Completed

## 2021-01-26 NOTE — DISCHARGE SUMMARY
Discharge Summary    NAME: Elyse Sotelo  :  1970  MRN:  [id number]    Admit date:  2021  Discharge date:  2021    Admitting Physician:  No admitting provider for patient encounter. Advance Directive: Full Code    Consults: Nephrology    Primary Care Physician:  Dwayne Young MD    Discharge Diagnoses:  Principal Problem:    Volume overload    Pulmonary edema    COVID-19 Pneumonia    Acute hypoxic respiratory failure    Mild intermittent asthma without complication    Morbid obesity due to excess calories ([facility]    Type 2 diabetes mellitus with chronic kidney disease on chronic dialysis, without long-term current use of insulin (HCC)    ESRD (end stage renal disease) ([facility]    Significant Diagnostic Studies:   Ct Head Wo Contrast    Result Date: 2021  Examination. CT HEAD WO CONTRAST 2021 8:57 PM History: The patient complains of dizziness. DLP: 1159 mGycm. The CT scan of the head is performed without intravenous contrast enhancement. The images are acquired in axial plane with subsequent reconstruction in coronal and sagittal planes. There is no previous study for comparison. There is no evidence of a mass or midline shift. There is no evidence of intracranial hemorrhage or hematoma. The ventricles, the basal cistern and the cortical sulci are unremarkable. The gray-white matter differentiation is maintained. The images reviewed in bone window show no acute bony abnormality. Visualized paranasal sinuses and mastoid air cells are clear. A negative unenhanced CT scan of the head. The above study was initially reviewed and reported by stat rads. I do not find any discrepancies. Signed by Dr Gali Cabrales on 2021 6:40 AM    Xr Chest Portable    Result Date: 2021  EXAMINATION: XR CHEST PORTABLE 2021 8:40 PM HISTORY: Shortness of breath COMPARISON: 2020 FINDINGS: There has been interval removal of the double lumen venous catheter. The heart appears enlarged.  Mediastinal contours otherwise appear normal. The pulmonary vasculature is indistinct. Diffuse bilateral interstitial opacities are present with developing alveolar opacities in the lung bases. No definite pneumothorax or pleural effusion. Cardiomegaly with pulmonary vascular congestion and findings suggestive of pulmonary edema. While Covid pneumonia could have this appearance, history of incomplete dialysis favors pulmonary edema. Signed by Dr Reba Briceno on 1/24/2021 8:42 PM      Pertinent Labs:   CBC:   Recent Labs     01/24/21 2027 01/25/21 0251 01/26/21 0442   WBC 3.9* 3.8* 1.8*   HGB 12.0* 10.1* 10.4*    120* 161     BMP:    Recent Labs     01/24/21 2027 01/25/21 0251 01/25/21  0540 01/26/21 0442    142  --  140   K 4.9 5.7* 5.9* 5.5*   CL 91* 94*  --  97*   CO2 31* 28  --  28   BUN 69* 77*  --  70*   CREATININE 16.8* 17.5*  --  16.8*   GLUCOSE 107 92  --  144*     INR:   Recent Labs     01/24/21 2027 01/25/21 0251 01/26/21 0442   INR 1.00 1.05 1.08             Hospital Course:     54-year-old male with ESRD on HD (Tuesday/Thursday/Saturday schedule), presented with worsening dyspnea, fatigue, dry cough, malaise over several days. His most recent dialysis session was not completed due to him not feeling well. He subsequently tested positive for COVID-19 here and chest x-ray shows bilateral opacities consistent with pulmonary edema, but possibly related to COVID-19 pneumonia. Initially on room air but has intermittently required supplemental oxygen. He underwent dialysis on 1/25 and again on 1/26 with some improvement in dyspnea, however on home oxygen assessment he still had O2 desaturation to 88% on room air, and qualified for 2L home oxygen. Treated with Dexamethasone while hospitalized. Set up for home health and home oxygen.   Stable for discharge home on 1/26 with Rx for Dexamethasone to complete 10 day total course and low dose Eliquis for prophylactic anticoagulation with Covid infection. Due to Covid-19 infection, social work assisted him in changing his dialysis center to [facility] in [place]. Contact PCP for follow up. Follow up with Nephrology at dialysis.   Instructed on 10-14 day quarantine period from date of positive covid test.            Physical Exam:  Vital Signs: /80   Pulse 72   Temp 97.2 °F (36.2 °C) (Temporal)   Resp 18   Ht 5' 8\" (1.727 m)   Wt 243 lb 8 oz (110.5 kg)   SpO2 90%   BMI 37.02 kg/m²   General: No acute distress, awake and alert  HEENT: Normocephalic/atraumatic, no scleral icterus  Neck: Trachea midline  Cardiovascular: Normal rate with regular rhythm  Respiratory: Equal and symmetric chest rise, no accessory muscle use  Abdomen: No abdominal distention observed  Musculoskeletal: No observed deformities  Neurologic: No focal weakness  Extremities: No cyanosis    Discharge Medications:       Zari Waite   Home Medication Instructions YNC:[id number]    Printed on:01/26/21 3909   Medication Information                      albuterol sulfate  (90 BASE) MCG/ACT inhaler  Inhale 2 puffs into the lungs every 6 hours as needed for Wheezing             apixaban (ELIQUIS) 2.5 MG TABS tablet  Take 1 tablet by mouth 2 times daily             calcium carbonate (OYSTER SHELL CALCIUM 500 MG) 1250 (500 Ca) MG tablet  Take 1 tablet by mouth daily             cloNIDine (CATAPRES) 0.1 MG tablet  TAKE ONE TABLET TWO TIMES A DAY             Cyanocobalamin (VITAMIN B 12 PO)  Take 1 tablet by mouth daily              ferrous sulfate (IRON 325) 325 (65 Fe) MG tablet  TAKE ONE TABLET TWO TIMES A DAY WITH MEALS             hydrALAZINE (APRESOLINE) 50 MG tablet  TAKE ONE TABLET EVERY 8 HOURS             metoprolol tartrate (LOPRESSOR) 25 MG tablet  TAKE ONE TABLET TWO TIMES A DAY             NIFEdipine (ADALAT CC) 60 MG extended release tablet  TAKE ONE TABLET TWO TIMES A DAY             sevelamer (RENVELA) 800 MG tablet  Take 800 mg by mouth 3 times daily (with meals) Indications: takes 2 tablets tid with meals              simvastatin (ZOCOR) 10 MG tablet  Take 1 tablet by mouth nightly             VITAMIN D PO  Take 5,000 Units by mouth 2 times daily                  Discharge Instructions: Follow up with Mayra Morton MD.  Call for appointment. Take medications as directed. Resume activity as tolerated. Diet: DIET RENAL; Low Sodium (2 GM); Daily Fluid Restriction: 1000 ml     Disposition: Patient is medically stable and will be discharged home with home health and home oxygen. Time spent on discharge over 30 minutes.       Signed:  Jon Krause MD  1/26/2021 7:43 AM

## 2021-01-27 ENCOUNTER — TELEPHONE (OUTPATIENT)
Dept: PRIMARY CARE CLINIC | Age: 51
End: 2021-01-27

## 2021-01-27 ENCOUNTER — CARE COORDINATION (OUTPATIENT)
Dept: CASE MANAGEMENT | Age: 51
End: 2021-01-27

## 2021-01-27 ENCOUNTER — TELEPHONE (OUTPATIENT)
Dept: CASE MANAGEMENT | Age: 51
End: 2021-01-27

## 2021-01-27 DIAGNOSIS — U07.1 COVID-19: Primary | ICD-10-CM

## 2021-01-27 NOTE — CARE COORDINATION
Jace 45 Transitions Initial Follow Up Call    Call within 2 business days of discharge: Yes    Patient: Inés Sotelo Patient : 1970   MRN: [id number]  Reason for Admission: Volume Overload, Pulmonary Edema, COVID 19  Discharge Date: 21 RARS: Readmission Risk Score: 20      Last Discharge [facility]       Complaint Diagnosis Description Type Department Provider    21 Shortness of Breath; Dizziness; Hypertension Pneumonia due to COVID-19 virus . .. ED to Hosp-Admission (Discharged) (ADMIT) Marisela Guillermo MD; Mary Alice Noel, ... Spoke with: [place]: [facility]    Non-face-to-face services provided:  Reviewed encounter information for continuity of care prior to follow up Care Transitions phone call - chart notes, consults, progress notes, test results, med list, appointments, AVS, other information. Care Transitions 24 Hour Call    Schedule Follow Up Appointment with PCP: Completed  Do you have any ongoing symptoms?: No  Do you have a copy of your discharge instructions?: Yes  Do you have all of your prescriptions and are they filled?: Yes  Have you scheduled your follow up appointment?: Yes  How are you going to get to your appointment?: Other  Were you discharged with any Home Care or Post Acute Services: Yes  Post Acute Services: [facility] [address] you feel like you have everything you need to keep you well at home?: Yes  Care Transitions Interventions         Follow Up  Future Appointments   Date Time Provider Hakeem Thomas   2021  2:15 PM SOSA Ernst - NP [facility]   2021  8:45 AM Nicolas Wilde MD [address] a call to the number listed for patient and spoke with him for an initial follow up call for COVID 19 Monitoring Care Transitions. He reported that he is doing very well. He said that he is not having any abnormal symptoms today. He said he slept well last night.   He is up and about this morning, tolerating activity well. He said he has all of his meds and is taking them as ordered. He has oxygen and is using it most times. He has a follow up virtual visit scheduled for tomorrow. He did say that he goes to HD on Tuesday, Thursday, Saturday. He is supposed to have home health. He is not aware of any issues. Informed of CTC process, purpose, future calls, etc.  Ensured to have CTN contact information to be used as needed. Encouraged to call as needed for new issues, questions, etc.  Given the opportunity to ask questions and answered appropriately. CTN to follow up as indicated. Challenges to be reviewed by the provider   Additional needs identified to be addressed with provider No  none    Discussed COVID-19 related testing which was available at this time. Test results were positive. Patient informed of results, if available? Already aware. Advance Care Planning:   Does patient have an Advance Directive:  reviewed and current. Was this a readmission? No  Patient stated reason for admission: I couldn't breathe. Patients top risk factors for readmission: medical condition    Care Transition Nurse (CTN) contacted the patient by telephone to perform post hospital discharge assessment. Verified name and  with patient as identifiers. Provided introduction to self, and explanation of the CTN role. CTN reviewed discharge instructions, medical action plan and red flags with patient who verbalized understanding. Patient given an opportunity to ask questions and does not have any further questions or concerns at this time. Were discharge instructions available to patient? Yes. Reviewed appropriate site of care based on symptoms and resources available to patient including: PCP, Specialist, Urgent care clinics, Home health and When to call 911. The patient agrees to contact the PCP office for questions related to their healthcare.      Medication reconciliation was performed with patient, who verbalizes understanding of administration of home medications. Advised obtaining a 90-day supply of all daily and as-needed medications. Covid Risk Education    Patient has following risk factors of: asthma, diabetes, chronic kidney disease and Positive COVID 19 Diagnosis. Education provided regarding infection prevention, and signs and symptoms of COVID-19 and when to seek medical attention with patient who verbalized understanding. Discussed exposure protocols and quarantine From [facility]: Are you at higher risk for severe illness?   and given an opportunity for questions and concerns. The patient agrees to contact the COVID-19 hotline [phone] or PCP office for questions related to COVID-19. Discussed follow-up appointments. Plan for follow-up call in 5-7 days based on severity of symptoms and risk factors. Plan for next call: - disease process mgmt, symptom mgmt, diet/hydration, pain control needs, medication mgmt, activity level, home safety needs, infection control, fall precautions, seeking medical attention, who/when to call prn any needs, etc.    CTN provided contact information for future needs.         Feliciano Momin RN

## 2021-01-27 NOTE — TELEPHONE ENCOUNTER
The following message is from Mona Fernandez RN with [facility]:  - Please let Dr. Esther Rankin know that his patient was admitted to homecare services for SN only today 1/27/21.   - POC: SN visits 1W1, 2W1, 1W2 plus 2 prn visits as needed for CP assess, VS, education on fall prevention, blood thinner precautions, disease processes, meds, O2 use and safety, O2 at 2L per n/c currently. - O2 sats 94-96% with O2 at 2l on at rest, 91% on room air after walking about his house, he states slight soa only. - Also Medication Interactions and Duplicate Medications have been faxed to Dr. Carlito Juarez office. Thank you.   Electronically signed by Abraham Yan RN on 1/27/2021 at 2:44 PM

## 2021-01-28 ENCOUNTER — TELEPHONE (OUTPATIENT)
Dept: PRIMARY CARE CLINIC | Age: 51
End: 2021-01-28

## 2021-01-28 NOTE — TELEPHONE ENCOUNTER
Jace 45 Transitions Initial Follow Up Call    Outreach made within 2 business days of discharge: Yes    Patient: Khurram Garza Patient : 1970   MRN: [id number]    Reason for Admission: Volume overload    Pulmonary edema, COVID-19 Pneumonia  Discharge Date: 21       Spoke with: Patient    Discharge department/facility: [facility]    TCM Interactive Patient Contact:  Was patient able to fill all prescriptions: Yes  Was patient instructed to bring all medications to the follow-up visit: Yes  Is patient taking all medications as directed in the discharge summary? Yes  Does patient understand their discharge instructions: Yes  Does patient have questions or concerns that need addressed prior to 7-14 day follow up office visit: no      Spoke with patient. He states he is feeling pretty well. Slept well last night. His appetite is good, no issues with bladder or bowels.     Scheduled appointment with PCP within 7-14 days    Follow Up  Future Appointments   Date Time Provider Hakeem Thomas   2021  2:15 PM SOSA Damon - NP [facility]   2021  8:45 AM MD LINDA Jamison [facility] [address] [place], [place]

## 2021-02-03 ENCOUNTER — CARE COORDINATION (OUTPATIENT)
Dept: CASE MANAGEMENT | Age: 51
End: 2021-02-03

## 2021-02-03 NOTE — CARE COORDINATION
Jace 45 Transitions Follow Up Call    2/3/2021    Patient: Christina Wagner  Patient : 1970   MRN: [id number]  Reason for Admission: COVID 19   Discharge Date: 21 RARS: Readmission Risk Score: 20         Spoke with: Juan Miguel Nguyen Transitions Subsequent and Final Call    Schedule Follow Up Appointment with PCP: Completed  Subsequent and Final Calls  Do you have any ongoing symptoms?: No  Have your medications changed?: No  Do you have any questions related to your medications?: No  Do you currently have any active services?: Yes  Are you currently active with any services?: Home Health  Do you have any needs or concerns that I can assist you with?: No  Identified Barriers: None  Care Transitions Interventions  Other Interventions: Follow Up  Future Appointments   Date Time Provider Hakeem Thomas   2021  8:45 AM MD LINDA Gr [facility]     Placed a call to the home and spoke with patient. He reported that he is doing much better. He reported that HE is not having any further symptoms of COVID 19. He said that he had a COVID 19 Test done yesterday and goes back next Tuesday for another and if he is negative, he can resume going to dialysis at his normal clinic, in [place], vs going to [place]. He denies any recent med or treatment changes. He said he is eating and drinking well. He is sleeping well. He is not aware of any new issues or problems. Home health has been out. CTN to follow up with one more call next week and dc if stable.         Roel Forte RN

## 2021-02-08 ENCOUNTER — VIRTUAL VISIT (OUTPATIENT)
Dept: PRIMARY CARE CLINIC | Age: 51
End: 2021-02-08
Payer: MEDICARE

## 2021-02-08 DIAGNOSIS — N18.6 ESRD (END STAGE RENAL DISEASE) (HCC): ICD-10-CM

## 2021-02-08 DIAGNOSIS — E78.2 MIXED HYPERLIPIDEMIA: ICD-10-CM

## 2021-02-08 DIAGNOSIS — I10 ESSENTIAL HYPERTENSION: Primary | ICD-10-CM

## 2021-02-08 DIAGNOSIS — I50.32 CHRONIC DIASTOLIC CONGESTIVE HEART FAILURE (HCC): ICD-10-CM

## 2021-02-08 PROCEDURE — 99213 OFFICE O/P EST LOW 20 MIN: CPT | Performed by: NURSE PRACTITIONER

## 2021-02-08 PROCEDURE — G8484 FLU IMMUNIZE NO ADMIN: HCPCS | Performed by: NURSE PRACTITIONER

## 2021-02-08 PROCEDURE — 3017F COLORECTAL CA SCREEN DOC REV: CPT | Performed by: NURSE PRACTITIONER

## 2021-02-08 PROCEDURE — 1036F TOBACCO NON-USER: CPT | Performed by: NURSE PRACTITIONER

## 2021-02-08 PROCEDURE — 1111F DSCHRG MED/CURRENT MED MERGE: CPT | Performed by: NURSE PRACTITIONER

## 2021-02-08 PROCEDURE — G8417 CALC BMI ABV UP PARAM F/U: HCPCS | Performed by: NURSE PRACTITIONER

## 2021-02-08 PROCEDURE — G8427 DOCREV CUR MEDS BY ELIG CLIN: HCPCS | Performed by: NURSE PRACTITIONER

## 2021-02-08 RX ORDER — CLONIDINE HYDROCHLORIDE 0.1 MG/1
TABLET ORAL
Qty: 60 TABLET | Refills: 3 | Status: SHIPPED | OUTPATIENT
Start: 2021-02-08 | End: 2021-06-27

## 2021-02-08 RX ORDER — SIMVASTATIN 10 MG
10 TABLET ORAL NIGHTLY
Qty: 90 TABLET | Refills: 1 | Status: SHIPPED | OUTPATIENT
Start: 2021-02-08 | End: 2021-09-15

## 2021-02-08 RX ORDER — NIFEDIPINE 60 MG/1
TABLET, FILM COATED, EXTENDED RELEASE ORAL
Qty: 60 TABLET | Refills: 3 | Status: SHIPPED | OUTPATIENT
Start: 2021-02-08 | End: 2021-06-27

## 2021-02-08 RX ORDER — HYDRALAZINE HYDROCHLORIDE 50 MG/1
TABLET, FILM COATED ORAL
Qty: 90 TABLET | Refills: 3 | Status: SHIPPED | OUTPATIENT
Start: 2021-02-08

## 2021-02-08 ASSESSMENT — ENCOUNTER SYMPTOMS
COUGH: 0
SHORTNESS OF BREATH: 0
COLOR CHANGE: 0
NAUSEA: 0
VOICE CHANGE: 0
PHOTOPHOBIA: 0
RHINORRHEA: 0
BACK PAIN: 0
VOMITING: 0

## 2021-02-08 ASSESSMENT — PATIENT HEALTH QUESTIONNAIRE - PHQ9
SUM OF ALL RESPONSES TO PHQ9 QUESTIONS 1 & 2: 0
SUM OF ALL RESPONSES TO PHQ QUESTIONS 1-9: 0
2. FEELING DOWN, DEPRESSED OR HOPELESS: 0
1. LITTLE INTEREST OR PLEASURE IN DOING THINGS: 0
SUM OF ALL RESPONSES TO PHQ QUESTIONS 1-9: 0
SUM OF ALL RESPONSES TO PHQ QUESTIONS 1-9: 0

## 2021-02-08 NOTE — PROGRESS NOTES
[address] [address]             Phone:  [phone]  Fax:  [phone]       2021    TELEHEALTH EVALUATION -- Audio/Visual (During HLBXX-55 public health emergency)    HPI:  Chief Complaint   Patient presents with    Hypertension         Bobbie Simmonds (:  1970) has requested an audio/video evaluation for the following concern(s):    Patient presents today for follow-up hypertension, hyperlipidemia, ESRD. He sees Dr Ngoc Justice. He reports no complaints today. He is due for physical with labs and AWV. Review of Systems   Constitutional: Negative for chills and fever. HENT: Negative for ear pain, hearing loss, rhinorrhea and voice change. Eyes: Negative for photophobia and visual disturbance. Respiratory: Negative for cough and shortness of breath. Cardiovascular: Negative for chest pain and palpitations. Gastrointestinal: Negative for nausea and vomiting. Endocrine: Negative. Negative for cold intolerance and heat intolerance. Genitourinary: Negative for difficulty urinating and flank pain. Musculoskeletal: Negative for back pain and neck pain. Skin: Negative for color change and rash. Allergic/Immunologic: Negative for environmental allergies and food allergies. Neurological: Negative for dizziness, speech difficulty and headaches. Hematological: Does not bruise/bleed easily. Psychiatric/Behavioral: Negative for sleep disturbance and suicidal ideas. Prior to Visit Medications    Medication Sig Taking?  Authorizing Provider   metoprolol tartrate (LOPRESSOR) 25 MG tablet TAKE ONE TABLET TWO TIMES A DAY Yes Alix Luu APRN   hydrALAZINE (APRESOLINE) 50 MG tablet TAKE ONE TABLET EVERY 8 HOURS Yes SOSA Gold   cloNIDine (CATAPRES) 0.1 MG tablet TAKE ONE TABLET TWO TIMES A DAY Yes SOSA Gold   NIFEdipine (ADALAT CC) 60 MG extended release tablet TAKE ONE TABLET TWO TIMES A DAY Yes SOSA Gold apixaban (ELIQUIS) 2.5 MG TABS tablet Take 1 tablet by mouth 2 times daily Yes SOSA Campos   simvastatin (ZOCOR) 10 MG tablet Take 1 tablet by mouth nightly Yes SOSA Campos   sevelamer (RENVELA) 800 MG tablet Take 800 mg by mouth 3 times daily (with meals) Indications: takes 2 tablets tid with meals   Historical Provider, MD   ferrous sulfate (IRON 325) 325 (65 Fe) MG tablet TAKE ONE TABLET TWO TIMES A DAY WITH MEALS  Ray Hussein MD   VITAMIN D PO Take 5,000 Units by mouth 2 times daily   Historical Provider, MD   Cyanocobalamin (VITAMIN B 12 PO) Take 1 tablet by mouth daily   Historical Provider, MD   calcium carbonate (OYSTER SHELL CALCIUM 500 MG) 1250 (500 Ca) MG tablet Take 1 tablet by mouth daily  Historical Provider, MD   albuterol sulfate  (90 BASE) MCG/ACT inhaler Inhale 2 puffs into the lungs every 6 hours as needed for Wheezing  Ray Hussein MD       Social History     Tobacco Use    Smoking status: Never Smoker    Smokeless tobacco: Never Used   Substance Use Topics    Alcohol use: Not Currently     Comment: occ    Drug use: No        Allergies   Allergen Reactions    Banana Anaphylaxis, Shortness Of Breath and Swelling    Atorvastatin Other (See Comments)     Chest pain, no muslce pains elsewhere    Lisinopril Other (See Comments)     Cough   ,   Past Medical History:   Diagnosis Date    Asthma     CHF (congestive heart failure) ([facility]     Diabetes mellitus ([facility]     [facility]thur-sat at [facility]    Erectile dysfunction     Hemodialysis patient ([facility]     t,th,s at [facility]    History of blood transfusion     Hypertension     Obesity     Palliative care patient 01/25/2021   ,   Past Surgical History:   Procedure Laterality Date    COLONOSCOPY N/A 12/05/2019    Dr Francesca You, internal hemorrhoids-Grade 1-2-HP, 3 yr recall    DIALYSIS FISTULA CREATION Left 06/26/2020 LEFT BRACHIAL / CEPHALIC AV FISTULA performed by Maxwell Hubbard MD at [address]      wbh - removal of first cath due to infection and placement of new one    TUNNELED VENOUS CATHETER PLACEMENT  01/2020    [facility]    VASCULAR SURGERY  [facility]11/2020    [facility].  Removal of tunneled dialysis catheter left internal jugular vein   ,   Social History     Tobacco Use    Smoking status: Never Smoker    Smokeless tobacco: Never Used   Substance Use Topics    Alcohol use: Not Currently     Comment: occ    Drug use: No   ,   Family History   Problem Relation Age of Onset    Cancer Father         prostate    Kidney Disease Brother    ,   Immunization History   Administered Date(s) Administered    Influenza Virus Vaccine 10/21/2019    Pneumococcal Polysaccharide ([id number]) 05/10/2017   ,   Health Maintenance   Topic Date Due    Hepatitis B vaccine (1 of 3 - Risk Recombivax 3-dose series) 11/15/1988    DTaP/Tdap/Td vaccine (1 - Tdap) 11/15/1988    Shingles Vaccine (1 of 2) 11/15/2019    Flu vaccine (1) 09/01/2020    Annual Wellness Visit (AWV)  01/28/2021    A1C test (Diabetic or Prediabetic)  11/24/2021    PSA counseling  11/24/2021    Colon cancer screen colonoscopy  12/05/2029    Pneumococcal 0-64 years Vaccine  Completed    Hepatitis A vaccine  Aged Out    Hib vaccine  Aged Out    Meningococcal (ACWY) vaccine  Aged Out       PHYSICAL EXAMINATION:  [ INSTRUCTIONS:  \"[x]\" Indicates a positive item  \"[]\" Indicates a negative item  -- DELETE ALL ITEMS NOT EXAMINED]  [x] Alert  [x] Oriented to person/place/time    [x] No apparent distress  [] Toxic appearing    [] Face flushed appearing [x] Sclera clear  [] Lips are cyanotic      [x] Breathing appears normal  [] Appears tachypneic      [] Rash on visible skin    [x] Cranial Nerves II-XII grossly intact    [x] Motor grossly intact in visible upper extremities    [x] Motor grossly intact in visible lower extremities [x] Normal Mood  [] Anxious appearing    [] Depressed appearing  [] Confused appearing      [] Poor short term memory  [] Poor long term memory    [] OTHER:      Due to this being a TeleHealth encounter, evaluation of the following organ systems is limited: Vitals/Constitutional/EENT/Resp/CV/GI//MS/Neuro/Skin/Heme-Lymph-Imm. ASSESSMENT/PLAN:  1. Essential hypertension    - metoprolol tartrate (LOPRESSOR) 25 MG tablet; TAKE ONE TABLET TWO TIMES A DAY  Dispense: 60 tablet; Refill: 3  - hydrALAZINE (APRESOLINE) 50 MG tablet; TAKE ONE TABLET EVERY 8 HOURS  Dispense: 90 tablet; Refill: 3  - cloNIDine (CATAPRES) 0.1 MG tablet; TAKE ONE TABLET TWO TIMES A DAY  Dispense: 60 tablet; Refill: 3  - NIFEdipine (ADALAT CC) 60 MG extended release tablet; TAKE ONE TABLET TWO TIMES A DAY  Dispense: 60 tablet; Refill: 3    2. Chronic diastolic congestive heart failure (HCC)  - Stable. 3. ESRD (end stage renal disease) ([facility]  - Followed closely by nephrology. 4. Mixed hyperlipidemia    - simvastatin (ZOCOR) 10 MG tablet; Take 1 tablet by mouth nightly  Dispense: 90 tablet; Refill: 1      Return in about 4 months (around 6/8/2021) for in office, physical.      An  electronic signature was used to authenticate this note. --SOSA Donaldson on 2/8/2021 at 12:28 PM        Pursuant to the emergency declaration under the [facility] waiver authority and the [employer] and [employer] Act, this Virtual  Visit was conducted, with patient's consent, to reduce the patient's risk of exposure to COVID-19 and provide continuity of care for an established patient. Services were provided through a video synchronous discussion virtually to substitute for in-person clinic visit.

## 2021-02-10 ENCOUNTER — CARE COORDINATION (OUTPATIENT)
Dept: CASE MANAGEMENT | Age: 51
End: 2021-02-10

## 2021-02-10 NOTE — CARE COORDINATION
Jace 45 Transitions Follow Up Call    2/10/2021    Patient: Jess Lamb  Patient : 1970   MRN: [id number]  Reason for Admission:   Discharge Date: 21 RARS: Readmission Risk Score: 20         Spoke with: [address] Nguyen Transitions Subsequent and Final Call    Subsequent and Final Calls  Do you have any ongoing symptoms?: No  Have your medications changed?: No  Do you have any questions related to your medications?: No  Do you currently have any active services?: No  Are you currently active with any services?: Home Health  Do you have any needs or concerns that I can assist you with?: No  Identified Barriers: None  Care Transitions Interventions  Other Interventions: Follow Up : Spoke with patient for final COVID call. He says he is doing well. Says he has tested negative and is now going back to [address] [place] [address] for his sessions, as he had been going to [place] since he was COVID positive. He says no respiratory symptoms, is eating well, and following with HD in [place]. No problems with bowels or bladder, seems to be progressing well. Will resolve calls to patient today. Future Appointments   Date Time Provider Hakeem Thomas   2021  8:45 AM MD LINDA Casey [facility]   9782 39:34 AM [facility]A [facility]     Jess Lamb   resolved from the Care Transitions episode on 2/10/21  Discussed COVID-19 related testing which was available at this time. Test results were positive. Patient informed of results, if available?  Yes    Patient/family has been provided the following resources and education related to COVID-19:                         Signs, symptoms and red flags related to COVID-19            CDC exposure and quarantine guidelines            [facility] exposure contact - [phone]            Contact for their local Department of Health                 Patient currently reports that the following symptoms have improved:  no new/worsening symptoms     No further outreach scheduled with this CTN/ACM. Episode of Care resolved. Patient has this CTN/ACM contact information if future needs arise.     Lakshmi Vera RN

## 2021-02-16 ENCOUNTER — HOSPITAL ENCOUNTER (EMERGENCY)
Age: 51
Discharge: HOME | End: 2021-02-16
Attending: EMERGENCY MEDICINE
Payer: MEDICARE

## 2021-02-16 ENCOUNTER — APPOINTMENT (OUTPATIENT)
Dept: GENERAL RADIOLOGY | Age: 51
End: 2021-02-16
Payer: MEDICARE

## 2021-02-16 VITALS
BODY MASS INDEX: 34.86 KG/M2 | RESPIRATION RATE: 24 BRPM | WEIGHT: 230 LBS | HEART RATE: 86 BPM | TEMPERATURE: 98.8 F | DIASTOLIC BLOOD PRESSURE: 86 MMHG | HEIGHT: 68 IN | SYSTOLIC BLOOD PRESSURE: 142 MMHG | OXYGEN SATURATION: 90 %

## 2021-02-16 VITALS
WEIGHT: 230 LBS | TEMPERATURE: 97.7 F | HEIGHT: 68 IN | HEART RATE: 91 BPM | DIASTOLIC BLOOD PRESSURE: 94 MMHG | RESPIRATION RATE: 26 BRPM | BODY MASS INDEX: 34.86 KG/M2 | OXYGEN SATURATION: 86 % | SYSTOLIC BLOOD PRESSURE: 126 MMHG

## 2021-02-16 DIAGNOSIS — R06.02 SHORTNESS OF BREATH: ICD-10-CM

## 2021-02-16 DIAGNOSIS — Z86.16 HISTORY OF COVID-19: ICD-10-CM

## 2021-02-16 DIAGNOSIS — R10.9 ABDOMINAL CRAMPING: ICD-10-CM

## 2021-02-16 DIAGNOSIS — N18.6 ESRD (END STAGE RENAL DISEASE) (HCC): Primary | ICD-10-CM

## 2021-02-16 LAB
ALBUMIN SERPL-MCNC: 4 G/DL (ref 3.5–5.2)
ALP BLD-CCNC: 49 U/L (ref 40–130)
ALT SERPL-CCNC: 7 U/L (ref 5–41)
ANION GAP SERPL CALCULATED.3IONS-SCNC: 10 MMOL/L (ref 7–19)
AST SERPL-CCNC: 11 U/L (ref 5–40)
BASE EXCESS ARTERIAL: 9.9 MMOL/L (ref -2–2)
BASOPHILS ABSOLUTE: 0 K/UL (ref 0–0.2)
BASOPHILS RELATIVE PERCENT: 0 % (ref 0–1)
BILIRUB SERPL-MCNC: 0.7 MG/DL (ref 0.2–1.2)
BUN BLDV-MCNC: 32 MG/DL (ref 6–20)
CALCIUM SERPL-MCNC: 9.2 MG/DL (ref 8.6–10)
CARBOXYHEMOGLOBIN ARTERIAL: 2.2 % (ref 0–5)
CHLORIDE BLD-SCNC: 97 MMOL/L (ref 98–111)
CO2: 33 MMOL/L (ref 22–29)
CREAT SERPL-MCNC: 8.5 MG/DL (ref 0.5–1.2)
EOSINOPHILS ABSOLUTE: 0.1 K/UL (ref 0–0.6)
EOSINOPHILS RELATIVE PERCENT: 1.6 % (ref 0–5)
GFR AFRICAN AMERICAN: 8
GFR NON-AFRICAN AMERICAN: 7
GLUCOSE BLD-MCNC: 97 MG/DL (ref 74–109)
HCO3 ARTERIAL: 34.3 MMOL/L (ref 22–26)
HCT VFR BLD CALC: 28.7 % (ref 42–52)
HEMOGLOBIN, ART, EXTENDED: 9.1 G/DL (ref 14–18)
HEMOGLOBIN: 9.5 G/DL (ref 14–18)
IMMATURE GRANULOCYTES #: 0 K/UL
INR BLD: 1.17 (ref 0.88–1.18)
LIPASE: 28 U/L (ref 13–60)
LYMPHOCYTES ABSOLUTE: 1.3 K/UL (ref 1.1–4.5)
LYMPHOCYTES RELATIVE PERCENT: 16.2 % (ref 20–40)
MCH RBC QN AUTO: 29.8 PG (ref 27–31)
MCHC RBC AUTO-ENTMCNC: 33.1 G/DL (ref 33–37)
MCV RBC AUTO: 90 FL (ref 80–94)
METHEMOGLOBIN ARTERIAL: 0.5 %
MONOCYTES ABSOLUTE: 0.9 K/UL (ref 0–0.9)
MONOCYTES RELATIVE PERCENT: 10.9 % (ref 0–10)
NEUTROPHILS ABSOLUTE: 5.7 K/UL (ref 1.5–7.5)
NEUTROPHILS RELATIVE PERCENT: 70.9 % (ref 50–65)
O2 CONTENT ARTERIAL: 11.2 ML/DL
O2 SAT, ARTERIAL: 87.6 %
O2 THERAPY: ABNORMAL
PCO2 ARTERIAL: 45 MMHG (ref 35–45)
PDW BLD-RTO: 15.3 % (ref 11.5–14.5)
PH ARTERIAL: 7.49 (ref 7.35–7.45)
PLATELET # BLD: 138 K/UL (ref 130–400)
PMV BLD AUTO: 8.9 FL (ref 9.4–12.4)
PO2 ARTERIAL: 53 MMHG (ref 80–100)
POTASSIUM REFLEX MAGNESIUM: 4.3 MMOL/L (ref 3.5–5)
POTASSIUM, WHOLE BLOOD: 4.7
PROTHROMBIN TIME: 14.9 SEC (ref 12–14.6)
RBC # BLD: 3.19 M/UL (ref 4.7–6.1)
SODIUM BLD-SCNC: 140 MMOL/L (ref 136–145)
TOTAL PROTEIN: 7.4 G/DL (ref 6.6–8.7)
WBC # BLD: 8 K/UL (ref 4.8–10.8)

## 2021-02-16 PROCEDURE — 36415 COLL VENOUS BLD VENIPUNCTURE: CPT

## 2021-02-16 PROCEDURE — 85025 COMPLETE CBC W/AUTO DIFF WBC: CPT

## 2021-02-16 PROCEDURE — 6370000000 HC RX 637 (ALT 250 FOR IP): Performed by: NURSE PRACTITIONER

## 2021-02-16 PROCEDURE — 83690 ASSAY OF LIPASE: CPT

## 2021-02-16 PROCEDURE — 94640 AIRWAY INHALATION TREATMENT: CPT

## 2021-02-16 PROCEDURE — 2580000003 HC RX 258: Performed by: NURSE PRACTITIONER

## 2021-02-16 PROCEDURE — 36600 WITHDRAWAL OF ARTERIAL BLOOD: CPT

## 2021-02-16 PROCEDURE — 80053 COMPREHEN METABOLIC PANEL: CPT

## 2021-02-16 PROCEDURE — 82803 BLOOD GASES ANY COMBINATION: CPT

## 2021-02-16 PROCEDURE — 85610 PROTHROMBIN TIME: CPT

## 2021-02-16 PROCEDURE — 71045 X-RAY EXAM CHEST 1 VIEW: CPT

## 2021-02-16 PROCEDURE — 6360000002 HC RX W HCPCS: Performed by: NURSE PRACTITIONER

## 2021-02-16 PROCEDURE — 99284 EMERGENCY DEPT VISIT MOD MDM: CPT

## 2021-02-16 PROCEDURE — 84132 ASSAY OF SERUM POTASSIUM: CPT

## 2021-02-16 PROCEDURE — 96374 THER/PROPH/DIAG INJ IV PUSH: CPT

## 2021-02-16 RX ORDER — ONDANSETRON 4 MG/1
4 TABLET, ORALLY DISINTEGRATING ORAL ONCE
Status: COMPLETED | OUTPATIENT
Start: 2021-02-16 | End: 2021-02-16

## 2021-02-16 RX ORDER — 0.9 % SODIUM CHLORIDE 0.9 %
250 INTRAVENOUS SOLUTION INTRAVENOUS ONCE
Status: COMPLETED | OUTPATIENT
Start: 2021-02-16 | End: 2021-02-16

## 2021-02-16 RX ORDER — IPRATROPIUM BROMIDE AND ALBUTEROL SULFATE 2.5; .5 MG/3ML; MG/3ML
1 SOLUTION RESPIRATORY (INHALATION) ONCE
Status: COMPLETED | OUTPATIENT
Start: 2021-02-16 | End: 2021-02-16

## 2021-02-16 RX ORDER — HYDROMORPHONE HYDROCHLORIDE 1 MG/ML
0.5 INJECTION, SOLUTION INTRAMUSCULAR; INTRAVENOUS; SUBCUTANEOUS ONCE
Status: COMPLETED | OUTPATIENT
Start: 2021-02-16 | End: 2021-02-16

## 2021-02-16 RX ORDER — IPRATROPIUM BROMIDE AND ALBUTEROL SULFATE 2.5; .5 MG/3ML; MG/3ML
1 SOLUTION RESPIRATORY (INHALATION)
Status: DISCONTINUED | OUTPATIENT
Start: 2021-02-16 | End: 2021-02-16

## 2021-02-16 RX ADMIN — HYDROMORPHONE HYDROCHLORIDE 0.5 MG: 1 INJECTION, SOLUTION INTRAMUSCULAR; INTRAVENOUS; SUBCUTANEOUS at 10:19

## 2021-02-16 RX ADMIN — ONDANSETRON 4 MG: 4 TABLET, ORALLY DISINTEGRATING ORAL at 10:19

## 2021-02-16 RX ADMIN — IPRATROPIUM BROMIDE AND ALBUTEROL SULFATE 1 AMPULE: .5; 3 SOLUTION RESPIRATORY (INHALATION) at 11:23

## 2021-02-16 RX ADMIN — SODIUM CHLORIDE 250 ML: 9 INJECTION, SOLUTION INTRAVENOUS at 10:18

## 2021-02-16 ASSESSMENT — ENCOUNTER SYMPTOMS
SORE THROAT: 0
COUGH: 1
VOMITING: 0
WHEEZING: 0
SHORTNESS OF BREATH: 1
WHEEZING: 0
COUGH: 0

## 2021-02-16 ASSESSMENT — PAIN SCALES - GENERAL: PAINLEVEL_OUTOF10: 8

## 2021-02-16 NOTE — ED PROVIDER NOTES
[facility] EMERG[facility]Y DEPT  eMERGENCY dEPARTMENT eNCOUnter      Pt Name: Christina Wagner  MRN: [id number]  [place] 1970  Date of evaluation: 2/16/2021  Provider: Humphrey Santana MD    [address] [place] [address]       Chief Complaint   Patient presents with    Shortness of Breath     pt wears 2L NC as needed. pt dialysis patient. pt states that he needs to go to dialysis. HISTORY OF PRESENT ILLNESS   (Location/Symptom, Timing/Onset,Context/Setting, Quality, Duration, Modifying Factors, Severity)  Note limiting factors. Christina Wagner is a 45 y.o. male who presents to the emergency department      The history is provided by the patient. Shortness of Breath  Severity:  Mild  Onset quality: ongoing. Timing:  Intermittent  Progression:  Unchanged  Chronicity:  Recurrent  Context comment:  ESRD, due for dialysis today - was some confusion and was being transferred emergenct traffis and had to come here  Relieved by:  Oxygen and rest  Worsened by: Activity and exertion  Associated symptoms: no chest pain, no cough, no fever and no wheezing        NursingNotes were reviewed. REVIEW OF SYSTEMS    (2-9 systems for level 4, 10 or more for level 5)     Review of Systems   Constitutional: Negative for fever. Respiratory: Positive for shortness of breath. Negative for cough and wheezing. Cardiovascular: Negative for chest pain. All other systems reviewed and are negative. Except as noted above the remainder of the review of systems was reviewed and negative.        PAST MEDICAL HISTORY     Past Medical History:   Diagnosis Date    Asthma     [facility]     Diabetes mellitus ([facility]     t[facility]thur-sat at [facility]    Erectile dysfunction     Hemodialysis patient ([facility]     t,th,s at [facility]    History of blood transfusion     Hypertension     Obesity     Palliative care patient 01/25/2021         SURGICALHISTORY       Past Surgical History:   Procedure Laterality Date    COLONOSCOPY N/A 12/05/2019    Dr Adry Cast, internal hemorrhoids-Grade 1-2-HP, 3 yr recall    DIALYSIS FISTULA CREATION Left 06/26/2020    LEFT BRACHIAL / CEPHALIC AV FISTULA performed by Parveen Paredes MD at [facility] TUNNELED VENOUS CATHETER PLACEMENT      wbh - removal of first cath due to infection and placement of new one    [facility]    VASCULAR SURGERY  11/11/2020    [facility].  Removal of tunneled dialysis catheter left internal jugular vein         CURRENT MEDICATIONS       Discharge Medication List as of 2/16/2021  5:45 AM      CONTINUE these medications which have NOT CHANGED    Details   metoprolol tartrate (LOPRESSOR) 25 MG tablet TAKE ONE TABLET TWO TIMES A DAY, Disp-60 tablet, R-3Normal      hydrALAZINE (APRESOLINE) 50 MG tablet TAKE ONE TABLET EVERY 8 HOURS, Disp-90 tablet, R-3Normal      cloNIDine (CATAPRES) 0.1 MG tablet TAKE ONE TABLET TWO TIMES A DAY, Disp-60 tablet, R-3Normal      NIFEdipine (ADALAT CC) 60 MG extended release tablet TAKE ONE TABLET TWO TIMES A DAY, Disp-60 tablet, R-3Normal      apixaban (ELIQUIS) 2.5 MG TABS tablet Take 1 tablet by mouth 2 times daily, Disp-60 tablet, R-0Normal      simvastatin (ZOCOR) 10 MG tablet Take 1 tablet by mouth nightly, Disp-90 tablet, R-1Normal      sevelamer (RENVELA) 800 MG tablet Take 800 mg by mouth 3 times daily (with meals) Indications: takes 2 tablets tid with meals Historical Med      ferrous sulfate (IRON 325) 325 (65 Fe) MG tablet TAKE ONE TABLET TWO TIMES A DAY WITH MEALS, Disp-60 tablet,R-3Normal      VITAMIN D PO Take 5,000 Units by mouth 2 times daily Historical Med      Cyanocobalamin (VITAMIN B 12 PO) Take 1 tablet by mouth daily Historical Med      calcium carbonate (OYSTER SHELL CALCIUM 500 MG) 1250 (500 Ca) MG tablet Take 1 tablet by mouth dailyHistorical Med      albuterol sulfate  (90 BASE) MCG/ACT inhaler Inhale 2 puffs into the lungs every 6 hours as needed for Wheezing, Disp-1 Inhaler, R-2Normal             ALLERGIES     Banana, Atorvastatin, and Lisinopril    FAMILY HISTORY       Family History   Problem Relation Age of Onset    Cancer Father         prostate    Kidney Disease Brother           SOCIAL HISTORY       Social History     Socioeconomic History    Marital status: Single     Spouse name: None    Number of children: None    Years of education: None    Highest education level: None   Occupational History    None   Social Needs    Financial resource strain: None    Food insecurity     Worry: None     Inability: None    Transportation needs     Medical: None     Non-medical: None   Tobacco Use    Smoking status: Never Smoker    Smokeless tobacco: Never Used   Substance and Sexual Activity    Alcohol use: Not Currently     Comment: occ    Drug use: No    Sexual activity: None   Lifestyle    Physical activity     Days per week: None     Minutes per session: None    Stress: None   Relationships    Social connections     Talks on phone: None     Gets together: None     Attends Nondenominational service: None     Active member of club or organization: None     Attends meetings of clubs or organizations: None     Relationship status: None    Intimate partner violence     Fear of current or ex partner: None     Emotionally abused: None     Physically abused: None     Forced sexual activity: None   Other Topics Concern    None   Social History Narrative    None       SCREENINGS      @FLOW([id number])@      PHYSICAL EXAM    (up to 7 for level 4, 8 or more for level 5)     ED Triage Vitals [02/16/21 0537]   BP Temp Temp Source Pulse Resp SpO2 Height Weight   (!) 142/86 98.8 °F (37.1 °C) Oral 86 24 90 % 5' 8\" (1.727 m) 230 lb (104.3 kg)       Physical Exam  Vitals signs and nursing note reviewed. Constitutional:       General: He is not in acute distress. Appearance: He is well-developed. He is obese. He is not ill-appearing, toxic-appearing or diaphoretic.    HENT: Mouth/Throat:      Mouth: Mucous membranes are moist.      Pharynx: Oropharynx is clear. Neck:      Musculoskeletal: Normal range of motion and neck supple. Cardiovascular:      Rate and Rhythm: Normal rate and regular rhythm. Pulmonary:      Effort: Pulmonary effort is normal.      Breath sounds: Normal breath sounds. Musculoskeletal:      Right lower leg: No edema. Left lower leg: No edema. Skin:     General: Skin is warm and dry. Neurological:      General: No focal deficit present. Mental Status: He is alert and oriented to person, place, and time. DIAGNOSTIC RESULTS     EKG: All EKG's are interpreted by the Emergency Department Physician who either signs or Co-signsthis chart in the absence of a cardiologist.        RADIOLOGY:   [place] such as CT, Ultrasound and MRI are read by the radiologist. Plain radiographic images are visualized and preliminarily interpreted by the emergency physician with the below findings:        Interpretation per the Radiologist below, if available at the time ofthis note:    No orders to display         ED BEDSIDE ULTRASOUND:   Performed by ED Physician - none    LABS:  Labs Reviewed - No data to display    All other labs were within normal range or not returned as of this dictation. EMERGENCY DEPARTMENT COURSE and DIFFERENTIAL DIAGNOSIS/MDM:   Vitals:    Vitals:    02/16/21 0537   BP: (!) 142/86   Pulse: 86   Resp: 24   Temp: 98.8 °F (37.1 °C)   TempSrc: Oral   SpO2: 90%   Weight: 230 lb (104.3 kg)   Height: 5' 8\" (1.727 m)           MDM  Number of Diagnoses or Management Options  [facility]  Shortness of breath  Diagnosis management comments: Visit amounted to screening exam.  Has appy 8896 - feel safe for dialysis now. CRITICAL CARE TIME   Total Critical Care time was 0 minutes, excluding separately reportable procedures.   There was a high probability of clinically significant/lifethreatening

## 2021-02-16 NOTE — ED PROVIDER NOTES
[facility] EMERGENCY DEPT  eMERGENCY dEPARTMENT eNCOUnter      Pt Name: Yamilex Gleason  MRN: [id number]  [place] 1970  Date of evaluation: 2/16/2021  Provider: Adam Germain, [address]       Chief Complaint   Patient presents with    Cough     Pt had covid three weeks ago, cough worsening. No fever          HISTORY OF PRESENT ILLNESS   (Location/Symptom, Timing/Onset,Context/Setting, Quality, Duration, Modifying Factors, Severity)  Note limiting factors. Yamilex Gleason is a 45 y.o. male who presents to the emergency department after his dialysis treatment. (Had 3 hrs)  ABd cramping- that happens sometimes. Tells me he needs some fluids. + cough. Covid 3 weeks ago. Wear O2 as needed. Was here earlier in the night and sent to dialysis. Pt was admitted 1/24-26 with COvid. Was sent home on decadron and eliquis and O2. HAs not had an increase in his O2 needs. No fever. The history is provided by the patient. Cough  Cough characteristics:  Productive  Severity:  Mild  Onset quality:  Sudden  Duration:  3 weeks  Timing:  Intermittent  Progression:  Waxing and waning  Smoker: no    Associated symptoms: no chest pain, no fever, no sore throat and no wheezing        NursingNotes were reviewed. REVIEW OF SYSTEMS    (2-9 systems for level 4, 10 or more for level 5)     Review of Systems   Constitutional: Negative for fever. HENT: Negative for sore throat. Respiratory: Positive for cough. Negative for wheezing. Cardiovascular: Negative for chest pain. Gastrointestinal: Negative for vomiting. Except as noted above the remainder of the review of systems was reviewed and negative.        PAST MEDICAL HISTORY     Past Medical History:   Diagnosis Date    Asthma     [facility]     Diabetes mellitus ([facility]     [facility]    Erectile dysfunction     Hemodialysis patient ([facility]     [facility]    History of blood transfusion     Hypertension     Obesity     Palliative care patient 01/25/2021         SURGICALHISTORY       Past Surgical History:   Procedure Laterality Date    COLONOSCOPY N/A 12/05/2019    Dr Gabrielle Wheeler, internal hemorrhoids-Grade 1-2-HP, 3 yr recall    DIALYSIS FISTULA CREATION Left 06/26/2020    LEFT BRACHIAL / CEPHALIC AV FISTULA performed by Guerda Phillips MD at [address] [facility] [address]      [facility] - removal of first cath due to infection and placement of new one    TUNNELED VENOUS CATHETER PLACEMENT  01/2020    [facility]    VASCULAR SURGERY  11/11/2020    [facility].  Removal of tunneled dialysis catheter left internal jugular vein         CURRENT MEDICATIONS       Discharge Medication List as of 2/16/2021  1:01 PM      CONTINUE these medications which have NOT CHANGED    Details   metoprolol tartrate (LOPRESSOR) 25 MG tablet TAKE ONE TABLET TWO TIMES A DAY, Disp-60 tablet, R-3Normal      hydrALAZINE (APRESOLINE) 50 MG tablet TAKE ONE TABLET EVERY 8 HOURS, Disp-90 tablet, R-3Normal      cloNIDine (CATAPRES) 0.1 MG tablet TAKE ONE TABLET TWO TIMES A DAY, Disp-60 tablet, R-3Normal      NIFEdipine (ADALAT CC) 60 MG extended release tablet TAKE ONE TABLET TWO TIMES A DAY, Disp-60 tablet, R-3Normal      apixaban (ELIQUIS) 2.5 MG TABS tablet Take 1 tablet by mouth 2 times daily, Disp-60 tablet, R-0Normal      simvastatin (ZOCOR) 10 MG tablet Take 1 tablet by mouth nightly, Disp-90 tablet, R-1Normal      sevelamer (RENVELA) 800 MG tablet Take 800 mg by mouth 3 times daily (with meals) Indications: takes 2 tablets tid with meals Historical Med      ferrous sulfate (IRON 325) 325 (65 Fe) MG tablet TAKE ONE TABLET TWO TIMES A DAY WITH MEALS, Disp-60 tablet,R-3Normal      VITAMIN D PO Take 5,000 Units by mouth 2 times daily Historical Med      Cyanocobalamin (VITAMIN B 12 PO) Take 1 tablet by mouth daily Historical Med      calcium carbonate (OYSTER SHELL CALCIUM 500 MG) 1250 (500 Ca) MG tablet Take 1 tablet by mouth dailyHistorical Med      albuterol sulfate  (90 BASE) MCG/ACT inhaler Inhale 2 puffs into the lungs every 6 hours as needed for Wheezing, Disp-1 Inhaler, R-2Normal             ALLERGIES     Banana, Atorvastatin, and Lisinopril    FAMILY HISTORY       Family History   Problem Relation Age of Onset    Cancer Father         prostate    Kidney Disease Brother           SOCIAL HISTORY       Social History     Socioeconomic History    Marital status: Single     Spouse name: None    Number of children: None    Years of education: None    Highest education level: None   Occupational History    None   Social Needs    Financial resource strain: None    Food insecurity     Worry: None     Inability: None    Transportation needs     Medical: None     Non-medical: None   Tobacco Use    Smoking status: Never Smoker    Smokeless tobacco: Never Used   Substance and Sexual Activity    Alcohol use: Not Currently     Comment: occ    Drug use: No    Sexual activity: None   Lifestyle    Physical activity     Days per week: None     Minutes per session: None    Stress: None   Relationships    Social connections     Talks on phone: None     Gets together: None     Attends Jew service: None     Active member of club or organization: None     Attends meetings of clubs or organizations: None     Relationship status: None    Intimate partner violence     Fear of current or ex partner: None     Emotionally abused: None     Physically abused: None     Forced sexual activity: None   Other Topics Concern    None   Social History Narrative    None       SCREENINGS      @FLOW([id number])@      PHYSICAL EXAM    (up to 7 for level 4, 8 or more for level 5)     ED Triage Vitals [02/16/21 0957]   BP Temp Temp Source Pulse Resp SpO2 Height Weight   (!) 126/94 97.7 °F (36.5 °C) Oral 91 18 92 % 5' 8\" (1.727 m) 230 lb (104.3 kg)       Physical Exam  Vitals signs and nursing note reviewed.    Constitutional: PANEL W/ REFLEX TO MG FOR LOW K - Abnormal; Notable for the following components:    Chloride 97 (*)     CO2 33 (*)     BUN 32 (*)     CREATININE 8.5 (*)     GFR Non- 7 (*)     GFR  8 (*)     All other components within normal limits   PROTIME-INR - Abnormal; Notable for the following components:    Protime 14.9 (*)     All other components within normal limits   BLOOD GAS, ARTERIAL - Abnormal; Notable for the following components:    pH, Arterial 7.490 (*)     pO2, Arterial 53.0 (*)     HCO3, Arterial 34.3 (*)     Base Excess, Arterial 9.9 (*)     Hemoglobin, Art, Extended 9.1 (*)     O2 Sat, Arterial 87.6 (*)     All other components within normal limits    Narrative:     CALL  Renee  Michell Hawkins RN ER, 02/16/2021 11:29, by Miladys SMITH   POTASSIUM, WHOLE BLOOD       All other labs were within normal range or not returned as of this dictation. EMERGENCY DEPARTMENT COURSE and DIFFERENTIALDIAGNOSIS/MDM:   Vitals:    Vitals:    02/16/21 0957 02/16/21 1124 02/16/21 1129   BP: (!) 126/94     Pulse: 91     Resp: 18 (!) 35 26   Temp: 97.7 °F (36.5 °C)     TempSrc: Oral     SpO2: 92% 90% (!) 86%   Weight: 230 lb (104.3 kg)     Height: 5' 8\" (1.727 m)             MDM  Blood gas was done on room air. Sating above 90 on 2 liter of O2. Offered pt admission and he declined. Wants to go home. Cramps have resolved. Strong return precautions given. CONSULTS:  None    PROCEDURES:  Unless otherwise noted below, none     Procedures    FINAL IMPRESSION      1. ESRD (end stage renal disease) ([facility]    2. History of COVID-19    3. Abdominal cramping        DISPOSITION/PLAN   DISPOSITION        PATIENT REFERRED TO:  No follow-up provider specified.     DISCHARGE MEDICATIONS:  Discharge Medication List as of 2/16/2021  1:01 PM             (Please note that portions of this note were completed with a voice recognitionprogram.  Efforts were made to edit the dictations but occasionally words are mis-transcribed.)    SOSA Cortes (electronically signed)         SOSA Cortes  02/16/21 6898

## 2021-02-16 NOTE — PROGRESS NOTES
Contains critical data BLOOD GAS, ARTERIAL  Status:  Final result   Ref Range & Units 02/16/21 1128   pH, Arterial 7.350 - 7.450 7.490High     pCO2, Arterial 35.0 - 45.0 mmHg 45.0    pO2, Arterial 80.0 - 100.0 mmHg 53. 0Low     HCO3, Arterial 22.0 - 26.0 mmol/L 34.3High     Base Excess, Arterial -2.0 - 2.0 mmol/L 9.9High     Hemoglobin, Art, Extended 14.0 - 18.0 g/dL 9.1Low     O2 Sat, Arterial >92 % 87.6Low Panic     Carboxyhgb, Arterial 0.0 - 5.0 % 2.2    Comment:      0.0-1.5   (Smokers 1.5-5.0)    Methemoglobin, Arterial <1.5 % 0.5    O2 Content, Arterial Not Established mL/dL 11.2    O2 Therapy  Unknown    Resulting Agency  [facility]   Narrative    CALL  [place] Cassie Orlando RN ER, 02/16/2021 11:29, by Ozzie Rosales        Specimen Collected: 02/16/21 1128 Last Resulted: 02/16/21 1129 View Other Order Details        Pt on 2 lpm NC   RR 32 site RR at+

## 2021-02-17 ENCOUNTER — CARE COORDINATION (OUTPATIENT)
Dept: CASE MANAGEMENT | Age: 51
End: 2021-02-17

## 2021-02-17 NOTE — CARE COORDINATION
[facility] ED Follow Up Call    2021    Patient: Jess Lamb Patient : 1970   MRN: [id number]    Discharge Date: 2021       Care Transitions ED Follow Up    Care Transitions Interventions  Did you call your PCP prior to going to the ED?: No - Did not call PCP   Are there any other complaints/concerns that you wish to tell your provider?: None at present. Do you understand what to report and when to return?: Yes   Are you following your discharge instructions?: Yes   Do you have all of your prescriptions and are they filled?: Yes   Post Acute Services: [facility] a call to the number listed for the patient for an ED follow up call for COVID 19 Monitoring. He reported that he feels very good today. He denied any shortness of breath, cough, congestion, dizziness. He also denied any abdominal cramping, pain, bowel upset, nausea, etc.  He said he does have oxygen at home, but is not currently using it. He said he is doing well this am.  He has all of his meds except Ferrous Sulfate. He said he is out of it and is going to get it when he gets back out tomorrow for HD. He is back at the [facility] now, released from [facility] 19 precautions and no longer going to [place]. Discussed COVID 19 precautions, risk, infection control, etc.  Informed of CTC process, purpose, future calls, etc.  Ensured to have CTN contact information to be used as needed. Encouraged to call as needed for new issues, questions, etc.  Given the opportunity to ask questions and answered appropriately. CTN to follow up as indicated. Was this a readmission? Recent ED visit within 30 days of admission  Patient stated reason for admission: \"I couldn't breathe right\"  Patients top risk factors for readmission: medical condition    Care Transition Nurse (CTN) contacted the patient by telephone to perform post hospital discharge assessment.  Verified name and  with patient as identifiers. Provided introduction to self, and explanation of the CTN role. CTN reviewed discharge instructions, medical action plan and red flags with patient who verbalized understanding. Patient given an opportunity to ask questions and does not have any further questions or concerns at this time. Were discharge instructions available to patient? Yes. Reviewed appropriate site of care based on symptoms and resources available to patient including: PCP, Specialist, Urgent care clinics, [facility] [place] and When to call 911. The patient agrees to contact the PCP office for questions related to their healthcare. Medication reconciliation was performed with patient, who verbalizes understanding of administration of home medications. Advised obtaining a 90-day supply of all daily and as-needed medications. Covid Risk Education    Patient has following risk factors of: chronic kidney disease. Education provided regarding infection prevention, and signs and symptoms of COVID-19 and when to seek medical attention with patient who verbalized understanding. Discussed exposure protocols and quarantine From CDC: Are you at higher risk for severe illness?   and given an opportunity for questions and concerns. The patient agrees to contact the COVID-19 hotline 160-116-2820 or PCP office for questions related to COVID-19. For more information on steps you can take to protect yourself, see CDC's How to Protect Yourself     Patient/family/caregiver given information for Get[employer] Loop and agrees to enroll no  Patient's preferred e-mail: declines  Patient's preferred phone number: declines    Discussed follow-up appointments. Plan for follow-up call in 5-7 days based on severity of symptoms and risk factors.   Plan for next call: - disease process mgmt, symptom mgmt, diet/hydration, pain control needs, medication mgmt, activity level, home safety needs, infection control, fall precautions, seeking medical attention, who/when to call prn any needs, etc.    CTN provided contact information for future needs.

## 2021-02-19 RX ORDER — FERROUS SULFATE 325(65) MG
TABLET ORAL
Qty: 60 TABLET | Refills: 3 | Status: SHIPPED | OUTPATIENT
Start: 2021-02-19 | End: 2021-06-27

## 2021-02-19 NOTE — TELEPHONE ENCOUNTER
Bobbie Simmonds called to request a refill on his medication.       Last office visit : 2/8/2021   Next office visit : 6/9/2021     Requested Prescriptions     Signed Prescriptions Disp Refills    ferrous sulfate (IRON 325) 325 (65 Fe) MG tablet 60 tablet 3     Sig: TAKE ONE TABLET TWO TIMES A DAY WITH MEALS     Authorizing Provider: Eze Sequeira     Ordering User: Andrez Buitrago MA

## 2021-02-24 ENCOUNTER — CARE COORDINATION (OUTPATIENT)
Dept: CASE MANAGEMENT | Age: 51
End: 2021-02-24

## 2021-02-24 NOTE — CARE COORDINATION
[facility] Transitions Follow Up Call    2021    Patient: Debbora Aschoff  Patient : 1970   MRN: [id number]  Reason for Admission:   Discharge Date: 21 RARS: Readmission Risk Score: 20         Spoke with: [address] Transitions Subsequent and Final Call    Subsequent and Final Calls  Do you have any ongoing symptoms?: No  Have your medications changed?: No  Do you have any questions related to your medications?: No  Do you currently have any active services?: No  Are you currently active with any services?: Home Health  Do you have any needs or concerns that I can assist you with?: No  Identified Barriers: None  Care Transitions Interventions  Other Interventions: Follow Up : Spoke with patient today for follow up Covid call. He says he is doing well. Says he wears his oxygen prn, has been wearing it for the last several days at 2 LPM and reading in the 90's . No issues with dialysis. He is back to his normal dialysis clinic. No problems or concerns today. Appetite is good, normal BM's. Encourage to call with prn needs or problems. If doing well at next call will discharge.    Future Appointments   Date Time Provider Hakeem Thomas   2021  8:45 AM MD LINDA Florian [facility]   5619 17:42 AM [facility]

## 2021-03-03 ENCOUNTER — CARE COORDINATION (OUTPATIENT)
Dept: CASE MANAGEMENT | Age: 51
End: 2021-03-03

## 2021-03-03 NOTE — CARE COORDINATION
[facility] Transitions Follow Up Call    3/3/2021    Patient: Davi Amaya  Patient : 1970   MRN: [id number]  Discharge Date: 21 RARS: Readmission Risk Score: 21         Spoke with: N/A    Care Transitions Subsequent and Final Call    Subsequent and Final Calls  Are you currently active with any services?: Home Health  Care Transitions Interventions  Other Interventions: Follow Up  Future Appointments   Date Time Provider Hakeem Thomas   2021  8:45 AM MD LINDA Godoy [facility]   3/0/8726 94:06 AM SOSA Fisher [facility]     Attempted to make contact with patient/caregiver for a routine follow up call without success. Unable to leave a message regarding intent of call and call back information. Call went to an unidentifiable voice mail.       Celestine Howe RN

## 2021-06-09 ENCOUNTER — OFFICE VISIT (OUTPATIENT)
Dept: PRIMARY CARE CLINIC | Age: 51
End: 2021-06-09
Payer: COMMERCIAL

## 2021-06-09 VITALS
RESPIRATION RATE: 14 BRPM | SYSTOLIC BLOOD PRESSURE: 126 MMHG | OXYGEN SATURATION: 98 % | WEIGHT: 246.8 LBS | TEMPERATURE: 97.3 F | HEIGHT: 68 IN | BODY MASS INDEX: 37.41 KG/M2 | HEART RATE: 60 BPM | DIASTOLIC BLOOD PRESSURE: 74 MMHG

## 2021-06-09 DIAGNOSIS — E11.9 TYPE 2 DIABETES MELLITUS WITHOUT COMPLICATION, WITHOUT LONG-TERM CURRENT USE OF INSULIN (HCC): Primary | ICD-10-CM

## 2021-06-09 DIAGNOSIS — Z00.00 ROUTINE GENERAL MEDICAL EXAMINATION AT A HEALTH CARE FACILITY: ICD-10-CM

## 2021-06-09 LAB — HBA1C MFR BLD: 7.6 %

## 2021-06-09 PROCEDURE — 83036 HEMOGLOBIN GLYCOSYLATED A1C: CPT | Performed by: NURSE PRACTITIONER

## 2021-06-09 PROCEDURE — 3051F HG A1C>EQUAL 7.0%<8.0%: CPT | Performed by: NURSE PRACTITIONER

## 2021-06-09 PROCEDURE — 99497 ADVNCD CARE PLAN 30 MIN: CPT | Performed by: NURSE PRACTITIONER

## 2021-06-09 PROCEDURE — 3017F COLORECTAL CA SCREEN DOC REV: CPT | Performed by: NURSE PRACTITIONER

## 2021-06-09 PROCEDURE — G0438 PPPS, INITIAL VISIT: HCPCS | Performed by: NURSE PRACTITIONER

## 2021-06-09 PROCEDURE — 99401 PREV MED CNSL INDIV APPRX 15: CPT | Performed by: NURSE PRACTITIONER

## 2021-06-09 ASSESSMENT — PATIENT HEALTH QUESTIONNAIRE - PHQ9
SUM OF ALL RESPONSES TO PHQ QUESTIONS 1-9: 0
1. LITTLE INTEREST OR PLEASURE IN DOING THINGS: 0
2. FEELING DOWN, DEPRESSED OR HOPELESS: 0
SUM OF ALL RESPONSES TO PHQ9 QUESTIONS 1 & 2: 0

## 2021-06-09 ASSESSMENT — LIFESTYLE VARIABLES: HOW OFTEN DO YOU HAVE A DRINK CONTAINING ALCOHOL: 0

## 2021-06-09 NOTE — PROGRESS NOTES
Medicare Annual Wellness Visit  Name: Gordy Rebolledo Date: 2021   MRN: [id number] Sex: Male   Age: 45 y.o. Ethnicity: Non-/Non    : 1970 Race: Black      Marycruz Garcia is here for Medicare AWV (doing well; not having any issues at this time) and Diabetes (bood sugars are doing well)    Screenings for behavioral, psychosocial and functional/safety risks, and cognitive dysfunction are all negative except as indicated below. These results, as well as other patient data from the [address] form, are documented in Flowsheets linked to this Encounter. Allergies   Allergen Reactions    Banana Anaphylaxis, Shortness Of Breath and Swelling    Atorvastatin Other (See Comments)     Chest pain, no muslce pains elsewhere    Lisinopril Other (See Comments)     Cough       Prior to Visit Medications    Medication Sig Taking?  Authorizing Provider   ferrous sulfate (IRON 325) 325 (65 Fe) MG tablet TAKE ONE TABLET TWO TIMES A DAY WITH MEALS Yes Richard Jolley MD   metoprolol tartrate (LOPRESSOR) 25 MG tablet TAKE ONE TABLET TWO TIMES A DAY Yes SOSA Paige   hydrALAZINE (APRESOLINE) 50 MG tablet TAKE ONE TABLET EVERY 8 HOURS Yes SOSA Paige   cloNIDine (CATAPRES) 0.1 MG tablet TAKE ONE TABLET TWO TIMES A DAY Yes SOSA Paige   NIFEdipine (ADALAT CC) 60 MG extended release tablet TAKE ONE TABLET TWO TIMES A DAY Yes SOSA Paige   apixaban (ELIQUIS) 2.5 MG TABS tablet Take 1 tablet by mouth 2 times daily Yes SOSA Paige   simvastatin (ZOCOR) 10 MG tablet Take 1 tablet by mouth nightly Yes SOSA Paige   sevelamer (RENVELA) 800 MG tablet Take 800 mg by mouth 3 times daily (with meals) Indications: takes 2 tablets tid with meals  Yes Historical Provider, MD   VITAMIN D PO Take 5,000 Units by mouth 2 times daily  Yes Historical Provider, MD   Cyanocobalamin (VITAMIN B 12 PO) Take 1 tablet by mouth daily  Yes Historical Provider, MD   calcium carbonate (OYSTER SHELL CALCIUM 500 MG) 1250 (500 Ca) MG tablet Take 1 tablet by mouth daily Yes Historical Provider, MD   albuterol sulfate  (90 BASE) MCG/ACT inhaler Inhale 2 puffs into the lungs every 6 hours as needed for Wheezing Yes Radha Whitaker MD       Past Medical History:   Diagnosis Date    Asthma     [facility]     Diabetes mellitus ([facility]     [facility]    Erectile dysfunction     Hemodialysis patient ([facility]     [facility]th[facility]    History of blood transfusion     Hypertension     Obesity     Palliative care patient 01/25/2021       Past Surgical History:   Procedure Laterality Date    COLONOSCOPY N/A 12/05/2019    Dr Carmen Sims, internal hemorrhoids-Grade 1-2-HP, 3 yr recall    DIALYSIS FISTULA CREATION Left 06/26/2020    LEFT BRACHIAL / CEPHALIC AV FISTULA performed by Maritza Altamirano MD at [address] [place] [address] [address]      wbh - removal of first cath due to infection and placement of new one    TUNNELED [facility]EN[facility]HE[facility]AC[facility]    VASCULAR SURGERY  11/11/2020    [facility]  Removal of tunneled dialysis catheter left internal jugular vein       Family History   Problem Relation Age of Onset    Cancer Father         prostate    Kidney Disease Brother        CareTeam (Including outside providers/suppliers regularly involved in providing care):   Patient Care Team:  Radha Whitaker MD as PCP - General (Family Medicine)  Radha Whitaker MD as PCP - [address] [place] [address] Provider  Brent Bolivar RN as Care Transitions Nurse  Kaylene Sanders RN as Care Transitions Nurse    Wt Readings from Last 3 Encounters:   06/09/21 246 lb 12.8 oz (111.9 kg)   02/16/21 230 lb (104.3 kg)   02/16/21 230 lb (104.3 kg)     Vitals:    06/09/21 1041   BP: 126/74   Pulse: 60   Resp: 14   Temp: 97.3 °F (36.3 °C)   TempSrc: Temporal   SpO2: 98%   Weight: 246 lb 12.8 oz (111.9 kg)   Height: 5' 8\" (1.727 m)     Body mass index is 37.53 kg/m². Based upon direct observation of the patient, evaluation of cognition reveals recent and remote memory intact. General Appearance: alert and oriented to person, place and time, well developed and well- nourished, in no acute distress  Skin: warm and dry, no rash or erythema  Head: normocephalic and atraumatic  Eyes: pupils equal, round, and reactive to light, extraocular eye movements intact, conjunctivae normal  ENT: tympanic membrane, external ear and ear canal normal bilaterally, nose without deformity, nasal mucosa and turbinates normal without polyps  Neck: supple and non-tender without mass, no thyromegaly or thyroid nodules, no cervical lymphadenopathy  Pulmonary/Chest: clear to auscultation bilaterally- no wheezes, rales or rhonchi, normal air movement, no respiratory distress  Cardiovascular: normal rate, regular rhythm, normal S1 and S2, no murmurs, rubs, clicks, or gallops, distal pulses intact, no carotid bruits  Abdomen: soft, non-tender, non-distended, normal bowel sounds, no masses or organomegaly  Extremities: no cyanosis, clubbing or edema  Musculoskeletal: normal range of motion, no joint swelling, deformity or tenderness  Neurologic: reflexes normal and symmetric, no cranial nerve deficit, gait, coordination and speech normal    Patient's complete Health Risk Assessment and screening values have been reviewed and are found in Flowsheets. The following problems were reviewed today and where indicated follow up appointments were made and/or referrals ordered. Positive Risk Factor Screenings with Interventions:          General Health and ACP:  General  In general, how would you say your health is?: Very Good  In the past 7 days, have you experienced any of the following?  New or Increased Pain, New or Increased Fatigue, Loneliness, Social Isolation, Stress or Anger?: None of These  Do you get the social and emotional support that you need?: Yes  Do you have a Living Will?: (!) No  Advance Directives     Power of  Living Will ACP-Advance Directive ACP-Power of     Not on File Filed on 09/24/17 Filed Not on File      General Health Risk Interventions:  · No Living Will: [address] addressed with patient today    Health Habits/Nutrition:  Health Habits/Nutrition  Do you exercise for at least 20 minutes 2-3 times per week?: Yes  Have you lost any weight without trying in the past 3 months?: No  Do you eat only one meal per day?: No  Have you seen the dentist within the past year?: (!) No  Body mass index: (!) 37.52  Health Habits/Nutrition Interventions:  · Dental exam overdue:  patient encouraged to make appointment with his/her dentist       Personalized Preventive Plan   Current Health Maintenance Status  Immunization History   Administered Date(s) Administered    Hepatitis B vaccine 04/07/2020, 07/14/2020, 08/04/2020, 09/08/2020, 01/07/2021    Influenza Virus Vaccine 10/21/2019    Influenza, MDCK Quadv, with preserv IM (Flucelvax 4 yrs and older) 10/05/2019    Pneumococcal Conjugate 13-valent ([id number]) 02/06/2020    Pneumococcal Polysaccharide ([id number]) 05/10/2017, 04/07/2020        Health Maintenance   Topic Date Due    COVID-19 Vaccine (1) Never done    Hepatitis B vaccine (1 of 3 - Risk Recombivax 3-dose series) 11/15/1988    DTaP/Tdap/Td vaccine (1 - Tdap) Never done    Shingles Vaccine (1 of 2) Never done   [employer] Visit (AWV)  Never done    Flu vaccine (Season Ended) 09/01/2021    PSA counseling  11/24/2021    A1C test (Diabetic or Prediabetic)  06/09/2022    Colon cancer screen colonoscopy  12/05/2029    Pneumococcal 0-64 years Vaccine (4 of 4) 11/15/2034    Hepatitis A vaccine  Aged Out    Hib vaccine  Aged Out    Meningococcal (ACWY) vaccine  Aged Out     Recommendations for [employer] Due: see orders and patient instructions/AVS.  .   Recommended screening schedule for the next 5-10 years is provided to the patient in written form: see Patient Instructions/AVS.    Leona Pratt was seen today for medicare awv and diabetes. Diagnoses and all orders for this visit:    Type 2 diabetes mellitus without complication, without long-term current use of insulin (HCC)  -     POCT glycosylated hemoglobin (Hb A1C)                   Advance Care Planning   Advanced Care Planning: Discussed the patients choices for care and treatment in case of a health event that adversely affects decision-making abilities. Also discussed the patients long-term treatment options. Reviewed the process of designating a Health Care Surrogate as defined by the [address]. Reviewed the [facility] Will Directive process and the kinds of life-sustaining treatments the patient would like to receive should they become terminally ill or permanently unconscious. Explained the state requirement to complete the forms in the presence of two eligible witnesses OR in the presence of a . Patient was asked to provide a copy of the signed forms to the practice office.   Time spent (minutes): 15

## 2021-06-09 NOTE — PATIENT INSTRUCTIONS
We are committed to providing you with the best care possible. In order to help us achieve these goals please remember to bring all medications, herbal products, and over the counter supplements with you to each visit. If your provider has ordered testing for you, please be sure to follow up with our office if you have not received results within 7 days after the testing took place. *If you receive a survey after visiting one of our offices, please take time to share your experience concerning your physician office visit. These surveys are confidential and no health information about you is shared. We are eager to improve for you and we are counting on your feedback to help make that happen. Personalized Preventive Plan for Vivien Chinchilla - 6/9/2021  Medicare offers a range of preventive health benefits. Some of the tests and screenings are paid in full while other may be subject to a deductible, co-insurance, and/or copay. Some of these benefits include a comprehensive review of your medical history including lifestyle, illnesses that may run in your family, and various assessments and screenings as appropriate. After reviewing your medical record and screening and assessments performed today your provider may have ordered immunizations, labs, imaging, and/or referrals for you. A list of these orders (if applicable) as well as your Preventive Care list are included within your After Visit Summary for your review. Other Preventive Recommendations:    · A preventive eye exam performed by an eye specialist is recommended every 1-2 years to screen for glaucoma; cataracts, macular degeneration, and other eye disorders. · A preventive dental visit is recommended every 6 months. · Try to get at least 150 minutes of exercise per week or 10,000 steps per day on a pedometer . · Order or download the FREE \"Exercise & Physical Activity: Your Everyday Guide\" from [employer].  Call 1-416.360.3301 or search The [employer] on [address] [place] [address]. · You need 1737-7116 mg of calcium and 4375-2290 IU of vitamin D per day. It is possible to meet your calcium requirement with diet alone, but a vitamin D supplement is usually necessary to meet this goal.  · When exposed to the sun, use a sunscreen that protects against both UVA and UVB radiation with an SPF of 30 or greater. Reapply every 2 to 3 hours or after sweating, drying off with a towel, or swimming. · Always wear a seat belt when traveling in a car. Always wear a helmet when riding a bicycle or motorcycle.

## 2021-06-18 DIAGNOSIS — R97.20 ELEVATED PSA: ICD-10-CM

## 2021-06-18 LAB — PROSTATE SPECIFIC ANTIGEN: 2.96 NG/ML (ref 0–4)

## 2021-06-21 ENCOUNTER — OFFICE VISIT (OUTPATIENT)
Dept: UROLOGY | Age: 51
End: 2021-06-21
Payer: MEDICARE

## 2021-06-21 VITALS — WEIGHT: 249 LBS | BODY MASS INDEX: 37.74 KG/M2 | TEMPERATURE: 97.7 F | HEIGHT: 68 IN

## 2021-06-21 DIAGNOSIS — N28.1 HYPERDENSE RENAL CYST: ICD-10-CM

## 2021-06-21 DIAGNOSIS — R97.20 ELEVATED PSA: Primary | ICD-10-CM

## 2021-06-21 PROCEDURE — G8417 CALC BMI ABV UP PARAM F/U: HCPCS | Performed by: UROLOGY

## 2021-06-21 PROCEDURE — G8427 DOCREV CUR MEDS BY ELIG CLIN: HCPCS | Performed by: UROLOGY

## 2021-06-21 PROCEDURE — 99214 OFFICE O/P EST MOD 30 MIN: CPT | Performed by: UROLOGY

## 2021-06-21 PROCEDURE — 3017F COLORECTAL CA SCREEN DOC REV: CPT | Performed by: UROLOGY

## 2021-06-21 PROCEDURE — 1036F TOBACCO NON-USER: CPT | Performed by: UROLOGY

## 2021-06-21 ASSESSMENT — ENCOUNTER SYMPTOMS
COUGH: 0
NAUSEA: 0
VOMITING: 0
BACK PAIN: 0
EYE PAIN: 0
WHEEZING: 0
SORE THROAT: 0

## 2021-06-21 NOTE — PROGRESS NOTES
Jennifer Ruiz is a 45 y.o. male who presents today   Chief Complaint   Patient presents with    Follow-up     I am here for my 3 month follow up for elevated psa and ED     Elevated PSA:  Patient is here today for history of an elevated PSA. His last several PSA values are as follows:  Lab Results   Component Value Date    PSA 2.96 06/18/2021    PSA 5.35 (H) 11/24/2020     Overall the PSA level is: decreased  Recent history of urinary tract infection/prostatitis? no  Previous prostate biopsy? yes -1/4/2021 for PSA of 5.35 prostate volume 22 g  Lower urinary tract symptoms: nocturia, 1 times per night    Renal Cyst:  Patient is here today for a renal cyst which was first noted approximately 9 month(s) ago. The mass(es) is(are): left   Overall the size of the cyst(s) are: 1.4 cm in diameter. Flank pain? no  Hematuria? None currently  Bosniak renal cyst classification: Type 2  Patient has a known hyperdense cyst measuring 14 mm located in the upper pole the left kidney. This was discovered on a CT scan done 9/16/2020 for evaluation of microscopic hematuria. He is not had a follow-up since December. He is on dialysis for end-stage renal disease.   He denies any flank pain or hematuria    Past Medical History:   Diagnosis Date    Asthma     CHF (congestive heart failure) (HCC)     Diabetes mellitus ([facility]     tues[facility]thur-sat at [facility]    Erectile dysfunction     Hemodialysis patient ([facility] [address] [facility]     t,th,s at [facility]    History of blood transfusion     Hypertension     Obesity     Palliative care patient 01/25/2021       Past Surgical History:   Procedure Laterality Date    COLONOSCOPY N/A 12/05/2019    Dr Ananya Mcknight, internal hemorrhoids-Grade 1-2-HP, 3 yr recall    DIALYSIS FISTULA CREATION Left 06/26/2020    LEFT BRACHIAL / CEPHALIC AV FISTULA performed by Dakotah Chavira MD at [facility] [address] [facility]      wbh - removal of first cath due to infection and placement of tobacco: Never Used   Vaping Use    Vaping Use: Never used   Substance and Sexual Activity    Alcohol use: Not Currently     Comment: occ    Drug use: No    Sexual activity: None   Other Topics Concern    None   Social History Narrative    None     Social Determinants of Health     Financial Resource Strain:     Difficulty of Paying Living Expenses:    Food Insecurity:     Worried About Running Out of Food in the Last Year:     Ran Out of Food in the Last Year:    Transportation Needs:     Lack of Transportation (Medical):  Lack of Transportation (Non-Medical):    Physical Activity:     Days of Exercise per Week:     Minutes of Exercise per Session:    Stress:     Feeling of Stress :    Social Connections:     Frequency of Communication with Friends and Family:     Frequency of Social Gatherings with Friends and Family:     Attends Holiness Services:     Active Member of Clubs or Organizations:     Attends Club or Organization Meetings:     Marital Status:    Intimate Partner Violence:     Fear of Current or Ex-Partner:     Emotionally Abused:     Physically Abused:     Sexually Abused:        Family History   Problem Relation Age of Onset    Cancer Father         prostate    Kidney Disease Brother        REVIEW OF SYSTEMS:  Review of Systems   Constitutional: Negative for chills and fever. HENT: Negative for congestion and sore throat. Eyes: Negative for pain and visual disturbance. Respiratory: Negative for cough and wheezing. Cardiovascular: Negative for chest pain and palpitations. Gastrointestinal: Negative for nausea and vomiting. Endocrine: Negative for polyphagia and polyuria. Genitourinary: Negative for decreased urine volume, difficulty urinating, discharge, dysuria, enuresis, flank pain, frequency, genital sores, hematuria, penile pain, penile swelling, scrotal swelling, testicular pain and urgency. Patient on dialysis;  He is not able to leave a urine sample for today's visit. Musculoskeletal: Negative for back pain and neck pain. Skin: Negative for rash and wound. Allergic/Immunologic: Negative for environmental allergies and food allergies. Neurological: Negative for dizziness and headaches. Hematological: Negative for adenopathy. Does not bruise/bleed easily. Psychiatric/Behavioral: Negative for confusion and hallucinations. All other systems reviewed and are negative. PHYSICAL EXAM:  Temp 97.7 °F (36.5 °C) (Temporal)   Ht 5' 8\" (1.727 m)   Wt 249 lb (112.9 kg)   BMI 37.86 kg/m²   Physical Exam  Constitutional:       General: He is not in acute distress. Appearance: Normal appearance. He is well-developed. HENT:      Head: Normocephalic and atraumatic. Nose: Nose normal.   Eyes:      General: No scleral icterus. Conjunctiva/sclera: Conjunctivae normal.      Pupils: Pupils are equal, round, and reactive to light. Neck:      Trachea: No tracheal deviation. Cardiovascular:      Rate and Rhythm: Normal rate and regular rhythm. Pulmonary:      Effort: Pulmonary effort is normal. No respiratory distress. Breath sounds: No stridor. Abdominal:      General: There is no distension. Palpations: Abdomen is soft. There is no mass. Tenderness: There is no abdominal tenderness. Musculoskeletal:         General: No tenderness. Normal range of motion. Cervical back: Normal range of motion and neck supple. Lymphadenopathy:      Cervical: No cervical adenopathy. Skin:     General: Skin is warm and dry. Findings: No erythema. Neurological:      Mental Status: He is alert and oriented to person, place, and time. Psychiatric:         Behavior: Behavior normal.         Judgment: Judgment normal.             DATA:    No results found for this visit on 06/21/21. Lab Results   Component Value Date    PSA 2.96 06/18/2021    PSA 5.35 (H) 11/24/2020     1.  Elevated PSA  PSA is down from previous we will continue to monitor he will need a PSA in approximately 6 months previous negative biopsy    2. Hyperdense renal cyst,left  Needs follow-up to confirm stability of hyperdense cyst in the left kidney. - CT ABDOMEN PELVIS W WO CONTRAST Additional Contrast? Radiologist Recommendation (renal mass protocol); Future      Orders Placed This Encounter   Procedures    CT ABDOMEN PELVIS W WO CONTRAST Additional Contrast? Radiologist Recommendation (renal mass protocol)     Renal mass protocol     Standing Status:   Future     Standing Expiration Date:   6/21/2022     Order Specific Question:   Additional Contrast?     Answer:   Radiologist Recommendation     Comments:   renal mass protocol     Order Specific Question:   Reason for exam:     Answer:   renal mass f/u left hyperdense cyst. On dialysis ok to give contrast        Return in about 1 month (around 7/21/2021) for office visit after xray study. All information inputted into the note by the MA to include chief complaint, past medical history, past surgical history, medications, allergies, social and family history and review of systems has been reviewed and updated as needed by me. EMR Dragon/transcription disclaimer: Much of this documentt is electronic  transcription/translation of spoken language to printed text. The  electronic translation of spoken language may be erroneous, or at times,  nonsensical words or phrases may be inadvertently transcribed.  Although I  have reviewed the document for such errors, some may still exist.

## 2021-06-26 DIAGNOSIS — I10 ESSENTIAL HYPERTENSION: ICD-10-CM

## 2021-06-27 RX ORDER — CLONIDINE HYDROCHLORIDE 0.1 MG/1
TABLET ORAL
Qty: 60 TABLET | Refills: 0 | Status: SHIPPED | OUTPATIENT
Start: 2021-06-27 | End: 2021-08-02

## 2021-06-27 RX ORDER — PNV NO.95/FERROUS FUM/FOLIC AC 28MG-0.8MG
TABLET ORAL
Qty: 60 TABLET | Refills: 0 | Status: SHIPPED | OUTPATIENT
Start: 2021-06-27 | End: 2021-08-02

## 2021-06-27 RX ORDER — NIFEDIPINE 60 MG/1
TABLET, FILM COATED, EXTENDED RELEASE ORAL
Qty: 60 TABLET | Refills: 0 | Status: SHIPPED | OUTPATIENT
Start: 2021-06-27 | End: 2021-08-02

## 2021-07-07 ENCOUNTER — OFFICE VISIT (OUTPATIENT)
Dept: PRIMARY CARE CLINIC | Age: 51
End: 2021-07-07
Payer: COMMERCIAL

## 2021-07-07 VITALS
HEIGHT: 68 IN | WEIGHT: 241.8 LBS | OXYGEN SATURATION: 98 % | TEMPERATURE: 97 F | HEART RATE: 69 BPM | DIASTOLIC BLOOD PRESSURE: 84 MMHG | RESPIRATION RATE: 14 BRPM | SYSTOLIC BLOOD PRESSURE: 136 MMHG | BODY MASS INDEX: 36.65 KG/M2

## 2021-07-07 DIAGNOSIS — E11.22 TYPE 2 DIABETES MELLITUS WITH CHRONIC KIDNEY DISEASE ON CHRONIC DIALYSIS, WITHOUT LONG-TERM CURRENT USE OF INSULIN (HCC): Primary | Chronic | ICD-10-CM

## 2021-07-07 DIAGNOSIS — N18.6 TYPE 2 DIABETES MELLITUS WITH CHRONIC KIDNEY DISEASE ON CHRONIC DIALYSIS, WITHOUT LONG-TERM CURRENT USE OF INSULIN (HCC): Primary | Chronic | ICD-10-CM

## 2021-07-07 DIAGNOSIS — Z99.2 TYPE 2 DIABETES MELLITUS WITH CHRONIC KIDNEY DISEASE ON CHRONIC DIALYSIS, WITHOUT LONG-TERM CURRENT USE OF INSULIN (HCC): Primary | Chronic | ICD-10-CM

## 2021-07-07 PROCEDURE — 2022F DILAT RTA XM EVC RTNOPTHY: CPT | Performed by: NURSE PRACTITIONER

## 2021-07-07 PROCEDURE — G8427 DOCREV CUR MEDS BY ELIG CLIN: HCPCS | Performed by: NURSE PRACTITIONER

## 2021-07-07 PROCEDURE — 3051F HG A1C>EQUAL 7.0%<8.0%: CPT | Performed by: NURSE PRACTITIONER

## 2021-07-07 PROCEDURE — 1036F TOBACCO NON-USER: CPT | Performed by: NURSE PRACTITIONER

## 2021-07-07 PROCEDURE — 99213 OFFICE O/P EST LOW 20 MIN: CPT | Performed by: NURSE PRACTITIONER

## 2021-07-07 PROCEDURE — 3017F COLORECTAL CA SCREEN DOC REV: CPT | Performed by: NURSE PRACTITIONER

## 2021-07-07 PROCEDURE — G8417 CALC BMI ABV UP PARAM F/U: HCPCS | Performed by: NURSE PRACTITIONER

## 2021-07-07 ASSESSMENT — ENCOUNTER SYMPTOMS
BACK PAIN: 0
SHORTNESS OF BREATH: 0
VOICE CHANGE: 0
PHOTOPHOBIA: 0
VOMITING: 0
COLOR CHANGE: 0
RHINORRHEA: 0
NAUSEA: 0
COUGH: 0

## 2021-07-07 NOTE — PROGRESS NOTES
[facility] CARE  [address] [address] [id number] [address] [address]  Dept: [phone]  Dept Fax: [phone]  Loc: [phone]    Martin Olson is a 45 y.o. male who presents today for his medical conditions/complaints as noted below. Martin Olson is c/o of Diabetes (4 week follow up; saw the dietician and has changed his diet around)        HPI:     HPI   Chief Complaint   Patient presents with    Diabetes     4 week follow up; saw the dietician and has changed his diet around     Patient presents today for diabetes follow-up. He reports that he has had improvement in diet since meeting with dietician. Blood sugar has been in much better range; the highest it has been was 141 but typically in low 100s. He has lost 8lbs and has changed diet. He is feeling much better and walking daily. Past Medical History:   Diagnosis Date    Asthma     CHF (congestive heart failure) (HCC)     Diabetes mellitus ([facility] [address]     tues-salmaur-sat at [facility]    Erectile dysfunction     Hemodialysis patient ([facility] [address]     t,th,s at [facility]    History of blood transfusion     Hypertension     Obesity     Palliative care patient 01/25/2021      Past Surgical History:   Procedure Laterality Date    COLONOSCOPY N/A 12/05/2019    Dr Denver France, internal hemorrhoids-Grade 1-2-HP, 3 yr recall    DIALYSIS FISTULA CREATION Left 06/26/2020    LEFT BRACHIAL / CEPHALIC AV FISTULA performed by Uche Becker MD at [facility] [address]      [facility] - removal of first cath due to infection and placement of new one    TUNNELED VENOUS CATHETER PLACEMENT  01/2020    [facility]    VASCULAR SURGERY  11/11/2020    [facility].  Removal of tunneled dialysis catheter left internal jugular vein       Vitals 7/7/2021 6/21/2021 6/9/2021 2/16/2021 2/16/2021 9/91/6466   SYSTOLIC 690 - 355 - - -   DIASTOLIC 84 - 74 - - -   Pulse 69 - 60 - - -   Temp 97 97.7 97.3 - - -   Resp 14 - 14 26 35 - SpO2 98 - 98 86 90 -   Weight 241 lb 12.8 oz 249 lb 246 lb 12.8 oz - - -   Height 5' 8\" 5' 8\" 5' 8\" - - -   Body mass index 36.76 kg/m2 37.86 kg/m2 37.52 kg/m2 - - -   Pain Level - - - - - 8   Some recent data might be hidden       Family History   Problem Relation Age of Onset    Cancer Father         prostate    Kidney Disease Brother        Social History     Tobacco Use    Smoking status: Never Smoker    Smokeless tobacco: Never Used   Substance Use Topics    Alcohol use: Not Currently     Comment: occ      Current Outpatient Medications on File Prior to Visit   Medication Sig Dispense Refill    metoprolol tartrate (LOPRESSOR) 25 MG tablet Take 1 tablet by mouth twice daily 60 tablet 0    cloNIDine (CATAPRES) 0.1 MG tablet Take 1 tablet by mouth twice daily 60 tablet 0    Ferrous Sulfate (IRON) 325 (65 Fe) MG TABS TAKE 1 TABLET BY MOUTH TWICE DAILY WITH MEALS 60 tablet 0    NIFEdipine (ADALAT CC) 60 MG extended release tablet Take 1 tablet by mouth twice daily 60 tablet 0    hydrALAZINE (APRESOLINE) 50 MG tablet TAKE ONE TABLET EVERY 8 HOURS 90 tablet 3    apixaban (ELIQUIS) 2.5 MG TABS tablet Take 1 tablet by mouth 2 times daily 60 tablet 0    simvastatin (ZOCOR) 10 MG tablet Take 1 tablet by mouth nightly 90 tablet 1    sevelamer (RENVELA) 800 MG tablet Take 800 mg by mouth 3 times daily (with meals) Indications: takes 2 tablets tid with meals       VITAMIN D PO Take 5,000 Units by mouth 2 times daily       Cyanocobalamin (VITAMIN B 12 PO) Take 1 tablet by mouth daily       calcium carbonate (OYSTER SHELL CALCIUM 500 MG) 1250 (500 Ca) MG tablet Take 1 tablet by mouth daily      albuterol sulfate  (90 BASE) MCG/ACT inhaler Inhale 2 puffs into the lungs every 6 hours as needed for Wheezing 1 Inhaler 2     No current facility-administered medications on file prior to visit.      Allergies   Allergen Reactions    Banana Anaphylaxis, Shortness Of Breath and Swelling    Atorvastatin Other (See Comments)     Chest pain, no muslce pains elsewhere    Lisinopril Other (See Comments)     Cough       Health Maintenance   Topic Date Due    COVID-19 Vaccine (1) Never done    Hepatitis B vaccine (1 of 3 - Risk Recombivax 3-dose series) 11/15/1988    DTaP/Tdap/Td vaccine (1 - Tdap) Never done    Shingles Vaccine (1 of 2) Never done    Flu vaccine (1) 09/01/2021    A1C test (Diabetic or Prediabetic)  06/09/2022    Annual Wellness Visit (AWV)  06/10/2022    Colon cancer screen colonoscopy  12/05/2029    Pneumococcal 0-64 years Vaccine (4 of 4 - PPSV23) 11/15/2034    Hepatitis A vaccine  Aged Out    Hib vaccine  Aged Out    Meningococcal (ACWY) vaccine  Aged Out       Subjective:      Review of Systems   Constitutional: Negative for chills and fever. HENT: Negative for ear pain, hearing loss, rhinorrhea and voice change. Eyes: Negative for photophobia and visual disturbance. Respiratory: Negative for cough and shortness of breath. Cardiovascular: Negative for chest pain and palpitations. Gastrointestinal: Negative for nausea and vomiting. Endocrine: Negative. Negative for cold intolerance and heat intolerance. Genitourinary: Negative for difficulty urinating and flank pain. Musculoskeletal: Negative for back pain and neck pain. Skin: Negative for color change and rash. Allergic/Immunologic: Negative for environmental allergies and food allergies. Neurological: Negative for dizziness, speech difficulty and headaches. Hematological: Does not bruise/bleed easily. Psychiatric/Behavioral: Negative for sleep disturbance and suicidal ideas. Objective:     Physical Exam  Vitals and nursing note reviewed. Constitutional:       Appearance: He is well-developed. HENT:      Head: Atraumatic.       Right Ear: External ear normal.      Left Ear: External ear normal.      Nose: Nose normal.   Eyes:      Conjunctiva/sclera: Conjunctivae normal.      Pupils: Pupils are equal, round, and reactive to light. Cardiovascular:      Rate and Rhythm: Normal rate and regular rhythm. Heart sounds: Normal heart sounds, S1 normal and S2 normal.   Pulmonary:      Effort: Pulmonary effort is normal.      Breath sounds: Normal breath sounds. Abdominal:      General: Bowel sounds are normal.      Palpations: Abdomen is soft. Musculoskeletal:         General: Normal range of motion. Cervical back: Normal range of motion and neck supple. Skin:     General: Skin is warm and dry. Neurological:      Mental Status: He is alert and oriented to person, place, and time. Psychiatric:         Behavior: Behavior normal.       /84   Pulse 69   Temp 97 °F (36.1 °C) (Temporal)   Resp 14   Ht 5' 8\" (1.727 m)   Wt 241 lb 12.8 oz (109.7 kg)   SpO2 98%   BMI 36.77 kg/m²     Assessment:       Diagnosis Orders   1. Type 2 diabetes mellitus with chronic kidney disease on chronic dialysis, without long-term current use of insulin ([address]           Plan:     Continue on current diet plan. He is doing great currently and I expect that diabetes will be well-controlled. We will see him back in 2 months for A1C check. Patient given educational materials -see patient instructions. Discussed use, benefit, and side effects of prescribed medications. All patient questions answered. Pt voiced understanding. Reviewed health maintenance. Instructed to continue currentmedications, diet and exercise. Patient agreed with treatment plan. Follow up as directed. MEDICATIONS:  No orders of the defined types were placed in this encounter. ORDERS:  No orders of the defined types were placed in this encounter. Follow-up:  Return in about 2 months (around 9/7/2021). PATIENT INSTRUCTIONS:  Patient Instructions   We are committed to providing you with the best care possible.    In order to help us achieve these goals please remember to bring all medications, herbal products, and over the counter supplements with you to each visit. If your provider has ordered testing for you, please be sure to follow up with our office if you have not received results within 7 days after the testing took place. *If you receive a survey after visiting one of our offices, please take time to share your experience concerning your physician office visit. These surveys are confidential and no health information about you is shared. We are eager to improve for you and we are counting on your feedback to help make that happen. Electronically signed by SOSA Briones on 7/7/2021 at 1:23 PM    EMR Dragon/transcription disclaimer:  Much of thisencounter note is electronic transcription/translation of spoken language to printed texts. The electronic translation of spoken language may be erroneous, or at times, nonsensical words or phrases may be inadvertentlytranscribed.   Although I have reviewed the note for such errors, some may still exist.

## 2021-07-16 ENCOUNTER — HOSPITAL ENCOUNTER (OUTPATIENT)
Dept: CT IMAGING | Age: 51
Discharge: HOME | End: 2021-07-16
Payer: COMMERCIAL

## 2021-07-16 DIAGNOSIS — N28.1 HYPERDENSE RENAL CYST: ICD-10-CM

## 2021-07-16 PROCEDURE — 6360000004 HC RX CONTRAST MEDICATION: Performed by: UROLOGY

## 2021-07-16 PROCEDURE — 74178 CT ABD&PLV WO CNTR FLWD CNTR: CPT

## 2021-07-16 RX ADMIN — IOPAMIDOL 75 ML: 755 INJECTION, SOLUTION INTRAVENOUS at 09:24

## 2021-07-20 ENCOUNTER — TELEPHONE (OUTPATIENT)
Dept: VASCULAR SURGERY | Age: 51
End: 2021-07-20

## 2021-07-22 ENCOUNTER — TELEPHONE (OUTPATIENT)
Dept: VASCULAR SURGERY | Age: 51
End: 2021-07-22

## 2021-07-22 ENCOUNTER — OFFICE VISIT (OUTPATIENT)
Age: 51
End: 2021-07-22

## 2021-07-22 DIAGNOSIS — Z11.59 SCREENING FOR VIRAL DISEASE: Primary | ICD-10-CM

## 2021-07-22 LAB — SARS-COV-2, PCR: NOT DETECTED

## 2021-07-22 PROCEDURE — 99999 PR OFFICE/OUTPT VISIT,PROCEDURE ONLY: CPT | Performed by: NURSE PRACTITIONER

## 2021-07-22 NOTE — TELEPHONE ENCOUNTER
Patient knows to arrive on Monday 7/26/21 at 9:30am to [address], patient is agreeable to this.

## 2021-07-22 NOTE — TELEPHONE ENCOUNTER
I spoke with [address] at [facility] and she made our office aware patient was unable to have a treatment today due to bleeding from buttonholes. They will attempt to treat him Saturday. I scheduled patient for a Fistulogram with Dr. Terra Borja per [address] [facility]. This was scheduled on Monday 7/26/21 at 2:00 pm and arriving at [facility] at 12:00 pm. Patient has been notified with instructions via phone and sent to patient via My Chart. Patient will have COVID 19 test today by noon at [facility] drive thru. Patient verbally understood and will call with any questions or concerns(Please see letters for instructions given).

## 2021-07-23 ENCOUNTER — PREP FOR PROCEDURE (OUTPATIENT)
Dept: VASCULAR SURGERY | Age: 51
End: 2021-07-23

## 2021-07-23 RX ORDER — ASPIRIN 81 MG/1
81 TABLET ORAL ONCE
Status: CANCELLED | OUTPATIENT
Start: 2021-07-23 | End: 2021-07-23

## 2021-07-23 RX ORDER — SODIUM CHLORIDE 0.9 % (FLUSH) 0.9 %
10 SYRINGE (ML) INJECTION PRN
Status: CANCELLED | OUTPATIENT
Start: 2021-07-23

## 2021-07-23 RX ORDER — SODIUM CHLORIDE 9 MG/ML
INJECTION, SOLUTION INTRAVENOUS CONTINUOUS
Status: CANCELLED | OUTPATIENT
Start: 2021-07-23

## 2021-07-23 RX ORDER — SODIUM CHLORIDE 9 MG/ML
25 INJECTION, SOLUTION INTRAVENOUS PRN
Status: CANCELLED | OUTPATIENT
Start: 2021-07-23

## 2021-07-23 RX ORDER — CLONIDINE HYDROCHLORIDE 0.1 MG/1
0.1 TABLET ORAL PRN
Status: CANCELLED | OUTPATIENT
Start: 2021-07-23

## 2021-07-23 NOTE — H&P (VIEW-ONLY)
Patient Care Team:  Greer Starr MD as PCP - General (Family Medicine)  Greer Starr MD as PCP - [facility]  Enriqueta Seo, RN as Care Transitions Nurse  Tyra Abel, RN as Care Transitions Nurse        Gucci Wenceslao 45 y.o. male with a history of renal failure requiring hemodialysis access. Patient currently has excessive post procedure bleeding. Patient Active Problem List   Diagnosis    Mild intermittent asthma without complication    Family history of prostate cancer    Morbid obesity due to excess calories ([address]    Type 2 diabetes mellitus with chronic kidney disease on chronic dialysis, without long-term current use of insulin (HCC)    Chronic diastolic congestive heart failure ([address]    ESRD (end stage renal disease) ([address]    Erectile dysfunction due to arterial insufficiency    COVID-19    Pulmonary edema    Volume overload    Palliative care patient      See attached meds  Allergies:  Banana, Atorvastatin, and Lisinopril  Past Medical History:   Diagnosis Date    Asthma     [facility]) [facility]     Diabetes mellitus ([facility] [address]     tues[facility]thur-sat at [facility]    Erectile dysfunction     Hemodialysis patient ([facility] [address]     t,th,s at [facility]    History of blood transfusion     Hypertension     Obesity     Palliative care patient 01/25/2021      Past Surgical History:   Procedure Laterality Date    COLONOSCOPY N/A 12/05/2019    Dr Lindy Chi, internal hemorrhoids-Grade 1-2-HP, 3 yr recall    DIALYSIS FISTULA CREATION Left 06/26/2020    LEFT BRACHIAL / CEPHALIC AV FISTULA performed by Dallin Patterson MD at [address] TUNNELED VENOUS CATHETER PLACEMENT      [facility] - removal of first cath due to infection and placement of new one    TUNNELED [facility]ENOUS CATHETER PLACEMENT  01/2020    [facility]    VASCULAR SURGERY  11/11/2020    [facility]  Removal of tunneled dialysis catheter left internal jugular vein      Family History   Problem Relation Age of Onset    Cancer Father         prostate    Kidney Disease Brother       Social History     Tobacco Use    Smoking status: Never Smoker    Smokeless tobacco: Never Used   Substance Use Topics    Alcohol use: Not Currently     Comment: occ     PE:  NAD  Pulsatile left brachial cephalic upper arm av fistula    A/P:  ESRD on hemodialysis  Bleeding/pulsatile left upper av fistula        Permit for fistulogram with possible angioplasty/stent  Risks have been reviewed with the patient including but not limited to heart attack, death, CVA, bleeding, nerve injury, infection, steal, pain, and need for further surgery.       _______________________________________________________________________  Signature     Date    Time

## 2021-07-23 NOTE — H&P
Patient Care Team:  Keri Soto MD as PCP - General (Family Medicine)  Keri Soto MD as PCP - [facility] Empaneled Provider  Dena Baer RN as Care Transitions Nurse  Samantha Armijo RN as Care Transitions Nurse        [place] [facility] 45 [place].[facility] male with a history of renal failure requiring hemodialysis access. Patient currently has excessive post procedure bleeding. Patient Active Problem List   Diagnosis    Mild intermittent asthma without complication    Family history of prostate cancer    Morbid obesity due to excess calories ([address]    Type 2 diabetes mellitus with chronic kidney disease on chronic dialysis, without long-term current use of insulin (HCC)    Chronic diastolic congestive heart failure ([address]    ESRD (end stage renal disease) ([address]    Erectile dysfunction due to arterial insufficiency    COVID-19    Pulmonary edema    Volume overload    Palliative care patient      See attached meds  Allergies:  Banana, Atorvastatin, and Lisinopril  Past Medical History:   Diagnosis Date    Asthma     [facility]est[facility]) [facility]     Diabetes mellitus ([address]     [facility]ues[facility]thur[facility]sat at [facility]    Erectile dysfunction     Hemodialysis patient ([address]     t[facility]th,s at [facility]    History of blood transfusion     Hypertension     Obesity     Palliative care patient 01/25/2021      Past Surgical History:   Procedure Laterality Date    COLONOSCOPY N/A 12/05/2019    Dr Sully Hendrix, internal hemorrhoids-Grade 1-2-HP, 3 yr recall    DIALYSIS FISTULA CREATION Left 06/26/2020    LEFT BRACHIAL / CEPHALIC AV FISTULA performed by Bobby Bolaños MD at [address] TUNNELED VENOUS CATHETER PLACEMENT      [facility] - removal of first cath due to infection and placement of new one    TUNNELED VENOUS CATHETER PLACEMENT  01/2020    [facility]    VASCULAR SURGERY  11/11/2020    [facility]  Removal of tunneled dialysis catheter left internal jugular vein      Family History   Problem Relation Age of Onset    Cancer Father         prostate    Kidney Disease Brother       Social History     Tobacco Use    Smoking status: Never Smoker    Smokeless tobacco: Never Used   Substance Use Topics    Alcohol use: Not Currently     Comment: occ     PE:  NAD  Pulsatile left brachial cephalic upper arm av fistula    A/P:  ESRD on hemodialysis  Bleeding/pulsatile left upper av fistula        Permit for fistulogram with possible angioplasty/stent  Risks have been reviewed with the patient including but not limited to heart attack, death, CVA, bleeding, nerve injury, infection, steal, pain, and need for further surgery.       _______________________________________________________________________  Signature     Date    Time

## 2021-07-26 ENCOUNTER — HOSPITAL ENCOUNTER (OUTPATIENT)
Dept: INTERVENTIONAL RADIOLOGY/VASCULAR | Age: 51
Discharge: HOME | End: 2021-07-26
Payer: COMMERCIAL

## 2021-07-26 VITALS
OXYGEN SATURATION: 96 % | DIASTOLIC BLOOD PRESSURE: 96 MMHG | HEART RATE: 53 BPM | TEMPERATURE: 97.3 F | WEIGHT: 241 LBS | HEIGHT: 68 IN | SYSTOLIC BLOOD PRESSURE: 170 MMHG | BODY MASS INDEX: 36.53 KG/M2 | RESPIRATION RATE: 11 BRPM

## 2021-07-26 DIAGNOSIS — T82.590A MALFUNCTION OF ARTERIOVENOUS DIALYSIS FISTULA, INITIAL ENCOUNTER (HCC): ICD-10-CM

## 2021-07-26 DIAGNOSIS — N18.6 ESRD (END STAGE RENAL DISEASE) (HCC): ICD-10-CM

## 2021-07-26 PROCEDURE — C1894 INTRO/SHEATH, NON-LASER: HCPCS

## 2021-07-26 PROCEDURE — 2580000003 HC RX 258: Performed by: NURSE PRACTITIONER

## 2021-07-26 PROCEDURE — 2500000003 HC RX 250 WO HCPCS: Performed by: SURGERY

## 2021-07-26 PROCEDURE — 6370000000 HC RX 637 (ALT 250 FOR IP): Performed by: NURSE PRACTITIONER

## 2021-07-26 PROCEDURE — 99152 MOD SED SAME PHYS/QHP 5/>YRS: CPT | Performed by: SURGERY

## 2021-07-26 PROCEDURE — 6360000002 HC RX W HCPCS: Performed by: SURGERY

## 2021-07-26 PROCEDURE — 36902 INTRO CATH DIALYSIS CIRCUIT: CPT | Performed by: SURGERY

## 2021-07-26 PROCEDURE — 99153 MOD SED SAME PHYS/QHP EA: CPT | Performed by: SURGERY

## 2021-07-26 PROCEDURE — 6360000002 HC RX W HCPCS: Performed by: NURSE PRACTITIONER

## 2021-07-26 RX ORDER — ONDANSETRON 2 MG/ML
4 INJECTION INTRAMUSCULAR; INTRAVENOUS EVERY 8 HOURS PRN
Status: DISCONTINUED | OUTPATIENT
Start: 2021-07-26 | End: 2021-07-28 | Stop reason: HOSPADM

## 2021-07-26 RX ORDER — ASPIRIN 81 MG/1
81 TABLET ORAL ONCE
Status: COMPLETED | OUTPATIENT
Start: 2021-07-26 | End: 2021-07-26

## 2021-07-26 RX ORDER — SODIUM CHLORIDE 9 MG/ML
25 INJECTION, SOLUTION INTRAVENOUS PRN
Status: DISCONTINUED | OUTPATIENT
Start: 2021-07-26 | End: 2021-07-28 | Stop reason: HOSPADM

## 2021-07-26 RX ORDER — HYDROCODONE BITARTRATE AND ACETAMINOPHEN 5; 325 MG/1; MG/1
1 TABLET ORAL EVERY 4 HOURS PRN
Status: DISCONTINUED | OUTPATIENT
Start: 2021-07-26 | End: 2021-07-28 | Stop reason: HOSPADM

## 2021-07-26 RX ORDER — FENTANYL CITRATE 50 UG/ML
INJECTION, SOLUTION INTRAMUSCULAR; INTRAVENOUS
Status: COMPLETED | OUTPATIENT
Start: 2021-07-26 | End: 2021-07-26

## 2021-07-26 RX ORDER — SODIUM CHLORIDE 0.9 % (FLUSH) 0.9 %
10 SYRINGE (ML) INJECTION PRN
Status: DISCONTINUED | OUTPATIENT
Start: 2021-07-26 | End: 2021-07-28 | Stop reason: HOSPADM

## 2021-07-26 RX ORDER — B,C/FERROUS FUM/FA/D3/ZINC OX 8MG-800MCG
1 TABLET ORAL
COMMUNITY
Start: 2021-07-08

## 2021-07-26 RX ORDER — LIDOCAINE HYDROCHLORIDE 20 MG/ML
INJECTION, SOLUTION INFILTRATION; PERINEURAL
Status: COMPLETED | OUTPATIENT
Start: 2021-07-26 | End: 2021-07-26

## 2021-07-26 RX ORDER — HYDROCODONE BITARTRATE AND ACETAMINOPHEN 5; 325 MG/1; MG/1
2 TABLET ORAL EVERY 4 HOURS PRN
Status: DISCONTINUED | OUTPATIENT
Start: 2021-07-26 | End: 2021-07-28 | Stop reason: HOSPADM

## 2021-07-26 RX ORDER — MIDAZOLAM HYDROCHLORIDE 1 MG/ML
INJECTION INTRAMUSCULAR; INTRAVENOUS
Status: COMPLETED | OUTPATIENT
Start: 2021-07-26 | End: 2021-07-26

## 2021-07-26 RX ORDER — SODIUM CHLORIDE 9 MG/ML
INJECTION, SOLUTION INTRAVENOUS CONTINUOUS
Status: DISCONTINUED | OUTPATIENT
Start: 2021-07-26 | End: 2021-07-28 | Stop reason: HOSPADM

## 2021-07-26 RX ORDER — ACETAMINOPHEN 325 MG/1
650 TABLET ORAL EVERY 4 HOURS PRN
Status: DISCONTINUED | OUTPATIENT
Start: 2021-07-26 | End: 2021-07-28 | Stop reason: HOSPADM

## 2021-07-26 RX ORDER — CLONIDINE HYDROCHLORIDE 0.1 MG/1
0.1 TABLET ORAL PRN
Status: DISCONTINUED | OUTPATIENT
Start: 2021-07-26 | End: 2021-07-28 | Stop reason: HOSPADM

## 2021-07-26 RX ORDER — HEPARIN SODIUM 5000 [USP'U]/ML
INJECTION, SOLUTION INTRAVENOUS; SUBCUTANEOUS
Status: COMPLETED | OUTPATIENT
Start: 2021-07-26 | End: 2021-07-26

## 2021-07-26 RX ADMIN — ASPIRIN 81 MG: 81 TABLET, COATED ORAL at 10:51

## 2021-07-26 RX ADMIN — CEFAZOLIN SODIUM 1000 MG: 1 INJECTION, POWDER, FOR SOLUTION INTRAMUSCULAR; INTRAVENOUS at 12:34

## 2021-07-26 RX ADMIN — SODIUM CHLORIDE: 9 INJECTION, SOLUTION INTRAVENOUS at 10:50

## 2021-07-26 RX ADMIN — MIDAZOLAM 1 MG: 1 INJECTION INTRAMUSCULAR; INTRAVENOUS at 12:58

## 2021-07-26 RX ADMIN — LIDOCAINE HYDROCHLORIDE 10 ML: 20 INJECTION, SOLUTION INFILTRATION; PERINEURAL at 12:53

## 2021-07-26 RX ADMIN — FENTANYL CITRATE 25 MCG: 50 INJECTION, SOLUTION INTRAMUSCULAR; INTRAVENOUS at 12:51

## 2021-07-26 RX ADMIN — FENTANYL CITRATE 25 MCG: 50 INJECTION, SOLUTION INTRAMUSCULAR; INTRAVENOUS at 12:58

## 2021-07-26 RX ADMIN — HEPARIN SODIUM 5000 UNITS: 5000 INJECTION, SOLUTION INTRAVENOUS; SUBCUTANEOUS at 12:39

## 2021-07-26 RX ADMIN — MIDAZOLAM 1 MG: 1 INJECTION INTRAMUSCULAR; INTRAVENOUS at 12:51

## 2021-07-26 NOTE — PROGRESS NOTES
Discharge instructions reviewed with patient and patient states understanding. Left brachial site c/d/i.  Patient discharged from cath holding with all belongings and AVS.  Electronically signed by Fidel Lozano RN on 7/26/2021 at 2:45 PM

## 2021-07-26 NOTE — OP NOTE
Preprocedure diagnosis:  1. End-stage renal disease on hemodialysis  2. Increased bleeding from a left brachial artery to cephalic vein arteriovenous fistula (left upper extremity dialysis arteriovenous fistula malfunction)    Post procedure diagnosis: Same    Procedure:  1. Left upper extremity fistulogram's including venography the superior cava  2. Balloon angioplasty left proximal upper arm cephalic vein stenosis with 7 mm x 80 mm conquest, 7 mm x 100 mm Lutonix, 8 mm x 60 mm Lutonix drug-coated balloon  3. Completion venograms left upper extremity    Surgeon: Kajal Barnett MD    Anesthesia: Intravenous/moderate sedation plus local    Estimated blood loss: Less than 50 mL    Findings:  1. There is a patent left brachial artery to cephalic vein arteriovenous fistula. The arterial anastomosis is widely patent. He has 2 pseudoaneurysms in the mid upper arm. The cephalic vein is between 6 and 8 mm in diameter throughout other than the area of the pseudoaneurysms which is larger. However, there is an 80% stenosis in the proximal upper arm cephalic vein around the humeral head. The left subclavian vein, innominate vein, and superior vena cava are all fairly widely patent. Procedure in detail:    After the patient was consented and given intravenous antibiotics, she is brought to angiography and placed on the table supine position. Intravenous/moderate sedation was administered the patient's left arm was prepped and draped in usual sterile procedure. Skin and subcutaneous tissues in the distal upper arm anesthetized lidocaine. Micropuncture needle was used to cannulate cephalic vein without difficulty. Seldinger technique was utilized to place a 4 [place] sheath. Left upper extremity fistulogram's including venography of the superior cava were performed with the above findings.     I upsized to a 6 [place] sheath, and then over an 035 wire, the proximal cephalic vein stenosis was treated with a 7 mm x 80 mm conquest balloon. This balloon was left inflated for 1 minute. There is some recoil after this. Balloon angioplasty was then performed with a 7 mm drug-coated balloon. This balloon was left inflated for 3 minutes. Again, there is some fairly significant recoil. I did not want to place a stent because I think this may cause more problems long-term. Therefore, finally, an 8 mm drug-coated balloon angioplasty was performed of the proximal cephalic vein. This balloon was left inflated for 3 minutes as well. Completion venogram show a good result. There is no significant residual stenosis. There is no extravasation of dye. The sheath was removed and puncture site closed with 3-0 nylon suture. The fistula has a nicely palpable thrill which is no longer significantly pulsatile. If the patient should continue have problems, I may have to replace the mid upper arm cephalic vein with graft to excise the pseudoaneurysmal segments.

## 2021-08-02 ENCOUNTER — OFFICE VISIT (OUTPATIENT)
Dept: UROLOGY | Age: 51
End: 2021-08-02
Payer: COMMERCIAL

## 2021-08-02 VITALS — HEIGHT: 68 IN | WEIGHT: 250 LBS | BODY MASS INDEX: 37.89 KG/M2 | TEMPERATURE: 97.3 F

## 2021-08-02 DIAGNOSIS — N28.1 HYPERDENSE RENAL CYST: ICD-10-CM

## 2021-08-02 DIAGNOSIS — I10 ESSENTIAL HYPERTENSION: ICD-10-CM

## 2021-08-02 DIAGNOSIS — R97.20 ELEVATED PSA: Primary | ICD-10-CM

## 2021-08-02 LAB
APPEARANCE FLUID: CLEAR
BILIRUBIN, POC: NORMAL
BLOOD URINE, POC: NORMAL
CLARITY, POC: CLEAR
COLOR, POC: YELLOW
GLUCOSE URINE, POC: NORMAL
KETONES, POC: NORMAL
LEUKOCYTE EST, POC: NORMAL
NITRITE, POC: NORMAL
PH, POC: 8.5
PROTEIN, POC: NORMAL
SPECIFIC GRAVITY, POC: 1.01
UROBILINOGEN, POC: 0.2

## 2021-08-02 PROCEDURE — G8427 DOCREV CUR MEDS BY ELIG CLIN: HCPCS | Performed by: UROLOGY

## 2021-08-02 PROCEDURE — G8417 CALC BMI ABV UP PARAM F/U: HCPCS | Performed by: UROLOGY

## 2021-08-02 PROCEDURE — 99214 OFFICE O/P EST MOD 30 MIN: CPT | Performed by: UROLOGY

## 2021-08-02 PROCEDURE — 1036F TOBACCO NON-USER: CPT | Performed by: UROLOGY

## 2021-08-02 PROCEDURE — 81002 URINALYSIS NONAUTO W/O SCOPE: CPT | Performed by: UROLOGY

## 2021-08-02 PROCEDURE — 3017F COLORECTAL CA SCREEN DOC REV: CPT | Performed by: UROLOGY

## 2021-08-02 RX ORDER — NIFEDIPINE 60 MG/1
TABLET, FILM COATED, EXTENDED RELEASE ORAL
Qty: 60 TABLET | Refills: 0 | Status: SHIPPED | OUTPATIENT
Start: 2021-08-02 | End: 2021-09-15

## 2021-08-02 RX ORDER — PNV NO.95/FERROUS FUM/FOLIC AC 28MG-0.8MG
TABLET ORAL
Qty: 60 TABLET | Refills: 0 | Status: SHIPPED | OUTPATIENT
Start: 2021-08-02 | End: 2021-09-15

## 2021-08-02 RX ORDER — CLONIDINE HYDROCHLORIDE 0.1 MG/1
TABLET ORAL
Qty: 60 TABLET | Refills: 0 | Status: SHIPPED | OUTPATIENT
Start: 2021-08-02 | End: 2021-09-15

## 2021-08-02 ASSESSMENT — ENCOUNTER SYMPTOMS
CHEST TIGHTNESS: 0
EYE DISCHARGE: 0
COLOR CHANGE: 0
VOMITING: 0
SHORTNESS OF BREATH: 0
TROUBLE SWALLOWING: 0
EYE REDNESS: 0
FACIAL SWELLING: 0
NAUSEA: 0

## 2021-08-02 NOTE — PROGRESS NOTES
Rhea Osborne is a 45 y.o. male who presents today   Chief Complaint   Patient presents with    Follow-up     I am here today for my 1 month with ct. Renal Cyst:  Patient is here today for a renal cyst which was first noted approximately 9 month(s) ago. The mass(es) is(are): left   Overall the size of the cyst(s) are: 1.4 cm in diameter. Flank pain? no  Hematuria? None currently  Bosniak renal cyst classification: Type 2  Patient has a known hyperdense cyst measuring 14 mm located in the upper pole the left kidney. This was discovered on a CT scan done 9/16/2020 for evaluation of microscopic hematuria. He is not had a follow-up since December. He is on dialysis for end-stage renal disease. He denies any flank pain or hematuria. Patient was seen proxy 1 month ago he had not had a follow-up on his hyperdense cyst awake returns now with the above history for follow-up of left upper pole hyperdense cyst.        Elevated PSA:  Patient is here today for history of an elevated PSA. His last several PSA values are as follows:        Lab Results   Component Value Date     PSA 2.96 06/18/2021     PSA 5.35 (H) 11/24/2020      Overall the PSA level is: decreased  Recent history of urinary tract infection/prostatitis? no  Previous prostate biopsy?  yes -1/4/2021 for PSA of 5.35 prostate volume 22 g  Lower urinary tract symptoms: nocturia, 1 times per night           Past Medical History:   Diagnosis Date    Asthma     [facility]     Diabetes mellitus ([facility]     tujaswinder-thedwige[facility]sat [facility]    Erectile dysfunction     Hemodialysis patient ([facility] [address]     [facility]th,[facility]    History of blood transfusion     Hypertension     Obesity     Palliative care patient 01/25/2021       Past Surgical History:   Procedure Laterality Date    COLONOSCOPY N/A 12/05/2019    Dr Ester Garcia, internal hemorrhoids-Grade 1-2-HP, 3 yr recall    DIALYSIS FISTULA CREATION Left 06/26/2020    LEFT HENT: Negative for facial swelling and trouble swallowing. Eyes: Negative for discharge and redness. Respiratory: Negative for chest tightness and shortness of breath. Cardiovascular: Negative for chest pain and palpitations. Gastrointestinal: Negative for nausea and vomiting. Endocrine: Negative for cold intolerance and heat intolerance. Genitourinary: Negative for decreased urine volume and genital sores. Musculoskeletal: Negative for joint swelling and neck pain. Skin: Negative for color change and rash. Allergic/Immunologic: Negative for environmental allergies and immunocompromised state. Neurological: Negative for dizziness and numbness. Hematological: Negative for adenopathy. Does not bruise/bleed easily. Psychiatric/Behavioral: Negative for behavioral problems and hallucinations. PHYSICAL EXAM:  Temp 97.3 °F (36.3 °C) (Temporal)   Ht 5' 8\" (1.727 m)   Wt 250 lb (113.4 kg)   BMI 38.01 kg/m²   Physical Exam  Constitutional:       General: He is not in acute distress. Appearance: Normal appearance. He is well-developed. HENT:      Head: Normocephalic and atraumatic. Nose: Nose normal.   Eyes:      General: No scleral icterus. Conjunctiva/sclera: Conjunctivae normal.      Pupils: Pupils are equal, round, and reactive to light. Neck:      Trachea: No tracheal deviation. Cardiovascular:      Rate and Rhythm: Normal rate and regular rhythm. Pulmonary:      Effort: Pulmonary effort is normal. No respiratory distress. Breath sounds: No stridor. Abdominal:      General: There is no distension. Palpations: Abdomen is soft. There is no mass. Tenderness: There is no abdominal tenderness. Musculoskeletal:         General: No tenderness. Normal range of motion. Cervical back: Normal range of motion and neck supple. Lymphadenopathy:      Cervical: No cervical adenopathy. Skin:     General: Skin is warm and dry.       Findings: No Standing Status:   Future     Standing Expiration Date:   8/2/2022    POCT Urinalysis no Micro        Return in about 6 months (around 2/2/2022) for office visit after xray study, PSA prior to vext visit. All information inputted into the note by the MA to include chief complaint, past medical history, past surgical history, medications, allergies, social and family history and review of systems has been reviewed and updated as needed by me. EMR Dragon/transcription disclaimer: Much of this documentt is electronic  transcription/translation of spoken language to printed text. The  electronic translation of spoken language may be erroneous, or at times,  nonsensical words or phrases may be inadvertently transcribed.  Although I  have reviewed the document for such errors, some may still exist.

## 2021-08-23 NOTE — PROGRESS NOTES
Patient and mother instructed on care of left arm post fistulogram.  Given written instructions. Both stated understanding. Clofazimine Counseling:  I discussed with the patient the risks of clofazimine including but not limited to skin and eye pigmentation, liver damage, nausea/vomiting, gastrointestinal bleeding and allergy.

## 2021-09-09 ENCOUNTER — OFFICE VISIT (OUTPATIENT)
Dept: PRIMARY CARE CLINIC | Age: 51
End: 2021-09-09
Payer: COMMERCIAL

## 2021-09-09 VITALS
RESPIRATION RATE: 18 BRPM | OXYGEN SATURATION: 97 % | TEMPERATURE: 98.6 F | WEIGHT: 242 LBS | DIASTOLIC BLOOD PRESSURE: 88 MMHG | SYSTOLIC BLOOD PRESSURE: 138 MMHG | HEART RATE: 58 BPM | BODY MASS INDEX: 36.68 KG/M2 | HEIGHT: 68 IN

## 2021-09-09 DIAGNOSIS — Z71.85 VACCINE COUNSELING: ICD-10-CM

## 2021-09-09 DIAGNOSIS — Z99.2 TYPE 2 DIABETES MELLITUS WITH CHRONIC KIDNEY DISEASE ON CHRONIC DIALYSIS, WITHOUT LONG-TERM CURRENT USE OF INSULIN (HCC): Primary | Chronic | ICD-10-CM

## 2021-09-09 DIAGNOSIS — E11.22 TYPE 2 DIABETES MELLITUS WITH CHRONIC KIDNEY DISEASE ON CHRONIC DIALYSIS, WITHOUT LONG-TERM CURRENT USE OF INSULIN (HCC): Primary | Chronic | ICD-10-CM

## 2021-09-09 DIAGNOSIS — N18.6 ESRD (END STAGE RENAL DISEASE) (HCC): ICD-10-CM

## 2021-09-09 DIAGNOSIS — N18.6 TYPE 2 DIABETES MELLITUS WITH CHRONIC KIDNEY DISEASE ON CHRONIC DIALYSIS, WITHOUT LONG-TERM CURRENT USE OF INSULIN (HCC): Primary | Chronic | ICD-10-CM

## 2021-09-09 LAB — HBA1C MFR BLD: 5.9 %

## 2021-09-09 PROCEDURE — G8417 CALC BMI ABV UP PARAM F/U: HCPCS | Performed by: NURSE PRACTITIONER

## 2021-09-09 PROCEDURE — 99214 OFFICE O/P EST MOD 30 MIN: CPT | Performed by: NURSE PRACTITIONER

## 2021-09-09 PROCEDURE — 3044F HG A1C LEVEL LT 7.0%: CPT | Performed by: NURSE PRACTITIONER

## 2021-09-09 PROCEDURE — G8427 DOCREV CUR MEDS BY ELIG CLIN: HCPCS | Performed by: NURSE PRACTITIONER

## 2021-09-09 PROCEDURE — 1036F TOBACCO NON-USER: CPT | Performed by: NURSE PRACTITIONER

## 2021-09-09 PROCEDURE — 3017F COLORECTAL CA SCREEN DOC REV: CPT | Performed by: NURSE PRACTITIONER

## 2021-09-09 PROCEDURE — 2022F DILAT RTA XM EVC RTNOPTHY: CPT | Performed by: NURSE PRACTITIONER

## 2021-09-09 PROCEDURE — 83036 HEMOGLOBIN GLYCOSYLATED A1C: CPT | Performed by: NURSE PRACTITIONER

## 2021-09-09 SDOH — ECONOMIC STABILITY: FOOD INSECURITY: WITHIN THE PAST 12 MONTHS, THE FOOD YOU BOUGHT JUST DIDN'T LAST AND YOU DIDN'T HAVE MONEY TO GET MORE.: NEVER TRUE

## 2021-09-09 SDOH — ECONOMIC STABILITY: FOOD INSECURITY: WITHIN THE PAST 12 MONTHS, YOU WORRIED THAT YOUR FOOD WOULD RUN OUT BEFORE YOU GOT MONEY TO BUY MORE.: NEVER TRUE

## 2021-09-09 ASSESSMENT — ENCOUNTER SYMPTOMS
COUGH: 0
SHORTNESS OF BREATH: 0
BACK PAIN: 0
VOICE CHANGE: 0
RHINORRHEA: 0
PHOTOPHOBIA: 0
VOMITING: 0
COLOR CHANGE: 0
NAUSEA: 0

## 2021-09-09 ASSESSMENT — SOCIAL DETERMINANTS OF HEALTH (SDOH): HOW HARD IS IT FOR YOU TO PAY FOR THE VERY BASICS LIKE FOOD, HOUSING, MEDICAL CARE, AND HEATING?: NOT HARD AT ALL

## 2021-09-09 NOTE — PROGRESS NOTES
[address] [facility] CARE  [address] [facility] [address] [address]  Dept: [phone]  Dept Fax: [phone]  Loc: [phone]    Rhea Osborne is a 45 y.o. male who presents today for his medical conditions/complaints as noted below. Rhea Osborne is c/o of Diabetes (2 month follow up pt reports BS have been running pretty good, he is taking medication as prescribed pt reports no additional concerns )        HPI:     HPI   Chief Complaint   Patient presents with    Diabetes     2 month follow up pt reports BS have been running pretty good, he is taking medication as prescribed pt reports no additional concerns      Patient presents today for follow-up diabetes. He reports blood sugars have been in normal range. Denies episodes of hypoglycemia. Blood pressure well-controlled. He is a dialysis patient; history of CHF. He is actually getting a kidney transplant hopefully in October or November; his brother is a match for him. He is very excited about this. A1C is down to 5.9. He is followed by urology for renal cyst; recent follow-up showed minimal change. He will repeat CT in 6 months as well as PSA.      Past Medical History:   Diagnosis Date    Asthma     CHF (congestive heart failure) (HCC)     Diabetes mellitus ([facility]     [facility]-sat at [facility]    Erectile dysfunction     Hemodialysis patient ([facility]     t,th,s at [facility]    History of blood transfusion     Hypertension     Obesity     Palliative care patient 01/25/2021      Past Surgical History:   Procedure Laterality Date    COLONOSCOPY N/A 12/05/2019    Dr Ester Garcia, internal hemorrhoids-Grade 1-2-HP, 3 yr recall    DIALYSIS FISTULA CREATION Left 06/26/2020    LEFT BRACHIAL / CEPHALIC AV FISTULA performed by Tracey Calvin MD at [address] [facility] [address]ELED [address]      [facility] - removal of first cath due to infection and placement of new one    TUNNELED VENOUS CATHETER PLACEMENT  01/2020    [facility]    VASCULAR SURGERY  11/11/2020    [facility].  Removal of tunneled dialysis catheter left internal jugular vein    VASCULAR SURGERY  07/26/2021    [facility]- Left upper extremity fistulogram's including venography the superior cava, Balloon angioplasty left proximal upper arm cephalic vein stenosis with 7 mm x 80 mm conquest, 7 mm x 100 mm Lutonix, 8 mm x 60 mm Lutonix drug-coated balloon, Completion venograms left upper extremity       Vitals 9/9/2021 8/2/2021 7/26/2021 7/26/2021 7/26/2021 6/26/7798   SYSTOLIC 660 - 409 517 313 709   DIASTOLIC 88 - 96 93 95 97   Pulse 58 - 53 52 51 59   Temp 98.6 97.3 - - - -   Resp 18 - 11 13 14 17   SpO2 97 - 96 98 98 97   Weight 242 lb 250 lb - - - -   Height 5' 8\" 5' 8\" - - - -   Body mass index 36.79 kg/m2 38.01 kg/m2 - - - -   Pain Level - - - - - -   Some recent data might be hidden       Family History   Problem Relation Age of Onset    Cancer Father         prostate    Kidney Disease Brother        Social History     Tobacco Use    Smoking status: Never Smoker    Smokeless tobacco: Never Used   Substance Use Topics    Alcohol use: Not Currently      Current Outpatient Medications on File Prior to Visit   Medication Sig Dispense Refill    Multiple Vitamins-Minerals (PRORENAL + D) TABS Take 1 tablet by mouth Every day on dialysis days(after treatment)      hydrALAZINE (APRESOLINE) 50 MG tablet TAKE ONE TABLET EVERY 8 HOURS 90 tablet 3    apixaban (ELIQUIS) 2.5 MG TABS tablet Take 1 tablet by mouth 2 times daily 60 tablet 0    sevelamer (RENVELA) 800 MG tablet Take 800 mg by mouth 3 times daily (with meals) Indications: takes 2 tablets tid with meals       VITAMIN D PO Take 5,000 Units by mouth 2 times daily       Cyanocobalamin (VITAMIN B 12 PO) Take 1 tablet by mouth daily       calcium carbonate (OYSTER SHELL CALCIUM 500 MG) 1250 (500 Ca) MG tablet Take 1 tablet by mouth daily      albuterol sulfate  (90 BASE) MCG/ACT inhaler Inhale 2 puffs into the lungs every 6 hours as needed for Wheezing 1 Inhaler 2     No current facility-administered medications on file prior to visit. Allergies   Allergen Reactions    Banana Anaphylaxis, Shortness Of Breath and Swelling    Atorvastatin Other (See Comments)     Chest pain, no muslce pains elsewhere    Lisinopril Other (See Comments)     Cough       Health Maintenance   Topic Date Due    Hepatitis B vaccine (1 of 3 - Risk Recombivax 3-dose series) 11/15/1988    DTaP/Tdap/Td vaccine (1 - Tdap) Never done    Shingles Vaccine (1 of 2) Never done    Flu vaccine (1) 09/01/2021    COVID-19 Vaccine (2 - Pfizer 2-dose series) 10/04/2021    A1C test (Diabetic or Prediabetic)  09/09/2022    Colon cancer screen colonoscopy  12/05/2029    Pneumococcal 0-64 years Vaccine (4 of 4 - PPSV23) 11/15/2034    Hepatitis A vaccine  Aged Out    Hib vaccine  Aged Out    Meningococcal (ACWY) vaccine  Aged Out       Subjective:      Review of Systems   Constitutional: Negative for chills and fever. HENT: Negative for ear pain, hearing loss, rhinorrhea and voice change. Eyes: Negative for photophobia and visual disturbance. Respiratory: Negative for cough and shortness of breath. Cardiovascular: Negative for chest pain and palpitations. Gastrointestinal: Negative for nausea and vomiting. Endocrine: Negative. Negative for cold intolerance and heat intolerance. Genitourinary: Negative for difficulty urinating and flank pain. Musculoskeletal: Negative for back pain and neck pain. Skin: Negative for color change and rash. Allergic/Immunologic: Negative for environmental allergies and food allergies. Neurological: Negative for dizziness, speech difficulty and headaches. Hematological: Does not bruise/bleed easily. Psychiatric/Behavioral: Negative for sleep disturbance and suicidal ideas. Objective:     Physical Exam  Vitals and nursing note reviewed.    Constitutional:       Appearance: He is well-developed. HENT:      Head: Atraumatic. Right Ear: External ear normal.      Left Ear: External ear normal.      Nose: Nose normal.   Eyes:      Conjunctiva/sclera: Conjunctivae normal.      Pupils: Pupils are equal, round, and reactive to light. Cardiovascular:      Rate and Rhythm: Normal rate and regular rhythm. Heart sounds: Normal heart sounds, S1 normal and S2 normal.   Pulmonary:      Effort: Pulmonary effort is normal.      Breath sounds: Normal breath sounds. Abdominal:      General: Bowel sounds are normal.      Palpations: Abdomen is soft. Musculoskeletal:         General: Normal range of motion. Cervical back: Normal range of motion and neck supple. Skin:     General: Skin is warm and dry. Neurological:      Mental Status: He is alert and oriented to person, place, and time. Psychiatric:         Behavior: Behavior normal.       /88   Pulse 58   Temp 98.6 °F (37 °C) (Temporal)   Resp 18   Ht 5' 8\" (1.727 m)   Wt 242 lb (109.8 kg)   SpO2 97%   BMI 36.80 kg/m²     Assessment:       Diagnosis Orders   1. Type 2 diabetes mellitus with chronic kidney disease on chronic dialysis, without long-term current use of insulin ([facility])  POCT glycosylated hemoglobin (Hb A1C)   2. ESRD [facility] stage renal disease) ([facility]     3. Vaccine counseling       Results for orders placed or performed in visit on 09/09/21   POCT glycosylated hemoglobin (Hb A1C)   Result Value Ref Range    Hemoglobin A1C 5.9 %         Plan:       More than 50% of the time was spent counseling and coordinating care for a total time of 30 min face to face. Patient is overall doing well; very pleased to hear that his brother is a renal transplant match and willing to donate. Patient is hopeful to have transplant in the next 2 months. I discussed [id number] vaccine with patient today; he is agreeable to obtaining first dose today. Hopefully he will be fully vaccinated by the time of transplant.  He will return in 3 weeks for 2nd pfizer dose. Patient received approximately 5 minutes of counseling on COVID-19 pandemic in regards to hygiene, symptoms, following public guidelines, and precautions to take. Patient received additional 5 minutes of counseling on covid vaccination data and need to vaccinate when available. Patient given educational materials -see patient instructions. Discussed use, benefit, and side effects of prescribed medications. All patient questions answered. Pt voiced understanding. Reviewed health maintenance. Instructed to continue currentmedications, diet and exercise. Patient agreed with treatment plan. Follow up as directed. MEDICATIONS:  No orders of the defined types were placed in this encounter. ORDERS:  Orders Placed This Encounter   Procedures    POCT glycosylated hemoglobin (Hb A1C)       Follow-up:  Return in about 6 months (around 3/9/2022) for in office. PATIENT INSTRUCTIONS:  There are no Patient Instructions on file for this visit. Electronically signed by SOSA Blair on 9/16/2021 at 1:50 PM    EMR Dragon/transcription disclaimer:  Much of thisencounter note is electronic transcription/translation of spoken language to printed texts. The electronic translation of spoken language may be erroneous, or at times, nonsensical words or phrases may be inadvertentlytranscribed.   Although I have reviewed the note for such errors, some may still exist.

## 2021-09-13 ENCOUNTER — OFFICE VISIT (OUTPATIENT)
Dept: PRIMARY CARE CLINIC | Age: 51
End: 2021-09-13
Payer: MEDICARE

## 2021-09-13 DIAGNOSIS — Z23 NEED FOR COVID-19 VACCINE: Primary | ICD-10-CM

## 2021-09-13 PROCEDURE — 91300 COVID-19, PFIZER VACCINE 30MCG/0.3ML DOSE: CPT | Performed by: FAMILY MEDICINE

## 2021-09-13 PROCEDURE — 99999 PR OFFICE/OUTPT VISIT,PROCEDURE ONLY: CPT | Performed by: NURSE PRACTITIONER

## 2021-09-13 PROCEDURE — 0001A COVID-19, PFIZER VACCINE 30MCG/0.3ML DOSE: CPT | Performed by: FAMILY MEDICINE

## 2021-09-13 NOTE — PROGRESS NOTES
After obtaining consent, and per orders of Dr. Zaynab Owusu, injection of Kim Peter given in Right deltoid by Lupe Yates MA. Patient instructed to remain in clinic for 20 minutes afterwards, and to report any adverse reaction to me immediately.

## 2021-09-14 DIAGNOSIS — E78.2 MIXED HYPERLIPIDEMIA: ICD-10-CM

## 2021-09-14 DIAGNOSIS — I10 ESSENTIAL HYPERTENSION: ICD-10-CM

## 2021-09-15 RX ORDER — NIFEDIPINE 60 MG/1
TABLET, FILM COATED, EXTENDED RELEASE ORAL
Qty: 60 TABLET | Refills: 0 | Status: SHIPPED | OUTPATIENT
Start: 2021-09-15 | End: 2021-09-16

## 2021-09-15 RX ORDER — CLONIDINE HYDROCHLORIDE 0.1 MG/1
TABLET ORAL
Qty: 60 TABLET | Refills: 0 | Status: SHIPPED | OUTPATIENT
Start: 2021-09-15 | End: 2021-09-16

## 2021-09-15 RX ORDER — SIMVASTATIN 10 MG
10 TABLET ORAL NIGHTLY
Qty: 90 TABLET | Refills: 0 | Status: SHIPPED | OUTPATIENT
Start: 2021-09-15 | End: 2021-09-16

## 2021-09-15 RX ORDER — PNV NO.95/FERROUS FUM/FOLIC AC 28MG-0.8MG
TABLET ORAL
Qty: 60 TABLET | Refills: 0 | Status: SHIPPED | OUTPATIENT
Start: 2021-09-15 | End: 2021-09-16

## 2021-09-16 RX ORDER — PNV NO.95/FERROUS FUM/FOLIC AC 28MG-0.8MG
TABLET ORAL
Qty: 60 TABLET | Refills: 0 | Status: SHIPPED | OUTPATIENT
Start: 2021-09-16 | End: 2021-11-23

## 2021-09-16 NOTE — TELEPHONE ENCOUNTER
Mitzy Guadarrama called to request a refill on his medication.       Last office visit : 9/13/2021   Next office visit : 10/4/2021     Requested Prescriptions     Pending Prescriptions Disp Refills    Ferrous Sulfate (IRON) 325 (65 Fe) MG TABS [Pharmacy Med Name: Iron 325 (65 Fe) MG Oral Tablet] 60 tablet 0     Sig: TAKE 1 TABLET BY MOUTH TWICE DAILY WITH MEALS            Tyra Joshua [place]

## 2021-10-04 ENCOUNTER — OFFICE VISIT (OUTPATIENT)
Dept: PRIMARY CARE CLINIC | Age: 51
End: 2021-10-04
Payer: MEDICARE

## 2021-10-04 DIAGNOSIS — Z28.311 NEED FOR SECOND DOSE OF COVID-19 VACCINE: Primary | ICD-10-CM

## 2021-10-04 DIAGNOSIS — Z23 NEED FOR SECOND DOSE OF COVID-19 VACCINE: Primary | ICD-10-CM

## 2021-10-04 PROCEDURE — 91300 COVID-19, PFIZER VACCINE 30MCG/0.3ML DOSE: CPT | Performed by: NURSE PRACTITIONER

## 2021-10-04 PROCEDURE — 99999 PR OFFICE/OUTPT VISIT,PROCEDURE ONLY: CPT | Performed by: NURSE PRACTITIONER

## 2021-10-04 PROCEDURE — 0002A COVID-19, PFIZER VACCINE 30MCG/0.3ML DOSE: CPT | Performed by: NURSE PRACTITIONER

## 2021-10-04 NOTE — PROGRESS NOTES
After obtaining consent, and per orders of Hortencia SWAN, injection of 2nd  COVID vaccine given in Right arm by Neto Stern MA. Patient instructed to remain in clinic for 20 minutes afterwards, and to report any adverse reaction to me immediately.

## 2021-11-23 DIAGNOSIS — I10 ESSENTIAL HYPERTENSION: ICD-10-CM

## 2021-11-23 RX ORDER — NIFEDIPINE 60 MG/1
TABLET, FILM COATED, EXTENDED RELEASE ORAL
Qty: 60 TABLET | Refills: 2 | Status: SHIPPED | OUTPATIENT
Start: 2021-11-23 | End: 2022-02-28

## 2021-11-23 RX ORDER — PNV NO.95/FERROUS FUM/FOLIC AC 28MG-0.8MG
TABLET ORAL
Qty: 60 TABLET | Refills: 2 | Status: SHIPPED | OUTPATIENT
Start: 2021-11-23 | End: 2022-02-28

## 2021-11-23 NOTE — TELEPHONE ENCOUNTER
Yamilet Pemberton called to request a refill on his medication.       Last office visit : 10/4/2021   Next office visit : 3/9/2022     Requested Prescriptions     Signed Prescriptions Disp Refills    Ferrous Sulfate (IRON) 325 (65 Fe) MG TABS 60 tablet 2     Sig: TAKE 1 TABLET BY MOUTH TWICE DAILY WITH MEALS     Authorizing Provider: Queta Mesa     Ordering User: Gema Danielle    NIFEdipine (ADALAT CC) 60 MG extended release tablet 60 tablet 2     Sig: Take 1 tablet by mouth twice daily     Authorizing Provider: Queta Mesa     Ordering User: Soni Cox MA

## 2022-01-26 ENCOUNTER — HOSPITAL ENCOUNTER (OUTPATIENT)
Dept: CT IMAGING | Age: 52
Discharge: HOME | End: 2022-01-26
Payer: COMMERCIAL

## 2022-01-26 DIAGNOSIS — N28.1 HYPERDENSE RENAL CYST: ICD-10-CM

## 2022-01-26 PROCEDURE — 6360000004 HC RX CONTRAST MEDICATION: Performed by: UROLOGY

## 2022-01-26 PROCEDURE — 74178 CT ABD&PLV WO CNTR FLWD CNTR: CPT

## 2022-01-26 RX ADMIN — IOPAMIDOL 80 ML: 755 INJECTION, SOLUTION INTRAVENOUS at 09:31

## 2022-02-02 ENCOUNTER — OFFICE VISIT (OUTPATIENT)
Dept: UROLOGY | Age: 52
End: 2022-02-02
Payer: COMMERCIAL

## 2022-02-02 VITALS — BODY MASS INDEX: 37.78 KG/M2 | TEMPERATURE: 97.2 F | HEIGHT: 68 IN | WEIGHT: 249.3 LBS

## 2022-02-02 DIAGNOSIS — R97.20 ELEVATED PSA: ICD-10-CM

## 2022-02-02 DIAGNOSIS — N28.1 HYPERDENSE RENAL CYST: ICD-10-CM

## 2022-02-02 DIAGNOSIS — R97.20 ELEVATED PSA: Primary | ICD-10-CM

## 2022-02-02 LAB — PROSTATE SPECIFIC ANTIGEN: 1.83 NG/ML (ref 0–4)

## 2022-02-02 PROCEDURE — 1036F TOBACCO NON-USER: CPT | Performed by: UROLOGY

## 2022-02-02 PROCEDURE — G8484 FLU IMMUNIZE NO ADMIN: HCPCS | Performed by: UROLOGY

## 2022-02-02 PROCEDURE — G8428 CUR MEDS NOT DOCUMENT: HCPCS | Performed by: UROLOGY

## 2022-02-02 PROCEDURE — 3017F COLORECTAL CA SCREEN DOC REV: CPT | Performed by: UROLOGY

## 2022-02-02 PROCEDURE — 99214 OFFICE O/P EST MOD 30 MIN: CPT | Performed by: UROLOGY

## 2022-02-02 PROCEDURE — G8417 CALC BMI ABV UP PARAM F/U: HCPCS | Performed by: UROLOGY

## 2022-02-02 ASSESSMENT — ENCOUNTER SYMPTOMS
COUGH: 0
BACK PAIN: 0
CONSTIPATION: 0
NAUSEA: 0
SHORTNESS OF BREATH: 0
ABDOMINAL PAIN: 0
EYE DISCHARGE: 0
TROUBLE SWALLOWING: 0
DIARRHEA: 0
EYE REDNESS: 0
VOMITING: 0
ABDOMINAL DISTENTION: 0

## 2022-02-02 NOTE — PROGRESS NOTES
Petey Carrera is a 45 y.o. male who presents today   Chief Complaint   Patient presents with    Follow-up     I am here today for my 6 month follow up on my elevated PSA, CT and PSA prior.  Other     pt unable to leave urine sample due to being on dialysis. Elevated PSA:  Patient is here today for history of an elevated PSA. His last several PSA values are as follows:  Lab Results   Component Value Date    PSA 2.96 06/18/2021    PSA 5.35 (H) 11/24/2020   He has PSA done today is now pending  Overall the PSA level is: decreased  Recent history of urinary tract infection/prostatitis? no  Previous prostate biopsy? yes -done 1/4/2021 for PSA of 5.35 prostate volume was 22 g  Lower urinary tract symptoms: decreased urinary stream, he is on dialysis and does not make much urine and does not void very often. Renal Cyst:  Patient is here today for a renal cyst which was first noted approximately 1 years(s) ago. The mass(es) is(are): left   Overall the size of the cyst(s) are: stable and 1.7 cm in diameter. Flank pain? no  Hematuria? None  Bosniak renal cyst classification: Type 2, patient has known hyperdense cyst in the upper pole the left kidney. The cyst is unchanged in size previously it was hyperdense but now has a density more consistent with a simple cyst.  He comes in today for follow-up CT. He denies any flank pain or hematuria he is on dialysis with end-stage renal disease. He also has some smaller cyst in the lower pole of the left kidney and on the right side.         Past Medical History:   Diagnosis Date    Asthma     [facility]F (congestive heart failure) ([facility]     Diabetes mellitus ([facility]     tues-salmaur-sat at [facility]    Erectile dysfunction     Hemodialysis patient ([facility]     t,th,s at [facility]    History of blood transfusion     Hypertension     Obesity     Palliative care patient 01/25/2021       Past Surgical History:   Procedure Laterality Date    COLONOSCOPY N/A 12/05/2019     [facility]-Diverticulosis, internal hemorrhoids-Grade 1-2-HP, 3 yr recall    DIALYSIS FISTULA CREATION Left 06/26/2020    LEFT BRACHIAL / CEPHALIC AV FISTULA performed [facility] Erickson Singh [facility] TUNNELED VENOUS CATHETER PLACEMENT      [facility] - removal of first cath due to infection and placement of new one    TUNNELED VENOUS CATHETER PLACEMENT  01/2020    [facility]    VASCULAR SURGERY  11/11/2020    [facility].  Removal of tunneled dialysis catheter left internal jugular vein    VASCULAR SURGERY  07/26/2021    [facility]- Left upper extremity fistulogram's including venography the superior cava, Balloon angioplasty left proximal upper arm cephalic vein stenosis with 7 mm x 80 mm conquest, 7 mm x 100 mm Lutonix, 8 mm x 60 mm Lutonix drug-coated balloon, Completion venograms left upper extremity       Current Outpatient Medications   Medication Sig Dispense Refill    Ferrous Sulfate (IRON) 325 (65 Fe) MG TABS TAKE 1 TABLET BY MOUTH TWICE DAILY WITH MEALS 60 tablet 2    NIFEdipine (ADALAT CC) 60 MG extended release tablet Take 1 tablet by mouth twice daily 60 tablet 2    simvastatin (ZOCOR) 10 MG tablet Take 1 tablet by mouth nightly 90 tablet 1    cloNIDine (CATAPRES) 0.1 MG tablet Take 1 tablet by mouth twice daily 60 tablet 5    metoprolol tartrate (LOPRESSOR) 25 MG tablet Take 1 tablet by mouth twice daily 60 tablet 5    Multiple Vitamins-Minerals (PRORENAL + D) TABS Take 1 tablet by mouth Every day on dialysis days(after treatment)      hydrALAZINE (APRESOLINE) 50 MG tablet TAKE ONE TABLET EVERY 8 HOURS 90 tablet 3    apixaban (ELIQUIS) 2.5 MG TABS tablet Take 1 tablet by mouth 2 times daily 60 tablet 0    sevelamer (RENVELA) 800 MG tablet Take 800 mg by mouth 3 times daily (with meals) Indications: takes 2 tablets tid with meals       VITAMIN D PO Take 5,000 Units by mouth 2 times daily       Cyanocobalamin (VITAMIN B 12 PO) Take 1 tablet by mouth daily       calcium carbonate (OYSTER SHELL CALCIUM 500 MG) 1250 (500 Ca) MG tablet Take 1 tablet by mouth daily      albuterol sulfate  (90 BASE) MCG/ACT inhaler Inhale 2 puffs into the lungs every 6 hours as needed for Wheezing 1 Inhaler 2     No current facility-administered medications for this visit. Allergies   Allergen Reactions    Banana Anaphylaxis, Shortness Of Breath and Swelling    Atorvastatin Other (See Comments)     Chest pain, no muslce pains elsewhere    Lisinopril Other (See Comments)     Cough       Social History     Socioeconomic History    Marital status: Single     Spouse name: None    Number of children: None    Years of education: None    Highest education level: None   Occupational History    None   Tobacco Use    Smoking status: Never Smoker    Smokeless tobacco: Never Used   Vaping Use    Vaping Use: Never used   Substance and Sexual Activity    Alcohol use: Not Currently    Drug use: No    Sexual activity: None   Other Topics Concern    None   Social History Narrative    None     Social Determinants of Health     Financial Resource Strain: Low Risk     Difficulty of Paying Living Expenses: Not hard at all   Food Insecurity: No Food Insecurity    Worried About Running Out of Food in the Last Year: Never true    Trang of Food in the Last Year: Never true   Transportation Needs:     Lack of Transportation (Medical): Not on file    Lack of Transportation (Non-Medical):  Not on file   Physical Activity:     Days of Exercise per Week: Not on file    Minutes of Exercise per Session: Not on file   Stress:     Feeling of Stress : Not on file   Social Connections:     Frequency of Communication with Friends and Family: Not on file    Frequency of Social Gatherings with Friends and Family: Not on file    Attends Zoroastrianism Services: Not on file    Active Member of Clubs or Organizations: Not on file    Attends Club or Organization Meetings: Not on file    Marital Status: Not on file   Intimate Partner Violence:  Fear of Current or Ex-Partner: Not on file    Emotionally Abused: Not on file    Physically Abused: Not on file    Sexually Abused: Not on file   Housing Stability:     Unable to Pay for Housing in the Last Year: Not on file    Number of Places Lived in the Last Year: Not on file    Unstable Housing in the Last Year: Not on file       Family History   Problem Relation Age of Onset    Cancer Father         prostate    Kidney Disease Brother        REVIEW OF SYSTEMS:  Review of Systems   Constitutional: Negative for chills, fatigue and fever. HENT: Negative for congestion, ear pain and trouble swallowing. Eyes: Negative for discharge and redness. Respiratory: Negative for cough and shortness of breath. Cardiovascular: Negative for chest pain. Gastrointestinal: Negative for abdominal distention, abdominal pain, constipation, diarrhea, nausea and vomiting. Endocrine: Negative for cold intolerance and heat intolerance. Genitourinary: Negative for difficulty urinating, dysuria, flank pain, frequency, hematuria and urgency. Musculoskeletal: Negative for back pain and gait problem. Skin: Negative for pallor and wound. Allergic/Immunologic: Negative for environmental allergies and immunocompromised state. Neurological: Negative for dizziness and weakness. Hematological: Negative for adenopathy. Does not bruise/bleed easily. Psychiatric/Behavioral: Negative for agitation and confusion. PHYSICAL EXAM:  Temp 97.2 °F (36.2 °C) (Temporal)   Ht 5' 8\" (1.727 m)   Wt 249 lb 4.8 oz (113.1 kg)   BMI 37.91 kg/m²   Physical Exam  Constitutional:       General: He is not in acute distress. Appearance: Normal appearance. He is well-developed. HENT:      Head: Normocephalic and atraumatic. Nose: Nose normal.   Eyes:      General: No scleral icterus. Conjunctiva/sclera: Conjunctivae normal.      Pupils: Pupils are equal, round, and reactive to light.    Neck:      Trachea: No tracheal deviation. Cardiovascular:      Rate and Rhythm: Normal rate and regular rhythm. Pulmonary:      Effort: Pulmonary effort is normal. No respiratory distress. Breath sounds: No stridor. Abdominal:      General: There is no distension. Palpations: Abdomen is soft. There is no mass. Tenderness: There is no abdominal tenderness. Musculoskeletal:         General: No tenderness. Normal range of motion. Cervical back: Normal range of motion and neck supple. Lymphadenopathy:      Cervical: No cervical adenopathy. Skin:     General: Skin is warm and dry. Findings: No erythema. Neurological:      Mental Status: He is alert and oriented to person, place, and time. Psychiatric:         Behavior: Behavior normal.         Judgment: Judgment normal.             DATA:  BMP:    Lab Results   Component Value Date     02/16/2021    K 4.7 02/16/2021    K 4.3 02/16/2021    CL 97 02/16/2021    CO2 33 02/16/2021    BUN 32 02/16/2021    LABALBU 4.0 02/16/2021    CREATININE 8.5 02/16/2021    CALCIUM 9.2 02/16/2021    GFRAA 8 02/16/2021    LABGLOM 7 02/16/2021    GLUCOSE 97 02/16/2021     No results found for this visit on 02/02/22. Lab Results   Component Value Date    PSA 2.96 06/18/2021    PSA 5.35 (H) 11/24/2020        IMAGING:  Previously seen hyperdense cyst in the posterior lateral upper pole the left kidney measures 1.7 cm essentially unchanged. The CT density now is 10 Hounsfield units without significant enhancing. There is a second smaller nodule off the lower pole the left kidney 9 mm and a small less than 1 cm cyst on the right. These are unchanged. 1. Elevated PSA  PSA drawn today is pending we will contact him with results. Otherwise we will continue to monitor he will return in 6 months for his next PSA test  - PSA, Diagnostic; Future    2.  Hyperdense renal cyst,left  This is essentially unchanged on imaging studies and has characteristic of a benign hyperdense cyst we will continue to monitor he will need follow-up imaging in 1 year      Orders Placed This Encounter   Procedures    PSA, Diagnostic     PSA in 6 month     Standing Status:   Future     Standing Expiration Date:   2/2/2023        Return in about 6 months (around 8/2/2022) for PSA prior to vext visit. All information inputted into the note by the MA to include chief complaint, past medical history, past surgical history, medications, allergies, social and family history and review of systems has been reviewed and updated as needed by me. EMR Dragon/transcription disclaimer: Much of this documentt is electronic  transcription/translation of spoken language to printed text. The  electronic translation of spoken language may be erroneous, or at times,  nonsensical words or phrases may be inadvertently transcribed.  Although I  have reviewed the document for such errors, some may still exist.

## 2022-02-26 DIAGNOSIS — I10 ESSENTIAL HYPERTENSION: ICD-10-CM

## 2022-02-28 RX ORDER — PNV NO.95/FERROUS FUM/FOLIC AC 28MG-0.8MG
TABLET ORAL
Qty: 60 TABLET | Refills: 2 | Status: SHIPPED | OUTPATIENT
Start: 2022-02-28 | End: 2022-06-03

## 2022-02-28 RX ORDER — NIFEDIPINE 60 MG/1
TABLET, FILM COATED, EXTENDED RELEASE ORAL
Qty: 60 TABLET | Refills: 1 | Status: SHIPPED | OUTPATIENT
Start: 2022-02-28 | End: 2022-05-02

## 2022-02-28 NOTE — TELEPHONE ENCOUNTER
Steve Cesar called to request a refill on his medication.       Last office visit : 10/4/2021   Next office visit : 3/9/2022     Requested Prescriptions     Signed Prescriptions Disp Refills    NIFEdipine (ADALAT CC) 60 MG extended release tablet 60 tablet 1     Sig: Take 1 tablet by mouth twice daily     Authorizing Provider: Alyse Reyez     Ordering User: Ben Mendosa Ferrous Sulfate (IRON) 325 (65 Fe) MG TABS 60 tablet 2     Sig: TAKE 1 TABLET BY MOUTH TWICE DAILY WITH MEALS     Authorizing Provider: Alyse Reyez     Ordering User: Nay King MA

## 2022-03-09 ENCOUNTER — OFFICE VISIT (OUTPATIENT)
Dept: PRIMARY CARE CLINIC | Age: 52
End: 2022-03-09
Payer: COMMERCIAL

## 2022-03-09 VITALS
TEMPERATURE: 97.7 F | WEIGHT: 247 LBS | OXYGEN SATURATION: 99 % | SYSTOLIC BLOOD PRESSURE: 132 MMHG | BODY MASS INDEX: 37.44 KG/M2 | HEART RATE: 67 BPM | DIASTOLIC BLOOD PRESSURE: 82 MMHG | HEIGHT: 68 IN

## 2022-03-09 DIAGNOSIS — J45.20 MILD INTERMITTENT ASTHMATIC BRONCHITIS WITHOUT COMPLICATION: ICD-10-CM

## 2022-03-09 DIAGNOSIS — E11.22 TYPE 2 DIABETES MELLITUS WITH CHRONIC KIDNEY DISEASE ON CHRONIC DIALYSIS, WITHOUT LONG-TERM CURRENT USE OF INSULIN (HCC): Primary | ICD-10-CM

## 2022-03-09 DIAGNOSIS — N18.6 TYPE 2 DIABETES MELLITUS WITH CHRONIC KIDNEY DISEASE ON CHRONIC DIALYSIS, WITHOUT LONG-TERM CURRENT USE OF INSULIN (HCC): Primary | ICD-10-CM

## 2022-03-09 DIAGNOSIS — I50.32 CHRONIC DIASTOLIC CONGESTIVE HEART FAILURE (HCC): ICD-10-CM

## 2022-03-09 DIAGNOSIS — Z99.2 TYPE 2 DIABETES MELLITUS WITH CHRONIC KIDNEY DISEASE ON CHRONIC DIALYSIS, WITHOUT LONG-TERM CURRENT USE OF INSULIN (HCC): Primary | ICD-10-CM

## 2022-03-09 DIAGNOSIS — N18.6 ESRD (END STAGE RENAL DISEASE) (HCC): ICD-10-CM

## 2022-03-09 LAB — HBA1C MFR BLD: 5.3 %

## 2022-03-09 PROCEDURE — 3017F COLORECTAL CA SCREEN DOC REV: CPT | Performed by: FAMILY MEDICINE

## 2022-03-09 PROCEDURE — G8427 DOCREV CUR MEDS BY ELIG CLIN: HCPCS | Performed by: FAMILY MEDICINE

## 2022-03-09 PROCEDURE — 3044F HG A1C LEVEL LT 7.0%: CPT | Performed by: FAMILY MEDICINE

## 2022-03-09 PROCEDURE — 91300 COVID-19, PFIZER PURPLE TOP, DILUTE FOR USE, 12+ YRS, 30MCG/0.3ML DOSE: CPT | Performed by: FAMILY MEDICINE

## 2022-03-09 PROCEDURE — 99213 OFFICE O/P EST LOW 20 MIN: CPT | Performed by: FAMILY MEDICINE

## 2022-03-09 PROCEDURE — 2022F DILAT RTA XM EVC RTNOPTHY: CPT | Performed by: FAMILY MEDICINE

## 2022-03-09 PROCEDURE — 1036F TOBACCO NON-USER: CPT | Performed by: FAMILY MEDICINE

## 2022-03-09 PROCEDURE — 0004A COVID-19, PFIZER PURPLE TOP, DILUTE FOR USE, 12+ YRS, 30MCG/0.3ML DOSE: CPT | Performed by: FAMILY MEDICINE

## 2022-03-09 PROCEDURE — G8417 CALC BMI ABV UP PARAM F/U: HCPCS | Performed by: FAMILY MEDICINE

## 2022-03-09 PROCEDURE — 83036 HEMOGLOBIN GLYCOSYLATED A1C: CPT | Performed by: FAMILY MEDICINE

## 2022-03-09 PROCEDURE — G8484 FLU IMMUNIZE NO ADMIN: HCPCS | Performed by: FAMILY MEDICINE

## 2022-03-09 RX ORDER — ALBUTEROL SULFATE 90 UG/1
2 AEROSOL, METERED RESPIRATORY (INHALATION) EVERY 6 HOURS PRN
Qty: 1 EACH | Refills: 3 | Status: SHIPPED | OUTPATIENT
Start: 2022-03-09

## 2022-03-09 NOTE — PROGRESS NOTES
Leland García is a 45 y.o. male who presents today for   Chief Complaint   Patient presents with    Diabetes     6 month f/u    Congestive Heart Failure    Injections     pt would like to receive covid booster        HPI  Patient is here for home dialysis. He is doing well. He notes that he is doing well. Not having any orthopnea. He notes that he continues his diabetes meds without any hypoglycemia. He does occasionally have some wheezing and he notes that historically inhalers have helped him in the past.  History of asthma as a kid. No change in PMH, family, social, or surgical history unless mentioned above. I have reviewed the above chief complaint and HPI details charted by staff and claim ownership of the documentation. Review of Systems  Constitutional: Negative for chills and fever  Respiratory: Negative for cough, chest tightness, shortness of breath, and wheezing. Cardiovascular: Negative for chest pain, palpitations, and leg swelling  Gastrointestinal: Negative for abdominal pain, constipation, diarrhea, nausea, and vomiting  Genitourinary: Negative for difficulty urinating, dysuria, and frequency  Constitutional:  Well developed, well nourished, no acute distress, non-toxic appearance   Eyes:  PERRL, conjunctiva normal   HENT:  Atraumatic, external ears normal, nose normal, oropharynx moist, no pharyngeal exudates. Neck- normal range of motion, no tenderness, supple   Respiratory:  No respiratory distress, normal breath sounds, no rales, no wheezing   Cardiovascular:  Normal rate, normal rhythm, no murmurs, no gallops, no rubs   GI:  Soft, nondistended, normal bowel sounds, nontender, no organomegaly, no mass, no rebound, no guarding   :  No costovertebral angle tenderness   Musculoskeletal:  No edema, no tenderness, no deformities.  Back- no tenderness  Integument:  Well hydrated, no rash   Lymphatic:  No lymphadenopathy noted   Neurologic:  Alert & oriented x 3, CN 2-12 normal, normal motor function, normal sensory function, no focal deficits noted   Psychiatric:  Speech and behavior appropriate  Review of Systems    Past Medical History:   Diagnosis Date    Asthma     [facility]     Diabetes mellitus ([facility]     [facility]    Erectile dysfunction     Hemodialysis patient ([facility]     [facility]th[facility] at [facility]    History of blood transfusion     Hypertension     Obesity     Palliative care patient 01/25/2021       Current Outpatient Medications   Medication Sig Dispense Refill    albuterol sulfate  (90 Base) MCG/ACT inhaler Inhale 2 puffs into the lungs every 6 hours as needed for Wheezing 1 each 3    NIFEdipine (ADALAT CC) 60 MG extended release tablet Take 1 tablet by mouth twice daily 60 tablet 1    Ferrous Sulfate (IRON) 325 (65 Fe) MG TABS TAKE 1 TABLET BY MOUTH TWICE DAILY WITH MEALS 60 tablet 2    simvastatin (ZOCOR) 10 MG tablet Take 1 tablet by mouth nightly 90 tablet 1    cloNIDine (CATAPRES) 0.1 MG tablet Take 1 tablet by mouth twice daily 60 tablet 5    metoprolol tartrate (LOPRESSOR) 25 MG tablet Take 1 tablet by mouth twice daily 60 tablet 5    Multiple Vitamins-Minerals (PRORENAL + D) TABS Take 1 tablet by mouth Every day on dialysis days(after treatment)      hydrALAZINE (APRESOLINE) 50 MG tablet TAKE ONE TABLET EVERY 8 HOURS 90 tablet 3    apixaban (ELIQUIS) 2.5 MG TABS tablet Take 1 tablet by mouth 2 times daily 60 tablet 0    sevelamer (RENVELA) 800 MG tablet Take 800 mg by mouth 3 times daily (with meals) Indications: takes 2 tablets tid with meals       VITAMIN D PO Take 5,000 Units by mouth 2 times daily       Cyanocobalamin (VITAMIN B 12 PO) Take 1 tablet by mouth daily       calcium carbonate (OYSTER SHELL CALCIUM 500 MG) 1250 (500 Ca) MG tablet Take 1 tablet by mouth daily       No current facility-administered medications for this visit.        Allergies   Allergen Reactions    Banana Anaphylaxis, Shortness Of Breath and Swelling    Atorvastatin Other (See Comments)     Chest pain, no muslce pains elsewhere    Lisinopril Other (See Comments)     Cough       Past Surgical History:   Procedure Laterality Date    COLONOSCOPY N/A 12/05/2019    Dr Tali Newell, internal hemorrhoids-Grade 1-2-HP, 3 yr recall    DIALYSIS FISTULA CREATION Left 06/26/2020    LEFT BRACHIAL / CEPHALIC AV FISTULA performed by Erickson Singh MD at [address]      [facility] - removal of first cath due to infection and placement of new one    TUNNELED VENOUS CATHETER PLACEMENT  01/2020    [facility]    VASCULAR SURGERY  11/11/2020    [facility]. Removal of tunneled dialysis catheter left internal jugular vein    VASCULAR SURGERY  07/26/2021    [facility]- Left upper extremity fistulogram's including venography the superior cava, Balloon angioplasty left proximal upper arm cephalic vein stenosis with 7 mm x 80 mm conquest, 7 mm x 100 mm Lutonix, 8 mm x 60 mm Lutonix drug-coated balloon, Completion venograms left upper extremity       Social History     Tobacco Use    Smoking status: Never Smoker    Smokeless tobacco: Never Used   Vaping Use    Vaping Use: Never used   Substance Use Topics    Alcohol use: Not Currently    Drug use: No       Family History   Problem Relation Age of Onset    Cancer Father         prostate    Kidney Disease Brother        /82 (Site: Right Upper Arm)   Pulse 67   Temp 97.7 °F (36.5 °C)   Ht 5' 8\" (1.727 m)   Wt 247 lb (112 kg)   SpO2 99%   BMI 37.56 kg/m²     Physical Exam    Assessment:    ICD-10-CM    1. Type 2 diabetes mellitus with chronic kidney disease on chronic dialysis, without long-term current use of insulin (HCC)  E11.22 POCT glycosylated hemoglobin (Hb A1C)    N18.6     Z99.2        Plan: This chronic conditions have been reviewed today. He is extremely high risk doing quite well. I will prescribe albuterol for him to use as needed.   Orders Placed This Encounter   Procedures    COVID-19, Pfizer Purple top, DILUTE for use, 12+ yrs, 30mcg/0.3mL dose    POCT glycosylated hemoglobin (Hb A1C)     Orders Placed This Encounter   Medications    albuterol sulfate  (90 Base) MCG/ACT inhaler     Sig: Inhale 2 puffs into the lungs every 6 hours as needed for Wheezing     Dispense:  1 each     Refill:  3     Medications Discontinued During This Encounter   Medication Reason    albuterol sulfate  (90 BASE) MCG/ACT inhaler REORDER     Patient [address] in [place] July 1 and after. Patient given educational handouts and has had all questions answered. Patient voices understanding and agrees to plans along with risks and benefits of plan. Patient isinstructed to continue prior meds, diet, and exercise plans unless instructed otherwise. Patient agrees to follow up as instructed and sooner if needed. Patient agrees to go to ER if condition becomes emergent. Notesmay be completed with dictation device and spelling errors may occur. Materials may be copied and pasted from a notepad outside of EMR, all of which, I, Dr. Alexander Gaffney MD, take sole intellectual ownership of and have approved adding to my note. Return in about 3 months (around 6/9/2022) for ov or VV.

## 2022-05-02 DIAGNOSIS — I10 ESSENTIAL HYPERTENSION: ICD-10-CM

## 2022-05-02 RX ORDER — NIFEDIPINE 60 MG/1
TABLET, FILM COATED, EXTENDED RELEASE ORAL
Qty: 60 TABLET | Refills: 0 | Status: SHIPPED | OUTPATIENT
Start: 2022-05-02 | End: 2022-06-03

## 2022-05-02 RX ORDER — CLONIDINE HYDROCHLORIDE 0.1 MG/1
TABLET ORAL
Qty: 60 TABLET | Refills: 0 | Status: SHIPPED | OUTPATIENT
Start: 2022-05-02 | End: 2022-06-03

## 2022-06-03 DIAGNOSIS — I10 ESSENTIAL HYPERTENSION: ICD-10-CM

## 2022-06-03 RX ORDER — NIFEDIPINE 60 MG/1
TABLET, FILM COATED, EXTENDED RELEASE ORAL
Qty: 60 TABLET | Refills: 0 | Status: SHIPPED | OUTPATIENT
Start: 2022-06-03 | End: 2022-07-05

## 2022-06-03 RX ORDER — PNV NO.95/FERROUS FUM/FOLIC AC 28MG-0.8MG
TABLET ORAL
Qty: 60 TABLET | Refills: 0 | Status: SHIPPED | OUTPATIENT
Start: 2022-06-03 | End: 2022-07-14

## 2022-06-03 RX ORDER — CLONIDINE HYDROCHLORIDE 0.1 MG/1
TABLET ORAL
Qty: 60 TABLET | Refills: 0 | Status: SHIPPED | OUTPATIENT
Start: 2022-06-03 | End: 2022-07-05

## 2022-07-05 ENCOUNTER — OFFICE VISIT (OUTPATIENT)
Dept: PRIMARY CARE CLINIC | Age: 52
End: 2022-07-05
Payer: COMMERCIAL

## 2022-07-05 VITALS
HEART RATE: 79 BPM | HEIGHT: 68 IN | WEIGHT: 249 LBS | BODY MASS INDEX: 37.74 KG/M2 | OXYGEN SATURATION: 97 % | SYSTOLIC BLOOD PRESSURE: 126 MMHG | DIASTOLIC BLOOD PRESSURE: 74 MMHG | TEMPERATURE: 97.2 F

## 2022-07-05 DIAGNOSIS — N18.6 TYPE 2 DIABETES MELLITUS WITH CHRONIC KIDNEY DISEASE ON CHRONIC DIALYSIS, WITHOUT LONG-TERM CURRENT USE OF INSULIN (HCC): Primary | ICD-10-CM

## 2022-07-05 DIAGNOSIS — E11.22 TYPE 2 DIABETES MELLITUS WITH CHRONIC KIDNEY DISEASE ON CHRONIC DIALYSIS, WITHOUT LONG-TERM CURRENT USE OF INSULIN (HCC): Primary | ICD-10-CM

## 2022-07-05 DIAGNOSIS — I10 ESSENTIAL HYPERTENSION: ICD-10-CM

## 2022-07-05 DIAGNOSIS — E78.2 MIXED HYPERLIPIDEMIA: ICD-10-CM

## 2022-07-05 DIAGNOSIS — Z99.2 TYPE 2 DIABETES MELLITUS WITH CHRONIC KIDNEY DISEASE ON CHRONIC DIALYSIS, WITHOUT LONG-TERM CURRENT USE OF INSULIN (HCC): Primary | ICD-10-CM

## 2022-07-05 DIAGNOSIS — E66.01 SEVERE OBESITY (BMI 35.0-39.9) WITH COMORBIDITY (HCC): ICD-10-CM

## 2022-07-05 LAB — HBA1C MFR BLD: 6 %

## 2022-07-05 PROCEDURE — 83036 HEMOGLOBIN GLYCOSYLATED A1C: CPT | Performed by: NURSE PRACTITIONER

## 2022-07-05 PROCEDURE — G8427 DOCREV CUR MEDS BY ELIG CLIN: HCPCS | Performed by: NURSE PRACTITIONER

## 2022-07-05 PROCEDURE — 3044F HG A1C LEVEL LT 7.0%: CPT | Performed by: NURSE PRACTITIONER

## 2022-07-05 PROCEDURE — 3017F COLORECTAL CA SCREEN DOC REV: CPT | Performed by: NURSE PRACTITIONER

## 2022-07-05 PROCEDURE — 2022F DILAT RTA XM EVC RTNOPTHY: CPT | Performed by: NURSE PRACTITIONER

## 2022-07-05 PROCEDURE — 1036F TOBACCO NON-USER: CPT | Performed by: NURSE PRACTITIONER

## 2022-07-05 PROCEDURE — G8417 CALC BMI ABV UP PARAM F/U: HCPCS | Performed by: NURSE PRACTITIONER

## 2022-07-05 PROCEDURE — 99214 OFFICE O/P EST MOD 30 MIN: CPT | Performed by: NURSE PRACTITIONER

## 2022-07-05 RX ORDER — NIFEDIPINE 60 MG/1
TABLET, FILM COATED, EXTENDED RELEASE ORAL
Qty: 60 TABLET | Refills: 2 | Status: SHIPPED | OUTPATIENT
Start: 2022-07-05 | End: 2022-10-07

## 2022-07-05 RX ORDER — SIMVASTATIN 10 MG
10 TABLET ORAL NIGHTLY
Qty: 90 TABLET | Refills: 1 | Status: SHIPPED | OUTPATIENT
Start: 2022-07-05

## 2022-07-05 RX ORDER — CLONIDINE HYDROCHLORIDE 0.1 MG/1
TABLET ORAL
Qty: 60 TABLET | Refills: 2 | Status: SHIPPED | OUTPATIENT
Start: 2022-07-05 | End: 2022-10-07

## 2022-07-05 ASSESSMENT — PATIENT HEALTH QUESTIONNAIRE - PHQ9
SUM OF ALL RESPONSES TO PHQ QUESTIONS 1-9: 0
1. LITTLE INTEREST OR PLEASURE IN DOING THINGS: 0
SUM OF ALL RESPONSES TO PHQ9 QUESTIONS 1 & 2: 0
2. FEELING DOWN, DEPRESSED OR HOPELESS: 0
SUM OF ALL RESPONSES TO PHQ QUESTIONS 1-9: 0

## 2022-07-05 ASSESSMENT — ENCOUNTER SYMPTOMS
ABDOMINAL PAIN: 0
SORE THROAT: 0
CHEST TIGHTNESS: 0
SHORTNESS OF BREATH: 0
COUGH: 0
VOMITING: 0
COLOR CHANGE: 0
NAUSEA: 0
DIARRHEA: 0

## 2022-07-05 NOTE — PROGRESS NOTES
[address] [address]     Phone:  [phone]  Fax:  [phone]      Yo Mejía is a 45 y.o. male who presents today for his medical conditions/complaints as noted below. Yo Mejía is c/o of Diabetes and Hypertension      Chief Complaint   Patient presents with    Diabetes    Hypertension       HPI:     HPI      Patient presents for follow up diabetes mellitus and hypertension. Patient voices no complaints at this time. Reports he has not been checking his blood sugars and does not eat a lot of sweets. Discussed A1C has increaed to 6.0. Reports his blood pressure have been well controlled. Has blood work done regularly with kidney specialists, but is unsure if his lipids have been checked. Past Medical History:   Diagnosis Date    Asthma     CHF (congestive heart failure) (HCC)     Diabetes mellitus ([address]     [facility]sat [facility]    Erectile dysfunction     [facility] ([facility]     [facility]    History of blood transfusion     Hypertension     Obesity     Palliative care patient 01/25/2021        Past Surgical History:   Procedure Laterality Date    COLONOSCOPY N/A 12/05/2019    Dr Kajal Corcoran, internal hemorrhoids-Grade 1-2-HP, 3 yr recall    DIALYSIS FISTULA CREATION Left 06/26/2020    LEFT BRACHIAL / CEPHALIC AV FISTULA performed by Qamar Colon MD at [address] [place] [address]      [facility] - removal of first cath due to infection and placement of new one    TUNNELED [facility]ENOUS CATHETER PLACEMENT  01/2020    [facility]    VASCULAR SURGERY  11/11/2020    [facility].  Removal of tunneled dialysis catheter left internal jugular vein    VASCULAR SURGERY  07/26/2021    [facility]- Left upper extremity fistulogram's including venography the superior cava, Balloon angioplasty left proximal upper arm cephalic vein stenosis with 7 mm x 80 mm conquest, 7 mm x 100 mm Lutonix, 8 mm x 60 mm Lutonix drug-coated balloon, Completion venograms left upper extremity       Social History     Tobacco Use    Smoking status: Never Smoker    Smokeless tobacco: Never Used   Substance Use Topics    Alcohol use: Not Currently        Current Outpatient Medications   Medication Sig Dispense Refill    Ferrous Sulfate (IRON) 325 (65 Fe) MG TABS TAKE 1 TABLET BY MOUTH TWICE DAILY WITH MEALS 60 tablet 0    albuterol sulfate  (90 Base) MCG/ACT inhaler Inhale 2 puffs into the lungs every 6 hours as needed for Wheezing 1 each 3    Multiple Vitamins-Minerals (PRORENAL + D) TABS Take 1 tablet by mouth Every day on dialysis days(after treatment)      hydrALAZINE (APRESOLINE) 50 MG tablet TAKE ONE TABLET EVERY 8 HOURS 90 tablet 3    apixaban (ELIQUIS) 2.5 MG TABS tablet Take 1 tablet by mouth 2 times daily 60 tablet 0    Cyanocobalamin (VITAMIN B 12 PO) Take 1 tablet by mouth daily       calcium carbonate (OYSTER SHELL CALCIUM 500 MG) 1250 (500 Ca) MG tablet Take 1 tablet by mouth daily      NIFEdipine (ADALAT CC) 60 MG extended release tablet Take 1 tablet by mouth twice daily 60 tablet 2    metoprolol tartrate (LOPRESSOR) 25 MG tablet Take 1 tablet by mouth twice daily 60 tablet 2    simvastatin (ZOCOR) 10 MG tablet Take 1 tablet by mouth nightly 90 tablet 1    cloNIDine (CATAPRES) 0.1 MG tablet Take 1 tablet by mouth twice daily 60 tablet 2    sevelamer (RENVELA) 800 MG tablet Take 800 mg by mouth 3 times daily (with meals) Indications: takes 2 tablets tid with meals       VITAMIN D PO Take 5,000 Units by mouth 2 times daily        No current facility-administered medications for this visit.        Allergies   Allergen Reactions    Banana Anaphylaxis, Shortness Of Breath and Swelling    Atorvastatin Other (See Comments)     Chest pain, no muslce pains elsewhere    Lisinopril Other (See Comments)     Cough       Family History   Problem Relation Age of Onset    Cancer Father         prostate    Kidney Disease Brother Subjective:      Review of Systems   Constitutional: Negative for activity change and fever. HENT: Negative for congestion, ear pain and sore throat. Respiratory: Negative for cough, chest tightness and shortness of breath. Cardiovascular: Negative for chest pain. Gastrointestinal: Negative for abdominal pain, diarrhea, nausea and vomiting. Genitourinary: Negative for frequency and urgency. Musculoskeletal: Negative for arthralgias and myalgias. Skin: Negative for color change. Neurological: Negative for dizziness, weakness and numbness. Psychiatric/Behavioral: Negative for agitation. The patient is not nervous/anxious. Objective:     Physical Exam  Vitals reviewed. Constitutional:       Appearance: Normal appearance. He is obese. HENT:      Head: Normocephalic. Right Ear: Tympanic membrane normal.      Left Ear: Tympanic membrane normal.      Nose: Nose normal.      Mouth/Throat:      Mouth: Mucous membranes are moist.      Pharynx: Oropharynx is clear. Eyes:      Extraocular Movements: Extraocular movements intact. Pupils: Pupils are equal, round, and reactive to light. Cardiovascular:      Rate and Rhythm: Normal rate and regular rhythm. Pulses: Normal pulses. Heart sounds: Normal heart sounds. Pulmonary:      Effort: Pulmonary effort is normal.      Breath sounds: Normal breath sounds. Abdominal:      General: Bowel sounds are normal.      Palpations: Abdomen is soft. Musculoskeletal:         General: Normal range of motion. Cervical back: Normal range of motion. Skin:     General: Skin is warm and dry. Neurological:      Mental Status: He is alert and oriented to person, place, and time. Psychiatric:         Mood and Affect: Mood normal.         Behavior: Behavior normal.         Thought Content:  Thought content normal.         Judgment: Judgment normal.         /74   Pulse 79   Temp 97.2 °F (36.2 °C) (Temporal)   Ht 5' 8\" (1.727 m)   Wt 249 lb (112.9 kg)   SpO2 97%   BMI 37.86 kg/m²     Assessment:      Diagnosis Orders   1. Type 2 diabetes mellitus with chronic kidney disease on chronic dialysis, without long-term current use of insulin ([facility])  POCT glycosylated hemoglobin (Hb A1C)    Comprehensive Metabolic Panel    CBC with Auto Differential    Diabetic Foot Exam   2. Essential hypertension  Comprehensive Metabolic Panel   3. Severe obesity (BMI 35.0-39. 9) with comorbidity ([address]     4. Mixed hyperlipidemia  Lipid Panel       Results for orders placed or performed in visit on 07/05/22   POCT glycosylated hemoglobin (Hb A1C)   Result Value Ref Range    Hemoglobin A1C 6.0 %       Plan:     1. Type 2 diabetes mellitus with chronic kidney disease on chronic dialysis, without long-term current use of insulin ([facility])    - POCT glycosylated hemoglobin (Hb A1C)  - Comprehensive Metabolic Panel; Future  - CBC with Auto Differential; Future  - Diabetic Foot Exam    2. Essential hypertension    - Comprehensive Metabolic Panel; Future    3. Severe obesity (BMI 35.0-39. 9) with comorbidity ([address]  Eat a healthy diet and exercise. 4. Mixed hyperlipidemia    - Lipid Panel; Future       Return in about 3 months (around 10/5/2022) for 3 month f/u . Orders Placed This Encounter   Procedures    Lipid Panel     Standing Status:   Future     Standing Expiration Date:   7/5/2023     Order Specific Question:   Is Patient Fasting?/# of Hours     Answer:   8    Comprehensive Metabolic Panel     Standing Status:   Future     Standing Expiration Date:   7/5/2023    CBC with Auto Differential     Standing Status:   Future     Standing Expiration Date:   7/5/2023    POCT glycosylated hemoglobin (Hb A1C)    Diabetic Foot Exam       No orders of the defined types were placed in this encounter. Patient offered educational handouts and has had all questions answered.   Patient voices understanding and agrees to plans along with risks and benefits of plan. Patient is instructed to continue prior meds, diet, and exercise plans as instructed. Patient agrees to follow up as instructed and sooner if needed. Patient agrees to go to ER if condition becomes emergent. EMR Dragon/transcription disclaimer: Some of this encounter note is an electronic transcription/translation of spoken language to printed text. The electronic translation of spoken language may permit erroneous, or at times, nonsensical words or phrases to be inadvertently transcribed.  Although I have reviewed the note for such errors, some may still exist.    Electronically signed by SOSA Ruiz CNP on 7/5/2022 at 1:37 PM

## 2022-07-14 RX ORDER — PNV NO.95/FERROUS FUM/FOLIC AC 28MG-0.8MG
TABLET ORAL
Qty: 60 TABLET | Refills: 0 | Status: SHIPPED | OUTPATIENT
Start: 2022-07-14 | End: 2022-08-21

## 2022-07-14 NOTE — TELEPHONE ENCOUNTER
[facility] called requesting a refill of the below medication which has been pended for you:     Requested Prescriptions     Pending Prescriptions Disp Refills    Ferrous Sulfate (IRON) 325 (65 Fe) MG TABS [Pharmacy Med Name: IRON 65MG TAB] 60 tablet 0     Sig: TAKE 1 TABLET BY MOUTH TWICE DAILY WITH MEALS       Last Appointment Date: 7/5/2022  Next Appointment Date: 10/10/2022    Allergies   Allergen Reactions    Banana Anaphylaxis, Shortness Of Breath and Swelling    Atorvastatin Other (See Comments)     Chest pain, no muslce pains elsewhere    Lisinopril Other (See Comments)     Cough

## 2022-08-02 DIAGNOSIS — R97.20 ELEVATED PSA: ICD-10-CM

## 2022-08-02 LAB — PROSTATE SPECIFIC ANTIGEN: 5.01 NG/ML (ref 0–4)

## 2022-08-08 ENCOUNTER — OFFICE VISIT (OUTPATIENT)
Dept: UROLOGY | Age: 52
End: 2022-08-08
Payer: MEDICARE

## 2022-08-08 VITALS
BODY MASS INDEX: 37.59 KG/M2 | OXYGEN SATURATION: 98 % | TEMPERATURE: 97.2 F | SYSTOLIC BLOOD PRESSURE: 136 MMHG | DIASTOLIC BLOOD PRESSURE: 82 MMHG | WEIGHT: 248 LBS | HEART RATE: 56 BPM | HEIGHT: 68 IN

## 2022-08-08 DIAGNOSIS — N28.1 HYPERDENSE RENAL CYST: ICD-10-CM

## 2022-08-08 DIAGNOSIS — R97.20 ELEVATED PSA: Primary | ICD-10-CM

## 2022-08-08 PROCEDURE — 99214 OFFICE O/P EST MOD 30 MIN: CPT | Performed by: UROLOGY

## 2022-08-08 PROCEDURE — 1036F TOBACCO NON-USER: CPT | Performed by: UROLOGY

## 2022-08-08 PROCEDURE — 3017F COLORECTAL CA SCREEN DOC REV: CPT | Performed by: UROLOGY

## 2022-08-08 PROCEDURE — G8417 CALC BMI ABV UP PARAM F/U: HCPCS | Performed by: UROLOGY

## 2022-08-08 PROCEDURE — G8427 DOCREV CUR MEDS BY ELIG CLIN: HCPCS | Performed by: UROLOGY

## 2022-08-08 ASSESSMENT — ENCOUNTER SYMPTOMS
VOMITING: 0
NAUSEA: 0
EYE REDNESS: 0
SORE THROAT: 0
BACK PAIN: 0
FACIAL SWELLING: 0
EYE DISCHARGE: 0
CHEST TIGHTNESS: 0
WHEEZING: 0

## 2022-08-08 NOTE — PROGRESS NOTES
Harley Kwong is a 45 y.o. male who presents today   Chief Complaint   Patient presents with    Follow-up     I am here today for a 6 month fu. I had my psa done prior. Elevated PSA:  Patient is here today for history of an elevated PSA. His last several PSA values are as follows:  Lab Results   Component Value Date    PSA 5.01 (H) 08/02/2022    PSA 1.83 02/02/2022    PSA 2.96 06/18/2021    PSA 5.35 (H) 11/24/2020     Overall the PSA level is: increased increased from previous but is basically unchanged from when he had his biopsy  Recent history of urinary tract infection/prostatitis? no  Previous prostate biopsy? yes -done 1/4/2021 for PSA of 5.35 prostate volume was 22 g  Lower urinary tract symptoms: He has no significant lower urinary tract symptoms he is on dialysis he does not make much urine and only voids maybe once or twice a week      Renal Cyst:  Patient is here today for a renal cyst which was first noted approximately 1.5 years(s) ago. The mass(es) is(are): left   Overall the size of the cyst(s) are: stable and 1.7 cm in diameter. Flank pain? no  Hematuria? None  Bosniak renal cyst classification: Type 2, patient has known hyperdense cyst in the upper pole the left kidney. The cyst is unchanged in size previously it was hyperdense but last follow-up has a density more consistent with a simple cyst.  He he is due a follow-up CT scan in 6 months. he denies any flank pain or hematuria he is on dialysis with end-stage renal disease. He also has some smaller cyst in the lower pole of the left kidney and on the right side.                 Past Medical History:   Diagnosis Date    Asthma     CHF (congestive heart failure) ([facility] [address]     Diabetes mellitus ([facility] [address]     tues-thur-sat at [facility]    Erectile dysfunction     Hemodialysis patient [facility])     t,th,s at [facility]    History of blood transfusion     Hypertension     Obesity     Palliative care patient 01/25/2021       Past Surgical History: Procedure Laterality Date    COLONOSCOPY N/A 12/05/2019    Dr Muriel Rivera, internal hemorrhoids-Grade 1-2-HP, 3 yr recall    DIALYSIS FISTULA CREATION Left 06/26/2020    LEFT BRACHIAL / CEPHALIC AV FISTULA performed by Nissa Delaney MD at [facility]      [facility] - removal of first cath due to infection and placement of new one    TUNNELED VENOUS CATHETER PLACEMENT  01/2020    [facility]    VASCULAR SURGERY  11/11/2020    [facility].  Removal of tunneled dialysis catheter left internal jugular vein    VASCULAR SURGERY  07/26/2021    [facility]- Left upper extremity fistulogram's including venography the superior cava, Balloon angioplasty left proximal upper arm cephalic vein stenosis with 7 mm x 80 mm conquest, 7 mm x 100 mm Lutonix, 8 mm x 60 mm Lutonix drug-coated balloon, Completion venograms left upper extremity       Current Outpatient Medications   Medication Sig Dispense Refill    Ferrous Sulfate (IRON) 325 (65 Fe) MG TABS TAKE 1 TABLET BY MOUTH TWICE DAILY WITH MEALS 60 tablet 0    NIFEdipine (ADALAT CC) 60 MG extended release tablet Take 1 tablet by mouth twice daily 60 tablet 2    metoprolol tartrate (LOPRESSOR) 25 MG tablet Take 1 tablet by mouth twice daily 60 tablet 2    simvastatin (ZOCOR) 10 MG tablet Take 1 tablet by mouth nightly 90 tablet 1    cloNIDine (CATAPRES) 0.1 MG tablet Take 1 tablet by mouth twice daily 60 tablet 2    albuterol sulfate  (90 Base) MCG/ACT inhaler Inhale 2 puffs into the lungs every 6 hours as needed for Wheezing 1 each 3    Multiple Vitamins-Minerals (PRORENAL + D) TABS Take 1 tablet by mouth Every day on dialysis days(after treatment)      hydrALAZINE (APRESOLINE) 50 MG tablet TAKE ONE TABLET EVERY 8 HOURS 90 tablet 3    apixaban (ELIQUIS) 2.5 MG TABS tablet Take 1 tablet by mouth 2 times daily 60 tablet 0    Cyanocobalamin (VITAMIN B 12 PO) Take 1 tablet by mouth daily       calcium carbonate (OYSTER SHELL CALCIUM 500 MG) 1250 (500 Ca) MG tablet Take 1 tablet by mouth daily       No current facility-administered medications for this visit. Allergies   Allergen Reactions    Banana Anaphylaxis, Shortness Of Breath and Swelling    Atorvastatin Other (See Comments)     Chest pain, no muslce pains elsewhere    Lisinopril Other (See Comments)     Cough       Social History     Socioeconomic History    Marital status: Single     Spouse name: None    Number of children: None    Years of education: None    Highest education level: None   Tobacco Use    Smoking status: Never    Smokeless tobacco: Never   Vaping Use    Vaping Use: Never used   Substance and Sexual Activity    Alcohol use: Not Currently    Drug use: No     Social Determinants of Health     Financial Resource Strain: Low Risk     Difficulty of Paying Living Expenses: Not hard at all   Food Insecurity: No Food Insecurity    Worried About Running Out of Food in the Last Year: Never true    Ran Out of Food in the Last Year: Never true       Family History   Problem Relation Age of Onset    Cancer Father         prostate    Kidney Disease Brother        REVIEW OF SYSTEMS:  Review of Systems   Constitutional:  Negative for chills and fever. HENT:  Negative for facial swelling and sore throat. Eyes:  Negative for discharge and redness. Respiratory:  Negative for chest tightness and wheezing. Cardiovascular:  Negative for chest pain and palpitations. Gastrointestinal:  Negative for nausea and vomiting. Endocrine: Negative for polyphagia and polyuria. Genitourinary:  Negative for decreased urine volume, difficulty urinating, dysuria, enuresis, flank pain, frequency, genital sores, hematuria, penile discharge, penile pain, penile swelling, scrotal swelling, testicular pain and urgency. Musculoskeletal:  Negative for back pain and neck stiffness. Skin:  Negative for rash and wound. Neurological:  Negative for dizziness and headaches. Hematological:  Negative for adenopathy. Does not bruise/bleed easily. Psychiatric/Behavioral:  Negative for confusion and hallucinations. PHYSICAL EXAM:  /82   Pulse 56   Temp 97.2 °F (36.2 °C)   Ht 5' 8\" (1.727 m)   Wt 248 lb (112.5 kg)   SpO2 98%   BMI 37.71 kg/m²   Physical Exam  Constitutional:       General: He is not in acute distress. Appearance: Normal appearance. He is well-developed. HENT:      Head: Normocephalic and atraumatic. Nose: Nose normal.   Eyes:      General: No scleral icterus. Conjunctiva/sclera: Conjunctivae normal.      Pupils: Pupils are equal, round, and reactive to light. Neck:      Trachea: No tracheal deviation. Cardiovascular:      Rate and Rhythm: Normal rate and regular rhythm. Pulses: Normal pulses. Pulmonary:      Effort: Pulmonary effort is normal. No respiratory distress. Breath sounds: No stridor. Abdominal:      General: There is no distension. Palpations: Abdomen is soft. There is no mass. Tenderness: There is no abdominal tenderness. Musculoskeletal:         General: No tenderness or deformity. Normal range of motion. Cervical back: Normal range of motion and neck supple. Lymphadenopathy:      Cervical: No cervical adenopathy. Skin:     General: Skin is warm and dry. Findings: No erythema. Neurological:      General: No focal deficit present. Mental Status: He is alert and oriented to person, place, and time. Psychiatric:         Behavior: Behavior normal.         Judgment: Judgment normal.           DATA:    No results found for this visit on 08/08/22. Lab Results   Component Value Date    PSA 5.01 (H) 08/02/2022    PSA 1.83 02/02/2022    PSA 2.96 06/18/2021    PSA 5.35 (H) 11/24/2020     No results found for: PSAFREEPCT              1. Elevated PSA  PSA is up from previous but still not as high as it was when he has biopsy we will continue to monitor  - PSA, Diagnostic; Future    2.  Hyperdense renal cyst,left  He will be due a follow-up CT scan in 6 months  - CT ABDOMEN PELVIS W WO CONTRAST Additional Contrast? Radiologist Recommendation (renal mass protocol); Future      Orders Placed This Encounter   Procedures    CT ABDOMEN PELVIS W WO CONTRAST Additional Contrast? Radiologist Recommendation (renal mass protocol)     Renal mass protocol     Standing Status:   Future     Standing Expiration Date:   8/8/2023     Scheduling Instructions: In 6 months     Order Specific Question:   Additional Contrast?     Answer:   Radiologist Recommendation     Comments:   renal mass protocol     Order Specific Question:   STAT Creatinine as needed:     Answer:   No     Order Specific Question:   Reason for exam:     Answer: Follow-up left renal cyst.  Okay to give contrast patient on dialysis    PSA, Diagnostic     PSA in 6 month     Standing Status:   Future     Standing Expiration Date:   8/8/2023        Return in about 6 months (around 2/8/2023) for PSA prior to vext visit, office visit after xray study. All information inputted into the note by the MA to include chief complaint, past medical history, past surgical history, medications, allergies, social and family history and review of systems has been reviewed and updated as needed by me. EMR Dragon/transcription disclaimer: Much of this documentt is electronic  transcription/translation of spoken language to printed text. The  electronic translation of spoken language may be erroneous, or at times,  nonsensical words or phrases may be inadvertently transcribed.  Although I  have reviewed the document for such errors, some may still exist.

## 2022-08-21 RX ORDER — PNV NO.95/FERROUS FUM/FOLIC AC 28MG-0.8MG
TABLET ORAL
Qty: 60 TABLET | Refills: 1 | Status: SHIPPED | OUTPATIENT
Start: 2022-08-21

## 2022-10-06 DIAGNOSIS — I10 ESSENTIAL HYPERTENSION: ICD-10-CM

## 2022-10-07 RX ORDER — CLONIDINE HYDROCHLORIDE 0.1 MG/1
TABLET ORAL
Qty: 60 TABLET | Refills: 0 | Status: SHIPPED | OUTPATIENT
Start: 2022-10-07

## 2022-10-07 RX ORDER — NIFEDIPINE 60 MG/1
TABLET, FILM COATED, EXTENDED RELEASE ORAL
Qty: 60 TABLET | Refills: 0 | Status: SHIPPED | OUTPATIENT
Start: 2022-10-07

## 2022-10-10 ENCOUNTER — OFFICE VISIT (OUTPATIENT)
Dept: PRIMARY CARE CLINIC | Age: 52
End: 2022-10-10
Payer: MEDICARE

## 2022-10-10 VITALS
HEIGHT: 68 IN | WEIGHT: 252 LBS | SYSTOLIC BLOOD PRESSURE: 100 MMHG | BODY MASS INDEX: 38.19 KG/M2 | DIASTOLIC BLOOD PRESSURE: 64 MMHG

## 2022-10-10 DIAGNOSIS — N18.6 ESRD (END STAGE RENAL DISEASE) (HCC): ICD-10-CM

## 2022-10-10 DIAGNOSIS — E11.22 TYPE 2 DIABETES MELLITUS WITH CHRONIC KIDNEY DISEASE ON CHRONIC DIALYSIS, WITHOUT LONG-TERM CURRENT USE OF INSULIN (HCC): Primary | ICD-10-CM

## 2022-10-10 DIAGNOSIS — Z99.2 TYPE 2 DIABETES MELLITUS WITH CHRONIC KIDNEY DISEASE ON CHRONIC DIALYSIS, WITHOUT LONG-TERM CURRENT USE OF INSULIN (HCC): Primary | ICD-10-CM

## 2022-10-10 DIAGNOSIS — N18.6 TYPE 2 DIABETES MELLITUS WITH CHRONIC KIDNEY DISEASE ON CHRONIC DIALYSIS, WITHOUT LONG-TERM CURRENT USE OF INSULIN (HCC): Primary | ICD-10-CM

## 2022-10-10 DIAGNOSIS — E78.2 MIXED HYPERLIPIDEMIA: ICD-10-CM

## 2022-10-10 DIAGNOSIS — G47.9 SLEEP DISTURBANCE: ICD-10-CM

## 2022-10-10 DIAGNOSIS — E66.01 SEVERE OBESITY (BMI 35.0-39.9) WITH COMORBIDITY (HCC): ICD-10-CM

## 2022-10-10 PROCEDURE — 99214 OFFICE O/P EST MOD 30 MIN: CPT | Performed by: NURSE PRACTITIONER

## 2022-10-10 PROCEDURE — 3044F HG A1C LEVEL LT 7.0%: CPT | Performed by: NURSE PRACTITIONER

## 2022-10-10 SDOH — ECONOMIC STABILITY: FOOD INSECURITY: WITHIN THE PAST 12 MONTHS, THE FOOD YOU BOUGHT JUST DIDN'T LAST AND YOU DIDN'T HAVE MONEY TO GET MORE.: NEVER TRUE

## 2022-10-10 SDOH — ECONOMIC STABILITY: FOOD INSECURITY: WITHIN THE PAST 12 MONTHS, YOU WORRIED THAT YOUR FOOD WOULD RUN OUT BEFORE YOU GOT MONEY TO BUY MORE.: NEVER TRUE

## 2022-10-10 ASSESSMENT — SOCIAL DETERMINANTS OF HEALTH (SDOH): HOW HARD IS IT FOR YOU TO PAY FOR THE VERY BASICS LIKE FOOD, HOUSING, MEDICAL CARE, AND HEATING?: NOT HARD AT ALL

## 2022-10-10 NOTE — PROGRESS NOTES
[address] [address]     Phone:  [phone]  Fax:  [phone]      Tereso Mckeon is a 45 y.o. male who presents today for his medical conditions/complaints as noted below. Tereso Mckeon is c/o of 3 Month Follow-Up      Chief Complaint   Patient presents with    3 Month Follow-Up       HPI:     HPI    Reports he is having trouble sleeping, is only sleeping about 1-2 hours per night. No naps during the day. Has trouble falling asleep. Reports he has not been checking his blood sugars. He follows renal/transplant at [facility] for his kidney failure. Denies any other concerns. Past Medical History:   Diagnosis Date    Asthma     CHF (congestive heart failure) ([facility]     Diabetes mellitus ([facility]     tues-thur-sat at [facility]    Erectile dysfunction     Hemodialysis patient [facility])     t,th,s at [facility]    History of blood transfusion     Hypertension     Obesity     Palliative care patient 01/25/2021        Past Surgical History:   Procedure Laterality Date    COLONOSCOPY N/A 12/05/2019    Dr Lorena Aldrich, internal hemorrhoids-Grade 1-2-HP, 3 yr recall    DIALYSIS FISTULA CREATION Left 06/26/2020    LEFT BRACHIAL / CEPHALIC AV FISTULA performed by Noelle Ruiz MD at [address] - removal of first cath due to infection and placement of new one    TUNNELED VENOUS CATHETER PLACEMENT  01/2020    [facility] [place]    VASCULAR SURGERY  11/11/2020    [facility].  Removal of tunneled dialysis catheter left internal jugular vein    VASCULAR SURGERY  07/26/2021    [facility]- Left upper extremity fistulogram's including venography the superior cava, Balloon angioplasty left proximal upper arm cephalic vein stenosis with 7 mm x 80 mm conquest, 7 mm x 100 mm Lutonix, 8 mm x 60 mm Lutonix drug-coated balloon, Completion venograms left upper extremity       Social History     Tobacco Use    Smoking status: Never    Smokeless tobacco: Never Substance Use Topics    Alcohol use: Not Currently        Current Outpatient Medications   Medication Sig Dispense Refill    metoprolol tartrate (LOPRESSOR) 25 MG tablet Take 1 tablet by mouth twice daily 60 tablet 0    NIFEdipine (ADALAT CC) 60 MG extended release tablet Take 1 tablet by mouth twice daily 60 tablet 0    cloNIDine (CATAPRES) 0.1 MG tablet Take 1 tablet by mouth twice daily 60 tablet 0    Ferrous Sulfate (IRON) 325 (65 Fe) MG TABS TAKE 1 TABLET BY MOUTH TWICE DAILY WITH MEALS 60 tablet 1    simvastatin (ZOCOR) 10 MG tablet Take 1 tablet by mouth nightly 90 tablet 1    albuterol sulfate  (90 Base) MCG/ACT inhaler Inhale 2 puffs into the lungs every 6 hours as needed for Wheezing 1 each 3    hydrALAZINE (APRESOLINE) 50 MG tablet TAKE ONE TABLET EVERY 8 HOURS 90 tablet 3    apixaban (ELIQUIS) 2.5 MG TABS tablet Take 1 tablet by mouth 2 times daily 60 tablet 0    Cyanocobalamin (VITAMIN B 12 PO) Take 1 tablet by mouth daily       calcium carbonate (OYSTER SHELL CALCIUM 500 MG) 1250 (500 Ca) MG tablet Take 1 tablet by mouth daily      Multiple Vitamins-Minerals (PRORENAL + D) TABS Take 1 tablet by mouth Every day on dialysis days(after treatment) (Patient not taking: Reported on 10/10/2022)       No current facility-administered medications for this visit. Allergies   Allergen Reactions    Banana Anaphylaxis, Shortness Of Breath and Swelling    Atorvastatin Other (See Comments)     Chest pain, no muslce pains elsewhere    Lisinopril Other (See Comments)     Cough       Family History   Problem Relation Age of Onset    Cancer Father         prostate    Kidney Disease Brother                Subjective:      Review of Systems   Constitutional:  Negative for activity change and fever. HENT:  Negative for congestion, ear pain and sore throat. Respiratory:  Negative for cough, chest tightness and shortness of breath. Cardiovascular:  Negative for chest pain.    Gastrointestinal:  Negative for abdominal pain, diarrhea, nausea and vomiting. Genitourinary:  Negative for frequency and urgency. Musculoskeletal:  Negative for arthralgias and myalgias. Skin:  Negative for color change. Neurological:  Negative for dizziness, weakness and numbness. Psychiatric/Behavioral:  Positive for sleep disturbance. Negative for agitation. The patient is not nervous/anxious. Objective:     Physical Exam  Vitals reviewed. Constitutional:       Appearance: Normal appearance. HENT:      Head: Normocephalic. Right Ear: Tympanic membrane normal.      Left Ear: Tympanic membrane normal.      Nose: Nose normal.      Mouth/Throat:      Mouth: Mucous membranes are moist.      Pharynx: Oropharynx is clear. Eyes:      Extraocular Movements: Extraocular movements intact. Pupils: Pupils are equal, round, and reactive to light. Cardiovascular:      Rate and Rhythm: Normal rate and regular rhythm. Pulses: Normal pulses. Heart sounds: Normal heart sounds. Pulmonary:      Effort: Pulmonary effort is normal.      Breath sounds: Normal breath sounds. Abdominal:      General: Bowel sounds are normal.      Palpations: Abdomen is soft. Musculoskeletal:         General: Normal range of motion. Cervical back: Normal range of motion. Skin:     General: Skin is warm and dry. Neurological:      Mental Status: He is alert and oriented to person, place, and time. Psychiatric:         Mood and Affect: Mood normal.         Behavior: Behavior normal.         Thought Content: Thought content normal.         Judgment: Judgment normal.       /64   Ht 5' 8\" (1.727 m)   Wt 252 lb (114.3 kg)   BMI 38.32 kg/m²     Assessment:      Diagnosis Orders   1. Type 2 diabetes mellitus with chronic kidney disease on chronic dialysis, without long-term current use of insulin ([facility])  Hemoglobin A1C      2. Mixed hyperlipidemia        3. ESRD (end stage renal disease) ([facility]        4.  Severe obesity (BMI 35.0-39. 9) with comorbidity ([facility]        5. Sleep disturbance            No results found for this visit on 10/10/22. Plan:     1. Type 2 diabetes mellitus with chronic kidney disease on chronic dialysis, without long-term current use of insulin ([facility])  Monitor blood sugars on a regular basis. Discussed the need for routine lab work, ordered placed and patient verbalized understanding.   - Hemoglobin A1C; Future    2. Mixed hyperlipidemia  Come in this week to have fasting labs checked. 3. ESRD (end stage renal disease) ([facility]  Continue to follow up with [facility]    4. Severe obesity (BMI 35.0-39. 9) with comorbidity ([address]  Do your best to eat a healthy diet and exercise. 5. Sleep disturbance  Recommend starting melatonin 10 mg once nightly. May increase to melatonin 20 mg once nightly if needed. Return in about 1 month (around 11/10/2022) for AWV. Orders Placed This Encounter   Procedures    Hemoglobin A1C     Standing Status:   Future     Standing Expiration Date:   10/10/2023       No orders of the defined types were placed in this encounter. Patient offered educational handouts and has had all questions answered. Patient voices understanding and agrees to plans along with risks and benefits of plan. Patient is instructed to continue prior meds, diet, and exercise plans as instructed. Patient agrees to follow up as instructed and sooner if needed. Patient agrees to go to ER if condition becomes emergent. EMR Dragon/transcription disclaimer: Some of this encounter note is an electronic transcription/translation of spoken language to printed text. The electronic translation of spoken language may permit erroneous, or at times, nonsensical words or phrases to be inadvertently transcribed.  Although I have reviewed the note for such errors, some may still exist.    Electronically signed by SOSA Rosen CNP on 10/17/2022 at 8:29 AM

## 2022-10-10 NOTE — PATIENT INSTRUCTIONS
Try melaltonin 10 mg once nightly. May increase to 20 mg once nightly if needed. Fasting blood work this week. 4th floor, water or black coffee after midnight, nothing else to eat or drink. We are committed to providing you with the best care possible. In order to help us achieve these goals please remember to bring all medications, herbal products, and over the counter supplements with you to each visit. If your provider has ordered testing for you, please be sure to follow up with our office if you have not received results within 7 days after the testing took place. *If you receive a survey after visiting one of our offices, please take time to share your experience concerning your physician office visit. These surveys are confidential and no health information about you is shared. We are eager to improve for you and we are counting on your feedback to help make that happen.

## 2022-10-17 ENCOUNTER — APPOINTMENT (OUTPATIENT)
Dept: GENERAL RADIOLOGY | Age: 52
End: 2022-10-17
Payer: MEDICARE

## 2022-10-17 ENCOUNTER — HOSPITAL ENCOUNTER (EMERGENCY)
Age: 52
Discharge: HOME | End: 2022-10-17
Attending: EMERGENCY MEDICINE
Payer: MEDICARE

## 2022-10-17 ENCOUNTER — APPOINTMENT (OUTPATIENT)
Dept: CT IMAGING | Age: 52
End: 2022-10-17
Payer: MEDICARE

## 2022-10-17 VITALS
HEART RATE: 68 BPM | HEIGHT: 68 IN | OXYGEN SATURATION: 97 % | BODY MASS INDEX: 37.59 KG/M2 | RESPIRATION RATE: 18 BRPM | WEIGHT: 248 LBS | TEMPERATURE: 98.2 F | SYSTOLIC BLOOD PRESSURE: 98 MMHG | DIASTOLIC BLOOD PRESSURE: 68 MMHG

## 2022-10-17 DIAGNOSIS — R05.1 ACUTE COUGH: ICD-10-CM

## 2022-10-17 DIAGNOSIS — R06.89 DYSPNEA AND RESPIRATORY ABNORMALITIES: ICD-10-CM

## 2022-10-17 DIAGNOSIS — R06.00 DYSPNEA AND RESPIRATORY ABNORMALITIES: ICD-10-CM

## 2022-10-17 DIAGNOSIS — R07.9 CHEST PAIN, UNSPECIFIED TYPE: Primary | ICD-10-CM

## 2022-10-17 LAB
ADENOVIRUS BY PCR: NOT DETECTED
ALBUMIN SERPL-MCNC: 4.3 G/DL (ref 3.5–5.2)
ALP BLD-CCNC: 61 U/L (ref 40–130)
ALT SERPL-CCNC: 23 U/L (ref 5–41)
ANION GAP SERPL CALCULATED.3IONS-SCNC: 14 MMOL/L (ref 7–19)
APTT: 31.8 SEC (ref 26–36.2)
AST SERPL-CCNC: 11 U/L (ref 5–40)
BASOPHILS ABSOLUTE: 0 K/UL (ref 0–0.2)
BASOPHILS RELATIVE PERCENT: 0.3 % (ref 0–1)
BILIRUB SERPL-MCNC: 0.3 MG/DL (ref 0.2–1.2)
BORDETELLA PARAPERTUSSIS BY PCR: NOT DETECTED
BORDETELLA PERTUSSIS BY PCR: NOT DETECTED
BUN BLDV-MCNC: 56 MG/DL (ref 6–20)
CALCIUM SERPL-MCNC: 10.4 MG/DL (ref 8.6–10)
CHLAMYDOPHILIA PNEUMONIAE BY PCR: NOT DETECTED
CHLORIDE BLD-SCNC: 95 MMOL/L (ref 98–111)
CO2: 29 MMOL/L (ref 22–29)
CORONAVIRUS 229E BY PCR: NOT DETECTED
CORONAVIRUS HKU1 BY PCR: NOT DETECTED
CORONAVIRUS NL63 BY PCR: NOT DETECTED
CORONAVIRUS OC43 BY PCR: NOT DETECTED
CREAT SERPL-MCNC: 14.3 MG/DL (ref 0.5–1.2)
D DIMER: 0.46 UG/ML FEU (ref 0–0.48)
EKG P AXIS: 15 DEGREES
EKG P-R INTERVAL: 162 MS
EKG Q-T INTERVAL: 428 MS
EKG QRS DURATION: 102 MS
EKG QTC CALCULATION (BAZETT): 428 MS
EKG T AXIS: 9 DEGREES
EOSINOPHILS ABSOLUTE: 0.6 K/UL (ref 0–0.6)
EOSINOPHILS RELATIVE PERCENT: 6.2 % (ref 0–5)
GFR AFRICAN AMERICAN: 4
GFR NON-AFRICAN AMERICAN: 4
GLUCOSE BLD-MCNC: 193 MG/DL (ref 74–109)
HCT VFR BLD CALC: 37.8 % (ref 42–52)
HEMOGLOBIN: 12.5 G/DL (ref 14–18)
HUMAN METAPNEUMOVIRUS BY PCR: NOT DETECTED
HUMAN RHINOVIRUS/ENTEROVIRUS BY PCR: NOT DETECTED
IMMATURE GRANULOCYTES #: 0.1 K/UL
INFLUENZA A BY PCR: NOT DETECTED
INFLUENZA B BY PCR: NOT DETECTED
INR BLD: 1.03 (ref 0.88–1.18)
LYMPHOCYTES ABSOLUTE: 2.4 K/UL (ref 1.1–4.5)
LYMPHOCYTES RELATIVE PERCENT: 27.2 % (ref 20–40)
MCH RBC QN AUTO: 32.5 PG (ref 27–31)
MCHC RBC AUTO-ENTMCNC: 33.1 G/DL (ref 33–37)
MCV RBC AUTO: 98.2 FL (ref 80–94)
MONOCYTES ABSOLUTE: 1 K/UL (ref 0–0.9)
MONOCYTES RELATIVE PERCENT: 11 % (ref 0–10)
MYCOPLASMA PNEUMONIAE BY PCR: NOT DETECTED
NEUTROPHILS ABSOLUTE: 4.8 K/UL (ref 1.5–7.5)
NEUTROPHILS RELATIVE PERCENT: 54.7 % (ref 50–65)
PARAINFLUENZA VIRUS 1 BY PCR: NOT DETECTED
PARAINFLUENZA VIRUS 2 BY PCR: NOT DETECTED
PARAINFLUENZA VIRUS 3 BY PCR: NOT DETECTED
PARAINFLUENZA VIRUS 4 BY PCR: NOT DETECTED
PDW BLD-RTO: 13.7 % (ref 11.5–14.5)
PLATELET # BLD: 175 K/UL (ref 130–400)
PMV BLD AUTO: 9.5 FL (ref 9.4–12.4)
POTASSIUM REFLEX MAGNESIUM: 3.8 MMOL/L (ref 3.5–5)
PRO-BNP: 1370 PG/ML (ref 0–900)
PROTHROMBIN TIME: 13.5 SEC (ref 12–14.6)
RBC # BLD: 3.85 M/UL (ref 4.7–6.1)
RESPIRATORY SYNCYTIAL VIRUS BY PCR: NOT DETECTED
SARS-COV-2, PCR: NOT DETECTED
SODIUM BLD-SCNC: 138 MMOL/L (ref 136–145)
TOTAL PROTEIN: 7.6 G/DL (ref 6.6–8.7)
TROPONIN: 0.02 NG/ML (ref 0–0.03)
WBC # BLD: 8.9 K/UL (ref 4.8–10.8)

## 2022-10-17 PROCEDURE — 85730 THROMBOPLASTIN TIME PARTIAL: CPT

## 2022-10-17 PROCEDURE — 71260 CT THORAX DX C+: CPT

## 2022-10-17 PROCEDURE — 6370000000 HC RX 637 (ALT 250 FOR IP): Performed by: EMERGENCY MEDICINE

## 2022-10-17 PROCEDURE — 93010 ELECTROCARDIOGRAM REPORT: CPT | Performed by: INTERNAL MEDICINE

## 2022-10-17 PROCEDURE — 80053 COMPREHEN METABOLIC PANEL: CPT

## 2022-10-17 PROCEDURE — 85610 PROTHROMBIN TIME: CPT

## 2022-10-17 PROCEDURE — 71045 X-RAY EXAM CHEST 1 VIEW: CPT

## 2022-10-17 PROCEDURE — 84484 ASSAY OF TROPONIN QUANT: CPT

## 2022-10-17 PROCEDURE — 0202U NFCT DS 22 TRGT SARS-COV-2: CPT

## 2022-10-17 PROCEDURE — 83880 ASSAY OF NATRIURETIC PEPTIDE: CPT

## 2022-10-17 PROCEDURE — 85025 COMPLETE CBC W/AUTO DIFF WBC: CPT

## 2022-10-17 PROCEDURE — 71260 CT THORAX DX C+: CPT | Performed by: RADIOLOGY

## 2022-10-17 PROCEDURE — 36415 COLL VENOUS BLD VENIPUNCTURE: CPT

## 2022-10-17 PROCEDURE — 6360000004 HC RX CONTRAST MEDICATION: Performed by: EMERGENCY MEDICINE

## 2022-10-17 PROCEDURE — 99285 EMERGENCY DEPT VISIT HI MDM: CPT

## 2022-10-17 PROCEDURE — 93005 ELECTROCARDIOGRAM TRACING: CPT | Performed by: EMERGENCY MEDICINE

## 2022-10-17 PROCEDURE — 85379 FIBRIN DEGRADATION QUANT: CPT

## 2022-10-17 RX ORDER — ACETAMINOPHEN 500 MG
1000 TABLET ORAL ONCE
Status: COMPLETED | OUTPATIENT
Start: 2022-10-17 | End: 2022-10-17

## 2022-10-17 RX ADMIN — IOPAMIDOL 70 ML: 755 INJECTION, SOLUTION INTRAVENOUS at 07:54

## 2022-10-17 RX ADMIN — ACETAMINOPHEN 1000 MG: 500 TABLET ORAL at 07:09

## 2022-10-17 ASSESSMENT — ENCOUNTER SYMPTOMS
ABDOMINAL PAIN: 0
COUGH: 1
DIARRHEA: 0
CHEST TIGHTNESS: 0
DIARRHEA: 0
VOMITING: 0
BACK PAIN: 0
ABDOMINAL PAIN: 0
COLOR CHANGE: 0
SORE THROAT: 0
NAUSEA: 0
SHORTNESS OF BREATH: 0
COUGH: 0
VOMITING: 0
SORE THROAT: 0
NAUSEA: 0
SHORTNESS OF BREATH: 0
RHINORRHEA: 0

## 2022-10-17 ASSESSMENT — PAIN - FUNCTIONAL ASSESSMENT: PAIN_FUNCTIONAL_ASSESSMENT: 0-10

## 2022-10-17 ASSESSMENT — PAIN SCALES - GENERAL: PAINLEVEL_OUTOF10: 4

## 2022-10-17 ASSESSMENT — PAIN DESCRIPTION - PAIN TYPE: TYPE: ACUTE PAIN

## 2022-10-17 ASSESSMENT — PAIN DESCRIPTION - LOCATION: LOCATION: RIB CAGE

## 2022-10-17 ASSESSMENT — PAIN DESCRIPTION - ORIENTATION: ORIENTATION: RIGHT

## 2022-10-17 NOTE — ED PROVIDER NOTES
[facility] EMERGENCY DEPT  eMERGENCY dEPARTMENT eNCOUnter      Pt Name: Kirsty Patterson  MRN: [id number]  [place] 1970  Date of evaluation: 10/17/2022  Provider: Joselito Jackman MD    [address] [place] [address]       Chief Complaint   Patient presents with    Shortness of Breath     Increased since yesterday. Pt c/o right side rib pain. Denies fevers. HISTORY OF PRESENT ILLNESS   (Location/Symptom, Timing/Onset,Context/Setting, Quality, Duration, Modifying Factors, Severity)  Note limiting factors. Kirsty Patterson is a 45 y.o. male who presents to the emergency department chest pain and cough. Its right-sided sharp stabbing and worse with deep breath. It woke him from sleep yesterday morning. He has had a cough that is nonproductive. No fever. He says he is not currently on any blood thinners. He is a dialysis patient and is due for dialysis at 4:00 today. No history of blood clots. No calf pain or leg swelling. HPI    NursingNotes were reviewed. REVIEW OF SYSTEMS    (2-9 systems for level 4, 10 or more for level 5)     Review of Systems   Constitutional:  Negative for chills and fever. HENT:  Negative for rhinorrhea and sore throat. Respiratory:  Positive for cough. Negative for shortness of breath. Cardiovascular:  Positive for chest pain. Negative for leg swelling. Gastrointestinal:  Negative for abdominal pain, diarrhea, nausea and vomiting. Genitourinary:  Negative for difficulty urinating. Musculoskeletal:  Negative for back pain and neck pain. Skin:  Negative for rash. Neurological:  Negative for weakness and headaches. Psychiatric/Behavioral:  Negative for confusion. A complete review of systems was performed and is negative except as noted above in the HPI.        PAST MEDICAL HISTORY     Past Medical History:   Diagnosis Date    Asthma     CHF (congestive heart failure) ([facility]     Diabetes mellitus ([facility] [address] [facility]     tues-thur-sat at [facility]    Erectile dysfunction     Hemodialysis patient [facility])     t,th,s at [facility]    History of blood transfusion     Hypertension     Obesity     Palliative care patient 01/25/2021         SURGICAL HISTORY       Past Surgical History:   Procedure Laterality Date    COLONOSCOPY N/A 12/05/2019    Dr Jericho Bettencourt, internal hemorrhoids-Grade 1-2-HP, 3 yr recall    DIALYSIS FISTULA CREATION Left 06/26/2020    LEFT BRACHIAL / CEPHALIC AV FISTULA performed by Nuris Ochoa MD at [address]      wb - removal of first cath due to infection and placement of new one    TUNNELED VENOUS CATHETER PLACEMENT  01/2020    [facility]    VASCULAR SURGERY  11/11/2020    [facility].  Removal of tunneled dialysis catheter left internal jugular vein    VASCULAR SURGERY  07/26/2021    [facility]- Left upper extremity fistulogram's including venography the superior cava, Balloon angioplasty left proximal upper arm cephalic vein stenosis with 7 mm x 80 mm conquest, 7 mm x 100 mm Lutonix, 8 mm x 60 mm Lutonix drug-coated balloon, Completion venograms left upper extremity         CURRENT MEDICATIONS       Previous Medications    ALBUTEROL SULFATE  (90 BASE) MCG/ACT INHALER    Inhale 2 puffs into the lungs every 6 hours as needed for Wheezing    APIXABAN (ELIQUIS) 2.5 MG TABS TABLET    Take 1 tablet by mouth 2 times daily    CALCIUM CARBONATE (OYSTER SHELL CALCIUM 500 MG) 1250 (500 CA) MG TABLET    Take 1 tablet by mouth daily    CLONIDINE (CATAPRES) 0.1 MG TABLET    Take 1 tablet by mouth twice daily    CYANOCOBALAMIN (VITAMIN B 12 PO)    Take 1 tablet by mouth daily     FERROUS SULFATE (IRON) 325 (65 FE) MG TABS    TAKE 1 TABLET BY MOUTH TWICE DAILY WITH MEALS    HYDRALAZINE (APRESOLINE) 50 MG TABLET    TAKE ONE TABLET EVERY 8 HOURS    METOPROLOL TARTRATE (LOPRESSOR) 25 MG TABLET    Take 1 tablet by mouth twice daily    MULTIPLE VITAMINS-MINERALS (PRORENAL + D) TABS    Take 1 tablet by mouth Every day on dialysis days(after treatment)    NIFEDIPINE (ADALAT CC) 60 MG EXTENDED RELEASE TABLET    Take 1 tablet by mouth twice daily    SIMVASTATIN (ZOCOR) 10 MG TABLET    Take 1 tablet by mouth nightly       ALLERGIES     Banana, Atorvastatin, and Lisinopril    FAMILY HISTORY       Family History   Problem Relation Age of Onset    Cancer Father         prostate    Kidney Disease Brother           SOCIAL HISTORY       Social History     Socioeconomic History    Marital status: Single     Spouse name: None    Number of children: None    Years of education: None    Highest education level: None   Tobacco Use    Smoking status: Never    Smokeless tobacco: Never   Vaping Use    Vaping Use: Never used   Substance and Sexual Activity    Alcohol use: Not Currently    Drug use: No     Social Determinants of Health     Financial Resource Strain: Low Risk     Difficulty of Paying Living Expenses: Not hard at all   Food Insecurity: No Food Insecurity    Worried About Running Out of Food in the Last Year: Never true    Ran Out of Food in the Last Year: Never true       SCREENINGS    Shanon Coma Scale  Eye Opening: Spontaneous  Best Verbal Response: Oriented  Best Motor Response: Obeys commands  Shanon Coma Scale Score: 15        PHYSICAL EXAM    (up to 7 for level 4, 8 or more for level 5)     ED Triage Vitals [10/17/22 0554]   BP Temp Temp src Heart Rate Resp SpO2 Height Weight   (!) 147/77 98.2 °F (36.8 °C) -- 68 18 96 % 5' 8\" (1.727 m) 248 lb (112.5 kg)       Physical Exam  Vitals and nursing note reviewed. Constitutional:       General: He is not in acute distress. Appearance: He is well-developed. He is not diaphoretic. HENT:      Head: Normocephalic and atraumatic. Eyes:      Pupils: Pupils are equal, round, and reactive to light. Cardiovascular:      Rate and Rhythm: Normal rate and regular rhythm. Heart sounds: Normal heart sounds. Pulmonary:      Effort: Pulmonary effort is normal. No respiratory distress. Breath sounds: Normal breath sounds. Abdominal:      General: Bowel sounds are normal. There is no distension. Palpations: Abdomen is soft. Tenderness: There is no abdominal tenderness. Musculoskeletal:         General: Normal range of motion. Cervical back: Normal range of motion and neck supple. Skin:     General: Skin is warm and dry. Findings: No rash. Neurological:      Mental Status: He is alert and oriented to person, place, and time. Cranial Nerves: No cranial nerve deficit. Motor: No abnormal muscle tone. Coordination: Coordination normal.   Psychiatric:         Behavior: Behavior normal.       DIAGNOSTIC RESULTS     EKG: All EKG's are interpreted by the Emergency Department Physician who either signs or Co-signs this chart in the absence of a cardiologist.    Sinus rhythm rate 60 non specific ST waves similar to prior    RADIOLOGY:   Non-plain film images such as CT, Ultrasound and MRI are read by the radiologist. Alon Cha images are visualized and preliminarily interpreted by the emergency physician with the below findings:        Interpretation per the Radiologist below, if available at the time of this note:    XR CHEST PORTABLE    (Results Pending)         ED BEDSIDE ULTRASOUND:   Performed by ED Physician - none    LABS:  Labs Reviewed   CBC WITH AUTO DIFFERENTIAL   COMPREHENSIVE METABOLIC PANEL W/ REFLEX TO MG FOR LOW K   TROPONIN   BRAIN NATRIURETIC PEPTIDE   PROTIME-INR   APTT   D-DIMER, QUANTITATIVE       All other labs were within normal range or not returned as of this dictation.     EMERGENCY DEPARTMENT COURSE and DIFFERENTIALDIAGNOSIS/MDM:   Vitals:    Vitals:    10/17/22 0554   BP: (!) 147/77   Pulse: 68   Resp: 18   Temp: 98.2 °F (36.8 °C)   SpO2: 96%   Weight: 248 lb (112.5 kg)   Height: 5' 8\" (1.727 m)       MDM    BARNEY end of shift to [facility] pending work up    CONSULTS:  None    PROCEDURES:  Unless otherwise notedbelow, none     Procedures    FINAL IMPRESSION     1. Chest pain, unspecified type    2.  Acute cough          DISPOSITION/PLAN   DISPOSITION        PATIENT REFERRED TO:  @FUP@    DISCHARGE MEDICATIONS:  New Prescriptions    No medications on file          (Please note that portions of this note were completed with a voice recognition program.  Efforts were made to edit the dictations butoccasionally words are mis-transcribed.)    Demarco Ott MD (electronically signed)  AttendingEmergency Physician         Demarco Ott MD  10/17/22 9664

## 2022-10-17 NOTE — ED PROVIDER NOTES
[facility] EMERGENCY DEPT  eMERGENCYdEPARTMENT eNCOUnter      Pt Name: Nancy Reyes  MRN: [id number]  [place] 1970  Date of evaluation: 10/17/2022  Davie Randall MD    CHIEF COMPLAINT       Chief Complaint   Patient presents with    Shortness of Breath     Increased since yesterday. Pt c/o right side rib pain. Denies fevers. PHYSICAL EXAM    (up to 7 for level 4, 8 or more for level 5)     ED Triage Vitals [10/17/22 0554]   BP Temp Temp src Heart Rate Resp SpO2 Height Weight   (!) 147/77 98.2 °F (36.8 °C) -- 68 18 96 % 5' 8\" (1.727 m) 248 lb (112.5 kg)       Physical Exam    DIAGNOSTIC RESULTS     EKG: All EKG's are interpreted by the Emergency Department Physician who either signs orCo-signs this chart in the absence of a cardiologist.    Sinus rhythm rate 60. MI interval 191. QTc 412. No ST abnormalities noted. RADIOLOGY:   Non-plain film images such as CT, Ultrasound and MRI are read by the radiologist. Plain radiographic images are visualized and preliminarily interpreted by the emergency physician with the below findings:    I have reviewed the images and results. CT CHEST W CONTRAST   Final Result   1. Right lower lobe of linear atelectasis/scarring. 2.  Diffuse fatty infiltration of the liver. 3. A 3 mm right lower lobe calcific granuloma. A 4 and a 2 mm right retrocrural calcific lymph nodes. Findings from remote granulomatous exposure. 4.  No other significant abnormality. Recommendation:   Follow up as clinically indicated. All CT scans at this facility utilize dose modulation, iterative reconstruction, and/or weight based dosing when appropriate to reduce radiation dose to as low as reasonably achievable. Electronically Signed by Kel Hebert MD at 17-Oct-2022 09:24:52 AM               XR CHEST PORTABLE   Final Result     Right basilar linear opacity. Recommendation: Follow up as clinically indicated.     Electronically Signed by Tien Hayes MD at 17-Oct-2022 07:47:41 AM                       LABS:  Labs Reviewed   CBC WITH AUTO DIFFERENTIAL - Abnormal; Notable for the following components:       Result Value    RBC 3.85 (*)     Hemoglobin 12.5 (*)     Hematocrit 37.8 (*)     MCV 98.2 (*)     MCH 32.5 (*)     Monocytes % 11.0 (*)     Eosinophils % 6.2 (*)     Monocytes Absolute 1.00 (*)     All other components within normal limits   COMPREHENSIVE METABOLIC PANEL W/ REFLEX TO MG FOR LOW K - Abnormal; Notable for the following components:    Chloride 95 (*)     Glucose 193 (*)     BUN 56 (*)     Creatinine 14.3 (*)     GFR Non- 4 (*)     GFR  4 (*)     Calcium 10.4 (*)     All other components within normal limits   BRAIN NATRIURETIC PEPTIDE - Abnormal; Notable for the following components:    Pro-BNP 1,370 (*)     All other components within normal limits   RESPIRATORY PANEL, MOLECULAR, WITH COVID-19   TROPONIN   PROTIME-INR   APTT   D-DIMER, QUANTITATIVE       All other labs were within normal range or not returned as of this dictation. EMERGENCY DEPARTMENT COURSE and DIFFERENTIALDIAGNOSIS/MDM:   Vitals:    Vitals:    10/17/22 0830 10/17/22 0900 10/17/22 1030 10/17/22 1100   BP: 103/68 117/65 118/77 98/68   Pulse:       Resp:       Temp:       SpO2: 93% 92% 92% 97%   Weight:       Height:           MDM  Number of Diagnoses or Management Options  Acute cough  Chest pain, unspecified type  Dyspnea and respiratory abnormalities  Diagnosis management comments: New cough. His pleuritic chest pain may be nothing more than musculoskeletal.  His x-ray was concerning and a CT scan was performed. It is stable. Patient does home dialysis and understands importance of doing dialysis to remove his IV contrast he received today. He will return if condition changes or worsens. No prescriptions. Reassessment  I assumed care at 6:30 AM from Dr. Abi Campbell.     CONSULTS:  None    PROCEDURES:  Unlessotherwise noted below, none Procedures    FINAL IMPRESSION      1. Chest pain, unspecified type    2. Acute cough    3.  Dyspnea and respiratory abnormalities          DISPOSITION/PLAN   DISPOSITION Decision To Discharge    PATIENT REFERRED TO:  SOSA Wang CNP  [address]  [phone]          DISCHARGE MEDICATIONS:  New Prescriptions    No medications on file          (Please note that portions of this note were completed with a voice recognition program.  Efforts were made to edit the dictations but occasionally words are mis-transcribed.)    Marisol Smith MD (electronically signed)  Attending Emergency Physician        Yokasta You MD  10/17/22 1122

## 2022-11-05 DIAGNOSIS — I10 ESSENTIAL HYPERTENSION: ICD-10-CM

## 2022-11-05 RX ORDER — CLONIDINE HYDROCHLORIDE 0.1 MG/1
TABLET ORAL
Qty: 60 TABLET | Refills: 5 | Status: SHIPPED | OUTPATIENT
Start: 2022-11-05

## 2022-11-05 RX ORDER — NIFEDIPINE 60 MG/1
TABLET, FILM COATED, EXTENDED RELEASE ORAL
Qty: 60 TABLET | Refills: 5 | Status: SHIPPED | OUTPATIENT
Start: 2022-11-05

## 2022-11-05 NOTE — TELEPHONE ENCOUNTER
[facility] EMERGENCY DEPT [address] [address] [phone] Work/School Note Date: 5/19/2019 To Whom It May concern: 
 
Serg Freedman was seen and treated today in the emergency room by the following provider(s): 
Attending Provider: Geraldine Amor MD 
Physician Assistant: VAIBHAV Navarrete. Serg Freedman may return to work on 05/22/19. Sincerely, VAIBHAV Nava 
 
 
 
 Fay Ravi called to request a refill on his medication.       Last office visit : 10/10/2022   Next office visit : 11/11/2022     Requested Prescriptions     Pending Prescriptions Disp Refills    metoprolol tartrate (LOPRESSOR) 25 MG tablet [Pharmacy Med Name: Metoprolol Tartrate 25 MG Oral Tablet] 60 tablet 0     Sig: Take 1 tablet by mouth twice daily    NIFEdipine (ADALAT CC) 60 MG extended release tablet [Pharmacy Med Name: NIFEdipine ER 60 MG Oral Tablet Extended Release 24 Hour] 60 tablet 0     Sig: Take 1 tablet by mouth twice daily    cloNIDine (CATAPRES) 0.1 MG tablet [Pharmacy Med Name: cloNIDine HCl 0.1 MG Oral Tablet] 60 tablet 0     Sig: Take 1 tablet by mouth twice daily            Edward Watts LPN

## 2022-11-11 ENCOUNTER — OFFICE VISIT (OUTPATIENT)
Dept: PRIMARY CARE CLINIC | Age: 52
End: 2022-11-11
Payer: MEDICARE

## 2022-11-11 VITALS
BODY MASS INDEX: 39.65 KG/M2 | HEART RATE: 63 BPM | HEIGHT: 67 IN | WEIGHT: 252.6 LBS | SYSTOLIC BLOOD PRESSURE: 118 MMHG | TEMPERATURE: 97.3 F | DIASTOLIC BLOOD PRESSURE: 70 MMHG | OXYGEN SATURATION: 98 %

## 2022-11-11 DIAGNOSIS — I10 BENIGN ESSENTIAL HTN: ICD-10-CM

## 2022-11-11 DIAGNOSIS — E78.2 MIXED HYPERLIPIDEMIA: ICD-10-CM

## 2022-11-11 DIAGNOSIS — E11.22 TYPE 2 DIABETES MELLITUS WITH CHRONIC KIDNEY DISEASE ON CHRONIC DIALYSIS, WITHOUT LONG-TERM CURRENT USE OF INSULIN (HCC): ICD-10-CM

## 2022-11-11 DIAGNOSIS — N18.6 TYPE 2 DIABETES MELLITUS WITH CHRONIC KIDNEY DISEASE ON CHRONIC DIALYSIS, WITHOUT LONG-TERM CURRENT USE OF INSULIN (HCC): ICD-10-CM

## 2022-11-11 DIAGNOSIS — Z80.42 FAMILY HISTORY OF PROSTATE CANCER IN FATHER: ICD-10-CM

## 2022-11-11 DIAGNOSIS — Z23 NEED FOR COVID-19 VACCINE: ICD-10-CM

## 2022-11-11 DIAGNOSIS — Z99.2 TYPE 2 DIABETES MELLITUS WITH CHRONIC KIDNEY DISEASE ON CHRONIC DIALYSIS, WITHOUT LONG-TERM CURRENT USE OF INSULIN (HCC): ICD-10-CM

## 2022-11-11 DIAGNOSIS — I10 ESSENTIAL HYPERTENSION: ICD-10-CM

## 2022-11-11 DIAGNOSIS — E11.9 TYPE 2 DIABETES MELLITUS WITHOUT COMPLICATION, WITHOUT LONG-TERM CURRENT USE OF INSULIN (HCC): ICD-10-CM

## 2022-11-11 DIAGNOSIS — Z00.00 WELCOME TO MEDICARE PREVENTIVE VISIT: Primary | ICD-10-CM

## 2022-11-11 LAB
ALBUMIN SERPL-MCNC: 4.5 G/DL (ref 3.5–5.2)
ALP BLD-CCNC: 46 U/L (ref 40–130)
ALT SERPL-CCNC: 37 U/L (ref 5–41)
ANION GAP SERPL CALCULATED.3IONS-SCNC: 14 MMOL/L (ref 7–19)
AST SERPL-CCNC: 19 U/L (ref 5–40)
BASOPHILS ABSOLUTE: 0.1 K/UL (ref 0–0.2)
BASOPHILS RELATIVE PERCENT: 0.8 % (ref 0–1)
BILIRUB SERPL-MCNC: 0.4 MG/DL (ref 0.2–1.2)
BUN BLDV-MCNC: 62 MG/DL (ref 6–20)
CALCIUM SERPL-MCNC: 10 MG/DL (ref 8.6–10)
CHLORIDE BLD-SCNC: 95 MMOL/L (ref 98–111)
CHOLESTEROL, TOTAL: 124 MG/DL (ref 160–199)
CO2: 29 MMOL/L (ref 22–29)
CREAT SERPL-MCNC: 12.2 MG/DL (ref 0.5–1.2)
EOSINOPHILS ABSOLUTE: 0.6 K/UL (ref 0–0.6)
EOSINOPHILS RELATIVE PERCENT: 8.4 % (ref 0–5)
GFR SERPL CREATININE-BSD FRML MDRD: 4 ML/MIN/{1.73_M2}
GLUCOSE BLD-MCNC: 124 MG/DL (ref 74–109)
HBA1C MFR BLD: 6 % (ref 4–6)
HBA1C MFR BLD: 6.3 %
HCT VFR BLD CALC: 39.6 % (ref 42–52)
HDLC SERPL-MCNC: 35 MG/DL (ref 55–121)
HEMOGLOBIN: 13 G/DL (ref 14–18)
IMMATURE GRANULOCYTES #: 0 K/UL
LDL CHOLESTEROL CALCULATED: 63 MG/DL
LYMPHOCYTES ABSOLUTE: 2.2 K/UL (ref 1.1–4.5)
LYMPHOCYTES RELATIVE PERCENT: 33 % (ref 20–40)
MCH RBC QN AUTO: 32.3 PG (ref 27–31)
MCHC RBC AUTO-ENTMCNC: 32.8 G/DL (ref 33–37)
MCV RBC AUTO: 98.5 FL (ref 80–94)
MONOCYTES ABSOLUTE: 0.8 K/UL (ref 0–0.9)
MONOCYTES RELATIVE PERCENT: 11.5 % (ref 0–10)
NEUTROPHILS ABSOLUTE: 3 K/UL (ref 1.5–7.5)
NEUTROPHILS RELATIVE PERCENT: 46 % (ref 50–65)
PDW BLD-RTO: 13.6 % (ref 11.5–14.5)
PLATELET # BLD: 201 K/UL (ref 130–400)
PMV BLD AUTO: 9.9 FL (ref 9.4–12.4)
POTASSIUM SERPL-SCNC: 4.8 MMOL/L (ref 3.5–5)
PROSTATE SPECIFIC ANTIGEN: 3.78 NG/ML (ref 0–4)
RBC # BLD: 4.02 M/UL (ref 4.7–6.1)
SODIUM BLD-SCNC: 138 MMOL/L (ref 136–145)
TOTAL PROTEIN: 7.3 G/DL (ref 6.6–8.7)
TRIGL SERPL-MCNC: 128 MG/DL (ref 0–149)
WBC # BLD: 6.5 K/UL (ref 4.8–10.8)

## 2022-11-11 PROCEDURE — 83036 HEMOGLOBIN GLYCOSYLATED A1C: CPT | Performed by: NURSE PRACTITIONER

## 2022-11-11 PROCEDURE — 91312 COVID-19, PFIZER BIVALENT BOOSTER, (AGE 12Y+), IM, 30 MCG/0.3 ML: CPT | Performed by: NURSE PRACTITIONER

## 2022-11-11 PROCEDURE — 0124A COVID-19, PFIZER BIVALENT BOOSTER, (AGE 12Y+), IM, 30 MCG/0.3 ML: CPT | Performed by: NURSE PRACTITIONER

## 2022-11-11 PROCEDURE — G0402 INITIAL PREVENTIVE EXAM: HCPCS | Performed by: NURSE PRACTITIONER

## 2022-11-11 PROCEDURE — 3044F HG A1C LEVEL LT 7.0%: CPT | Performed by: NURSE PRACTITIONER

## 2022-11-11 SDOH — HEALTH STABILITY: PHYSICAL HEALTH: ON AVERAGE, HOW MANY MINUTES DO YOU ENGAGE IN EXERCISE AT THIS LEVEL?: 0 MIN

## 2022-11-11 SDOH — HEALTH STABILITY: PHYSICAL HEALTH: ON AVERAGE, HOW MANY DAYS PER WEEK DO YOU ENGAGE IN MODERATE TO STRENUOUS EXERCISE (LIKE A BRISK WALK)?: 0 DAYS

## 2022-11-11 ASSESSMENT — PATIENT HEALTH QUESTIONNAIRE - PHQ9
SUM OF ALL RESPONSES TO PHQ QUESTIONS 1-9: 0
1. LITTLE INTEREST OR PLEASURE IN DOING THINGS: 0
SUM OF ALL RESPONSES TO PHQ QUESTIONS 1-9: 0
SUM OF ALL RESPONSES TO PHQ QUESTIONS 1-9: 0
SUM OF ALL RESPONSES TO PHQ9 QUESTIONS 1 & 2: 0
SUM OF ALL RESPONSES TO PHQ QUESTIONS 1-9: 0
2. FEELING DOWN, DEPRESSED OR HOPELESS: 0

## 2022-11-11 ASSESSMENT — LIFESTYLE VARIABLES
HOW OFTEN DO YOU HAVE SIX OR MORE DRINKS ON ONE OCCASION: 1
HOW OFTEN DO YOU HAVE A DRINK CONTAINING ALCOHOL: NEVER
HOW MANY STANDARD DRINKS CONTAINING ALCOHOL DO YOU HAVE ON A TYPICAL DAY: 0
HOW OFTEN DO YOU HAVE A DRINK CONTAINING ALCOHOL: 1
HOW MANY STANDARD DRINKS CONTAINING ALCOHOL DO YOU HAVE ON A TYPICAL DAY: PATIENT DOES NOT DRINK

## 2022-11-11 NOTE — PATIENT INSTRUCTIONS
Labs 4th floor today. Body Mass Index: Care Instructions  Your Care Instructions     Body mass index (BMI) can help you see if your weight is raising your risk for health problems. It uses a formula to compare how much you weigh with how tall you are. A BMI lower than 18.5 is considered underweight. A BMI between 18.5 and 24.9 is considered healthy. A BMI between 25 and 29.9 is considered overweight. A BMI of 30 or higher is considered obese. If your BMI is in the normal range, it means that you have a lower risk for weight-related health problems. If your BMI is in the overweight or obese range, you may be at increased risk for weight-related health problems, such as high blood pressure, heart disease, stroke, arthritis or joint pain, and diabetes. If your BMI is in the underweight range, you may be at increased risk for health problems such as fatigue, lower protection (immunity) against illness, muscle loss, bone loss, hair loss, and hormone problems. BMI is just one measure of your risk for weight-related health problems. You may be at higher risk for health problems if you are not active, you eat an unhealthy diet, or you drink too much alcohol or use tobacco products. Follow-up care is a key part of your treatment and safety. Be sure to make and go to all appointments, and call your doctor if you are having problems. It's also a good idea to know your test results and keep a list of the medicines you take. How can you care for yourself at home? Practice healthy eating habits. This includes eating plenty of fruits, vegetables, whole grains, lean protein, and low-fat dairy. If your doctor recommends it, get more exercise. Walking is a good choice. Bit by bit, increase the amount you walk every day. Try for at least 30 minutes on most days of the week. Do not smoke. Smoking can increase your risk for health problems.  If you need help quitting, talk to your doctor about stop-smoking programs and medicines. These can increase your chances of quitting for good. Limit alcohol to 2 drinks a day for men and 1 drink a day for women. Too much alcohol can cause health problems. If you have a BMI higher than 25  Your doctor may do other tests to check your risk for weight-related health problems. This may include measuring the distance around your waist. A waist measurement of more than 40 inches in men or 35 inches in women can increase the risk of weight-related health problems. Talk with your doctor about steps you can take to stay healthy or improve your health. You may need to make lifestyle changes to lose weight and stay healthy, such as changing your diet and getting regular exercise. If you have a BMI lower than 18.5  Your doctor may do other tests to check your risk for health problems. Talk with your doctor about steps you can take to stay healthy or improve your health. You may need to make lifestyle changes to gain or maintain weight and stay healthy, such as getting more healthy foods in your diet and doing exercises to build muscle. Where can you learn more? Go to https://wesync.[employer]. org and sign in to your [employer] account. Enter S176 in the Trigemina box to learn more about \"Body Mass Index: Care Instructions. \"     If you do not have an account, please click on the \"Sign Up Now\" link. Current as of: December 27, 2021               Content Version: 13.4  © 2457-0329 Healthwise, Incorporated. Care instructions adapted under license by [facility] ([facility]). If you have questions about a medical condition or this instruction, always ask your healthcare professional. [place] [address] any warranty or liability for your use of this information. Personalized Preventive Plan for Tereso Mckeon - 11/11/2022  Medicare offers a range of preventive health benefits.  Some of the tests and screenings are paid in full while other may be subject to a deductible, co-insurance, and/or copay. Some of these benefits include a comprehensive review of your medical history including lifestyle, illnesses that may run in your family, and various assessments and screenings as appropriate. After reviewing your medical record and screening and assessments performed today your provider may have ordered immunizations, labs, imaging, and/or referrals for you. A list of these orders (if applicable) as well as your Preventive Care list are included within your After Visit Summary for your review. Other Preventive Recommendations:    A preventive eye exam performed by an eye specialist is recommended every 1-2 years to screen for glaucoma; cataracts, macular degeneration, and other eye disorders. A preventive dental visit is recommended every 6 months. Try to get at least 150 minutes of exercise per week or 10,000 steps per day on a pedometer . Order or download the FREE \"Exercise & Physical Activity: Your Everyday Guide\" from [employer]. Call 6-245.237.3322 or search [employer] online. You need 4186-7426 mg of calcium and 3922-0212 IU of vitamin D per day. It is possible to meet your calcium requirement with diet alone, but a vitamin D supplement is usually necessary to meet this goal.  When exposed to the sun, use a sunscreen that protects against both UVA and UVB radiation with an SPF of 30 or greater. Reapply every 2 to 3 hours or after sweating, drying off with a towel, or swimming. Always wear a seat belt when traveling in a car. Always wear a helmet when riding a bicycle or motorcycle.

## 2022-11-11 NOTE — PROGRESS NOTES
After obtaining consent, and per orders of Aletha Del Cid,APRN  injection of Covid Bivalent was given in the right deltoid by Alicia Blount LPN  Pt tolerated well and had no reactions.

## 2022-11-11 NOTE — PROGRESS NOTES
Medicare Annual Wellness Visit    Judd Mckay is here for Medicare AWV    Assessment & Plan   Welcome to Medicare preventive visit  Type 2 diabetes mellitus with chronic kidney disease on chronic dialysis, without long-term current use of insulin ([facility] [address]  -     POCT glycosylated hemoglobin (Hb A1C)  Need for COVID-19 vaccine  -     COVID-19, PFIZER Bivalent BOOSTER, (age 12y+), IM, 30 mcg/0.3 mL    Recommendations for Preventive Services Due: see orders and patient instructions/AVS.  Recommended screening schedule for the next 5-10 years is provided to the patient in written form: see Patient Instructions/AVS.     Return for Medicare Annual Wellness Visit in 1 year. Subjective   The following acute and/or chronic problems were also addressed today:  Cerumen impaction, left ear > right ear  Needs diabetic eye exam- plans to go to [facility]    Patient's complete Health Risk Assessment and screening values have been reviewed and are found in Flowsheets. The following problems were reviewed today and where indicated follow up appointments were made and/or referrals ordered.     Positive Risk Factor Screenings with Interventions:             General Health and ACP:  General  In general, how would you say your health is?: Fair  In the past 7 days, have you experienced any of the following: New or Increased Pain, New or Increased Fatigue, Loneliness, Social Isolation, Stress or Anger?: No  Do you get the social and emotional support that you need?: Yes  Do you have a Living Will?: (!) No    Advance Directives       Power of  Living Will ACP-Advance Directive ACP-Power of     Not on File Filed on 09/24/17 Filed Not on File          General Health Risk Interventions:  No Living Will: [address] addressed with patient today    Health Habits/Nutrition:  Physical Activity: Inactive    Days of Exercise per Week: 0 days    Minutes of Exercise per Session: 0 min     Have you lost any weight without trying in the past 3 months?: No  Body mass index: (!) 39.56  Have you seen the dentist within the past year?: (!) No  Health Habits/Nutrition Interventions:  Inadequate physical activity:  patient agrees to exercise for at least 150 minutes/week  Dental exam overdue:  patient encouraged to make appointment with his/her dentist    Hearing/Vision:  Do you or your family notice any trouble with your hearing that hasn't been managed with hearing aids?: No  Do you have difficulty driving, watching TV, or doing any of your daily activities because of your eyesight?: No  Have you had an eye exam within the past year?: (!) No  No results found. Hearing/Vision Interventions:  No concerns. Reports he has mild hearing loss in his left ear but no concerns. Safety:  Do you have working smoke detectors?: Yes  Do you have any tripping hazards - loose or unsecured carpets or rugs?: No  Do you have any tripping hazards - clutter in doorways, halls, or stairs?: No  Do you have either shower bars, grab bars, non-slip mats or non-slip surfaces in your shower or bathtub?: (!) No  Do all of your stairways have a railing or banister?: Not Applicable  Do you always fasten your seatbelt when you are in a car?: Yes  Safety Interventions:  Home safety tips provided           Objective   Vitals:    11/11/22 1047   BP: 118/70   Pulse: 63   Temp: 97.3 °F (36.3 °C)   TempSrc: Temporal   SpO2: 98%   Weight: 252 lb 9.6 oz (114.6 kg)   Height: 5' 7\" (1.702 m)      Body mass index is 39.56 kg/m².       General Appearance: alert and oriented to person, place and time, well developed and well- nourished, in no acute distress  Skin: warm and dry, no rash or erythema  Head: normocephalic and atraumatic  Eyes: pupils equal, round, and reactive to light, extraocular eye movements intact, conjunctivae normal  ENT: tympanic membrane, external ear and ear canal normal bilaterally, nose without deformity, nasal mucosa and turbinates normal without TABS tablet Take 1 tablet by mouth 2 times daily Yes SOSA Alberts   Cyanocobalamin (VITAMIN B 12 PO) Take 1 tablet by mouth daily  Yes Historical Provider, MD   calcium carbonate (OYSTER SHELL CALCIUM 500 MG) 1250 (500 Ca) MG tablet Take 1 tablet by mouth daily Yes Historical Provider, MD [place] (Including outside providers/suppliers regularly involved in providing care):   Patient Care Team:  SOSA Weber CNP as PCP - General (Nurse Practitioner Family)  SOSA Weber CNP as PCP - [facility] Empaneled Provider     Reviewed and updated this visit:  Tobacco  Allergies  Meds  Problems  Med Hx  Surg Hx  Soc Hx  Fam Hx                 Obesity Counseling: Assessed behavioral health risks and factors affecting choice of behavior. Suggested weight control approaches, including dietary changes behavioral modification and follow up plan. Provided educational and support documentation.   Time spent (minutes): 10

## 2022-11-14 ENCOUNTER — TELEPHONE (OUTPATIENT)
Dept: GASTROENTEROLOGY | Age: 52
End: 2022-11-14

## 2022-11-21 ENCOUNTER — TELEPHONE (OUTPATIENT)
Dept: PRIMARY CARE CLINIC | Age: 52
End: 2022-11-21

## 2022-11-21 NOTE — TELEPHONE ENCOUNTER
Called patient related to results and my chart message not read  Informed and all questions answered

## 2022-12-01 ENCOUNTER — ANESTHESIA EVENT (OUTPATIENT)
Dept: ENDOSCOPY | Age: 52
End: 2022-12-01
Payer: MEDICARE

## 2022-12-05 ENCOUNTER — ANESTHESIA (OUTPATIENT)
Dept: ENDOSCOPY | Age: 52
End: 2022-12-05
Payer: MEDICARE

## 2022-12-08 ENCOUNTER — TELEPHONE (OUTPATIENT)
Dept: GASTROENTEROLOGY | Age: 52
End: 2022-12-08

## 2022-12-08 NOTE — TELEPHONE ENCOUNTER
Patient: Hiram Schuler    YOB: 1970      Clearance was received on December 8, 2022. for Colonoscopy scheduled for: 12/19/22    Patient may discontinue the use of ELIQUIS for 3  days prior to the procedure.     IS Lovenox required:  NO    PATIENT NOTIFIED ON:  12/8/22      Clearance scanned into media

## 2022-12-15 ENCOUNTER — HOSPITAL ENCOUNTER (EMERGENCY)
Age: 52
Discharge: HOME | End: 2022-12-16
Payer: MEDICARE

## 2022-12-15 ENCOUNTER — APPOINTMENT (OUTPATIENT)
Dept: CT IMAGING | Age: 52
End: 2022-12-15
Payer: MEDICARE

## 2022-12-15 VITALS
WEIGHT: 242 LBS | OXYGEN SATURATION: 96 % | BODY MASS INDEX: 37.98 KG/M2 | HEIGHT: 67 IN | RESPIRATION RATE: 15 BRPM | SYSTOLIC BLOOD PRESSURE: 121 MMHG | HEART RATE: 64 BPM | TEMPERATURE: 98.2 F | DIASTOLIC BLOOD PRESSURE: 80 MMHG

## 2022-12-15 DIAGNOSIS — M25.552 LEFT HIP PAIN: Primary | ICD-10-CM

## 2022-12-15 PROCEDURE — 6370000000 HC RX 637 (ALT 250 FOR IP): Performed by: NURSE PRACTITIONER

## 2022-12-15 PROCEDURE — 99284 EMERGENCY DEPT VISIT MOD MDM: CPT

## 2022-12-15 PROCEDURE — 73700 CT LOWER EXTREMITY W/O DYE: CPT | Performed by: RADIOLOGY

## 2022-12-15 PROCEDURE — 72192 CT PELVIS W/O DYE: CPT | Performed by: RADIOLOGY

## 2022-12-15 PROCEDURE — 96372 THER/PROPH/DIAG INJ SC/IM: CPT

## 2022-12-15 PROCEDURE — 6360000002 HC RX W HCPCS: Performed by: NURSE PRACTITIONER

## 2022-12-15 PROCEDURE — 73700 CT LOWER EXTREMITY W/O DYE: CPT

## 2022-12-15 PROCEDURE — 72192 CT PELVIS W/O DYE: CPT

## 2022-12-15 RX ORDER — ORPHENADRINE CITRATE 30 MG/ML
60 INJECTION INTRAMUSCULAR; INTRAVENOUS ONCE
Status: COMPLETED | OUTPATIENT
Start: 2022-12-15 | End: 2022-12-15

## 2022-12-15 RX ORDER — HYDROCODONE BITARTRATE AND ACETAMINOPHEN 5; 325 MG/1; MG/1
1 TABLET ORAL EVERY 6 HOURS PRN
Qty: 12 TABLET | Refills: 0 | Status: SHIPPED | OUTPATIENT
Start: 2022-12-15 | End: 2022-12-18

## 2022-12-15 RX ORDER — HYDROCODONE BITARTRATE AND ACETAMINOPHEN 5; 325 MG/1; MG/1
2 TABLET ORAL ONCE
Status: COMPLETED | OUTPATIENT
Start: 2022-12-15 | End: 2022-12-15

## 2022-12-15 RX ORDER — MORPHINE SULFATE 4 MG/ML
2 INJECTION, SOLUTION INTRAMUSCULAR; INTRAVENOUS ONCE
Status: COMPLETED | OUTPATIENT
Start: 2022-12-15 | End: 2022-12-15

## 2022-12-15 RX ADMIN — ORPHENADRINE CITRATE 60 MG: 30 INJECTION INTRAMUSCULAR; INTRAVENOUS at 19:26

## 2022-12-15 RX ADMIN — MORPHINE SULFATE 2 MG: 4 INJECTION, SOLUTION INTRAMUSCULAR; INTRAVENOUS at 19:26

## 2022-12-15 RX ADMIN — HYDROCODONE BITARTRATE AND ACETAMINOPHEN 2 TABLET: 5; 325 TABLET ORAL at 22:42

## 2022-12-15 ASSESSMENT — PAIN SCALES - GENERAL
PAINLEVEL_OUTOF10: 8
PAINLEVEL_OUTOF10: 9

## 2022-12-15 ASSESSMENT — PAIN DESCRIPTION - LOCATION: LOCATION: LEG

## 2022-12-15 ASSESSMENT — PAIN DESCRIPTION - ORIENTATION: ORIENTATION: LEFT

## 2022-12-15 ASSESSMENT — ENCOUNTER SYMPTOMS
ABDOMINAL PAIN: 0
BACK PAIN: 0

## 2022-12-16 NOTE — ED PROVIDER NOTES
[facility] [place]GENC[place]PT  eMERGENCY dEPARTMENT eNCOUnter      Pt Name: Sterling Veras  MRN: [id number]  Gradygfjuan 1970  Date of evaluation: 12/15/2022  Provider: Jackie Mancuso       Chief Complaint   Patient presents with    Hip Pain     Left hip, no injury         HISTORY OF PRESENT ILLNESS   (Location/Symptom, Timing/Onset,Context/Setting, Quality, Duration, Modifying Factors, Severity)  Note limiting factors. Celine Prajapati a 47 y.o. male who presents to the emergency department for evaluation of left hip pain. Pt tells me that he has had left hip pain over past 3 days. He denies specific injury. He describes left hip pain as moderately severe, constant and worsening since onset. Left hip pain is worse with activity and improved somewhat with rest. He denies fevers as well as recent illness. He has had no back or abdominal pain. He denies lower extremity numbness/weakness. He has had no swelling overlying skin left hip. He tells me that her is a dialysis patient. He denies missing dialysis treatments. [facility]    Nursing Notes were reviewed. REVIEW OF SYSTEMS    (2-9 systems for level 4, 10 or more for level 5)     Review of Systems   Gastrointestinal:  Negative for abdominal pain. Musculoskeletal:  Positive for arthralgias (left hip). Negative for back pain. A complete review of systems was performed and is negative except as noted above in the HPI.        PAST MEDICAL HISTORY     Past Medical History:   Diagnosis Date    Asthma     CHF (congestive heart failure) ([facility] [address]     Diabetes mellitus ([facility] [address]     tues-thedwige-sat at [facility]    Erectile dysfunction     Hemodialysis patient [facility])     t,th,s at [facility]    History of blood transfusion     Hypertension     Obesity     Palliative care patient 01/25/2021         SURGICAL HISTORY       Past Surgical History:   Procedure Laterality Date    COLONOSCOPY N/A 12/05/2019    Dr Alejandro Pickard, internal hemorrhoids-Grade 1-2-HP, 3 yr recall    DIALYSIS FISTULA CREATION Left 06/26/2020    LEFT BRACHIAL / CEPHALIC AV FISTULA performed by Noelle Ruiz MD at [address]      wb - removal of first cath due to infection and placement of new one    TUNNELED VENOUS CATHETER PLACEMENT  01/2020    \[facility] [place] [facility]\""    VASCULAR SURGERY  11/11/2020    S.  Removal of tunneled dialysis catheter left internal jugular vein    VASCULAR SURGERY  07/26/2021    [facility]- Left upper extremity fistulogram's including venography the superior cava, Balloon angioplasty left proximal upper arm cephalic vein stenosis with 7 mm x 80 mm conquest, 7 mm x 100 mm Lutonix, 8 mm x 60 mm Lutonix drug-coated balloon, Completion venograms left upper extremity         CURRENT MEDICATIONS       Discharge Medication List as of 12/15/2022 11:42 PM        CONTINUE these medications which have NOT CHANGED    Details   metoprolol tartrate (LOPRESSOR) 25 MG tablet Take 1 tablet by mouth twice daily, Disp-60 tablet, R-5Normal      NIFEdipine (ADALAT CC) 60 MG extended release tablet Take 1 tablet by mouth twice daily, Disp-60 tablet, R-5Normal      cloNIDine (CATAPRES) 0.1 MG tablet Take 1 tablet by mouth twice daily, Disp-60 tablet, R-5Normal      Ferrous Sulfate (IRON) 325 (65 Fe) MG TABS TAKE 1 TABLET BY MOUTH TWICE DAILY WITH MEALS, Disp-60 tablet, R-1Normal      simvastatin (ZOCOR) 10 MG tablet Take 1 tablet by mouth nightly, Disp-90 tablet, R-1Normal      albuterol sulfate  (90 Base) MCG/ACT inhaler Inhale 2 puffs into the lungs every 6 hours as needed for Wheezing, Disp-1 each, R-3Normal      Multiple Vitamins-Minerals (PRORENAL + D) TABS Take 1 tablet by mouth Every day on dialysis days(after treatment)Historical Med      hydrALAZINE (APRESOLINE) 50 MG tablet TAKE ONE TABLET EVERY 8 HOURS, Disp-90 tablet, R-3Normal      apixaban (ELIQUIS) 2.5 MG TABS tablet Take 1 tablet by mouth 2 times daily, Disp-60 tablet, R-0Normal      Cyanocobalamin (VITAMIN B 12 PO) Take 1 tablet by mouth daily Historical Med      calcium carbonate (OYSTER SHELL CALCIUM 500 MG) 1250 (500 Ca) MG tablet Take 1 tablet by mouth dailyHistorical Med             ALLERGIES     Banana, Atorvastatin, and Lisinopril    FAMILY HISTORY       Family History   Problem Relation Age of Onset    Cancer Father         prostate    Kidney Disease Brother           SOCIAL HISTORY       Social History     Socioeconomic History    Marital status: Single     Spouse name: None    Number of children: None    Years of education: None    Highest education level: None   Tobacco Use    Smoking status: Never    Smokeless tobacco: Never   Vaping Use    Vaping Use: Never used   Substance and Sexual Activity    Alcohol use: Not Currently    Drug use: No     Social Determinants of Health     Financial Resource Strain: Low Risk     Difficulty of Paying Living Expenses: Not hard at all   Food Insecurity: No Food Insecurity    Worried About Running Out of Food in the Last Year: Never true    Ran Out of Food in the Last Year: Never true   Physical Activity: Inactive    Days of Exercise per Week: 0 days    Minutes of Exercise per Session: 0 min       SCREENINGS    [place] Coma Scale  Eye Opening: Spontaneous  Best Verbal Response: Oriented  Best Motor Response: Obeys commands  [place] Coma Scale Score: 15        PHYSICAL EXAM    (up to 7 for level 4, 8 or more for level 5)     ED Triage Vitals [12/15/22 1751]   BP Temp Temp Source Heart Rate Resp SpO2 Height Weight   121/80 98.2 °F (36.8 °C) Temporal 64 15 96 % 5' 7\" (1.702 m) 242 lb (109.8 kg)       Physical Exam  Vitals reviewed. HENT:      Right Ear: External ear normal.      Left Ear: External ear normal.   Eyes:      Conjunctiva/sclera: Conjunctivae normal.   Cardiovascular:      Rate and Rhythm: Normal rate and regular rhythm. Pulmonary:      Effort: Pulmonary effort is normal.      Breath sounds: Normal breath sounds. Abdominal:      Tenderness:  There is no abdominal tenderness. Musculoskeletal:      Comments: No direct tenderness to palpate lumbar spine  Left lateral hip with tenderness to palpate  No overlying induration or edema noted to left hip  CMS intact left lower extremity   Neurological:      Mental Status: He is oriented to person, place, and time. DIAGNOSTIC RESULTS     EKG: All EKG's are interpreted by the Emergency Department Physician who either signs or Co-signs this chart in the absence of acardiologist.        RADIOLOGY:   Non-plain film images such as CT, Ultrasound andMRI are read by the radiologist. Plain radiographic images are visualized and preliminarily interpreted by the emergency physician with the below findings:        Interpretation per the Radiologist below, if available at the time of this note:    CT PELVIS WO CONTRAST Additional Contrast? None   Final Result   1. No acute findings   2. Remainder as above   Recommendation:   Follow up as clinically indicated. All CT scans at this facility utilize dose modulation, iterative reconstruction, and/or weight based dosing when appropriate to reduce radiation dose to as low as reasonably achievable. Dictated and Electronically Signed by Lul Pratt DO at 15-Dec-2022 11:44:10 PM               CT HIP LEFT WO CONTRAST   Final Result   1. No acute findings   2. No significant degenerative changes   3. Subtle soft tissue calcifications adjacent to the greater trochanter, likely related to tendinitis   Recommendation:   Follow up as clinically indicated. All CT scans at this facility utilize dose modulation, iterative reconstruction, and/or weight based dosing when appropriate to reduce radiation dose to as low as reasonably achievable.    Dictated and Electronically Signed by Lul Pratt DO at 15-Dec-2022 11:39:26 PM                     ED BEDSIDE ULTRASOUND:   Performed by ED Physician - none    LABS:  Labs Reviewed - No data to display    All other labs were within normal range or not returned as of this dictation. RE-ASSESSMENT     Discussed imaging result with patient. No avascular necrosis or fracture noted on CT. Left hip does not appear clinically consistent with picture of septic arthritis. Discussed possible left greater trochanteric tendinitis. Plan for pain control, rollator walker over next few days and follow up with orthopedic institute next week if not improving. EMERGENCY DEPARTMENT COURSE and DIFFERENTIALDIAGNOSIS/MDM:   Vitals:    Vitals:    12/15/22 1751   BP: 121/80   Pulse: 64   Resp: 15   Temp: 98.2 °F (36.8 °C)   TempSrc: Temporal   SpO2: 96%   Weight: 242 lb (109.8 kg)   Height: 5' 7\" (1.702 m)       MDM        CONSULTS:  None    PROCEDURES:  Unless otherwise notedbelow, none     Procedures    FINAL IMPRESSION     1. Left hip pain          DISPOSITION/PLAN   DISPOSITION Decision To Discharge 12/15/2022 11:49:49 PM      PATIENT REFERRED TO:  Edmundo Evans MD  [address] [place] [address]  [phone]    Schedule an appointment as soon as possible for a visit in 3 days  fail to improve    DISCHARGE MEDICATIONS:    Periodic Controlled Substance Monitoring: Possible medication side effects, risk of tolerance/dependence & alternative treatments discussed., No signs of potential drug abuse or diversion identified. Tonia Nazario, APRN - CNP)  Discharge Medication List as of 12/15/2022 11:42 PM           Medication List        START taking these medications      HYDROcodone-acetaminophen 5-325 MG per tablet  Commonly known as: Norco  Take 1 tablet by mouth every 6 hours as needed for Pain for up to 3 days. Intended supply: 3 days.  Take lowest dose possible to manage pain            ASK your doctor about these medications      albuterol sulfate  (90 Base) MCG/ACT inhaler  Commonly known as: PROVENTIL;VENTOLIN;PROAIR  Inhale 2 puffs into the lungs every 6 hours as needed for Wheezing     apixaban 2.5 MG Tabs tablet  Commonly known as: Eliquis  Take 1 tablet by mouth 2 times daily     calcium carbonate 1250 (500 Ca) MG tablet  Commonly known as: OYSTER SHELL CALCIUM 500 mg     cloNIDine 0.1 MG tablet  Commonly known as: CATAPRES  Take 1 tablet by mouth twice daily     hydrALAZINE 50 MG tablet  Commonly known as: APRESOLINE  TAKE ONE TABLET EVERY 8 HOURS     Iron 325 (65 Fe) MG Tabs  TAKE 1 TABLET BY MOUTH TWICE DAILY WITH MEALS     metoprolol tartrate 25 MG tablet  Commonly known as: LOPRESSOR  Take 1 tablet by mouth twice daily     NIFEdipine 60 MG extended release tablet  Commonly known as: ADALAT CC  Take 1 tablet by mouth twice daily     ProRenal + D Tabs     simvastatin 10 MG tablet  Commonly known as: ZOCOR  Take 1 tablet by mouth nightly     VITAMIN B 12 PO               Where to Get Your Medications        These medications were sent to [address], [address], [address] [address]      Phone: [phone]   HYDROcodone-acetaminophen 5-325 MG per tablet         (Pleasenote that portions of this note were completed with a voice recognition program.  Efforts were made to edit the dictations but occasionally words are mis-transcribed.)               SOSA Shafer - RIVERA  12/16/22 0037

## 2022-12-19 ENCOUNTER — HOSPITAL ENCOUNTER (OUTPATIENT)
Age: 52
Setting detail: OUTPATIENT SURGERY
Discharge: HOME | End: 2022-12-19
Attending: INTERNAL MEDICINE | Admitting: INTERNAL MEDICINE
Payer: MEDICARE

## 2022-12-19 ENCOUNTER — ANESTHESIA EVENT (OUTPATIENT)
Dept: ENDOSCOPY | Age: 52
End: 2022-12-19
Payer: MEDICARE

## 2022-12-19 ENCOUNTER — TELEPHONE (OUTPATIENT)
Dept: GASTROENTEROLOGY | Age: 52
End: 2022-12-19

## 2022-12-19 VITALS
HEIGHT: 67 IN | DIASTOLIC BLOOD PRESSURE: 53 MMHG | SYSTOLIC BLOOD PRESSURE: 116 MMHG | BODY MASS INDEX: 37.98 KG/M2 | TEMPERATURE: 97.2 F | WEIGHT: 242 LBS | RESPIRATION RATE: 18 BRPM | HEART RATE: 59 BPM | OXYGEN SATURATION: 100 %

## 2022-12-19 LAB
ANION GAP SERPL CALCULATED.3IONS-SCNC: 19 MMOL/L (ref 7–19)
BUN BLDV-MCNC: 57 MG/DL (ref 6–20)
CALCIUM SERPL-MCNC: 9.7 MG/DL (ref 8.6–10)
CHLORIDE BLD-SCNC: 95 MMOL/L (ref 98–111)
CO2: 26 MMOL/L (ref 22–29)
CREAT SERPL-MCNC: 15.3 MG/DL (ref 0.5–1.2)
GFR SERPL CREATININE-BSD FRML MDRD: 3 ML/MIN/{1.73_M2}
GLUCOSE BLD-MCNC: 148 MG/DL (ref 74–109)
POTASSIUM SERPL-SCNC: 5.2 MMOL/L (ref 3.5–5)
SODIUM BLD-SCNC: 140 MMOL/L (ref 136–145)

## 2022-12-19 PROCEDURE — 6360000002 HC RX W HCPCS: Performed by: NURSE ANESTHETIST, CERTIFIED REGISTERED

## 2022-12-19 PROCEDURE — 7100000010 HC PHASE II RECOVERY - FIRST 15 MIN: Performed by: INTERNAL MEDICINE

## 2022-12-19 PROCEDURE — 3700000001 HC ADD 15 MINUTES (ANESTHESIA): Performed by: INTERNAL MEDICINE

## 2022-12-19 PROCEDURE — G0105 COLORECTAL SCRN; HI RISK IND: HCPCS | Performed by: INTERNAL MEDICINE

## 2022-12-19 PROCEDURE — 7100000011 HC PHASE II RECOVERY - ADDTL 15 MIN: Performed by: INTERNAL MEDICINE

## 2022-12-19 PROCEDURE — 2709999900 HC NON-CHARGEABLE SUPPLY: Performed by: INTERNAL MEDICINE

## 2022-12-19 PROCEDURE — 3700000000 HC ANESTHESIA ATTENDED CARE: Performed by: INTERNAL MEDICINE

## 2022-12-19 PROCEDURE — 3609027000 HC COLONOSCOPY: Performed by: INTERNAL MEDICINE

## 2022-12-19 PROCEDURE — 36415 COLL VENOUS BLD VENIPUNCTURE: CPT

## 2022-12-19 PROCEDURE — 2580000003 HC RX 258: Performed by: INTERNAL MEDICINE

## 2022-12-19 PROCEDURE — 2500000003 HC RX 250 WO HCPCS: Performed by: NURSE ANESTHETIST, CERTIFIED REGISTERED

## 2022-12-19 PROCEDURE — 80048 BASIC METABOLIC PNL TOTAL CA: CPT

## 2022-12-19 RX ORDER — LIDOCAINE HYDROCHLORIDE 10 MG/ML
INJECTION, SOLUTION INFILTRATION; PERINEURAL PRN
Status: DISCONTINUED | OUTPATIENT
Start: 2022-12-19 | End: 2022-12-19 | Stop reason: SDUPTHER

## 2022-12-19 RX ORDER — SODIUM CHLORIDE 450 MG/100ML
INJECTION, SOLUTION INTRAVENOUS CONTINUOUS
Status: DISCONTINUED | OUTPATIENT
Start: 2022-12-19 | End: 2022-12-19 | Stop reason: HOSPADM

## 2022-12-19 RX ORDER — PROPOFOL 10 MG/ML
INJECTION, EMULSION INTRAVENOUS PRN
Status: DISCONTINUED | OUTPATIENT
Start: 2022-12-19 | End: 2022-12-19 | Stop reason: SDUPTHER

## 2022-12-19 RX ADMIN — LIDOCAINE HYDROCHLORIDE 40 MG: 10 INJECTION, SOLUTION INFILTRATION; PERINEURAL at 10:25

## 2022-12-19 RX ADMIN — PROPOFOL 200 MG: 10 INJECTION, EMULSION INTRAVENOUS at 10:25

## 2022-12-19 RX ADMIN — SODIUM CHLORIDE: 4.5 INJECTION, SOLUTION INTRAVENOUS at 09:21

## 2022-12-19 ASSESSMENT — PAIN - FUNCTIONAL ASSESSMENT: PAIN_FUNCTIONAL_ASSESSMENT: 0-10

## 2022-12-19 ASSESSMENT — PAIN SCALES - GENERAL: PAINLEVEL_OUTOF10: 0

## 2022-12-19 NOTE — OP NOTE
Patient: Hebert Saini : 1970  Med Rec#: [id number] Acc#: [id number]   Primary Care Provider SOSA Weber CNP    Date of Procedure:  2022    Endoscopist: Moustapha Frye MD, MD    Referring Provider: SOSA Weber CNP,     Operation Performed: Colonoscopy to the cecum; suboptimal exam inadequate for colorectal cancer screening due to poor prep    Indications: Strong family hx of colon cancer on his paternal side-2 uncles and one aunt  of colon cancer. Last colonoscopy exam in 2019 was suboptimal and patient had a cecal polyp; needs colon cancer surveillance    Anesthesia:  Sedation was administered by anesthesia who monitored the patient during the procedure. I met with Hebert Saini prior to procedure. We discussed the procedure itself, and I have discussed the risks of endoscopy (including-- but not limited to-- pain, discomfort, bleeding potentially requiring second endoscopic procedure and/or blood transfusion, organ perforation requiring operative repair, damage to organs near the colon, infection, aspiration, cardiopulmonary/allergic reaction), benefits, indications to endoscopy. Additionally, we discussed options other than colonoscopy. The patient expressed understanding. All questions answered. The patient decided to proceed with the procedure. Signed informed consent was placed on the chart. Blood Loss: minimal    Withdrawal time: More than 6 minutes  Bowel Prep: Fair to poor with thick solid and semisolid stool scattered in segments throughout the colon obscuring the underlying mucosa. Small lesions including polyps may have been missed. Complications: no immediate complications    DESCRIPTION OF PROCEDURE:     A time out was performed. After written informed consent was obtained, the patient was placed in the left lateral position. The perianal area was inspected, and a digital rectal exam was performed.  A rectal exam was performed: normal tone, no palpable lesions. At this point, a forward viewing Olympus colonoscope was inserted into the anus and carefully advanced to the cecum. The cecum was identified by the ileocecal valve and the appendiceal orifice. The colonoscope was then slowly withdrawn with careful inspection of the mucosa in a linear and circumferential fashion. The scope was retroflexed in the rectum. Suction was utilized during the procedure to remove as much air as possible from the bowel. The colonoscope was removed from the patient, and the procedure was terminated. Findings are listed below. Findings:   Diffuse melanosis coli was noted where the colon mucosa was visible and not obscured by stool. NO large polyps or masses or strictures or colitis or Ileitis. Mild Diverticulosis in the left colon  Internal hemorrhoids-Grade 1-2  Where it was clearly visible, the mucosa appeared normal throughout the entire examined colon; overall quality of exam was suboptimal and inadequate for colorectal cancer surveillance especially given his personal history of polyps and strong family history of colon cancer  Retroflexion in the rectum was otherwise normal and revealed no further abnormalities. Recommendations:  1. Repeat colonoscopy: With a strict 2-day clear liquid diet and 2-day prep using PEG solution along with Zofran 4 mg p.o. every 4 hours as needed for any prep associated nausea or vomiting, this Thursday at the hospital.  2.  Hold iron supplements until colonoscopy is completed    - Resume previous meds except any anticoagulants and diet  - GI clinic f/u PRN   - Keep scheduled f/u appts with other MDs     Findings and recommendations were discussed w/ the patient. A copy of the images was provided.     (Please note that portions of this note were completed with a voice recognition program. Efforts were made to edit the dictations but occasionally words may be mis-transcribed.)     Roxanne Eaton MD, MD  12/19/2022  10:28 AM

## 2022-12-19 NOTE — H&P
Patient Name: Zamzam Galvan  : 1970  MRN: [id number]  DATE: 22    Allergies: Allergies   Allergen Reactions    Banana Anaphylaxis, Shortness Of Breath and Swelling    Atorvastatin Other (See Comments)     Chest pain, no muslce pains elsewhere    Lisinopril Other (See Comments)     Cough        ENDOSCOPY  History and Physical    Procedure:    [] Diagnostic Colonoscopy       [x] Screening Colonoscopy  [] EGD      [] ERCP      [] EUS       [] Other    [x] Previous office notes/History and Physical reviewed from the patients chart. Please see EMR for further details of HPI. I have examined the patient's status immediately prior to the procedure and:      Indications/HPI:    []Abdominal Pain   []Cancer- GI/Lung     []Fhx of colon CA/polyps  []History of Polyps  []Barretts            []Melena  []Abnormal Imaging              []Dysphagia              []Persistent Pneumonia   []Anemia                            []Food Impaction        []History of Polyps  [] GI Bleed             []Pulmonary nodule/Mass   []Change in bowel habits []Heartburn/Reflux  []Rectal Bleed (BRBPR)  []Chest Pain - Non Cardiac []Heme (+) Stool []Ulcers  []Constipation  []Hemoptysis  []Varices  []Diarrhea  []Hypoxemia    []Nausea/Vomiting   [x]Screening   []Crohns/Colitis  []Other:     Anesthesia:   [x] MAC [] Moderate Sedation   [] General   [] None     ROS: 12 pt Review of Symptoms was negative unless mentioned above    Medications:   Prior to Admission medications    Medication Sig Start Date End Date Taking?  Authorizing Provider   metoprolol tartrate (LOPRESSOR) 25 MG tablet Take 1 tablet by mouth twice daily 22   Lemon Phlegm, APRN - CNP   NIFEdipine (ADALAT CC) 60 MG extended release tablet Take 1 tablet by mouth twice daily 22   Lemon Phlegm, APRN - CNP   cloNIDine (CATAPRES) 0.1 MG tablet Take 1 tablet by mouth twice daily 22   Lemon Phlegm, APRN - CNP   Ferrous Sulfate (IRON) 325 (65 Fe) MG TABS TAKE 1 TABLET BY MOUTH TWICE DAILY WITH MEALS  Patient not taking: Reported on 12/19/2022 8/21/22   SOSA Olvera - CNP   simvastatin (ZOCOR) 10 MG tablet Take 1 tablet by mouth nightly 7/5/22   Brain Huntley MD   albuterol sulfate  (90 Base) MCG/ACT inhaler Inhale 2 puffs into the lungs every 6 hours as needed for Wheezing 3/9/22   Brain Huntley MD   Multiple Vitamins-Minerals (PRORENAL + D) TABS Take 1 tablet by mouth Every day on dialysis days(after treatment)  Patient not taking: Reported on 12/19/2022 7/8/21   Historical Provider, MD   hydrALAZINE (APRESOLINE) 50 MG tablet TAKE ONE TABLET EVERY 8 HOURS 3/3/09   SOSA Mejia   apixaban (ELIQUIS) 2.5 MG TABS tablet Take 1 tablet by mouth 2 times daily 7/0/87   SOSA Mejia   Cyanocobalamin (VITAMIN B 12 PO) Take 1 tablet by mouth daily     Historical Provider, MD   calcium carbonate (OYSTER SHELL CALCIUM 500 MG) 1250 (500 Ca) MG tablet Take 1 tablet by mouth daily    Historical Provider, MD       Past Medical History:  Past Medical History:   Diagnosis Date    Asthma     CHF (congestive heart failure) ([facility] [address]     Diabetes mellitus ([facility] [address]     dannyes-thur-sat at [facility]    Erectile dysfunction     Hemodialysis patient [facility])     t,th,s at [facility]    History of blood transfusion     Hypertension     Obesity     Palliative care patient 01/25/2021       Past Surgical History:  Past Surgical History:   Procedure Laterality Date    COLONOSCOPY N/A 12/05/2019    Dr Urbano Holder, internal hemorrhoids-Grade 1-2-HP, 3 yr recall    DIALYSIS FISTULA CREATION Left 06/26/2020    LEFT BRACHIAL / CEPHALIC AV FISTULA performed by Anna Irvin MD at [address]      wb - removal of first cath due to infection and placement of new one    TUNNELED VENOUS CATHETER PLACEMENT  01/2020    [facility]    VASCULAR SURGERY  11/11/2020    [facility].  Removal of tunneled dialysis catheter left internal jugular vein    VASCULAR SURGERY 07/26/2021    [facility]- Left upper extremity fistulogram's including venography the superior cava, Balloon angioplasty left proximal upper arm cephalic vein stenosis with 7 mm x 80 mm conquest, 7 mm x 100 mm Lutonix, 8 mm x 60 mm Lutonix drug-coated balloon, Completion venograms left upper extremity       Social History:  Social History     Tobacco Use    Smoking status: Never    Smokeless tobacco: Never   Vaping Use    Vaping Use: Never used   Substance Use Topics    Alcohol use: Not Currently    Drug use: No       Vital Signs:   Vitals:    12/19/22 0912   BP: 121/82   Pulse: 56   Resp: 18   Temp: 97.2 °F (36.2 °C)   SpO2: 100%        Physical Exam:  Cardiac:  [x]WNL  []Comments:  Pulmonary:  [x]WNL   []Comments:  Neuro/Mental Status:  [x]WNL  []Comments:  Abdominal:  [x]WNL    []Comments:  Other:   []WNL  []Comments:    Informed Consent:  The risks and benefits of the procedure have been discussed with either the patient or if they cannot consent, their representative. Assessment:  Patient examined and appropriate for planned sedation and procedure. Plan:  Proceed with planned sedation and procedure as above.          Roxanne Eaton MD

## 2022-12-19 NOTE — ANESTHESIA POSTPROCEDURE EVALUATION
Department of Anesthesiology  Postprocedure Note    Patient: Sammi Baldwin  MRN: [id number]  YOB: 1970  Date of evaluation: 12/19/2022      Procedure Summary     Date: 12/19/22 Room / Location: [address]    Anesthesia Start: 9957 Anesthesia Stop: 1039    Procedure: COLORECTAL CANCER SCREENING, NOT HIGH RISK Diagnosis:       Screen for colon cancer      (Screen for colon cancer [Z12.11])    Surgeons: Anali Lindquist MD Responsible Provider: SOSA Herbert CRNA    Anesthesia Type: general, MAC ASA Status: 4          Anesthesia Type: No value filed.     Kendall Phase I: Kendall Score: 10    Kendall Phase II:        Anesthesia Post Evaluation    Patient location during evaluation: bedside  Patient participation: complete - patient participated  Level of consciousness: sleepy but conscious  Pain score: 0  Airway patency: patent  Nausea & Vomiting: no nausea and no vomiting  Complications: no  Cardiovascular status: hemodynamically stable  Respiratory status: acceptable  Hydration status: stable

## 2022-12-19 NOTE — TELEPHONE ENCOUNTER
12-19-22 Per Dr Brandon Hubbard,       Recommendations:  1. Repeat colonoscopy:  With a strict 2-day clear liquid diet and 2-day prep using PEG solution along with Zofran 4 mg p.o. every 4 hours as needed for any prep associated nausea or vomiting, this Thursday at the hospital.  2.  Hold iron supplements until colonoscopy is completed      Routed to [employer]

## 2022-12-19 NOTE — DISCHARGE INSTRUCTIONS
1. Repeat colonoscopy:  With a strict 2-day clear liquid diet and 2-day prep using PEG solution along with Zofran 4 mg p.o. every 4 hours as needed for any prep associated nausea or vomiting, this Thursday at the hospital.  2.  Hold iron supplements until colonoscopy is completed    - Resume previous meds except any anticoagulants and diet  - GI clinic f/u PRN   - Keep scheduled f/u appts with other MDs

## 2022-12-19 NOTE — ANESTHESIA PRE PROCEDURE
Department of Anesthesiology  Preprocedure Note       Name:  Melton Boast   Age:  47 y.o.  :  1970                                          MRN:  [id number]         Date:  2022      Surgeon: González Santos):  Wally Broussard MD    Procedure: Procedure(s):  COLORECTAL CANCER SCREENING, NOT HIGH RISK    Medications prior to admission:   Prior to Admission medications    Medication Sig Start Date End Date Taking?  Authorizing Provider   metoprolol tartrate (LOPRESSOR) 25 MG tablet Take 1 tablet by mouth twice daily 22   SOSA Cifuentes CNP   NIFEdipine (ADALAT CC) 60 MG extended release tablet Take 1 tablet by mouth twice daily 22   SOSA Cfiuentes CNP   cloNIDine (CATAPRES) 0.1 MG tablet Take 1 tablet by mouth twice daily 22   SOSA Cifuentes CNP   Ferrous Sulfate (IRON) 325 (65 Fe) MG TABS TAKE 1 TABLET BY MOUTH TWICE DAILY WITH MEALS  Patient not taking: Reported on 2022   SOSA Cifuentes CNP   simvastatin (ZOCOR) 10 MG tablet Take 1 tablet by mouth nightly 22   Jorge Sorensen MD   albuterol sulfate  (90 Base) MCG/ACT inhaler Inhale 2 puffs into the lungs every 6 hours as needed for Wheezing 3/9/22   Jorge Sorensen MD   Multiple Vitamins-Minerals (PRORENAL + D) TABS Take 1 tablet by mouth Every day on dialysis days(after treatment)  Patient not taking: Reported on 2022   Historical Provider, MD   hydrALAZINE (APRESOLINE) 50 MG tablet TAKE ONE TABLET EVERY 8 HOURS 58   SOSA Lima   apixaban (ELIQUIS) 2.5 MG TABS tablet Take 1 tablet by mouth 2 times daily    SOSA Lima   Cyanocobalamin (VITAMIN B 12 PO) Take 1 tablet by mouth daily     Historical Provider, MD   calcium carbonate (OYSTER SHELL CALCIUM 500 MG) 1250 (500 Ca) MG tablet Take 1 tablet by mouth daily    Historical Provider, MD       Current medications:    Current Facility-Administered Medications   Medication Dose Route Frequency Provider Last Rate Last Admin    0.45 % sodium chloride infusion   IntraVENous Continuous Fito Tong  mL/hr at 12/19/22 0959 NoRateChange at 12/19/22 0959       Allergies: Allergies   Allergen Reactions    Banana Anaphylaxis, Shortness Of Breath and Swelling    Atorvastatin Other (See Comments)     Chest pain, no muslce pains elsewhere    Lisinopril Other (See Comments)     Cough       Problem List:    Patient Active Problem List   Diagnosis Code    Mild intermittent asthma without complication V01.56    Family history of prostate cancer Z80.42    Morbid obesity due to excess calories ([facility] [address] E66.01    Type 2 diabetes mellitus with chronic kidney disease on chronic dialysis, without long-term current use of insulin ([facility] E11.22, N18.6, Z99.2    Chronic diastolic congestive heart failure ([facility]) I50.32    ESRD (end stage renal disease) ([facility]) N18.6    Erectile dysfunction due to arterial insufficiency N52.01    COVID-19 U07.1    Pulmonary edema J81.1    Volume overload E87.70    Palliative care patient Z51.5    Dialysis AV fistula malfunction ([facility]) T82.590A       Past Medical History:        Diagnosis Date    Asthma     CHF (congestive heart failure) ([facility])     Diabetes mellitus ([facility])     tues-thur-sat at [facility]    Erectile dysfunction     Hemodialysis patient ([facility] [address]     t,th,s at [facility]    History of blood transfusion     Hypertension     Obesity     Palliative care patient 01/25/2021       Past Surgical History:        Procedure Laterality Date    COLONOSCOPY N/A 12/05/2019    Dr Rica Howard, internal hemorrhoids-Grade 1-2-HP, 3 yr recall    DIALYSIS FISTULA CREATION Left 06/26/2020    LEFT BRACHIAL / CEPHALIC AV FISTULA performed by Semaj Rain MD at [address] [facility] TUNNELED VENOUS CATHETER PLACEMENT      [facility] - removal of first cath due to infection and placement of new one    TUNNELED VENOUS CATHETER PLACEMENT  01/2020    [facility]    VASCULAR SURGERY  11/11/[facility]. Removal of tunneled dialysis catheter left internal jugular vein    VASCULAR SURGERY  07/26/2021    [facility]- Left upper extremity fistulogram's including venography the superior cava, Balloon angioplasty left proximal upper arm cephalic vein stenosis with 7 mm x 80 mm conquest, 7 mm x 100 mm Lutonix, 8 mm x 60 mm Lutonix drug-coated balloon, Completion venograms left upper extremity       Social History:    Social History     Tobacco Use    Smoking status: Never    Smokeless tobacco: Never   Substance Use Topics    Alcohol use: Not Currently                                Counseling given: Not Answered      Vital Signs (Current):   Vitals:    12/19/22 0912   BP: 121/82   Pulse: 56   Resp: 18   Temp: 97.2 °F (36.2 °C)   TempSrc: Temporal   SpO2: 100%   Weight: 242 lb (109.8 kg)   Height: 5' 7\" (1.702 m)                                              BP Readings from Last 3 Encounters:   12/19/22 121/82   12/15/22 121/80   11/11/22 118/70       NPO Status: Time of last liquid consumption: 0400                        Time of last solid consumption: 1230 (large meal at [employer])                        Date of last liquid consumption: 12/19/22                        Date of last solid food consumption: 12/18/22    BMI:   Wt Readings from Last 3 Encounters:   12/19/22 242 lb (109.8 kg)   12/15/22 242 lb (109.8 kg)   11/11/22 252 lb 9.6 oz (114.6 kg)     Body mass index is 37.9 kg/m².     CBC:   Lab Results   Component Value Date/Time    WBC 6.5 11/11/2022 12:32 PM    RBC 4.02 11/11/2022 12:32 PM    HGB 13.0 11/11/2022 12:32 PM    HCT 39.6 11/11/2022 12:32 PM    MCV 98.5 11/11/2022 12:32 PM    RDW 13.6 11/11/2022 12:32 PM     11/11/2022 12:32 PM       CMP:   Lab Results   Component Value Date/Time     12/19/2022 09:20 AM    K 5.2 12/19/2022 09:20 AM    K 3.8 10/17/2022 06:05 AM    CL 95 12/19/2022 09:20 AM    CO2 26 12/19/2022 09:20 AM    BUN 57 12/19/2022 09:20 AM    CREATININE 15.3 12/19/2022 09:20 AM    GFRAA 4 10/17/2022 06:05 AM    LABGLOM 3 12/19/2022 09:20 AM    GLUCOSE 148 12/19/2022 09:20 AM    PROT 7.3 11/11/2022 12:32 PM    CALCIUM 9.7 12/19/2022 09:20 AM    BILITOT 0.4 11/11/2022 12:32 PM    ALKPHOS 46 11/11/2022 12:32 PM    AST 19 11/11/2022 12:32 PM    ALT 37 11/11/2022 12:32 PM       POC Tests: No results for input(s): POCGLU, POCNA, POCK, POCCL, POCBUN, POCHEMO, POCHCT in the last 72 hours.     Coags:   Lab Results   Component Value Date/Time    PROTIME 13.5 10/17/2022 06:05 AM    INR 1.03 10/17/2022 06:05 AM    APTT 31.8 10/17/2022 06:05 AM       HCG (If Applicable): No results found for: PREGTESTUR, PREGSERUM, HCG, HCGQUANT     ABGs:   Lab Results   Component Value Date/Time    PHART 7.490 02/16/2021 11:28 AM    PO2ART 53.0 02/16/2021 11:28 AM    WLT0LXC 45.0 02/16/2021 11:28 AM    LTP4EUP 34.3 02/16/2021 11:28 AM    BEART 9.9 02/16/2021 11:28 AM    X5MLOKAU 87.6 02/16/2021 11:28 AM        Type & Screen (If Applicable):  No results found for: LABABO, LABRH    Drug/Infectious Status (If Applicable):  No results found for: HIV, HEPCAB    COVID-19 Screening (If Applicable):   Lab Results   Component Value Date/Time    COVID19 Not Detected 10/17/2022 06:50 AM           Anesthesia Evaluation  Patient summary reviewed  Airway: Mallampati: II  TM distance: >3 FB   Neck ROM: full  Mouth opening: < 3 FB   Dental: normal exam         Pulmonary:normal exam    (+) asthma:                            Cardiovascular:  Exercise tolerance: poor (<4 METS),   (+) hypertension:, CHF:,          Beta Blocker:  Dose within 24 Hrs      ROS comment:  Summary   Normal left ventricular size and systolic function EF 66%   Mild concentric left ventricular hypertrophy with tissue and transmitral   Doppler is consistent with grade 2 (pseudo-normal) diastolic dysfunction   Severe left atrial enlargement   Structurally and functionally normal aortic valve   Structurally and functionally normal mitral valve   Pulmonic valve not well visualized   Normal right-sided chambers with preserved right ventricular systolic   function   Mild tricuspid regurgitation with RVSP estimated at 39 mmHg   Normal IVC dimensions and physiologic motion consistent with normal right   atrial filling pressures of 5-10 mmHg   No significant pericardial effusion and aortic dimensions are within   normal limits   Definity utilized to better define endocardial surface      Signature      ----------------------------------------------------------------   Electronically signed by Elmira Hodges MD(Interpreting physician)   on 01/30/2020 03:20 PM   ----------------------------------------------------------------     Neuro/Psych:                ROS comment: Chronic narc use GI/Hepatic/Renal:   (+) renal disease: ESRD, bowel prep,          ROS comment: Hemodialysis at home-  .   Endo/Other:    (+) DiabetesType II DM, , blood dyscrasia: anticoagulation therapy:., malignancy/cancer (h/o prostate cancer). Abdominal:             Vascular: negative vascular ROS. Other Findings:           Anesthesia Plan      general and MAC     ASA 4       Induction: intravenous. Anesthetic plan and risks discussed with patient.                         Fran Dinero, APRN - CRNA   12/19/2022

## 2022-12-22 ENCOUNTER — HOSPITAL ENCOUNTER (OUTPATIENT)
Age: 52
Setting detail: OUTPATIENT SURGERY
Discharge: HOME | End: 2022-12-22
Attending: INTERNAL MEDICINE | Admitting: INTERNAL MEDICINE
Payer: MEDICARE

## 2022-12-22 ENCOUNTER — ANESTHESIA (OUTPATIENT)
Dept: ENDOSCOPY | Age: 52
End: 2022-12-22
Payer: MEDICARE

## 2022-12-22 VITALS
DIASTOLIC BLOOD PRESSURE: 66 MMHG | SYSTOLIC BLOOD PRESSURE: 117 MMHG | WEIGHT: 242 LBS | OXYGEN SATURATION: 94 % | TEMPERATURE: 97.1 F | HEIGHT: 67 IN | HEART RATE: 51 BPM | BODY MASS INDEX: 37.98 KG/M2 | RESPIRATION RATE: 16 BRPM

## 2022-12-22 LAB
ANION GAP SERPL CALCULATED.3IONS-SCNC: 18 MMOL/L (ref 7–19)
BUN BLDV-MCNC: 52 MG/DL (ref 6–20)
CALCIUM SERPL-MCNC: 9.8 MG/DL (ref 8.6–10)
CHLORIDE BLD-SCNC: 89 MMOL/L (ref 98–111)
CO2: 27 MMOL/L (ref 22–29)
CREAT SERPL-MCNC: 15 MG/DL (ref 0.5–1.2)
GFR SERPL CREATININE-BSD FRML MDRD: 3 ML/MIN/{1.73_M2}
GLUCOSE BLD-MCNC: 123 MG/DL (ref 74–109)
POTASSIUM SERPL-SCNC: 4.2 MMOL/L (ref 3.5–5)
SODIUM BLD-SCNC: 134 MMOL/L (ref 136–145)

## 2022-12-22 PROCEDURE — 7100000011 HC PHASE II RECOVERY - ADDTL 15 MIN: Performed by: INTERNAL MEDICINE

## 2022-12-22 PROCEDURE — 2709999900 HC NON-CHARGEABLE SUPPLY: Performed by: INTERNAL MEDICINE

## 2022-12-22 PROCEDURE — 3609010600 HC COLONOSCOPY POLYPECTOMY SNARE/COLD BIOPSY: Performed by: INTERNAL MEDICINE

## 2022-12-22 PROCEDURE — 36415 COLL VENOUS BLD VENIPUNCTURE: CPT

## 2022-12-22 PROCEDURE — 3700000001 HC ADD 15 MINUTES (ANESTHESIA): Performed by: INTERNAL MEDICINE

## 2022-12-22 PROCEDURE — 2580000003 HC RX 258: Performed by: INTERNAL MEDICINE

## 2022-12-22 PROCEDURE — 6360000002 HC RX W HCPCS: Performed by: NURSE ANESTHETIST, CERTIFIED REGISTERED

## 2022-12-22 PROCEDURE — 7100000010 HC PHASE II RECOVERY - FIRST 15 MIN: Performed by: INTERNAL MEDICINE

## 2022-12-22 PROCEDURE — 3700000000 HC ANESTHESIA ATTENDED CARE: Performed by: INTERNAL MEDICINE

## 2022-12-22 PROCEDURE — 2500000003 HC RX 250 WO HCPCS: Performed by: NURSE ANESTHETIST, CERTIFIED REGISTERED

## 2022-12-22 PROCEDURE — 80048 BASIC METABOLIC PNL TOTAL CA: CPT

## 2022-12-22 RX ORDER — LIDOCAINE HYDROCHLORIDE 10 MG/ML
INJECTION, SOLUTION EPIDURAL; INFILTRATION; INTRACAUDAL; PERINEURAL PRN
Status: DISCONTINUED | OUTPATIENT
Start: 2022-12-22 | End: 2022-12-22 | Stop reason: SDUPTHER

## 2022-12-22 RX ORDER — PROPOFOL 10 MG/ML
INJECTION, EMULSION INTRAVENOUS PRN
Status: DISCONTINUED | OUTPATIENT
Start: 2022-12-22 | End: 2022-12-22 | Stop reason: SDUPTHER

## 2022-12-22 RX ORDER — SODIUM CHLORIDE 450 MG/100ML
INJECTION, SOLUTION INTRAVENOUS CONTINUOUS
Status: DISCONTINUED | OUTPATIENT
Start: 2022-12-22 | End: 2022-12-22 | Stop reason: HOSPADM

## 2022-12-22 RX ADMIN — SODIUM CHLORIDE: 4.5 INJECTION, SOLUTION INTRAVENOUS at 09:33

## 2022-12-22 RX ADMIN — LIDOCAINE HYDROCHLORIDE 30 MG: 10 INJECTION, SOLUTION EPIDURAL; INFILTRATION; INTRACAUDAL; PERINEURAL at 10:58

## 2022-12-22 RX ADMIN — PROPOFOL 400 MG: 10 INJECTION, EMULSION INTRAVENOUS at 10:58

## 2022-12-22 ASSESSMENT — PAIN SCALES - GENERAL: PAINLEVEL_OUTOF10: 0

## 2022-12-22 ASSESSMENT — PAIN - FUNCTIONAL ASSESSMENT
PAIN_FUNCTIONAL_ASSESSMENT: NONE - DENIES PAIN
PAIN_FUNCTIONAL_ASSESSMENT: 0-10

## 2022-12-22 NOTE — H&P
Patient Name: Nan Kwon  : 1970  MRN: [id number]  DATE: 22    Allergies: Allergies   Allergen Reactions    Banana Anaphylaxis, Shortness Of Breath and Swelling    Atorvastatin Other (See Comments)     Chest pain, no muslce pains elsewhere    Lisinopril Other (See Comments)     Cough        ENDOSCOPY  History and Physical    Procedure:    [] Diagnostic Colonoscopy       [x] Screening Colonoscopy  [] EGD      [] ERCP      [] EUS       [] Other    [x] Previous office notes/History and Physical reviewed from the patients chart. Please see EMR for further details of HPI. I have examined the patient's status immediately prior to the procedure and:      Indications/HPI:  Strong family hx of colon cancer on his paternal side-2 uncles and one aunt  of colon cancer. Last colonoscopy exam recently and the exam in  were suboptimal and patient had a cecal polyp; needs colon cancer surveillance    []Abdominal Pain   []Cancer- GI/Lung     []Fhx of colon CA/polyps  []History of Polyps  []Barretts            []Melena  []Abnormal Imaging              []Dysphagia              []Persistent Pneumonia   []Anemia                            []Food Impaction        []History of Polyps  [] GI Bleed             []Pulmonary nodule/Mass   []Change in bowel habits []Heartburn/Reflux  []Rectal Bleed (BRBPR)  []Chest Pain - Non Cardiac []Heme (+) Stool []Ulcers  []Constipation  []Hemoptysis  []Varices  []Diarrhea  []Hypoxemia    []Nausea/Vomiting   []Screening   []Crohns/Colitis  []Other:     Anesthesia:   [x] MAC [] Moderate Sedation   [] General   [] None     ROS: 12 pt Review of Symptoms was negative unless mentioned above    Medications:   Prior to Admission medications    Medication Sig Start Date End Date Taking?  Authorizing Provider   metoprolol tartrate (LOPRESSOR) 25 MG tablet Take 1 tablet by mouth twice daily 22   SOSA Goodman - CNP   NIFEdipine (ADALAT CC) 60 MG extended release tablet Take 1 tablet by mouth twice daily 11/5/22   SOSA Barrera CNP   cloNIDine (CATAPRES) 0.1 MG tablet Take 1 tablet by mouth twice daily 11/5/22   SOSA Barrera CNP   Ferrous Sulfate (IRON) 325 (65 Fe) MG TABS TAKE 1 TABLET BY MOUTH TWICE DAILY WITH MEALS  Patient not taking: No sig reported 8/21/22   SOSA Barrera CNP   simvastatin (ZOCOR) 10 MG tablet Take 1 tablet by mouth nightly 7/5/22   Emelyn Corcoran MD   albuterol sulfate  (90 Base) MCG/ACT inhaler Inhale 2 puffs into the lungs every 6 hours as needed for Wheezing 3/9/22   Emelyn Corcoran MD   Multiple Vitamins-Minerals (PRORENAL + D) TABS Take 1 tablet by mouth Every day on dialysis days(after treatment)  Patient not taking: No sig reported 7/8/21   Historical Provider, MD   hydrALAZINE (APRESOLINE) 50 MG tablet TAKE ONE TABLET EVERY 8 HOURS 1/1/75   SOSA Dumont   Cyanocobalamin (VITAMIN B 12 PO) Take 1 tablet by mouth daily     Historical Provider, MD   calcium carbonate (OYSTER SHELL CALCIUM 500 MG) 1250 (500 Ca) MG tablet Take 1 tablet by mouth daily    Historical Provider, MD       Past Medical History:  Past Medical History:   Diagnosis Date    Asthma     CHF (congestive heart failure) ([facility] [address]     Diabetes mellitus ([facility] [address]     t[facility]thur[facility]sat at [facility]    Erectile dysfunction     Hemodialysis patient [facility])     t,th,s at [facility]    History of blood transfusion     Hypertension     Obesity     Palliative care patient 01/25/2021       Past Surgical History:  Past Surgical History:   Procedure Laterality Date    COLONOSCOPY N/A 12/05/2019    Dr Gustabo Ramos, internal hemorrhoids-Grade 1-2-HP, 3 yr recall    COLONOSCOPY N/A 12/19/2022    Dr Renee Neff, Diffuse melanosis coli, mild diverticulosis left colon, Int hem Gr 1-2, inadequate-fair to poor prep, repeat 12-22-22    DIALYSIS FISTULA CREATION Left 06/26/2020    LEFT BRACHIAL / CEPHALIC AV FISTULA performed by Jagruti Espino MD at [address] [place] [address] PLACEMENT      [facility] - removal of first cath due to infection and placement of new one    TUNNELED VENOUS CATHETER PLACEMENT  01/2020    [facility]\""    VASCULAR SURGERY  11/11/2020    [facility]. Removal of tunneled dialysis catheter left internal jugular vein    VASCULAR SURGERY  07/26/2021    [facility]- Left upper extremity fistulogram's including venography the superior cava, Balloon angioplasty left proximal upper arm cephalic vein stenosis with 7 mm x 80 mm conquest, 7 mm x 100 mm Lutonix, 8 mm x 60 mm Lutonix drug-coated balloon, Completion venograms left upper extremity       Social History:  Social History     Tobacco Use    Smoking status: Never    Smokeless tobacco: Never   Vaping Use    Vaping Use: Never used   Substance Use Topics    Alcohol use: Not Currently    Drug use: No       Vital Signs:   Vitals:    12/22/22 0926   BP: 108/72   Pulse: 52   Resp: 18   Temp: 97.2 °F (36.2 °C)   SpO2: 100%        Physical Exam:  Cardiac:  [x]WNL  []Comments:  Pulmonary:  [x]WNL   []Comments:  Neuro/Mental Status:  [x]WNL  []Comments:  Abdominal:  [x]WNL    []Comments:  Other:   []WNL  []Comments:    Informed Consent:  The risks and benefits of the procedure have been discussed with either the patient or if they cannot consent, their representative. Assessment:  Patient examined and appropriate for planned sedation and procedure. Plan:  Proceed with planned sedation and procedure as above.          Aaliyah Hutson MD

## 2022-12-22 NOTE — DISCHARGE INSTRUCTIONS
1. Repeat colonoscopy: In 3 years with a strict 2-day clear liquid diet and a 2-day prep with a PEG solution such as Plenvu, due to his personal and family history and prep quality today  2. - Resume previous meds and diet  - GI clinic f/u PRN   - Keep scheduled f/u appts with other MDs     - NO ASA/NSAIDs x 2 weeks      Colonoscopy: What to Expect at [facility]  After a colonoscopy, you'll stay at the clinic until you wake up. Then you can go home. But you'll need to arrange for a ride. Your doctor will tell you when you can eat and do your other usual activities. Your doctor will talk to you about when you'll need your next colonoscopy. Your doctor can help you decide how often you need to be checked. This will depend on the results of your test and your risk for colorectal cancer. After the test, you may be bloated or have gas pains. You may need to pass gas. If a biopsy was done or a polyp was removed, you may have streaks of blood in your stool (feces) for a few days. Problems such as heavy rectal bleeding may not occur until several weeks after the test. This isn't common. But it can happen after polyps are removed. This care sheet gives you a general idea about how long it will take for you to recover. But each person recovers at a different pace. Follow the steps below to get better as quickly as possible. How can you care for yourself at home? Activity    Rest when you feel tired. You can do your normal activities when it feels okay to do so. Diet    Follow your doctor's directions for eating. Unless your doctor has told you not to, drink plenty of fluids. This helps to replace the fluids that were lost during the colon prep. Do not drink alcohol. Medicines    Your doctor will tell you if and when you can restart your medicines. You will also be given instructions about taking any new medicines.      If you stopped taking aspirin or some other blood thinner, your doctor will tell you when to start taking it again. If polyps were removed or a biopsy was done during the test, your doctor may tell you not to take aspirin or other anti-inflammatory medicines for a few days. These include ibuprofen (Advil, Motrin) and naproxen (Aleve). Other instructions    For your safety, do not drive or operate machinery until the medicine wears off and you can think clearly. Your doctor may tell you not to drive or operate machinery until the day after your test.     Do not sign legal documents or make major decisions until the medicine wears off and you can think clearly. The anesthesia can make it hard for you to fully understand what you are agreeing to. Follow-up care is a key part of your treatment and safety. Be sure to make and go to all appointments, and call your doctor if you are having problems. It's also a good idea to know your test results and keep a list of the medicines you take. When should you call for help? Call 911 anytime you think you may need emergency care. For example, call if:    You passed out (lost consciousness). You pass maroon or bloody stools. You have trouble breathing. Call your doctor now or seek immediate medical care if:    You have pain that does not get better after you take pain medicine. You are sick to your stomach or cannot drink fluids. You have new or worse belly pain. You have blood in your stools. You have a fever. You cannot pass stools or gas. Watch closely for changes in your health, and be sure to contact your doctor if you have any problems. Where can you learn more? Go to http://www.woods.com/ and enter E264 to learn more about \"Colonoscopy: What to Expect at Home. \"  Current as of: May 4, 2022               Content Version: 13.5  © 7149-4345 Healthwise, [employer]. Care instructions adapted under license by [facility] ([facility]).  If you have questions about a medical condition or this instruction, always ask your healthcare professional. [place] [address] any warranty or liability for your use of this information.

## 2022-12-22 NOTE — ANESTHESIA POSTPROCEDURE EVALUATION
Department of Anesthesiology  Postprocedure Note    Patient: Tereso Mckeon  MRN: [id number]  YOB: 1970  Date of evaluation: 12/22/2022      Procedure Summary     Date: 12/22/22 Room / Location: [address]    Anesthesia Start: 1053 Anesthesia Stop:     Procedure: COLORECTAL CANCER SCREENING, NOT HIGH RISK Diagnosis:       Screen for colon cancer      (Screen for colon cancer [Z12.11])    Surgeons: Niurka Tobin MD Responsible Provider: SOSA Erwin CRNA    Anesthesia Type: general, TIVA ASA Status: 3          Anesthesia Type: No value filed.     Kendall Phase I: Kendall Score: 10    Kendall Phase II:        Anesthesia Post Evaluation    Patient location during evaluation: bedside  Patient participation: complete - patient participated  Level of consciousness: sleepy but conscious  Pain score: 0  Airway patency: patent  Nausea & Vomiting: no nausea and no vomiting  Complications: no  Cardiovascular status: blood pressure returned to baseline  Respiratory status: acceptable, room air and spontaneous ventilation  Hydration status: euvolemic

## 2022-12-22 NOTE — OP NOTE
Patient: Sammi Baldwin : 1970  Med Rec#: [id number] Acc#: [id number]   Primary Care Provider SOSA Hawkins CNP    Date of Procedure:  2022    Endoscopist: Anali Lindquist MD, MD    Referring Provider: SOSA Hawkins CNP,     Operation Performed: Colonoscopy up to the cecum with hot cautery ablation of few diminutive sigmoid colon polyp    Indications: Strong family hx of colon cancer on his paternal side-2 uncles and one aunt  of colon cancer. Last colonoscopy exam recently and the exam in 2019 were suboptimal and patient had a cecal polyp; needs colon cancer surveillance    Anesthesia:  Sedation was administered by anesthesia who monitored the patient during the procedure. I met with Sammi Baldwin prior to procedure. We discussed the procedure itself, and I have discussed the risks of endoscopy (including-- but not limited to-- pain, discomfort, bleeding potentially requiring second endoscopic procedure and/or blood transfusion, organ perforation requiring operative repair, damage to organs near the colon, infection, aspiration, cardiopulmonary/allergic reaction), benefits, indications to endoscopy. Additionally, we discussed options other than colonoscopy. The patient expressed understanding. All questions answered. The patient decided to proceed with the procedure. Signed informed consent was placed on the chart. Blood Loss: minimal    Withdrawal time: More than 9 minutes  Bowel Prep: Fair  with small amounts of thick  semisolid stool and a moderate amount of thick, opaque liquid scattered in patchy segments throughout the colon obscuring the underlying mucosa. Lesions including polyps may have been missed. Complications: no immediate complications    DESCRIPTION OF PROCEDURE:     A time out was performed. After written informed consent was obtained, the patient was placed in the left lateral position.      The perianal area was inspected, and a digital rectal exam was performed. A rectal exam was performed: normal tone, no palpable lesions. At this point, a forward viewing Olympus colonoscope was inserted into the anus and carefully advanced to the cecum. The cecum was identified by the ileocecal valve and the appendiceal orifice. The colonoscope was then slowly withdrawn with careful inspection of the mucosa in a linear and circumferential fashion. The scope was retroflexed in the rectum. Suction was utilized during the procedure to remove as much air as possible from the bowel. The colonoscope was removed from the patient, and the procedure was terminated. Findings are listed below. Findings:   5 small/diminutive 3 to 4 mm in diameter sessile benign-appearing polyps in the sigmoid colon were subjected to hot cautery ablation. Diffuse melanosis coli noted throughout the colon. NO large polyps or masses or strictures or colitis. Suboptimal exam due to prep quality as described above. Diverticulosis in the left colon  Internal hemorrhoids-Grade 1  Where it was clearly visible, the mucosa appeared normal throughout the entire examined colon  Retroflexion in the rectum was otherwise normal and revealed no further abnormalities        Recommendations:  1. Repeat colonoscopy: In 3 years with a strict 2-day clear liquid diet and a 2-day prep with a PEG solution such as Plenvu, due to his personal and family history and prep quality today  2. - Resume previous meds and diet  - GI clinic f/u PRN   - Keep scheduled f/u appts with other MDs     - NO ASA/NSAIDs x 2 weeks     Findings and recommendations were discussed w/ the patient. A copy of the images was provided.     (Please note that portions of this note were completed with a voice recognition program. Efforts were made to edit the dictations but occasionally words may be mis-transcribed.)     Haleigh Tinoco MD, MD  12/22/2022  10:53 AM

## 2022-12-22 NOTE — ANESTHESIA PRE PROCEDURE
Department of Anesthesiology  Preprocedure Note       Name:  Nan Kwon   Age:  47 y.o.  :  1970                                          MRN:  [id number]         Date:  2022      Surgeon: Bonnie Padilla):  Ange Ortega MD    Procedure: Procedure(s):  COLORECTAL CANCER SCREENING, NOT HIGH RISK    Medications prior to admission:   Prior to Admission medications    Medication Sig Start Date End Date Taking?  Authorizing Provider   metoprolol tartrate (LOPRESSOR) 25 MG tablet Take 1 tablet by mouth twice daily 22   SOSA Goodman CNP   NIFEdipine (ADALAT CC) 60 MG extended release tablet Take 1 tablet by mouth twice daily 22   SOSA Goodman CNP   cloNIDine (CATAPRES) 0.1 MG tablet Take 1 tablet by mouth twice daily 22   SOSA Goodman CNP   Ferrous Sulfate (IRON) 325 (65 Fe) MG TABS TAKE 1 TABLET BY MOUTH TWICE DAILY WITH MEALS  Patient not taking: No sig reported 22   SOSA Goodman CNP   simvastatin (ZOCOR) 10 MG tablet Take 1 tablet by mouth nightly 22   Eliza Curiel MD   albuterol sulfate  (90 Base) MCG/ACT inhaler Inhale 2 puffs into the lungs every 6 hours as needed for Wheezing 3/9/22   Eliza Curiel MD   Multiple Vitamins-Minerals (PRORENAL + D) TABS Take 1 tablet by mouth Every day on dialysis days(after treatment)  Patient not taking: No sig reported 21   Historical Provider, MD   hydrALAZINE (APRESOLINE) 50 MG tablet TAKE ONE TABLET EVERY 8 HOURS 76   SOSA Billings   Cyanocobalamin (VITAMIN B 12 PO) Take 1 tablet by mouth daily     Historical Provider, MD   calcium carbonate (OYSTER SHELL CALCIUM 500 MG) 1250 (500 Ca) MG tablet Take 1 tablet by mouth daily    Historical Provider, MD       Current medications:    Current Facility-Administered Medications   Medication Dose Route Frequency Provider Last Rate Last Admin    0.45 % sodium chloride infusion   IntraVENous Continuous Ange Ortega  mL/hr at 12/22/22 0933 New Bag at 12/22/22 0933       Allergies:     Allergies   Allergen Reactions    Banana Anaphylaxis, Shortness Of Breath and Swelling    Atorvastatin Other (See Comments)     Chest pain, no muslce pains elsewhere    Lisinopril Other (See Comments)     Cough       Problem List:    Patient Active Problem List   Diagnosis Code    Mild intermittent asthma without complication J43.12    Family history of prostate cancer Z80.42    Morbid obesity due to excess calories ([facility] E66.01    Type 2 diabetes mellitus with chronic kidney disease on chronic dialysis, without long-term current use of insulin ([facility]) E11.22, N18.6, Z99.2    Chronic diastolic congestive heart failure ([facility]) I50.32    ESRD (end stage renal disease) ([facility]) N18.6    Erectile dysfunction due to arterial insufficiency N52.01    COVID-19 U07.1    Pulmonary edema J81.1    Volume overload E87.70    Palliative care patient Z51.5    Dialysis AV fistula malfunction ([facility]) T82.590A       Past Medical History:        Diagnosis Date    Asthma     CHF (congestive heart failure) ([facility])     Diabetes mellitus ([facility])     tues-salmaur-sat at [facility]    Erectile dysfunction     Hemodialysis patient ([facility]     t,th,s at [facility]    History of blood transfusion     Hypertension     Obesity     Palliative care patient 01/25/2021       Past Surgical History:        Procedure Laterality Date    COLONOSCOPY N/A 12/05/2019    Dr Emilio Torres, internal hemorrhoids-Grade 1-2-HP, 3 yr recall    COLONOSCOPY N/A 12/19/2022    Dr Shirley Jaime, Diffuse melanosis coli, mild diverticulosis left colon, Int hem Gr 1-2, inadequate-fair to poor prep, repeat 12-22-22    DIALYSIS FISTULA CREATION Left 06/26/2020    LEFT BRACHIAL / CEPHALIC AV FISTULA performed by Rudy Llamas MD at [facility] TUNNELED VENOUS CATHETER PLACEMENT      [facility] - removal of first cath due to infection and placement of new one    TUNNELED VENOUS CATHETER PLACEMENT  01/2020 \[facility] [place] [facility]\""    VASCULAR SURGERY  11/11/2020    [facility]. Removal of tunneled dialysis catheter left internal jugular vein    VASCULAR SURGERY  07/26/2021    [facility]- Left upper extremity fistulogram's including venography the superior cava, Balloon angioplasty left proximal upper arm cephalic vein stenosis with 7 mm x 80 mm conquest, 7 mm x 100 mm Lutonix, 8 mm x 60 mm Lutonix drug-coated balloon, Completion venograms left upper extremity       Social History:    Social History     Tobacco Use    Smoking status: Never    Smokeless tobacco: Never   Substance Use Topics    Alcohol use: Not Currently                                Counseling given: Not Answered      Vital Signs (Current):   Vitals:    12/22/22 0926   BP: 108/72   Pulse: 52   Resp: 18   Temp: 97.2 °F (36.2 °C)   TempSrc: Temporal   SpO2: 100%   Weight: 242 lb (109.8 kg)   Height: 5' 7\" (1.702 m)                                              BP Readings from Last 3 Encounters:   12/22/22 108/72   12/19/22 (!) 116/53   12/15/22 121/80       NPO Status: Time of last liquid consumption: 0550                        Time of last solid consumption: 2350                        Date of last liquid consumption: 12/22/22                        Date of last solid food consumption: 12/19/22    BMI:   Wt Readings from Last 3 Encounters:   12/22/22 242 lb (109.8 kg)   12/19/22 242 lb (109.8 kg)   12/15/22 242 lb (109.8 kg)     Body mass index is 37.9 kg/m².     CBC:   Lab Results   Component Value Date/Time    WBC 6.5 11/11/2022 12:32 PM    RBC 4.02 11/11/2022 12:32 PM    HGB 13.0 11/11/2022 12:32 PM    HCT 39.6 11/11/2022 12:32 PM    MCV 98.5 11/11/2022 12:32 PM    RDW 13.6 11/11/2022 12:32 PM     11/11/2022 12:32 PM       CMP:   Lab Results   Component Value Date/Time     12/19/2022 09:20 AM    K 5.2 12/19/2022 09:20 AM    K 3.8 10/17/2022 06:05 AM    CL 95 12/19/2022 09:20 AM    CO2 26 12/19/2022 09:20 AM    BUN 57 12/19/2022 09:20 AM    CREATININE 15.3 12/19/2022 09:20 AM    GFRAA 4 10/17/2022 06:05 AM    LABGLOM 3 12/19/2022 09:20 AM    GLUCOSE 148 12/19/2022 09:20 AM    PROT 7.3 11/11/2022 12:32 PM    CALCIUM 9.7 12/19/2022 09:20 AM    BILITOT 0.4 11/11/2022 12:32 PM    ALKPHOS 46 11/11/2022 12:32 PM    AST 19 11/11/2022 12:32 PM    ALT 37 11/11/2022 12:32 PM       POC Tests: No results for input(s): POCGLU, POCNA, POCK, POCCL, POCBUN, POCHEMO, POCHCT in the last 72 hours. Coags:   Lab Results   Component Value Date/Time    PROTIME 13.5 10/17/2022 06:05 AM    INR 1.03 10/17/2022 06:05 AM    APTT 31.8 10/17/2022 06:05 AM       HCG (If Applicable): No results found for: PREGTESTUR, PREGSERUM, HCG, HCGQUANT     ABGs:   Lab Results   Component Value Date/Time    PHART 7.490 02/16/2021 11:28 AM    PO2ART 53.0 02/16/2021 11:28 AM    TLA7DLE 45.0 02/16/2021 11:28 AM    DUF5KFV 34.3 02/16/2021 11:28 AM    BEART 9.9 02/16/2021 11:28 AM    L3RYVFDJ 87.6 02/16/2021 11:28 AM        Type & Screen (If Applicable):  No results found for: LABABO, LABRH    Drug/Infectious Status (If Applicable):  No results found for: HIV, HEPCAB    COVID-19 Screening (If Applicable):   Lab Results   Component Value Date/Time    COVID19 Not Detected 10/17/2022 06:50 AM           Anesthesia Evaluation  Patient summary reviewed and Nursing notes reviewed  Airway: Mallampati: II  TM distance: >3 FB   Neck ROM: full  Mouth opening: > = 3 FB   Dental: normal exam         Pulmonary:normal exam    (+) asthma:                            Cardiovascular:Negative CV ROS  Exercise tolerance: good (>4 METS),   (+) hypertension:, CHF:,          Beta Blocker:  Dose within 24 Hrs         Neuro/Psych:   Negative Neuro/Psych ROS              GI/Hepatic/Renal: Neg GI/Hepatic/Renal ROS            Endo/Other:    (+) DiabetesType II DM, , .                 Abdominal:             Vascular: negative vascular ROS.          Other Findings:           Anesthesia Plan      general and TIVA     ASA 3       Induction: intravenous. Anesthetic plan and risks discussed with patient. Plan discussed with CRNA.                     SOSA Duque - YSABEL   12/22/2022

## 2022-12-29 DIAGNOSIS — E78.2 MIXED HYPERLIPIDEMIA: ICD-10-CM

## 2022-12-29 RX ORDER — SIMVASTATIN 10 MG
10 TABLET ORAL NIGHTLY
Qty: 90 TABLET | Refills: 0 | Status: SHIPPED | OUTPATIENT
Start: 2022-12-29

## 2022-12-29 NOTE — TELEPHONE ENCOUNTER
Aura Benitez called to request a refill on his medication.       Last office visit : 11/11/2022   Next office visit : 2/13/2023     Requested Prescriptions     Pending Prescriptions Disp Refills    simvastatin (ZOCOR) 10 MG tablet [Pharmacy Med Name: Simvastatin 10 MG Oral Tablet] 90 tablet 0     Sig: Take 1 tablet by mouth nightly            Reyes Ban, LPN

## 2023-02-02 ENCOUNTER — TELEPHONE (OUTPATIENT)
Dept: UROLOGY | Age: 53
End: 2023-02-02

## 2023-02-07 DIAGNOSIS — E78.2 MIXED HYPERLIPIDEMIA: ICD-10-CM

## 2023-02-07 RX ORDER — SIMVASTATIN 10 MG
10 TABLET ORAL NIGHTLY
Qty: 90 TABLET | Refills: 0 | OUTPATIENT
Start: 2023-02-07

## 2023-02-14 ENCOUNTER — OFFICE VISIT (OUTPATIENT)
Dept: PRIMARY CARE CLINIC | Age: 53
End: 2023-02-14

## 2023-02-14 VITALS
BODY MASS INDEX: 39.52 KG/M2 | HEART RATE: 64 BPM | DIASTOLIC BLOOD PRESSURE: 70 MMHG | TEMPERATURE: 97.6 F | SYSTOLIC BLOOD PRESSURE: 120 MMHG | HEIGHT: 67 IN | WEIGHT: 251.8 LBS | OXYGEN SATURATION: 98 %

## 2023-02-14 DIAGNOSIS — E78.2 MIXED HYPERLIPIDEMIA: ICD-10-CM

## 2023-02-14 DIAGNOSIS — E11.22 TYPE 2 DIABETES MELLITUS WITH CHRONIC KIDNEY DISEASE ON CHRONIC DIALYSIS, WITHOUT LONG-TERM CURRENT USE OF INSULIN (HCC): Primary | ICD-10-CM

## 2023-02-14 DIAGNOSIS — Z99.2 TYPE 2 DIABETES MELLITUS WITH CHRONIC KIDNEY DISEASE ON CHRONIC DIALYSIS, WITHOUT LONG-TERM CURRENT USE OF INSULIN (HCC): Primary | ICD-10-CM

## 2023-02-14 DIAGNOSIS — Z99.2 DEPENDENCE ON RENAL DIALYSIS (HCC): ICD-10-CM

## 2023-02-14 DIAGNOSIS — N18.6 ESRD (END STAGE RENAL DISEASE) (HCC): ICD-10-CM

## 2023-02-14 DIAGNOSIS — I10 ESSENTIAL HYPERTENSION: ICD-10-CM

## 2023-02-14 DIAGNOSIS — I50.32 CHRONIC DIASTOLIC CONGESTIVE HEART FAILURE (HCC): Chronic | ICD-10-CM

## 2023-02-14 DIAGNOSIS — N18.6 TYPE 2 DIABETES MELLITUS WITH CHRONIC KIDNEY DISEASE ON CHRONIC DIALYSIS, WITHOUT LONG-TERM CURRENT USE OF INSULIN (HCC): Primary | ICD-10-CM

## 2023-02-14 DIAGNOSIS — E66.01 MORBID OBESITY DUE TO EXCESS CALORIES (HCC): Chronic | ICD-10-CM

## 2023-02-14 LAB — HBA1C MFR BLD: 6.5 %

## 2023-02-14 RX ORDER — CINACALCET 30 MG/1
TABLET, FILM COATED ORAL
COMMUNITY
Start: 2023-02-05

## 2023-02-14 RX ORDER — FERRIC CITRATE 210 MG/1
TABLET, COATED ORAL
COMMUNITY
Start: 2023-01-30

## 2023-02-14 SDOH — ECONOMIC STABILITY: INCOME INSECURITY: HOW HARD IS IT FOR YOU TO PAY FOR THE VERY BASICS LIKE FOOD, HOUSING, MEDICAL CARE, AND HEATING?: NOT HARD AT ALL

## 2023-02-14 SDOH — ECONOMIC STABILITY: HOUSING INSECURITY
IN THE LAST 12 MONTHS, WAS THERE A TIME WHEN YOU DID NOT HAVE A STEADY PLACE TO SLEEP OR SLEPT IN A SHELTER (INCLUDING NOW)?: NO

## 2023-02-14 SDOH — ECONOMIC STABILITY: FOOD INSECURITY: WITHIN THE PAST 12 MONTHS, YOU WORRIED THAT YOUR FOOD WOULD RUN OUT BEFORE YOU GOT MONEY TO BUY MORE.: NEVER TRUE

## 2023-02-14 SDOH — ECONOMIC STABILITY: FOOD INSECURITY: WITHIN THE PAST 12 MONTHS, THE FOOD YOU BOUGHT JUST DIDN'T LAST AND YOU DIDN'T HAVE MONEY TO GET MORE.: NEVER TRUE

## 2023-02-14 ASSESSMENT — PATIENT HEALTH QUESTIONNAIRE - PHQ9
SUM OF ALL RESPONSES TO PHQ9 QUESTIONS 1 & 2: 0
1. LITTLE INTEREST OR PLEASURE IN DOING THINGS: 0
SUM OF ALL RESPONSES TO PHQ QUESTIONS 1-9: 0
SUM OF ALL RESPONSES TO PHQ QUESTIONS 1-9: 0
2. FEELING DOWN, DEPRESSED OR HOPELESS: 0
SUM OF ALL RESPONSES TO PHQ QUESTIONS 1-9: 0
SUM OF ALL RESPONSES TO PHQ QUESTIONS 1-9: 0

## 2023-02-14 NOTE — PROGRESS NOTES
[address] [address]     Phone:  [phone]  Fax:  [phone]      Hugh Dillon is a 47 y.o. male who presents today for his medical conditions/complaints as noted below. Hugh Dillon is c/o of Diabetes      Chief Complaint   Patient presents with    Diabetes       HPI:     HPI     Patient presents for 3 month follow up diabetes mellitus type 2, end-stage renal disease, hypertension, and hyperlipidemia. Patient reports hasn't checked glucose in a while but they normally run 88-91. Last checked a couple of month ago. Saw [facility] recently. Dialysis 4 days per week. Past Medical History:   Diagnosis Date    Asthma     CHF (congestive heart failure) ([facility]     Diabetes mellitus ([facility]     tues-thur-sat at [facility]    Erectile dysfunction     Hemodialysis patient [facility])     t,th,s at [facility]    History of blood transfusion     Hypertension     Obesity     Palliative care patient 01/25/2021        Past Surgical History:   Procedure Laterality Date    COLONOSCOPY N/A 12/05/2019    Dr Johana Chand, internal hemorrhoids-Grade 1-2-HP, 3 yr recall    COLONOSCOPY N/A 12/19/2022    Dr Grace Acosta, Diffuse melanosis coli, mild diverticulosis left colon, Int hem Gr 1-2, inadequate-fair to poor prep, repeat 12-22-22    COLONOSCOPY N/A 12/22/2022    Dr Grace Acosta, W Ablation 5 sm 3-4 mm sessile benign appearing polyps in sig colon, int hem Gr 1, 3 year recall    DIALYSIS FISTULA CREATION Left 06/26/2020    LEFT BRACHIAL / CEPHALIC AV FISTULA performed by Casper Wood MD at [facility] - removal of first cath due to infection and placement of new one    TUNNELED VENOUS CATHETER PLACEMENT  01/2020    [facility]    VASCULAR SURGERY  11/11/2020    [facility].  Removal of tunneled dialysis catheter left internal jugular vein    VASCULAR SURGERY  07/26/2021    [facility]- Left upper extremity fistulogram's including venography the superior cava, Balloon angioplasty left proximal upper arm cephalic vein stenosis with 7 mm x 80 mm conquest, 7 mm x 100 mm Lutonix, 8 mm x 60 mm Lutonix drug-coated balloon, Completion venograms left upper extremity       Social History     Tobacco Use    Smoking status: Never    Smokeless tobacco: Never   Substance Use Topics    Alcohol use: Not Currently        Current Outpatient Medications   Medication Sig Dispense Refill    cinacalcet (SENSIPAR) 30 MG tablet TAKE 1 TABLET BY MOUTH EVERY DAY      AURYXIA 1  MG(Fe) TABS tablet TAKE 3 TABLETS BY MOUTH THREE TIMES A DAY WITH MEALS. SWALLOW WHOLE, DO NOT CHEW OR CRUSH MEDICATION      simvastatin (ZOCOR) 10 MG tablet Take 1 tablet by mouth nightly 90 tablet 0    metoprolol tartrate (LOPRESSOR) 25 MG tablet Take 1 tablet by mouth twice daily 60 tablet 5    NIFEdipine (ADALAT CC) 60 MG extended release tablet Take 1 tablet by mouth twice daily 60 tablet 5    cloNIDine (CATAPRES) 0.1 MG tablet Take 1 tablet by mouth twice daily 60 tablet 5    albuterol sulfate  (90 Base) MCG/ACT inhaler Inhale 2 puffs into the lungs every 6 hours as needed for Wheezing 1 each 3    hydrALAZINE (APRESOLINE) 50 MG tablet TAKE ONE TABLET EVERY 8 HOURS 90 tablet 3    Cyanocobalamin (VITAMIN B 12 PO) Take 1 tablet by mouth daily       calcium carbonate (OYSTER SHELL CALCIUM 500 MG) 1250 (500 Ca) MG tablet Take 1 tablet by mouth daily       No current facility-administered medications for this visit. Allergies   Allergen Reactions    Banana Anaphylaxis, Shortness Of Breath and Swelling    Atorvastatin Other (See Comments)     Chest pain, no muslce pains elsewhere    Lisinopril Other (See Comments)     Cough       Family History   Problem Relation Age of Onset    Cancer Father         prostate    Kidney Disease Brother                Subjective:      Review of Systems   Constitutional:  Negative for activity change and fever.    HENT:  Negative for congestion, ear pain and sore throat. Respiratory:  Negative for cough, chest tightness and shortness of breath. Cardiovascular:  Negative for chest pain. Gastrointestinal:  Negative for abdominal pain, diarrhea, nausea and vomiting. Genitourinary:  Negative for frequency and urgency. Musculoskeletal:  Negative for arthralgias and myalgias. Skin:  Negative for color change. Neurological:  Negative for dizziness, weakness and numbness. Psychiatric/Behavioral:  Negative for agitation. The patient is not nervous/anxious. Objective:     Physical Exam  Vitals reviewed. Constitutional:       Appearance: Normal appearance. He is obese. HENT:      Head: Normocephalic. Right Ear: Tympanic membrane normal.      Left Ear: Tympanic membrane normal.      Nose: Nose normal.      Mouth/Throat:      Mouth: Mucous membranes are moist.      Pharynx: Oropharynx is clear. Eyes:      Extraocular Movements: Extraocular movements intact. Pupils: Pupils are equal, round, and reactive to light. Cardiovascular:      Rate and Rhythm: Normal rate and regular rhythm. Pulses: Normal pulses. Heart sounds: Normal heart sounds, S1 normal and S2 normal.   Pulmonary:      Effort: Pulmonary effort is normal.      Breath sounds: Normal breath sounds. Abdominal:      General: Bowel sounds are normal.      Palpations: Abdomen is soft. Musculoskeletal:         General: Normal range of motion. Cervical back: Normal range of motion. Right lower le+ Edema present. Left lower le+ Edema present. Skin:     General: Skin is warm and dry. Neurological:      Mental Status: He is alert and oriented to person, place, and time. Psychiatric:         Mood and Affect: Mood normal.         Behavior: Behavior normal.         Thought Content:  Thought content normal.         Judgment: Judgment normal.       /70   Pulse 64   Temp 97.6 °F (36.4 °C) (Temporal)   Ht 5' 7\" (1.702 m)   Wt 251 lb 12.8 oz (114.2 kg)   SpO2 98%   BMI 39.44 kg/m²     Assessment:      Diagnosis Orders   1. Type 2 diabetes mellitus with chronic kidney disease on chronic dialysis, without long-term current use of insulin (HCC)  POCT glycosylated hemoglobin (Hb A1C)      2. ESRD (end stage renal disease) (HCC)        3. Essential hypertension        4. Mixed hyperlipidemia        5. Dependence on renal dialysis (HCC)        6. Morbid obesity due to excess calories (HCC)        7. Chronic diastolic congestive heart failure (HCC)            Results for orders placed or performed in visit on 02/14/23   POCT glycosylated hemoglobin (Hb A1C)   Result Value Ref Range    Hemoglobin A1C 6.5 %       Plan:     1. Type 2 diabetes mellitus with chronic kidney disease on chronic dialysis, without long-term current use of insulin (HCC)  Recommended patient need a diabetic diet and limit excess sugars.  Patient verbalized understanding.    - POCT glycosylated hemoglobin (Hb A1C)    2. ESRD (end stage renal disease) (HCC)  Do your best to eat a diabetic diet and limit excess sugars    3. Essential hypertension  Continue nifedipine, metoprolol, hydralazine, and clonidine.    4. Mixed hyperlipidemia  Continue simvastatin    5. Dependence on renal dialysis (HCC)  Continue Auryxia, senna spar, vitamin B12, calcium carbonate    6. Morbid obesity due to excess calories (HCC)  Do your best to eat a diabetic diet and limit excess sugars    7. Chronic diastolic congestive heart failure (HCC)  Continue nifedipine, metoprolol, hydralazine, and clonidine.    Return in about 3 months (around 5/14/2023) for dm, htn, esrd.    Orders Placed This Encounter   Procedures    POCT glycosylated hemoglobin (Hb A1C)       No orders of the defined types were placed in this encounter.           Patient offered educational handouts and has had all questions answered.  Patient voices understanding and agrees to plans along with risks and benefits of plan. Patient is instructed to  continue prior meds, diet, and exercise plans as instructed. Patient agrees to follow up as instructed and sooner if needed. Patient agrees to go to ER if condition becomes emergent. EMR Dragon/transcription disclaimer: Some of this encounter note is an electronic transcription/translation of spoken language to printed text. The electronic translation of spoken language may permit erroneous, or at times, nonsensical words or phrases to be inadvertently transcribed.  Although I have reviewed the note for such errors, some may still exist.    Electronically signed by SOSA Barrera CNP on 2/20/2023 at 9:30 PM

## 2023-02-20 PROBLEM — Z99.2 DEPENDENCE ON RENAL DIALYSIS (HCC): Status: ACTIVE | Noted: 2020-05-06

## 2023-02-20 ASSESSMENT — ENCOUNTER SYMPTOMS
SORE THROAT: 0
CHEST TIGHTNESS: 0
DIARRHEA: 0
SHORTNESS OF BREATH: 0
COLOR CHANGE: 0
ABDOMINAL PAIN: 0
COUGH: 0
VOMITING: 0
NAUSEA: 0

## 2023-03-15 ENCOUNTER — OFFICE VISIT (OUTPATIENT)
Dept: PRIMARY CARE CLINIC | Age: 53
End: 2023-03-15
Payer: MEDICARE

## 2023-03-15 VITALS
SYSTOLIC BLOOD PRESSURE: 118 MMHG | WEIGHT: 249.6 LBS | OXYGEN SATURATION: 96 % | TEMPERATURE: 96.9 F | DIASTOLIC BLOOD PRESSURE: 64 MMHG | BODY MASS INDEX: 39.18 KG/M2 | HEART RATE: 54 BPM | HEIGHT: 67 IN

## 2023-03-15 DIAGNOSIS — E78.2 MIXED HYPERLIPIDEMIA: ICD-10-CM

## 2023-03-15 DIAGNOSIS — E11.22 TYPE 2 DIABETES MELLITUS WITH CHRONIC KIDNEY DISEASE ON CHRONIC DIALYSIS, WITHOUT LONG-TERM CURRENT USE OF INSULIN (HCC): ICD-10-CM

## 2023-03-15 DIAGNOSIS — I10 ESSENTIAL HYPERTENSION: ICD-10-CM

## 2023-03-15 DIAGNOSIS — K76.0 FATTY LIVER: Primary | ICD-10-CM

## 2023-03-15 DIAGNOSIS — N18.6 ESRD (END STAGE RENAL DISEASE) (HCC): ICD-10-CM

## 2023-03-15 DIAGNOSIS — Z99.2 TYPE 2 DIABETES MELLITUS WITH CHRONIC KIDNEY DISEASE ON CHRONIC DIALYSIS, WITHOUT LONG-TERM CURRENT USE OF INSULIN (HCC): ICD-10-CM

## 2023-03-15 DIAGNOSIS — N18.6 TYPE 2 DIABETES MELLITUS WITH CHRONIC KIDNEY DISEASE ON CHRONIC DIALYSIS, WITHOUT LONG-TERM CURRENT USE OF INSULIN (HCC): ICD-10-CM

## 2023-03-15 PROCEDURE — 3074F SYST BP LT 130 MM HG: CPT | Performed by: NURSE PRACTITIONER

## 2023-03-15 PROCEDURE — 3078F DIAST BP <80 MM HG: CPT | Performed by: NURSE PRACTITIONER

## 2023-03-15 PROCEDURE — 99214 OFFICE O/P EST MOD 30 MIN: CPT | Performed by: NURSE PRACTITIONER

## 2023-03-15 PROCEDURE — 3044F HG A1C LEVEL LT 7.0%: CPT | Performed by: NURSE PRACTITIONER

## 2023-03-15 RX ORDER — SIMVASTATIN 10 MG
10 TABLET ORAL NIGHTLY
Qty: 90 TABLET | Refills: 0 | Status: SHIPPED | OUTPATIENT
Start: 2023-03-15

## 2023-03-15 ASSESSMENT — ENCOUNTER SYMPTOMS
SORE THROAT: 0
COLOR CHANGE: 0
COUGH: 0
DIARRHEA: 0
CHEST TIGHTNESS: 0
ABDOMINAL PAIN: 0
SHORTNESS OF BREATH: 0
NAUSEA: 0
VOMITING: 0

## 2023-03-15 NOTE — PROGRESS NOTES
cath due to infection and placement of new one    TUNNELED VENOUS CATHETER PLACEMENT  01/2020    [facility]    VASCULAR SURGERY  11/11/2020    \[facility]". Removal of tunneled dialysis catheter left internal jugular vein    VASCULAR SURGERY  07/26/2021    S- Left upper extremity fistulogram's including venography the superior cava, Balloon angioplasty left proximal upper arm cephalic vein stenosis with 7 mm x 80 mm conquest, 7 mm x 100 mm Lutonix, 8 mm x 60 mm Lutonix drug-coated balloon, Completion venograms left upper extremity       Social History     Tobacco Use    Smoking status: Never    Smokeless tobacco: Never   Substance Use Topics    Alcohol use: Not Currently        Current Outpatient Medications   Medication Sig Dispense Refill    simvastatin (ZOCOR) 10 MG tablet Take 1 tablet by mouth nightly 90 tablet 0    cinacalcet (SENSIPAR) 30 MG tablet TAKE 1 TABLET BY MOUTH EVERY DAY      AURYXIA 1  MG(Fe) TABS tablet TAKE 3 TABLETS BY MOUTH THREE TIMES A DAY WITH MEALS. SWALLOW WHOLE, DO NOT CHEW OR CRUSH MEDICATION      metoprolol tartrate (LOPRESSOR) 25 MG tablet Take 1 tablet by mouth twice daily 60 tablet 5    NIFEdipine (ADALAT CC) 60 MG extended release tablet Take 1 tablet by mouth twice daily 60 tablet 5    cloNIDine (CATAPRES) 0.1 MG tablet Take 1 tablet by mouth twice daily 60 tablet 5    albuterol sulfate  (90 Base) MCG/ACT inhaler Inhale 2 puffs into the lungs every 6 hours as needed for Wheezing 1 each 3    hydrALAZINE (APRESOLINE) 50 MG tablet TAKE ONE TABLET EVERY 8 HOURS 90 tablet 3    Cyanocobalamin (VITAMIN B 12 PO) Take 1 tablet by mouth daily       calcium carbonate (OYSTER SHELL CALCIUM 500 MG) 1250 (500 Ca) MG tablet Take 1 tablet by mouth daily       No current facility-administered medications for this visit.        Allergies   Allergen Reactions    Banana Anaphylaxis, Shortness Of Breath and Swelling    Atorvastatin Other (See Comments)     Chest pain, no muslce pains elsewhere

## 2023-04-06 ENCOUNTER — OFFICE VISIT (OUTPATIENT)
Dept: GASTROENTEROLOGY | Age: 53
End: 2023-04-06
Payer: MEDICARE

## 2023-04-06 VITALS
HEIGHT: 67 IN | SYSTOLIC BLOOD PRESSURE: 120 MMHG | RESPIRATION RATE: 96 BRPM | HEART RATE: 56 BPM | DIASTOLIC BLOOD PRESSURE: 82 MMHG | BODY MASS INDEX: 39.55 KG/M2 | WEIGHT: 252 LBS

## 2023-04-06 DIAGNOSIS — N18.6 ESRD (END STAGE RENAL DISEASE) (HCC): ICD-10-CM

## 2023-04-06 DIAGNOSIS — K76.0 FATTY LIVER: Primary | ICD-10-CM

## 2023-04-06 DIAGNOSIS — Z86.010 HISTORY OF COLON POLYPS: ICD-10-CM

## 2023-04-06 PROCEDURE — 99214 OFFICE O/P EST MOD 30 MIN: CPT | Performed by: NURSE PRACTITIONER

## 2023-04-06 NOTE — PROGRESS NOTES
Vaping Use: Never used   Substance and Sexual Activity    Alcohol use: Not Currently    Drug use: No     Social Determinants of Health     Financial Resource Strain: Low Risk     Difficulty of Paying Living Expenses: Not hard at all   Food Insecurity: No Food Insecurity    Worried About Running Out of Food in the Last Year: Never true    Ran Out of Food in the Last Year: Never true   Transportation Needs: Unknown    Lack of Transportation (Non-Medical): No   Physical Activity: Inactive    Days of Exercise per Week: 0 days    Minutes of Exercise per Session: 0 min   Housing Stability: Unknown    Unstable Housing in the Last Year: No       Current Outpatient Medications   Medication Sig Dispense Refill    simvastatin (ZOCOR) 10 MG tablet Take 1 tablet by mouth nightly 90 tablet 0    cinacalcet (SENSIPAR) 30 MG tablet TAKE 1 TABLET BY MOUTH EVERY DAY      AURYXIA 1  MG(Fe) TABS tablet TAKE 3 TABLETS BY MOUTH THREE TIMES A DAY WITH MEALS. SWALLOW WHOLE, DO NOT CHEW OR CRUSH MEDICATION      metoprolol tartrate (LOPRESSOR) 25 MG tablet Take 1 tablet by mouth twice daily 60 tablet 5    NIFEdipine (ADALAT CC) 60 MG extended release tablet Take 1 tablet by mouth twice daily 60 tablet 5    cloNIDine (CATAPRES) 0.1 MG tablet Take 1 tablet by mouth twice daily 60 tablet 5    albuterol sulfate  (90 Base) MCG/ACT inhaler Inhale 2 puffs into the lungs every 6 hours as needed for Wheezing 1 each 3    hydrALAZINE (APRESOLINE) 50 MG tablet TAKE ONE TABLET EVERY 8 HOURS 90 tablet 3    Cyanocobalamin (VITAMIN B 12 PO) Take 1 tablet by mouth daily       calcium carbonate (OYSTER SHELL CALCIUM 500 MG) 1250 (500 Ca) MG tablet Take 1 tablet by mouth daily       No current facility-administered medications for this visit.        Allergies   Allergen Reactions    Banana Anaphylaxis, Shortness Of Breath and Swelling    Atorvastatin Other (See Comments)     Chest pain, no muslce pains elsewhere    Lisinopril Other (See Comments)

## 2023-05-16 ENCOUNTER — HOSPITAL ENCOUNTER (OUTPATIENT)
Dept: GENERAL RADIOLOGY | Age: 53
Discharge: HOME | End: 2023-05-16
Payer: MEDICARE

## 2023-05-16 ENCOUNTER — HOSPITAL ENCOUNTER (INPATIENT)
Age: 53
LOS: 4 days | Discharge: LEFT BEFORE DISCHARGE | End: 2023-05-20
Attending: PEDIATRICS
Payer: MEDICARE

## 2023-05-16 ENCOUNTER — APPOINTMENT (OUTPATIENT)
Dept: CT IMAGING | Age: 53
End: 2023-05-16
Payer: MEDICARE

## 2023-05-16 ENCOUNTER — OFFICE VISIT (OUTPATIENT)
Dept: PRIMARY CARE CLINIC | Age: 53
End: 2023-05-16
Payer: MEDICARE

## 2023-05-16 VITALS
DIASTOLIC BLOOD PRESSURE: 76 MMHG | BODY MASS INDEX: 39.11 KG/M2 | HEART RATE: 76 BPM | SYSTOLIC BLOOD PRESSURE: 120 MMHG | HEIGHT: 67 IN | TEMPERATURE: 97.1 F | WEIGHT: 249.2 LBS | OXYGEN SATURATION: 97 %

## 2023-05-16 DIAGNOSIS — E66.01 MORBID OBESITY DUE TO EXCESS CALORIES (HCC): Chronic | ICD-10-CM

## 2023-05-16 DIAGNOSIS — R06.02 SHORTNESS OF BREATH: ICD-10-CM

## 2023-05-16 DIAGNOSIS — E78.2 MIXED HYPERLIPIDEMIA: ICD-10-CM

## 2023-05-16 DIAGNOSIS — Z99.2 TYPE 2 DIABETES MELLITUS WITH CHRONIC KIDNEY DISEASE ON CHRONIC DIALYSIS, WITHOUT LONG-TERM CURRENT USE OF INSULIN (HCC): Primary | ICD-10-CM

## 2023-05-16 DIAGNOSIS — N18.6 ESRD (END STAGE RENAL DISEASE) (HCC): ICD-10-CM

## 2023-05-16 DIAGNOSIS — N18.6 TYPE 2 DIABETES MELLITUS WITH CHRONIC KIDNEY DISEASE ON CHRONIC DIALYSIS, WITHOUT LONG-TERM CURRENT USE OF INSULIN (HCC): Primary | ICD-10-CM

## 2023-05-16 DIAGNOSIS — J18.9 PNEUMONIA DUE TO INFECTIOUS ORGANISM, UNSPECIFIED LATERALITY, UNSPECIFIED PART OF LUNG: Primary | ICD-10-CM

## 2023-05-16 DIAGNOSIS — R09.02 HYPOXIA: ICD-10-CM

## 2023-05-16 DIAGNOSIS — Z99.2 DEPENDENCE ON RENAL DIALYSIS (HCC): ICD-10-CM

## 2023-05-16 DIAGNOSIS — J45.20 MILD INTERMITTENT ASTHMA WITHOUT COMPLICATION: ICD-10-CM

## 2023-05-16 DIAGNOSIS — E11.22 TYPE 2 DIABETES MELLITUS WITH CHRONIC KIDNEY DISEASE ON CHRONIC DIALYSIS, WITHOUT LONG-TERM CURRENT USE OF INSULIN (HCC): Primary | ICD-10-CM

## 2023-05-16 DIAGNOSIS — J18.9 PNEUMONIA OF LEFT UPPER LOBE DUE TO INFECTIOUS ORGANISM: ICD-10-CM

## 2023-05-16 DIAGNOSIS — R07.89 CHEST WALL PAIN: ICD-10-CM

## 2023-05-16 PROBLEM — U07.1 COVID-19: Status: RESOLVED | Noted: 2021-01-24 | Resolved: 2023-05-16

## 2023-05-16 PROBLEM — J96.01 ACUTE HYPOXEMIC RESPIRATORY FAILURE (HCC): Status: ACTIVE | Noted: 2023-05-16

## 2023-05-16 LAB
ALBUMIN SERPL-MCNC: 4.2 G/DL (ref 3.5–5.2)
ALBUMIN SERPL-MCNC: 4.3 G/DL (ref 3.5–5.2)
ALLENS TEST: ABNORMAL
ALP SERPL-CCNC: 60 U/L (ref 40–130)
ALP SERPL-CCNC: 64 U/L (ref 40–130)
ALT SERPL-CCNC: 13 U/L (ref 5–41)
ALT SERPL-CCNC: 15 U/L (ref 5–41)
ANION GAP SERPL CALCULATED.3IONS-SCNC: 17 MMOL/L (ref 7–19)
ANION GAP SERPL CALCULATED.3IONS-SCNC: 18 MMOL/L (ref 7–19)
APTT PPP: 38.8 SEC (ref 26–36.2)
AST SERPL-CCNC: 12 U/L (ref 5–40)
AST SERPL-CCNC: 20 U/L (ref 5–40)
BASE EXCESS ARTERIAL: 3.5 MMOL/L (ref -2–2)
BASOPHILS # BLD: 0 K/UL (ref 0–0.2)
BASOPHILS # BLD: 0 K/UL (ref 0–0.2)
BASOPHILS NFR BLD: 0.2 % (ref 0–1)
BASOPHILS NFR BLD: 0.3 % (ref 0–1)
BILIRUB SERPL-MCNC: 0.3 MG/DL (ref 0.2–1.2)
BILIRUB SERPL-MCNC: 0.3 MG/DL (ref 0.2–1.2)
BNP BLD-MCNC: 2982 PG/ML (ref 0–124)
BNP BLD-MCNC: 3228 PG/ML (ref 0–124)
BUN SERPL-MCNC: 81 MG/DL (ref 6–20)
BUN SERPL-MCNC: 85 MG/DL (ref 6–20)
CALCIUM SERPL-MCNC: 10 MG/DL (ref 8.6–10)
CALCIUM SERPL-MCNC: 10.2 MG/DL (ref 8.6–10)
CARBOXYHEMOGLOBIN ARTERIAL: 1.6 % (ref 0–5)
CHLORIDE SERPL-SCNC: 94 MMOL/L (ref 98–111)
CHLORIDE SERPL-SCNC: 94 MMOL/L (ref 98–111)
CO2 SERPL-SCNC: 23 MMOL/L (ref 22–29)
CO2 SERPL-SCNC: 27 MMOL/L (ref 22–29)
CREAT SERPL-MCNC: 18.5 MG/DL (ref 0.5–1.2)
CREAT SERPL-MCNC: 19.5 MG/DL (ref 0.5–1.2)
D DIMER PPP FEU-MCNC: 0.88 UG/ML FEU (ref 0–0.48)
EOSINOPHIL # BLD: 0.2 K/UL (ref 0–0.6)
EOSINOPHIL # BLD: 0.3 K/UL (ref 0–0.6)
EOSINOPHIL NFR BLD: 0.9 % (ref 0–5)
EOSINOPHIL NFR BLD: 1.7 % (ref 0–5)
ERYTHROCYTE [DISTWIDTH] IN BLOOD BY AUTOMATED COUNT: 13.2 % (ref 11.5–14.5)
ERYTHROCYTE [DISTWIDTH] IN BLOOD BY AUTOMATED COUNT: 13.4 % (ref 11.5–14.5)
GLUCOSE SERPL-MCNC: 100 MG/DL (ref 74–109)
GLUCOSE SERPL-MCNC: 133 MG/DL (ref 74–109)
HBA1C MFR BLD: 5.7 % (ref 4–6)
HBA1C MFR BLD: 6.1 %
HCO3 ARTERIAL: 27.8 MMOL/L (ref 22–26)
HCT VFR BLD AUTO: 30.3 % (ref 42–52)
HCT VFR BLD AUTO: 31.6 % (ref 42–52)
HEMOGLOBIN, ART, EXTENDED: 9.9 G/DL (ref 14–18)
HGB BLD-MCNC: 10.3 G/DL (ref 14–18)
HGB BLD-MCNC: 9.4 G/DL (ref 14–18)
IMM GRANULOCYTES # BLD: 0.1 K/UL
IMM GRANULOCYTES # BLD: 0.1 K/UL
INR PPP: 1.24 (ref 0.88–1.18)
LYMPHOCYTES # BLD: 2.1 K/UL (ref 1.1–4.5)
LYMPHOCYTES # BLD: 2.1 K/UL (ref 1.1–4.5)
LYMPHOCYTES NFR BLD: 12.8 % (ref 20–40)
LYMPHOCYTES NFR BLD: 13.5 % (ref 20–40)
MAGNESIUM SERPL-MCNC: 2.7 MG/DL (ref 1.6–2.6)
MCH RBC QN AUTO: 31.9 PG (ref 27–31)
MCH RBC QN AUTO: 32 PG (ref 27–31)
MCHC RBC AUTO-ENTMCNC: 29.7 G/DL (ref 33–37)
MCHC RBC AUTO-ENTMCNC: 34 G/DL (ref 33–37)
MCV RBC AUTO: 107.5 FL (ref 80–94)
MCV RBC AUTO: 93.8 FL (ref 80–94)
METHEMOGLOBIN ARTERIAL: 0.9 %
MONOCYTES # BLD: 1.3 K/UL (ref 0–0.9)
MONOCYTES # BLD: 1.8 K/UL (ref 0–0.9)
MONOCYTES NFR BLD: 11 % (ref 0–10)
MONOCYTES NFR BLD: 8.3 % (ref 0–10)
NEUTROPHILS # BLD: 11.7 K/UL (ref 1.5–7.5)
NEUTROPHILS # BLD: 12.4 K/UL (ref 1.5–7.5)
NEUTS SEG NFR BLD: 73.7 % (ref 50–65)
NEUTS SEG NFR BLD: 76.6 % (ref 50–65)
O2 CONTENT ARTERIAL: 12.2 ML/DL
O2 DELIVERY DEVICE: ABNORMAL
O2 SAT, ARTERIAL: 87.6 %
O2 THERAPY: ABNORMAL
PCO2 ARTERIAL: 40 MMHG (ref 35–45)
PH ARTERIAL: 7.45 (ref 7.35–7.45)
PHOSPHATE SERPL-MCNC: 4.7 MG/DL (ref 2.5–4.5)
PLATELET # BLD AUTO: 261 K/UL (ref 130–400)
PLATELET # BLD AUTO: 265 K/UL (ref 130–400)
PMV BLD AUTO: 9.9 FL (ref 9.4–12.4)
PMV BLD AUTO: 9.9 FL (ref 9.4–12.4)
PO2 ARTERIAL: 56 MMHG (ref 80–100)
POTASSIUM BLD-SCNC: 4.7 MMOL/L
POTASSIUM SERPL-SCNC: 5.1 MMOL/L (ref 3.5–5)
POTASSIUM SERPL-SCNC: 5.2 MMOL/L (ref 3.5–5)
PROT SERPL-MCNC: 7.3 G/DL (ref 6.6–8.7)
PROT SERPL-MCNC: 7.8 G/DL (ref 6.6–8.7)
PROTHROMBIN TIME: 15.1 SEC (ref 12–14.6)
RBC # BLD AUTO: 2.94 M/UL (ref 4.7–6.1)
RBC # BLD AUTO: 3.23 M/UL (ref 4.7–6.1)
REASON FOR REJECTION: NORMAL
REJECTED TEST: NORMAL
SAMPLE SOURCE: ABNORMAL
SODIUM SERPL-SCNC: 135 MMOL/L (ref 136–145)
SODIUM SERPL-SCNC: 138 MMOL/L (ref 136–145)
SPONT RATE(BPM): 19
TROPONIN T SERPL-MCNC: 0.02 NG/ML (ref 0–0.03)
WBC # BLD AUTO: 15.9 K/UL (ref 4.8–10.8)
WBC # BLD AUTO: 16.2 K/UL (ref 4.8–10.8)

## 2023-05-16 PROCEDURE — 36415 COLL VENOUS BLD VENIPUNCTURE: CPT

## 2023-05-16 PROCEDURE — 6360000002 HC RX W HCPCS

## 2023-05-16 PROCEDURE — 93005 ELECTROCARDIOGRAM TRACING: CPT | Performed by: PEDIATRICS

## 2023-05-16 PROCEDURE — 36600 WITHDRAWAL OF ARTERIAL BLOOD: CPT

## 2023-05-16 PROCEDURE — 2580000003 HC RX 258: Performed by: PEDIATRICS

## 2023-05-16 PROCEDURE — 6360000002 HC RX W HCPCS: Performed by: PEDIATRICS

## 2023-05-16 PROCEDURE — 85379 FIBRIN DEGRADATION QUANT: CPT

## 2023-05-16 PROCEDURE — 99214 OFFICE O/P EST MOD 30 MIN: CPT | Performed by: NURSE PRACTITIONER

## 2023-05-16 PROCEDURE — 6370000000 HC RX 637 (ALT 250 FOR IP): Performed by: HOSPITALIST

## 2023-05-16 PROCEDURE — 82803 BLOOD GASES ANY COMBINATION: CPT

## 2023-05-16 PROCEDURE — 6360000004 HC RX CONTRAST MEDICATION: Performed by: PEDIATRICS

## 2023-05-16 PROCEDURE — 83735 ASSAY OF MAGNESIUM: CPT

## 2023-05-16 PROCEDURE — 71046 X-RAY EXAM CHEST 2 VIEWS: CPT

## 2023-05-16 PROCEDURE — 71046 X-RAY EXAM CHEST 2 VIEWS: CPT | Performed by: RADIOLOGY

## 2023-05-16 PROCEDURE — 85025 COMPLETE CBC W/AUTO DIFF WBC: CPT

## 2023-05-16 PROCEDURE — 83036 HEMOGLOBIN GLYCOSYLATED A1C: CPT

## 2023-05-16 PROCEDURE — 71275 CT ANGIOGRAPHY CHEST: CPT | Performed by: RADIOLOGY

## 2023-05-16 PROCEDURE — 85730 THROMBOPLASTIN TIME PARTIAL: CPT

## 2023-05-16 PROCEDURE — 1210000000 HC MED SURG R&B

## 2023-05-16 PROCEDURE — 85610 PROTHROMBIN TIME: CPT

## 2023-05-16 PROCEDURE — 71275 CT ANGIOGRAPHY CHEST: CPT

## 2023-05-16 PROCEDURE — 83036 HEMOGLOBIN GLYCOSYLATED A1C: CPT | Performed by: NURSE PRACTITIONER

## 2023-05-16 PROCEDURE — 2580000003 HC RX 258: Performed by: HOSPITALIST

## 2023-05-16 PROCEDURE — 3044F HG A1C LEVEL LT 7.0%: CPT | Performed by: NURSE PRACTITIONER

## 2023-05-16 PROCEDURE — 83880 ASSAY OF NATRIURETIC PEPTIDE: CPT

## 2023-05-16 PROCEDURE — 80053 COMPREHEN METABOLIC PANEL: CPT

## 2023-05-16 PROCEDURE — 84100 ASSAY OF PHOSPHORUS: CPT

## 2023-05-16 PROCEDURE — 96375 TX/PRO/DX INJ NEW DRUG ADDON: CPT

## 2023-05-16 PROCEDURE — 96365 THER/PROPH/DIAG IV INF INIT: CPT

## 2023-05-16 PROCEDURE — 99285 EMERGENCY DEPT VISIT HI MDM: CPT

## 2023-05-16 PROCEDURE — 84484 ASSAY OF TROPONIN QUANT: CPT

## 2023-05-16 RX ORDER — CALCIUM CARBONATE 500(1250)
1000 TABLET ORAL DAILY
Status: DISCONTINUED | OUTPATIENT
Start: 2023-05-17 | End: 2023-05-20 | Stop reason: HOSPADM

## 2023-05-16 RX ORDER — MECOBALAMIN 5000 MCG
5 TABLET,DISINTEGRATING ORAL NIGHTLY PRN
Status: DISCONTINUED | OUTPATIENT
Start: 2023-05-16 | End: 2023-05-20 | Stop reason: HOSPADM

## 2023-05-16 RX ORDER — DOXYCYCLINE HYCLATE 100 MG
100 TABLET ORAL 2 TIMES DAILY
Qty: 20 TABLET | Refills: 0 | Status: ON HOLD | OUTPATIENT
Start: 2023-05-16 | End: 2023-05-26

## 2023-05-16 RX ORDER — ACETAMINOPHEN 650 MG/1
650 SUPPOSITORY RECTAL EVERY 4 HOURS PRN
Status: DISCONTINUED | OUTPATIENT
Start: 2023-05-16 | End: 2023-05-20 | Stop reason: HOSPADM

## 2023-05-16 RX ORDER — INSULIN LISPRO 100 [IU]/ML
0-8 INJECTION, SOLUTION INTRAVENOUS; SUBCUTANEOUS
Status: DISCONTINUED | OUTPATIENT
Start: 2023-05-17 | End: 2023-05-20 | Stop reason: HOSPADM

## 2023-05-16 RX ORDER — NIFEDIPINE 60 MG/1
60 TABLET, EXTENDED RELEASE ORAL 2 TIMES DAILY
Status: DISCONTINUED | OUTPATIENT
Start: 2023-05-16 | End: 2023-05-20 | Stop reason: HOSPADM

## 2023-05-16 RX ORDER — HEPARIN SODIUM 1000 [USP'U]/ML
40 INJECTION, SOLUTION INTRAVENOUS; SUBCUTANEOUS PRN
Status: DISCONTINUED | OUTPATIENT
Start: 2023-05-16 | End: 2023-05-20 | Stop reason: HOSPADM

## 2023-05-16 RX ORDER — SODIUM CHLORIDE 0.9 % (FLUSH) 0.9 %
5-40 SYRINGE (ML) INJECTION PRN
Status: DISCONTINUED | OUTPATIENT
Start: 2023-05-16 | End: 2023-05-20 | Stop reason: HOSPADM

## 2023-05-16 RX ORDER — ATORVASTATIN CALCIUM 10 MG/1
10 TABLET, FILM COATED ORAL NIGHTLY
Status: CANCELLED | OUTPATIENT
Start: 2023-05-16

## 2023-05-16 RX ORDER — ONDANSETRON 2 MG/ML
4 INJECTION INTRAMUSCULAR; INTRAVENOUS EVERY 6 HOURS PRN
Status: DISCONTINUED | OUTPATIENT
Start: 2023-05-16 | End: 2023-05-20 | Stop reason: HOSPADM

## 2023-05-16 RX ORDER — ONDANSETRON 2 MG/ML
4 INJECTION INTRAMUSCULAR; INTRAVENOUS ONCE
Status: COMPLETED | OUTPATIENT
Start: 2023-05-16 | End: 2023-05-16

## 2023-05-16 RX ORDER — CALCIUM CARBONATE 200(500)MG
500 TABLET,CHEWABLE ORAL 3 TIMES DAILY PRN
Status: DISCONTINUED | OUTPATIENT
Start: 2023-05-16 | End: 2023-05-20 | Stop reason: HOSPADM

## 2023-05-16 RX ORDER — HEPARIN SODIUM 1000 [USP'U]/ML
80 INJECTION, SOLUTION INTRAVENOUS; SUBCUTANEOUS PRN
Status: DISCONTINUED | OUTPATIENT
Start: 2023-05-16 | End: 2023-05-20 | Stop reason: HOSPADM

## 2023-05-16 RX ORDER — DEXTROSE MONOHYDRATE 100 MG/ML
INJECTION, SOLUTION INTRAVENOUS CONTINUOUS PRN
Status: DISCONTINUED | OUTPATIENT
Start: 2023-05-16 | End: 2023-05-20 | Stop reason: HOSPADM

## 2023-05-16 RX ORDER — ONDANSETRON 4 MG/1
4 TABLET, ORALLY DISINTEGRATING ORAL EVERY 8 HOURS PRN
Status: DISCONTINUED | OUTPATIENT
Start: 2023-05-16 | End: 2023-05-20 | Stop reason: HOSPADM

## 2023-05-16 RX ORDER — SODIUM CHLORIDE 0.9 % (FLUSH) 0.9 %
5-40 SYRINGE (ML) INJECTION EVERY 12 HOURS SCHEDULED
Status: DISCONTINUED | OUTPATIENT
Start: 2023-05-16 | End: 2023-05-20 | Stop reason: HOSPADM

## 2023-05-16 RX ORDER — ALBUTEROL SULFATE 90 UG/1
2 AEROSOL, METERED RESPIRATORY (INHALATION) EVERY 4 HOURS PRN
Status: DISCONTINUED | OUTPATIENT
Start: 2023-05-16 | End: 2023-05-20 | Stop reason: HOSPADM

## 2023-05-16 RX ORDER — HEPARIN SODIUM 5000 [USP'U]/ML
5000 INJECTION, SOLUTION INTRAVENOUS; SUBCUTANEOUS EVERY 8 HOURS SCHEDULED
Status: DISCONTINUED | OUTPATIENT
Start: 2023-05-16 | End: 2023-05-16

## 2023-05-16 RX ORDER — INSULIN LISPRO 100 [IU]/ML
0-4 INJECTION, SOLUTION INTRAVENOUS; SUBCUTANEOUS NIGHTLY
Status: DISCONTINUED | OUTPATIENT
Start: 2023-05-16 | End: 2023-05-20 | Stop reason: HOSPADM

## 2023-05-16 RX ORDER — SIMVASTATIN 10 MG
10 TABLET ORAL NIGHTLY
Status: DISCONTINUED | OUTPATIENT
Start: 2023-05-16 | End: 2023-05-17

## 2023-05-16 RX ORDER — SEVELAMER CARBONATE 800 MG/1
1600 TABLET, FILM COATED ORAL
Status: DISCONTINUED | OUTPATIENT
Start: 2023-05-17 | End: 2023-05-20 | Stop reason: HOSPADM

## 2023-05-16 RX ORDER — HEPARIN SODIUM 10000 [USP'U]/100ML
5-30 INJECTION, SOLUTION INTRAVENOUS CONTINUOUS
Status: DISCONTINUED | OUTPATIENT
Start: 2023-05-16 | End: 2023-05-20 | Stop reason: HOSPADM

## 2023-05-16 RX ORDER — CLONIDINE HYDROCHLORIDE 0.1 MG/1
0.1 TABLET ORAL 2 TIMES DAILY
Status: DISCONTINUED | OUTPATIENT
Start: 2023-05-16 | End: 2023-05-20 | Stop reason: HOSPADM

## 2023-05-16 RX ORDER — POLYETHYLENE GLYCOL 3350 17 G/17G
17 POWDER, FOR SOLUTION ORAL DAILY PRN
Status: DISCONTINUED | OUTPATIENT
Start: 2023-05-16 | End: 2023-05-20 | Stop reason: HOSPADM

## 2023-05-16 RX ORDER — CINACALCET 30 MG/1
30 TABLET, FILM COATED ORAL DAILY
Status: DISCONTINUED | OUTPATIENT
Start: 2023-05-17 | End: 2023-05-20 | Stop reason: HOSPADM

## 2023-05-16 RX ORDER — SODIUM CHLORIDE 9 MG/ML
INJECTION, SOLUTION INTRAVENOUS PRN
Status: DISCONTINUED | OUTPATIENT
Start: 2023-05-16 | End: 2023-05-20 | Stop reason: HOSPADM

## 2023-05-16 RX ORDER — ALBUTEROL SULFATE 2.5 MG/3ML
2.5 SOLUTION RESPIRATORY (INHALATION) EVERY 4 HOURS PRN
Status: CANCELLED | OUTPATIENT
Start: 2023-05-16

## 2023-05-16 RX ORDER — SIMVASTATIN 10 MG
10 TABLET ORAL NIGHTLY
Qty: 90 TABLET | Refills: 0 | Status: ON HOLD | OUTPATIENT
Start: 2023-05-16

## 2023-05-16 RX ORDER — ACETAMINOPHEN 325 MG/1
650 TABLET ORAL EVERY 4 HOURS PRN
Status: DISCONTINUED | OUTPATIENT
Start: 2023-05-16 | End: 2023-05-20 | Stop reason: HOSPADM

## 2023-05-16 RX ORDER — HEPARIN SODIUM 1000 [USP'U]/ML
80 INJECTION, SOLUTION INTRAVENOUS; SUBCUTANEOUS ONCE
Status: COMPLETED | OUTPATIENT
Start: 2023-05-16 | End: 2023-05-16

## 2023-05-16 RX ORDER — HYDRALAZINE HYDROCHLORIDE 50 MG/1
50 TABLET, FILM COATED ORAL EVERY 8 HOURS SCHEDULED
Status: DISCONTINUED | OUTPATIENT
Start: 2023-05-16 | End: 2023-05-20 | Stop reason: HOSPADM

## 2023-05-16 RX ORDER — CHOLECALCIFEROL (VITAMIN D3) 125 MCG
500 CAPSULE ORAL DAILY
Status: DISCONTINUED | OUTPATIENT
Start: 2023-05-17 | End: 2023-05-20 | Stop reason: HOSPADM

## 2023-05-16 RX ORDER — MORPHINE SULFATE 4 MG/ML
4 INJECTION, SOLUTION INTRAMUSCULAR; INTRAVENOUS ONCE
Status: COMPLETED | OUTPATIENT
Start: 2023-05-16 | End: 2023-05-16

## 2023-05-16 RX ORDER — MECOBALAMIN 5000 MCG
5 TABLET,DISINTEGRATING ORAL NIGHTLY
Status: DISCONTINUED | OUTPATIENT
Start: 2023-05-16 | End: 2023-05-20 | Stop reason: HOSPADM

## 2023-05-16 RX ADMIN — CLONIDINE HYDROCHLORIDE 0.1 MG: 0.1 TABLET ORAL at 23:49

## 2023-05-16 RX ADMIN — HEPARIN SODIUM 9030 UNITS: 1000 INJECTION INTRAVENOUS; SUBCUTANEOUS at 23:38

## 2023-05-16 RX ADMIN — HYDRALAZINE HYDROCHLORIDE 50 MG: 50 TABLET, FILM COATED ORAL at 23:50

## 2023-05-16 RX ADMIN — MORPHINE SULFATE 4 MG: 4 INJECTION, SOLUTION INTRAMUSCULAR; INTRAVENOUS at 19:06

## 2023-05-16 RX ADMIN — HEPARIN SODIUM 18 UNITS/KG/HR: 10000 INJECTION, SOLUTION INTRAVENOUS at 23:40

## 2023-05-16 RX ADMIN — Medication 5 MG: at 23:49

## 2023-05-16 RX ADMIN — ONDANSETRON 4 MG: 2 INJECTION INTRAMUSCULAR; INTRAVENOUS at 19:05

## 2023-05-16 RX ADMIN — IOPAMIDOL 90 ML: 755 INJECTION, SOLUTION INTRAVENOUS at 20:55

## 2023-05-16 RX ADMIN — CEFTRIAXONE 1000 MG: 1 INJECTION, POWDER, FOR SOLUTION INTRAMUSCULAR; INTRAVENOUS at 21:06

## 2023-05-16 RX ADMIN — METOPROLOL TARTRATE 25 MG: 25 TABLET, FILM COATED ORAL at 23:49

## 2023-05-16 RX ADMIN — SIMVASTATIN 10 MG: 10 TABLET, FILM COATED ORAL at 23:49

## 2023-05-16 RX ADMIN — SODIUM CHLORIDE, PRESERVATIVE FREE 10 ML: 5 INJECTION INTRAVENOUS at 23:50

## 2023-05-16 RX ADMIN — NIFEDIPINE 60 MG: 60 TABLET, EXTENDED RELEASE ORAL at 23:49

## 2023-05-16 RX ADMIN — AZITHROMYCIN MONOHYDRATE 500 MG: 500 INJECTION, POWDER, LYOPHILIZED, FOR SOLUTION INTRAVENOUS at 21:09

## 2023-05-16 ASSESSMENT — ENCOUNTER SYMPTOMS
COLOR CHANGE: 0
DIARRHEA: 0
COUGH: 0
ABDOMINAL PAIN: 0
SORE THROAT: 0
VOMITING: 0
CHEST TIGHTNESS: 0
SHORTNESS OF BREATH: 1
NAUSEA: 0

## 2023-05-16 ASSESSMENT — PAIN DESCRIPTION - LOCATION: LOCATION: CHEST;BACK

## 2023-05-16 ASSESSMENT — PAIN DESCRIPTION - ORIENTATION: ORIENTATION: LEFT

## 2023-05-16 ASSESSMENT — PAIN SCALES - GENERAL: PAINLEVEL_OUTOF10: 10

## 2023-05-16 NOTE — PROGRESS NOTES
Abdominal:      General: Bowel sounds are normal.      Palpations: Abdomen is soft. Musculoskeletal:         General: Normal range of motion. Cervical back: Normal range of motion. Skin:     General: Skin is warm and dry. Neurological:      Mental Status: He is alert and oriented to person, place, and time. Psychiatric:         Mood and Affect: Mood normal.         Behavior: Behavior normal.       /76   Pulse 76   Temp 97.1 °F (36.2 °C) (Temporal)   Ht 5' 7\" (1.702 m)   Wt 249 lb 3.2 oz (113 kg)   SpO2 97%   BMI 39.03 kg/m²     Assessment:      Diagnosis Orders   1. Type 2 diabetes mellitus with chronic kidney disease on chronic dialysis, without long-term current use of insulin ([facility])  POCT glycosylated hemoglobin (Hb A1C)      2. Mixed hyperlipidemia  simvastatin (ZOCOR) 10 MG tablet      3. Shortness of breath  XR CHEST STANDARD (2 VW)    Comprehensive Metabolic Panel    CBC with Auto Differential    Brain Natriuretic Peptide      4. ESRD (end stage renal disease) ([facility] [address]  Comprehensive Metabolic Panel    CBC with Auto Differential      5. Dependence on renal dialysis ([facility] [address]        6. Mild intermittent asthma without complication        7. Morbid obesity due to excess calories ([facility] [address]        8. Pneumonia of left upper lobe due to infectious organism  doxycycline hyclate (VIBRA-TABS) 100 MG tablet          Results for orders placed or performed in visit on 05/16/23   POCT glycosylated hemoglobin (Hb A1C)   Result Value Ref Range    Hemoglobin A1C 6.1 %       Plan:     1. Type 2 diabetes mellitus with chronic kidney disease on chronic dialysis, without long-term current use of insulin ([facility])    - POCT glycosylated hemoglobin (Hb A1C)    2. Mixed hyperlipidemia    - simvastatin (ZOCOR) 10 MG tablet; Take 1 tablet by mouth nightly  Dispense: 90 tablet; Refill: 0    3. Shortness of breath    - XR CHEST STANDARD (2 VW); Future  - Comprehensive Metabolic Panel;  Future  - CBC with Auto Differential;

## 2023-05-17 LAB
ANION GAP SERPL CALCULATED.3IONS-SCNC: 21 MMOL/L (ref 7–19)
APTT PPP: 52.6 SEC (ref 26–36.2)
APTT PPP: >200 SEC (ref 26–36.2)
BASOPHILS # BLD: 0 K/UL (ref 0–0.2)
BASOPHILS NFR BLD: 0 % (ref 0–1)
BUN SERPL-MCNC: 92 MG/DL (ref 6–20)
CALCIUM SERPL-MCNC: 9.6 MG/DL (ref 8.6–10)
CHLORIDE SERPL-SCNC: 93 MMOL/L (ref 98–111)
CO2 SERPL-SCNC: 21 MMOL/L (ref 22–29)
CREAT SERPL-MCNC: 20.3 MG/DL (ref 0.5–1.2)
EKG P AXIS: 45 DEGREES
EKG P-R INTERVAL: 178 MS
EKG Q-T INTERVAL: 378 MS
EKG QRS DURATION: 94 MS
EKG QTC CALCULATION (BAZETT): 399 MS
EKG T AXIS: -2 DEGREES
EOSINOPHIL # BLD: 0 K/UL (ref 0–0.6)
EOSINOPHIL NFR BLD: 0 % (ref 0–5)
ERYTHROCYTE [DISTWIDTH] IN BLOOD BY AUTOMATED COUNT: 13 % (ref 11.5–14.5)
GLUCOSE BLD-MCNC: 105 MG/DL (ref 70–99)
GLUCOSE BLD-MCNC: 115 MG/DL (ref 70–99)
GLUCOSE BLD-MCNC: 126 MG/DL (ref 70–99)
GLUCOSE BLD-MCNC: 146 MG/DL (ref 70–99)
GLUCOSE SERPL-MCNC: 102 MG/DL (ref 74–109)
HCT VFR BLD AUTO: 26.8 % (ref 42–52)
HGB BLD-MCNC: 8.9 G/DL (ref 14–18)
IMM GRANULOCYTES # BLD: 0.2 K/UL
LYMPHOCYTES # BLD: 2.7 K/UL (ref 1.1–4.5)
LYMPHOCYTES NFR BLD: 14 % (ref 20–40)
MCH RBC QN AUTO: 31 PG (ref 27–31)
MCHC RBC AUTO-ENTMCNC: 33.2 G/DL (ref 33–37)
MCV RBC AUTO: 93.4 FL (ref 80–94)
MONOCYTES # BLD: 1.8 K/UL (ref 0–0.9)
MONOCYTES NFR BLD: 9 % (ref 0–10)
MRSA DNA SPEC QL NAA+PROBE: NOT DETECTED
NEUTROPHILS # BLD: 15.1 K/UL (ref 1.5–7.5)
NEUTS SEG NFR BLD: 77 % (ref 50–65)
PERFORMED ON: ABNORMAL
PLATELET # BLD AUTO: 227 K/UL (ref 130–400)
PLATELET SLIDE REVIEW: ADEQUATE
PMV BLD AUTO: 9.4 FL (ref 9.4–12.4)
POTASSIUM SERPL-SCNC: 5.5 MMOL/L (ref 3.5–5)
RBC # BLD AUTO: 2.87 M/UL (ref 4.7–6.1)
SODIUM SERPL-SCNC: 135 MMOL/L (ref 136–145)
TROPONIN T SERPL-MCNC: 0.03 NG/ML (ref 0–0.03)
TROPONIN T SERPL-MCNC: 0.05 NG/ML (ref 0–0.03)
TROPONIN T SERPL-MCNC: 0.06 NG/ML (ref 0–0.03)
WBC # BLD AUTO: 19.6 K/UL (ref 4.8–10.8)

## 2023-05-17 PROCEDURE — 87641 MR-STAPH DNA AMP PROBE: CPT

## 2023-05-17 PROCEDURE — 93005 ELECTROCARDIOGRAM TRACING: CPT

## 2023-05-17 PROCEDURE — 6360000002 HC RX W HCPCS: Performed by: INTERNAL MEDICINE

## 2023-05-17 PROCEDURE — 80048 BASIC METABOLIC PNL TOTAL CA: CPT

## 2023-05-17 PROCEDURE — 6370000000 HC RX 637 (ALT 250 FOR IP): Performed by: HOSPITALIST

## 2023-05-17 PROCEDURE — 6370000000 HC RX 637 (ALT 250 FOR IP)

## 2023-05-17 PROCEDURE — 87449 NOS EACH ORGANISM AG IA: CPT

## 2023-05-17 PROCEDURE — 2700000000 HC OXYGEN THERAPY PER DAY

## 2023-05-17 PROCEDURE — 82962 GLUCOSE BLOOD TEST: CPT

## 2023-05-17 PROCEDURE — 6370000000 HC RX 637 (ALT 250 FOR IP): Performed by: INTERNAL MEDICINE

## 2023-05-17 PROCEDURE — 85730 THROMBOPLASTIN TIME PARTIAL: CPT

## 2023-05-17 PROCEDURE — 5A1D70Z PERFORMANCE OF URINARY FILTRATION, INTERMITTENT, LESS THAN 6 HOURS PER DAY: ICD-10-PCS | Performed by: INTERNAL MEDICINE

## 2023-05-17 PROCEDURE — 2580000003 HC RX 258: Performed by: INTERNAL MEDICINE

## 2023-05-17 PROCEDURE — 86480 TB TEST CELL IMMUN MEASURE: CPT

## 2023-05-17 PROCEDURE — 1210000000 HC MED SURG R&B

## 2023-05-17 PROCEDURE — 8010000000 HC HEMODIALYSIS ACUTE INPT

## 2023-05-17 PROCEDURE — 6360000002 HC RX W HCPCS

## 2023-05-17 PROCEDURE — 85025 COMPLETE CBC W/AUTO DIFF WBC: CPT

## 2023-05-17 PROCEDURE — 94760 N-INVAS EAR/PLS OXIMETRY 1: CPT

## 2023-05-17 PROCEDURE — 84484 ASSAY OF TROPONIN QUANT: CPT

## 2023-05-17 PROCEDURE — 93010 ELECTROCARDIOGRAM REPORT: CPT | Performed by: INTERNAL MEDICINE

## 2023-05-17 PROCEDURE — 36415 COLL VENOUS BLD VENIPUNCTURE: CPT

## 2023-05-17 RX ORDER — SIMVASTATIN 10 MG
20 TABLET ORAL NIGHTLY
Status: DISCONTINUED | OUTPATIENT
Start: 2023-05-17 | End: 2023-05-20 | Stop reason: HOSPADM

## 2023-05-17 RX ORDER — HEPARIN SODIUM 5000 [USP'U]/ML
INJECTION, SOLUTION INTRAVENOUS; SUBCUTANEOUS
Status: DISPENSED
Start: 2023-05-17 | End: 2023-05-17

## 2023-05-17 RX ORDER — ASPIRIN 81 MG/1
81 TABLET, CHEWABLE ORAL DAILY
Status: DISCONTINUED | OUTPATIENT
Start: 2023-05-17 | End: 2023-05-20 | Stop reason: HOSPADM

## 2023-05-17 RX ADMIN — SIMVASTATIN 20 MG: 10 TABLET, FILM COATED ORAL at 20:33

## 2023-05-17 RX ADMIN — ACETAMINOPHEN 650 MG: 325 TABLET ORAL at 11:33

## 2023-05-17 RX ADMIN — HEPARIN SODIUM 19 UNITS/KG/HR: 10000 INJECTION, SOLUTION INTRAVENOUS at 10:41

## 2023-05-17 RX ADMIN — HEPARIN SODIUM 9030 UNITS: 1000 INJECTION INTRAVENOUS; SUBCUTANEOUS at 10:48

## 2023-05-17 RX ADMIN — NIFEDIPINE 60 MG: 60 TABLET, EXTENDED RELEASE ORAL at 10:45

## 2023-05-17 RX ADMIN — HEPARIN SODIUM 16 UNITS/KG/HR: 10000 INJECTION, SOLUTION INTRAVENOUS at 20:53

## 2023-05-17 RX ADMIN — METOPROLOL TARTRATE 25 MG: 25 TABLET, FILM COATED ORAL at 10:46

## 2023-05-17 RX ADMIN — CYANOCOBALAMIN TAB 500 MCG 500 MCG: 500 TAB at 10:46

## 2023-05-17 RX ADMIN — HYDRALAZINE HYDROCHLORIDE 50 MG: 50 TABLET, FILM COATED ORAL at 06:18

## 2023-05-17 RX ADMIN — SEVELAMER CARBONATE 1600 MG: 800 TABLET, FILM COATED ORAL at 19:46

## 2023-05-17 RX ADMIN — VANCOMYCIN HYDROCHLORIDE 2500 MG: 500 INJECTION, POWDER, LYOPHILIZED, FOR SOLUTION INTRAVENOUS at 20:49

## 2023-05-17 RX ADMIN — SEVELAMER CARBONATE 1600 MG: 800 TABLET, FILM COATED ORAL at 11:20

## 2023-05-17 RX ADMIN — CLONIDINE HYDROCHLORIDE 0.1 MG: 0.1 TABLET ORAL at 10:45

## 2023-05-17 RX ADMIN — Medication 5 MG: at 20:33

## 2023-05-17 RX ADMIN — ACETAMINOPHEN 650 MG: 325 TABLET ORAL at 06:18

## 2023-05-17 RX ADMIN — CALCIUM 1000 MG: 500 TABLET ORAL at 10:52

## 2023-05-17 RX ADMIN — NIFEDIPINE 60 MG: 60 TABLET, EXTENDED RELEASE ORAL at 20:33

## 2023-05-17 RX ADMIN — CLONIDINE HYDROCHLORIDE 0.1 MG: 0.1 TABLET ORAL at 20:32

## 2023-05-17 RX ADMIN — HYDRALAZINE HYDROCHLORIDE 50 MG: 50 TABLET, FILM COATED ORAL at 20:33

## 2023-05-17 RX ADMIN — CEFEPIME 2000 MG: 2 INJECTION, POWDER, FOR SOLUTION INTRAVENOUS at 19:45

## 2023-05-17 RX ADMIN — ASPIRIN 81 MG: 81 TABLET, CHEWABLE ORAL at 10:45

## 2023-05-17 RX ADMIN — METOPROLOL TARTRATE 25 MG: 25 TABLET, FILM COATED ORAL at 20:33

## 2023-05-17 RX ADMIN — EPOETIN ALFA-EPBX 10000 UNITS: 10000 INJECTION, SOLUTION INTRAVENOUS; SUBCUTANEOUS at 19:39

## 2023-05-17 RX ADMIN — ALBUTEROL SULFATE 2 PUFF: 90 AEROSOL, METERED RESPIRATORY (INHALATION) at 11:20

## 2023-05-17 RX ADMIN — CINACALCET HYDROCHLORIDE 30 MG: 30 TABLET, FILM COATED ORAL at 11:20

## 2023-05-17 RX ADMIN — SODIUM ZIRCONIUM CYCLOSILICATE 10 G: 10 POWDER, FOR SUSPENSION ORAL at 10:51

## 2023-05-17 ASSESSMENT — ENCOUNTER SYMPTOMS
EYE DISCHARGE: 0
CHEST TIGHTNESS: 1
BACK PAIN: 0
WHEEZING: 0
SHORTNESS OF BREATH: 1
COUGH: 0
COUGH: 1
COLOR CHANGE: 0
VOMITING: 0
ABDOMINAL DISTENTION: 0
ABDOMINAL PAIN: 0
NAUSEA: 0
RHINORRHEA: 0
CONSTIPATION: 0

## 2023-05-17 ASSESSMENT — PAIN DESCRIPTION - LOCATION: LOCATION: CHEST

## 2023-05-17 ASSESSMENT — PAIN DESCRIPTION - DESCRIPTORS: DESCRIPTORS: SHARP

## 2023-05-17 ASSESSMENT — PAIN SCALES - GENERAL: PAINLEVEL_OUTOF10: 9

## 2023-05-17 ASSESSMENT — PAIN DESCRIPTION - ORIENTATION: ORIENTATION: LEFT

## 2023-05-17 NOTE — ACP (ADVANCE CARE PLANNING)
Advance Care Planning     Advance Care Planning Inpatient Note  [facility] Department    Today's Date: 5/17/2023  Unit: L 4 ONCOLOGY UNIT    Received request from Other: Palliative care . Upon review of chart and communication with care team, patient's decision making abilities are not in question. . Patient was/were present in the room during visit. Goals of ACP Conversation:  Discuss advance care planning documents    Health Care Decision Makers:       Primary Decision Maker: Esthela Neida - Child - [phone]    Secondary Decision Maker: Barbara Marianomario - Parent - [phone]    Supplemental (Other) Decision Maker: Nolvia Santacruz - Brother/Sister - [phone]  Summary:  Documented Next of Kin, per patient report      Advance Care Planning Documents (Patient Wishes):  None     Assessment:  Pt says his oldest daughter would be primary decision maker, and he confirmed he wanted full scope of treatment for his code status. Interventions:  Confirmed code status, and Primary decision maker. Care Preferences Communicated:     Hospitalization:  If the patient's health worsens and it becomes clear that the chance of recovery is unlikely,     the patient wants hospitalization. Ventilation:   If the patient, in their present state of health, suddenly became very ill and unable to breathe on their own,     the patient would desire the use of a ventilator (breathing machine). If their health worsens and it becomes clear that the change of recovery is unlikely,     the patient would desire the use of a ventilator (breathing machine). Resuscitation:  In the event the patient's heart stopped as a result of an underlying serious health condition, the patient communicates a preference for      resuscitative attempts (CPR).     Outcomes/Plan:  ACP Discussion: Completed    Electronically signed by Naomi Higgins on 5/17/2023 at 3:03 PM

## 2023-05-17 NOTE — ED PROVIDER NOTES
wall pain          DISPOSITION/PLAN   DISPOSITION Admitted 05/16/2023 09:29:56 PM               (Please note that portions of this note were completed with a voice recognition program.  Efforts were made to edit thedictations but occasionally words are mis-transcribed.)    Ligia Juarez MD (electronically signed)  Attending Emergency Physician          Ligia Juarez MD  05/17/23 Catrina Naik MD  05/17/23 [id number]

## 2023-05-17 NOTE — CARE COORDINATION
Case Management Assessment  Initial Evaluation    Date/Time of Evaluation: 5/17/2023 12:14 PM  Assessment Completed by: Estelita Singer RN    If patient is discharged prior to next notation, then this note serves as note for discharge by case management. Patient Name: Olga Vargas                   YOB: 1970  Diagnosis: Acute hypoxemic respiratory failure [facility]) [J96.01]                   Date / Time: 5/16/2023  5:34 PM    Patient Admission Status: Inpatient   Readmission Risk (Low < 19, Mod (19-27), High > 27): Readmission Risk Score: 18.6    Current PCP: SOSA Xiao CNP  PCP verified by CM? Yes    Chart Reviewed: Yes      History Provided by: Patient  Patient Orientation: Alert and Oriented, Person, Place, Situation    Patient Cognition: Alert    Hospitalization in the last 30 days (Readmission):  No    If yes, Readmission Assessment in CM Navigator will be completed. Advance Directives:      Code Status: Full Code   Patient's Primary Decision Maker is:      Primary Decision Maker: [facility] - [phone]    Discharge Planning:    Patient lives with: Alone Type of Home: House  Primary Care Giver: Self  Patient Support Systems include: Family Members   Current Financial resources: Medicaid, Medicare  Current community resources:    Current services prior to admission: None            Current DME:              Type of Home Care services:  None    ADLS  Prior functional level: Independent in ADLs/IADLs  Current functional level: Independent in ADLs/IADLs    PT AM-PAC:   /24  OT AM-PAC:   /24    Family can provide assistance at DC: Yes  Would you like Case Management to discuss the discharge plan with any other family members/significant others, and if so, who?     Plans to Return to Present Housing: Yes  Other Identified Issues/Barriers to RETURNING to current housing: N/A  Potential Assistance needed at discharge: N/A            Potential DME:    Patient expects to discharge

## 2023-05-17 NOTE — H&P
[facility] - History & Physical      PCP: SOSA Haynes CNP    Date of Admission: 5/16/2023    Date of Service: 5/16/2023    Chief Complaint: Shortness of breath    History Of Present Illness: The patient is a 47 y.o. male who presented to [facility] ER with PMH type II DM, HTN, complaining of shortness of breath. Patient initially presented today to PCP due to acute left-sided lung pain worse upon inspiration. He stated symptoms started last evening, does have inhaler however states he has not utilized due to not relief. Pain worsened today and presented to PCP. Did perform outpatient CXR and was notified of pneumonia and prescription called in. Patient states her left-sided rib pain worsened and presented to [facility] ER for further evaluation. He denies fever, chills, nausea, vomiting, abdominal pain, hemoptysis, productive cough, lower extremity edema, night sweats, and weight loss. States that he performs home hemodialysis 4 times weekly. Currently denies pain at this time. Work-up in ER CTA pulmonary multifocal cavitary left upper lobe pulmonary lesion may represent septic emboli, pulmonary infarct, atypical infection (fungal/TB), or malignancy, left mediastinal lymphadenopathy, limited opacification of pulmonary arteries without large emboli, WBC 16.2, K5.2, troponin 0.02. ABG pH 7.45 PCO2 40 PO2 56 HCO3 27.8 currently on room air. Patient is to be admitted to the hospitalist service with consultation to infectious disease.   Past Medical History:        Diagnosis Date    Asthma     CHF (congestive heart failure) ([facility] [address]     Diabetes mellitus ([facility] [address]     tues-thur-sat at [facility]    Erectile dysfunction     Hemodialysis patient [facility])     t,th,s at [facility]    History of blood transfusion     Hypertension     Obesity     Palliative care patient 01/25/2021       Past Surgical History:        Procedure Laterality Date    COLONOSCOPY N/A 12/05/2019    Dr Fernando Del Cid,

## 2023-05-17 NOTE — PLAN OF CARE
SUBJECTIVE:    EP:  Pain out of proportion, left sided infiltrate, hypoxemic to just under 88% on ABG, morbidly obese, ESRD    \"[facility]\" update us that he may have TB      OBJECTIVE:    BP (!) 107/91   Pulse 80   Temp 98 °F (36.7 °C)   Resp 20   Ht 5' 7\" (1.702 m)   Wt 249 lb (112.9 kg)   SpO2 97%   BMI 39.00 kg/m²       ASSESSMENTS & PLANS:    Acute Hypoxemic Respiratory Failure due to a Left Sided Community Acquired Pneumoniae:  Admit to medical [facility]  CBC with Diff  BMP with Mag reflex  Rocephin 1g IV Q24h, for 7 doses, will retime doses 2-7 to be at 18:00, first dose in ED  Azithromycin 500mg IV Q24h, for 3 doses, will retime doses 2-3 to be at 18:00, first dose in ED  F/U ED Blood Cx  F/U Urine Analysis  F/U Strep Urine Cx    TB Rule Out:  Make sure patient is in [employer], 4th floor, RT informed of read and asked to wear N95 when they see them  Infectious Disease MD to be consulted    ESRD:  Nephrology consultation for HD needs, to be called in AM  Strict Is and Os  Daily Weights  1 liter fluid restriction    Morbid Obesity:  Dietary Modifier: \"NO Outside food, NO Juice, No Soda (diet soda only allowed), NO vending machine food, No Extra portions, No Candy. Liberalizing this diet may result in patient harm. \"    Chronic Medical Problems:  Continue home regimen as indicated  Any puffers will be subbed to nebulizers as able, and any oral  antihyperglycemics to an insuling regimen with POCT scheduled nad PRN as well as providion of an antihypoglycemic orders set for safety    Supportive and Prophylactic Txx:  DVT PPx: Heparin SQ Q8h  GI (PUD) PPx: not indicated  PT: not indicated      Case d/w EP & hospitalist NP in detail  Chart reviewed   Orders entered by NP & me with CPOE

## 2023-05-18 LAB
ALBUMIN SERPL-MCNC: 3.6 G/DL (ref 3.5–5.2)
ALP SERPL-CCNC: 54 U/L (ref 40–130)
ALT SERPL-CCNC: 10 U/L (ref 5–41)
ANION GAP SERPL CALCULATED.3IONS-SCNC: 14 MMOL/L (ref 7–19)
APTT PPP: 112.4 SEC (ref 26–36.2)
APTT PPP: 142.7 SEC (ref 26–36.2)
APTT PPP: 56 SEC (ref 26–36.2)
APTT PPP: 99.1 SEC (ref 26–36.2)
AST SERPL-CCNC: 10 U/L (ref 5–40)
BASOPHILS # BLD: 0 K/UL (ref 0–0.2)
BASOPHILS NFR BLD: 0.1 % (ref 0–1)
BILIRUB SERPL-MCNC: 0.3 MG/DL (ref 0.2–1.2)
BUN SERPL-MCNC: 63 MG/DL (ref 6–20)
CALCIUM SERPL-MCNC: 9.5 MG/DL (ref 8.6–10)
CHLORIDE SERPL-SCNC: 91 MMOL/L (ref 98–111)
CO2 SERPL-SCNC: 29 MMOL/L (ref 22–29)
CREAT SERPL-MCNC: 14.5 MG/DL (ref 0.5–1.2)
EKG P AXIS: 44 DEGREES
EKG P AXIS: 44 DEGREES
EKG P-R INTERVAL: 172 MS
EKG P-R INTERVAL: 178 MS
EKG Q-T INTERVAL: 352 MS
EKG Q-T INTERVAL: 388 MS
EKG QRS DURATION: 84 MS
EKG QRS DURATION: 90 MS
EKG QTC CALCULATION (BAZETT): 390 MS
EKG QTC CALCULATION (BAZETT): 410 MS
EKG T AXIS: 37 DEGREES
EKG T AXIS: 39 DEGREES
EOSINOPHIL # BLD: 0.2 K/UL (ref 0–0.6)
EOSINOPHIL NFR BLD: 1.3 % (ref 0–5)
ERYTHROCYTE [DISTWIDTH] IN BLOOD BY AUTOMATED COUNT: 13.2 % (ref 11.5–14.5)
GLUCOSE BLD-MCNC: 122 MG/DL (ref 70–99)
GLUCOSE BLD-MCNC: 124 MG/DL (ref 70–99)
GLUCOSE BLD-MCNC: 136 MG/DL (ref 70–99)
GLUCOSE BLD-MCNC: 167 MG/DL (ref 70–99)
GLUCOSE SERPL-MCNC: 118 MG/DL (ref 74–109)
HCT VFR BLD AUTO: 25.6 % (ref 42–52)
HGB BLD-MCNC: 8.7 G/DL (ref 14–18)
IMM GRANULOCYTES # BLD: 0.1 K/UL
LYMPHOCYTES # BLD: 2.1 K/UL (ref 1.1–4.5)
LYMPHOCYTES NFR BLD: 13.7 % (ref 20–40)
MCH RBC QN AUTO: 31.6 PG (ref 27–31)
MCHC RBC AUTO-ENTMCNC: 34 G/DL (ref 33–37)
MCV RBC AUTO: 93.1 FL (ref 80–94)
MONOCYTES # BLD: 1.8 K/UL (ref 0–0.9)
MONOCYTES NFR BLD: 11.9 % (ref 0–10)
NEUTROPHILS # BLD: 11.1 K/UL (ref 1.5–7.5)
NEUTS SEG NFR BLD: 72.4 % (ref 50–65)
PERFORMED ON: ABNORMAL
PLATELET # BLD AUTO: 218 K/UL (ref 130–400)
PMV BLD AUTO: 9.4 FL (ref 9.4–12.4)
POTASSIUM SERPL-SCNC: 4.3 MMOL/L (ref 3.5–5)
POTASSIUM SERPL-SCNC: 4.3 MMOL/L (ref 3.5–5)
PROT SERPL-MCNC: 7.1 G/DL (ref 6.6–8.7)
RBC # BLD AUTO: 2.75 M/UL (ref 4.7–6.1)
SODIUM SERPL-SCNC: 134 MMOL/L (ref 136–145)
WBC # BLD AUTO: 15.3 K/UL (ref 4.8–10.8)

## 2023-05-18 PROCEDURE — 87040 BLOOD CULTURE FOR BACTERIA: CPT

## 2023-05-18 PROCEDURE — 2580000003 HC RX 258: Performed by: INTERNAL MEDICINE

## 2023-05-18 PROCEDURE — 94760 N-INVAS EAR/PLS OXIMETRY 1: CPT

## 2023-05-18 PROCEDURE — 6370000000 HC RX 637 (ALT 250 FOR IP): Performed by: HOSPITALIST

## 2023-05-18 PROCEDURE — 36415 COLL VENOUS BLD VENIPUNCTURE: CPT

## 2023-05-18 PROCEDURE — 2700000000 HC OXYGEN THERAPY PER DAY

## 2023-05-18 PROCEDURE — 93010 ELECTROCARDIOGRAM REPORT: CPT | Performed by: INTERNAL MEDICINE

## 2023-05-18 PROCEDURE — 85025 COMPLETE CBC W/AUTO DIFF WBC: CPT

## 2023-05-18 PROCEDURE — 6360000002 HC RX W HCPCS: Performed by: INTERNAL MEDICINE

## 2023-05-18 PROCEDURE — 80053 COMPREHEN METABOLIC PANEL: CPT

## 2023-05-18 PROCEDURE — 6360000002 HC RX W HCPCS

## 2023-05-18 PROCEDURE — 82962 GLUCOSE BLOOD TEST: CPT

## 2023-05-18 PROCEDURE — 93005 ELECTROCARDIOGRAM TRACING: CPT

## 2023-05-18 PROCEDURE — 1210000000 HC MED SURG R&B

## 2023-05-18 PROCEDURE — 6370000000 HC RX 637 (ALT 250 FOR IP)

## 2023-05-18 PROCEDURE — 85730 THROMBOPLASTIN TIME PARTIAL: CPT

## 2023-05-18 RX ADMIN — SEVELAMER CARBONATE 1600 MG: 800 TABLET, FILM COATED ORAL at 17:55

## 2023-05-18 RX ADMIN — CINACALCET HYDROCHLORIDE 30 MG: 30 TABLET, FILM COATED ORAL at 09:26

## 2023-05-18 RX ADMIN — SEVELAMER CARBONATE 1600 MG: 800 TABLET, FILM COATED ORAL at 12:54

## 2023-05-18 RX ADMIN — HEPARIN SODIUM 4520 UNITS: 1000 INJECTION, SOLUTION INTRAVENOUS; SUBCUTANEOUS at 13:58

## 2023-05-18 RX ADMIN — SIMVASTATIN 20 MG: 10 TABLET, FILM COATED ORAL at 22:02

## 2023-05-18 RX ADMIN — CALCIUM 1000 MG: 500 TABLET ORAL at 09:25

## 2023-05-18 RX ADMIN — ACETAMINOPHEN 650 MG: 325 TABLET ORAL at 10:52

## 2023-05-18 RX ADMIN — ASPIRIN 81 MG: 81 TABLET, CHEWABLE ORAL at 09:26

## 2023-05-18 RX ADMIN — HYDRALAZINE HYDROCHLORIDE 50 MG: 50 TABLET, FILM COATED ORAL at 12:54

## 2023-05-18 RX ADMIN — SEVELAMER CARBONATE 1600 MG: 800 TABLET, FILM COATED ORAL at 09:26

## 2023-05-18 RX ADMIN — Medication 5 MG: at 22:02

## 2023-05-18 RX ADMIN — HYDRALAZINE HYDROCHLORIDE 50 MG: 50 TABLET, FILM COATED ORAL at 22:02

## 2023-05-18 RX ADMIN — METOPROLOL TARTRATE 25 MG: 25 TABLET, FILM COATED ORAL at 22:02

## 2023-05-18 RX ADMIN — CEFEPIME 1000 MG: 1 INJECTION, POWDER, FOR SOLUTION INTRAMUSCULAR; INTRAVENOUS at 17:57

## 2023-05-18 RX ADMIN — CYANOCOBALAMIN TAB 500 MCG 500 MCG: 500 TAB at 09:26

## 2023-05-18 RX ADMIN — NIFEDIPINE 60 MG: 60 TABLET, EXTENDED RELEASE ORAL at 22:02

## 2023-05-18 ASSESSMENT — PAIN SCALES - GENERAL: PAINLEVEL_OUTOF10: 5

## 2023-05-18 ASSESSMENT — PAIN DESCRIPTION - DESCRIPTORS: DESCRIPTORS: ACHING

## 2023-05-18 ASSESSMENT — PAIN DESCRIPTION - LOCATION: LOCATION: HEAD

## 2023-05-19 DIAGNOSIS — J98.4 CAVITARY LESION OF LUNG: Primary | ICD-10-CM

## 2023-05-19 LAB
ALBUMIN SERPL-MCNC: 3.3 G/DL (ref 3.5–5.2)
ALP SERPL-CCNC: 53 U/L (ref 40–130)
ALT SERPL-CCNC: 9 U/L (ref 5–41)
ANION GAP SERPL CALCULATED.3IONS-SCNC: 15 MMOL/L (ref 7–19)
APTT PPP: 66.4 SEC (ref 26–36.2)
APTT PPP: 70 SEC (ref 26–36.2)
APTT PPP: 72.2 SEC (ref 26–36.2)
APTT PPP: 97.2 SEC (ref 26–36.2)
AST SERPL-CCNC: 11 U/L (ref 5–40)
BACTERIA BLD CULT ORG #2: NORMAL
BACTERIA BLD CULT: NORMAL
BASOPHILS # BLD: 0 K/UL (ref 0–0.2)
BASOPHILS NFR BLD: 0.2 % (ref 0–1)
BILIRUB SERPL-MCNC: 0.3 MG/DL (ref 0.2–1.2)
BUN SERPL-MCNC: 80 MG/DL (ref 6–20)
CALCIUM SERPL-MCNC: 9.7 MG/DL (ref 8.6–10)
CHLORIDE SERPL-SCNC: 91 MMOL/L (ref 98–111)
CO2 SERPL-SCNC: 28 MMOL/L (ref 22–29)
CREAT SERPL-MCNC: 18.8 MG/DL (ref 0.5–1.2)
EKG P AXIS: 55 DEGREES
EKG P-R INTERVAL: 184 MS
EKG Q-T INTERVAL: 398 MS
EKG QRS DURATION: 90 MS
EKG QTC CALCULATION (BAZETT): 422 MS
EKG T AXIS: 45 DEGREES
EOSINOPHIL # BLD: 0.5 K/UL (ref 0–0.6)
EOSINOPHIL NFR BLD: 4.4 % (ref 0–5)
ERYTHROCYTE [DISTWIDTH] IN BLOOD BY AUTOMATED COUNT: 12.7 % (ref 11.5–14.5)
GLUCOSE BLD-MCNC: 114 MG/DL (ref 70–99)
GLUCOSE BLD-MCNC: 124 MG/DL (ref 70–99)
GLUCOSE BLD-MCNC: 145 MG/DL (ref 70–99)
GLUCOSE BLD-MCNC: 149 MG/DL (ref 70–99)
GLUCOSE SERPL-MCNC: 115 MG/DL (ref 74–109)
HCT VFR BLD AUTO: 25.2 % (ref 42–52)
HGB BLD-MCNC: 8.5 G/DL (ref 14–18)
IMM GRANULOCYTES # BLD: 0.2 K/UL
LYMPHOCYTES # BLD: 1.9 K/UL (ref 1.1–4.5)
LYMPHOCYTES NFR BLD: 15.2 % (ref 20–40)
MCH RBC QN AUTO: 31.3 PG (ref 27–31)
MCHC RBC AUTO-ENTMCNC: 33.7 G/DL (ref 33–37)
MCV RBC AUTO: 92.6 FL (ref 80–94)
MONOCYTES # BLD: 1.6 K/UL (ref 0–0.9)
MONOCYTES NFR BLD: 13 % (ref 0–10)
NEUTROPHILS # BLD: 8 K/UL (ref 1.5–7.5)
NEUTS SEG NFR BLD: 66 % (ref 50–65)
PERFORMED ON: ABNORMAL
PLATELET # BLD AUTO: 226 K/UL (ref 130–400)
PMV BLD AUTO: 9.4 FL (ref 9.4–12.4)
POTASSIUM SERPL-SCNC: 4.5 MMOL/L (ref 3.5–5)
PROT SERPL-MCNC: 6.9 G/DL (ref 6.6–8.7)
RBC # BLD AUTO: 2.72 M/UL (ref 4.7–6.1)
SODIUM SERPL-SCNC: 134 MMOL/L (ref 136–145)
SPECIMEN SOURCE: NORMAL
WBC # BLD AUTO: 12.2 K/UL (ref 4.8–10.8)

## 2023-05-19 PROCEDURE — 99223 1ST HOSP IP/OBS HIGH 75: CPT | Performed by: INTERNAL MEDICINE

## 2023-05-19 PROCEDURE — 85613 RUSSELL VIPER VENOM DILUTED: CPT

## 2023-05-19 PROCEDURE — 82962 GLUCOSE BLOOD TEST: CPT

## 2023-05-19 PROCEDURE — 8010000000 HC HEMODIALYSIS ACUTE INPT

## 2023-05-19 PROCEDURE — 93005 ELECTROCARDIOGRAM TRACING: CPT

## 2023-05-19 PROCEDURE — 85302 CLOT INHIBIT PROT C ANTIGEN: CPT

## 2023-05-19 PROCEDURE — 6360000002 HC RX W HCPCS: Performed by: INTERNAL MEDICINE

## 2023-05-19 PROCEDURE — 85025 COMPLETE CBC W/AUTO DIFF WBC: CPT

## 2023-05-19 PROCEDURE — 94760 N-INVAS EAR/PLS OXIMETRY 1: CPT

## 2023-05-19 PROCEDURE — 6370000000 HC RX 637 (ALT 250 FOR IP)

## 2023-05-19 PROCEDURE — 36415 COLL VENOUS BLD VENIPUNCTURE: CPT

## 2023-05-19 PROCEDURE — 85730 THROMBOPLASTIN TIME PARTIAL: CPT

## 2023-05-19 PROCEDURE — 85610 PROTHROMBIN TIME: CPT

## 2023-05-19 PROCEDURE — 6360000002 HC RX W HCPCS

## 2023-05-19 PROCEDURE — 85300 ANTITHROMBIN III ACTIVITY: CPT

## 2023-05-19 PROCEDURE — 80053 COMPREHEN METABOLIC PANEL: CPT

## 2023-05-19 PROCEDURE — 6370000000 HC RX 637 (ALT 250 FOR IP): Performed by: HOSPITALIST

## 2023-05-19 PROCEDURE — 81240 F2 GENE: CPT

## 2023-05-19 PROCEDURE — 85301 ANTITHROMBIN III ANTIGEN: CPT

## 2023-05-19 PROCEDURE — 2580000003 HC RX 258: Performed by: INTERNAL MEDICINE

## 2023-05-19 PROCEDURE — 2580000003 HC RX 258: Performed by: HOSPITALIST

## 2023-05-19 PROCEDURE — 87385 HISTOPLASMA CAPSUL AG IA: CPT

## 2023-05-19 PROCEDURE — 1210000000 HC MED SURG R&B

## 2023-05-19 PROCEDURE — 81241 F5 GENE: CPT

## 2023-05-19 PROCEDURE — 83516 IMMUNOASSAY NONANTIBODY: CPT

## 2023-05-19 PROCEDURE — 85305 CLOT INHIBIT PROT S TOTAL: CPT

## 2023-05-19 PROCEDURE — 2700000000 HC OXYGEN THERAPY PER DAY

## 2023-05-19 PROCEDURE — 93010 ELECTROCARDIOGRAM REPORT: CPT | Performed by: INTERNAL MEDICINE

## 2023-05-19 RX ADMIN — METOPROLOL TARTRATE 25 MG: 25 TABLET, FILM COATED ORAL at 10:09

## 2023-05-19 RX ADMIN — CINACALCET HYDROCHLORIDE 30 MG: 30 TABLET, FILM COATED ORAL at 10:09

## 2023-05-19 RX ADMIN — HYDRALAZINE HYDROCHLORIDE 50 MG: 50 TABLET, FILM COATED ORAL at 13:28

## 2023-05-19 RX ADMIN — SEVELAMER CARBONATE 1600 MG: 800 TABLET, FILM COATED ORAL at 13:28

## 2023-05-19 RX ADMIN — CALCIUM 1000 MG: 500 TABLET ORAL at 10:09

## 2023-05-19 RX ADMIN — CYANOCOBALAMIN TAB 500 MCG 500 MCG: 500 TAB at 10:09

## 2023-05-19 RX ADMIN — EPOETIN ALFA-EPBX 10000 UNITS: 10000 INJECTION, SOLUTION INTRAVENOUS; SUBCUTANEOUS at 10:10

## 2023-05-19 RX ADMIN — CEFEPIME 1000 MG: 1 INJECTION, POWDER, FOR SOLUTION INTRAMUSCULAR; INTRAVENOUS at 22:15

## 2023-05-19 RX ADMIN — SODIUM CHLORIDE, PRESERVATIVE FREE 10 ML: 5 INJECTION INTRAVENOUS at 10:10

## 2023-05-19 RX ADMIN — SEVELAMER CARBONATE 1600 MG: 800 TABLET, FILM COATED ORAL at 22:12

## 2023-05-19 RX ADMIN — CLONIDINE HYDROCHLORIDE 0.1 MG: 0.1 TABLET ORAL at 22:13

## 2023-05-19 RX ADMIN — NIFEDIPINE 60 MG: 60 TABLET, EXTENDED RELEASE ORAL at 10:09

## 2023-05-19 RX ADMIN — CLONIDINE HYDROCHLORIDE 0.1 MG: 0.1 TABLET ORAL at 10:09

## 2023-05-19 RX ADMIN — HEPARIN SODIUM 4520 UNITS: 1000 INJECTION, SOLUTION INTRAVENOUS; SUBCUTANEOUS at 06:25

## 2023-05-19 RX ADMIN — HEPARIN SODIUM 15 UNITS/KG/HR: 10000 INJECTION, SOLUTION INTRAVENOUS at 06:30

## 2023-05-19 RX ADMIN — ASPIRIN 81 MG: 81 TABLET, CHEWABLE ORAL at 10:09

## 2023-05-19 RX ADMIN — HEPARIN SODIUM 15 UNITS/KG/HR: 10000 INJECTION, SOLUTION INTRAVENOUS at 20:54

## 2023-05-19 RX ADMIN — HYDRALAZINE HYDROCHLORIDE 50 MG: 50 TABLET, FILM COATED ORAL at 22:13

## 2023-05-19 RX ADMIN — SEVELAMER CARBONATE 1600 MG: 800 TABLET, FILM COATED ORAL at 10:09

## 2023-05-19 NOTE — CONSULTS
Pulmonary and Critical Care Consult Note    THE [facility] Gerhardt Garret    MRN# [id number]    Acct# [de-identified]  5/19/2023   1:58 PM CDT    Referring Antonio Chow MD      Chief Complaint: shortness of breath. Requesting physician: Dr. Don Bain. Reason for consult: cavitary lung lesion. HPI: We have been consulted to see this 47y.o. year old male born on 1970. The patient presented to the hospital due to sudden onset of shortness of breath. He says that he rode his motorcycle to his friend's house after which he started developing pain in his back on the left side and an anterior aspect of his chest on the left. He says the pain would get worse with deep inspiration. Denies any sputum production. Denies hemoptysis denies fever. In the emergency room he had a CT pulm angiogram that showed possible subsegmental pulmonary embolus in the right upper lobe. He also had a pleural-based consolidation in the left upper lobe and what seems to be cavitary left upper lobe density. The patient had CT of the chest in October of last year that showed no evidence of any of the above. He did have leukocytosis on admission patient denies ever smoking. He does not live with any smokers. Denies weight loss. He is on home hemodialysis. At this time the patient is on empirical antibiotic therapy. He is followed by infectious disease. No fevers noted.   I was asked to see him regarding the above      Past Medical History      Past Medical History:   Diagnosis Date    Asthma     CHF (congestive heart failure) ([address]     Diabetes mellitus ([address]     [place] [facility]    Erectile dysfunction     Hemodialysis patient [facility])     t[facility]th[facility]s at [facility]    History of blood transfusion     Hypertension     Obesity     Palliative care patient 01/25/2021     SurgicalHistory  Past
WBC 16.2* 15.9* 19.6*   HGB 10.3* 9.4* 8.9*   HCT 30.3* 31.6* 26.8*    265 227   LYMPHOPCT 12.8* 13.5* 14.0*   MONOPCT 8.3 11.0* 9.0     CMP:   Recent Labs     05/16/23  1503 05/16/23  1905 05/16/23  1932 05/17/23  0152    135*  --  135*   K 5.1* 5.2* 4.7 5.5*   CL 94* 94*  --  93*   CO2 27 23  --  21*   BUN 81* 85*  --  92*   CREATININE 18.5* 19.5*  --  20.3*   CALCIUM 10.2* 10.0  --  9.6   BILITOT 0.3 0.3  --   --    ALKPHOS 60 64  --   --    ALT 13 15  --   --    AST 12 20  --   --    GLUCOSE 133* 100  --  102     Culture: None    Radiology:   CT angiogram of the chest done yesterday personally reviewed:  IMPRESSION:   1.Multifocal cavitary left upper lobe pulmonary lesions may represent septic      emboli, pulmonary infarcts, atypical infection (Eg.TB, fungal) or      malignancy. 2.Left mediastinal lymphadenopathy. 3.Limited opacification of the pulmonary arteries without large embolus. Additional Studies Reviewed:     Impression:   1. Cavitary left upper lung lesions-large differential diagnosis. He has a fairly abrupt onset pleuritic pain. For the time he is not experiencing significant symptoms to suggest pneumonia. Pleasant bacterial infection possibility. Bacterial infection possibility. Fungal infection possible. Malignancy also differential diagnosis. 2.  End-stage renal disease on hemodialysis  3.   Congestive heart failure    Recommendations:    Suggest antibiotic treatment with cefepime and vancomycin  Check blood cultures  Serum QuantiFERON gold  Send serum histoplasma, cryptococcal, and Blastomyces antigen  Suggest pulmonary consultation evaluate for bronchoscopy  Continue to follow    Ashlie Schaefer MD  05/17/23  5:53 PM
CO2 27 23  --  21*   PHOS  --  4.7*  --   --    BUN 81* 85*  --  92*   CREATININE 18.5* 19.5*  --  20.3*   CALCIUM 10.2* 10.0  --  9.6     CBC:   Recent Labs     05/16/23  1503 05/16/23  1857 05/17/23  0152   WBC 16.2* 15.9* 19.6*   HGB 10.3* 9.4* 8.9*   HCT 30.3* 31.6* 26.8*   MCV 93.8 107.5* 93.4    265 227     LIVER PROFILE:   Recent Labs     05/16/23  1503 05/16/23  1905   AST 12 20   ALT 13 15   BILITOT 0.3 0.3   ALKPHOS 60 64     PT/INR:   Recent Labs     05/16/23  1905   PROTIME 15.1*   INR 1.24*     APTT:   Recent Labs     05/16/23  2347 05/17/23  0152 05/17/23  0834   APTT >200.0* >200.0* 52.6*     BNP:  No results for input(s): BNP in the last 72 hours. Ionized Calcium:No results for input(s): IONCA in the last 72 hours. Magnesium:  Recent Labs     05/16/23 1905   MG 2.7*     Phosphorus:  Recent Labs     05/16/23  1905   PHOS 4.7*     HgbA1C:   Recent Labs     05/16/23  0000 05/16/23  1905   LABA1C 6.1 5.7     Hepatic:   Recent Labs     05/16/23  1503 05/16/23  1905   ALKPHOS 60 64   ALT 13 15   AST 12 20   PROT 7.8 7.3   BILITOT 0.3 0.3   LABALBU 4.3 4.2     Lactic Acid: No results for input(s): LACTA in the last 72 hours. Troponin: No results for input(s): CKTOTAL, CKMB, TROPONINT in the last 72 hours. ABGs: No results for input(s): PH, PCO2, PO2, HCO3, O2SAT in the last 72 hours. CRP:  No results for input(s): CRP in the last 72 hours. Sed Rate:  No results for input(s): SEDRATE in the last 72 hours. Cultures:   No results for input(s): CULTURE in the last 72 hours. No results for input(s): BC, Marni Flaming in the last 72 hours. No results for input(s): CXSURG in the last 72 hours. Radiology reports as per the Radiologist  Radiology: XR CHEST (2 VW)    Result Date: 5/16/2023  Examination: Chest x-ray, 2 views Clinical history: Shortness of breath Comparison: 10/17/2022 Findings: Scattered atelectasis or scarring noted within both lungs. Concomitant mild infiltrates cannot be

## 2023-05-20 VITALS
SYSTOLIC BLOOD PRESSURE: 119 MMHG | OXYGEN SATURATION: 91 % | DIASTOLIC BLOOD PRESSURE: 66 MMHG | TEMPERATURE: 98.6 F | WEIGHT: 249.9 LBS | HEART RATE: 80 BPM | HEIGHT: 67 IN | BODY MASS INDEX: 39.22 KG/M2 | RESPIRATION RATE: 18 BRPM

## 2023-05-20 LAB
ALBUMIN SERPL-MCNC: 3.8 G/DL (ref 3.5–5.2)
ALP SERPL-CCNC: 61 U/L (ref 40–130)
ALT SERPL-CCNC: 13 U/L (ref 5–41)
ANION GAP SERPL CALCULATED.3IONS-SCNC: 14 MMOL/L (ref 7–19)
APTT PPP: 75.1 SEC (ref 26–36.2)
APTT PPP: 79 SEC (ref 26–36.2)
AST SERPL-CCNC: 17 U/L (ref 5–40)
BASOPHILS # BLD: 0.1 K/UL (ref 0–0.2)
BASOPHILS NFR BLD: 0.3 % (ref 0–1)
BILIRUB SERPL-MCNC: 0.3 MG/DL (ref 0.2–1.2)
BUN SERPL-MCNC: 49 MG/DL (ref 6–20)
CALCIUM SERPL-MCNC: 9.6 MG/DL (ref 8.6–10)
CHLORIDE SERPL-SCNC: 98 MMOL/L (ref 98–111)
CO2 SERPL-SCNC: 26 MMOL/L (ref 22–29)
CREAT SERPL-MCNC: 13.1 MG/DL (ref 0.5–1.2)
EOSINOPHIL # BLD: 0.5 K/UL (ref 0–0.6)
EOSINOPHIL NFR BLD: 3.3 % (ref 0–5)
ERYTHROCYTE [DISTWIDTH] IN BLOOD BY AUTOMATED COUNT: 12.9 % (ref 11.5–14.5)
GAMMA INTERFERON BACKGROUND BLD IA-ACNC: 0.02 IU/ML
GLUCOSE SERPL-MCNC: 109 MG/DL (ref 74–109)
HCT VFR BLD AUTO: 27.6 % (ref 42–52)
HGB BLD-MCNC: 9.3 G/DL (ref 14–18)
IMM GRANULOCYTES # BLD: 0.2 K/UL
LYMPHOCYTES # BLD: 2 K/UL (ref 1.1–4.5)
LYMPHOCYTES NFR BLD: 13.5 % (ref 20–40)
MCH RBC QN AUTO: 31.4 PG (ref 27–31)
MCHC RBC AUTO-ENTMCNC: 33.7 G/DL (ref 33–37)
MCV RBC AUTO: 93.2 FL (ref 80–94)
MITOGEN IGNF BCKGRD COR BLD-ACNC: 6.74 IU/ML
MONOCYTES # BLD: 1.8 K/UL (ref 0–0.9)
MONOCYTES NFR BLD: 12.6 % (ref 0–10)
NEUTROPHILS # BLD: 9.9 K/UL (ref 1.5–7.5)
NEUTS SEG NFR BLD: 68.6 % (ref 50–65)
PLATELET # BLD AUTO: 283 K/UL (ref 130–400)
PMV BLD AUTO: 9.4 FL (ref 9.4–12.4)
POTASSIUM SERPL-SCNC: 4.5 MMOL/L (ref 3.5–5)
PROT SERPL-MCNC: 7.1 G/DL (ref 6.6–8.7)
QUANTI TB GOLD PLUS: NEGATIVE
QUANTI TB1 MINUS NIL: 0 IU/ML (ref 0–0.34)
QUANTI TB2 MINUS NIL: 0 IU/ML (ref 0–0.34)
RBC # BLD AUTO: 2.96 M/UL (ref 4.7–6.1)
SODIUM SERPL-SCNC: 138 MMOL/L (ref 136–145)
WBC # BLD AUTO: 14.4 K/UL (ref 4.8–10.8)

## 2023-05-20 PROCEDURE — 80053 COMPREHEN METABOLIC PANEL: CPT

## 2023-05-20 PROCEDURE — 6370000000 HC RX 637 (ALT 250 FOR IP)

## 2023-05-20 PROCEDURE — 2580000003 HC RX 258: Performed by: INTERNAL MEDICINE

## 2023-05-20 PROCEDURE — 94760 N-INVAS EAR/PLS OXIMETRY 1: CPT

## 2023-05-20 PROCEDURE — 6360000002 HC RX W HCPCS: Performed by: INTERNAL MEDICINE

## 2023-05-20 PROCEDURE — 85730 THROMBOPLASTIN TIME PARTIAL: CPT

## 2023-05-20 PROCEDURE — 85025 COMPLETE CBC W/AUTO DIFF WBC: CPT

## 2023-05-20 PROCEDURE — 36415 COLL VENOUS BLD VENIPUNCTURE: CPT

## 2023-05-20 PROCEDURE — 6370000000 HC RX 637 (ALT 250 FOR IP): Performed by: HOSPITALIST

## 2023-05-20 RX ORDER — CEFDINIR 300 MG/1
600 CAPSULE ORAL DAILY
Qty: 20 CAPSULE | Refills: 0 | Status: SHIPPED | OUTPATIENT
Start: 2023-05-20 | End: 2023-05-30

## 2023-05-20 RX ORDER — CLINDAMYCIN HYDROCHLORIDE 300 MG/1
600 CAPSULE ORAL 3 TIMES DAILY
Qty: 60 CAPSULE | Refills: 0 | Status: ON HOLD | OUTPATIENT
Start: 2023-05-20 | End: 2023-05-28 | Stop reason: SDUPTHER

## 2023-05-20 RX ADMIN — SIMVASTATIN 20 MG: 10 TABLET, FILM COATED ORAL at 00:30

## 2023-05-20 RX ADMIN — VANCOMYCIN HYDROCHLORIDE 1250 MG: 10 INJECTION, POWDER, LYOPHILIZED, FOR SOLUTION INTRAVENOUS at 02:24

## 2023-05-20 RX ADMIN — ONDANSETRON 4 MG: 4 TABLET, ORALLY DISINTEGRATING ORAL at 02:25

## 2023-05-20 RX ADMIN — HYDRALAZINE HYDROCHLORIDE 50 MG: 50 TABLET, FILM COATED ORAL at 07:00

## 2023-05-20 RX ADMIN — NIFEDIPINE 60 MG: 60 TABLET, EXTENDED RELEASE ORAL at 00:30

## 2023-05-20 RX ADMIN — METOPROLOL TARTRATE 25 MG: 25 TABLET, FILM COATED ORAL at 00:30

## 2023-05-20 NOTE — PROGRESS NOTES
[address] [facility] Pharmacokinetic Monitoring Service - Vancomycin    Consulting Provider: Dr Tammy Potts   Indication: PNA  Target Concentration: Pre-Dialysis Concentration 15-20 mg/L  Day of Therapy: 3  Additional Antimicrobials: Cefepime    Pertinent Laboratory Values: Wt Readings from Last 1 Encounters:   05/17/23 249 lb 14.4 oz (113.4 kg)     Temp Readings from Last 1 Encounters:   05/19/23 97.9 °F (36.6 °C) (Temporal)     Recent Labs     05/18/23  0020 05/19/23  0552   CREATININE 14.5* 18.8*   WBC 15.3* 12.2*     Procalcitonin:     Pertinent Cultures:  Culture Date Source Results   05/18/23 Blood x 2  No growth   MRSA Nasal Swab: was negative on 05/17/23, ordered for respiratory indication, pharmacy to contact provider about discontinuing vancomycin  TS did message Dr Narciso Carrera can we DC since nares negative on 5/18 ( message read)  and we have gotten no reply. BUT Dr Tammy Potts 5/18 notes stated CONTINUE EMPIRIC therapy with vancomycin and cefepime    Recent vancomycin administrations                     vancomycin (VANCOCIN) 2,500 mg in sodium chloride 0.9 % 500 mL IVPB (mg) 2,500 mg New Bag 05/17/23 2049                    Plan:  Concentration-guided dosing due to renal impairment and intermittent hemodialysis     Patient resume HD today 5/19.   Will give maintenance dose of 1250 mg x 1 today after HD  Vancomycin concentration ordered for 05/22/23 @ 0400, prior to HD session   Pharmacy will continue to monitor patient and adjust therapy as indicated    Thank you for the YEISON [facility]  5/19/2023 4:01 PM
[address] [facility] Pharmacokinetic Monitoring Service - Vancomycin     Ashwini Burnett is a 47 y.o. male starting on vancomycin therapy for Pneumonia . Pharmacy consulted by Dr Clarice Hernandez for monitoring and adjustment. Target Concentration: Dosing based on anticipated concentration <15 mg/L due to renal impairment/insufficiency    Additional Antimicrobials: cefepime    Pertinent Laboratory Values: Wt Readings from Last 1 Encounters:   05/17/23 249 lb 14.4 oz (113.4 kg)     Temp Readings from Last 1 Encounters:   05/17/23 97.7 °F (36.5 °C) (Temporal)     Estimated Creatinine Clearance: 5 mL/min (A) (based on SCr of 20.3 mg/dL (H)). Recent Labs     05/16/23  1857 05/16/23  1905 05/17/23  0152   CREATININE  --  19.5* 20.3*   WBC 15.9*  --  19.6*     Procalcitonin:     Pertinent Cultures:  Culture Date Source Results   05/17/23 Blood  respiratory ordered   MRSA Nasal Swab: not ordered. Order placed by pharmacy.     Plan:  Concentration-guided dosing due to renal impairment/insufficiency  Start vancomycin 2500 mg x 1  Renal labs as indicated      Pharmacy will continue to monitor patient and adjust therapy as indicated    Thank you for the consult,  Bob Huerta, [facility]  5/17/2023 6:40 PM
Called patient, Sandra Lopez, to notify him of the medications called-in to [address] on [address]. -Eliquis 2.5 mg  -Cefdinir 300 mg  -Clindamycin 300 mg    Educated patient on the importance of taking these medications. Stressed to him that the best medical care option would be for him to get readmitted to the hospital. Patient verbalized understanding.
Nephrology ([address] [facility]) progress Note      Patient:  Nan Kwon  YOB: 1970  Date of Service: 5/18/2023  MRN: [id number]   Acct: [de-identified]   Primary Care Physician: SOSA Goodman CNP  Advance Directive: Full Code  Admit Date: 5/16/2023       Hospital Day: 2  Referring Provider: Claudine Hand MD    Patient independently seen and examined, Chart, Consults, Notes, Operative notes, Labs, Cardiology, and Radiology studies reviewed as available. Chief complaint: End-stage renal disease/pleuritic chest pain. Subjective:  Nan Kwon is a 47 y.o. male for whom we were consulted for evaluation and treatment of end-stage renal disease, currently on home hemodialysis 4 times a week. Patient also has history of type 2 diabetes with nephropathy, benign essential hypertension, abdominal obesity and anemia of chronic kidney disease. Presented to his primary care physician when he was complaining of left-sided pleuritic chest pain when he was breathing. His chest x-ray was done consistent with pneumonia. Patient also had a CTA of the pulmonary arteries and was found to have cavitary lesion of left upper lobe consistent with possible tuberculosis. He is currently in respiratory isolation. He is scheduled to have hemodialysis treatment today. Patient denies any history of weight loss, night sweats or fevers. He also denies any hemoptysis. This morning patient feels well. He denies any shortness of breath. Patient is being worked up for left upper lung cavitary lesion.     Allergies:  Banana, Atorvastatin, and Lisinopril    Medicines:  Current Facility-Administered Medications   Medication Dose Route Frequency Provider Last Rate Last Admin    simvastatin (ZOCOR) tablet 20 mg  20 mg Oral Nightly SOSA Acharya CNP   20 mg at 05/17/23 2033    aspirin chewable tablet 81 mg  81 mg Oral Daily SOSA Acharya CNP   81 mg at 05/18/23 0926    epoetin
No one on call for Pulmonology until 5/22.      Electronically signed by Steven Gary RN on 5/18/2023 at 11:49 AM
Palliative Care/Spiritual Care: Met with pt to initiate palliative care. Pt says he had trouble breathing and it hurt when he breathes in. Pt is being ruled out for TB. Pt has other diagnoses in past medical history. Pt is known to palliative care. Advance Directives: Pt says he does not have an AD/LW. He says his daughter Sid Willson is his NOK and primary decision maker. Pt is full code and wants all treatments including CPR and Ventilator. SEE ACP NOTE. Pain/other symptoms: Pt says his chest hurts when he breathes in. Social/Spiritual: Pt says he attends [employer]. Pt/family discussion r/t goals: He says he has two daughters. Pt says he lives at home alone. Pt says he performs all daily living skills to care for himself at home. Pt's goal is to return home and he does dialysis at home. Provided spiritual care with sustaining presence, nurtured hope, and prayer. Pt expressed gratitude for spiritual care.      Electronically signed by Amanda Carrera on 5/17/2023 at 3:02 PM
Patient requested [place] papers. This nurse notified Dr. Anya Smith MD.  Patient educated on the risks involved with leaving hospital AMA. Patient verbalized understanding. Patient given AMA papers to sign. IV's removed.
Pharmacy Adjustment per [facility] protocol    Roman Rivers is a 47 y.o. male. Pharmacy has adjusted medications per [facility] protocol. Recent Labs     05/17/23  0152 05/18/23  0020   BUN 92* 63*       Recent Labs     05/17/23  0152 05/18/23  0020   CREATININE 20.3* 14.5*       Estimated Creatinine Clearance: 7 mL/min (A) (based on SCr of 14.5 mg/dL (H)). Height:   Ht Readings from Last 1 Encounters:   05/16/23 5' 7\" (1.702 m)     Weight:  Wt Readings from Last 1 Encounters:   05/17/23 249 lb 14.4 oz (113.4 kg)         Plan: Adjust the following medications based on [facility] protocol:           Cefepime from 2000mg IV daily over 30 minutes to 1000 mg IV daily post-HD over 4 hour extended infusion x 6 days.     Electronically signed by YEISON Raines [facility] on 5/18/2023 at 2:27 PM
[facility]    Patient:  Demetrius Aldrich  YOB: 1970  Date of Service: 5/19/2023  MRN: [id number]   Acct: [de-identified]   Primary Care Physician: SOSA Holguin CNP  Advance Directive: Full Code  Admit Date: 5/16/2023       Hospital Day: 3      CHIEF COMPLAINT:     Chief Complaint   Patient presents with    Chest Pain     Worse with breathing, dialysis pt    Shortness of Breath       5/19/2023 9:50 AM  Subjective / Interval History:   05/19/2023  No acute changes or acute overnight event reported. Patient seen and examined. Continues to require supplemental oxygen. Laying comfortably in bed in no distress. Denies any complaint of distress at this time. Denies any active chest pain this time. 05/18/2023  Patient seen and examined this AM.  Doing well. No new complaints. Notable desaturation to SPO2 of 85% this AM.  Laying comfortably in bed in no apparent acute distress. Patient denies any acute chest pain or distress at this time. Shortness of breath improved    05/17/2023  No acute changes or acute overnight event reported. Patient seen and examined. Doing well. No new complaints. Lying comfortably in bed in no acute distress. Denies any acute complaints or distress. Family at bedside. Review of Systems:   Review of Systems  ROS: 14 point review of systems is negative except as specifically addressed above. ADULT DIET; Regular; 5 carb choices (75 gm/meal); No Added Salt (3-4 gm); Low Potassium (Less than 3000 mg/day);  Low Phosphorus (Less than 1000 mg); 1000 ml  No intake or output data in the 24 hours ending 05/19/23 0950      Medications:   dextrose      sodium chloride      heparin (PORCINE) Infusion 15 Units/kg/hr (05/19/23 0630)     Current Facility-Administered Medications   Medication Dose Route Frequency Provider Last Rate Last Admin    cefepime (MAXIPIME) 1,000 mg in sodium chloride 0.9 % 50 mL IVPB (Vmom7Pvh)  1,000 mg IntraVENous
(ZOFRAN-ODT) disintegrating tablet 4 mg, Q8H PRN   Or  ondansetron (ZOFRAN) injection 4 mg, Q6H PRN  acetaminophen (TYLENOL) tablet 650 mg, Q4H PRN   Or  acetaminophen (TYLENOL) suppository 650 mg, Q4H PRN  polyethylene glycol (GLYCOLAX) packet 17 g, Daily PRN  melatonin disintegrating tablet 5 mg, Nightly PRN  melatonin disintegrating tablet 5 mg, Nightly  calcium carbonate (TUMS) chewable tablet 500 mg, TID PRN  albuterol sulfate HFA (PROVENTIL;VENTOLIN;PROAIR) 108 (90 Base) MCG/ACT inhaler 2 puff, Q4H PRN  heparin (porcine) PF injection 9,030 Units, PRN  heparin (porcine) PF injection 4,520 Units, PRN  heparin 25,000 units in dextrose 5% 250 mL (premix) infusion, Continuous      Review of Systems see HPI    VitalSigns:  /63   Pulse 75   Temp 97.1 °F (36.2 °C) (Temporal)   Resp 18   Ht 5' 7\" (1.702 m)   Wt 249 lb 14.4 oz (113.4 kg)   SpO2 93%   BMI 39.14 kg/m²      Physical Exam  Line/IV site: No erythema, warmth, induration, or tenderness. No change in exam    Lab Results:  CBC:   Recent Labs     05/16/23  1857 05/17/23  0152 05/18/23  0020   WBC 15.9* 19.6* 15.3*   HGB 9.4* 8.9* 8.7*    227 218     BMP:  Recent Labs     05/16/23  1905 05/16/23  1932 05/17/23  0152 05/18/23  0020   *  --  135* 134*   K 5.2*   < > 5.5* 4.3  4.3   CL 94*  --  93* 91*   CO2 23  --  21* 29   BUN 85*  --  92* 63*   CREATININE 19.5*  --  20.3* 14.5*   GLUCOSE 100  --  102 118*    < > = values in this interval not displayed. CultureResults:  Blood cultures May 18, 2023-pending  Blood cultures May 18, 2023-pending    Radiology: None    Additional Studies Reviewed:  None    Impression:  1. Cavitary left upper lobe lung lesion. Large differential diagnosis. Septic emboli possible. Fungal infection possible. Malignancy in the differential as well. 2.  End-stage renal disease on hemodialysis  3.   Congestive heart failure    Recommendations:  Continue empiric vancomycin and cefepime  Have sent serum
atypical infection (Eg.TB, fungal) or      malignancy. 2.Left mediastinal lymphadenopathy. 3.Limited opacification of the pulmonary arteries without large embolus. Additional Studies Reviewed:  None    Impression:  1. Cavitary left upper lung lesion-possible bacterial process. He seems to be improving with antibiotic treatment vancomycin and cefepime. His white blood cell count is improving. Feel differential diagnosis continues to include the possibility of septic emboli, or chronic atypical infection and less likely malignancy. 2.  End-stage renal disease on hemodialysis  3.   Congestive heart failure    Recommendations:  Continue vancomycin and cefepime  Since he seems to be improving would recommend we complete a 10 to 14-day course of treatment  We will discuss with renal to see if we can make arrangements for him to receive antibiotic treatment postdialysis to avoid need for any additional access-alternatively we may consider switching to oral antimicrobial treatment  Await pending studies including serum QuantiFERON gold along with urine histoplasma Blastomyces antigen  Possibly home in the next few days if ready for discharge per others, ongoing improvement, and we can make additional antibiotic arrangements    Radha Rothman MD
0.3 0.3  --   --  0.3   ALKPHOS 60 64  --   --  54   ALT 13 15  --   --  10   AST 12 20  --   --  10    < > = values in this interval not displayed. CRP:  No results for input(s): CRP in the last 72 hours. Sed Rate:  No results for input(s): SEDRATE in the last 72 hours. HgBA1c:  No components found for: HGBA1C  FLP:    Lab Results   Component Value Date/Time    TRIG 128 11/11/2022 12:32 PM    HDL 35 11/11/2022 12:32 PM    LDLCALC 63 11/11/2022 12:32 PM     TSH:    Lab Results   Component Value Date/Time    TSH 2.85 05/10/2017 08:28 AM     Troponin T:   Recent Labs     05/16/23  2347 05/17/23  0834 05/17/23  1418   TROPONINI 0.03 0.05* 0.06*     Pro-BNP: No results for input(s): BNP in the last 72 hours. INR:   Recent Labs     05/16/23 1905   INR 1.24*     ABGs:   Lab Results   Component Value Date/Time    PHART 7.450 05/16/2023 07:32 PM    PO2ART 56.0 05/16/2023 07:32 PM    UTU6OXL 40.0 05/16/2023 07:32 PM     UA:No results for input(s): NITRITE, COLORU, PHUR, LABCAST, WBCUA, RBCUA, MUCUS, TRICHOMONAS, YEAST, BACTERIA, CLARITYU, SPECGRAV, LEUKOCYTESUR, UROBILINOGEN, BILIRUBINUR, BLOODU, GLUCOSEU, AMORPHOUS in the last 72 hours. Invalid input(s): Ollen Gills      Culture Results:    No results for input(s): CXSURG in the last 720 hours. Blood Culture Recent: No results for input(s): BC in the last 720 hours. No results for input(s): BC, BLOODCULT2, ORG in the last 72 hours. Cultures:   No results for input(s): CULTURE in the last 72 hours. No results for input(s): BC, Bakari Martinez in the last 72 hours. No results for input(s): CXSURG in the last 72 hours.       Recent Labs     05/16/23 1905   MG 2.7*   PHOS 4.7*     Recent Labs     05/16/23  1503 05/16/23  1905 05/18/23  0020   AST 12 20 10   ALT 13 15 10   BILITOT 0.3 0.3 0.3   ALKPHOS 60 64 54         RAD:   XR CHEST (2 VW)    Result Date: 5/16/2023  Examination: Chest x-ray, 2 views Clinical history: Shortness of breath Comparison:
07:32 PM     UA:No results for input(s): NITRITE, COLORU, PHUR, LABCAST, WBCUA, RBCUA, MUCUS, TRICHOMONAS, YEAST, BACTERIA, CLARITYU, SPECGRAV, LEUKOCYTESUR, UROBILINOGEN, BILIRUBINUR, BLOODU, GLUCOSEU, AMORPHOUS in the last 72 hours. Invalid input(s): Rosibel Scott      Culture Results:    No results for input(s): CXSURG in the last 720 hours. Blood Culture Recent: No results for input(s): BC in the last 720 hours. No results for input(s): BC, BLOODCULT2, ORG in the last 72 hours. Cultures:   No results for input(s): CULTURE in the last 72 hours. No results for input(s): BC, Dudley Mady in the last 72 hours. No results for input(s): CXSURG in the last 72 hours. Recent Labs     05/16/23  1905   MG 2.7*   PHOS 4.7*     Recent Labs     05/16/23  1503 05/16/23  1905   AST 12 20   ALT 13 15   BILITOT 0.3 0.3   ALKPHOS 60 64         RAD:   XR CHEST (2 VW)    Result Date: 5/16/2023  Examination: Chest x-ray, 2 views Clinical history: Shortness of breath Comparison: 10/17/2022 Findings: Scattered atelectasis or scarring noted within both lungs. Concomitant mild infiltrates cannot be excluded. No pneumothorax is seen. No significant pleural effusion. Cardiac silhouette is unremarkable. No acute osseous lesion is seen. Impression: Scattered atelectasis or scarring within both lungs. Concomitant mild infiltrates cannot be excluded. CTA PULMONARY W CONTRAST    Result Date: 5/16/2023  EXAM: CTA PULMONARY W CONTRAST COMPARISON: 16 May 2023; cr; XR CHEST (2 VIEWS) 17 Oct 2022; CT; CT CHEST W CONTRAST INDICATIONS: low saturations, elevated d dimer TECHNIQUE: This CT uses dose modulation, interval reconstruction, and/or weight-based dosing when appropriate to reduce radiation dose to as low as reasonably achievable. FINDINGS: LUNGS: Left sized airspace opacities with hypodense centers in the left upper lobe measuring up to 2.8 x 3.5 cm. PULMONARY ARTERIES: Suboptimal opacification without large embolism.  ABDOULAYE: No
embolus. Assessment   1. End-stage renal disease on home hemodialysis  2. Type 2 diabetes with nephropathy. 3.  Recurrent hyperkalemia. 4.  Anemia of chronic kidney disease. 5.  Left lung pneumonia. 6.  Left upper lobe lung cavity  7. Abdominal obesity. Plan:  1. Resume hemodialysis treatment today. 2.  Continue CYNDI.   3.  Follow-up on work-up recommended by Dr. Shell Charles MD  05/19/23  9:42 AM

## 2023-05-20 NOTE — DISCHARGE SUMMARY
MEDICAL ADVICE. Writer had extensive conversation with patient yesterday, when patient was considering leaving [address] Although patient left AGAINST MEDICAL Sariah Ramos will prescribe oral antibiotics (vs not being on any abx at all), to patient's pharmacy, given official infectious disease antibiotic recommendation has not been made. Patient understands risk of leaving [address] Cefdinir (Omnicef) 600 mg p.o. daily x10 days  Clindamycin 600 mg p.o. 4 times daily x10 days. Patient to be advised that it is a recommendation of medical team that he return to admitted, for continued optimal medical management. Pulmonology recommended the patient continue antibiotic course and repeat CT of the chest in about 6 weeks to reevaluate infiltrates. Repeat CT ordered by Pulmonology. Patient to follow-up with pulmonology outpatient. Subsegmental Pulmonary Embolism. Heparin gtt while inpatient. Follow-up pulmonology  Recommend anticoagulation for least 3 months. Apixaban 2.5 mg PO BID, to be called to patient's pharmacy, despite patient leaving [address] ESRD, on scheduled HD  Hyperkalemia  Sodium zirconium cyclosilicate as needed for hyperkalemia. Nephrology consulted on admission  Continued scheduled HD as per nephrology rec   Avoided nephrotoxic agents        Diabetes Mellitus II  Insulin Lispro (Humalog) on a Low  dose sliding scale  Monitor POC glucose, and adjust insulin regimen accordingly based on daily insulin requirement.            Continued management of other chronic medical conditions        Physical Exam:  Vital Signs: /66   Pulse 80   Temp 98.6 °F (37 °C) (Temporal)   Resp 18   Ht 5' 7\" (1.702 m)   Wt 249 lb 14.4 oz (113.4 kg)   SpO2 91%   BMI 39.14 kg/m²   Physical Exam      Discharge Medications:        Medication List        START taking these medications      apixaban 2.5 MG Tabs tablet  Commonly known as: Eliquis  Take 1 tablet

## 2023-05-21 ENCOUNTER — HOSPITAL ENCOUNTER (INPATIENT)
Age: 53
LOS: 7 days | Discharge: HOME | DRG: 166 | End: 2023-05-28
Attending: EMERGENCY MEDICINE | Admitting: INTERNAL MEDICINE
Payer: MEDICARE

## 2023-05-21 ENCOUNTER — APPOINTMENT (OUTPATIENT)
Dept: GENERAL RADIOLOGY | Age: 53
DRG: 166 | End: 2023-05-21
Payer: MEDICARE

## 2023-05-21 DIAGNOSIS — J18.9 PNEUMONIA OF LEFT UPPER LOBE DUE TO INFECTIOUS ORGANISM: Primary | ICD-10-CM

## 2023-05-21 DIAGNOSIS — J98.4 CAVITARY LESION OF LUNG: ICD-10-CM

## 2023-05-21 DIAGNOSIS — R91.8 LUNG INFILTRATE: ICD-10-CM

## 2023-05-21 DIAGNOSIS — Z99.2 ESRD ON DIALYSIS (HCC): ICD-10-CM

## 2023-05-21 DIAGNOSIS — N18.6 ESRD ON DIALYSIS (HCC): ICD-10-CM

## 2023-05-21 PROBLEM — I26.99 PULMONARY EMBOLISM (HCC): Status: ACTIVE | Noted: 2023-05-21

## 2023-05-21 PROBLEM — Z79.01 CHRONIC ANTICOAGULATION: Status: ACTIVE | Noted: 2023-05-21

## 2023-05-21 LAB
ALBUMIN SERPL-MCNC: 3.4 G/DL (ref 3.5–5.2)
ALP SERPL-CCNC: 55 U/L (ref 40–130)
ALT SERPL-CCNC: 30 U/L (ref 5–41)
ANION GAP SERPL CALCULATED.3IONS-SCNC: 16 MMOL/L (ref 7–19)
AST SERPL-CCNC: 27 U/L (ref 5–40)
BASOPHILS # BLD: 0.1 K/UL (ref 0–0.2)
BASOPHILS NFR BLD: 0.3 % (ref 0–1)
BILIRUB SERPL-MCNC: <0.2 MG/DL (ref 0.2–1.2)
BUN SERPL-MCNC: 66 MG/DL (ref 6–20)
CALCIUM SERPL-MCNC: 10.3 MG/DL (ref 8.6–10)
CHLORIDE SERPL-SCNC: 97 MMOL/L (ref 98–111)
CO2 SERPL-SCNC: 24 MMOL/L (ref 22–29)
CREAT SERPL-MCNC: 16.8 MG/DL (ref 0.5–1.2)
EOSINOPHIL # BLD: 0.6 K/UL (ref 0–0.6)
EOSINOPHIL NFR BLD: 3.8 % (ref 0–5)
ERYTHROCYTE [DISTWIDTH] IN BLOOD BY AUTOMATED COUNT: 13 % (ref 11.5–14.5)
GLUCOSE BLD-MCNC: 120 MG/DL (ref 70–99)
GLUCOSE SERPL-MCNC: 97 MG/DL (ref 74–109)
HCT VFR BLD AUTO: 24.7 % (ref 42–52)
HGB BLD-MCNC: 8.4 G/DL (ref 14–18)
IMM GRANULOCYTES # BLD: 0.3 K/UL
LACTATE BLDV-SCNC: 0.6 MMOL/L (ref 0.5–1.9)
LYMPHOCYTES # BLD: 1.7 K/UL (ref 1.1–4.5)
LYMPHOCYTES NFR BLD: 11.8 % (ref 20–40)
MCH RBC QN AUTO: 31.5 PG (ref 27–31)
MCHC RBC AUTO-ENTMCNC: 34 G/DL (ref 33–37)
MCV RBC AUTO: 92.5 FL (ref 80–94)
MONOCYTES # BLD: 1.7 K/UL (ref 0–0.9)
MONOCYTES NFR BLD: 11.8 % (ref 0–10)
NEUTROPHILS # BLD: 10.2 K/UL (ref 1.5–7.5)
NEUTS SEG NFR BLD: 70 % (ref 50–65)
PERFORMED ON: ABNORMAL
PLATELET # BLD AUTO: 246 K/UL (ref 130–400)
PMV BLD AUTO: 9 FL (ref 9.4–12.4)
POTASSIUM SERPL-SCNC: 4.6 MMOL/L (ref 3.5–5)
PROT SERPL-MCNC: 7.3 G/DL (ref 6.6–8.7)
RBC # BLD AUTO: 2.67 M/UL (ref 4.7–6.1)
SODIUM SERPL-SCNC: 137 MMOL/L (ref 136–145)
WBC # BLD AUTO: 14.6 K/UL (ref 4.8–10.8)

## 2023-05-21 PROCEDURE — 6360000002 HC RX W HCPCS: Performed by: NURSE PRACTITIONER

## 2023-05-21 PROCEDURE — 36415 COLL VENOUS BLD VENIPUNCTURE: CPT

## 2023-05-21 PROCEDURE — 82962 GLUCOSE BLOOD TEST: CPT

## 2023-05-21 PROCEDURE — 71045 X-RAY EXAM CHEST 1 VIEW: CPT

## 2023-05-21 PROCEDURE — 1210000000 HC MED SURG R&B

## 2023-05-21 PROCEDURE — 94760 N-INVAS EAR/PLS OXIMETRY 1: CPT

## 2023-05-21 PROCEDURE — 71045 X-RAY EXAM CHEST 1 VIEW: CPT | Performed by: RADIOLOGY

## 2023-05-21 PROCEDURE — 2580000003 HC RX 258: Performed by: NURSE PRACTITIONER

## 2023-05-21 PROCEDURE — 2580000003 HC RX 258: Performed by: EMERGENCY MEDICINE

## 2023-05-21 PROCEDURE — 6360000002 HC RX W HCPCS: Performed by: EMERGENCY MEDICINE

## 2023-05-21 PROCEDURE — 80053 COMPREHEN METABOLIC PANEL: CPT

## 2023-05-21 PROCEDURE — 99285 EMERGENCY DEPT VISIT HI MDM: CPT

## 2023-05-21 PROCEDURE — 94150 VITAL CAPACITY TEST: CPT

## 2023-05-21 PROCEDURE — 85025 COMPLETE CBC W/AUTO DIFF WBC: CPT

## 2023-05-21 PROCEDURE — 6370000000 HC RX 637 (ALT 250 FOR IP): Performed by: NURSE PRACTITIONER

## 2023-05-21 PROCEDURE — 83605 ASSAY OF LACTIC ACID: CPT

## 2023-05-21 RX ORDER — SIMVASTATIN 10 MG
10 TABLET ORAL NIGHTLY
Status: DISCONTINUED | OUTPATIENT
Start: 2023-05-21 | End: 2023-05-28 | Stop reason: HOSPADM

## 2023-05-21 RX ORDER — ONDANSETRON 4 MG/1
4 TABLET, ORALLY DISINTEGRATING ORAL EVERY 8 HOURS PRN
Status: DISCONTINUED | OUTPATIENT
Start: 2023-05-21 | End: 2023-05-28 | Stop reason: HOSPADM

## 2023-05-21 RX ORDER — CINACALCET 30 MG/1
30 TABLET, FILM COATED ORAL DAILY
Status: DISCONTINUED | OUTPATIENT
Start: 2023-05-21 | End: 2023-05-28 | Stop reason: HOSPADM

## 2023-05-21 RX ORDER — SODIUM CHLORIDE 0.9 % (FLUSH) 0.9 %
5-40 SYRINGE (ML) INJECTION PRN
Status: DISCONTINUED | OUTPATIENT
Start: 2023-05-21 | End: 2023-05-28 | Stop reason: HOSPADM

## 2023-05-21 RX ORDER — SODIUM CHLORIDE 0.9 % (FLUSH) 0.9 %
5-40 SYRINGE (ML) INJECTION EVERY 12 HOURS SCHEDULED
Status: DISCONTINUED | OUTPATIENT
Start: 2023-05-21 | End: 2023-05-28 | Stop reason: HOSPADM

## 2023-05-21 RX ORDER — ACETAMINOPHEN 325 MG/1
650 TABLET ORAL EVERY 6 HOURS PRN
Status: DISCONTINUED | OUTPATIENT
Start: 2023-05-21 | End: 2023-05-28 | Stop reason: HOSPADM

## 2023-05-21 RX ORDER — CALCIUM CARBONATE 500(1250)
500 TABLET ORAL DAILY
Status: DISCONTINUED | OUTPATIENT
Start: 2023-05-21 | End: 2023-05-28 | Stop reason: HOSPADM

## 2023-05-21 RX ORDER — DEXTROSE MONOHYDRATE 100 MG/ML
INJECTION, SOLUTION INTRAVENOUS CONTINUOUS PRN
Status: DISCONTINUED | OUTPATIENT
Start: 2023-05-21 | End: 2023-05-28 | Stop reason: HOSPADM

## 2023-05-21 RX ORDER — ACETAMINOPHEN 650 MG/1
650 SUPPOSITORY RECTAL EVERY 6 HOURS PRN
Status: DISCONTINUED | OUTPATIENT
Start: 2023-05-21 | End: 2023-05-28 | Stop reason: HOSPADM

## 2023-05-21 RX ORDER — POLYETHYLENE GLYCOL 3350 17 G/17G
17 POWDER, FOR SOLUTION ORAL DAILY PRN
Status: DISCONTINUED | OUTPATIENT
Start: 2023-05-21 | End: 2023-05-28 | Stop reason: HOSPADM

## 2023-05-21 RX ORDER — IPRATROPIUM BROMIDE AND ALBUTEROL SULFATE 2.5; .5 MG/3ML; MG/3ML
1 SOLUTION RESPIRATORY (INHALATION) EVERY 4 HOURS PRN
Status: DISCONTINUED | OUTPATIENT
Start: 2023-05-21 | End: 2023-05-28 | Stop reason: HOSPADM

## 2023-05-21 RX ORDER — INSULIN LISPRO 100 [IU]/ML
0-4 INJECTION, SOLUTION INTRAVENOUS; SUBCUTANEOUS NIGHTLY
Status: DISCONTINUED | OUTPATIENT
Start: 2023-05-21 | End: 2023-05-28 | Stop reason: HOSPADM

## 2023-05-21 RX ORDER — NIFEDIPINE 60 MG/1
60 TABLET, EXTENDED RELEASE ORAL 2 TIMES DAILY
Status: DISCONTINUED | OUTPATIENT
Start: 2023-05-21 | End: 2023-05-28 | Stop reason: HOSPADM

## 2023-05-21 RX ORDER — ONDANSETRON 2 MG/ML
4 INJECTION INTRAMUSCULAR; INTRAVENOUS EVERY 6 HOURS PRN
Status: DISCONTINUED | OUTPATIENT
Start: 2023-05-21 | End: 2023-05-28 | Stop reason: HOSPADM

## 2023-05-21 RX ORDER — CLONIDINE HYDROCHLORIDE 0.1 MG/1
0.1 TABLET ORAL 2 TIMES DAILY
Status: DISCONTINUED | OUTPATIENT
Start: 2023-05-21 | End: 2023-05-28 | Stop reason: HOSPADM

## 2023-05-21 RX ORDER — INSULIN LISPRO 100 [IU]/ML
0-4 INJECTION, SOLUTION INTRAVENOUS; SUBCUTANEOUS
Status: DISCONTINUED | OUTPATIENT
Start: 2023-05-22 | End: 2023-05-28 | Stop reason: HOSPADM

## 2023-05-21 RX ORDER — HYDRALAZINE HYDROCHLORIDE 50 MG/1
50 TABLET, FILM COATED ORAL EVERY 8 HOURS SCHEDULED
Status: DISCONTINUED | OUTPATIENT
Start: 2023-05-21 | End: 2023-05-28 | Stop reason: HOSPADM

## 2023-05-21 RX ORDER — SODIUM CHLORIDE 9 MG/ML
INJECTION, SOLUTION INTRAVENOUS PRN
Status: DISCONTINUED | OUTPATIENT
Start: 2023-05-21 | End: 2023-05-28 | Stop reason: HOSPADM

## 2023-05-21 RX ADMIN — CEFEPIME 1000 MG: 1 INJECTION, POWDER, FOR SOLUTION INTRAMUSCULAR; INTRAVENOUS at 18:39

## 2023-05-21 RX ADMIN — SODIUM CHLORIDE: 9 INJECTION, SOLUTION INTRAVENOUS at 23:20

## 2023-05-21 RX ADMIN — APIXABAN 2.5 MG: 2.5 TABLET, FILM COATED ORAL at 22:22

## 2023-05-21 RX ADMIN — CLONIDINE HYDROCHLORIDE 0.1 MG: 0.1 TABLET ORAL at 22:22

## 2023-05-21 RX ADMIN — NIFEDIPINE 60 MG: 60 TABLET, EXTENDED RELEASE ORAL at 22:22

## 2023-05-21 RX ADMIN — METOPROLOL TARTRATE 25 MG: 25 TABLET, FILM COATED ORAL at 22:22

## 2023-05-21 RX ADMIN — HYDRALAZINE HYDROCHLORIDE 50 MG: 50 TABLET, FILM COATED ORAL at 22:22

## 2023-05-21 RX ADMIN — SIMVASTATIN 10 MG: 10 TABLET, FILM COATED ORAL at 22:22

## 2023-05-21 RX ADMIN — Medication 1000 MG: at 19:31

## 2023-05-21 RX ADMIN — SODIUM CHLORIDE, PRESERVATIVE FREE 10 ML: 5 INJECTION INTRAVENOUS at 20:31

## 2023-05-21 RX ADMIN — VANCOMYCIN HYDROCHLORIDE 1500 MG: 10 INJECTION, POWDER, LYOPHILIZED, FOR SOLUTION INTRAVENOUS at 23:22

## 2023-05-21 SDOH — SOCIAL STABILITY: SOCIAL NETWORK: HOW OFTEN DO YOU ATTEND CHURCH OR RELIGIOUS SERVICES?: MORE THAN 4 TIMES PER YEAR

## 2023-05-21 SDOH — ECONOMIC STABILITY: FOOD INSECURITY: WITHIN THE PAST 12 MONTHS, YOU WORRIED THAT YOUR FOOD WOULD RUN OUT BEFORE YOU GOT MONEY TO BUY MORE.: NEVER TRUE

## 2023-05-21 SDOH — SOCIAL STABILITY: SOCIAL INSECURITY
WITHIN THE LAST YEAR, HAVE TO BEEN RAPED OR FORCED TO HAVE ANY KIND OF SEXUAL ACTIVITY BY YOUR PARTNER OR EX-PARTNER?: NO

## 2023-05-21 SDOH — ECONOMIC STABILITY: INCOME INSECURITY: HOW HARD IS IT FOR YOU TO PAY FOR THE VERY BASICS LIKE FOOD, HOUSING, MEDICAL CARE, AND HEATING?: NOT HARD AT ALL

## 2023-05-21 SDOH — SOCIAL STABILITY: SOCIAL NETWORK
DO YOU BELONG TO ANY CLUBS OR ORGANIZATIONS SUCH AS CHURCH GROUPS UNIONS, FRATERNAL OR ATHLETIC GROUPS, OR SCHOOL GROUPS?: NO

## 2023-05-21 SDOH — HEALTH STABILITY: MENTAL HEALTH
STRESS IS WHEN SOMEONE FEELS TENSE, NERVOUS, ANXIOUS, OR CAN'T SLEEP AT NIGHT BECAUSE THEIR MIND IS TROUBLED. HOW STRESSED ARE YOU?: NOT AT ALL

## 2023-05-21 SDOH — SOCIAL STABILITY: SOCIAL NETWORK
IN A TYPICAL WEEK, HOW MANY TIMES DO YOU TALK ON THE PHONE WITH FAMILY, FRIENDS, OR NEIGHBORS?: MORE THAN THREE TIMES A WEEK

## 2023-05-21 SDOH — HEALTH STABILITY: PHYSICAL HEALTH: ON AVERAGE, HOW MANY DAYS PER WEEK DO YOU ENGAGE IN MODERATE TO STRENUOUS EXERCISE (LIKE A BRISK WALK)?: 0 DAYS

## 2023-05-21 SDOH — HEALTH STABILITY: MENTAL HEALTH: HOW OFTEN DO YOU HAVE A DRINK CONTAINING ALCOHOL?: NEVER

## 2023-05-21 SDOH — SOCIAL STABILITY: SOCIAL INSECURITY: WITHIN THE LAST YEAR, HAVE YOU BEEN AFRAID OF YOUR PARTNER OR EX-PARTNER?: NO

## 2023-05-21 SDOH — SOCIAL STABILITY: SOCIAL NETWORK: ARE YOU MARRIED, WIDOWED, DIVORCED, SEPARATED, NEVER MARRIED, OR LIVING WITH A PARTNER?: NEVER MARRIED

## 2023-05-21 SDOH — ECONOMIC STABILITY: FOOD INSECURITY: WITHIN THE PAST 12 MONTHS, THE FOOD YOU BOUGHT JUST DIDN'T LAST AND YOU DIDN'T HAVE MONEY TO GET MORE.: NEVER TRUE

## 2023-05-21 SDOH — ECONOMIC STABILITY: TRANSPORTATION INSECURITY
IN THE PAST 12 MONTHS, HAS LACK OF TRANSPORTATION KEPT YOU FROM MEETINGS, WORK, OR FROM GETTING THINGS NEEDED FOR DAILY LIVING?: NO

## 2023-05-21 SDOH — SOCIAL STABILITY: SOCIAL INSECURITY
WITHIN THE LAST YEAR, HAVE YOU BEEN KICKED, HIT, SLAPPED, OR OTHERWISE PHYSICALLY HURT BY YOUR PARTNER OR EX-PARTNER?: NO

## 2023-05-21 SDOH — SOCIAL STABILITY: SOCIAL NETWORK: HOW OFTEN DO YOU GET TOGETHER WITH FRIENDS OR RELATIVES?: MORE THAN THREE TIMES A WEEK

## 2023-05-21 SDOH — SOCIAL STABILITY: SOCIAL NETWORK: HOW OFTEN DO YOU ATTENT MEETINGS OF THE CLUB OR ORGANIZATION YOU BELONG TO?: NEVER

## 2023-05-21 SDOH — HEALTH STABILITY: PHYSICAL HEALTH: ON AVERAGE, HOW MANY MINUTES DO YOU ENGAGE IN EXERCISE AT THIS LEVEL?: 0 MIN

## 2023-05-21 SDOH — HEALTH STABILITY: MENTAL HEALTH: HOW MANY STANDARD DRINKS CONTAINING ALCOHOL DO YOU HAVE ON A TYPICAL DAY?: PATIENT DOES NOT DRINK

## 2023-05-21 SDOH — ECONOMIC STABILITY: HOUSING INSECURITY: IN THE LAST 12 MONTHS, HOW MANY PLACES HAVE YOU LIVED?: 1

## 2023-05-21 SDOH — SOCIAL STABILITY: SOCIAL INSECURITY: WITHIN THE LAST YEAR, HAVE YOU BEEN HUMILIATED OR EMOTIONALLY ABUSED IN OTHER WAYS BY YOUR PARTNER OR EX-PARTNER?: NO

## 2023-05-21 SDOH — ECONOMIC STABILITY: INCOME INSECURITY: IN THE LAST 12 MONTHS, WAS THERE A TIME WHEN YOU WERE NOT ABLE TO PAY THE MORTGAGE OR RENT ON TIME?: NO

## 2023-05-21 SDOH — ECONOMIC STABILITY: TRANSPORTATION INSECURITY
IN THE PAST 12 MONTHS, HAS THE LACK OF TRANSPORTATION KEPT YOU FROM MEDICAL APPOINTMENTS OR FROM GETTING MEDICATIONS?: NO

## 2023-05-21 ASSESSMENT — ENCOUNTER SYMPTOMS
SHORTNESS OF BREATH: 1
DIARRHEA: 0
ABDOMINAL PAIN: 0
NAUSEA: 0
VOMITING: 0
SHORTNESS OF BREATH: 0
COUGH: 1
SORE THROAT: 0
RHINORRHEA: 0
BACK PAIN: 0

## 2023-05-21 ASSESSMENT — PAIN SCALES - GENERAL: PAINLEVEL_OUTOF10: 0

## 2023-05-21 ASSESSMENT — LIFESTYLE VARIABLES
HOW MANY STANDARD DRINKS CONTAINING ALCOHOL DO YOU HAVE ON A TYPICAL DAY: PATIENT DOES NOT DRINK
HOW OFTEN DO YOU HAVE A DRINK CONTAINING ALCOHOL: NEVER

## 2023-05-21 ASSESSMENT — PAIN - FUNCTIONAL ASSESSMENT: PAIN_FUNCTIONAL_ASSESSMENT: NONE - DENIES PAIN

## 2023-05-22 ENCOUNTER — APPOINTMENT (OUTPATIENT)
Dept: CT IMAGING | Age: 53
DRG: 166 | End: 2023-05-22
Payer: MEDICARE

## 2023-05-22 LAB
ANION GAP SERPL CALCULATED.3IONS-SCNC: 17 MMOL/L (ref 7–19)
APTT PPP: 168.4 SEC (ref 26–36.2)
APTT PPP: 50.2 SEC (ref 26–36.2)
APTT PPP: 93.8 SEC (ref 26–36.2)
APTT PPP: >200 SEC (ref 26–36.2)
AT III ACT/NOR PPP CHRO: 63 % (ref 76–128)
AT III AG ACT/NOR PPP IA: 61 % (ref 82–136)
BASOPHILS # BLD: 0.1 K/UL (ref 0–0.2)
BASOPHILS NFR BLD: 0.4 % (ref 0–1)
BUN SERPL-MCNC: 74 MG/DL (ref 6–20)
CALCIUM SERPL-MCNC: 10.2 MG/DL (ref 8.6–10)
CHLORIDE SERPL-SCNC: 98 MMOL/L (ref 98–111)
CO2 SERPL-SCNC: 24 MMOL/L (ref 22–29)
CREAT SERPL-MCNC: 18.2 MG/DL (ref 0.5–1.2)
EOSINOPHIL # BLD: 0.5 K/UL (ref 0–0.6)
EOSINOPHIL NFR BLD: 4.5 % (ref 0–5)
ERYTHROCYTE [DISTWIDTH] IN BLOOD BY AUTOMATED COUNT: 13.2 % (ref 11.5–14.5)
GLUCOSE BLD-MCNC: 105 MG/DL (ref 70–99)
GLUCOSE BLD-MCNC: 138 MG/DL (ref 70–99)
GLUCOSE BLD-MCNC: 152 MG/DL (ref 70–99)
GLUCOSE BLD-MCNC: 236 MG/DL (ref 70–99)
GLUCOSE SERPL-MCNC: 130 MG/DL (ref 74–109)
H CAPSUL AG SER IA-ACNC: NOT DETECTED
H CAPSUL AG SER QL IA: NOT DETECTED
HCT VFR BLD AUTO: 23.3 % (ref 42–52)
HGB BLD-MCNC: 7.9 G/DL (ref 14–18)
IMM GRANULOCYTES # BLD: 0.4 K/UL
LYMPHOCYTES # BLD: 1.7 K/UL (ref 1.1–4.5)
LYMPHOCYTES NFR BLD: 14.2 % (ref 20–40)
MCH RBC QN AUTO: 31.7 PG (ref 27–31)
MCHC RBC AUTO-ENTMCNC: 33.9 G/DL (ref 33–37)
MCV RBC AUTO: 93.6 FL (ref 80–94)
MONOCYTES # BLD: 1.3 K/UL (ref 0–0.9)
MONOCYTES NFR BLD: 11 % (ref 0–10)
NEUTROPHILS # BLD: 8 K/UL (ref 1.5–7.5)
NEUTS SEG NFR BLD: 66.6 % (ref 50–65)
PERFORMED ON: ABNORMAL
PLATELET # BLD AUTO: 241 K/UL (ref 130–400)
PMV BLD AUTO: 9.7 FL (ref 9.4–12.4)
POTASSIUM SERPL-SCNC: 4.6 MMOL/L (ref 3.5–5)
PROT C AG ACT/NOR PPP IA: 89 % (ref 63–153)
PROT S AG ACT/NOR PPP IA: 163 % (ref 84–134)
RBC # BLD AUTO: 2.49 M/UL (ref 4.7–6.1)
SODIUM SERPL-SCNC: 139 MMOL/L (ref 136–145)
VANCOMYCIN SERPL-MCNC: 44.7 UG/ML
WBC # BLD AUTO: 12 K/UL (ref 4.8–10.8)

## 2023-05-22 PROCEDURE — 99223 1ST HOSP IP/OBS HIGH 75: CPT | Performed by: INTERNAL MEDICINE

## 2023-05-22 PROCEDURE — 36415 COLL VENOUS BLD VENIPUNCTURE: CPT

## 2023-05-22 PROCEDURE — 85730 THROMBOPLASTIN TIME PARTIAL: CPT

## 2023-05-22 PROCEDURE — 80202 ASSAY OF VANCOMYCIN: CPT

## 2023-05-22 PROCEDURE — 94760 N-INVAS EAR/PLS OXIMETRY 1: CPT

## 2023-05-22 PROCEDURE — 5A1D70Z PERFORMANCE OF URINARY FILTRATION, INTERMITTENT, LESS THAN 6 HOURS PER DAY: ICD-10-PCS | Performed by: INTERNAL MEDICINE

## 2023-05-22 PROCEDURE — 82962 GLUCOSE BLOOD TEST: CPT

## 2023-05-22 PROCEDURE — 85025 COMPLETE CBC W/AUTO DIFF WBC: CPT

## 2023-05-22 PROCEDURE — 2580000003 HC RX 258: Performed by: NURSE PRACTITIONER

## 2023-05-22 PROCEDURE — 1210000000 HC MED SURG R&B

## 2023-05-22 PROCEDURE — 71250 CT THORAX DX C-: CPT | Performed by: RADIOLOGY

## 2023-05-22 PROCEDURE — 6360000002 HC RX W HCPCS: Performed by: HOSPITALIST

## 2023-05-22 PROCEDURE — 6370000000 HC RX 637 (ALT 250 FOR IP): Performed by: NURSE PRACTITIONER

## 2023-05-22 PROCEDURE — 8010000000 HC HEMODIALYSIS ACUTE INPT

## 2023-05-22 PROCEDURE — 71250 CT THORAX DX C-: CPT

## 2023-05-22 PROCEDURE — 6360000002 HC RX W HCPCS: Performed by: NURSE PRACTITIONER

## 2023-05-22 PROCEDURE — 80048 BASIC METABOLIC PNL TOTAL CA: CPT

## 2023-05-22 RX ORDER — HEPARIN SODIUM 1000 [USP'U]/ML
40 INJECTION, SOLUTION INTRAVENOUS; SUBCUTANEOUS PRN
Status: DISCONTINUED | OUTPATIENT
Start: 2023-05-22 | End: 2023-05-24 | Stop reason: ALTCHOICE

## 2023-05-22 RX ORDER — HEPARIN SODIUM 10000 [USP'U]/100ML
5-30 INJECTION, SOLUTION INTRAVENOUS CONTINUOUS
Status: DISCONTINUED | OUTPATIENT
Start: 2023-05-22 | End: 2023-05-24

## 2023-05-22 RX ORDER — HEPARIN SODIUM 1000 [USP'U]/ML
80 INJECTION, SOLUTION INTRAVENOUS; SUBCUTANEOUS ONCE
Status: COMPLETED | OUTPATIENT
Start: 2023-05-22 | End: 2023-05-22

## 2023-05-22 RX ORDER — HEPARIN SODIUM 1000 [USP'U]/ML
80 INJECTION, SOLUTION INTRAVENOUS; SUBCUTANEOUS PRN
Status: DISCONTINUED | OUTPATIENT
Start: 2023-05-22 | End: 2023-05-24 | Stop reason: ALTCHOICE

## 2023-05-22 RX ADMIN — HEPARIN SODIUM 19 UNITS/KG/HR: 10000 INJECTION, SOLUTION INTRAVENOUS at 14:02

## 2023-05-22 RX ADMIN — SODIUM CHLORIDE, PRESERVATIVE FREE 10 ML: 5 INJECTION INTRAVENOUS at 08:58

## 2023-05-22 RX ADMIN — METOPROLOL TARTRATE 25 MG: 25 TABLET, FILM COATED ORAL at 08:56

## 2023-05-22 RX ADMIN — HEPARIN SODIUM 16 UNITS/KG/HR: 10000 INJECTION, SOLUTION INTRAVENOUS at 22:28

## 2023-05-22 RX ADMIN — HEPARIN SODIUM 22 UNITS/KG/HR: 10000 INJECTION, SOLUTION INTRAVENOUS at 08:54

## 2023-05-22 RX ADMIN — NIFEDIPINE 60 MG: 60 TABLET, EXTENDED RELEASE ORAL at 21:03

## 2023-05-22 RX ADMIN — CLONIDINE HYDROCHLORIDE 0.1 MG: 0.1 TABLET ORAL at 08:56

## 2023-05-22 RX ADMIN — CALCIUM 500 MG: 500 TABLET ORAL at 08:56

## 2023-05-22 RX ADMIN — CLONIDINE HYDROCHLORIDE 0.1 MG: 0.1 TABLET ORAL at 21:03

## 2023-05-22 RX ADMIN — HYDRALAZINE HYDROCHLORIDE 50 MG: 50 TABLET, FILM COATED ORAL at 09:02

## 2023-05-22 RX ADMIN — NIFEDIPINE 60 MG: 60 TABLET, EXTENDED RELEASE ORAL at 08:56

## 2023-05-22 RX ADMIN — HYDRALAZINE HYDROCHLORIDE 50 MG: 50 TABLET, FILM COATED ORAL at 21:03

## 2023-05-22 RX ADMIN — HYDRALAZINE HYDROCHLORIDE 50 MG: 50 TABLET, FILM COATED ORAL at 14:02

## 2023-05-22 RX ADMIN — CINACALCET HYDROCHLORIDE 30 MG: 30 TABLET, FILM COATED ORAL at 08:56

## 2023-05-22 RX ADMIN — METOPROLOL TARTRATE 25 MG: 25 TABLET, FILM COATED ORAL at 21:03

## 2023-05-22 RX ADMIN — SODIUM CHLORIDE, PRESERVATIVE FREE 10 ML: 5 INJECTION INTRAVENOUS at 21:03

## 2023-05-22 RX ADMIN — HEPARIN SODIUM 8900 UNITS: 1000 INJECTION, SOLUTION INTRAVENOUS; SUBCUTANEOUS at 08:48

## 2023-05-22 RX ADMIN — CEFEPIME 1000 MG: 1 INJECTION, POWDER, FOR SOLUTION INTRAMUSCULAR; INTRAVENOUS at 21:01

## 2023-05-22 RX ADMIN — SIMVASTATIN 10 MG: 10 TABLET, FILM COATED ORAL at 21:03

## 2023-05-22 ASSESSMENT — PAIN SCALES - GENERAL
PAINLEVEL_OUTOF10: 0
PAINLEVEL_OUTOF10: 0

## 2023-05-23 ENCOUNTER — APPOINTMENT (OUTPATIENT)
Dept: CT IMAGING | Age: 53
DRG: 166 | End: 2023-05-23
Payer: MEDICARE

## 2023-05-23 PROBLEM — R91.8 LUNG INFILTRATE: Status: ACTIVE | Noted: 2023-05-21

## 2023-05-23 LAB
25(OH)D3 SERPL-MCNC: 36.7 NG/ML
ANION GAP SERPL CALCULATED.3IONS-SCNC: 13 MMOL/L (ref 7–19)
APTT PPP: 137 SEC (ref 26–36.2)
APTT PPP: 77.4 SEC (ref 26–36.2)
APTT PPP: 89.7 SEC (ref 26–36.2)
APTT PPP: 99.9 SEC (ref 26–36.2)
B DERMAT AG SER QL IA: NORMAL NG/ML
BACTERIA BLD CULT ORG #2: NORMAL
BACTERIA BLD CULT: NORMAL
BASOPHILS # BLD: 0.1 K/UL (ref 0–0.2)
BASOPHILS NFR BLD: 0.4 % (ref 0–1)
BUN SERPL-MCNC: 42 MG/DL (ref 6–20)
CALCIUM SERPL-MCNC: 10.1 MG/DL (ref 8.6–10)
CHLORIDE SERPL-SCNC: 101 MMOL/L (ref 98–111)
CO2 SERPL-SCNC: 26 MMOL/L (ref 22–29)
CREAT SERPL-MCNC: 12.1 MG/DL (ref 0.5–1.2)
EOSINOPHIL # BLD: 0.6 K/UL (ref 0–0.6)
EOSINOPHIL NFR BLD: 4.9 % (ref 0–5)
ERYTHROCYTE [DISTWIDTH] IN BLOOD BY AUTOMATED COUNT: 13.4 % (ref 11.5–14.5)
GLUCOSE BLD-MCNC: 105 MG/DL (ref 70–99)
GLUCOSE BLD-MCNC: 110 MG/DL (ref 70–99)
GLUCOSE BLD-MCNC: 112 MG/DL (ref 70–99)
GLUCOSE BLD-MCNC: 118 MG/DL (ref 70–99)
GLUCOSE SERPL-MCNC: 93 MG/DL (ref 74–109)
HCT VFR BLD AUTO: 25 % (ref 42–52)
HGB BLD-MCNC: 8.4 G/DL (ref 14–18)
IMM GRANULOCYTES # BLD: 0.4 K/UL
LYMPHOCYTES # BLD: 2.4 K/UL (ref 1.1–4.5)
LYMPHOCYTES NFR BLD: 21.6 % (ref 20–40)
MAGNESIUM SERPL-MCNC: 2.4 MG/DL (ref 1.6–2.6)
MCH RBC QN AUTO: 31.3 PG (ref 27–31)
MCHC RBC AUTO-ENTMCNC: 33.6 G/DL (ref 33–37)
MCV RBC AUTO: 93.3 FL (ref 80–94)
MONOCYTES # BLD: 1.2 K/UL (ref 0–0.9)
MONOCYTES NFR BLD: 10.2 % (ref 0–10)
NEUTROPHILS # BLD: 6.7 K/UL (ref 1.5–7.5)
NEUTS SEG NFR BLD: 59.2 % (ref 50–65)
PERFORMED ON: ABNORMAL
PLATELET # BLD AUTO: 289 K/UL (ref 130–400)
PMV BLD AUTO: 9.6 FL (ref 9.4–12.4)
POTASSIUM SERPL-SCNC: 4.6 MMOL/L (ref 3.5–5)
PTH-INTACT SERPL-MCNC: 90.9 PG/ML (ref 15–65)
RBC # BLD AUTO: 2.68 M/UL (ref 4.7–6.1)
SODIUM SERPL-SCNC: 140 MMOL/L (ref 136–145)
WBC # BLD AUTO: 11.3 K/UL (ref 4.8–10.8)

## 2023-05-23 PROCEDURE — 94760 N-INVAS EAR/PLS OXIMETRY 1: CPT

## 2023-05-23 PROCEDURE — 2580000003 HC RX 258: Performed by: NURSE PRACTITIONER

## 2023-05-23 PROCEDURE — 85730 THROMBOPLASTIN TIME PARTIAL: CPT

## 2023-05-23 PROCEDURE — 83970 ASSAY OF PARATHORMONE: CPT

## 2023-05-23 PROCEDURE — 99233 SBSQ HOSP IP/OBS HIGH 50: CPT | Performed by: INTERNAL MEDICINE

## 2023-05-23 PROCEDURE — 82962 GLUCOSE BLOOD TEST: CPT

## 2023-05-23 PROCEDURE — 82306 VITAMIN D 25 HYDROXY: CPT

## 2023-05-23 PROCEDURE — 83735 ASSAY OF MAGNESIUM: CPT

## 2023-05-23 PROCEDURE — 71250 CT THORAX DX C-: CPT

## 2023-05-23 PROCEDURE — 6360000002 HC RX W HCPCS: Performed by: INTERNAL MEDICINE

## 2023-05-23 PROCEDURE — 6370000000 HC RX 637 (ALT 250 FOR IP): Performed by: NURSE PRACTITIONER

## 2023-05-23 PROCEDURE — 6360000002 HC RX W HCPCS: Performed by: HOSPITALIST

## 2023-05-23 PROCEDURE — 2580000003 HC RX 258: Performed by: INTERNAL MEDICINE

## 2023-05-23 PROCEDURE — 36415 COLL VENOUS BLD VENIPUNCTURE: CPT

## 2023-05-23 PROCEDURE — 80048 BASIC METABOLIC PNL TOTAL CA: CPT

## 2023-05-23 PROCEDURE — 85025 COMPLETE CBC W/AUTO DIFF WBC: CPT

## 2023-05-23 PROCEDURE — 1210000000 HC MED SURG R&B

## 2023-05-23 RX ADMIN — CALCIUM 500 MG: 500 TABLET ORAL at 09:25

## 2023-05-23 RX ADMIN — SODIUM CHLORIDE, PRESERVATIVE FREE 10 ML: 5 INJECTION INTRAVENOUS at 21:02

## 2023-05-23 RX ADMIN — NIFEDIPINE 60 MG: 60 TABLET, EXTENDED RELEASE ORAL at 09:25

## 2023-05-23 RX ADMIN — NIFEDIPINE 60 MG: 60 TABLET, EXTENDED RELEASE ORAL at 21:03

## 2023-05-23 RX ADMIN — METOPROLOL TARTRATE 25 MG: 25 TABLET, FILM COATED ORAL at 21:03

## 2023-05-23 RX ADMIN — METOPROLOL TARTRATE 25 MG: 25 TABLET, FILM COATED ORAL at 09:25

## 2023-05-23 RX ADMIN — SIMVASTATIN 10 MG: 10 TABLET, FILM COATED ORAL at 21:03

## 2023-05-23 RX ADMIN — CINACALCET HYDROCHLORIDE 30 MG: 30 TABLET, FILM COATED ORAL at 09:25

## 2023-05-23 RX ADMIN — MEROPENEM 1000 MG: 1 INJECTION, POWDER, FOR SOLUTION INTRAVENOUS at 17:28

## 2023-05-23 RX ADMIN — SODIUM CHLORIDE, PRESERVATIVE FREE 10 ML: 5 INJECTION INTRAVENOUS at 09:25

## 2023-05-23 RX ADMIN — HEPARIN SODIUM 13 UNITS/KG/HR: 10000 INJECTION, SOLUTION INTRAVENOUS at 06:30

## 2023-05-23 RX ADMIN — HYDRALAZINE HYDROCHLORIDE 50 MG: 50 TABLET, FILM COATED ORAL at 21:03

## 2023-05-23 RX ADMIN — HYDRALAZINE HYDROCHLORIDE 50 MG: 50 TABLET, FILM COATED ORAL at 05:35

## 2023-05-23 RX ADMIN — CLONIDINE HYDROCHLORIDE 0.1 MG: 0.1 TABLET ORAL at 21:03

## 2023-05-23 RX ADMIN — HYDRALAZINE HYDROCHLORIDE 50 MG: 50 TABLET, FILM COATED ORAL at 14:39

## 2023-05-23 RX ADMIN — CLONIDINE HYDROCHLORIDE 0.1 MG: 0.1 TABLET ORAL at 09:25

## 2023-05-23 RX ADMIN — HEPARIN SODIUM 13 UNITS/KG/HR: 10000 INJECTION, SOLUTION INTRAVENOUS at 17:37

## 2023-05-24 ENCOUNTER — APPOINTMENT (OUTPATIENT)
Dept: GENERAL RADIOLOGY | Age: 53
DRG: 166 | End: 2023-05-24
Payer: MEDICARE

## 2023-05-24 ENCOUNTER — ANESTHESIA (OUTPATIENT)
Dept: ENDOSCOPY | Age: 53
End: 2023-05-24
Payer: MEDICARE

## 2023-05-24 ENCOUNTER — ANESTHESIA EVENT (OUTPATIENT)
Dept: ENDOSCOPY | Age: 53
End: 2023-05-24
Payer: MEDICARE

## 2023-05-24 LAB
ACID FAST STN SPEC QL: NORMAL
ANION GAP SERPL CALCULATED.3IONS-SCNC: 16 MMOL/L (ref 7–19)
ANTICARDIOLIPIN IGA ANTIBODY: 2.2 APL (ref 0–14)
ANTICARDIOLIPIN IGG ANTIBODY: 4.5 GPL (ref 0–10)
ANTICARDIOLIPIN IGM ANTIBODY: 1.8 MPL (ref 0–10)
APTT IMM NP PPP: 50 SEC (ref 32–48)
APTT P HEP NEUT PPP: 53 SEC (ref 32–48)
APTT PPP: 103.4 SEC (ref 26–36.2)
APTT PPP: 39.3 SEC (ref 26–36.2)
BASOPHILS # BLD: 0.1 K/UL (ref 0–0.2)
BASOPHILS # BLD: 0.1 K/UL (ref 0–0.2)
BASOPHILS NFR BLD: 0.6 % (ref 0–1)
BASOPHILS NFR BLD: 0.6 % (ref 0–1)
BUN SERPL-MCNC: 54 MG/DL (ref 6–20)
CALCIUM SERPL-MCNC: 10.1 MG/DL (ref 8.6–10)
CHLORIDE SERPL-SCNC: 99 MMOL/L (ref 98–111)
CO2 SERPL-SCNC: 23 MMOL/L (ref 22–29)
CONFIRM APTT STACLOT: POSITIVE
CREAT SERPL-MCNC: 14.9 MG/DL (ref 0.5–1.2)
DRVVT SCREEN TO CONFIRM RATIO: ABNORMAL RATIO
EOSINOPHIL # BLD: 0.5 K/UL (ref 0–0.6)
EOSINOPHIL # BLD: 0.6 K/UL (ref 0–0.6)
EOSINOPHIL NFR BLD: 5.1 % (ref 0–5)
EOSINOPHIL NFR BLD: 5.5 % (ref 0–5)
ERYTHROCYTE [DISTWIDTH] IN BLOOD BY AUTOMATED COUNT: 13.2 % (ref 11.5–14.5)
ERYTHROCYTE [DISTWIDTH] IN BLOOD BY AUTOMATED COUNT: 13.3 % (ref 11.5–14.5)
F2 C.20210G>A GENO BLD/T: NEGATIVE
F5 P.R506Q BLD/T QL: NEGATIVE
GLUCOSE BLD-MCNC: 119 MG/DL (ref 70–99)
GLUCOSE BLD-MCNC: 145 MG/DL (ref 70–99)
GLUCOSE BLD-MCNC: 156 MG/DL (ref 70–99)
GLUCOSE BLD-MCNC: 96 MG/DL (ref 70–99)
GLUCOSE SERPL-MCNC: 106 MG/DL (ref 74–109)
HCT VFR BLD AUTO: 23.7 % (ref 42–52)
HCT VFR BLD AUTO: 28.6 % (ref 42–52)
HGB BLD-MCNC: 8 G/DL (ref 14–18)
HGB BLD-MCNC: 9.6 G/DL (ref 14–18)
IMM GRANULOCYTES # BLD: 0.3 K/UL
IMM GRANULOCYTES # BLD: 0.4 K/UL
INR PPP: 1.04 (ref 0.88–1.18)
KOH PREP SPEC: NORMAL
LA 3 SCREEN W REFLEX-IMP: ABNORMAL
LA NT DPL PPP QL: NEGATIVE
LYMPHOCYTES # BLD: 2 K/UL (ref 1.1–4.5)
LYMPHOCYTES # BLD: 2.8 K/UL (ref 1.1–4.5)
LYMPHOCYTES NFR BLD: 20.2 % (ref 20–40)
LYMPHOCYTES NFR BLD: 26.8 % (ref 20–40)
Lab: NORMAL
MCH RBC QN AUTO: 31.1 PG (ref 27–31)
MCH RBC QN AUTO: 31.4 PG (ref 27–31)
MCHC RBC AUTO-ENTMCNC: 33.6 G/DL (ref 33–37)
MCHC RBC AUTO-ENTMCNC: 33.8 G/DL (ref 33–37)
MCV RBC AUTO: 92.6 FL (ref 80–94)
MCV RBC AUTO: 92.9 FL (ref 80–94)
MIXING DRVVT: ABNORMAL SEC (ref 33–44)
MONOCYTES # BLD: 1.2 K/UL (ref 0–0.9)
MONOCYTES # BLD: 1.2 K/UL (ref 0–0.9)
MONOCYTES NFR BLD: 11.1 % (ref 0–10)
MONOCYTES NFR BLD: 12.2 % (ref 0–10)
NEUTROPHILS # BLD: 5.4 K/UL (ref 1.5–7.5)
NEUTROPHILS # BLD: 5.7 K/UL (ref 1.5–7.5)
NEUTS SEG NFR BLD: 52.3 % (ref 50–65)
NEUTS SEG NFR BLD: 58.5 % (ref 50–65)
PERFORMED ON: ABNORMAL
PERFORMED ON: NORMAL
PLATELET # BLD AUTO: 294 K/UL (ref 130–400)
PLATELET # BLD AUTO: 343 K/UL (ref 130–400)
PMV BLD AUTO: 8.9 FL (ref 9.4–12.4)
PMV BLD AUTO: 9.1 FL (ref 9.4–12.4)
POTASSIUM SERPL-SCNC: 4.6 MMOL/L (ref 3.5–5)
PROTHROMBIN TIME: 13.2 SEC (ref 12–14.6)
PROTHROMBIN TIME: 14.5 SEC (ref 12–15.5)
RBC # BLD AUTO: 2.55 M/UL (ref 4.7–6.1)
RBC # BLD AUTO: 3.09 M/UL (ref 4.7–6.1)
REPORT: NORMAL
REPTILASE TIME: 20.1 SEC
SCREEN APTT: 66 SEC (ref 32–48)
SCREEN DRVVT: 36 SEC (ref 33–44)
SODIUM SERPL-SCNC: 138 MMOL/L (ref 136–145)
SPECIMEN SOURCE: NORMAL
SPECIMEN SOURCE: NORMAL
THIS TEST SENT TO: NORMAL
THROMBIN TIME: 23.4 SEC (ref 14.7–19.5)
VANCOMYCIN SERPL-MCNC: 30.7 UG/ML
WBC # BLD AUTO: 10.4 K/UL (ref 4.8–10.8)
WBC # BLD AUTO: 9.8 K/UL (ref 4.8–10.8)

## 2023-05-24 PROCEDURE — 7100000001 HC PACU RECOVERY - ADDTL 15 MIN: Performed by: INTERNAL MEDICINE

## 2023-05-24 PROCEDURE — 36415 COLL VENOUS BLD VENIPUNCTURE: CPT

## 2023-05-24 PROCEDURE — 3700000001 HC ADD 15 MINUTES (ANESTHESIA): Performed by: INTERNAL MEDICINE

## 2023-05-24 PROCEDURE — 87102 FUNGUS ISOLATION CULTURE: CPT

## 2023-05-24 PROCEDURE — 80202 ASSAY OF VANCOMYCIN: CPT

## 2023-05-24 PROCEDURE — 2720000010 HC SURG SUPPLY STERILE: Performed by: INTERNAL MEDICINE

## 2023-05-24 PROCEDURE — 6360000002 HC RX W HCPCS: Performed by: INTERNAL MEDICINE

## 2023-05-24 PROCEDURE — 3609155400 HC ADD ON COMPUTER ASSISTED NAVIGATION: Performed by: INTERNAL MEDICINE

## 2023-05-24 PROCEDURE — 1210000000 HC MED SURG R&B

## 2023-05-24 PROCEDURE — 0B9H8ZX DRAINAGE OF LUNG LINGULA, VIA NATURAL OR ARTIFICIAL OPENING ENDOSCOPIC, DIAGNOSTIC: ICD-10-PCS | Performed by: INTERNAL MEDICINE

## 2023-05-24 PROCEDURE — 87205 SMEAR GRAM STAIN: CPT

## 2023-05-24 PROCEDURE — 80048 BASIC METABOLIC PNL TOTAL CA: CPT

## 2023-05-24 PROCEDURE — 2580000003 HC RX 258: Performed by: NURSE ANESTHETIST, CERTIFIED REGISTERED

## 2023-05-24 PROCEDURE — 87116 MYCOBACTERIA CULTURE: CPT

## 2023-05-24 PROCEDURE — 2500000003 HC RX 250 WO HCPCS: Performed by: NURSE ANESTHETIST, CERTIFIED REGISTERED

## 2023-05-24 PROCEDURE — 0B9G8ZX DRAINAGE OF LEFT UPPER LUNG LOBE, VIA NATURAL OR ARTIFICIAL OPENING ENDOSCOPIC, DIAGNOSTIC: ICD-10-PCS | Performed by: INTERNAL MEDICINE

## 2023-05-24 PROCEDURE — 87075 CULTR BACTERIA EXCEPT BLOOD: CPT

## 2023-05-24 PROCEDURE — 85610 PROTHROMBIN TIME: CPT

## 2023-05-24 PROCEDURE — 87385 HISTOPLASMA CAPSUL AG IA: CPT

## 2023-05-24 PROCEDURE — 8010000000 HC HEMODIALYSIS ACUTE INPT

## 2023-05-24 PROCEDURE — 85730 THROMBOPLASTIN TIME PARTIAL: CPT

## 2023-05-24 PROCEDURE — 71045 X-RAY EXAM CHEST 1 VIEW: CPT

## 2023-05-24 PROCEDURE — 6360000002 HC RX W HCPCS: Performed by: NURSE ANESTHETIST, CERTIFIED REGISTERED

## 2023-05-24 PROCEDURE — 6370000000 HC RX 637 (ALT 250 FOR IP): Performed by: INTERNAL MEDICINE

## 2023-05-24 PROCEDURE — 2580000003 HC RX 258: Performed by: INTERNAL MEDICINE

## 2023-05-24 PROCEDURE — 31624 DX BRONCHOSCOPE/LAVAGE: CPT | Performed by: INTERNAL MEDICINE

## 2023-05-24 PROCEDURE — 2709999900 HC NON-CHARGEABLE SUPPLY: Performed by: INTERNAL MEDICINE

## 2023-05-24 PROCEDURE — 87070 CULTURE OTHR SPECIMN AEROBIC: CPT

## 2023-05-24 PROCEDURE — 3609010800 HC BRONCHOSCOPY ALVEOLAR LAVAGE: Performed by: INTERNAL MEDICINE

## 2023-05-24 PROCEDURE — 99233 SBSQ HOSP IP/OBS HIGH 50: CPT | Performed by: INTERNAL MEDICINE

## 2023-05-24 PROCEDURE — 0BBG8ZX EXCISION OF LEFT UPPER LUNG LOBE, VIA NATURAL OR ARTIFICIAL OPENING ENDOSCOPIC, DIAGNOSTIC: ICD-10-PCS | Performed by: INTERNAL MEDICINE

## 2023-05-24 PROCEDURE — 3700000000 HC ANESTHESIA ATTENDED CARE: Performed by: INTERNAL MEDICINE

## 2023-05-24 PROCEDURE — 31627 NAVIGATIONAL BRONCHOSCOPY: CPT | Performed by: INTERNAL MEDICINE

## 2023-05-24 PROCEDURE — 87186 SC STD MICRODIL/AGAR DIL: CPT

## 2023-05-24 PROCEDURE — 3609011300 HC BRONCHOSCOPY BRONCHIAL/ENDOBRNCL BX 1+ SITES: Performed by: INTERNAL MEDICINE

## 2023-05-24 PROCEDURE — 7100000000 HC PACU RECOVERY - FIRST 15 MIN: Performed by: INTERNAL MEDICINE

## 2023-05-24 PROCEDURE — 0BBH8ZX EXCISION OF LUNG LINGULA, VIA NATURAL OR ARTIFICIAL OPENING ENDOSCOPIC, DIAGNOSTIC: ICD-10-PCS | Performed by: INTERNAL MEDICINE

## 2023-05-24 PROCEDURE — 87176 TISSUE HOMOGENIZATION CULTR: CPT

## 2023-05-24 PROCEDURE — 3209999900 FLUORO FOR SURGICAL PROCEDURES

## 2023-05-24 PROCEDURE — 87305 ASPERGILLUS AG IA: CPT

## 2023-05-24 PROCEDURE — 31628 BRONCHOSCOPY/LUNG BX EACH: CPT | Performed by: INTERNAL MEDICINE

## 2023-05-24 PROCEDURE — 82962 GLUCOSE BLOOD TEST: CPT

## 2023-05-24 PROCEDURE — 87206 SMEAR FLUORESCENT/ACID STAI: CPT

## 2023-05-24 PROCEDURE — 85025 COMPLETE CBC W/AUTO DIFF WBC: CPT

## 2023-05-24 RX ORDER — SUCCINYLCHOLINE/SOD CL,ISO/PF 100 MG/5ML
SYRINGE (ML) INTRAVENOUS PRN
Status: DISCONTINUED | OUTPATIENT
Start: 2023-05-24 | End: 2023-05-24 | Stop reason: SDUPTHER

## 2023-05-24 RX ORDER — ROCURONIUM BROMIDE 10 MG/ML
INJECTION, SOLUTION INTRAVENOUS PRN
Status: DISCONTINUED | OUTPATIENT
Start: 2023-05-24 | End: 2023-05-24 | Stop reason: SDUPTHER

## 2023-05-24 RX ORDER — HYDROMORPHONE HYDROCHLORIDE 1 MG/ML
0.5 INJECTION, SOLUTION INTRAMUSCULAR; INTRAVENOUS; SUBCUTANEOUS EVERY 5 MIN PRN
Status: DISCONTINUED | OUTPATIENT
Start: 2023-05-24 | End: 2023-05-24

## 2023-05-24 RX ORDER — SODIUM CHLORIDE, SODIUM LACTATE, POTASSIUM CHLORIDE, CALCIUM CHLORIDE 600; 310; 30; 20 MG/100ML; MG/100ML; MG/100ML; MG/100ML
INJECTION, SOLUTION INTRAVENOUS CONTINUOUS PRN
Status: DISCONTINUED | OUTPATIENT
Start: 2023-05-24 | End: 2023-05-24 | Stop reason: SDUPTHER

## 2023-05-24 RX ORDER — DEXAMETHASONE SODIUM PHOSPHATE 10 MG/ML
INJECTION, SOLUTION INTRAMUSCULAR; INTRAVENOUS PRN
Status: DISCONTINUED | OUTPATIENT
Start: 2023-05-24 | End: 2023-05-24 | Stop reason: SDUPTHER

## 2023-05-24 RX ORDER — FENTANYL CITRATE 50 UG/ML
INJECTION, SOLUTION INTRAMUSCULAR; INTRAVENOUS PRN
Status: DISCONTINUED | OUTPATIENT
Start: 2023-05-24 | End: 2023-05-24 | Stop reason: SDUPTHER

## 2023-05-24 RX ORDER — LIDOCAINE HYDROCHLORIDE 10 MG/ML
INJECTION, SOLUTION EPIDURAL; INFILTRATION; INTRACAUDAL; PERINEURAL PRN
Status: DISCONTINUED | OUTPATIENT
Start: 2023-05-24 | End: 2023-05-24 | Stop reason: SDUPTHER

## 2023-05-24 RX ORDER — HYDROMORPHONE HYDROCHLORIDE 1 MG/ML
0.25 INJECTION, SOLUTION INTRAMUSCULAR; INTRAVENOUS; SUBCUTANEOUS EVERY 5 MIN PRN
Status: DISCONTINUED | OUTPATIENT
Start: 2023-05-24 | End: 2023-05-24

## 2023-05-24 RX ORDER — MIDAZOLAM HYDROCHLORIDE 1 MG/ML
INJECTION INTRAMUSCULAR; INTRAVENOUS PRN
Status: DISCONTINUED | OUTPATIENT
Start: 2023-05-24 | End: 2023-05-24 | Stop reason: SDUPTHER

## 2023-05-24 RX ORDER — PROPOFOL 10 MG/ML
INJECTION, EMULSION INTRAVENOUS PRN
Status: DISCONTINUED | OUTPATIENT
Start: 2023-05-24 | End: 2023-05-24 | Stop reason: SDUPTHER

## 2023-05-24 RX ORDER — ONDANSETRON 2 MG/ML
INJECTION INTRAMUSCULAR; INTRAVENOUS PRN
Status: DISCONTINUED | OUTPATIENT
Start: 2023-05-24 | End: 2023-05-24 | Stop reason: SDUPTHER

## 2023-05-24 RX ORDER — GLYCOPYRROLATE 1 MG/5 ML
SYRINGE (ML) INTRAVENOUS PRN
Status: DISCONTINUED | OUTPATIENT
Start: 2023-05-24 | End: 2023-05-24 | Stop reason: SDUPTHER

## 2023-05-24 RX ORDER — SODIUM CHLORIDE 9 MG/ML
INJECTION, SOLUTION INTRAVENOUS PRN
Status: DISCONTINUED | OUTPATIENT
Start: 2023-05-24 | End: 2023-05-24

## 2023-05-24 RX ORDER — SODIUM CHLORIDE 0.9 % (FLUSH) 0.9 %
5-40 SYRINGE (ML) INJECTION PRN
Status: DISCONTINUED | OUTPATIENT
Start: 2023-05-24 | End: 2023-05-24

## 2023-05-24 RX ORDER — METOCLOPRAMIDE HYDROCHLORIDE 5 MG/ML
10 INJECTION INTRAMUSCULAR; INTRAVENOUS
Status: DISCONTINUED | OUTPATIENT
Start: 2023-05-24 | End: 2023-05-24

## 2023-05-24 RX ORDER — DIPHENHYDRAMINE HYDROCHLORIDE 50 MG/ML
12.5 INJECTION INTRAMUSCULAR; INTRAVENOUS
Status: DISCONTINUED | OUTPATIENT
Start: 2023-05-24 | End: 2023-05-24

## 2023-05-24 RX ORDER — SODIUM CHLORIDE 0.9 % (FLUSH) 0.9 %
5-40 SYRINGE (ML) INJECTION EVERY 12 HOURS SCHEDULED
Status: DISCONTINUED | OUTPATIENT
Start: 2023-05-24 | End: 2023-05-24

## 2023-05-24 RX ORDER — NEOSTIGMINE METHYLSULFATE 5 MG/5 ML
SYRINGE (ML) INTRAVENOUS PRN
Status: DISCONTINUED | OUTPATIENT
Start: 2023-05-24 | End: 2023-05-24 | Stop reason: SDUPTHER

## 2023-05-24 RX ADMIN — Medication 200 MG: at 15:32

## 2023-05-24 RX ADMIN — Medication 0.6 MG: at 16:49

## 2023-05-24 RX ADMIN — MEROPENEM 1000 MG: 1 INJECTION, POWDER, FOR SOLUTION INTRAVENOUS at 18:27

## 2023-05-24 RX ADMIN — FENTANYL CITRATE 100 MCG: 50 INJECTION INTRAMUSCULAR; INTRAVENOUS at 15:30

## 2023-05-24 RX ADMIN — NIFEDIPINE 60 MG: 60 TABLET, EXTENDED RELEASE ORAL at 20:26

## 2023-05-24 RX ADMIN — LIDOCAINE HYDROCHLORIDE 50 MG: 10 INJECTION, SOLUTION EPIDURAL; INFILTRATION; INTRACAUDAL; PERINEURAL at 15:31

## 2023-05-24 RX ADMIN — HYDRALAZINE HYDROCHLORIDE 50 MG: 50 TABLET, FILM COATED ORAL at 20:26

## 2023-05-24 RX ADMIN — SODIUM CHLORIDE, SODIUM LACTATE, POTASSIUM CHLORIDE, AND CALCIUM CHLORIDE: 600; 310; 30; 20 INJECTION, SOLUTION INTRAVENOUS at 15:28

## 2023-05-24 RX ADMIN — ROCURONIUM BROMIDE 20 MG: 50 INJECTION, SOLUTION INTRAVENOUS at 15:46

## 2023-05-24 RX ADMIN — METOPROLOL TARTRATE 25 MG: 25 TABLET, FILM COATED ORAL at 20:26

## 2023-05-24 RX ADMIN — SUGAMMADEX 200 MG: 100 INJECTION, SOLUTION INTRAVENOUS at 16:58

## 2023-05-24 RX ADMIN — Medication 3 MG: at 16:49

## 2023-05-24 RX ADMIN — SIMVASTATIN 10 MG: 10 TABLET, FILM COATED ORAL at 20:26

## 2023-05-24 RX ADMIN — ONDANSETRON 4 MG: 2 INJECTION INTRAMUSCULAR; INTRAVENOUS at 15:38

## 2023-05-24 RX ADMIN — SODIUM CHLORIDE, PRESERVATIVE FREE 10 ML: 5 INJECTION INTRAVENOUS at 20:26

## 2023-05-24 RX ADMIN — DEXAMETHASONE SODIUM PHOSPHATE 10 MG: 10 INJECTION, SOLUTION INTRAMUSCULAR; INTRAVENOUS at 15:38

## 2023-05-24 RX ADMIN — CLONIDINE HYDROCHLORIDE 0.1 MG: 0.1 TABLET ORAL at 20:26

## 2023-05-24 RX ADMIN — PROPOFOL 50 MG: 10 INJECTION, EMULSION INTRAVENOUS at 15:32

## 2023-05-24 RX ADMIN — PROPOFOL 150 MG: 10 INJECTION, EMULSION INTRAVENOUS at 15:31

## 2023-05-24 RX ADMIN — MIDAZOLAM 2 MG: 1 INJECTION INTRAMUSCULAR; INTRAVENOUS at 15:28

## 2023-05-24 ASSESSMENT — PAIN SCALES - GENERAL: PAINLEVEL_OUTOF10: 0

## 2023-05-24 ASSESSMENT — ENCOUNTER SYMPTOMS: DYSPNEA ACTIVITY LEVEL: NO INTERVAL CHANGE

## 2023-05-24 NOTE — ANESTHESIA POSTPROCEDURE EVALUATION
Department of Anesthesiology  Postprocedure Note    Patient: Harini Silva  MRN: [id number]  YOB: 1970  Date of evaluation: 5/24/2023      Procedure Summary     Date: 05/24/23 Room / Location: [address]    Anesthesia Start: 1528 Anesthesia Stop: 0571    Procedures:       BRONCHOSCOPY ADD ON COMPUTER ASSISTED (Left)      BRONCHOSCOPY ALVEOLAR LAVAGE ROBOTIC      BRONCHOSCOPY BIOPSY BRONCHUS ROBOTIC Diagnosis:       Lung infiltrate      Cavitary lesion of lung      (Lung infiltrate [R91.8])      (Cavitary lesion of lung [J98.4])    Surgeons: Rylie Harman MD Responsible Provider:     Anesthesia Type: general ASA Status: 4          Anesthesia Type: No value filed.     Kendall Phase I: Kendall Score: 8    Kendall Phase II:        Anesthesia Post Evaluation    Patient location during evaluation: PACU  Patient participation: complete - patient participated  Level of consciousness: awake and alert  Pain score: 0  Airway patency: patent  Nausea & Vomiting: no nausea and no vomiting  Complications: no  Cardiovascular status: hemodynamically stable  Respiratory status: acceptable  Hydration status: stable

## 2023-05-24 NOTE — ANESTHESIA PRE PROCEDURE
PM    ALT 30 05/21/2023 04:39 PM       POC Tests:   Recent Labs     05/24/23  1223   POCGLU 96       Coags:   Lab Results   Component Value Date/Time    PROTIME 15.1 05/16/2023 07:05 PM    INR 1.24 05/16/2023 07:05 PM    APTT 103.4 05/24/2023 04:41 AM       HCG (If Applicable): No results found for: PREGTESTUR, PREGSERUM, HCG, HCGQUANT     ABGs:   Lab Results   Component Value Date/Time    PHART 7.450 05/16/2023 07:32 PM    PO2ART 56.0 05/16/2023 07:32 PM    MZA7RSC 40.0 05/16/2023 07:32 PM    PJW9RYI 27.8 05/16/2023 07:32 PM    BEART 3.5 05/16/2023 07:32 PM    N4RMFQCX 87.6 05/16/2023 07:32 PM        Type & Screen (If Applicable):  No results found for: LABABO, LABRH    Drug/Infectious Status (If Applicable):  No results found for: HIV, HEPCAB    COVID-19 Screening (If Applicable):   Lab Results   Component Value Date/Time    COVID19 Not Detected 10/17/2022 06:50 AM           Anesthesia Evaluation  Patient summary reviewed no history of anesthetic complications:   Airway: Mallampati: III  TM distance: >3 FB   Neck ROM: full  Mouth opening: < 3 FB   Dental:          Pulmonary:   (+) pneumonia:  sleep apnea:  asthma:                           ROS comment: IMPRESSION:  1. Masslike consolidation in the subpleural left upper lobe measures approximately 3.3 x 4.9 x 3.9 cm. Cavitation noted. Additional nodule in the left upper lobe with cavitation measuring 1.6 cm. Surrounding groundglass opacities. Findings are concerning for infectious/inflammatory process versus neoplastic process. 2. Nonspecific 3 mm nodule at the right lung apex. Nonspecific small nodules in the left lung, measuring approximately 3 mm. Metastases are possible. 3. Nonspecific   prominent mediastinal and left hilar lymph nodes. These could represent sarah metastases. 4. Small left pleural effusion. Electronically signed by Simona Delacruz M.D. on 05-23-23 at 1727 MYOS   Cardiovascular:  Exercise tolerance: poor (<4 METS),   (+) hypertension:, CHF:,

## 2023-05-25 DIAGNOSIS — I10 ESSENTIAL HYPERTENSION: ICD-10-CM

## 2023-05-25 LAB
ACID FAST STN SPEC QL: NORMAL
ANION GAP SERPL CALCULATED.3IONS-SCNC: 15 MMOL/L (ref 7–19)
BASOPHILS # BLD: 0 K/UL (ref 0–0.2)
BASOPHILS NFR BLD: 0.3 % (ref 0–1)
BUN SERPL-MCNC: 39 MG/DL (ref 6–20)
CALCIUM SERPL-MCNC: 10.4 MG/DL (ref 8.6–10)
CHLORIDE SERPL-SCNC: 98 MMOL/L (ref 98–111)
CO2 SERPL-SCNC: 24 MMOL/L (ref 22–29)
CREAT SERPL-MCNC: 11.3 MG/DL (ref 0.5–1.2)
EOSINOPHIL # BLD: 0 K/UL (ref 0–0.6)
EOSINOPHIL NFR BLD: 0.2 % (ref 0–5)
ERYTHROCYTE [DISTWIDTH] IN BLOOD BY AUTOMATED COUNT: 13.1 % (ref 11.5–14.5)
GLUCOSE BLD-MCNC: 109 MG/DL (ref 70–99)
GLUCOSE BLD-MCNC: 112 MG/DL (ref 70–99)
GLUCOSE BLD-MCNC: 140 MG/DL (ref 70–99)
GLUCOSE BLD-MCNC: 161 MG/DL (ref 70–99)
GLUCOSE SERPL-MCNC: 131 MG/DL (ref 74–109)
HCT VFR BLD AUTO: 24.7 % (ref 42–52)
HGB BLD-MCNC: 8.3 G/DL (ref 14–18)
IMM GRANULOCYTES # BLD: 0.4 K/UL
LYMPHOCYTES # BLD: 1 K/UL (ref 1.1–4.5)
LYMPHOCYTES NFR BLD: 10.5 % (ref 20–40)
MCH RBC QN AUTO: 31.3 PG (ref 27–31)
MCHC RBC AUTO-ENTMCNC: 33.6 G/DL (ref 33–37)
MCV RBC AUTO: 93.2 FL (ref 80–94)
MONOCYTES # BLD: 0.7 K/UL (ref 0–0.9)
MONOCYTES NFR BLD: 7.1 % (ref 0–10)
NEUTROPHILS # BLD: 7.2 K/UL (ref 1.5–7.5)
NEUTS SEG NFR BLD: 77.6 % (ref 50–65)
PERFORMED ON: ABNORMAL
PLATELET # BLD AUTO: 272 K/UL (ref 130–400)
PMV BLD AUTO: 8.9 FL (ref 9.4–12.4)
POTASSIUM SERPL-SCNC: 5 MMOL/L (ref 3.5–5)
RBC # BLD AUTO: 2.65 M/UL (ref 4.7–6.1)
SODIUM SERPL-SCNC: 137 MMOL/L (ref 136–145)
WBC # BLD AUTO: 9.3 K/UL (ref 4.8–10.8)

## 2023-05-25 PROCEDURE — 6360000002 HC RX W HCPCS: Performed by: INTERNAL MEDICINE

## 2023-05-25 PROCEDURE — 6370000000 HC RX 637 (ALT 250 FOR IP): Performed by: INTERNAL MEDICINE

## 2023-05-25 PROCEDURE — 99233 SBSQ HOSP IP/OBS HIGH 50: CPT | Performed by: INTERNAL MEDICINE

## 2023-05-25 PROCEDURE — 85025 COMPLETE CBC W/AUTO DIFF WBC: CPT

## 2023-05-25 PROCEDURE — 36415 COLL VENOUS BLD VENIPUNCTURE: CPT

## 2023-05-25 PROCEDURE — 80048 BASIC METABOLIC PNL TOTAL CA: CPT

## 2023-05-25 PROCEDURE — 2580000003 HC RX 258: Performed by: INTERNAL MEDICINE

## 2023-05-25 PROCEDURE — 1210000000 HC MED SURG R&B

## 2023-05-25 PROCEDURE — 6370000000 HC RX 637 (ALT 250 FOR IP): Performed by: STUDENT IN AN ORGANIZED HEALTH CARE EDUCATION/TRAINING PROGRAM

## 2023-05-25 PROCEDURE — 87206 SMEAR FLUORESCENT/ACID STAI: CPT

## 2023-05-25 PROCEDURE — 82962 GLUCOSE BLOOD TEST: CPT

## 2023-05-25 PROCEDURE — 87116 MYCOBACTERIA CULTURE: CPT

## 2023-05-25 PROCEDURE — 94760 N-INVAS EAR/PLS OXIMETRY 1: CPT

## 2023-05-25 RX ORDER — NIFEDIPINE 60 MG/1
TABLET, FILM COATED, EXTENDED RELEASE ORAL
Qty: 60 TABLET | Refills: 0 | Status: SHIPPED | OUTPATIENT
Start: 2023-05-25

## 2023-05-25 RX ORDER — CLONIDINE HYDROCHLORIDE 0.1 MG/1
TABLET ORAL
Qty: 60 TABLET | Refills: 0 | Status: SHIPPED | OUTPATIENT
Start: 2023-05-25

## 2023-05-25 RX ADMIN — CLONIDINE HYDROCHLORIDE 0.1 MG: 0.1 TABLET ORAL at 20:13

## 2023-05-25 RX ADMIN — METOPROLOL TARTRATE 25 MG: 25 TABLET, FILM COATED ORAL at 20:12

## 2023-05-25 RX ADMIN — CLONIDINE HYDROCHLORIDE 0.1 MG: 0.1 TABLET ORAL at 11:03

## 2023-05-25 RX ADMIN — NIFEDIPINE 60 MG: 60 TABLET, EXTENDED RELEASE ORAL at 11:03

## 2023-05-25 RX ADMIN — CINACALCET HYDROCHLORIDE 30 MG: 30 TABLET, FILM COATED ORAL at 11:03

## 2023-05-25 RX ADMIN — CALCIUM 500 MG: 500 TABLET ORAL at 11:03

## 2023-05-25 RX ADMIN — NIFEDIPINE 60 MG: 60 TABLET, EXTENDED RELEASE ORAL at 20:13

## 2023-05-25 RX ADMIN — METOPROLOL TARTRATE 25 MG: 25 TABLET, FILM COATED ORAL at 11:03

## 2023-05-25 RX ADMIN — SIMVASTATIN 10 MG: 10 TABLET, FILM COATED ORAL at 20:12

## 2023-05-25 RX ADMIN — APIXABAN 2.5 MG: 2.5 TABLET, FILM COATED ORAL at 20:13

## 2023-05-25 RX ADMIN — MEROPENEM 1000 MG: 1 INJECTION, POWDER, FOR SOLUTION INTRAVENOUS at 17:44

## 2023-05-25 RX ADMIN — SODIUM CHLORIDE, PRESERVATIVE FREE 10 ML: 5 INJECTION INTRAVENOUS at 11:05

## 2023-05-25 RX ADMIN — HYDRALAZINE HYDROCHLORIDE 50 MG: 50 TABLET, FILM COATED ORAL at 17:40

## 2023-05-25 RX ADMIN — HYDRALAZINE HYDROCHLORIDE 50 MG: 50 TABLET, FILM COATED ORAL at 20:12

## 2023-05-25 RX ADMIN — SODIUM CHLORIDE, PRESERVATIVE FREE 10 ML: 5 INJECTION INTRAVENOUS at 20:14

## 2023-05-25 RX ADMIN — HYDRALAZINE HYDROCHLORIDE 50 MG: 50 TABLET, FILM COATED ORAL at 05:54

## 2023-05-26 LAB
ACID FAST STN SPEC QL: NORMAL
ANION GAP SERPL CALCULATED.3IONS-SCNC: 17 MMOL/L (ref 7–19)
BASOPHILS # BLD: 0 K/UL (ref 0–0.2)
BASOPHILS NFR BLD: 0.4 % (ref 0–1)
BUN SERPL-MCNC: 55 MG/DL (ref 6–20)
CALCIUM SERPL-MCNC: 10 MG/DL (ref 8.6–10)
CHLORIDE SERPL-SCNC: 99 MMOL/L (ref 98–111)
CO2 SERPL-SCNC: 23 MMOL/L (ref 22–29)
CREAT SERPL-MCNC: 14.9 MG/DL (ref 0.5–1.2)
EOSINOPHIL # BLD: 0.2 K/UL (ref 0–0.6)
EOSINOPHIL NFR BLD: 2.5 % (ref 0–5)
ERYTHROCYTE [DISTWIDTH] IN BLOOD BY AUTOMATED COUNT: 13.3 % (ref 11.5–14.5)
GLUCOSE BLD-MCNC: 111 MG/DL (ref 70–99)
GLUCOSE BLD-MCNC: 129 MG/DL (ref 70–99)
GLUCOSE BLD-MCNC: 151 MG/DL (ref 70–99)
GLUCOSE BLD-MCNC: 95 MG/DL (ref 70–99)
GLUCOSE SERPL-MCNC: 112 MG/DL (ref 74–109)
HCT VFR BLD AUTO: 24.5 % (ref 42–52)
HGB BLD-MCNC: 8.1 G/DL (ref 14–18)
IMM GRANULOCYTES # BLD: 0.3 K/UL
LYMPHOCYTES # BLD: 2.1 K/UL (ref 1.1–4.5)
LYMPHOCYTES NFR BLD: 22.9 % (ref 20–40)
MCH RBC QN AUTO: 31.2 PG (ref 27–31)
MCHC RBC AUTO-ENTMCNC: 33.1 G/DL (ref 33–37)
MCV RBC AUTO: 94.2 FL (ref 80–94)
MONOCYTES # BLD: 0.9 K/UL (ref 0–0.9)
MONOCYTES NFR BLD: 9.4 % (ref 0–10)
NEUTROPHILS # BLD: 5.8 K/UL (ref 1.5–7.5)
NEUTS SEG NFR BLD: 61.5 % (ref 50–65)
PERFORMED ON: ABNORMAL
PERFORMED ON: NORMAL
PLATELET # BLD AUTO: 280 K/UL (ref 130–400)
PMV BLD AUTO: 9 FL (ref 9.4–12.4)
POTASSIUM SERPL-SCNC: 4.6 MMOL/L (ref 3.5–5)
RBC # BLD AUTO: 2.6 M/UL (ref 4.7–6.1)
SODIUM SERPL-SCNC: 139 MMOL/L (ref 136–145)
VANCOMYCIN SERPL-MCNC: 21.1 UG/ML
WBC # BLD AUTO: 9.4 K/UL (ref 4.8–10.8)

## 2023-05-26 PROCEDURE — 2580000003 HC RX 258: Performed by: NURSE PRACTITIONER

## 2023-05-26 PROCEDURE — 6360000002 HC RX W HCPCS: Performed by: NURSE PRACTITIONER

## 2023-05-26 PROCEDURE — 80202 ASSAY OF VANCOMYCIN: CPT

## 2023-05-26 PROCEDURE — 2580000003 HC RX 258: Performed by: INTERNAL MEDICINE

## 2023-05-26 PROCEDURE — 1210000000 HC MED SURG R&B

## 2023-05-26 PROCEDURE — 87206 SMEAR FLUORESCENT/ACID STAI: CPT

## 2023-05-26 PROCEDURE — 85025 COMPLETE CBC W/AUTO DIFF WBC: CPT

## 2023-05-26 PROCEDURE — 6360000002 HC RX W HCPCS: Performed by: INTERNAL MEDICINE

## 2023-05-26 PROCEDURE — 99233 SBSQ HOSP IP/OBS HIGH 50: CPT | Performed by: INTERNAL MEDICINE

## 2023-05-26 PROCEDURE — 80048 BASIC METABOLIC PNL TOTAL CA: CPT

## 2023-05-26 PROCEDURE — 8010000000 HC HEMODIALYSIS ACUTE INPT

## 2023-05-26 PROCEDURE — 6370000000 HC RX 637 (ALT 250 FOR IP): Performed by: INTERNAL MEDICINE

## 2023-05-26 PROCEDURE — 87116 MYCOBACTERIA CULTURE: CPT

## 2023-05-26 PROCEDURE — 82962 GLUCOSE BLOOD TEST: CPT

## 2023-05-26 PROCEDURE — 6370000000 HC RX 637 (ALT 250 FOR IP): Performed by: STUDENT IN AN ORGANIZED HEALTH CARE EDUCATION/TRAINING PROGRAM

## 2023-05-26 PROCEDURE — 36415 COLL VENOUS BLD VENIPUNCTURE: CPT

## 2023-05-26 RX ORDER — HEPARIN SODIUM 5000 [USP'U]/ML
5000 INJECTION, SOLUTION INTRAVENOUS; SUBCUTANEOUS EVERY 8 HOURS SCHEDULED
Status: DISCONTINUED | OUTPATIENT
Start: 2023-05-27 | End: 2023-05-28 | Stop reason: HOSPADM

## 2023-05-26 RX ADMIN — APIXABAN 2.5 MG: 2.5 TABLET, FILM COATED ORAL at 13:45

## 2023-05-26 RX ADMIN — CALCIUM 500 MG: 500 TABLET ORAL at 13:44

## 2023-05-26 RX ADMIN — SODIUM CHLORIDE, PRESERVATIVE FREE 10 ML: 5 INJECTION INTRAVENOUS at 21:17

## 2023-05-26 RX ADMIN — SIMVASTATIN 10 MG: 10 TABLET, FILM COATED ORAL at 21:13

## 2023-05-26 RX ADMIN — NIFEDIPINE 60 MG: 60 TABLET, EXTENDED RELEASE ORAL at 13:44

## 2023-05-26 RX ADMIN — MEROPENEM 1000 MG: 1 INJECTION, POWDER, FOR SOLUTION INTRAVENOUS at 17:27

## 2023-05-26 RX ADMIN — HYDRALAZINE HYDROCHLORIDE 50 MG: 50 TABLET, FILM COATED ORAL at 05:33

## 2023-05-26 RX ADMIN — METOPROLOL TARTRATE 25 MG: 25 TABLET, FILM COATED ORAL at 13:44

## 2023-05-26 RX ADMIN — CINACALCET HYDROCHLORIDE 30 MG: 30 TABLET, FILM COATED ORAL at 13:44

## 2023-05-26 RX ADMIN — METOPROLOL TARTRATE 25 MG: 25 TABLET, FILM COATED ORAL at 21:13

## 2023-05-26 RX ADMIN — CLONIDINE HYDROCHLORIDE 0.1 MG: 0.1 TABLET ORAL at 21:13

## 2023-05-26 RX ADMIN — HYDRALAZINE HYDROCHLORIDE 50 MG: 50 TABLET, FILM COATED ORAL at 21:15

## 2023-05-26 RX ADMIN — NIFEDIPINE 60 MG: 60 TABLET, EXTENDED RELEASE ORAL at 21:13

## 2023-05-26 RX ADMIN — VANCOMYCIN HYDROCHLORIDE 1000 MG: 10 INJECTION, POWDER, LYOPHILIZED, FOR SOLUTION INTRAVENOUS at 16:17

## 2023-05-27 LAB
ACID FAST STN SPEC QL: NORMAL
ANION GAP SERPL CALCULATED.3IONS-SCNC: 14 MMOL/L (ref 7–19)
BACTERIA SPEC RESP CULT: ABNORMAL
BACTERIA SPEC RESP CULT: ABNORMAL
BASOPHILS # BLD: 0 K/UL (ref 0–0.2)
BASOPHILS NFR BLD: 0.5 % (ref 0–1)
BUN SERPL-MCNC: 39 MG/DL (ref 6–20)
CALCIUM SERPL-MCNC: 9.5 MG/DL (ref 8.6–10)
CHLORIDE SERPL-SCNC: 98 MMOL/L (ref 98–111)
CO2 SERPL-SCNC: 28 MMOL/L (ref 22–29)
CREAT SERPL-MCNC: 11.1 MG/DL (ref 0.5–1.2)
EOSINOPHIL # BLD: 0.3 K/UL (ref 0–0.6)
EOSINOPHIL NFR BLD: 3 % (ref 0–5)
ERYTHROCYTE [DISTWIDTH] IN BLOOD BY AUTOMATED COUNT: 13.8 % (ref 11.5–14.5)
GLUCOSE BLD-MCNC: 111 MG/DL (ref 70–99)
GLUCOSE BLD-MCNC: 124 MG/DL (ref 70–99)
GLUCOSE BLD-MCNC: 127 MG/DL (ref 70–99)
GLUCOSE BLD-MCNC: 130 MG/DL (ref 70–99)
GLUCOSE SERPL-MCNC: 102 MG/DL (ref 74–109)
GRAM STN SPEC: ABNORMAL
HCT VFR BLD AUTO: 24.8 % (ref 42–52)
HGB BLD-MCNC: 8.3 G/DL (ref 14–18)
IMM GRANULOCYTES # BLD: 0.2 K/UL
LYMPHOCYTES # BLD: 2.4 K/UL (ref 1.1–4.5)
LYMPHOCYTES NFR BLD: 27.1 % (ref 20–40)
MCH RBC QN AUTO: 31.6 PG (ref 27–31)
MCHC RBC AUTO-ENTMCNC: 33.5 G/DL (ref 33–37)
MCV RBC AUTO: 94.3 FL (ref 80–94)
MISCELLANEOUS LAB TEST RESULT: NORMAL
MONOCYTES # BLD: 1 K/UL (ref 0–0.9)
MONOCYTES NFR BLD: 11.3 % (ref 0–10)
NEUTROPHILS # BLD: 4.9 K/UL (ref 1.5–7.5)
NEUTS SEG NFR BLD: 56.4 % (ref 50–65)
ORGANISM: ABNORMAL
PERFORMED ON: ABNORMAL
PLATELET # BLD AUTO: 283 K/UL (ref 130–400)
PMV BLD AUTO: 9.1 FL (ref 9.4–12.4)
POTASSIUM SERPL-SCNC: 4.4 MMOL/L (ref 3.5–5)
RBC # BLD AUTO: 2.63 M/UL (ref 4.7–6.1)
SODIUM SERPL-SCNC: 140 MMOL/L (ref 136–145)
WBC # BLD AUTO: 8.8 K/UL (ref 4.8–10.8)

## 2023-05-27 PROCEDURE — 82962 GLUCOSE BLOOD TEST: CPT

## 2023-05-27 PROCEDURE — 85025 COMPLETE CBC W/AUTO DIFF WBC: CPT

## 2023-05-27 PROCEDURE — 94760 N-INVAS EAR/PLS OXIMETRY 1: CPT

## 2023-05-27 PROCEDURE — 36415 COLL VENOUS BLD VENIPUNCTURE: CPT

## 2023-05-27 PROCEDURE — 6360000002 HC RX W HCPCS: Performed by: STUDENT IN AN ORGANIZED HEALTH CARE EDUCATION/TRAINING PROGRAM

## 2023-05-27 PROCEDURE — 6360000002 HC RX W HCPCS: Performed by: INTERNAL MEDICINE

## 2023-05-27 PROCEDURE — 80048 BASIC METABOLIC PNL TOTAL CA: CPT

## 2023-05-27 PROCEDURE — 6370000000 HC RX 637 (ALT 250 FOR IP): Performed by: INTERNAL MEDICINE

## 2023-05-27 PROCEDURE — 2580000003 HC RX 258: Performed by: INTERNAL MEDICINE

## 2023-05-27 PROCEDURE — 1210000000 HC MED SURG R&B

## 2023-05-27 RX ADMIN — SODIUM CHLORIDE, PRESERVATIVE FREE 10 ML: 5 INJECTION INTRAVENOUS at 22:00

## 2023-05-27 RX ADMIN — METOPROLOL TARTRATE 25 MG: 25 TABLET, FILM COATED ORAL at 22:00

## 2023-05-27 RX ADMIN — HEPARIN SODIUM 5000 UNITS: 5000 INJECTION INTRAVENOUS; SUBCUTANEOUS at 22:01

## 2023-05-27 RX ADMIN — METOPROLOL TARTRATE 25 MG: 25 TABLET, FILM COATED ORAL at 08:40

## 2023-05-27 RX ADMIN — CLONIDINE HYDROCHLORIDE 0.1 MG: 0.1 TABLET ORAL at 22:00

## 2023-05-27 RX ADMIN — SODIUM CHLORIDE, PRESERVATIVE FREE 10 ML: 5 INJECTION INTRAVENOUS at 08:40

## 2023-05-27 RX ADMIN — HYDRALAZINE HYDROCHLORIDE 50 MG: 50 TABLET, FILM COATED ORAL at 06:09

## 2023-05-27 RX ADMIN — CLONIDINE HYDROCHLORIDE 0.1 MG: 0.1 TABLET ORAL at 08:40

## 2023-05-27 RX ADMIN — HYDRALAZINE HYDROCHLORIDE 50 MG: 50 TABLET, FILM COATED ORAL at 22:00

## 2023-05-27 RX ADMIN — CALCIUM 500 MG: 500 TABLET ORAL at 08:40

## 2023-05-27 RX ADMIN — HYDRALAZINE HYDROCHLORIDE 50 MG: 50 TABLET, FILM COATED ORAL at 14:47

## 2023-05-27 RX ADMIN — HEPARIN SODIUM 5000 UNITS: 5000 INJECTION INTRAVENOUS; SUBCUTANEOUS at 14:47

## 2023-05-27 RX ADMIN — NIFEDIPINE 60 MG: 60 TABLET, EXTENDED RELEASE ORAL at 08:40

## 2023-05-27 RX ADMIN — MEROPENEM 1000 MG: 1 INJECTION, POWDER, FOR SOLUTION INTRAVENOUS at 18:00

## 2023-05-27 RX ADMIN — SIMVASTATIN 10 MG: 10 TABLET, FILM COATED ORAL at 22:00

## 2023-05-27 RX ADMIN — NIFEDIPINE 60 MG: 60 TABLET, EXTENDED RELEASE ORAL at 22:00

## 2023-05-27 RX ADMIN — CINACALCET HYDROCHLORIDE 30 MG: 30 TABLET, FILM COATED ORAL at 08:40

## 2023-05-27 RX ADMIN — HEPARIN SODIUM 5000 UNITS: 5000 INJECTION INTRAVENOUS; SUBCUTANEOUS at 06:10

## 2023-05-27 ASSESSMENT — PAIN SCALES - GENERAL
PAINLEVEL_OUTOF10: 0
PAINLEVEL_OUTOF10: 0

## 2023-05-28 VITALS
HEART RATE: 63 BPM | DIASTOLIC BLOOD PRESSURE: 88 MMHG | HEIGHT: 67 IN | WEIGHT: 241.25 LBS | SYSTOLIC BLOOD PRESSURE: 136 MMHG | OXYGEN SATURATION: 94 % | RESPIRATION RATE: 18 BRPM | BODY MASS INDEX: 37.87 KG/M2 | TEMPERATURE: 97.1 F

## 2023-05-28 PROBLEM — Z79.01 CHRONIC ANTICOAGULATION: Status: RESOLVED | Noted: 2023-05-21 | Resolved: 2023-05-28

## 2023-05-28 LAB
ANION GAP SERPL CALCULATED.3IONS-SCNC: 18 MMOL/L (ref 7–19)
BACTERIA SPEC ANAEROBE CULT: ABNORMAL
BACTERIA SPEC ANAEROBE CULT: NORMAL
BACTERIA SPEC ANAEROBE+AEROBE CULT: ABNORMAL
BACTERIA SPEC ANAEROBE+AEROBE CULT: NORMAL
BASOPHILS # BLD: 0 K/UL (ref 0–0.2)
BASOPHILS NFR BLD: 0.5 % (ref 0–1)
BUN SERPL-MCNC: 56 MG/DL (ref 6–20)
CALCIUM SERPL-MCNC: 9.3 MG/DL (ref 8.6–10)
CHLORIDE SERPL-SCNC: 98 MMOL/L (ref 98–111)
CO2 SERPL-SCNC: 25 MMOL/L (ref 22–29)
CREAT SERPL-MCNC: 14.3 MG/DL (ref 0.5–1.2)
EOSINOPHIL # BLD: 0.3 K/UL (ref 0–0.6)
EOSINOPHIL NFR BLD: 3.1 % (ref 0–5)
ERYTHROCYTE [DISTWIDTH] IN BLOOD BY AUTOMATED COUNT: 13.7 % (ref 11.5–14.5)
GLUCOSE BLD-MCNC: 134 MG/DL (ref 70–99)
GLUCOSE BLD-MCNC: 93 MG/DL (ref 70–99)
GLUCOSE SERPL-MCNC: 97 MG/DL (ref 74–109)
HCT VFR BLD AUTO: 23.7 % (ref 42–52)
HGB BLD-MCNC: 7.9 G/DL (ref 14–18)
IMM GRANULOCYTES # BLD: 0.1 K/UL
LYMPHOCYTES # BLD: 2.2 K/UL (ref 1.1–4.5)
LYMPHOCYTES NFR BLD: 28 % (ref 20–40)
MCH RBC QN AUTO: 31.2 PG (ref 27–31)
MCHC RBC AUTO-ENTMCNC: 33.3 G/DL (ref 33–37)
MCV RBC AUTO: 93.7 FL (ref 80–94)
MONOCYTES # BLD: 0.9 K/UL (ref 0–0.9)
MONOCYTES NFR BLD: 10.6 % (ref 0–10)
NEUTROPHILS # BLD: 4.5 K/UL (ref 1.5–7.5)
NEUTS SEG NFR BLD: 56.4 % (ref 50–65)
ORGANISM: ABNORMAL
PERFORMED ON: ABNORMAL
PERFORMED ON: NORMAL
PLATELET # BLD AUTO: 269 K/UL (ref 130–400)
PMV BLD AUTO: 8.7 FL (ref 9.4–12.4)
POTASSIUM SERPL-SCNC: 4.5 MMOL/L (ref 3.5–5)
RBC # BLD AUTO: 2.53 M/UL (ref 4.7–6.1)
SODIUM SERPL-SCNC: 141 MMOL/L (ref 136–145)
WBC # BLD AUTO: 8 K/UL (ref 4.8–10.8)

## 2023-05-28 PROCEDURE — 2580000003 HC RX 258: Performed by: INTERNAL MEDICINE

## 2023-05-28 PROCEDURE — 80048 BASIC METABOLIC PNL TOTAL CA: CPT

## 2023-05-28 PROCEDURE — 85025 COMPLETE CBC W/AUTO DIFF WBC: CPT

## 2023-05-28 PROCEDURE — 82962 GLUCOSE BLOOD TEST: CPT

## 2023-05-28 PROCEDURE — 6360000002 HC RX W HCPCS: Performed by: STUDENT IN AN ORGANIZED HEALTH CARE EDUCATION/TRAINING PROGRAM

## 2023-05-28 PROCEDURE — 6370000000 HC RX 637 (ALT 250 FOR IP): Performed by: INTERNAL MEDICINE

## 2023-05-28 PROCEDURE — 36415 COLL VENOUS BLD VENIPUNCTURE: CPT

## 2023-05-28 PROCEDURE — 99232 SBSQ HOSP IP/OBS MODERATE 35: CPT | Performed by: INTERNAL MEDICINE

## 2023-05-28 RX ORDER — CLINDAMYCIN HYDROCHLORIDE 300 MG/1
600 CAPSULE ORAL 3 TIMES DAILY
Qty: 60 CAPSULE | Refills: 0 | Status: SHIPPED | OUTPATIENT
Start: 2023-05-28 | End: 2023-06-07

## 2023-05-28 RX ORDER — CEFDINIR 300 MG/1
600 CAPSULE ORAL DAILY
Qty: 20 CAPSULE | Refills: 0 | Status: SHIPPED | OUTPATIENT
Start: 2023-05-28 | End: 2023-05-28 | Stop reason: HOSPADM

## 2023-05-28 RX ADMIN — NIFEDIPINE 60 MG: 60 TABLET, EXTENDED RELEASE ORAL at 09:44

## 2023-05-28 RX ADMIN — HYDRALAZINE HYDROCHLORIDE 50 MG: 50 TABLET, FILM COATED ORAL at 06:44

## 2023-05-28 RX ADMIN — SODIUM CHLORIDE, PRESERVATIVE FREE 10 ML: 5 INJECTION INTRAVENOUS at 09:44

## 2023-05-28 RX ADMIN — HEPARIN SODIUM 5000 UNITS: 5000 INJECTION INTRAVENOUS; SUBCUTANEOUS at 06:44

## 2023-05-28 RX ADMIN — HYDRALAZINE HYDROCHLORIDE 50 MG: 50 TABLET, FILM COATED ORAL at 15:49

## 2023-05-28 RX ADMIN — HEPARIN SODIUM 5000 UNITS: 5000 INJECTION INTRAVENOUS; SUBCUTANEOUS at 15:47

## 2023-05-28 RX ADMIN — METOPROLOL TARTRATE 25 MG: 25 TABLET, FILM COATED ORAL at 09:44

## 2023-05-28 RX ADMIN — CINACALCET HYDROCHLORIDE 30 MG: 30 TABLET, FILM COATED ORAL at 09:44

## 2023-05-28 RX ADMIN — CLONIDINE HYDROCHLORIDE 0.1 MG: 0.1 TABLET ORAL at 09:44

## 2023-05-28 RX ADMIN — CALCIUM 500 MG: 500 TABLET ORAL at 09:44

## 2023-05-28 ASSESSMENT — PAIN SCALES - GENERAL: PAINLEVEL_OUTOF10: 0

## 2023-05-30 ENCOUNTER — TELEPHONE (OUTPATIENT)
Dept: INTERNAL MEDICINE | Age: 53
End: 2023-05-30

## 2023-05-30 NOTE — TELEPHONE ENCOUNTER
[facility] Initial Follow Up Call    Outreach made within 2 business days of discharge: Yes    Patient: Kostas Medina   Patient : 1970 MRN: [id number]    Reason for Admission: SOB, left sided pleuritic pain,    Discharge Date: 23      Discharge Diagnoses:  Principal Problem:    Cavitary pneumonia  Active Problems:    Diabetes ([facility] [address]    Chronic diastolic congestive heart failure ([facility]    ESRD on hemodialysis ([facility]    Lung infiltrate     Spoke with: Patient    Discharge department/facility: [place] [address]    TCM Interactive Patient Contact:  Was patient able to fill all prescriptions: Yes  Was patient instructed to bring all medications to the follow-up visit: Yes  Is patient taking all medications as directed in the discharge summary? Yes  Does patient understand their discharge instructions: Yes  Does patient have questions or concerns that need addressed prior to 7-14 day follow up office visit: no    Spoke to the  patient he is still taking antibiotics. He is not on any inhaler or nebulizer. His O2 is running around 95. He is not having any more shortness of breath or lung pain. He is eating and drinking well.  He did not think of anything that he would need at this time and is scheduled for a follow up on 23    RECORDS IN CHART  PT    Scheduled appointment with PCP within 7-14 days    Follow Up  Future Appointments   Date Time Provider Hakeem Thomas   2023 10:00 AM SOSA Keane CNP [facility]   2023 10:00 AM [facility] CT RM 1 [facility] CT [facility] Rad   7/10/2023  2:45 PM MD Donovan See Pulm [facility]   2023  8:15 AM SOSA Keane CNP [facility]   2023  7:15 AM [facility] [address] [facility] [place]

## 2023-06-02 ENCOUNTER — OFFICE VISIT (OUTPATIENT)
Dept: PRIMARY CARE CLINIC | Age: 53
End: 2023-06-02

## 2023-06-02 VITALS
HEIGHT: 67 IN | WEIGHT: 243.6 LBS | SYSTOLIC BLOOD PRESSURE: 130 MMHG | OXYGEN SATURATION: 96 % | HEART RATE: 84 BPM | BODY MASS INDEX: 38.24 KG/M2 | DIASTOLIC BLOOD PRESSURE: 80 MMHG | TEMPERATURE: 97 F

## 2023-06-02 DIAGNOSIS — N18.6 TYPE 2 DIABETES MELLITUS WITH CHRONIC KIDNEY DISEASE ON CHRONIC DIALYSIS, WITHOUT LONG-TERM CURRENT USE OF INSULIN (HCC): ICD-10-CM

## 2023-06-02 DIAGNOSIS — I10 ESSENTIAL HYPERTENSION: ICD-10-CM

## 2023-06-02 DIAGNOSIS — I50.32 CHRONIC DIASTOLIC CONGESTIVE HEART FAILURE (HCC): ICD-10-CM

## 2023-06-02 DIAGNOSIS — Z87.01 HISTORY OF PNEUMONIA: ICD-10-CM

## 2023-06-02 DIAGNOSIS — Z09 HOSPITAL DISCHARGE FOLLOW-UP: Primary | ICD-10-CM

## 2023-06-02 DIAGNOSIS — Z99.2 TYPE 2 DIABETES MELLITUS WITH CHRONIC KIDNEY DISEASE ON CHRONIC DIALYSIS, WITHOUT LONG-TERM CURRENT USE OF INSULIN (HCC): ICD-10-CM

## 2023-06-02 DIAGNOSIS — E11.22 TYPE 2 DIABETES MELLITUS WITH CHRONIC KIDNEY DISEASE ON CHRONIC DIALYSIS, WITHOUT LONG-TERM CURRENT USE OF INSULIN (HCC): ICD-10-CM

## 2023-06-02 DIAGNOSIS — N18.6 ESRD (END STAGE RENAL DISEASE) (HCC): ICD-10-CM

## 2023-06-06 LAB
FUNGUS SPEC CULT: NORMAL
KOH PREP SPEC: NORMAL

## 2023-06-07 ENCOUNTER — OFFICE VISIT (OUTPATIENT)
Dept: CARDIOLOGY CLINIC | Age: 53
End: 2023-06-07
Payer: MEDICARE

## 2023-06-07 VITALS
BODY MASS INDEX: 37.98 KG/M2 | SYSTOLIC BLOOD PRESSURE: 120 MMHG | HEART RATE: 62 BPM | HEIGHT: 67 IN | WEIGHT: 242 LBS | OXYGEN SATURATION: 97 % | DIASTOLIC BLOOD PRESSURE: 64 MMHG

## 2023-06-07 DIAGNOSIS — I50.32 CHRONIC DIASTOLIC CONGESTIVE HEART FAILURE (HCC): Primary | Chronic | ICD-10-CM

## 2023-06-07 DIAGNOSIS — Z99.2 ESRD ON HEMODIALYSIS (HCC): ICD-10-CM

## 2023-06-07 DIAGNOSIS — I10 HYPERTENSION, UNSPECIFIED TYPE: ICD-10-CM

## 2023-06-07 DIAGNOSIS — N18.6 ESRD ON HEMODIALYSIS (HCC): ICD-10-CM

## 2023-06-07 LAB
ACID FAST STN SPEC QL: NORMAL
MYCOBACTERIUM SPEC CULT: NORMAL

## 2023-06-07 PROCEDURE — 93000 ELECTROCARDIOGRAM COMPLETE: CPT | Performed by: CLINICAL NURSE SPECIALIST

## 2023-06-07 PROCEDURE — 3074F SYST BP LT 130 MM HG: CPT | Performed by: CLINICAL NURSE SPECIALIST

## 2023-06-07 PROCEDURE — 3078F DIAST BP <80 MM HG: CPT | Performed by: CLINICAL NURSE SPECIALIST

## 2023-06-07 PROCEDURE — 99203 OFFICE O/P NEW LOW 30 MIN: CPT | Performed by: CLINICAL NURSE SPECIALIST

## 2023-06-07 NOTE — PATIENT INSTRUCTIONS
ECHO as planned   Let us know if worsening fluid retention   Maintain good blood pressure control-goal<130/80 at rest  Maintain good cholesterol control LDL goal<70 with arterial disease  If you are diabetic work to keep/obtain hemoglobin A1c< 7    Follow up in 6 mos  Call with any questions or concerns  Follow up with PCP for non cardiac problems  Report any new problems  Cardiovascular Fitness-Exercise as tolerated. Strive for 30 minutes of exercise most days of the week.     Cardiac / Healthy Diet  Continue current medications as directed  Continue plan of treatment

## 2023-06-07 NOTE — PROGRESS NOTES
[facility], [address], [address] [facility]  Phone: [phone]  Fax: [phone]    OFFICE VISIT:  6/7/2023    [address]: 1970    Dear SOSA Fregoso,     I appreciate the opportunity of participating in the care of Bret Johnston. He is a very pleasant 47 y.o. male who I had the opportunity of seeing in my office today, 6/7/23. Records from your office have been obtained and reviewed. Reason For Visit:  Nataliya Diana is a 47 y.o. male who is here for New Patient (No symptoms) and Congestive Heart Failure  History hypertension, diabetes, hyperlipidemia, end-stage renal disease on dialysis  Kidney transplant work-up in 2020 with heart catheterization showed mildly depressed LV function with EF 45% no significant valvular disease, right dominant coronary circulation with no significant disease    Patient had 2D echo done at [facility] 3/9/2023 that showed normal LV size with moderate concentric LVH. EF 70%. No regional wall abnormalities. Grade 1 diastolic dysfunction. No valvular disease    Patient was recently hospitalized for cavitary pneumonia-patient was treated with IV antibiotics. He is followed by nephrology as well as pulmonology  He is here today to establish care. He reports he still taking his antibiotics. He is feeling better overall. He does home dialysis 4 days a week. Usually runs 3 hours and 20 minutes. He makes very little urine so his fluid removal is dialysis dependent  He states his blood pressures been well controlled. Occasionally will have low readings but none recently. Subjective  Nataliya Diana has no exertional chest pain, pressure, burning or squeezing. He is able to lie flat without evidence of orthopnea or paroxysmal nocturnal dyspnea. No symptomatic tachy- or ar-arrhythmia. No numbness or weakness to suggest cerebrovascular accident or transient ischemic attack. Reports no edema.      Coleman Hendricks

## 2023-06-20 LAB
FUNGUS SPEC CULT: NORMAL
KOH PREP SPEC: NORMAL

## 2023-06-21 LAB
ACID FAST STN SPEC QL: NORMAL
MYCOBACTERIUM SPEC CULT: NORMAL

## 2023-06-27 LAB
ACID FAST STN SPEC QL: ABNORMAL
FUNGUS SPEC CULT: NORMAL
KOH PREP SPEC: NORMAL
MYCOBACTERIUM SPEC CULT: ABNORMAL
ORGANISM: ABNORMAL

## 2023-06-28 LAB
ACID FAST STN SPEC QL: NORMAL
MYCOBACTERIUM SPEC CULT: NORMAL

## 2023-06-29 ENCOUNTER — HOSPITAL ENCOUNTER (OUTPATIENT)
Dept: NON INVASIVE DIAGNOSTICS | Age: 53
Discharge: HOME | End: 2023-06-29
Payer: MEDICARE

## 2023-06-29 DIAGNOSIS — I50.32 CHRONIC DIASTOLIC CONGESTIVE HEART FAILURE (HCC): ICD-10-CM

## 2023-06-29 LAB
LV EF: 58 %
LVEF MODALITY: NORMAL

## 2023-06-29 PROCEDURE — 93306 TTE W/DOPPLER COMPLETE: CPT

## 2023-07-03 LAB
ACID FAST STN SPEC QL: ABNORMAL
MYCOBACTERIUM SPEC CULT: ABNORMAL
ORGANISM: ABNORMAL

## 2023-07-05 ENCOUNTER — HOSPITAL ENCOUNTER (OUTPATIENT)
Dept: CT IMAGING | Age: 53
Discharge: HOME | End: 2023-07-05
Payer: MEDICARE

## 2023-07-05 DIAGNOSIS — J98.4 CAVITARY LESION OF LUNG: ICD-10-CM

## 2023-07-05 LAB
ACID FAST STN SPEC QL: NORMAL
MYCOBACTERIUM SPEC CULT: NORMAL

## 2023-07-05 PROCEDURE — 71250 CT THORAX DX C-: CPT

## 2023-07-10 ENCOUNTER — OFFICE VISIT (OUTPATIENT)
Dept: PULMONOLOGY | Age: 53
End: 2023-07-10
Payer: MEDICARE

## 2023-07-10 VITALS
HEART RATE: 57 BPM | SYSTOLIC BLOOD PRESSURE: 128 MMHG | BODY MASS INDEX: 37.76 KG/M2 | OXYGEN SATURATION: 98 % | HEIGHT: 67 IN | TEMPERATURE: 97.5 F | DIASTOLIC BLOOD PRESSURE: 72 MMHG | WEIGHT: 240.6 LBS

## 2023-07-10 DIAGNOSIS — J98.4 CAVITARY PNEUMONIA: Primary | ICD-10-CM

## 2023-07-10 DIAGNOSIS — J18.9 CAVITARY PNEUMONIA: Primary | ICD-10-CM

## 2023-07-10 DIAGNOSIS — N18.6 ESRD ON HEMODIALYSIS (HCC): ICD-10-CM

## 2023-07-10 DIAGNOSIS — R91.8 LUNG INFILTRATE: ICD-10-CM

## 2023-07-10 DIAGNOSIS — Z99.2 ESRD ON HEMODIALYSIS (HCC): ICD-10-CM

## 2023-07-10 DIAGNOSIS — J45.20 MILD INTERMITTENT ASTHMA WITHOUT COMPLICATION: ICD-10-CM

## 2023-07-10 PROCEDURE — 99214 OFFICE O/P EST MOD 30 MIN: CPT | Performed by: INTERNAL MEDICINE

## 2023-07-10 ASSESSMENT — ENCOUNTER SYMPTOMS
SHORTNESS OF BREATH: 1
COUGH: 0
CHEST TIGHTNESS: 0
ANAL BLEEDING: 0
WHEEZING: 0
ABDOMINAL PAIN: 0
RHINORRHEA: 0
ABDOMINAL DISTENTION: 0
APNEA: 0
BACK PAIN: 0

## 2023-07-10 NOTE — PROGRESS NOTES
Pulmonary and Sleep Medicine    Nicol Murillo (:  1970) is a 47 y.o. male,Established patient, here for evaluation of the following chief complaint(s):  Follow-up (HFU- pneumonia, CT Chest )      Referring physician:  No referring provider defined for this encounter. ASSESSMENT/PLAN:  1. Cavitary pneumonia  -     CT CHEST WO CONTRAST; Future  2. Mild intermittent asthma without complication  3. Lung infiltrate  4. ESRD on hemodialysis ([address])        Cavitary pneumonia secondary to staph. He is at his baseline. We will repeat CT of the chest in 6 months. Follow-up at that time. Clarice French MD, [facility]    No follow-ups on file. SUBJECTIVE/OBJECTIVE:  Patient is here for follow-up after hospital discharge. He was seen by myself during his hospitalization in May for cavitary pneumonia. At that time he was diagnosed with staph pneumonia. He was treated with antibiotics. He had a follow-up CT of the chest a few days ago that showed significant improvement with almost complete resolution of the previous findings. No respiratory symptoms at this time. No fever. Prior to Visit Medications    Medication Sig Taking? Authorizing Provider   metoprolol tartrate (LOPRESSOR) 25 MG tablet Take 1 tablet by mouth twice daily Yes SOSA Rinaldi CNP   NIFEdipine (ADALAT CC) 60 MG extended release tablet Take 1 tablet by mouth twice daily Yes SOSA Rinaldi CNP   cloNIDine (CATAPRES) 0.1 MG tablet Take 1 tablet by mouth twice daily Yes SOSA Rinaldi CNP   simvastatin (ZOCOR) 10 MG tablet Take 1 tablet by mouth nightly Yes SOSA Rinaldi CNP   cinacalcet (SENSIPAR) 30 MG tablet TAKE 1 TABLET BY MOUTH EVERY DAY Yes Historical Provider, MD EDGE 1  MG(Fe) TABS tablet TAKE 3 TABLETS BY MOUTH THREE TIMES A DAY WITH MEALS.    SWALLOW WHOLE, DO NOT CHEW OR CRUSH MEDICATION Yes Historical Provider, MD   albuterol sulfate  (90 Base) MCG/ACT inhaler Inhale 2

## 2023-07-12 LAB
ACID FAST STN SPEC QL: NORMAL
MYCOBACTERIUM SPEC CULT: NORMAL

## 2023-07-25 DIAGNOSIS — I10 ESSENTIAL HYPERTENSION: ICD-10-CM

## 2023-07-25 RX ORDER — NIFEDIPINE 60 MG/1
TABLET, FILM COATED, EXTENDED RELEASE ORAL
Qty: 60 TABLET | Refills: 0 | Status: SHIPPED | OUTPATIENT
Start: 2023-07-25

## 2023-07-25 RX ORDER — CLONIDINE HYDROCHLORIDE 0.1 MG/1
TABLET ORAL
Qty: 60 TABLET | Refills: 0 | Status: SHIPPED | OUTPATIENT
Start: 2023-07-25

## 2023-08-02 ENCOUNTER — OFFICE VISIT (OUTPATIENT)
Dept: PRIMARY CARE CLINIC | Age: 53
End: 2023-08-02
Payer: MEDICARE

## 2023-08-02 VITALS
OXYGEN SATURATION: 98 % | SYSTOLIC BLOOD PRESSURE: 128 MMHG | HEART RATE: 82 BPM | DIASTOLIC BLOOD PRESSURE: 74 MMHG | HEIGHT: 67 IN | BODY MASS INDEX: 37.54 KG/M2 | TEMPERATURE: 97.4 F | WEIGHT: 239.2 LBS

## 2023-08-02 DIAGNOSIS — E78.2 MIXED HYPERLIPIDEMIA: ICD-10-CM

## 2023-08-02 DIAGNOSIS — Z99.2 TYPE 2 DIABETES MELLITUS WITH CHRONIC KIDNEY DISEASE ON CHRONIC DIALYSIS, WITHOUT LONG-TERM CURRENT USE OF INSULIN (HCC): Primary | ICD-10-CM

## 2023-08-02 DIAGNOSIS — E66.01 SEVERE OBESITY (BMI 35.0-39.9) WITH COMORBIDITY (HCC): ICD-10-CM

## 2023-08-02 DIAGNOSIS — N18.6 TYPE 2 DIABETES MELLITUS WITH CHRONIC KIDNEY DISEASE ON CHRONIC DIALYSIS, WITHOUT LONG-TERM CURRENT USE OF INSULIN (HCC): Primary | ICD-10-CM

## 2023-08-02 DIAGNOSIS — N18.6 ESRD (END STAGE RENAL DISEASE) (HCC): ICD-10-CM

## 2023-08-02 DIAGNOSIS — I10 ESSENTIAL HYPERTENSION: ICD-10-CM

## 2023-08-02 DIAGNOSIS — E11.22 TYPE 2 DIABETES MELLITUS WITH CHRONIC KIDNEY DISEASE ON CHRONIC DIALYSIS, WITHOUT LONG-TERM CURRENT USE OF INSULIN (HCC): Primary | ICD-10-CM

## 2023-08-02 DIAGNOSIS — B35.1 ONYCHOMYCOSIS: ICD-10-CM

## 2023-08-02 PROCEDURE — 3074F SYST BP LT 130 MM HG: CPT | Performed by: NURSE PRACTITIONER

## 2023-08-02 PROCEDURE — 3078F DIAST BP <80 MM HG: CPT | Performed by: NURSE PRACTITIONER

## 2023-08-02 PROCEDURE — 3044F HG A1C LEVEL LT 7.0%: CPT | Performed by: NURSE PRACTITIONER

## 2023-08-02 PROCEDURE — 99214 OFFICE O/P EST MOD 30 MIN: CPT | Performed by: NURSE PRACTITIONER

## 2023-08-03 ASSESSMENT — ENCOUNTER SYMPTOMS
SORE THROAT: 0
VOMITING: 0
SHORTNESS OF BREATH: 0
NAUSEA: 0
DIARRHEA: 0
CHEST TIGHTNESS: 0
COLOR CHANGE: 0
COUGH: 0
ABDOMINAL PAIN: 0

## 2023-08-09 ENCOUNTER — HOSPITAL ENCOUNTER (INPATIENT)
Age: 53
LOS: 8 days | Discharge: HOME | End: 2023-08-17
Attending: EMERGENCY MEDICINE | Admitting: HOSPITALIST
Payer: MEDICARE

## 2023-08-09 DIAGNOSIS — T82.838A HEMORRHAGE OF ARTERIOVENOUS FISTULA, INITIAL ENCOUNTER (HCC): Primary | ICD-10-CM

## 2023-08-09 DIAGNOSIS — T82.9XXA COMPLICATION OF VASCULAR ACCESS FOR DIALYSIS, INITIAL ENCOUNTER: ICD-10-CM

## 2023-08-09 DIAGNOSIS — T82.9XXA COMPLICATION OF AV DIALYSIS FISTULA, INITIAL ENCOUNTER: ICD-10-CM

## 2023-08-09 DIAGNOSIS — Z99.2 ESRD (END STAGE RENAL DISEASE) ON DIALYSIS (HCC): ICD-10-CM

## 2023-08-09 DIAGNOSIS — N18.6 ESRD (END STAGE RENAL DISEASE) ON DIALYSIS (HCC): ICD-10-CM

## 2023-08-09 LAB
ALBUMIN SERPL-MCNC: 3.9 G/DL (ref 3.5–5.2)
ALP SERPL-CCNC: 57 U/L (ref 40–130)
ALT SERPL-CCNC: 17 U/L (ref 5–41)
ANION GAP SERPL CALCULATED.3IONS-SCNC: 13 MMOL/L (ref 7–19)
APTT PPP: 22.7 SEC (ref 26–36.2)
AST SERPL-CCNC: 12 U/L (ref 5–40)
BASOPHILS # BLD: 0 K/UL (ref 0–0.2)
BASOPHILS NFR BLD: 0.4 % (ref 0–1)
BILIRUB SERPL-MCNC: 0.3 MG/DL (ref 0.2–1.2)
BUN SERPL-MCNC: 70 MG/DL (ref 6–20)
CALCIUM SERPL-MCNC: 9.6 MG/DL (ref 8.6–10)
CHLORIDE SERPL-SCNC: 98 MMOL/L (ref 98–111)
CO2 SERPL-SCNC: 27 MMOL/L (ref 22–29)
CREAT SERPL-MCNC: 17.9 MG/DL (ref 0.5–1.2)
EKG P AXIS: 53 DEGREES
EKG P-R INTERVAL: 172 MS
EKG Q-T INTERVAL: 416 MS
EKG QRS DURATION: 88 MS
EKG QTC CALCULATION (BAZETT): 419 MS
EKG T AXIS: 37 DEGREES
EOSINOPHIL # BLD: 0.6 K/UL (ref 0–0.6)
EOSINOPHIL NFR BLD: 5.3 % (ref 0–5)
ERYTHROCYTE [DISTWIDTH] IN BLOOD BY AUTOMATED COUNT: 13.7 % (ref 11.5–14.5)
GLUCOSE BLD-MCNC: 101 MG/DL (ref 70–99)
GLUCOSE BLD-MCNC: 120 MG/DL (ref 70–99)
GLUCOSE BLD-MCNC: 127 MG/DL (ref 70–99)
GLUCOSE SERPL-MCNC: 135 MG/DL (ref 74–109)
HCT VFR BLD AUTO: 21.4 % (ref 42–52)
HCT VFR BLD AUTO: 25.8 % (ref 42–52)
HGB BLD-MCNC: 7.4 G/DL (ref 14–18)
HGB BLD-MCNC: 8.5 G/DL (ref 14–18)
IMM GRANULOCYTES # BLD: 0.1 K/UL
INR PPP: 1.05 (ref 0.88–1.18)
LYMPHOCYTES # BLD: 4.3 K/UL (ref 1.1–4.5)
LYMPHOCYTES NFR BLD: 41.5 % (ref 20–40)
MCH RBC QN AUTO: 30.9 PG (ref 27–31)
MCHC RBC AUTO-ENTMCNC: 32.9 G/DL (ref 33–37)
MCV RBC AUTO: 93.8 FL (ref 80–94)
MONOCYTES # BLD: 1.1 K/UL (ref 0–0.9)
MONOCYTES NFR BLD: 10.3 % (ref 0–10)
NEUTROPHILS # BLD: 4.4 K/UL (ref 1.5–7.5)
NEUTS SEG NFR BLD: 41.7 % (ref 50–65)
PERFORMED ON: ABNORMAL
PLATELET # BLD AUTO: 241 K/UL (ref 130–400)
PMV BLD AUTO: 9.5 FL (ref 9.4–12.4)
POTASSIUM SERPL-SCNC: 4.6 MMOL/L (ref 3.5–5)
PROT SERPL-MCNC: 6.9 G/DL (ref 6.6–8.7)
PROTHROMBIN TIME: 13.4 SEC (ref 12–14.6)
RBC # BLD AUTO: 2.75 M/UL (ref 4.7–6.1)
SODIUM SERPL-SCNC: 138 MMOL/L (ref 136–145)
WBC # BLD AUTO: 10.4 K/UL (ref 4.8–10.8)

## 2023-08-09 PROCEDURE — 87150 DNA/RNA AMPLIFIED PROBE: CPT

## 2023-08-09 PROCEDURE — 85025 COMPLETE CBC W/AUTO DIFF WBC: CPT

## 2023-08-09 PROCEDURE — 80053 COMPREHEN METABOLIC PANEL: CPT

## 2023-08-09 PROCEDURE — 85610 PROTHROMBIN TIME: CPT

## 2023-08-09 PROCEDURE — 87040 BLOOD CULTURE FOR BACTERIA: CPT

## 2023-08-09 PROCEDURE — 85730 THROMBOPLASTIN TIME PARTIAL: CPT

## 2023-08-09 PROCEDURE — 99285 EMERGENCY DEPT VISIT HI MDM: CPT

## 2023-08-09 PROCEDURE — 82962 GLUCOSE BLOOD TEST: CPT

## 2023-08-09 PROCEDURE — 2580000003 HC RX 258: Performed by: NURSE PRACTITIONER

## 2023-08-09 PROCEDURE — 1210000000 HC MED SURG R&B

## 2023-08-09 PROCEDURE — 85018 HEMOGLOBIN: CPT

## 2023-08-09 PROCEDURE — 85014 HEMATOCRIT: CPT

## 2023-08-09 PROCEDURE — 6370000000 HC RX 637 (ALT 250 FOR IP): Performed by: NURSE PRACTITIONER

## 2023-08-09 PROCEDURE — 36415 COLL VENOUS BLD VENIPUNCTURE: CPT

## 2023-08-09 PROCEDURE — 87186 SC STD MICRODIL/AGAR DIL: CPT

## 2023-08-09 PROCEDURE — 93005 ELECTROCARDIOGRAM TRACING: CPT | Performed by: EMERGENCY MEDICINE

## 2023-08-09 PROCEDURE — 6360000002 HC RX W HCPCS: Performed by: INTERNAL MEDICINE

## 2023-08-09 RX ORDER — LIDOCAINE HYDROCHLORIDE AND EPINEPHRINE 10; 10 MG/ML; UG/ML
20 INJECTION, SOLUTION INFILTRATION; PERINEURAL ONCE
Status: DISCONTINUED | OUTPATIENT
Start: 2023-08-09 | End: 2023-08-09 | Stop reason: SDUPTHER

## 2023-08-09 RX ORDER — INSULIN LISPRO 100 [IU]/ML
0-4 INJECTION, SOLUTION INTRAVENOUS; SUBCUTANEOUS
Status: DISCONTINUED | OUTPATIENT
Start: 2023-08-09 | End: 2023-08-17 | Stop reason: HOSPADM

## 2023-08-09 RX ORDER — INSULIN LISPRO 100 [IU]/ML
0-4 INJECTION, SOLUTION INTRAVENOUS; SUBCUTANEOUS NIGHTLY
Status: DISCONTINUED | OUTPATIENT
Start: 2023-08-09 | End: 2023-08-17 | Stop reason: HOSPADM

## 2023-08-09 RX ORDER — CHOLECALCIFEROL (VITAMIN D3) 125 MCG
250 CAPSULE ORAL DAILY
Status: DISCONTINUED | OUTPATIENT
Start: 2023-08-09 | End: 2023-08-17 | Stop reason: HOSPADM

## 2023-08-09 RX ORDER — SODIUM CHLORIDE 0.9 % (FLUSH) 0.9 %
5-40 SYRINGE (ML) INJECTION PRN
Status: DISCONTINUED | OUTPATIENT
Start: 2023-08-09 | End: 2023-08-11 | Stop reason: SDUPTHER

## 2023-08-09 RX ORDER — POLYETHYLENE GLYCOL 3350 17 G/17G
17 POWDER, FOR SOLUTION ORAL DAILY PRN
Status: DISCONTINUED | OUTPATIENT
Start: 2023-08-09 | End: 2023-08-17 | Stop reason: HOSPADM

## 2023-08-09 RX ORDER — CINACALCET 30 MG/1
30 TABLET, FILM COATED ORAL DAILY
Status: DISCONTINUED | OUTPATIENT
Start: 2023-08-09 | End: 2023-08-17 | Stop reason: HOSPADM

## 2023-08-09 RX ORDER — ACETAMINOPHEN 650 MG/1
650 SUPPOSITORY RECTAL EVERY 6 HOURS PRN
Status: DISCONTINUED | OUTPATIENT
Start: 2023-08-09 | End: 2023-08-17 | Stop reason: HOSPADM

## 2023-08-09 RX ORDER — ACETAMINOPHEN 325 MG/1
650 TABLET ORAL EVERY 6 HOURS PRN
Status: DISCONTINUED | OUTPATIENT
Start: 2023-08-09 | End: 2023-08-17 | Stop reason: HOSPADM

## 2023-08-09 RX ORDER — CALCIUM CARBONATE 500(1250)
1250 TABLET ORAL DAILY
Status: DISCONTINUED | OUTPATIENT
Start: 2023-08-09 | End: 2023-08-17 | Stop reason: HOSPADM

## 2023-08-09 RX ORDER — SODIUM CHLORIDE 0.9 % (FLUSH) 0.9 %
5-40 SYRINGE (ML) INJECTION EVERY 12 HOURS SCHEDULED
Status: DISCONTINUED | OUTPATIENT
Start: 2023-08-09 | End: 2023-08-11 | Stop reason: SDUPTHER

## 2023-08-09 RX ORDER — CLONIDINE HYDROCHLORIDE 0.1 MG/1
0.1 TABLET ORAL 2 TIMES DAILY
Status: DISCONTINUED | OUTPATIENT
Start: 2023-08-09 | End: 2023-08-17 | Stop reason: HOSPADM

## 2023-08-09 RX ORDER — LIDOCAINE HYDROCHLORIDE 20 MG/ML
INJECTION, SOLUTION INTRAVENOUS
Status: DISCONTINUED
Start: 2023-08-09 | End: 2023-08-09 | Stop reason: WASHOUT

## 2023-08-09 RX ORDER — SODIUM CHLORIDE 9 MG/ML
INJECTION, SOLUTION INTRAVENOUS PRN
Status: DISCONTINUED | OUTPATIENT
Start: 2023-08-09 | End: 2023-08-11 | Stop reason: SDUPTHER

## 2023-08-09 RX ORDER — ALBUTEROL SULFATE 90 UG/1
2 AEROSOL, METERED RESPIRATORY (INHALATION) EVERY 6 HOURS PRN
Status: DISCONTINUED | OUTPATIENT
Start: 2023-08-09 | End: 2023-08-17 | Stop reason: HOSPADM

## 2023-08-09 RX ORDER — SEVELAMER CARBONATE 800 MG/1
1600 TABLET, FILM COATED ORAL
Status: DISCONTINUED | OUTPATIENT
Start: 2023-08-09 | End: 2023-08-17 | Stop reason: HOSPADM

## 2023-08-09 RX ORDER — HYDRALAZINE HYDROCHLORIDE 25 MG/1
25 TABLET, FILM COATED ORAL EVERY 8 HOURS SCHEDULED
Status: DISCONTINUED | OUTPATIENT
Start: 2023-08-09 | End: 2023-08-17 | Stop reason: HOSPADM

## 2023-08-09 RX ORDER — NIFEDIPINE 30 MG/1
60 TABLET, EXTENDED RELEASE ORAL 2 TIMES DAILY
Status: DISCONTINUED | OUTPATIENT
Start: 2023-08-09 | End: 2023-08-17 | Stop reason: HOSPADM

## 2023-08-09 RX ORDER — ONDANSETRON 2 MG/ML
4 INJECTION INTRAMUSCULAR; INTRAVENOUS EVERY 6 HOURS PRN
Status: DISCONTINUED | OUTPATIENT
Start: 2023-08-09 | End: 2023-08-17 | Stop reason: HOSPADM

## 2023-08-09 RX ORDER — ONDANSETRON 4 MG/1
4 TABLET, ORALLY DISINTEGRATING ORAL EVERY 8 HOURS PRN
Status: DISCONTINUED | OUTPATIENT
Start: 2023-08-09 | End: 2023-08-17 | Stop reason: HOSPADM

## 2023-08-09 RX ADMIN — EPOETIN ALFA-EPBX 10000 UNITS: 10000 INJECTION, SOLUTION INTRAVENOUS; SUBCUTANEOUS at 15:40

## 2023-08-09 RX ADMIN — SEVELAMER CARBONATE 1600 MG: 800 TABLET, FILM COATED ORAL at 15:40

## 2023-08-09 RX ADMIN — SEVELAMER CARBONATE 1600 MG: 800 TABLET, FILM COATED ORAL at 11:48

## 2023-08-09 RX ADMIN — HYDRALAZINE HYDROCHLORIDE 25 MG: 25 TABLET, FILM COATED ORAL at 20:54

## 2023-08-09 RX ADMIN — CINACALCET HYDROCHLORIDE 30 MG: 30 TABLET, FILM COATED ORAL at 11:48

## 2023-08-09 RX ADMIN — CALCIUM 1250 MG: 500 TABLET ORAL at 11:47

## 2023-08-09 RX ADMIN — CLONIDINE HYDROCHLORIDE 0.1 MG: 0.1 TABLET ORAL at 20:54

## 2023-08-09 RX ADMIN — METOPROLOL TARTRATE 25 MG: 25 TABLET, FILM COATED ORAL at 20:54

## 2023-08-09 RX ADMIN — METOPROLOL TARTRATE 25 MG: 25 TABLET, FILM COATED ORAL at 11:48

## 2023-08-09 RX ADMIN — CYANOCOBALAMIN TAB 500 MCG 250 MCG: 500 TAB at 11:48

## 2023-08-09 RX ADMIN — CLONIDINE HYDROCHLORIDE 0.1 MG: 0.1 TABLET ORAL at 11:48

## 2023-08-09 RX ADMIN — SODIUM CHLORIDE, PRESERVATIVE FREE 10 ML: 5 INJECTION INTRAVENOUS at 22:10

## 2023-08-09 RX ADMIN — NIFEDIPINE 60 MG: 30 TABLET, FILM COATED, EXTENDED RELEASE ORAL at 20:54

## 2023-08-09 RX ADMIN — HYDRALAZINE HYDROCHLORIDE 25 MG: 25 TABLET, FILM COATED ORAL at 15:40

## 2023-08-09 RX ADMIN — NIFEDIPINE 60 MG: 30 TABLET, FILM COATED, EXTENDED RELEASE ORAL at 11:48

## 2023-08-09 RX ADMIN — SODIUM CHLORIDE, PRESERVATIVE FREE 10 ML: 5 INJECTION INTRAVENOUS at 11:48

## 2023-08-09 SDOH — ECONOMIC STABILITY: INCOME INSECURITY: HOW HARD IS IT FOR YOU TO PAY FOR THE VERY BASICS LIKE FOOD, HOUSING, MEDICAL CARE, AND HEATING?: NOT HARD AT ALL

## 2023-08-09 SDOH — ECONOMIC STABILITY: INCOME INSECURITY: IN THE PAST 12 MONTHS, HAS THE ELECTRIC, GAS, OIL, OR WATER COMPANY THREATENED TO SHUT OFF SERVICE IN YOUR HOME?: NO

## 2023-08-09 SDOH — ECONOMIC STABILITY: FOOD INSECURITY: WITHIN THE PAST 12 MONTHS, YOU WORRIED THAT YOUR FOOD WOULD RUN OUT BEFORE YOU GOT MONEY TO BUY MORE.: NEVER TRUE

## 2023-08-09 ASSESSMENT — ENCOUNTER SYMPTOMS
VOMITING: 0
DIARRHEA: 0
SHORTNESS OF BREATH: 0
GASTROINTESTINAL NEGATIVE: 1
EYES NEGATIVE: 1
COUGH: 0
ABDOMINAL DISTENTION: 0
CONSTIPATION: 0
RESPIRATORY NEGATIVE: 1
WHEEZING: 0
NAUSEA: 0
BLOOD IN STOOL: 0
COLOR CHANGE: 0
ABDOMINAL PAIN: 0

## 2023-08-09 ASSESSMENT — PAIN - FUNCTIONAL ASSESSMENT: PAIN_FUNCTIONAL_ASSESSMENT: 0-10

## 2023-08-09 ASSESSMENT — PATIENT HEALTH QUESTIONNAIRE - PHQ9
SUM OF ALL RESPONSES TO PHQ QUESTIONS 1-9: 0
2. FEELING DOWN, DEPRESSED OR HOPELESS: 0
SUM OF ALL RESPONSES TO PHQ QUESTIONS 1-9: 0
1. LITTLE INTEREST OR PLEASURE IN DOING THINGS: 0
SUM OF ALL RESPONSES TO PHQ QUESTIONS 1-9: 0
SUM OF ALL RESPONSES TO PHQ QUESTIONS 1-9: 0
SUM OF ALL RESPONSES TO PHQ9 QUESTIONS 1 & 2: 0

## 2023-08-09 ASSESSMENT — PAIN DESCRIPTION - LOCATION: LOCATION: ARM

## 2023-08-09 ASSESSMENT — PAIN DESCRIPTION - ORIENTATION: ORIENTATION: LEFT

## 2023-08-09 ASSESSMENT — PAIN DESCRIPTION - DESCRIPTORS: DESCRIPTORS: PATIENT UNABLE TO DESCRIBE

## 2023-08-09 ASSESSMENT — PAIN SCALES - GENERAL: PAINLEVEL_OUTOF10: 5

## 2023-08-09 NOTE — CONSULTS
[place]-Diverticulosis, internal hemorrhoids-Grade 1-2-HP, 3 yr recall    COLONOSCOPY N/A 12/19/2022    Dr Natali Butler, Diffuse melanosis coli, mild diverticulosis left colon, Int hem Gr 1-2, inadequate-fair to poor prep, repeat 12-22-22    COLONOSCOPY N/A 12/22/2022    Dr Natali Butler, W Ablation 5 sm 3-4 mm sessile benign appearing polyps in sig colon, int hem Gr 1, 3 year recall    DIALYSIS FISTULA CREATION Left 06/26/2020    LEFT BRACHIAL / CEPHALIC AV FISTULA performed by Jennifer Sloan MD at [address] [facility] [address]      [facility] - removal of first cath due to infection and placement of new one    TUNNELED VENOUS CATHETER PLACEMENT  01/2020    [facility]    VASCULAR SURGERY  11/11/2020    [facility].  Removal of tunneled dialysis catheter left internal jugular vein    VASCULAR SURGERY  07/26/2021    [facility]- Left upper extremity fistulogram's including venography the superior cava, Balloon angioplasty left proximal upper arm cephalic vein stenosis with 7 mm x 80 mm conquest, 7 mm x 100 mm Lutonix, 8 mm x 60 mm Lutonix drug-coated balloon, Completion venograms left upper extremity       Allergies:  Banana, Atorvastatin, and Lisinopril    Medications Current:   Current Facility-Administered Medications   Medication Dose Route Frequency Provider Last Rate Last Admin    albuterol sulfate HFA (PROVENTIL;VENTOLIN;PROAIR) 108 (90 Base) MCG/ACT inhaler 2 puff  2 puff Inhalation Q6H PRN Oliverio Alonso, APRN        sevelamer (RENVELA) tablet 1,600 mg  1,600 mg Oral TID  Oliverio Alonso, APRN   1,600 mg at 08/09/23 1148    calcium elemental (OSCAL) tablet 1,250 mg  1,250 mg Oral Daily Oliverio Alonso, APRN   1,250 mg at 08/09/23 1147    cinacalcet (SENSIPAR) tablet 30 mg  30 mg Oral Daily Oliverio Alonso, APRN   30 mg at 08/09/23 1148    cloNIDine (CATAPRES) tablet 0.1 mg  0.1 mg Oral BID Oliverio Alonso, APRN   0.1 mg at 08/09/23 1148    vitamin B-12 (CYANOCOBALAMIN) tablet 250 mcg  250 mcg Oral BMP:   Lab Results   Component Value Date/Time     08/09/2023 07:45 AM    K 4.6 08/09/2023 07:45 AM    K 4.5 05/28/2023 02:04 AM    CL 98 08/09/2023 07:45 AM    CO2 27 08/09/2023 07:45 AM    BUN 70 08/09/2023 07:45 AM    CREATININE 17.9 08/09/2023 07:45 AM    CALCIUM 9.6 08/09/2023 07:45 AM    GFRAA 4 10/17/2022 06:05 AM    LABGLOM 3 08/09/2023 07:45 AM    GLUCOSE 135 08/09/2023 07:45 AM     PT/INR:  No results found for: PTINR    ASSESSMENT:  Hemorrhage of AV fistula  ESRD on HD   Chronic diastolic heart failure   Hypertension  Diabetes mellitus type II    PLAN:  Discussed case with Dr. Anna Khanna.  Pt NPO after midnight for fistulogram in AM    Christiane Martinez PA-C  8/9/2023  1:18 PM

## 2023-08-09 NOTE — H&P
[facility] - History & Physical      PCP: SOSA Gomez CNP    Date of Admission: 8/9/2023    Date of Service: 8/9/2023    Chief Complaint:  Bleeding from fistula    History Of Present Illness: The patient is a 47 y.o. male with a PMH of ESRD HD dependence, HTN, asthma, and DM who presented to [address] [place] [address] ED on 8/9/2023 complaining of bleeding from his fistula. He performs HD at home and states that he had a scab that developed in the upper aspect of his left upper arm fistula. This fistula has been present for approximately 4 years with no previous issues or complications. He states that it became painful to dialyze to this area was advised to start a new \"buttonhole\" this area for dialysis. He dialyzed without difficulty yesterday evening. He states that he has noted some increased swelling and slight pain to this upper aspect of his fistula. He states that he was sleeping and was awoke with a sensation of blood running down his arm. He reported a significant amount of blood was lost at home but was able to control it somewhat to be transported to the ED. Denies any recent fevers or chills. Denies purulent drainage. Denies flow problems or issues with dialysis. Further ED workup revealed the patient to have a large volume of brisk bleeding to an approximate half centimeter area of ulceration to the upper aspect of his left fistula. Bleeding was not controlled with pressure, therefore, figure-of-eight sutures were placed x 4 per Dr. Emily Mayers in the ED. Bleeding was then controlled without difficulty. H&H 8.5/25.8-WBC 10.4 platelet count 307. K4.6, BUN 70 creatinine 17.9. Vascular surgery was consulted in the ED who was agreeable to accept the patient in consult to perform a fistulogram due to bleeding.   The patient will be admitted to hospital medicine for hemorrhage from AV fistula, AV fistula complication, ESRD HD dependence with vascular surgery and nephrology results found. Assessment/Plan:  Principal Problem:    Complication of AV dialysis fistula/Hemorrhage of arteriovenous fistula (HCC)   -admit to med/surg with tele-full code   -Consult vascular surgery-to be NPO until evaluated   -Monitor AV site for s/s of further bleeding   -Q 8 hour H&H x3   -Monitor vitals   -Monitor daily labs     Active Problems:    Hypertension   -Home meds continued   -Monitor      Diabetes ([address])  -Continue low dose correctional protocol   -Carb conscious diet   -Hypoglycemic protocol   -Accuchecks       ESRD on hemodialysis ([address])   -Consult nephrology-recommendations appreciated   -Continue renal diet   -Monitor daily labs   -Strict &O   -Dialysis per nephro recommendations     Resolved Problems:    * No resolved hospital problems.  *       Signed:  SOSA Shipley, 8/9/2023 5:42 PM

## 2023-08-09 NOTE — CARE COORDINATION
Case Management Assessment  Initial Evaluation    Date/Time of Evaluation: 8/9/2023 10:35 AM  Assessment Completed by: Graham Santoro    If patient is discharged prior to next notation, then this note serves as note for discharge by case management. Patient Name: Rolando Pichardo                   YOB: 1970  Diagnosis: Complication of AV dialysis fistula, initial encounter [T82. 9XXA]                   Date / Time: 8/9/2023  7:14 AM    Patient Admission Status: Inpatient   Readmission Risk (Low < 19, Mod (19-27), High > 27): Readmission Risk Score: 22.3    Current PCP: SOSA Al CNP  PCP verified by CM? Yes    Chart Reviewed: Yes      History Provided by: Patient  Patient Orientation: Alert and Oriented    Patient Cognition: Alert    Hospitalization in the last 30 days (Readmission):  No    If yes, Readmission Assessment in CM Navigator will be completed. Advance Directives:      Code Status: Prior   Patient's Primary Decision Maker is: Legal Next of Kin    Primary Decision Maker: Cydney Nowak - [phone]    Secondary Decision Maker: Galen Jacques - Parent - [phone]    Supplemental (Other) Decision Maker: Nolvia Santacruz - Brother/Sister - [phone]    Discharge Planning:    Patient lives with: Alone Type of Home: House  Primary Care Giver: Self  Patient Support Systems include: Parent, Family Members   Current Financial resources: Medicaid, Medicare  Current community resources: None  Current services prior to admission: None            Current DME:              Type of Home Care services:  None    ADLS  Prior functional level: Independent in ADLs/IADLs  Current functional level: Independent in ADLs/IADLs    PT AM-PAC:   /24  OT AM-PAC:   /24    Family can provide assistance at DC: Yes  Would you like Case Management to discuss the discharge plan with any other family members/significant others, and if so, who?  No  Plans to Return to Present Housing: Yes  Potential

## 2023-08-09 NOTE — CONSULTS
Nephrology ([address]) Consult Note      Patient:  Jerry Harris  YOB: 1970  Date of Service: 8/9/2023  MRN: [id number]   Acct: [de-identified]   Primary Care Physician: Marylen Nip, APRN - CNP  Advance Directive: Full Code  Admit Date: 8/9/2023       Hospital Day: 0  Referring Provider: Elver Vera MD    Patient independently seen and examined, Chart, Consults, Notes, Operative notes, Labs, Cardiology, and Radiology studies reviewed as available. Chief complaint: Bleeding dialysis fistula. Subjective:  Jerry Harris is a 47 y.o. male for whom we were consulted for evaluation and treatment of end-stage renal disease on home hemodialysis. He Is on hemodialysis, self cannulate his AV fistula for dialysis at home. He has history of type II diabetic nephropathy, hypertension, congestive heart failure and anemia of chronic kidney disease. 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 It was profusely bleeding. He presented to the emergency room and vascular surgery has placed a couple of sutures to stop his bleeder. He is now scheduled to have fistulogram and repair of aneurysm. His surgery is scheduled tomorrow morning. He has completed successful dialysis treatment last night.     Allergies:  Banana, Atorvastatin, and Lisinopril    Medicines:  Current Facility-Administered Medications   Medication Dose Route Frequency Provider Last Rate Last Admin    albuterol sulfate HFA (PROVENTIL;VENTOLIN;PROAIR) 108 (90 Base) MCG/ACT inhaler 2 puff  2 puff Inhalation Q6H PRN SOSA Anderson        sevelamer (RENVELA) tablet 1,600 mg  1,600 mg Oral TID WC SOSA Anderson   1,600 mg at 08/09/23 1148    calcium elemental (OSCAL) tablet 1,250 mg  1,250 mg Oral Daily SOSA Anderson   1,250 mg at 08/09/23 1147    cinacalcet (SENSIPAR) tablet 30 mg  30 mg Oral Daily SOSA Anderson   30 mg at

## 2023-08-09 NOTE — PROGRESS NOTES
4 Eyes Skin Assessment    Brittany Patterson is being assessed upon: Admission    I agree that Luis Alberto Mccabe RN, along with Go Valdez RN (either 2 RN's or 1 LPN and 1 RN) have performed a thorough Head to Toe Skin Assessment on the patient. ALL assessment sites listed below have been assessed. Areas assessed by both nurses:     [x]   Head, Face, and Ears   [x]   Shoulders, Back, and Chest  [x]   Arms, Elbows, and Hands   [x]   Coccyx, Sacrum, and Ischium  [x]   Legs, Feet, and Heels    Does the Patient have Skin Breakdown?  No    Vitaliy Prevention initiated: Yes  Wound Care Orders initiated: No    WOC nurse consulted for Pressure Injury (Stage 3,4, Unstageable, DTI, NWPT, and Complex wounds) and New or Established Ostomies: No        Primary Nurse eSignature: Marleni Avitia RN on 8/9/2023 at 11:33 AM      Co-Signer eSignature: Electronically signed by Fredrick Deluca RN on 8/9/23 at 12:23 PM CDT

## 2023-08-09 NOTE — PLAN OF CARE
Problem: Discharge Planning  Goal: Discharge to home or other facility with appropriate resources  Outcome: Progressing  Flowsheets (Taken 8/9/2023 1119)  Discharge to home or other facility with appropriate resources: Identify barriers to discharge with patient and caregiver     Problem: Pain  Goal: Verbalizes/displays adequate comfort level or baseline comfort level  Outcome: Progressing     Problem: Safety - Adult  Goal: Free from fall injury  Outcome: Progressing     Problem: ABCDS Injury Assessment  Goal: Absence of physical injury  Outcome: Progressing

## 2023-08-10 ENCOUNTER — APPOINTMENT (OUTPATIENT)
Dept: INTERVENTIONAL RADIOLOGY/VASCULAR | Age: 53
End: 2023-08-10
Payer: MEDICARE

## 2023-08-10 PROBLEM — E87.5 HYPERKALEMIA: Status: ACTIVE | Noted: 2023-08-10

## 2023-08-10 PROBLEM — R78.81 BACTEREMIA: Status: ACTIVE | Noted: 2023-08-10

## 2023-08-10 PROBLEM — D50.0 BLOOD LOSS ANEMIA: Status: ACTIVE | Noted: 2023-08-10

## 2023-08-10 LAB
A BAUMANNII DNA BLD POS QL NAA+NON-PROBE: NOT DETECTED
ABO/RH: NORMAL
ALBUMIN SERPL-MCNC: 3.4 G/DL (ref 3.5–5.2)
ALP SERPL-CCNC: 46 U/L (ref 40–130)
ALT SERPL-CCNC: 13 U/L (ref 5–41)
ANION GAP SERPL CALCULATED.3IONS-SCNC: 14 MMOL/L (ref 7–19)
ANTIBODY SCREEN: NORMAL
AST SERPL-CCNC: 10 U/L (ref 5–40)
BASOPHILS # BLD: 0 K/UL (ref 0–0.2)
BASOPHILS NFR BLD: 0.3 % (ref 0–1)
BILIRUB SERPL-MCNC: <0.2 MG/DL (ref 0.2–1.2)
BLOOD BANK DISPENSE STATUS: NORMAL
BLOOD BANK PRODUCT CODE: NORMAL
BPU ID: NORMAL
BUN SERPL-MCNC: 79 MG/DL (ref 6–20)
C ALBICANS DNA BLD POS QL NAA+NON-PROBE: NOT DETECTED
C AURIS DNA BLD POS QL NAA+PROBE: NOT DETECTED
C GLABRATA DNA BLD POS QL NAA+NON-PROBE: NOT DETECTED
C KRUSEI DNA BLD POS QL NAA+NON-PROBE: NOT DETECTED
C PARAP DNA BLD POS QL NAA+NON-PROBE: NOT DETECTED
C TROPICLS DNA BLD POS QL NAA+NON-PROBE: NOT DETECTED
CALCIUM SERPL-MCNC: 9.5 MG/DL (ref 8.6–10)
CHLORIDE SERPL-SCNC: 98 MMOL/L (ref 98–111)
CO2 SERPL-SCNC: 27 MMOL/L (ref 22–29)
CREAT SERPL-MCNC: 19.7 MG/DL (ref 0.5–1.2)
CRYPTOCOCCUS NEOFORMANS/GATTII BY PCR: NOT DETECTED
DESCRIPTION BLOOD BANK: NORMAL
E CLOAC COMP DNA BLD POS NAA+NON-PROBE: NOT DETECTED
E COLI DNA BLD POS QL NAA+NON-PROBE: NOT DETECTED
E FAECALIS DNA BLD POS QL NAA+PROBE: NOT DETECTED
E FAECIUM DNA BLD POS QL NAA+PROBE: NOT DETECTED
ENTEROBACT DNA BLD POS QL NAA+NON-PROBE: NOT DETECTED
ENTEROCOC DNA BLD POS QL NAA+NON-PROBE: NOT DETECTED
EOSINOPHIL # BLD: 0.4 K/UL (ref 0–0.6)
EOSINOPHIL NFR BLD: 5.6 % (ref 0–5)
ERYTHROCYTE [DISTWIDTH] IN BLOOD BY AUTOMATED COUNT: 13.2 % (ref 11.5–14.5)
GLUCOSE BLD-MCNC: 104 MG/DL (ref 70–99)
GLUCOSE BLD-MCNC: 119 MG/DL (ref 70–99)
GLUCOSE BLD-MCNC: 149 MG/DL (ref 70–99)
GLUCOSE BLD-MCNC: 99 MG/DL (ref 70–99)
GLUCOSE SERPL-MCNC: 89 MG/DL (ref 74–109)
GN BLD CULTURE PNL BLD POS NAA+PROBE: NOT DETECTED
GP B STREP DNA BLD POS QL NAA+NON-PROBE: NOT DETECTED
HCT VFR BLD AUTO: 20 % (ref 42–52)
HCT VFR BLD AUTO: 20.3 % (ref 42–52)
HCT VFR BLD AUTO: 24.3 % (ref 42–52)
HGB BLD-MCNC: 6.8 G/DL (ref 14–18)
HGB BLD-MCNC: 6.9 G/DL (ref 14–18)
HGB BLD-MCNC: 8.2 G/DL (ref 14–18)
IMM GRANULOCYTES # BLD: 0.1 K/UL
INR PPP: 1.11 (ref 0.88–1.18)
K OXYTOCA DNA BLD POS QL NAA+NON-PROBE: NOT DETECTED
K PNEUMON DNA SPEC QL NAA+PROBE: NOT DETECTED
K. AEROGENES DNA SPEC QL NAA+PROBE: NOT DETECTED
L MONOCYTOG DNA BLD POS QL NAA+NON-PROBE: NOT DETECTED
LYMPHOCYTES # BLD: 2.6 K/UL (ref 1.1–4.5)
LYMPHOCYTES NFR BLD: 36.3 % (ref 20–40)
MCH RBC QN AUTO: 30.9 PG (ref 27–31)
MCHC RBC AUTO-ENTMCNC: 34 G/DL (ref 33–37)
MCV RBC AUTO: 90.9 FL (ref 80–94)
MECA BLD POS QL NAA+NON-PROBE: DETECTED
MONOCYTES # BLD: 0.8 K/UL (ref 0–0.9)
MONOCYTES NFR BLD: 11.3 % (ref 0–10)
N MEN DNA BLD POS QL NAA+NON-PROBE: NOT DETECTED
NEUTROPHILS # BLD: 3.2 K/UL (ref 1.5–7.5)
NEUTS SEG NFR BLD: 45.8 % (ref 50–65)
P AERUGINOSA DNA BLD POS NAA+NON-PROBE: NOT DETECTED
PERFORMED ON: ABNORMAL
PERFORMED ON: NORMAL
PHOSPHATE SERPL-MCNC: 6.5 MG/DL (ref 2.5–4.5)
PLATELET # BLD AUTO: 182 K/UL (ref 130–400)
PMV BLD AUTO: 9.6 FL (ref 9.4–12.4)
POTASSIUM SERPL-SCNC: 5.9 MMOL/L (ref 3.5–5)
PROT SERPL-MCNC: 5.8 G/DL (ref 6.6–8.7)
PROTEUS SP DNA BLD POS QL NAA+NON-PROBE: NOT DETECTED
PROTHROMBIN TIME: 14 SEC (ref 12–14.6)
RBC # BLD AUTO: 2.2 M/UL (ref 4.7–6.1)
S AUREUS DNA BLD POS QL NAA+NON-PROBE: NOT DETECTED
S AUREUS+CONS DNA BLD POS NAA+NON-PROBE: DETECTED
S EPIDERMIDIS DNA BLD POS QL NAA+PROBE: DETECTED
S LUGDUNENSIS DNA BLD POS QL NAA+PROBE: NOT DETECTED
S MALTOPH DNA BLD POS QL NAA+PROBE: NOT DETECTED
S MARCESCENS DNA BLD POS NAA+NON-PROBE: NOT DETECTED
S PNEUM DNA BLD POS QL NAA+NON-PROBE: NOT DETECTED
S PYO DNA BLD POS QL NAA+NON-PROBE: NOT DETECTED
SALMONELLA DNA BLD POS QL NAA+PROBE: NOT DETECTED
SODIUM SERPL-SCNC: 139 MMOL/L (ref 136–145)
STREPTOCOCCUS DNA BLD POS NAA+NON-PROBE: NOT DETECTED
WBC # BLD AUTO: 7 K/UL (ref 4.8–10.8)

## 2023-08-10 PROCEDURE — C1769 GUIDE WIRE: HCPCS

## 2023-08-10 PROCEDURE — 36430 TRANSFUSION BLD/BLD COMPNT: CPT

## 2023-08-10 PROCEDURE — 99153 MOD SED SAME PHYS/QHP EA: CPT

## 2023-08-10 PROCEDURE — 6360000002 HC RX W HCPCS: Performed by: NURSE PRACTITIONER

## 2023-08-10 PROCEDURE — 85014 HEMATOCRIT: CPT

## 2023-08-10 PROCEDURE — 84100 ASSAY OF PHOSPHORUS: CPT

## 2023-08-10 PROCEDURE — 2580000003 HC RX 258: Performed by: PHYSICIAN ASSISTANT

## 2023-08-10 PROCEDURE — 82962 GLUCOSE BLOOD TEST: CPT

## 2023-08-10 PROCEDURE — 94760 N-INVAS EAR/PLS OXIMETRY 1: CPT

## 2023-08-10 PROCEDURE — 1210000000 HC MED SURG R&B

## 2023-08-10 PROCEDURE — 80053 COMPREHEN METABOLIC PANEL: CPT

## 2023-08-10 PROCEDURE — 2500000003 HC RX 250 WO HCPCS: Performed by: SURGERY

## 2023-08-10 PROCEDURE — 6360000002 HC RX W HCPCS: Performed by: SURGERY

## 2023-08-10 PROCEDURE — 86850 RBC ANTIBODY SCREEN: CPT

## 2023-08-10 PROCEDURE — 86923 COMPATIBILITY TEST ELECTRIC: CPT

## 2023-08-10 PROCEDURE — 057F3DZ DILATION OF LEFT CEPHALIC VEIN WITH INTRALUMINAL DEVICE, PERCUTANEOUS APPROACH: ICD-10-PCS | Performed by: SURGERY

## 2023-08-10 PROCEDURE — 6360000004 HC RX CONTRAST MEDICATION: Performed by: SURGERY

## 2023-08-10 PROCEDURE — 6370000000 HC RX 637 (ALT 250 FOR IP): Performed by: SURGERY

## 2023-08-10 PROCEDURE — 2580000003 HC RX 258: Performed by: INTERNAL MEDICINE

## 2023-08-10 PROCEDURE — 85610 PROTHROMBIN TIME: CPT

## 2023-08-10 PROCEDURE — 36415 COLL VENOUS BLD VENIPUNCTURE: CPT

## 2023-08-10 PROCEDURE — 6370000000 HC RX 637 (ALT 250 FOR IP): Performed by: HOSPITALIST

## 2023-08-10 PROCEDURE — 2580000003 HC RX 258: Performed by: NURSE PRACTITIONER

## 2023-08-10 PROCEDURE — 6360000002 HC RX W HCPCS: Performed by: INTERNAL MEDICINE

## 2023-08-10 PROCEDURE — P9016 RBC LEUKOCYTES REDUCED: HCPCS

## 2023-08-10 PROCEDURE — 87040 BLOOD CULTURE FOR BACTERIA: CPT

## 2023-08-10 PROCEDURE — 6360000002 HC RX W HCPCS: Performed by: PHYSICIAN ASSISTANT

## 2023-08-10 PROCEDURE — 6370000000 HC RX 637 (ALT 250 FOR IP): Performed by: NURSE PRACTITIONER

## 2023-08-10 PROCEDURE — 36903 INTRO CATH DIALYSIS CIRCUIT: CPT

## 2023-08-10 PROCEDURE — 99152 MOD SED SAME PHYS/QHP 5/>YRS: CPT

## 2023-08-10 PROCEDURE — 85018 HEMOGLOBIN: CPT

## 2023-08-10 PROCEDURE — 85025 COMPLETE CBC W/AUTO DIFF WBC: CPT

## 2023-08-10 RX ORDER — FENTANYL CITRATE 50 UG/ML
INJECTION, SOLUTION INTRAMUSCULAR; INTRAVENOUS PRN
Status: COMPLETED | OUTPATIENT
Start: 2023-08-10 | End: 2023-08-10

## 2023-08-10 RX ORDER — SODIUM CHLORIDE 9 MG/ML
INJECTION, SOLUTION INTRAVENOUS PRN
Status: DISCONTINUED | OUTPATIENT
Start: 2023-08-10 | End: 2023-08-17 | Stop reason: HOSPADM

## 2023-08-10 RX ORDER — SODIUM CHLORIDE 0.9 % (FLUSH) 0.9 %
5-40 SYRINGE (ML) INJECTION PRN
Status: DISCONTINUED | OUTPATIENT
Start: 2023-08-10 | End: 2023-08-17 | Stop reason: HOSPADM

## 2023-08-10 RX ORDER — LIDOCAINE HYDROCHLORIDE 20 MG/ML
INJECTION, SOLUTION EPIDURAL; INFILTRATION; INTRACAUDAL; PERINEURAL PRN
Status: COMPLETED | OUTPATIENT
Start: 2023-08-10 | End: 2023-08-10

## 2023-08-10 RX ORDER — HEPARIN SODIUM 5000 [USP'U]/ML
INJECTION, SOLUTION INTRAVENOUS; SUBCUTANEOUS PRN
Status: COMPLETED | OUTPATIENT
Start: 2023-08-10 | End: 2023-08-10

## 2023-08-10 RX ORDER — DEXTROSE MONOHYDRATE 100 MG/ML
INJECTION, SOLUTION INTRAVENOUS CONTINUOUS PRN
Status: DISCONTINUED | OUTPATIENT
Start: 2023-08-10 | End: 2023-08-17 | Stop reason: HOSPADM

## 2023-08-10 RX ORDER — MIDAZOLAM HYDROCHLORIDE 2 MG/2ML
INJECTION, SOLUTION INTRAMUSCULAR; INTRAVENOUS PRN
Status: COMPLETED | OUTPATIENT
Start: 2023-08-10 | End: 2023-08-10

## 2023-08-10 RX ORDER — SODIUM CHLORIDE 0.9 % (FLUSH) 0.9 %
5-40 SYRINGE (ML) INJECTION EVERY 12 HOURS SCHEDULED
Status: DISCONTINUED | OUTPATIENT
Start: 2023-08-10 | End: 2023-08-17 | Stop reason: HOSPADM

## 2023-08-10 RX ORDER — HEPARIN SODIUM 5000 [USP'U]/ML
5000 INJECTION, SOLUTION INTRAVENOUS; SUBCUTANEOUS EVERY 8 HOURS SCHEDULED
Status: DISCONTINUED | OUTPATIENT
Start: 2023-08-10 | End: 2023-08-17 | Stop reason: HOSPADM

## 2023-08-10 RX ORDER — HYDROCODONE BITARTRATE AND ACETAMINOPHEN 5; 325 MG/1; MG/1
1 TABLET ORAL EVERY 6 HOURS PRN
Status: DISCONTINUED | OUTPATIENT
Start: 2023-08-10 | End: 2023-08-17 | Stop reason: HOSPADM

## 2023-08-10 RX ADMIN — CINACALCET HYDROCHLORIDE 30 MG: 30 TABLET, FILM COATED ORAL at 09:15

## 2023-08-10 RX ADMIN — CALCIUM 1250 MG: 500 TABLET ORAL at 09:20

## 2023-08-10 RX ADMIN — HEPARIN SODIUM 5000 UNITS: 5000 INJECTION INTRAVENOUS; SUBCUTANEOUS at 12:48

## 2023-08-10 RX ADMIN — HYDROCODONE BITARTRATE AND ACETAMINOPHEN 1 TABLET: 5; 325 TABLET ORAL at 21:48

## 2023-08-10 RX ADMIN — MIDAZOLAM HYDROCHLORIDE 1 MG: 1 INJECTION, SOLUTION INTRAMUSCULAR; INTRAVENOUS at 12:45

## 2023-08-10 RX ADMIN — MIDAZOLAM HYDROCHLORIDE 1 MG: 1 INJECTION, SOLUTION INTRAMUSCULAR; INTRAVENOUS at 13:09

## 2023-08-10 RX ADMIN — SODIUM ZIRCONIUM CYCLOSILICATE 10 G: 10 POWDER, FOR SUSPENSION ORAL at 09:15

## 2023-08-10 RX ADMIN — SEVELAMER CARBONATE 1600 MG: 800 TABLET, FILM COATED ORAL at 17:48

## 2023-08-10 RX ADMIN — CEFEPIME 2000 MG: 2 INJECTION, POWDER, FOR SOLUTION INTRAVENOUS at 14:47

## 2023-08-10 RX ADMIN — IOPAMIDOL 50 ML: 612 INJECTION, SOLUTION INTRAVENOUS at 14:00

## 2023-08-10 RX ADMIN — VANCOMYCIN HYDROCHLORIDE 2500 MG: 10 INJECTION, POWDER, LYOPHILIZED, FOR SOLUTION INTRAVENOUS at 15:51

## 2023-08-10 RX ADMIN — HEPARIN SODIUM 1000 UNITS: 5000 INJECTION INTRAVENOUS; SUBCUTANEOUS at 12:45

## 2023-08-10 RX ADMIN — CLONIDINE HYDROCHLORIDE 0.1 MG: 0.1 TABLET ORAL at 20:41

## 2023-08-10 RX ADMIN — HYDROCODONE BITARTRATE AND ACETAMINOPHEN 1 TABLET: 5; 325 TABLET ORAL at 15:35

## 2023-08-10 RX ADMIN — METOPROLOL TARTRATE 25 MG: 25 TABLET, FILM COATED ORAL at 20:41

## 2023-08-10 RX ADMIN — LIDOCAINE HYDROCHLORIDE 5 ML: 20 INJECTION, SOLUTION EPIDURAL; INFILTRATION; INTRACAUDAL; PERINEURAL at 12:47

## 2023-08-10 RX ADMIN — CYANOCOBALAMIN TAB 500 MCG 250 MCG: 500 TAB at 09:20

## 2023-08-10 RX ADMIN — MIDAZOLAM HYDROCHLORIDE 1 MG: 1 INJECTION, SOLUTION INTRAMUSCULAR; INTRAVENOUS at 12:56

## 2023-08-10 RX ADMIN — HYDRALAZINE HYDROCHLORIDE 25 MG: 25 TABLET, FILM COATED ORAL at 06:35

## 2023-08-10 RX ADMIN — NIFEDIPINE 60 MG: 30 TABLET, FILM COATED, EXTENDED RELEASE ORAL at 20:41

## 2023-08-10 RX ADMIN — HEPARIN SODIUM 5000 UNITS: 5000 INJECTION INTRAVENOUS; SUBCUTANEOUS at 20:41

## 2023-08-10 RX ADMIN — SODIUM CHLORIDE, PRESERVATIVE FREE 10 ML: 5 INJECTION INTRAVENOUS at 09:16

## 2023-08-10 RX ADMIN — HEPARIN SODIUM 5000 UNITS: 5000 INJECTION INTRAVENOUS; SUBCUTANEOUS at 17:48

## 2023-08-10 RX ADMIN — CEFAZOLIN 1000 MG: 1 INJECTION, POWDER, FOR SOLUTION INTRAMUSCULAR; INTRAVENOUS at 12:34

## 2023-08-10 RX ADMIN — HYDRALAZINE HYDROCHLORIDE 25 MG: 25 TABLET, FILM COATED ORAL at 20:41

## 2023-08-10 RX ADMIN — FENTANYL CITRATE 25 MCG: 50 INJECTION, SOLUTION INTRAMUSCULAR; INTRAVENOUS at 12:45

## 2023-08-10 RX ADMIN — FENTANYL CITRATE 25 MCG: 50 INJECTION, SOLUTION INTRAMUSCULAR; INTRAVENOUS at 13:09

## 2023-08-10 RX ADMIN — SODIUM CHLORIDE 45 ML: 9 INJECTION, SOLUTION INTRAVENOUS at 14:46

## 2023-08-10 RX ADMIN — FENTANYL CITRATE 25 MCG: 50 INJECTION, SOLUTION INTRAMUSCULAR; INTRAVENOUS at 12:56

## 2023-08-10 ASSESSMENT — PAIN DESCRIPTION - LOCATION
LOCATION: ARM
LOCATION: ARM

## 2023-08-10 ASSESSMENT — ENCOUNTER SYMPTOMS
SHORTNESS OF BREATH: 0
WHEEZING: 0
VOMITING: 0
ABDOMINAL DISTENTION: 0
DIARRHEA: 0
CONSTIPATION: 0
COUGH: 0
NAUSEA: 0
COLOR CHANGE: 0
BLOOD IN STOOL: 0
ABDOMINAL PAIN: 0

## 2023-08-10 ASSESSMENT — PAIN DESCRIPTION - ORIENTATION
ORIENTATION: LEFT;UPPER
ORIENTATION: LEFT

## 2023-08-10 ASSESSMENT — PAIN - FUNCTIONAL ASSESSMENT: PAIN_FUNCTIONAL_ASSESSMENT: ACTIVITIES ARE NOT PREVENTED

## 2023-08-10 ASSESSMENT — PAIN SCALES - GENERAL
PAINLEVEL_OUTOF10: 7

## 2023-08-10 ASSESSMENT — PAIN DESCRIPTION - DESCRIPTORS: DESCRIPTORS: ACHING

## 2023-08-10 ASSESSMENT — PAIN DESCRIPTION - PAIN TYPE: TYPE: SURGICAL PAIN

## 2023-08-10 NOTE — PROGRESS NOTES
Pharmacy Adjustment per [facility] protocol    Donnell Mcclellan is a 47 y.o. male. Pharmacy has adjusted medications per [facility] protocol. Recent Labs     08/09/23  0745 08/10/23  0141   BUN 70* 79*       Recent Labs     08/09/23  0745 08/10/23  0141   CREATININE 17.9* 19.7*       Estimated Creatinine Clearance: 5 mL/min (A) (based on SCr of 19.7 mg/dL (H)).     Height:   Ht Readings from Last 1 Encounters:   08/09/23 5' 8\" (1.727 m)     Weight:  Wt Readings from Last 1 Encounters:   08/09/23 240 lb (108.9 kg)         Plan: Adjust the following medications based on [facility] protocol:           Cefepime to 2000 mg IV once over 30 minutes followed by 1000 mg IV every 24 hours extended infusion over 240 minutes     Electronically signed by Js Maritnez RPH on 8/10/2023 at 9:57 AM

## 2023-08-10 NOTE — PROGRESS NOTES
Nephrology ([facility]) Progress Note      Patient:  Fabiano Newman  YOB: 1970  Date of Service: 8/10/2023  MRN: [id number]   Acct: [de-identified]   Primary Care Physician: SOSA Donis - CNP  Advance Directive: Full Code  Admit Date: 8/9/2023       Hospital Day: 1  Referring Provider: Isidoro Oneil MD    Patient independently seen and examined, Chart, Consults, Notes, Operative notes, Labs, Cardiology, and Radiology studies reviewed as available. Chief complaint: Bleeding dialysis fistula. Subjective:  Fabiano Newman is a 47 y.o. male for whom we were consulted for evaluation and treatment of end-stage renal disease on home hemodialysis. He Is on hemodialysis, self cannulate his AV fistula for dialysis at home. He has history of type II diabetic nephropathy, hypertension, congestive heart failure and anemia of chronic kidney disease. 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 It was profusely bleeding. He presented to the emergency room and vascular surgery has placed a couple of sutures to stop his bleeder. He is now scheduled to have fistulogram and repair of aneurysm. This morning patient is n.p.o. and waiting to have fistulogram and possible surgery. Otherwise he feels fine. There is no active bleeding from the access site.     Allergies:  Banana, Atorvastatin, and Lisinopril    Medicines:  Current Facility-Administered Medications   Medication Dose Route Frequency Provider Last Rate Last Admin    0.9 % sodium chloride infusion   IntraVENous PRN SOSA Toribio        ceFEPIme (MAXIPIME) 2,000 mg in sodium chloride 0.9 % 50 mL IVPB ([id number])  2,000 mg IntraVENous Once SOSA Toribio        Followed by    Yeison De Jesus ON 8/11/2023] cefepime (MAXIPIME) 1,000 mg in sodium chloride 0.9 % 50 mL IVPB ([id number])  1,000 mg IntraVENous Q24H SOSA Toribio        albuterol 79*   CREATININE 17.9* 19.7*   CALCIUM 9.6 9.5     CBC:   Recent Labs     08/09/23  0745 08/09/23  1642 08/10/23  0141 08/10/23  0700   WBC 10.4  --  7.0  --    HGB 8.5* 7.4* 6.8* 6.9*   HCT 25.8* 21.4* 20.0* 20.3*   MCV 93.8  --  90.9  --      --  182  --      LIVER PROFILE:   Recent Labs     08/09/23  0745 08/10/23  0141   AST 12 10   ALT 17 13   BILITOT 0.3 <0.2   ALKPHOS 57 46     PT/INR:   Recent Labs     08/09/23  0745 08/10/23  0141   PROTIME 13.4 14.0   INR 1.05 1.11     APTT:   Recent Labs     08/09/23 0745   APTT 22.7*     BNP:  No results for input(s): BNP in the last 72 hours. Ionized Calcium:No results for input(s): IONCA in the last 72 hours. Magnesium:No results for input(s): MG in the last 72 hours. Phosphorus:  Recent Labs     08/10/23  0141   PHOS 6.5*     HgbA1C: No results for input(s): LABA1C in the last 72 hours. Hepatic:   Recent Labs     08/09/23  0745 08/10/23  0141   ALKPHOS 57 46   ALT 17 13   AST 12 10   PROT 6.9 5.8*   BILITOT 0.3 <0.2   LABALBU 3.9 3.4*     Lactic Acid: No results for input(s): LACTA in the last 72 hours. Troponin: No results for input(s): CKTOTAL, CKMB, TROPONINT in the last 72 hours. ABGs: No results for input(s): PH, PCO2, PO2, HCO3, O2SAT in the last 72 hours. CRP:  No results for input(s): CRP in the last 72 hours. Sed Rate:  No results for input(s): SEDRATE in the last 72 hours. Cultures:   No results for input(s): CULTURE in the last 72 hours. Recent Labs     08/09/23  0745 08/09/23  0755   BC Gram stain Aerobic bottle:  Gram stain Anaerobic bottle:  Gram positive cocci in clusters  resembling Staphylococcus  Culture in progress  Please notify Physician   Bottle volume = 6 ml  *  --    BLOODCULT2  --  Gram stain Anaerobic bottle:  Gram positive cocci in clusters  resembling Staphylococcus  Culture in progress  Please notify Physician  *     No results for input(s): CXSURG in the last 72 hours.     Radiology reports as per the

## 2023-08-10 NOTE — PROGRESS NOTES
Nephrology following and ongoing recommendations appreciated              - Creatinine 19.7 today               - Monitor I's and O's closely              - Monitor labs closely              - Avoid hypotension              - Avoid nephrotoxic agents            Bacteremia   Blood cultures x2 from 8/9/2023-positive for gram-positive cocci in clusters   -Repeat blood cultures x2 on 8/10/2023   -Consult pharmacy to dose vancomycin   -Start cefepime every 8 hours IV.   -Monitor culture   -Monitor sensitivities   -monitor vital signs      Blood loss anemia   -Bleeding resolved   -hbg-6.9   -transfuse 1 unit PRBCs   -CYNDI per nephrology orders   -Monitor CBC daily    Hyperkalemia   -Today's K-5.9   -Lokelma 10 g x1 dose   -Tele   -Repeat labs in the AM    Resolved Problems:    * No resolved hospital problems.  *      Antibiotic: Vanco/Cefepime     DVT Prophylaxis: heparin    GI prophylaxis:  Protonix    Disposition: TBSOSA Wolf, 8/10/2023 3:16 PM

## 2023-08-10 NOTE — OP NOTE
Operative Note        Patient Name: Penny Ugalde   YOB: 1970   MRN: [id number]     Procedure Date: 8/10/2023     Pre Op Diagnosis: End-stage renal disease, fistula function    Post Op Diagnosis: same    Procedure: Left upper extremity fistulogram, venogram.  Balloon angioplasty of left cephalic vein arch using 6 x 150  balloon, 7 x 150 drug-coated balloon, stening using 8 x 10 Viabahn followed by 8 x 7.5 Viabahn stent    Anesthesia: Local with Moderate Sedation    Estimated Blood Loss: Less than 50 ml    Complications: None    Implants: stens 8x 10, 8 x 7.5 Viabahn    Procedure Details: Patient was brought to the angiogram suite and placed in supine position. His left upper extremity was placed on the table table, prepped and draped in sterile fashion. His AV fistula was accessed in retrograde fashion using standard micropuncture technique. 5 [place] sheath was inserted. Fistulogram and left upper extremity and venogram were performed with mentioned below findings. Using stiff guidewire and guide catheter I was able to advance it through the occluded cephalic arch into the SVC and then secured the wire in IVC. Then I exchanged the sheath to the 6 [place] access sheath. Informed balloon angioplasty of the cephalic arch using a 6 x 150  balloon. Then it was treated using 7 x 150 drug coated balloon. It was then stented due to residual irregularity and stenosis using 8 x 10 Viabahn followed by 8 x 7.5 Viabahn. It was then dilated using 7 x 150 . There was good flow and no residual stenosis. Devices were removed and the exit site was sealed using 3-0 nylon stitch. Patient tolerated procedure well and was transferred back to his room in stable condition. Patient might need revision of the pseudoaneurysm that was bleeding. However I do not observe his AV fistula to continue with his home dialysis.     Findings: Hydronephrosis and occlusion segment in the cephalic arch,

## 2023-08-10 NOTE — PROGRESS NOTES
Palliative Care/Spiritual Care: Met with pt to initiate palliative care. Pt says he had a wound close to his fistula. He said it was swollen and then it burst. He says it has infection and infection in his blood. Pt also is ESRD and he says he has dialysis four days a week. Pt has other diagnoses in past medical history. Pt is known to palliative care. Advance Directives:  Pt does not have an AD/LW. He says his NOK knows what he wants. His primary decision maker is [employer]. He is full code and wants CPR and Ventilator. Pt has an ACP note with no changes. SEE ACP NOTE. Pain/other symptoms: Pt says he is not having pain. Social/Spiritual: Pt says he attends [employer] [facility]. Pt/family discussion r/t goals: Pt says he lives at home alone. He has family but he is still able to perform all daily living skills and care for himself. Pt says he drives, mows lawns, and cares for his own needs. Pt's goal is to be treated for the infection, and return home to his previous quality of life. Provided spiritual care with sustaining presence, nurtured hope, and prayer. Pt expressed gratitude for spiritual care.      Electronically signed by Raquel Calderon on 8/10/2023 at 11:25 AM

## 2023-08-11 ENCOUNTER — APPOINTMENT (OUTPATIENT)
Dept: GENERAL RADIOLOGY | Age: 53
End: 2023-08-11
Payer: MEDICARE

## 2023-08-11 ENCOUNTER — OUTSIDE FACILITY SERVICE (OUTPATIENT)
Age: 53
End: 2023-08-11
Payer: MEDICARE

## 2023-08-11 LAB
ALBUMIN SERPL-MCNC: 3.4 G/DL (ref 3.5–5.2)
ALP SERPL-CCNC: 45 U/L (ref 40–130)
ALT SERPL-CCNC: 9 U/L (ref 5–41)
ANION GAP SERPL CALCULATED.3IONS-SCNC: 17 MMOL/L (ref 7–19)
AST SERPL-CCNC: 9 U/L (ref 5–40)
BASOPHILS # BLD: 0 K/UL (ref 0–0.2)
BASOPHILS NFR BLD: 0.3 % (ref 0–1)
BILIRUB SERPL-MCNC: 0.3 MG/DL (ref 0.2–1.2)
BUN SERPL-MCNC: 91 MG/DL (ref 6–20)
CALCIUM SERPL-MCNC: 9.2 MG/DL (ref 8.6–10)
CHLORIDE SERPL-SCNC: 97 MMOL/L (ref 98–111)
CO2 SERPL-SCNC: 23 MMOL/L (ref 22–29)
CREAT SERPL-MCNC: 20.8 MG/DL (ref 0.5–1.2)
EOSINOPHIL # BLD: 0.5 K/UL (ref 0–0.6)
EOSINOPHIL NFR BLD: 6 % (ref 0–5)
ERYTHROCYTE [DISTWIDTH] IN BLOOD BY AUTOMATED COUNT: 13.6 % (ref 11.5–14.5)
GLUCOSE BLD-MCNC: 130 MG/DL (ref 70–99)
GLUCOSE BLD-MCNC: 148 MG/DL (ref 70–99)
GLUCOSE BLD-MCNC: 149 MG/DL (ref 70–99)
GLUCOSE SERPL-MCNC: 84 MG/DL (ref 74–109)
HCT VFR BLD AUTO: 22.4 % (ref 42–52)
HGB BLD-MCNC: 7.4 G/DL (ref 14–18)
IMM GRANULOCYTES # BLD: 0.1 K/UL
LYMPHOCYTES # BLD: 2.1 K/UL (ref 1.1–4.5)
LYMPHOCYTES NFR BLD: 27.7 % (ref 20–40)
MCH RBC QN AUTO: 31 PG (ref 27–31)
MCHC RBC AUTO-ENTMCNC: 33 G/DL (ref 33–37)
MCV RBC AUTO: 93.7 FL (ref 80–94)
MONOCYTES # BLD: 0.9 K/UL (ref 0–0.9)
MONOCYTES NFR BLD: 11.1 % (ref 0–10)
NEUTROPHILS # BLD: 4.2 K/UL (ref 1.5–7.5)
NEUTS SEG NFR BLD: 54 % (ref 50–65)
PERFORMED ON: ABNORMAL
PLATELET # BLD AUTO: 91 K/UL (ref 130–400)
PMV BLD AUTO: 9.9 FL (ref 9.4–12.4)
POTASSIUM SERPL-SCNC: 6.3 MMOL/L (ref 3.5–5)
PROT SERPL-MCNC: 5.9 G/DL (ref 6.6–8.7)
RBC # BLD AUTO: 2.39 M/UL (ref 4.7–6.1)
SODIUM SERPL-SCNC: 137 MMOL/L (ref 136–145)
WBC # BLD AUTO: 7.7 K/UL (ref 4.8–10.8)

## 2023-08-11 PROCEDURE — 85025 COMPLETE CBC W/AUTO DIFF WBC: CPT

## 2023-08-11 PROCEDURE — 8010000000 HC HEMODIALYSIS ACUTE INPT

## 2023-08-11 PROCEDURE — 2580000003 HC RX 258: Performed by: SURGERY

## 2023-08-11 PROCEDURE — 87040 BLOOD CULTURE FOR BACTERIA: CPT

## 2023-08-11 PROCEDURE — 80053 COMPREHEN METABOLIC PANEL: CPT

## 2023-08-11 PROCEDURE — 94760 N-INVAS EAR/PLS OXIMETRY 1: CPT

## 2023-08-11 PROCEDURE — 36415 COLL VENOUS BLD VENIPUNCTURE: CPT

## 2023-08-11 PROCEDURE — 6360000002 HC RX W HCPCS: Performed by: SURGERY

## 2023-08-11 PROCEDURE — 1210000000 HC MED SURG R&B

## 2023-08-11 PROCEDURE — 71046 X-RAY EXAM CHEST 2 VIEWS: CPT

## 2023-08-11 PROCEDURE — 6370000000 HC RX 637 (ALT 250 FOR IP): Performed by: SURGERY

## 2023-08-11 PROCEDURE — 82962 GLUCOSE BLOOD TEST: CPT

## 2023-08-11 PROCEDURE — 6370000000 HC RX 637 (ALT 250 FOR IP): Performed by: HOSPITALIST

## 2023-08-11 RX ADMIN — METOPROLOL TARTRATE 25 MG: 25 TABLET, FILM COATED ORAL at 08:02

## 2023-08-11 RX ADMIN — HYDROCODONE BITARTRATE AND ACETAMINOPHEN 1 TABLET: 5; 325 TABLET ORAL at 21:00

## 2023-08-11 RX ADMIN — CLONIDINE HYDROCHLORIDE 0.1 MG: 0.1 TABLET ORAL at 08:02

## 2023-08-11 RX ADMIN — CALCIUM 1250 MG: 500 TABLET ORAL at 08:02

## 2023-08-11 RX ADMIN — HYDROCODONE BITARTRATE AND ACETAMINOPHEN 1 TABLET: 5; 325 TABLET ORAL at 06:39

## 2023-08-11 RX ADMIN — Medication 1000 MG: at 19:28

## 2023-08-11 RX ADMIN — SEVELAMER CARBONATE 1600 MG: 800 TABLET, FILM COATED ORAL at 19:26

## 2023-08-11 RX ADMIN — EPOETIN ALFA-EPBX 10000 UNITS: 10000 INJECTION, SOLUTION INTRAVENOUS; SUBCUTANEOUS at 08:02

## 2023-08-11 RX ADMIN — Medication 10 ML: at 08:03

## 2023-08-11 RX ADMIN — HEPARIN SODIUM 5000 UNITS: 5000 INJECTION INTRAVENOUS; SUBCUTANEOUS at 06:34

## 2023-08-11 RX ADMIN — NIFEDIPINE 60 MG: 30 TABLET, FILM COATED, EXTENDED RELEASE ORAL at 08:02

## 2023-08-11 RX ADMIN — CYANOCOBALAMIN TAB 500 MCG 250 MCG: 500 TAB at 08:03

## 2023-08-11 RX ADMIN — NIFEDIPINE 60 MG: 30 TABLET, FILM COATED, EXTENDED RELEASE ORAL at 21:00

## 2023-08-11 RX ADMIN — HYDRALAZINE HYDROCHLORIDE 25 MG: 25 TABLET, FILM COATED ORAL at 21:01

## 2023-08-11 RX ADMIN — HYDRALAZINE HYDROCHLORIDE 25 MG: 25 TABLET, FILM COATED ORAL at 06:34

## 2023-08-11 RX ADMIN — SODIUM ZIRCONIUM CYCLOSILICATE 10 G: 10 POWDER, FOR SUSPENSION ORAL at 08:02

## 2023-08-11 RX ADMIN — HEPARIN SODIUM 5000 UNITS: 5000 INJECTION INTRAVENOUS; SUBCUTANEOUS at 14:55

## 2023-08-11 RX ADMIN — METOPROLOL TARTRATE 25 MG: 25 TABLET, FILM COATED ORAL at 21:00

## 2023-08-11 RX ADMIN — SEVELAMER CARBONATE 1600 MG: 800 TABLET, FILM COATED ORAL at 08:02

## 2023-08-11 RX ADMIN — SODIUM CHLORIDE: 9 INJECTION, SOLUTION INTRAVENOUS at 14:59

## 2023-08-11 RX ADMIN — CEFEPIME 1000 MG: 1 INJECTION, POWDER, FOR SOLUTION INTRAMUSCULAR; INTRAVENOUS at 15:00

## 2023-08-11 RX ADMIN — CLONIDINE HYDROCHLORIDE 0.1 MG: 0.1 TABLET ORAL at 21:00

## 2023-08-11 RX ADMIN — CINACALCET HYDROCHLORIDE 30 MG: 30 TABLET, FILM COATED ORAL at 08:03

## 2023-08-11 RX ADMIN — HYDROCODONE BITARTRATE AND ACETAMINOPHEN 1 TABLET: 5; 325 TABLET ORAL at 14:54

## 2023-08-11 ASSESSMENT — ENCOUNTER SYMPTOMS
COUGH: 0
DIARRHEA: 0
COLOR CHANGE: 0
SHORTNESS OF BREATH: 0
CONSTIPATION: 0
ABDOMINAL DISTENTION: 0
ABDOMINAL PAIN: 0
NAUSEA: 0
VOMITING: 0
BLOOD IN STOOL: 0
WHEEZING: 0

## 2023-08-11 ASSESSMENT — PAIN - FUNCTIONAL ASSESSMENT: PAIN_FUNCTIONAL_ASSESSMENT: PREVENTS OR INTERFERES SOME ACTIVE ACTIVITIES AND ADLS

## 2023-08-11 ASSESSMENT — PAIN SCALES - GENERAL
PAINLEVEL_OUTOF10: 7
PAINLEVEL_OUTOF10: 6
PAINLEVEL_OUTOF10: 7

## 2023-08-11 ASSESSMENT — PAIN DESCRIPTION - ORIENTATION: ORIENTATION: LEFT

## 2023-08-11 ASSESSMENT — PAIN DESCRIPTION - PAIN TYPE: TYPE: SURGICAL PAIN

## 2023-08-11 ASSESSMENT — PAIN DESCRIPTION - FREQUENCY: FREQUENCY: CONTINUOUS

## 2023-08-11 ASSESSMENT — PAIN DESCRIPTION - LOCATION: LOCATION: ARM;HEAD

## 2023-08-11 ASSESSMENT — PAIN DESCRIPTION - ONSET: ONSET: ON-GOING

## 2023-08-11 ASSESSMENT — PAIN DESCRIPTION - DESCRIPTORS: DESCRIPTORS: BURNING;ACHING

## 2023-08-11 NOTE — CONSULTS
INFECTIOUS DISEASES CONSULT NOTE    Patient:  Alfred Basurto 47 y.o. male  ROOM # [unfilled]  YOB: 1970  MRN: [id number]  [facility]:  [id number]  Admit date: 8/9/2023   Admitting Physician: Nadine Steele MD  Primary Care Physician: Bella Grimm APRN - CNP  REFERRING PROVIDER: No ref. provider found    Reason for Consultation: Bacteremia    History of Present Illness/Chief Complaint: Pleasant 50-year-old man. He has a history of end-stage renal disease. He is on a home hemodialysis. He dialyzes via a left arm fistula that has been in place for the past 4 years. He woke the other day with blood on the sheets. He had had bleeding from the proximal aspect of the fistula. He had not been having fever or chills. He had not noticed purulent drainage. He was admitted to the hospital.  He was taken to surgery yesterday. He underwent angioplasty of his fistula. He has not had fever. Blood cultures drawn on August 9, 2023-both sets have grown Staphylococcus epidermidis. Blood cultures drawn yesterday-1 of 2 sets of grown gram-positive cocci in clusters. Infectious disease asked to evaluate and offer recommendations.     Current Scheduled Medications:    cefepime  1,000 mg IntraVENous Q24H    vancomycin (VANCOCIN) intermittent dosing (placeholder)   Other RX Placeholder    vancomycin  1,000 mg IntraVENous Once    sodium chloride flush  5-40 mL IntraVENous 2 times per day    heparin (porcine)  5,000 Units SubCUTAneous 3 times per day    sevelamer  1,600 mg Oral TID WC    calcium elemental  1,250 mg Oral Daily    cinacalcet  30 mg Oral Daily    cloNIDine  0.1 mg Oral BID    vitamin B-12  250 mcg Oral Daily    hydrALAZINE  25 mg Oral 3 times per day    NIFEdipine  60 mg Oral BID    metoprolol tartrate  25 mg Oral BID    sodium chloride flush  5-40 mL IntraVENous 2 times per day    insulin lispro  0-4 Units SubCUTAneous TID WC    insulin lispro  0-4 Units SubCUTAneous Nightly    epoetin nicole-epbx  10,000 Units

## 2023-08-11 NOTE — PROGRESS NOTES
[facility]      Patient:  Alfred Basurto  YOB: 1970  Date of Service: 8/11/2023  MRN: [id number]   Acct: [de-identified]   Primary Care Physician: SOSA Camacho CNP  Advance Directive: Full Code  Admit Date: 8/9/2023       Hospital Day: 2  Portions of this note have been copied forward, however, changed to reflect the most current clinical status of this patient. CHIEF COMPLAINT Bleeding from fistula    SUBJECTIVE: He states that he feels fine today. He is shocked that he has bacteremia because he feels fine. He remains afebrile. Vital signs are stable. No issues or events overnight. CUMULATIVE HOSPITAL STAY:  The patient is a 47 y.o. male with a PMH of ESRD with HD dependence- 4 times weekly at home, HTN, asthma, and DM who presented to [address] [place] [address] ED on 8/9/2023 complaining of bleeding from his fistula. He performs HD at home and states that he had a scab that developed in the upper aspect of his left upper arm fistula. This fistula has been present for approximately 4 years with no previous issues or complications. He stated that it became increasingly painful to dialyze in this area and was advised to start on a \"buttonhole low this area for dialysis. Stated that he noticed some increased swelling and slight pain to the upper aspect of his fistula in addition to the scab on the evening prior to his admission. Denies fever or chills. He denies any purulent drainage from this area. He awoke from sleeping in the morning of admission with a sensation of blood running down his arm. He reported a significant amount of blood was lost at home but was able to control it somewhat to be transported to the ED. In ED he was found to have large volume brisk bleeding to an approximate half centimeter area of ulceration to the upper aspect of his left fistula. Bleeding was not controlled with pressure, therefore figure of 8 sutures were placed x 4 per Dr. Cori Brunson in the ED on 8/9/2023.   Was dialysis fistula/Hemorrhage of arteriovenous fistula (HCC)   -POD #1 Fistulogram with angioplasty and stent placement vascular   -Continue to monitor CBC daily    -Hgb -7.4   -Monitor daily labs   -Monitor fistula for signs and symptoms of infection    Active Problems:    Hypertension   -Home Apresoline, clonidine and nifedipine continued   -Monitor vital signs      Diabetes (HCC)  -Continue low dose correctional protocol   -Carb conscious diet   -Hypoglycemic protocol   -Accuchecks       ESRD on hemodialysis [facility])              - Nephrology following and ongoing recommendations appreciated              - Creatinine 20.8 today               - Monitor I's and O's closely              - Monitor labs closely              - Avoid hypotension              - Avoid nephrotoxic agents            Bacteremia   Blood cultures x2 from 8/9/2023-positive for gram-positive cocci in clusters-staph epidermis   -Repeat blood cultures x2 on 8/10/2023-1/2-positive for gram-positive cocci in clusters   -Consult infectious disease for further antibiotic guidance due to bacteremia   -Consult pharmacy to dose vancomycin   -Start cefepime every 8 hours IV.   -Monitor sensitivities   -monitor vital signs      Blood loss anemia   -Bleeding resolved   -hbg-6.9   -transfuse 1 unit PRBCs   -CYNDI per nephrology orders   -Monitor CBC daily    Hyperkalemia   -Today's K-6.3   -Lokelma 10 g x1 dose   -Tele   -Repeat labs in the AM    Resolved Problems:    * No resolved hospital problems.  *      Antibiotic: Vanco/Cefepime     DVT Prophylaxis: heparin-hold due to progressive thrombocytopenia    GI prophylaxis:  Protonix    Disposition: TBD     SOSA Fitzgerald, 8/11/2023 3:31 PM

## 2023-08-11 NOTE — PLAN OF CARE
Problem: Discharge Planning  Goal: Discharge to home or other facility with appropriate resources  Outcome: Progressing  Flowsheets (Taken 8/11/2023 0802)  Discharge to home or other facility with appropriate resources: Identify barriers to discharge with patient and caregiver     Problem: Pain  Goal: Verbalizes/displays adequate comfort level or baseline comfort level  Outcome: Progressing     Problem: Safety - Adult  Goal: Free from fall injury  Outcome: Progressing     Problem: ABCDS Injury Assessment  Goal: Absence of physical injury  Outcome: Progressing  Flowsheets (Taken 8/11/2023 0802)  Absence of Physical Injury: Implement safety measures based on patient assessment

## 2023-08-11 NOTE — PROGRESS NOTES
Nephrology ([facility]) Progress Note      Patient:  Natalia Enciso  YOB: 1970  Date of Service: 8/11/2023  MRN: [id number]   Acct: [de-identified]   Primary Care Physician: SOSA Sales CNP  Advance Directive: Full Code  Admit Date: 8/9/2023       Hospital Day: 2  Referring Provider: Juan Alberto Corona MD    Patient independently seen and examined, Chart, Consults, Notes, Operative notes, Labs, Cardiology, and Radiology studies reviewed as available. Chief complaint: Bleeding dialysis fistula. Subjective:  Natalia Enciso is a 47 y.o. male for whom we were consulted for evaluation and treatment of end-stage renal disease on home hemodialysis. He Is on hemodialysis, self cannulate his AV fistula for dialysis at home. He has history of type II diabetic nephropathy, hypertension, congestive heart failure and anemia of chronic kidney disease. 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 It was profusely bleeding. He presented to the emergency room and vascular surgery has placed a couple of sutures to stop his bleeder. On August 10 patient underwent fistulogram/angioplasty followed by insertion of a stent. However he has positive blood culture as well, growing gram-positive cocci in bloodstream.    This morning he feels well. He denies any fever. He is scheduled to have dialysis today.     Allergies:  Banana, Atorvastatin, and Lisinopril    Medicines:  Current Facility-Administered Medications   Medication Dose Route Frequency Provider Last Rate Last Admin    0.9 % sodium chloride infusion   IntraVENous PRN Rebel Pierce, DO        cefepime (MAXIPIME) 1,000 mg in sodium chloride 0.9 % 50 mL IVPB ([id number])  1,000 mg IntraVENous Q24H Rebel Pierce DO        vancomycin (VANCOCIN) intermittent dosing (placeholder)   Other RX Placeholder [facility] Shelton DO        vancomycin (VANCOCIN) 1000 mg in 10 9   PROT 6.9 5.8* 5.9*   BILITOT 0.3 <0.2 0.3   LABALBU 3.9 3.4* 3.4*     Lactic Acid: No results for input(s): LACTA in the last 72 hours. Troponin: No results for input(s): CKTOTAL, CKMB, TROPONINT in the last 72 hours. ABGs: No results for input(s): PH, PCO2, PO2, HCO3, O2SAT in the last 72 hours. CRP:  No results for input(s): CRP in the last 72 hours. Sed Rate:  No results for input(s): SEDRATE in the last 72 hours. Cultures:   No results for input(s): CULTURE in the last 72 hours. Recent Labs     08/10/23  1010 08/10/23  1023   BC No Growth to date. Any change in status will be called. --    BLOODCULT2  --  Gram stain Aerobic bottle:  Gram positive cocci in clusters  resembling Staphylococcus  Culture in progress  Please notify Physician  Gram stain Anaerobic bottle:  Gram positive cocci in clusters  resembling Staphylococcus  Culture in progress  Please notify Physician  *     No results for input(s): CXSURG in the last 72 hours. Radiology reports as per the Radiologist  Radiology: No results found. Assessment   1. End-stage renal disease on maintenance dialysis. 2.  Type II diabetic nephropathy. 3.  Bleeding dialysis fistula/ruptured aneurysm. 4.  Anemia secondary to blood loss. 5.  History of chronic systolic CHF. 6.  Secondary hyperparathyroidism. 7.  Hyperkalemia  8. Staph aureus bacteremia. 9.  Status post fistulogram, angioplasty and stent    Plan:  1. Routine hemodialysis treatment today. 2.  Start [place]. 3.  Start CYNDI. 4.  Intravenous vancomycin.       Shay Gillette MD  08/11/23  12:18 PM

## 2023-08-12 ENCOUNTER — OUTSIDE FACILITY SERVICE (OUTPATIENT)
Age: 53
End: 2023-08-12
Payer: MEDICARE

## 2023-08-12 LAB
ANION GAP SERPL CALCULATED.3IONS-SCNC: 11 MMOL/L (ref 7–19)
BACTERIA BLD CULT ORG #2: ABNORMAL
BACTERIA BLD CULT ORG #2: ABNORMAL
BACTERIA BLD CULT: ABNORMAL
BACTERIA BLD CULT: ABNORMAL
BASOPHILS # BLD: 0 K/UL (ref 0–0.2)
BASOPHILS NFR BLD: 0.3 % (ref 0–1)
BUN SERPL-MCNC: 43 MG/DL (ref 6–20)
CALCIUM SERPL-MCNC: 9 MG/DL (ref 8.6–10)
CHLORIDE SERPL-SCNC: 95 MMOL/L (ref 98–111)
CO2 SERPL-SCNC: 28 MMOL/L (ref 22–29)
CREAT SERPL-MCNC: 12.7 MG/DL (ref 0.5–1.2)
EOSINOPHIL # BLD: 0.5 K/UL (ref 0–0.6)
EOSINOPHIL NFR BLD: 5.1 % (ref 0–5)
ERYTHROCYTE [DISTWIDTH] IN BLOOD BY AUTOMATED COUNT: 13.7 % (ref 11.5–14.5)
GLUCOSE BLD-MCNC: 116 MG/DL (ref 70–99)
GLUCOSE BLD-MCNC: 119 MG/DL (ref 70–99)
GLUCOSE BLD-MCNC: 124 MG/DL (ref 70–99)
GLUCOSE BLD-MCNC: 164 MG/DL (ref 70–99)
GLUCOSE SERPL-MCNC: 109 MG/DL (ref 74–109)
HCT VFR BLD AUTO: 22.3 % (ref 42–52)
HGB BLD-MCNC: 7.2 G/DL (ref 14–18)
IMM GRANULOCYTES # BLD: 0.1 K/UL
LYMPHOCYTES # BLD: 2 K/UL (ref 1.1–4.5)
LYMPHOCYTES NFR BLD: 21.4 % (ref 20–40)
MCH RBC QN AUTO: 30.9 PG (ref 27–31)
MCHC RBC AUTO-ENTMCNC: 32.3 G/DL (ref 33–37)
MCV RBC AUTO: 95.7 FL (ref 80–94)
MONOCYTES # BLD: 1.2 K/UL (ref 0–0.9)
MONOCYTES NFR BLD: 12.9 % (ref 0–10)
NEUTROPHILS # BLD: 5.5 K/UL (ref 1.5–7.5)
NEUTS SEG NFR BLD: 59.8 % (ref 50–65)
ORGANISM: ABNORMAL
PERFORMED ON: ABNORMAL
PLATELET # BLD AUTO: 171 K/UL (ref 130–400)
PMV BLD AUTO: 9.6 FL (ref 9.4–12.4)
POTASSIUM SERPL-SCNC: 4.8 MMOL/L (ref 3.5–5)
RBC # BLD AUTO: 2.33 M/UL (ref 4.7–6.1)
SODIUM SERPL-SCNC: 134 MMOL/L (ref 136–145)
WBC # BLD AUTO: 9.2 K/UL (ref 4.8–10.8)

## 2023-08-12 PROCEDURE — 85025 COMPLETE CBC W/AUTO DIFF WBC: CPT

## 2023-08-12 PROCEDURE — 6370000000 HC RX 637 (ALT 250 FOR IP): Performed by: SURGERY

## 2023-08-12 PROCEDURE — 1210000000 HC MED SURG R&B

## 2023-08-12 PROCEDURE — 80048 BASIC METABOLIC PNL TOTAL CA: CPT

## 2023-08-12 PROCEDURE — 94760 N-INVAS EAR/PLS OXIMETRY 1: CPT

## 2023-08-12 PROCEDURE — 36415 COLL VENOUS BLD VENIPUNCTURE: CPT

## 2023-08-12 PROCEDURE — 82962 GLUCOSE BLOOD TEST: CPT

## 2023-08-12 PROCEDURE — 99232 SBSQ HOSP IP/OBS MODERATE 35: CPT | Performed by: SURGERY

## 2023-08-12 PROCEDURE — 2580000003 HC RX 258: Performed by: SURGERY

## 2023-08-12 PROCEDURE — 6360000002 HC RX W HCPCS: Performed by: SURGERY

## 2023-08-12 RX ADMIN — CLONIDINE HYDROCHLORIDE 0.1 MG: 0.1 TABLET ORAL at 08:44

## 2023-08-12 RX ADMIN — NIFEDIPINE 60 MG: 30 TABLET, FILM COATED, EXTENDED RELEASE ORAL at 22:30

## 2023-08-12 RX ADMIN — CYANOCOBALAMIN TAB 500 MCG 250 MCG: 500 TAB at 08:44

## 2023-08-12 RX ADMIN — HYDRALAZINE HYDROCHLORIDE 25 MG: 25 TABLET, FILM COATED ORAL at 22:30

## 2023-08-12 RX ADMIN — SEVELAMER CARBONATE 1600 MG: 800 TABLET, FILM COATED ORAL at 08:45

## 2023-08-12 RX ADMIN — METOPROLOL TARTRATE 25 MG: 25 TABLET, FILM COATED ORAL at 08:44

## 2023-08-12 RX ADMIN — HEPARIN SODIUM 5000 UNITS: 5000 INJECTION INTRAVENOUS; SUBCUTANEOUS at 22:30

## 2023-08-12 RX ADMIN — HYDRALAZINE HYDROCHLORIDE 25 MG: 25 TABLET, FILM COATED ORAL at 15:09

## 2023-08-12 RX ADMIN — CINACALCET HYDROCHLORIDE 30 MG: 30 TABLET, FILM COATED ORAL at 08:44

## 2023-08-12 RX ADMIN — SEVELAMER CARBONATE 1600 MG: 800 TABLET, FILM COATED ORAL at 17:50

## 2023-08-12 RX ADMIN — METOPROLOL TARTRATE 25 MG: 25 TABLET, FILM COATED ORAL at 22:29

## 2023-08-12 RX ADMIN — Medication 10 ML: at 22:30

## 2023-08-12 RX ADMIN — Medication 10 ML: at 08:45

## 2023-08-12 RX ADMIN — SEVELAMER CARBONATE 1600 MG: 800 TABLET, FILM COATED ORAL at 12:18

## 2023-08-12 RX ADMIN — NIFEDIPINE 60 MG: 30 TABLET, FILM COATED, EXTENDED RELEASE ORAL at 08:44

## 2023-08-12 RX ADMIN — CALCIUM 1250 MG: 500 TABLET ORAL at 08:44

## 2023-08-12 RX ADMIN — HYDRALAZINE HYDROCHLORIDE 25 MG: 25 TABLET, FILM COATED ORAL at 06:44

## 2023-08-12 ASSESSMENT — ENCOUNTER SYMPTOMS
ABDOMINAL PAIN: 0
SHORTNESS OF BREATH: 0
DIARRHEA: 0
BLOOD IN STOOL: 0
CONSTIPATION: 0
VOMITING: 0
WHEEZING: 0
NAUSEA: 0
COLOR CHANGE: 0
ABDOMINAL DISTENTION: 0
COUGH: 0

## 2023-08-12 ASSESSMENT — PAIN SCALES - GENERAL: PAINLEVEL_OUTOF10: 0

## 2023-08-12 NOTE — PROGRESS NOTES
Nephrology ([facility]) Progress Note      Patient:  Lizzy Wing  YOB: 1970  Date of Service: 8/12/2023  MRN: [id number]   Acct: [de-identified]   Primary Care Physician: Curley Eisenmenger, APRN - CNP  Advance Directive: Full Code  Admit Date: 8/9/2023       Hospital Day: 3  Referring Provider: Virgene Bumpers, MD    Patient independently seen and examined, Chart, Consults, Notes, Operative notes, Labs, Cardiology, and Radiology studies reviewed as available. Chief complaint: Bleeding dialysis fistula. Subjective:  Lizzy Wing is a 47 y.o. male for whom we were consulted for evaluation and treatment of end-stage renal disease on home hemodialysis. He Is on hemodialysis, self cannulate his AV fistula for dialysis at home. He has history of type II diabetic nephropathy, hypertension, congestive heart failure and anemia of chronic kidney disease. 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 It was profusely bleeding. He presented to the emergency room and vascular surgery has placed a couple of sutures to stop his bleeder. On August 10 patient underwent fistulogram/angioplasty followed by insertion of a stent. However he has positive blood culture as well, growing gram-positive cocci in bloodstream/staph epidermidis    This morning he feels well.   He denies any weakness of fever, surveillance blood cultures remain negative    Allergies:  Banana, Atorvastatin, and Lisinopril    Medicines:  Current Facility-Administered Medications   Medication Dose Route Frequency Provider Last Rate Last Admin    0.9 % sodium chloride infusion   IntraVENous PRN Mandie Conch, DO        vancomycin [facility]) intermittent dosing (placeholder)   Other RX Placeholder Mandie Damianh, DO        glucose chewable tablet 16 g  4 tablet Oral PRN Mandie Conch, DO        dextrose bolus 10% 125 mL  125 mL IntraVENous PRN

## 2023-08-12 NOTE — PLAN OF CARE
Problem: Discharge Planning  Goal: Discharge to home or other facility with appropriate resources  Outcome: Progressing  Flowsheets (Taken 8/12/2023 0781)  Discharge to home or other facility with appropriate resources: Identify barriers to discharge with patient and caregiver     Problem: Pain  Goal: Verbalizes/displays adequate comfort level or baseline comfort level  Outcome: Progressing     Problem: Safety - Adult  Goal: Free from fall injury  Outcome: Progressing     Problem: ABCDS Injury Assessment  Goal: Absence of physical injury  Outcome: Progressing

## 2023-08-12 NOTE — PROGRESS NOTES
[facility]      Patient:  Fabiano Newman  YOB: 1970  Date of Service: 8/12/2023  MRN: [id number]   Acct: [de-identified]   Primary Care Physician: SOSA Donis CNP  Advance Directive: Full Code  Admit Date: 8/9/2023       Hospital Day: 3  Portions of this note have been copied forward, however, changed to reflect the most current clinical status of this patient. CHIEF COMPLAINT Bleeding from fistula    SUBJECTIVE: He is feeling well. He has no complaints. He does voice his understanding that he needs to stay due to bacteremia. No issues or events overnight. CUMULATIVE HOSPITAL STAY:  The patient is a 47 y.o. male with a PMH of ESRD with HD dependence= 4 times weekly at home, HTN, asthma, and DM who presented to [address] [place] [address] ED on 8/9/2023 complaining of bleeding from his fistula. He performs HD at home and states that he had a scab that developed in the upper aspect of his left upper arm fistula. This fistula has been present for approximately 4 years with no previous issues or complications. He stated that it became increasingly painful to dialyze in this area and was advised to start on a \"buttonhole low this area for dialysis. Stated that he noticed some increased swelling and slight pain to the upper aspect of his fistula in addition to the scab on the evening prior to his admission. Denies fever or chills. He denies any purulent drainage from this area. He awoke from sleeping in the morning of admission with a sensation of blood running down his arm. He reported a significant amount of blood was lost at home but was able to control it somewhat to be transported to the ED. In ED he was found to have large volume brisk bleeding to an approximate half centimeter area of ulceration to the upper aspect of his left fistula. Bleeding was not controlled with pressure, therefore figure of 8 sutures were placed x 4 per Dr. Jelly Leary in the ED on 8/9/2023.   Was then controlled without

## 2023-08-13 LAB
ALBUMIN SERPL-MCNC: 3.1 G/DL (ref 3.5–5.2)
ALP SERPL-CCNC: 45 U/L (ref 40–130)
ALT SERPL-CCNC: <5 U/L (ref 5–41)
ANION GAP SERPL CALCULATED.3IONS-SCNC: 16 MMOL/L (ref 7–19)
AST SERPL-CCNC: 11 U/L (ref 5–40)
BACTERIA BLD CULT ORG #2: ABNORMAL
BACTERIA BLD CULT ORG #2: ABNORMAL
BACTERIA BLD CULT: ABNORMAL
BACTERIA BLD CULT: ABNORMAL
BASOPHILS # BLD: 0 K/UL (ref 0–0.2)
BASOPHILS NFR BLD: 0.2 % (ref 0–1)
BILIRUB SERPL-MCNC: <0.2 MG/DL (ref 0.2–1.2)
BUN SERPL-MCNC: 53 MG/DL (ref 6–20)
CALCIUM SERPL-MCNC: 9.2 MG/DL (ref 8.6–10)
CHLORIDE SERPL-SCNC: 94 MMOL/L (ref 98–111)
CO2 SERPL-SCNC: 25 MMOL/L (ref 22–29)
CREAT SERPL-MCNC: 15.5 MG/DL (ref 0.5–1.2)
EOSINOPHIL # BLD: 0.4 K/UL (ref 0–0.6)
EOSINOPHIL NFR BLD: 4.6 % (ref 0–5)
ERYTHROCYTE [DISTWIDTH] IN BLOOD BY AUTOMATED COUNT: 13.6 % (ref 11.5–14.5)
GLUCOSE BLD-MCNC: 125 MG/DL (ref 70–99)
GLUCOSE BLD-MCNC: 131 MG/DL (ref 70–99)
GLUCOSE BLD-MCNC: 179 MG/DL (ref 70–99)
GLUCOSE BLD-MCNC: 98 MG/DL (ref 70–99)
GLUCOSE SERPL-MCNC: 131 MG/DL (ref 74–109)
HCT VFR BLD AUTO: 22.5 % (ref 42–52)
HGB BLD-MCNC: 7.4 G/DL (ref 14–18)
IMM GRANULOCYTES # BLD: 0 K/UL
LYMPHOCYTES # BLD: 2.1 K/UL (ref 1.1–4.5)
LYMPHOCYTES NFR BLD: 23.2 % (ref 20–40)
MCH RBC QN AUTO: 31.6 PG (ref 27–31)
MCHC RBC AUTO-ENTMCNC: 32.9 G/DL (ref 33–37)
MCV RBC AUTO: 96.2 FL (ref 80–94)
MONOCYTES # BLD: 1.1 K/UL (ref 0–0.9)
MONOCYTES NFR BLD: 12.5 % (ref 0–10)
NEUTROPHILS # BLD: 5.4 K/UL (ref 1.5–7.5)
NEUTS SEG NFR BLD: 59.1 % (ref 50–65)
ORGANISM: ABNORMAL
ORGANISM: ABNORMAL
PERFORMED ON: ABNORMAL
PERFORMED ON: NORMAL
PLATELET # BLD AUTO: 166 K/UL (ref 130–400)
PMV BLD AUTO: 9.6 FL (ref 9.4–12.4)
POTASSIUM SERPL-SCNC: 4.9 MMOL/L (ref 3.5–5)
PROT SERPL-MCNC: 6.1 G/DL (ref 6.6–8.7)
RBC # BLD AUTO: 2.34 M/UL (ref 4.7–6.1)
SODIUM SERPL-SCNC: 135 MMOL/L (ref 136–145)
WBC # BLD AUTO: 9.1 K/UL (ref 4.8–10.8)

## 2023-08-13 PROCEDURE — 1210000000 HC MED SURG R&B

## 2023-08-13 PROCEDURE — 2580000003 HC RX 258: Performed by: SURGERY

## 2023-08-13 PROCEDURE — 82962 GLUCOSE BLOOD TEST: CPT

## 2023-08-13 PROCEDURE — 6370000000 HC RX 637 (ALT 250 FOR IP): Performed by: SURGERY

## 2023-08-13 PROCEDURE — 94760 N-INVAS EAR/PLS OXIMETRY 1: CPT

## 2023-08-13 PROCEDURE — 85025 COMPLETE CBC W/AUTO DIFF WBC: CPT

## 2023-08-13 PROCEDURE — 80053 COMPREHEN METABOLIC PANEL: CPT

## 2023-08-13 PROCEDURE — 6360000002 HC RX W HCPCS: Performed by: SURGERY

## 2023-08-13 PROCEDURE — 87040 BLOOD CULTURE FOR BACTERIA: CPT

## 2023-08-13 PROCEDURE — 36415 COLL VENOUS BLD VENIPUNCTURE: CPT

## 2023-08-13 RX ADMIN — METOPROLOL TARTRATE 25 MG: 25 TABLET, FILM COATED ORAL at 21:00

## 2023-08-13 RX ADMIN — HYDRALAZINE HYDROCHLORIDE 25 MG: 25 TABLET, FILM COATED ORAL at 15:29

## 2023-08-13 RX ADMIN — NIFEDIPINE 60 MG: 30 TABLET, FILM COATED, EXTENDED RELEASE ORAL at 21:06

## 2023-08-13 RX ADMIN — Medication 10 ML: at 21:06

## 2023-08-13 RX ADMIN — CALCIUM 1250 MG: 500 TABLET ORAL at 08:28

## 2023-08-13 RX ADMIN — CLONIDINE HYDROCHLORIDE 0.1 MG: 0.1 TABLET ORAL at 08:28

## 2023-08-13 RX ADMIN — HEPARIN SODIUM 5000 UNITS: 5000 INJECTION INTRAVENOUS; SUBCUTANEOUS at 15:29

## 2023-08-13 RX ADMIN — CINACALCET HYDROCHLORIDE 30 MG: 30 TABLET, FILM COATED ORAL at 08:28

## 2023-08-13 RX ADMIN — CLONIDINE HYDROCHLORIDE 0.1 MG: 0.1 TABLET ORAL at 21:00

## 2023-08-13 RX ADMIN — CYANOCOBALAMIN TAB 500 MCG 250 MCG: 500 TAB at 08:28

## 2023-08-13 RX ADMIN — HYDRALAZINE HYDROCHLORIDE 25 MG: 25 TABLET, FILM COATED ORAL at 21:00

## 2023-08-13 RX ADMIN — METOPROLOL TARTRATE 25 MG: 25 TABLET, FILM COATED ORAL at 08:28

## 2023-08-13 RX ADMIN — HEPARIN SODIUM 5000 UNITS: 5000 INJECTION INTRAVENOUS; SUBCUTANEOUS at 06:44

## 2023-08-13 RX ADMIN — HYDRALAZINE HYDROCHLORIDE 25 MG: 25 TABLET, FILM COATED ORAL at 06:44

## 2023-08-13 RX ADMIN — HEPARIN SODIUM 5000 UNITS: 5000 INJECTION INTRAVENOUS; SUBCUTANEOUS at 20:59

## 2023-08-13 RX ADMIN — SEVELAMER CARBONATE 1600 MG: 800 TABLET, FILM COATED ORAL at 12:56

## 2023-08-13 RX ADMIN — SEVELAMER CARBONATE 1600 MG: 800 TABLET, FILM COATED ORAL at 08:27

## 2023-08-13 RX ADMIN — NIFEDIPINE 60 MG: 30 TABLET, FILM COATED, EXTENDED RELEASE ORAL at 08:27

## 2023-08-13 RX ADMIN — Medication 10 ML: at 08:28

## 2023-08-13 ASSESSMENT — ENCOUNTER SYMPTOMS
CONSTIPATION: 0
BLOOD IN STOOL: 0
WHEEZING: 0
ABDOMINAL PAIN: 0
COLOR CHANGE: 0
DIARRHEA: 0
NAUSEA: 0
VOMITING: 0
ABDOMINAL DISTENTION: 0
SHORTNESS OF BREATH: 0
COUGH: 0

## 2023-08-13 ASSESSMENT — PAIN SCALES - GENERAL
PAINLEVEL_OUTOF10: 0
PAINLEVEL_OUTOF10: 0

## 2023-08-13 NOTE — PROGRESS NOTES
[facility] Hospitalists      Patient:  Isha Massey  YOB: 1970  Date of Service: 8/13/2023  MRN: [id number]   Acct: [de-identified]   Primary Care Physician: SOSA Mendosa CNP  Advance Directive: Full Code  Admit Date: 8/9/2023       Hospital Day: 4  Portions of this note have been copied forward, however, changed to reflect the most current clinical status of this patient. CHIEF COMPLAINT Bleeding from fistula    SUBJECTIVE: He is feeling well. He has no complaints. Stable. Remains afebrile. No issues or events overnight. CUMULATIVE HOSPITAL STAY:  The patient is a 47 y.o. male with a PMH of ESRD with HD dependence= 4 times weekly at home, HTN, asthma, and DM who presented to [address] [place] [address] ED on 8/9/2023 complaining of bleeding from his fistula. He performs HD at home and states that he had a scab that developed in the upper aspect of his left upper arm fistula. This fistula has been present for approximately 4 years with no previous issues or complications. He stated that it became increasingly painful to dialyze in this area and was advised to start on a \"buttonhole low this area for dialysis. Stated that he noticed some increased swelling and slight pain to the upper aspect of his fistula in addition to the scab on the evening prior to his admission. Denies fever or chills. He denies any purulent drainage from this area. He awoke from sleeping in the morning of admission with a sensation of blood running down his arm. He reported a significant amount of blood was lost at home but was able to control it somewhat to be transported to the ED. In ED he was found to have large volume brisk bleeding to an approximate half centimeter area of ulceration to the upper aspect of his left fistula. Bleeding was not controlled with pressure, therefore figure of 8 sutures were placed x 4 per Dr. Julio Quiroz in the ED on 8/9/2023. Was then controlled without difficultyH&H 8.5/25.8-WBC 10.4 platelet count 676.

## 2023-08-13 NOTE — PROGRESS NOTES
Infectious Diseases Progress Note    Patient:  Natalia Enciso  YOB: 1970  MRN: [id number]   Admit date: 8/9/2023   Admitting Physician: Juan Alberto Corona MD  Primary Care Physician: SOSA Sales CNP    Chief Complaint/Interval History: He is comfortable. No new problems. He does go home soon. He does home hemodialysis. He would be willing to do center hemodialysis for few weeks if necessary. In/Out    Intake/Output Summary (Last 24 hours) at 8/12/2023 2251  Last data filed at 8/12/2023 1445  Gross per 24 hour   Intake 240 ml   Output --   Net 240 ml     Allergies:    Allergies   Allergen Reactions    Banana Anaphylaxis, Shortness Of Breath and Swelling    Atorvastatin Other (See Comments)     Chest pain, no muslce pains elsewhere    Lisinopril Other (See Comments)     Cough     Current Meds: 0.9 % sodium chloride infusion, PRN  vancomycin (VANCOCIN) intermittent dosing (placeholder), RX Placeholder  glucose chewable tablet 16 g, PRN  dextrose bolus 10% 125 mL, PRN   Or  dextrose bolus 10% 250 mL, PRN  glucagon injection 1 mg, PRN  dextrose 10 % infusion, Continuous PRN  HYDROcodone-acetaminophen (NORCO) 5-325 MG per tablet 1 tablet, Q6H PRN  sodium chloride flush 0.9 % injection 5-40 mL, 2 times per day  sodium chloride flush 0.9 % injection 5-40 mL, PRN  0.9 % sodium chloride infusion, PRN  heparin (porcine) injection 5,000 Units, 3 times per day  albuterol sulfate HFA (PROVENTIL;VENTOLIN;PROAIR) 108 (90 Base) MCG/ACT inhaler 2 puff, Q6H PRN  sevelamer (RENVELA) tablet 1,600 mg, TID WC  calcium elemental (OSCAL) tablet 1,250 mg, Daily  cinacalcet (SENSIPAR) tablet 30 mg, Daily  cloNIDine (CATAPRES) tablet 0.1 mg, BID  vitamin B-12 (CYANOCOBALAMIN) tablet 250 mcg, Daily  hydrALAZINE (APRESOLINE) tablet 25 mg, 3 times per day  NIFEdipine (PROCARDIA XL) extended release tablet 60 mg, BID  metoprolol tartrate (LOPRESSOR) tablet 25 mg, BID  ondansetron (ZOFRAN-ODT) disintegrating tablet 4 mg, Q8H

## 2023-08-13 NOTE — PROGRESS NOTES
Nephrology ([facility]) Progress Note      Patient:  Milagros Mendosa  YOB: 1970  Date of Service: 8/13/2023  MRN: [id number]   Acct: [de-identified]   Primary Care Physician: SOSA Ervin CNP  Advance Directive: Full Code  Admit Date: 8/9/2023       Hospital Day: 4  Referring Provider: Ena Kathleen MD    Patient independently seen and examined, Chart, Consults, Notes, Operative notes, Labs, Cardiology, and Radiology studies reviewed as available. Chief complaint: Bleeding dialysis fistula. Subjective:  Milagros Mendosa is a 47 y.o. male for whom we were consulted for evaluation and treatment of end-stage renal disease on home hemodialysis. He Is on hemodialysis, self cannulate his AV fistula for dialysis at home. He has history of type II diabetic nephropathy, hypertension, congestive heart failure and anemia of chronic kidney disease. 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 It was profusely bleeding. He presented to the emergency room and vascular surgery has placed a couple of sutures to stop his bleeder. On August 10 patient underwent fistulogram/angioplasty followed by insertion of a stent. However he has positive blood culture as well, growing gram-positive cocci in bloodstream/staph epidermidis    This morning patient feels well. He denies any fever or shortness of breath.     Allergies:  Banana, Atorvastatin, and Lisinopril    Medicines:  Current Facility-Administered Medications   Medication Dose Route Frequency Provider Last Rate Last Admin    0.9 % sodium chloride infusion   IntraVENous PRN Terrea Rocher, DO        vancomycin [facility]) intermittent dosing (placeholder)   Other RX Placeholder Terrea Rocher, DO        glucose chewable tablet 16 g  4 tablet Oral PRN Terrea Rocher, DO        dextrose bolus 10% 125 mL  125 mL IntraVENous PRN Terrea Rocher, DO        Or bilaterally  CVS: regular rate and rhythm  Abdominal: soft, nontender, normal bowel sounds  Extremities: no cyanosis or edema/left upper arm primary fistula  Skin: warm and dry without rash      Labs:  BMP:   Recent Labs     08/11/23 0315 08/12/23 0423 08/13/23 0347    134* 135*   K 6.3* 4.8 4.9   CL 97* 95* 94*   CO2 23 28 25   BUN 91* 43* 53*   CREATININE 20.8* 12.7* 15.5*   CALCIUM 9.2 9.0 9.2     CBC:   Recent Labs     08/11/23 0315 08/12/23 0423 08/13/23 0347   WBC 7.7 9.2 9.1   HGB 7.4* 7.2* 7.4*   HCT 22.4* 22.3* 22.5*   MCV 93.7 95.7* 96.2*   PLT 91* 171 166     LIVER PROFILE:   Recent Labs     08/11/23 0315 08/13/23 0347   AST 9 11   ALT 9 <5*   BILITOT 0.3 <0.2   ALKPHOS 45 45     PT/INR:   No results for input(s): PROTIME, INR in the last 72 hours. APTT:   No results for input(s): APTT in the last 72 hours. BNP:  No results for input(s): BNP in the last 72 hours. Ionized Calcium:No results for input(s): IONCA in the last 72 hours. Magnesium:No results for input(s): MG in the last 72 hours. Phosphorus:  No results for input(s): PHOS in the last 72 hours. HgbA1C: No results for input(s): LABA1C in the last 72 hours. Hepatic:   Recent Labs     08/11/23 0315 08/13/23 0347   ALKPHOS 45 45   ALT 9 <5*   AST 9 11   PROT 5.9* 6.1*   BILITOT 0.3 <0.2   LABALBU 3.4* 3.1*     Lactic Acid: No results for input(s): LACTA in the last 72 hours. Troponin: No results for input(s): CKTOTAL, CKMB, TROPONINT in the last 72 hours. ABGs: No results for input(s): PH, PCO2, PO2, HCO3, O2SAT in the last 72 hours. CRP:  No results for input(s): CRP in the last 72 hours. Sed Rate:  No results for input(s): SEDRATE in the last 72 hours. Cultures:   No results for input(s): CULTURE in the last 72 hours.   Recent Labs     08/11/23  1405 08/11/23  1533   BC Bottle volume = >10 ml*  Isolated from Aerobic and Anaerobic bottle  Refer to (blood culture collected @07:45 08/09/23) for

## 2023-08-14 ENCOUNTER — OUTSIDE FACILITY SERVICE (OUTPATIENT)
Age: 53
End: 2023-08-14
Payer: MEDICARE

## 2023-08-14 LAB
ALBUMIN SERPL-MCNC: 3.9 G/DL (ref 3.5–5.2)
ALP SERPL-CCNC: 49 U/L (ref 40–130)
ALT SERPL-CCNC: <5 U/L (ref 5–41)
ANION GAP SERPL CALCULATED.3IONS-SCNC: 16 MMOL/L (ref 7–19)
AST SERPL-CCNC: 11 U/L (ref 5–40)
BASOPHILS # BLD: 0 K/UL (ref 0–0.2)
BASOPHILS NFR BLD: 0.4 % (ref 0–1)
BILIRUB SERPL-MCNC: 0.3 MG/DL (ref 0.2–1.2)
BUN SERPL-MCNC: 61 MG/DL (ref 6–20)
CALCIUM SERPL-MCNC: 9.8 MG/DL (ref 8.6–10)
CHLORIDE SERPL-SCNC: 95 MMOL/L (ref 98–111)
CO2 SERPL-SCNC: 26 MMOL/L (ref 22–29)
CREAT SERPL-MCNC: 18.1 MG/DL (ref 0.5–1.2)
EOSINOPHIL # BLD: 0.6 K/UL (ref 0–0.6)
EOSINOPHIL NFR BLD: 5.1 % (ref 0–5)
ERYTHROCYTE [DISTWIDTH] IN BLOOD BY AUTOMATED COUNT: 13.7 % (ref 11.5–14.5)
GLUCOSE BLD-MCNC: 108 MG/DL (ref 70–99)
GLUCOSE BLD-MCNC: 118 MG/DL (ref 70–99)
GLUCOSE BLD-MCNC: 171 MG/DL (ref 70–99)
GLUCOSE BLD-MCNC: 98 MG/DL (ref 70–99)
GLUCOSE SERPL-MCNC: 103 MG/DL (ref 74–109)
HCT VFR BLD AUTO: 24.7 % (ref 42–52)
HGB BLD-MCNC: 8.4 G/DL (ref 14–18)
IMM GRANULOCYTES # BLD: 0.1 K/UL
LYMPHOCYTES # BLD: 3.8 K/UL (ref 1.1–4.5)
LYMPHOCYTES NFR BLD: 33.4 % (ref 20–40)
MCH RBC QN AUTO: 31.5 PG (ref 27–31)
MCHC RBC AUTO-ENTMCNC: 34 G/DL (ref 33–37)
MCV RBC AUTO: 92.5 FL (ref 80–94)
MONOCYTES # BLD: 1.1 K/UL (ref 0–0.9)
MONOCYTES NFR BLD: 9.9 % (ref 0–10)
NEUTROPHILS # BLD: 5.8 K/UL (ref 1.5–7.5)
NEUTS SEG NFR BLD: 50.5 % (ref 50–65)
PERFORMED ON: ABNORMAL
PERFORMED ON: NORMAL
PLATELET # BLD AUTO: 219 K/UL (ref 130–400)
PMV BLD AUTO: 9.3 FL (ref 9.4–12.4)
POTASSIUM SERPL-SCNC: 5.1 MMOL/L (ref 3.5–5)
PROT SERPL-MCNC: 6.7 G/DL (ref 6.6–8.7)
RBC # BLD AUTO: 2.67 M/UL (ref 4.7–6.1)
SODIUM SERPL-SCNC: 137 MMOL/L (ref 136–145)
VANCOMYCIN SERPL-MCNC: 24.7 UG/ML
WBC # BLD AUTO: 11.4 K/UL (ref 4.8–10.8)

## 2023-08-14 PROCEDURE — 5A1D70Z PERFORMANCE OF URINARY FILTRATION, INTERMITTENT, LESS THAN 6 HOURS PER DAY: ICD-10-PCS | Performed by: SURGERY

## 2023-08-14 PROCEDURE — 80202 ASSAY OF VANCOMYCIN: CPT

## 2023-08-14 PROCEDURE — 6370000000 HC RX 637 (ALT 250 FOR IP): Performed by: SURGERY

## 2023-08-14 PROCEDURE — 6360000002 HC RX W HCPCS: Performed by: SURGERY

## 2023-08-14 PROCEDURE — 2580000003 HC RX 258: Performed by: NURSE PRACTITIONER

## 2023-08-14 PROCEDURE — 94760 N-INVAS EAR/PLS OXIMETRY 1: CPT

## 2023-08-14 PROCEDURE — 87040 BLOOD CULTURE FOR BACTERIA: CPT

## 2023-08-14 PROCEDURE — 6360000002 HC RX W HCPCS: Performed by: NURSE PRACTITIONER

## 2023-08-14 PROCEDURE — 85025 COMPLETE CBC W/AUTO DIFF WBC: CPT

## 2023-08-14 PROCEDURE — 2580000003 HC RX 258: Performed by: SURGERY

## 2023-08-14 PROCEDURE — 99232 SBSQ HOSP IP/OBS MODERATE 35: CPT | Performed by: SURGERY

## 2023-08-14 PROCEDURE — 80053 COMPREHEN METABOLIC PANEL: CPT

## 2023-08-14 PROCEDURE — 82962 GLUCOSE BLOOD TEST: CPT

## 2023-08-14 PROCEDURE — 8010000000 HC HEMODIALYSIS ACUTE INPT

## 2023-08-14 PROCEDURE — 36415 COLL VENOUS BLD VENIPUNCTURE: CPT

## 2023-08-14 PROCEDURE — 1210000000 HC MED SURG R&B

## 2023-08-14 RX ADMIN — METOPROLOL TARTRATE 25 MG: 25 TABLET, FILM COATED ORAL at 13:55

## 2023-08-14 RX ADMIN — CALCIUM 1250 MG: 500 TABLET ORAL at 13:12

## 2023-08-14 RX ADMIN — EPOETIN ALFA-EPBX 10000 UNITS: 10000 INJECTION, SOLUTION INTRAVENOUS; SUBCUTANEOUS at 13:13

## 2023-08-14 RX ADMIN — CLONIDINE HYDROCHLORIDE 0.1 MG: 0.1 TABLET ORAL at 21:23

## 2023-08-14 RX ADMIN — HYDRALAZINE HYDROCHLORIDE 25 MG: 25 TABLET, FILM COATED ORAL at 21:23

## 2023-08-14 RX ADMIN — CLONIDINE HYDROCHLORIDE 0.1 MG: 0.1 TABLET ORAL at 13:12

## 2023-08-14 RX ADMIN — METOPROLOL TARTRATE 25 MG: 25 TABLET, FILM COATED ORAL at 21:23

## 2023-08-14 RX ADMIN — VANCOMYCIN HYDROCHLORIDE 750 MG: 750 INJECTION, POWDER, LYOPHILIZED, FOR SOLUTION INTRAVENOUS at 13:19

## 2023-08-14 RX ADMIN — CYANOCOBALAMIN TAB 500 MCG 250 MCG: 500 TAB at 13:56

## 2023-08-14 RX ADMIN — SEVELAMER CARBONATE 1600 MG: 800 TABLET, FILM COATED ORAL at 17:18

## 2023-08-14 RX ADMIN — CINACALCET HYDROCHLORIDE 30 MG: 30 TABLET, FILM COATED ORAL at 13:58

## 2023-08-14 RX ADMIN — NIFEDIPINE 60 MG: 30 TABLET, FILM COATED, EXTENDED RELEASE ORAL at 21:23

## 2023-08-14 RX ADMIN — HEPARIN SODIUM 5000 UNITS: 5000 INJECTION INTRAVENOUS; SUBCUTANEOUS at 06:48

## 2023-08-14 RX ADMIN — NIFEDIPINE 60 MG: 30 TABLET, FILM COATED, EXTENDED RELEASE ORAL at 13:13

## 2023-08-14 RX ADMIN — HYDRALAZINE HYDROCHLORIDE 25 MG: 25 TABLET, FILM COATED ORAL at 13:12

## 2023-08-14 RX ADMIN — SEVELAMER CARBONATE 1600 MG: 800 TABLET, FILM COATED ORAL at 13:12

## 2023-08-14 RX ADMIN — HYDRALAZINE HYDROCHLORIDE 25 MG: 25 TABLET, FILM COATED ORAL at 06:48

## 2023-08-14 RX ADMIN — Medication 10 ML: at 21:23

## 2023-08-14 ASSESSMENT — ENCOUNTER SYMPTOMS
COLOR CHANGE: 0
DIARRHEA: 0
NAUSEA: 0
ABDOMINAL DISTENTION: 0
BLOOD IN STOOL: 0
ABDOMINAL PAIN: 0
SHORTNESS OF BREATH: 0
CONSTIPATION: 0
WHEEZING: 0
COUGH: 0
VOMITING: 0

## 2023-08-14 NOTE — PROGRESS NOTES
This  visited with pt to provide spiritual care and support. Pt says he hasn't felt unwell but says he is waiting on the surgery from vascular. He says perhaps today or the next few days. This  provided spiritual care with sustaining presence, nurtured hope, and prayer. Pt expressed gratitude for spiritual care.      Electronically signed by Louisa Suarez on 8/14/2023 at 2:25 PM

## 2023-08-14 NOTE — PLAN OF CARE
Problem: Discharge Planning  Goal: Discharge to home or other facility with appropriate resources  Outcome: Progressing     Problem: Pain  Goal: Verbalizes/displays adequate comfort level or baseline comfort level  Outcome: Progressing     Problem: Safety - Adult  Goal: Free from fall injury  Outcome: Progressing     Problem: ABCDS Injury Assessment  Goal: Absence of physical injury  Outcome: Progressing     Problem: Metabolic/Fluid and Electrolytes - Adult  Goal: Hemodynamic stability and optimal renal function maintained  Outcome: Progressing  Goal: Glucose maintained within prescribed range  Outcome: Progressing

## 2023-08-14 NOTE — PROGRESS NOTES
[facility]      Patient:  Diane Williamson  YOB: 1970  Date of Service: 8/14/2023  MRN: [id number]   Acct: [de-identified]   Primary Care Physician: SOSA Erickson CNP  Advance Directive: Full Code  Admit Date: 8/9/2023       Hospital Day: 5  Portions of this note have been copied forward, however, changed to reflect the most current clinical status of this patient. CHIEF COMPLAINT Bleeding from fistula    SUBJECTIVE: He is feeling well. Tolerating dialysis. He has no complaints. Stable. Remains afebrile. No issues or events overnight. CUMULATIVE HOSPITAL STAY:  The patient is a 47 y.o. male with a PMH of ESRD with HD dependence= 4 times weekly at home, HTN, asthma, and DM who presented to [address] [place] [address] ED on 8/9/2023 complaining of bleeding from his fistula. He performs HD at home and states that he had a scab that developed in the upper aspect of his left upper arm fistula. This fistula has been present for approximately 4 years with no previous issues or complications. He stated that it became increasingly painful to dialyze in this area and was advised to start on a \"buttonhole low this area for dialysis. Stated that he noticed some increased swelling and slight pain to the upper aspect of his fistula in addition to the scab on the evening prior to his admission. Denies fever or chills. He denies any purulent drainage from this area. He awoke from sleeping in the morning of admission with a sensation of blood running down his arm. He reported a significant amount of blood was lost at home but was able to control it somewhat to be transported to the ED. In ED he was found to have large volume brisk bleeding to an approximate half centimeter area of ulceration to the upper aspect of his left fistula. Bleeding was not controlled with pressure, therefore figure of 8 sutures were placed x 4 per Dr. Chuy Davis in the ED on 8/9/2023.   Was then controlled without difficultyH&H 8.5/25.8-WBC PANEL BY BREANNE     benzylpenicillin Resistant >=0.5 mcg/mL BACTERIAL SUSCEPTIBILITY PANEL BY BREANNE     clindamycin Resistant >=8 mcg/mL BACTERIAL SUSCEPTIBILITY PANEL BY BREANNE     doxycycline Sensitive   BACTERIAL SUSCEPTIBILITY PANEL BY BREANNE     erythromycin Resistant >=8 mcg/mL BACTERIAL SUSCEPTIBILITY PANEL BY BREANNE     inducible clindamycin resistance  Neg mcg/mL BACTERIAL SUSCEPTIBILITY PANEL BY BREANNE     levofloxacin Sensitive <=0.12 mcg/mL BACTERIAL SUSCEPTIBILITY PANEL BY BREANNE     oxacillin Resistant >=4 mcg/mL BACTERIAL SUSCEPTIBILITY PANEL BY BREANNE     tetracycline Sensitive <=1 mcg/mL BACTERIAL SUSCEPTIBILITY PANEL BY BREANNE     vancomycin Sensitive 1 mcg/mL BACTERIAL SUSCEPTIBILITY PANEL BY BREANNE                Culture, Blood 2  Bottle volume = 8 ml Abnormal     Organism Staphylococcus epidermidis Abnormal     Culture, Blood 2 Isolated from Aerobic and Anaerobic bottle   Refer to (blood culture collected @07:45 08/09/23) for sensitivity   results   No further workup    Resulting Agency [id number] [facility]           Narrative  Performed by: [id number] [facility]  ORDER#: [id number]                          ORDERED BY: Winter Harris   SOURCE: Blood RAC                          COLLECTED:  08/11/23 15:33   ANTIBIOTICS AT CINTHIA.:                      RECEIVED :  08/11/23 15:43            8/13/23-BC-NGTD      Assessment/Plan   Principal Problem:    Complication of AV dialysis fistula/Hemorrhage of arteriovenous fistula ([address])   -Vascular following-s/p fistulogram with stent and balloon angioplasty on 8/10/2023   -Bacteremia felt to be secondary to fistula due to staph epidermis and buttonhole technique, therefore, we will undergo fistula revision and/or PermCath insertion on Monday or Wednesday of this week per vascular surgery   -Continue to monitor CBC daily post transfusion as needed   -H&H  8.4/24.7   -Monitor daily labs    Active Problems:    Hypertension   -Home Apresoline, clonidine and nifedipine

## 2023-08-14 NOTE — PROGRESS NOTES
Nutrition Assessment     Type and Reason for Visit: Initial, RD Nutrition Re-Screen/LOS    Nutrition Recommendations/Plan:   Once medically appropriate, restart previous renal diet. Malnutrition Assessment:  Malnutrition Status: No malnutrition    Nutrition Assessment:  LOS day 5. Pt adequately nourished AEB stable wt hx and adequate po intake since admission. Pt currently NPO for planned procedure. Will monitor for diet advance to previous renal diet.     Nutrition Related Findings:   glucose 103-179 Wound Type: None    Current Nutrition Therapies:    Diet NPO Exceptions are: Sips of Water with Meds    Anthropometric Measures:  Height: 5' 8\" (172.7 cm)  Current Body Wt: 240 lb (108.9 kg)   BMI: 36.5    Nutrition Diagnosis:   No nutrition diagnosis at this time     Nutrition Interventions:   Food and/or Nutrient Delivery: Continue NPO, Start Oral Diet (once medically appropriate)  Nutrition Education/Counseling: No recommendation at this time  Coordination of Nutrition Care: Continue to monitor while inpatient       Goals:     Goals: Initiate PO diet       Nutrition Monitoring and Evaluation:   Behavioral-Environmental Outcomes: None Identified  Food/Nutrient Intake Outcomes: Food and Nutrient Intake, Diet Advancement/Tolerance  Physical Signs/Symptoms Outcomes: Biochemical Data, Nutrition Focused Physical Findings, Weight    Discharge Planning:    No discharge needs at this time     Sunil Olson, MS, RD, LD  Contact: [phone]

## 2023-08-14 NOTE — PROGRESS NOTES
Infectious Diseases Progress Note    Patient:  Mary Jo Moyer  YOB: 1970  MRN: [id number]   Admit date: 8/9/2023   Admitting Physician: Carson Farmer MD  Primary Care Physician: Alvaro Kim, APRN - CNP    Chief Complaint/Interval History: Patient was seen on rounds this morning. He is feeling fine. He is without fever. Explained per renal notes he cannot get vancomycin postdialysis on home hemodialysis. He will need to do center hemodialysis until he completes treatment. He would prefer center dialysis to be on Monday, Wednesday, and Friday as his son is new to football and has games on Saturdays. I started reviewing charts and initiated his note early this morning. I saw him around 8 AM.  Received notification late in the day that he had received half his dose of vancomycin and then lost peripheral IV access. Nurse indicated that they had tried to start another IV but were unsuccessful. In reviewing his chart his vancomycin level this morning was 24.7 and he received about half a dose of vancomycin today. He should have adequate vancomycin on board until postdialysis dosing on Wednesday. In/Out    Intake/Output Summary (Last 24 hours) at 8/14/2023 0550  Last data filed at 8/13/2023 1921  Gross per 24 hour   Intake 720 ml   Output --   Net 720 ml     Allergies:    Allergies   Allergen Reactions    Banana Anaphylaxis, Shortness Of Breath and Swelling    Atorvastatin Other (See Comments)     Chest pain, no muslce pains elsewhere    Lisinopril Other (See Comments)     Cough     Current Meds: 0.9 % sodium chloride infusion, PRN  vancomycin (VANCOCIN) intermittent dosing (placeholder), RX Placeholder  glucose chewable tablet 16 g, PRN  dextrose bolus 10% 125 mL, PRN   Or  dextrose bolus 10% 250 mL, PRN  glucagon injection 1 mg, PRN  dextrose 10 % infusion, Continuous PRN  HYDROcodone-acetaminophen (NORCO) 5-325 MG per tablet 1 tablet, Q6H PRN  sodium chloride flush 0.9 % injection 5-40 mL, 2

## 2023-08-15 ENCOUNTER — ANESTHESIA EVENT (OUTPATIENT)
Dept: OPERATING ROOM | Age: 53
End: 2023-08-15
Payer: MEDICARE

## 2023-08-15 ENCOUNTER — OUTSIDE FACILITY SERVICE (OUTPATIENT)
Age: 53
End: 2023-08-15
Payer: MEDICARE

## 2023-08-15 LAB
ABO/RH: NORMAL
ANION GAP SERPL CALCULATED.3IONS-SCNC: 11 MMOL/L (ref 7–19)
ANTIBODY SCREEN: NORMAL
BASOPHILS # BLD: 0 K/UL (ref 0–0.2)
BASOPHILS NFR BLD: 0.4 % (ref 0–1)
BUN SERPL-MCNC: 35 MG/DL (ref 6–20)
CALCIUM SERPL-MCNC: 9.6 MG/DL (ref 8.6–10)
CHLORIDE SERPL-SCNC: 98 MMOL/L (ref 98–111)
CO2 SERPL-SCNC: 30 MMOL/L (ref 22–29)
CREAT SERPL-MCNC: 12 MG/DL (ref 0.5–1.2)
EOSINOPHIL # BLD: 0.4 K/UL (ref 0–0.6)
EOSINOPHIL NFR BLD: 5.3 % (ref 0–5)
ERYTHROCYTE [DISTWIDTH] IN BLOOD BY AUTOMATED COUNT: 13.8 % (ref 11.5–14.5)
GLUCOSE BLD-MCNC: 117 MG/DL (ref 70–99)
GLUCOSE BLD-MCNC: 119 MG/DL (ref 70–99)
GLUCOSE BLD-MCNC: 135 MG/DL (ref 70–99)
GLUCOSE BLD-MCNC: 145 MG/DL (ref 70–99)
GLUCOSE SERPL-MCNC: 103 MG/DL (ref 74–109)
HCT VFR BLD AUTO: 22.5 % (ref 42–52)
HGB BLD-MCNC: 7.4 G/DL (ref 14–18)
IMM GRANULOCYTES # BLD: 0 K/UL
LYMPHOCYTES # BLD: 2.2 K/UL (ref 1.1–4.5)
LYMPHOCYTES NFR BLD: 27.3 % (ref 20–40)
MCH RBC QN AUTO: 30.8 PG (ref 27–31)
MCHC RBC AUTO-ENTMCNC: 32.9 G/DL (ref 33–37)
MCV RBC AUTO: 93.8 FL (ref 80–94)
MONOCYTES # BLD: 1.1 K/UL (ref 0–0.9)
MONOCYTES NFR BLD: 13.7 % (ref 0–10)
NEUTROPHILS # BLD: 4.2 K/UL (ref 1.5–7.5)
NEUTS SEG NFR BLD: 52.8 % (ref 50–65)
PERFORMED ON: ABNORMAL
PLATELET # BLD AUTO: 198 K/UL (ref 130–400)
PMV BLD AUTO: 9.5 FL (ref 9.4–12.4)
POTASSIUM SERPL-SCNC: 4.9 MMOL/L (ref 3.5–5)
RBC # BLD AUTO: 2.4 M/UL (ref 4.7–6.1)
SODIUM SERPL-SCNC: 139 MMOL/L (ref 136–145)
WBC # BLD AUTO: 8 K/UL (ref 4.8–10.8)

## 2023-08-15 PROCEDURE — 80048 BASIC METABOLIC PNL TOTAL CA: CPT

## 2023-08-15 PROCEDURE — 6370000000 HC RX 637 (ALT 250 FOR IP): Performed by: SURGERY

## 2023-08-15 PROCEDURE — 85025 COMPLETE CBC W/AUTO DIFF WBC: CPT

## 2023-08-15 PROCEDURE — 86850 RBC ANTIBODY SCREEN: CPT

## 2023-08-15 PROCEDURE — 82962 GLUCOSE BLOOD TEST: CPT

## 2023-08-15 PROCEDURE — 94760 N-INVAS EAR/PLS OXIMETRY 1: CPT

## 2023-08-15 PROCEDURE — 36415 COLL VENOUS BLD VENIPUNCTURE: CPT

## 2023-08-15 PROCEDURE — 1210000000 HC MED SURG R&B

## 2023-08-15 PROCEDURE — 2580000003 HC RX 258: Performed by: SURGERY

## 2023-08-15 RX ADMIN — CINACALCET HYDROCHLORIDE 30 MG: 30 TABLET, FILM COATED ORAL at 08:41

## 2023-08-15 RX ADMIN — CYANOCOBALAMIN TAB 500 MCG 250 MCG: 500 TAB at 08:40

## 2023-08-15 RX ADMIN — NIFEDIPINE 60 MG: 30 TABLET, FILM COATED, EXTENDED RELEASE ORAL at 08:40

## 2023-08-15 RX ADMIN — SEVELAMER CARBONATE 1600 MG: 800 TABLET, FILM COATED ORAL at 16:37

## 2023-08-15 RX ADMIN — CHLORHEXIDINE GLUCONATE: 4 LIQUID TOPICAL at 07:25

## 2023-08-15 RX ADMIN — HYDRALAZINE HYDROCHLORIDE 25 MG: 25 TABLET, FILM COATED ORAL at 22:07

## 2023-08-15 RX ADMIN — HYDRALAZINE HYDROCHLORIDE 25 MG: 25 TABLET, FILM COATED ORAL at 06:19

## 2023-08-15 RX ADMIN — SEVELAMER CARBONATE 1600 MG: 800 TABLET, FILM COATED ORAL at 12:00

## 2023-08-15 RX ADMIN — CALCIUM 1250 MG: 500 TABLET ORAL at 08:40

## 2023-08-15 RX ADMIN — NIFEDIPINE 60 MG: 30 TABLET, FILM COATED, EXTENDED RELEASE ORAL at 20:09

## 2023-08-15 RX ADMIN — Medication 10 ML: at 20:09

## 2023-08-15 RX ADMIN — CLONIDINE HYDROCHLORIDE 0.1 MG: 0.1 TABLET ORAL at 08:40

## 2023-08-15 RX ADMIN — SEVELAMER CARBONATE 1600 MG: 800 TABLET, FILM COATED ORAL at 08:39

## 2023-08-15 RX ADMIN — METOPROLOL TARTRATE 25 MG: 25 TABLET, FILM COATED ORAL at 08:40

## 2023-08-15 RX ADMIN — CLONIDINE HYDROCHLORIDE 0.1 MG: 0.1 TABLET ORAL at 20:09

## 2023-08-15 RX ADMIN — HYDRALAZINE HYDROCHLORIDE 25 MG: 25 TABLET, FILM COATED ORAL at 15:21

## 2023-08-15 RX ADMIN — METOPROLOL TARTRATE 25 MG: 25 TABLET, FILM COATED ORAL at 20:09

## 2023-08-15 ASSESSMENT — ENCOUNTER SYMPTOMS
BLOOD IN STOOL: 0
WHEEZING: 0
SHORTNESS OF BREATH: 0
ABDOMINAL PAIN: 0
COUGH: 0
CONSTIPATION: 0
DIARRHEA: 0
COLOR CHANGE: 0
NAUSEA: 0
VOMITING: 0
ABDOMINAL DISTENTION: 0

## 2023-08-15 ASSESSMENT — LIFESTYLE VARIABLES: SMOKING_STATUS: 0

## 2023-08-15 NOTE — PROGRESS NOTES
[facility]      Patient:  Arlette Lee  YOB: 1970  Date of Service: 8/15/2023  MRN: [id number]   Acct: [de-identified]   Primary Care Physician: SOSA Schroeder Junior, CNP  Advance Directive: Full Code  Admit Date: 8/9/2023       Hospital Day: 6  Portions of this note have been copied forward, however, changed to reflect the most current clinical status of this patient. CHIEF COMPLAINT Bleeding from fistula    SUBJECTIVE: He is feeling well. He has no complaints. Stable. Remains afebrile. No issues or events overnight. Pending revision and permcath replacement. CUMULATIVE HOSPITAL STAY:  The patient is a 47 y.o. male with a PMH of ESRD with HD dependence= 4 times weekly at home, HTN, asthma, and DM who presented to [address] [place] [address] ED on 8/9/2023 complaining of bleeding from his fistula. He performs HD at home and states that he had a scab that developed in the upper aspect of his left upper arm fistula. This fistula has been present for approximately 4 years with no previous issues or complications. He stated that it became increasingly painful to dialyze in this area and was advised to start on a \"buttonhole low this area for dialysis. Stated that he noticed some increased swelling and slight pain to the upper aspect of his fistula in addition to the scab on the evening prior to his admission. Denies fever or chills. He denies any purulent drainage from this area. He awoke from sleeping in the morning of admission with a sensation of blood running down his arm. He reported a significant amount of blood was lost at home but was able to control it somewhat to be transported to the ED. In ED he was found to have large volume brisk bleeding to an approximate half centimeter area of ulceration to the upper aspect of his left fistula. Bleeding was not controlled with pressure, therefore figure of 8 sutures were placed x 4 per Dr. Essence Mason in the ED on 8/9/2023.   Was then controlled without

## 2023-08-15 NOTE — CARE COORDINATION
PT will be temporarily switching to in-center HD while getting Abx.   Pt set up at [address] [place] [address]/[facility] at 10:45am.  Electronically signed by Isaias Del Cid RN on 8/15/2023 at 3:13 PM

## 2023-08-15 NOTE — PROGRESS NOTES
Nephrology ([facility]) Progress Note    Patient:  Isha Massey  YOB: 1970  Date of Service: 8/15/2023  MRN: [id number]   Acct: [de-identified]   Primary Care Physician: SOSA Mendosa CNP  Advance Directive: Full Code  Admit Date: 8/9/2023       Hospital Day: 6  Referring Provider: Carlos Schuster MD    Patient independently seen and examined, Chart, Consults, Notes, Operative notes, Labs, Cardiology, and Radiology studies reviewed as available. Subjective:  Isha Massey is a 47 y.o. male for whom we were consulted for evaluation and treatment of end-stage renal disease on home hemodialysis. He Is on hemodialysis, self cannulate his AV fistula for dialysis at home. He has a history of type II diabetic nephropathy, hypertension, congestive heart failure and anemia of chronic kidney disease. Patient has an aneurysm at proximal end of his fistula and has spontaneous rupture of aneurysm this morning. It was profusely bleeding. He presented to the emergency room and vascular surgery has placed a couple of sutures to stop his bleeder. On August 10, patient underwent fistulogram/angioplasty followed by insertion of a stent. However he has positive blood culture as well, growing gram-positive cocci in bloodstream/staph epidermidis. Today, no overnight events. Denied chest pain, shortness of air at rest, nausea or vomiting. Planned vascular procedure later today.       Allergies:  Banana, Atorvastatin, and Lisinopril    Medicines:  Current Facility-Administered Medications   Medication Dose Route Frequency Provider Last Rate Last Admin    0.9 % sodium chloride infusion   IntraVENous PRN Shane Everts, DO        vancomycin [facility]) intermittent dosing (placeholder)   Other RX Placeholder Shane Everts, DO        glucose chewable tablet 16 g  4 tablet Oral PRN Shane Everts, DO        dextrose bolus 10% 125 mL  125 mL IntraVENous PRN [facility] 07/05/2023. FINDINGS: The lungs are clear. The left upper lobe cavitary lesion seen on the prior CT scan is not well seen with plain radiograph. Heart size is normal.  There is a stent in the left subclavian and axillary region. There is no pleural effusion. There is no pneumothorax. 1.  Left upper lobe cavitary lesion seen on prior CT scan not well seen with plain radiograph. 2.  Stent in the subclavian and axillary region. 3.  Otherwise unremarkable chest radiographs.    ______________________________________ Electronically signed by: Oni Munguia M.D.  Date:     08/11/2023 Time:    18:58        Assessment   End-stage renal disease  Diabetes type 2 with diabetic nephropathy  Dialysis access malfunction  Anemia in chronic kidney disease with acute blood loss  Chronic systolic congestive heart failure  Secondary hyperparathyroidism  Hyperkalemia  Staph epidermidis bacteremia      Plan:  Discussed with patient, nursing  Dialysis Monday Wednesday Friday per routine scheduling  Work-up reviewed today  Monitor labs  Antibiotics per primary service  Reassess in the morning    Daquan Yap MD  08/15/23  11:16 AM

## 2023-08-15 NOTE — PLAN OF CARE
Problem: Discharge Planning  Goal: Discharge to home or other facility with appropriate resources  8/15/2023 1323 by Tatyana Augustin RN  Outcome: Progressing  Flowsheets (Taken 8/15/2023 1321)  Discharge to home or other facility with appropriate resources: Identify barriers to discharge with patient and caregiver  8/15/2023 0305 by Smitha Yusuf  Outcome: Progressing     Problem: Pain  Goal: Verbalizes/displays adequate comfort level or baseline comfort level  8/15/2023 1323 by Tatyana Augustin RN  Outcome: Progressing  8/15/2023 0305 by Smitha Yusuf  Outcome: Progressing     Problem: Safety - Adult  Goal: Free from fall injury  8/15/2023 1323 by Tatyana Augustin RN  Outcome: Progressing  8/15/2023 0305 by Smitha Yusuf  Outcome: Progressing     Problem: ABCDS Injury Assessment  Goal: Absence of physical injury  8/15/2023 1323 by Tatyana Augustin RN  Outcome: Progressing  8/15/2023 0305 by Smitha Yusuf  Outcome: Progressing     Problem: Metabolic/Fluid and Electrolytes - Adult  Goal: Hemodynamic stability and optimal renal function maintained  8/15/2023 1323 by Tatyana Augustin RN  Outcome: Progressing  Flowsheets (Taken 8/15/2023 1321)  Hemodynamic stability and optimal renal function maintained: Monitor labs and assess for signs and symptoms of volume excess or deficit  8/15/2023 0305 by TOMER Nickerson  Outcome: Progressing  Goal: Glucose maintained within prescribed range  8/15/2023 1323 by Tatyana Augustin RN  Outcome: Progressing  Flowsheets (Taken 8/15/2023 1321)  Glucose maintained within prescribed range: Monitor blood glucose as ordered  8/15/2023 0305 by TOMER Nickerson  Outcome: Progressing     Problem: Chronic Conditions and Co-morbidities  Goal: Patient's chronic conditions and co-morbidity symptoms are monitored and maintained or improved  8/15/2023 1323 by Tatyana Augustin RN  Outcome: Progressing  Flowsheets (Taken 8/15/2023 1321)  Care Plan - Patient's Chronic Conditions and Co-Morbidity

## 2023-08-15 NOTE — PROGRESS NOTES
Infectious Diseases Progress Note    Patient:  Fabiano Newman  YOB: 1970  MRN: [id number]   Admit date: 8/9/2023   Admitting Physician: Isidoro Oneil MD  Primary Care Physician: SOSA Donis - CNP    Chief Complaint/Interval History: He feels okay. No new symptoms. No fevers or chills. Most recent blood cultures negative. Hemodynamically stable. Discussed with vascular surgery. Vascular surgery would prefer to proceed with permacath instead of temporary dialysis catheter if okay from ID standpoint. With clearing his blood cultures, feel it would be reasonable to proceed with permacath placement instead of temporary dialysis catheter. He could dialyzed via permacath until his fistula is healed/repaired. In/Out  No intake or output data in the 24 hours ending 08/15/23 0606  Allergies:    Allergies   Allergen Reactions    Banana Anaphylaxis, Shortness Of Breath and Swelling    Atorvastatin Other (See Comments)     Chest pain, no muslce pains elsewhere    Lisinopril Other (See Comments)     Cough     Current Meds: chlorhexidine gluconate (ANTISEPTIC SKIN CLEANSER) 4 % solution, Once  0.9 % sodium chloride infusion, PRN  vancomycin (VANCOCIN) intermittent dosing (placeholder), RX Placeholder  glucose chewable tablet 16 g, PRN  dextrose bolus 10% 125 mL, PRN   Or  dextrose bolus 10% 250 mL, PRN  glucagon injection 1 mg, PRN  dextrose 10 % infusion, Continuous PRN  HYDROcodone-acetaminophen (NORCO) 5-325 MG per tablet 1 tablet, Q6H PRN  sodium chloride flush 0.9 % injection 5-40 mL, 2 times per day  sodium chloride flush 0.9 % injection 5-40 mL, PRN  0.9 % sodium chloride infusion, PRN  [Held by provider] heparin (porcine) injection 5,000 Units, 3 times per day  albuterol sulfate HFA (PROVENTIL;VENTOLIN;PROAIR) 108 (90 Base) MCG/ACT inhaler 2 puff, Q6H PRN  sevelamer (RENVELA) tablet 1,600 mg, TID WC  calcium elemental (OSCAL) tablet 1,250 mg, Daily  cinacalcet (SENSIPAR) tablet 30 mg,

## 2023-08-16 ENCOUNTER — ANESTHESIA (OUTPATIENT)
Dept: OPERATING ROOM | Age: 53
End: 2023-08-16
Payer: MEDICARE

## 2023-08-16 ENCOUNTER — APPOINTMENT (OUTPATIENT)
Dept: INTERVENTIONAL RADIOLOGY/VASCULAR | Age: 53
End: 2023-08-16
Payer: MEDICARE

## 2023-08-16 ENCOUNTER — OUTSIDE FACILITY SERVICE (OUTPATIENT)
Age: 53
End: 2023-08-16
Payer: MEDICARE

## 2023-08-16 LAB
ANION GAP SERPL CALCULATED.3IONS-SCNC: 13 MMOL/L (ref 7–19)
BASOPHILS # BLD: 0 K/UL (ref 0–0.2)
BASOPHILS NFR BLD: 0.5 % (ref 0–1)
BUN SERPL-MCNC: 46 MG/DL (ref 6–20)
CALCIUM SERPL-MCNC: 9.7 MG/DL (ref 8.6–10)
CHLORIDE SERPL-SCNC: 97 MMOL/L (ref 98–111)
CO2 SERPL-SCNC: 29 MMOL/L (ref 22–29)
CREAT SERPL-MCNC: 14.9 MG/DL (ref 0.5–1.2)
EOSINOPHIL # BLD: 0.5 K/UL (ref 0–0.6)
EOSINOPHIL NFR BLD: 5.5 % (ref 0–5)
ERYTHROCYTE [DISTWIDTH] IN BLOOD BY AUTOMATED COUNT: 13.9 % (ref 11.5–14.5)
GLUCOSE BLD-MCNC: 103 MG/DL (ref 70–99)
GLUCOSE BLD-MCNC: 149 MG/DL (ref 70–99)
GLUCOSE BLD-MCNC: 168 MG/DL (ref 70–99)
GLUCOSE BLD-MCNC: 97 MG/DL (ref 70–99)
GLUCOSE SERPL-MCNC: 92 MG/DL (ref 74–109)
HCT VFR BLD AUTO: 22.1 % (ref 42–52)
HGB BLD-MCNC: 7.3 G/DL (ref 14–18)
IMM GRANULOCYTES # BLD: 0.1 K/UL
LYMPHOCYTES # BLD: 2.6 K/UL (ref 1.1–4.5)
LYMPHOCYTES NFR BLD: 29.2 % (ref 20–40)
MCH RBC QN AUTO: 31.1 PG (ref 27–31)
MCHC RBC AUTO-ENTMCNC: 33 G/DL (ref 33–37)
MCV RBC AUTO: 94 FL (ref 80–94)
MONOCYTES # BLD: 1.1 K/UL (ref 0–0.9)
MONOCYTES NFR BLD: 12.9 % (ref 0–10)
NEUTROPHILS # BLD: 4.5 K/UL (ref 1.5–7.5)
NEUTS SEG NFR BLD: 51.2 % (ref 50–65)
PERFORMED ON: ABNORMAL
PERFORMED ON: NORMAL
PLATELET # BLD AUTO: 199 K/UL (ref 130–400)
PMV BLD AUTO: 9.2 FL (ref 9.4–12.4)
POTASSIUM SERPL-SCNC: 5.1 MMOL/L (ref 3.5–5)
RBC # BLD AUTO: 2.35 M/UL (ref 4.7–6.1)
SODIUM SERPL-SCNC: 139 MMOL/L (ref 136–145)
VANCOMYCIN SERPL-MCNC: 24.7 UG/ML
WBC # BLD AUTO: 8.8 K/UL (ref 4.8–10.8)

## 2023-08-16 PROCEDURE — 6360000002 HC RX W HCPCS: Performed by: SURGERY

## 2023-08-16 PROCEDURE — 2580000003 HC RX 258: Performed by: ANESTHESIOLOGY

## 2023-08-16 PROCEDURE — C1750 CATH, HEMODIALYSIS,LONG-TERM: HCPCS | Performed by: SURGERY

## 2023-08-16 PROCEDURE — 80048 BASIC METABOLIC PNL TOTAL CA: CPT

## 2023-08-16 PROCEDURE — 94760 N-INVAS EAR/PLS OXIMETRY 1: CPT

## 2023-08-16 PROCEDURE — 80202 ASSAY OF VANCOMYCIN: CPT

## 2023-08-16 PROCEDURE — 3700000001 HC ADD 15 MINUTES (ANESTHESIA): Performed by: SURGERY

## 2023-08-16 PROCEDURE — 8010000000 HC HEMODIALYSIS ACUTE INPT

## 2023-08-16 PROCEDURE — 2580000003 HC RX 258: Performed by: NURSE PRACTITIONER

## 2023-08-16 PROCEDURE — 77001 FLUOROGUIDE FOR VEIN DEVICE: CPT

## 2023-08-16 PROCEDURE — 2500000003 HC RX 250 WO HCPCS: Performed by: SURGERY

## 2023-08-16 PROCEDURE — 3700000000 HC ANESTHESIA ATTENDED CARE: Performed by: SURGERY

## 2023-08-16 PROCEDURE — 87176 TISSUE HOMOGENIZATION CULTR: CPT

## 2023-08-16 PROCEDURE — 85025 COMPLETE CBC W/AUTO DIFF WBC: CPT

## 2023-08-16 PROCEDURE — 36558 INSERT TUNNELED CV CATH: CPT | Performed by: SURGERY

## 2023-08-16 PROCEDURE — 6360000002 HC RX W HCPCS: Performed by: NURSE PRACTITIONER

## 2023-08-16 PROCEDURE — 87070 CULTURE OTHR SPECIMN AEROBIC: CPT

## 2023-08-16 PROCEDURE — 36415 COLL VENOUS BLD VENIPUNCTURE: CPT

## 2023-08-16 PROCEDURE — 76937 US GUIDE VASCULAR ACCESS: CPT

## 2023-08-16 PROCEDURE — 3600000007 HC SURGERY HYBRID BASE: Performed by: SURGERY

## 2023-08-16 PROCEDURE — 82962 GLUCOSE BLOOD TEST: CPT

## 2023-08-16 PROCEDURE — 3600000017 HC SURGERY HYBRID ADDL 15MIN: Performed by: SURGERY

## 2023-08-16 PROCEDURE — C1769 GUIDE WIRE: HCPCS | Performed by: SURGERY

## 2023-08-16 PROCEDURE — 6370000000 HC RX 637 (ALT 250 FOR IP): Performed by: SURGERY

## 2023-08-16 PROCEDURE — 2500000003 HC RX 250 WO HCPCS: Performed by: NURSE ANESTHETIST, CERTIFIED REGISTERED

## 2023-08-16 PROCEDURE — 7100000001 HC PACU RECOVERY - ADDTL 15 MIN: Performed by: SURGERY

## 2023-08-16 PROCEDURE — 77001 FLUOROGUIDE FOR VEIN DEVICE: CPT | Performed by: SURGERY

## 2023-08-16 PROCEDURE — 87205 SMEAR GRAM STAIN: CPT

## 2023-08-16 PROCEDURE — 7100000000 HC PACU RECOVERY - FIRST 15 MIN: Performed by: SURGERY

## 2023-08-16 PROCEDURE — 2580000003 HC RX 258: Performed by: SURGERY

## 2023-08-16 PROCEDURE — 2720000010 HC SURG SUPPLY STERILE: Performed by: SURGERY

## 2023-08-16 PROCEDURE — A4217 STERILE WATER/SALINE, 500 ML: HCPCS | Performed by: SURGERY

## 2023-08-16 PROCEDURE — 0JH63XZ INSERTION OF TUNNELED VASCULAR ACCESS DEVICE INTO CHEST SUBCUTANEOUS TISSUE AND FASCIA, PERCUTANEOUS APPROACH: ICD-10-PCS | Performed by: SURGERY

## 2023-08-16 PROCEDURE — 1210000000 HC MED SURG R&B

## 2023-08-16 PROCEDURE — 36832 AV FISTULA REVISION OPEN: CPT | Performed by: SURGERY

## 2023-08-16 PROCEDURE — C1887 CATHETER, GUIDING: HCPCS | Performed by: SURGERY

## 2023-08-16 PROCEDURE — 6360000002 HC RX W HCPCS: Performed by: NURSE ANESTHETIST, CERTIFIED REGISTERED

## 2023-08-16 PROCEDURE — 2709999900 HC NON-CHARGEABLE SUPPLY: Performed by: SURGERY

## 2023-08-16 PROCEDURE — 87075 CULTR BACTERIA EXCEPT BLOOD: CPT

## 2023-08-16 PROCEDURE — C1893 INTRO/SHEATH, FIXED,NON-PEEL: HCPCS | Performed by: SURGERY

## 2023-08-16 RX ORDER — FENTANYL CITRATE 50 UG/ML
INJECTION, SOLUTION INTRAMUSCULAR; INTRAVENOUS PRN
Status: DISCONTINUED | OUTPATIENT
Start: 2023-08-16 | End: 2023-08-16 | Stop reason: SDUPTHER

## 2023-08-16 RX ORDER — HYDROCODONE BITARTRATE AND ACETAMINOPHEN 5; 325 MG/1; MG/1
1 TABLET ORAL EVERY 6 HOURS PRN
Qty: 12 TABLET | Refills: 0 | Status: SHIPPED | OUTPATIENT
Start: 2023-08-16 | End: 2023-08-19

## 2023-08-16 RX ORDER — PROCHLORPERAZINE EDISYLATE 5 MG/ML
5 INJECTION INTRAMUSCULAR; INTRAVENOUS
Status: DISCONTINUED | OUTPATIENT
Start: 2023-08-16 | End: 2023-08-16 | Stop reason: HOSPADM

## 2023-08-16 RX ORDER — DEXAMETHASONE SODIUM PHOSPHATE 10 MG/ML
INJECTION, SOLUTION INTRAMUSCULAR; INTRAVENOUS PRN
Status: DISCONTINUED | OUTPATIENT
Start: 2023-08-16 | End: 2023-08-16 | Stop reason: SDUPTHER

## 2023-08-16 RX ORDER — ONDANSETRON 2 MG/ML
4 INJECTION INTRAMUSCULAR; INTRAVENOUS
Status: DISCONTINUED | OUTPATIENT
Start: 2023-08-16 | End: 2023-08-16 | Stop reason: HOSPADM

## 2023-08-16 RX ORDER — SODIUM CHLORIDE 9 MG/ML
INJECTION, SOLUTION INTRAVENOUS PRN
Status: DISCONTINUED | OUTPATIENT
Start: 2023-08-16 | End: 2023-08-16 | Stop reason: HOSPADM

## 2023-08-16 RX ORDER — FENTANYL CITRATE 50 UG/ML
25 INJECTION, SOLUTION INTRAMUSCULAR; INTRAVENOUS EVERY 5 MIN PRN
Status: DISCONTINUED | OUTPATIENT
Start: 2023-08-16 | End: 2023-08-16 | Stop reason: HOSPADM

## 2023-08-16 RX ORDER — SODIUM CHLORIDE 0.9 % (FLUSH) 0.9 %
5-40 SYRINGE (ML) INJECTION PRN
Status: DISCONTINUED | OUTPATIENT
Start: 2023-08-16 | End: 2023-08-16 | Stop reason: HOSPADM

## 2023-08-16 RX ORDER — EPHEDRINE SULFATE 50 MG/ML
INJECTION, SOLUTION INTRAVENOUS PRN
Status: DISCONTINUED | OUTPATIENT
Start: 2023-08-16 | End: 2023-08-16 | Stop reason: SDUPTHER

## 2023-08-16 RX ORDER — HEPARIN SODIUM 1000 [USP'U]/ML
INJECTION, SOLUTION INTRAVENOUS; SUBCUTANEOUS PRN
Status: DISCONTINUED | OUTPATIENT
Start: 2023-08-16 | End: 2023-08-16 | Stop reason: SDUPTHER

## 2023-08-16 RX ORDER — SODIUM CHLORIDE 450 MG/100ML
INJECTION, SOLUTION INTRAVENOUS CONTINUOUS
Status: DISCONTINUED | OUTPATIENT
Start: 2023-08-16 | End: 2023-08-16 | Stop reason: HOSPADM

## 2023-08-16 RX ORDER — SODIUM CHLORIDE, SODIUM LACTATE, POTASSIUM CHLORIDE, CALCIUM CHLORIDE 600; 310; 30; 20 MG/100ML; MG/100ML; MG/100ML; MG/100ML
INJECTION, SOLUTION INTRAVENOUS CONTINUOUS PRN
Status: DISCONTINUED | OUTPATIENT
Start: 2023-08-16 | End: 2023-08-16

## 2023-08-16 RX ORDER — FENTANYL CITRATE 50 UG/ML
50 INJECTION, SOLUTION INTRAMUSCULAR; INTRAVENOUS EVERY 5 MIN PRN
Status: DISCONTINUED | OUTPATIENT
Start: 2023-08-16 | End: 2023-08-16 | Stop reason: HOSPADM

## 2023-08-16 RX ORDER — HYDRALAZINE HYDROCHLORIDE 25 MG/1
25 TABLET, FILM COATED ORAL EVERY 8 HOURS SCHEDULED
Qty: 90 TABLET | Refills: 0 | Status: SHIPPED | OUTPATIENT
Start: 2023-08-16

## 2023-08-16 RX ORDER — LIDOCAINE HYDROCHLORIDE 10 MG/ML
INJECTION, SOLUTION EPIDURAL; INFILTRATION; INTRACAUDAL; PERINEURAL PRN
Status: DISCONTINUED | OUTPATIENT
Start: 2023-08-16 | End: 2023-08-16 | Stop reason: SDUPTHER

## 2023-08-16 RX ORDER — SODIUM CHLORIDE 0.9 % (FLUSH) 0.9 %
5-40 SYRINGE (ML) INJECTION PRN
Status: DISCONTINUED | OUTPATIENT
Start: 2023-08-16 | End: 2023-08-17 | Stop reason: HOSPADM

## 2023-08-16 RX ORDER — PROPOFOL 10 MG/ML
INJECTION, EMULSION INTRAVENOUS PRN
Status: DISCONTINUED | OUTPATIENT
Start: 2023-08-16 | End: 2023-08-16 | Stop reason: SDUPTHER

## 2023-08-16 RX ORDER — ONDANSETRON 2 MG/ML
INJECTION INTRAMUSCULAR; INTRAVENOUS PRN
Status: DISCONTINUED | OUTPATIENT
Start: 2023-08-16 | End: 2023-08-16 | Stop reason: SDUPTHER

## 2023-08-16 RX ORDER — SODIUM CHLORIDE 0.9 % (FLUSH) 0.9 %
5-40 SYRINGE (ML) INJECTION EVERY 12 HOURS SCHEDULED
Status: DISCONTINUED | OUTPATIENT
Start: 2023-08-16 | End: 2023-08-16 | Stop reason: HOSPADM

## 2023-08-16 RX ORDER — SODIUM CHLORIDE 0.9 % (FLUSH) 0.9 %
5-40 SYRINGE (ML) INJECTION EVERY 12 HOURS SCHEDULED
Status: DISCONTINUED | OUTPATIENT
Start: 2023-08-16 | End: 2023-08-17 | Stop reason: HOSPADM

## 2023-08-16 RX ORDER — LIDOCAINE HYDROCHLORIDE AND EPINEPHRINE BITARTRATE 20; .01 MG/ML; MG/ML
INJECTION, SOLUTION SUBCUTANEOUS PRN
Status: DISCONTINUED | OUTPATIENT
Start: 2023-08-16 | End: 2023-08-16 | Stop reason: ALTCHOICE

## 2023-08-16 RX ORDER — SODIUM CHLORIDE 9 MG/ML
INJECTION, SOLUTION INTRAVENOUS PRN
Status: DISCONTINUED | OUTPATIENT
Start: 2023-08-16 | End: 2023-08-17 | Stop reason: HOSPADM

## 2023-08-16 RX ADMIN — EPHEDRINE SULFATE 15 MG: 50 INJECTION INTRAMUSCULAR; INTRAVENOUS; SUBCUTANEOUS at 15:32

## 2023-08-16 RX ADMIN — METOPROLOL TARTRATE 25 MG: 25 TABLET, FILM COATED ORAL at 08:17

## 2023-08-16 RX ADMIN — PHENYLEPHRINE HYDROCHLORIDE 50 MCG: 10 INJECTION INTRAVENOUS at 15:11

## 2023-08-16 RX ADMIN — NIFEDIPINE 60 MG: 30 TABLET, FILM COATED, EXTENDED RELEASE ORAL at 20:48

## 2023-08-16 RX ADMIN — EPHEDRINE SULFATE 10 MG: 50 INJECTION INTRAMUSCULAR; INTRAVENOUS; SUBCUTANEOUS at 14:34

## 2023-08-16 RX ADMIN — NIFEDIPINE 60 MG: 30 TABLET, FILM COATED, EXTENDED RELEASE ORAL at 08:17

## 2023-08-16 RX ADMIN — FENTANYL CITRATE 25 MCG: 0.05 INJECTION, SOLUTION INTRAMUSCULAR; INTRAVENOUS at 14:53

## 2023-08-16 RX ADMIN — DEXAMETHASONE SODIUM PHOSPHATE 5 MG: 10 INJECTION, SOLUTION INTRAMUSCULAR; INTRAVENOUS at 13:30

## 2023-08-16 RX ADMIN — HYDROCODONE BITARTRATE AND ACETAMINOPHEN 1 TABLET: 5; 325 TABLET ORAL at 21:01

## 2023-08-16 RX ADMIN — EPHEDRINE SULFATE 10 MG: 50 INJECTION INTRAMUSCULAR; INTRAVENOUS; SUBCUTANEOUS at 14:39

## 2023-08-16 RX ADMIN — METOPROLOL TARTRATE 25 MG: 25 TABLET, FILM COATED ORAL at 20:48

## 2023-08-16 RX ADMIN — PROPOFOL 200 MG: 10 INJECTION, EMULSION INTRAVENOUS at 13:05

## 2023-08-16 RX ADMIN — SODIUM CHLORIDE: 4.5 INJECTION, SOLUTION INTRAVENOUS at 12:56

## 2023-08-16 RX ADMIN — SEVELAMER CARBONATE 1600 MG: 800 TABLET, FILM COATED ORAL at 08:16

## 2023-08-16 RX ADMIN — HYDRALAZINE HYDROCHLORIDE 25 MG: 25 TABLET, FILM COATED ORAL at 05:59

## 2023-08-16 RX ADMIN — PROPOFOL 30 MG: 10 INJECTION, EMULSION INTRAVENOUS at 15:15

## 2023-08-16 RX ADMIN — LIDOCAINE HYDROCHLORIDE 50 MG: 10 INJECTION, SOLUTION EPIDURAL; INFILTRATION; INTRACAUDAL; PERINEURAL at 13:05

## 2023-08-16 RX ADMIN — Medication 10 ML: at 20:48

## 2023-08-16 RX ADMIN — EPHEDRINE SULFATE 10 MG: 50 INJECTION INTRAMUSCULAR; INTRAVENOUS; SUBCUTANEOUS at 15:06

## 2023-08-16 RX ADMIN — CLONIDINE HYDROCHLORIDE 0.1 MG: 0.1 TABLET ORAL at 20:48

## 2023-08-16 RX ADMIN — SODIUM CHLORIDE: 4.5 INJECTION, SOLUTION INTRAVENOUS at 16:43

## 2023-08-16 RX ADMIN — FENTANYL CITRATE 50 MCG: 0.05 INJECTION, SOLUTION INTRAMUSCULAR; INTRAVENOUS at 14:22

## 2023-08-16 RX ADMIN — CYANOCOBALAMIN TAB 500 MCG 250 MCG: 500 TAB at 08:17

## 2023-08-16 RX ADMIN — HEPARIN SODIUM 4000 UNITS: 1000 INJECTION, SOLUTION INTRAVENOUS; SUBCUTANEOUS at 14:54

## 2023-08-16 RX ADMIN — FENTANYL CITRATE 25 MCG: 0.05 INJECTION, SOLUTION INTRAMUSCULAR; INTRAVENOUS at 13:30

## 2023-08-16 RX ADMIN — FENTANYL CITRATE 25 MCG: 0.05 INJECTION, SOLUTION INTRAMUSCULAR; INTRAVENOUS at 15:15

## 2023-08-16 RX ADMIN — ONDANSETRON 4 MG: 2 INJECTION INTRAMUSCULAR; INTRAVENOUS at 16:37

## 2023-08-16 RX ADMIN — EPHEDRINE SULFATE 5 MG: 50 INJECTION INTRAMUSCULAR; INTRAVENOUS; SUBCUTANEOUS at 13:59

## 2023-08-16 RX ADMIN — EPOETIN ALFA-EPBX 10000 UNITS: 10000 INJECTION, SOLUTION INTRAVENOUS; SUBCUTANEOUS at 08:16

## 2023-08-16 RX ADMIN — CLONIDINE HYDROCHLORIDE 0.1 MG: 0.1 TABLET ORAL at 08:17

## 2023-08-16 RX ADMIN — CALCIUM 1250 MG: 500 TABLET ORAL at 08:17

## 2023-08-16 RX ADMIN — FENTANYL CITRATE 50 MCG: 0.05 INJECTION, SOLUTION INTRAMUSCULAR; INTRAVENOUS at 13:25

## 2023-08-16 RX ADMIN — CINACALCET HYDROCHLORIDE 30 MG: 30 TABLET, FILM COATED ORAL at 08:17

## 2023-08-16 RX ADMIN — FENTANYL CITRATE 50 MCG: 0.05 INJECTION, SOLUTION INTRAMUSCULAR; INTRAVENOUS at 16:37

## 2023-08-16 RX ADMIN — PROPOFOL 50 MG: 10 INJECTION, EMULSION INTRAVENOUS at 13:53

## 2023-08-16 RX ADMIN — VANCOMYCIN HYDROCHLORIDE 500 MG: 500 INJECTION, POWDER, LYOPHILIZED, FOR SOLUTION INTRAVENOUS at 13:20

## 2023-08-16 RX ADMIN — PROPOFOL 30 MG: 10 INJECTION, EMULSION INTRAVENOUS at 13:34

## 2023-08-16 RX ADMIN — PROPOFOL 50 MG: 10 INJECTION, EMULSION INTRAVENOUS at 14:07

## 2023-08-16 RX ADMIN — FENTANYL CITRATE 25 MCG: 0.05 INJECTION, SOLUTION INTRAMUSCULAR; INTRAVENOUS at 13:12

## 2023-08-16 RX ADMIN — FENTANYL CITRATE 50 MCG: 0.05 INJECTION, SOLUTION INTRAMUSCULAR; INTRAVENOUS at 16:24

## 2023-08-16 RX ADMIN — HYDRALAZINE HYDROCHLORIDE 25 MG: 25 TABLET, FILM COATED ORAL at 22:14

## 2023-08-16 ASSESSMENT — PAIN DESCRIPTION - DESCRIPTORS: DESCRIPTORS: SHARP;THROBBING

## 2023-08-16 ASSESSMENT — PAIN DESCRIPTION - ORIENTATION: ORIENTATION: RIGHT

## 2023-08-16 ASSESSMENT — ENCOUNTER SYMPTOMS
COUGH: 0
COLOR CHANGE: 0
CONSTIPATION: 0
WHEEZING: 0
BLOOD IN STOOL: 0
SHORTNESS OF BREATH: 0
VOMITING: 0
ABDOMINAL PAIN: 0
NAUSEA: 0
ABDOMINAL DISTENTION: 0
DIARRHEA: 0

## 2023-08-16 ASSESSMENT — LIFESTYLE VARIABLES: SMOKING_STATUS: 0

## 2023-08-16 ASSESSMENT — PAIN SCALES - GENERAL: PAINLEVEL_OUTOF10: 10

## 2023-08-16 ASSESSMENT — PAIN DESCRIPTION - LOCATION: LOCATION: ARM

## 2023-08-16 NOTE — OP NOTE
Operative Note      Patient: Lizzy Wing  YOB: 1970  MRN: [id number]    Date of Procedure: 8/16/2023    Pre-Op Diagnosis Codes:     * Complication of vascular access for dialysis, initial encounter [T82. 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 Right IJ patent. Tip of the permcath  in the SVC atrial junction        Detailed Description of Procedure:   Patient was brought to the operating room and placed in supine position. Patient's Right neck and chest were prepped and draped in the usual sterile fashion. The right internal jugular vein was cannulated under ultrasound guidance with a micropuncture needle. Seldinger technique was utilized to place an 0.035 wire into the inferior vena cava under fluoroscopic visualization. The Right neck and chest were anesthetized with lidocaine. An incision was made in the right neck over the wire with knife. A separate incision was made in the right chest with knife. The catheter was tunneled from the chest to the neck incision. Dilators were passed over the wire under fluoroscopic visualization. A dilator/tear-away sheath was placed over the wire under fluoroscopic visualization and the dilator and wire were removed.   The catheter was placed through the tear-away sheath and down to the right atrium under fluoroscopic visualization. The tear-away sheath was removed and the catheter was withdrawn until the tips were in the superior vena cava/right atrial junction. The right neck wound was closed 4-0 Monocryl and dermabond skin adhesive. The exit site was secured around the catheter with 4-0 Monocryl. The catheter itself was secured to the chest with two 2-0 nylon sutures. Sterile dressings were placed. Both ports of the catheter aspirated and flushed easily with heparinized saline and heparin lock. The catheter is ready for use. Next, the left arm was placed on the extension table, prepped and draped in sterile fashion. The incision was made aortic fashion extending to the site below buttonhole into the aneurysm infected and above with 15 blade and carried down through the subcutaneous tissues with electrocautery. Eventually electrocautery and Metzenbaum scissors the.A and dissection. Proximal and distal for proximal and distal control. It was surrounded with Vesseloops. Patient was heparinized with 4000u heparin. After 3 minutes of circulation proximal and distal control were gained. Skin and partial pseudoaneurysm were removed using curved Ventura scissors. Pseudoaneurysm were approximated using 4-0 Prolene. After completion there was no good flow felt distal.  The patient was accessed. The puncture. 5 Serbian sheath was placed. Attempted to advance my J-wire and right cath and was unsuccessful. The flow was seen on the at the pseudoaneurysmal site and not able to advance beyond that. The sheath was removed and the access site was sutured using 6-0 Prolene. And therefore the proximal portion was reopened and incision was extended to the venous side abnormal fistula. Plaque was removed and it was reapproximated again using 5-0 Prolene. The flow was now palpable throughout the fistula. The skin was undermined to the able to be approximated.   It was closed using 3-0 Vicryl and 4-0 nylon vertical

## 2023-08-16 NOTE — PROGRESS NOTES
[facility]      Patient:  Bozena Hong  YOB: 1970  Date of Service: 8/16/2023  MRN: [id number]   Acct: [de-identified]   Primary Care Physician: SOSA Mohan CNP  Advance Directive: Full Code  Admit Date: 8/9/2023       Hospital Day: 7  Portions of this note have been copied forward, however, changed to reflect the most current clinical status of this patient. CHIEF COMPLAINT Bleeding from fistula    SUBJECTIVE: He is feeling well. He has no complaints. Stable. Remains afebrile. No issues or events overnight. Pending revision and permcath replacement. CUMULATIVE HOSPITAL STAY:  The patient is a 47 y.o. male with a PMH of ESRD with HD dependence= 4 times weekly at home, HTN, asthma, and DM who presented to [facility] ED on 8/9/2023 complaining of bleeding from his fistula. He performs HD at home and states that he had a scab that developed in the upper aspect of his left upper arm fistula. This fistula has been present for approximately 4 years with no previous issues or complications. He stated that it became increasingly painful to dialyze in this area and was advised to start on a \"buttonhole low this area for dialysis. Stated that he noticed some increased swelling and slight pain to the upper aspect of his fistula in addition to the scab on the evening prior to his admission. Denies fever or chills. He denies any purulent drainage from this area. He awoke from sleeping in the morning of admission with a sensation of blood running down his arm. He reported a significant amount of blood was lost at home but was able to control it somewhat to be transported to the ED. In ED he was found to have large volume brisk bleeding to an approximate half centimeter area of ulceration to the upper aspect of his left fistula. Bleeding was not controlled with pressure, therefore figure of 8 sutures were placed x 4 per Dr. Charlie Barillas in the ED on 8/9/2023.   Was then controlled without

## 2023-08-16 NOTE — PROGRESS NOTES
CLINICAL PHARMACY NOTE: MEDS TO BEDS    Total # of Prescriptions Filled: 2   The following medications were delivered to the patient:  Current Discharge Medication List        START taking these medications    Details   HYDROcodone-acetaminophen (NORCO) 5-325 MG per tablet Take 1 tablet by mouth every 6 hours as needed for Pain for up to 3 days. Max Daily Amount: 4 tablets  Qty: 12 tablet, Refills: 0    Comments: Reduce doses taken as pain becomes manageable  Associated Diagnoses: Complication of AV dialysis fistula, initial encounter           narcan    Additional Documentation:    Delivered RX to nurse's station.  No copay

## 2023-08-16 NOTE — PROGRESS NOTES
Infectious Diseases Progress Note    Patient:  Ananias Galeazzi  YOB: 1970  MRN: [id number]   Admit date: 8/9/2023   Admitting Physician: Trung Tejada MD  Primary Care Physician: SOSA Noonan - RIVERA    Chief Complaint/Interval History: Feels okay. No new symptoms. He is without fever. He indicates he is getting permacath this morning. Per review of interval notes, in center dialysis has been arranged for Tuesday, Thursday, and Saturday. In/Out    Intake/Output Summary (Last 24 hours) at 8/16/2023 0533  Last data filed at 8/15/2023 1754  Gross per 24 hour   Intake 240 ml   Output --   Net 240 ml     Allergies:    Allergies   Allergen Reactions    Banana Anaphylaxis, Shortness Of Breath and Swelling    Atorvastatin Other (See Comments)     Chest pain, no muslce pains elsewhere    Lisinopril Other (See Comments)     Cough     Current Meds: 0.9 % sodium chloride infusion, PRN  vancomycin (VANCOCIN) intermittent dosing (placeholder), RX Placeholder  glucose chewable tablet 16 g, PRN  dextrose bolus 10% 125 mL, PRN   Or  dextrose bolus 10% 250 mL, PRN  glucagon injection 1 mg, PRN  dextrose 10 % infusion, Continuous PRN  HYDROcodone-acetaminophen (NORCO) 5-325 MG per tablet 1 tablet, Q6H PRN  sodium chloride flush 0.9 % injection 5-40 mL, 2 times per day  sodium chloride flush 0.9 % injection 5-40 mL, PRN  0.9 % sodium chloride infusion, PRN  [Held by provider] heparin (porcine) injection 5,000 Units, 3 times per day  albuterol sulfate HFA (PROVENTIL;VENTOLIN;PROAIR) 108 (90 Base) MCG/ACT inhaler 2 puff, Q6H PRN  sevelamer (RENVELA) tablet 1,600 mg, TID WC  calcium elemental (OSCAL) tablet 1,250 mg, Daily  cinacalcet (SENSIPAR) tablet 30 mg, Daily  cloNIDine (CATAPRES) tablet 0.1 mg, BID  vitamin B-12 (CYANOCOBALAMIN) tablet 250 mcg, Daily  hydrALAZINE (APRESOLINE) tablet 25 mg, 3 times per day  NIFEdipine (PROCARDIA XL) extended release tablet 60 mg, BID  metoprolol tartrate (LOPRESSOR) tablet 25

## 2023-08-16 NOTE — PLAN OF CARE
Problem: Discharge Planning  Goal: Discharge to home or other facility with appropriate resources  Outcome: Adequate for Discharge  Flowsheets  Taken 8/16/2023 0947 by Megan Lorenzo RN  Discharge to home or other facility with appropriate resources: Identify barriers to discharge with patient and caregiver  Taken 8/15/2023 2128 by Meena Servin LPN  Discharge to home or other facility with appropriate resources: Identify barriers to discharge with patient and caregiver     Problem: Pain  Goal: Verbalizes/displays adequate comfort level or baseline comfort level  Outcome: Adequate for Discharge     Problem: Safety - Adult  Goal: Free from fall injury  Outcome: Adequate for Discharge  Flowsheets (Taken 8/16/2023 0151 by Meena Servin LPN)  Free From Fall Injury: Instruct family/caregiver on patient safety     Problem: ABCDS Injury Assessment  Goal: Absence of physical injury  Outcome: Adequate for Discharge  Flowsheets (Taken 8/16/2023 0151 by Meena Servin LPN)  Absence of Physical Injury: Implement safety measures based on patient assessment     Problem: Metabolic/Fluid and Electrolytes - Adult  Goal: Hemodynamic stability and optimal renal function maintained  Outcome: Adequate for Discharge  Flowsheets  Taken 8/16/2023 0947 by Megan Lorenzo RN  Hemodynamic stability and optimal renal function maintained: Monitor labs and assess for signs and symptoms of volume excess or deficit  Taken 8/15/2023 2128 by Meena Servin LPN  Hemodynamic stability and optimal renal function maintained: Monitor labs and assess for signs and symptoms of volume excess or deficit  Goal: Glucose maintained within prescribed range  Outcome: Adequate for Discharge  Flowsheets  Taken 8/16/2023 0947 by Megan Lorenzo RN  Glucose maintained within prescribed range: Monitor blood glucose as ordered  Taken 8/15/2023 2128 by Meena Servin LPN  Glucose maintained within prescribed range: Monitor blood glucose as ordered     Problem: Chronic Conditions and Co-morbidities  Goal: Patient's chronic conditions and co-morbidity symptoms are monitored and maintained or improved  Outcome: Adequate for Discharge  Flowsheets  Taken 8/16/2023 [id number] [place] [address] by Oxana Josue RN  Care Plan - Patient's Chronic Conditions and Co-Morbidity Symptoms are Monitored and Maintained or Improved: Monitor and assess patient's chronic conditions and comorbid symptoms for stability, deterioration, or improvement  Taken 8/15/2023 2128 by [id number]  Care Plan - Patient's Chronic Conditions and Co-Morbidity Symptoms are Monitored and Maintained or Improved: Monitor and assess patient's chronic conditions and comorbid symptoms for stability, deterioration, or improvement

## 2023-08-16 NOTE — ANESTHESIA PRE PROCEDURE
Department of Anesthesiology  Preprocedure Note       Name:  Alfred Basurto   Age:  47 y.o.  :  1970                                          MRN:  [id number]         Date:  2023      Surgeon: Nilda Edward):  Jered Wheeler DO    Procedure: Procedure(s):  LEFT ARM DIALYSIS ACCESS REVISION WITH POSSIBLE FISTULA GRAM, POSSIBLE ANGIOGRAM WITH STENT    Medications prior to admission:   Prior to Admission medications    Medication Sig Start Date End Date Taking? Authorizing Provider   hydrALAZINE (APRESOLINE) 25 MG tablet Take 1 tablet by mouth every 8 hours 23   Moose Banerjee MD   HYDROcodone-acetaminophen (NORCO) 5-325 MG per tablet Take 1 tablet by mouth every 6 hours as needed for Pain for up to 3 days. Max Daily Amount: 4 tablets 23  Moose Banerjee MD   NIFEdipine (ADALAT CC) 60 MG extended release tablet Take 1 tablet by mouth twice daily 23   SOSA Camacho CNP   metoprolol tartrate (LOPRESSOR) 25 MG tablet Take 1 tablet by mouth twice daily 23   SOSA Camacho CNP   cloNIDine (CATAPRES) 0.1 MG tablet Take 1 tablet by mouth twice daily 23   SOSA Camacho CNP   simvastatin (ZOCOR) 10 MG tablet Take 1 tablet by mouth nightly 23   SOSA Camacho CNP   cinacalcet (SENSIPAR) 30 MG tablet TAKE 1 TABLET BY MOUTH EVERY DAY 23   Historical Provider, MD   AURYXIA 1  MG(Fe) TABS tablet TAKE 3 TABLETS BY MOUTH THREE TIMES A DAY WITH MEALS.    SWALLOW WHOLE, DO NOT CHEW OR CRUSH MEDICATION 23   Historical Provider, MD   albuterol sulfate  (90 Base) MCG/ACT inhaler Inhale 2 puffs into the lungs every 6 hours as needed for Wheezing 3/9/22   Shayna Terrell MD   Cyanocobalamin (VITAMIN B 12 PO) Take 1 tablet by mouth daily     Historical Provider, MD   calcium carbonate (OYSTER SHELL CALCIUM 500 MG) 1250 (500 Ca) MG tablet Take 1 tablet by mouth daily    Historical Provider, MD       Current medications:    No current facility-administered
Vascular:     - DVT. Other Findings:           Anesthesia Plan      general     ASA 4       Induction: intravenous. MIPS: Postoperative opioids intended and Prophylactic antiemetics administered. Anesthetic plan and risks discussed with patient. Use of blood products discussed with patient whom consented to blood products.                      Michael Hernandez MD   8/15/2023

## 2023-08-16 NOTE — DISCHARGE SUMMARY
Peyman Alvarado  :  1970  MRN:  [id number]    Admit date:  2023 Discharge date:  2023    Discharging Physician:  Dr Tatianna Mancilla    Advance Directive: Full Code    Consults:  [address]  IP CONSULT TO PHARMACY  IP CONSULT TO INFECTIOUS DISEASES  IP CONSULT TO CASE MANAGEMENT  IP CONSULT TO SOCIAL WORK     Primary Care Physician:  Ene Omalley, APRN - CNP    Discharge Diagnoses:  Principal Problem:    Complication of AV dialysis fistula  Active Problems:    Hypertension    Diabetes ([address])    ESRD on hemodialysis ([address])    Hemorrhage of arteriovenous fistula (HCC)    Bacteremia    Blood loss anemia    Hyperkalemia  Resolved Problems:    * No resolved hospital problems. *      Portions of this note have been copied forward, however, changed to reflect the most current clinical status of this patient. Hospital Course: The patient is a 47 y.o. male with a PMH of ESRD with HD dependence= 4 times weekly at home, HTN, asthma, and DM who presented to [facility] ED on 2023 complaining of bleeding from his fistula. He performs HD at home and states that he had a scab that developed in the upper aspect of his left upper arm fistula. This fistula has been present for approximately 4 years with no previous issues or complications. He stated that it became increasingly painful to dialyze in this area and was advised to start on a \"buttonhole low this area for dialysis. Stated that he noticed some increased swelling and slight pain to the upper aspect of his fistula in addition to the scab on the evening prior to his admission. Denies fever or chills. He denies any purulent drainage from this area. He awoke from sleeping in the morning of admission with a sensation of blood running down his arm. He reported a significant amount of blood was lost at home but was able to control it somewhat to be transported to the ED. ill-appearing. HENT:      Head: Normocephalic. Mouth/Throat:      Mouth: Mucous membranes are moist.   Cardiovascular:      Rate and Rhythm: Normal rate and regular rhythm. Pulses: Normal pulses. Heart sounds: Normal heart sounds. Pulmonary:      Effort: Pulmonary effort is normal.      Breath sounds: Normal breath sounds. Abdominal:      General: Bowel sounds are normal. There is no distension. Palpations: Abdomen is soft. Tenderness: There is no abdominal tenderness. Musculoskeletal:      Cervical back: Normal range of motion. Right lower leg: No edema. Left lower leg: No edema. Skin:     General: Skin is warm and dry. Neurological:      Mental Status: He is alert and oriented to person, place, and time. Psychiatric:         Mood and Affect: Mood normal.         Behavior: Behavior normal.       Discharge Medications: At discharge, Vancomycin 750mg with each dialysis, 4 weeks of antibiotic therapy, till Sept. 8,2023. Medication List        START taking these medications      HYDROcodone-acetaminophen 5-325 MG per tablet  Commonly known as: NORCO  Take 1 tablet by mouth every 6 hours as needed for Pain for up to 3 days.  Max Daily Amount: 4 tablets            CHANGE how you take these medications      hydrALAZINE 25 MG tablet  Commonly known as: APRESOLINE  Take 1 tablet by mouth every 8 hours  What changed:   medication strength  how much to take  how to take this  when to take this  additional instructions            CONTINUE taking these medications      albuterol sulfate  (90 Base) MCG/ACT inhaler  Commonly known as: PROVENTIL;VENTOLIN;PROAIR  Inhale 2 puffs into the lungs every 6 hours as needed for Wheezing     Auryxia 1  MG(Fe) Tabs tablet  Generic drug: ferric citrate     calcium carbonate 1250 (500 Ca) MG tablet  Commonly known as: OYSTER SHELL CALCIUM 500 mg     cinacalcet 30 MG tablet  Commonly known as: SENSIPAR     cloNIDine 0.1 MG

## 2023-08-16 NOTE — PROGRESS NOTES
Nephrology ([facility]) Progress Note    Patient:  Gala Pearce  YOB: 1970  Date of Service: 8/16/2023  MRN: [id number]   Acct: [de-identified]   Primary Care Physician: SOSA Alves - CNP  Advance Directive: Full Code  Admit Date: 8/9/2023       Hospital Day: 7  Referring Provider: Liat Samaniego MD    Patient independently seen and examined, Chart, Consults, Notes, Operative notes, Labs, Cardiology, and Radiology studies reviewed as available. Subjective:  Gala Pearce is a 47 y.o. male for whom we were consulted for evaluation and treatment of end-stage renal disease on home hemodialysis. He Is on hemodialysis, self cannulate his AV fistula for dialysis at home. He has a history of type II diabetic nephropathy, hypertension, congestive heart failure and anemia of chronic kidney disease. Patient has an aneurysm at proximal end of his fistula and has spontaneous rupture of aneurysm this morning. It was profusely bleeding. He presented to the emergency room and vascular surgery has placed a couple of sutures to stop his bleeder. On August 10, patient underwent fistulogram/angioplasty followed by insertion of a stent. However he has positive blood culture as well, growing gram-positive cocci in bloodstream/staph epidermidis. Today, no overnight events. Denied chest pain, shortness of air at rest, nausea or vomiting. Hoping for d/c today.       Allergies:  Banana, Atorvastatin, and Lisinopril    Medicines:  Current Facility-Administered Medications   Medication Dose Route Frequency Provider Last Rate Last Admin    vancomycin (VANCOCIN) 500 mg in sodium chloride 0.9 % 100 mL IVPB (mini-bag)  500 mg IntraVENous Once Francoise Pinzon, APRN        0.9 % sodium chloride infusion   IntraVENous PRN Deena Grijalva DO        vancomycin (VANCOCIN) intermittent dosing (placeholder)   Other RX Placeholder [facility] Ivdrake, DO        glucose chewable tablet 16 g  4 Recent Labs     08/14/23  0554   BC No Growth to date. Any change in status will be called. Urine Culture Recent : No results for input(s): LABURIN in the last 720 hours. Radiology reports as per the Radiologist  Radiology: XR CHEST (2 VW)    Result Date: 8/11/2023  EXAM: CHEST RADIOGRAPH  TECHNIQUE: Two views. Frontal and lateral.  HISTORY: Cough. COMPARISON: Chest x-ray dated 05/24/2023. CT scan of the chest dated 07/05/2023. FINDINGS: The lungs are clear. The left upper lobe cavitary lesion seen on the prior CT scan is not well seen with plain radiograph. Heart size is normal.  There is a stent in the left subclavian and axillary region. There is no pleural effusion. There is no pneumothorax. 1.  Left upper lobe cavitary lesion seen on prior CT scan not well seen with plain radiograph. 2.  Stent in the subclavian and axillary region. 3.  Otherwise unremarkable chest radiographs.    ______________________________________ Electronically signed by: Lupe Davey M.D.  Date:     08/11/2023 Time:    18:58        Assessment   End-stage renal disease  Diabetes type 2 with diabetic nephropathy  Dialysis access malfunction  Anemia in chronic kidney disease with acute blood loss  Chronic systolic congestive heart failure  Secondary hyperparathyroidism  Hyperkalemia  Staph epidermidis bacteremia      Plan:  Discussed with patient, nursing, ID  Dialysis Monday Wednesday Friday per routine scheduling while inpatient, outpatient will be TTS and patient aware  Work-up reviewed today  Monitor labs  Antibiotics per primary service/ID - requested vancomycin 750 mg postdialysis through September 8, 2023  Reassess in the morning    Aristides Archibald MD  08/16/23  11:46 AM

## 2023-08-16 NOTE — ANESTHESIA POSTPROCEDURE EVALUATION
Department of Anesthesiology  Postprocedure Note    Patient: Fabiano Newman  MRN: [id number]  YOB: 1970  Date of evaluation: 8/16/2023      Procedure Summary     Date: 08/16/23 Room / Location: [address]    Anesthesia Start: 1300 Anesthesia Stop: 1657    Procedure: RIGHT IJ 7 [facility]; ANEURSYM RMOVAL AND FISTULAGRAM (Left: Arm Upper) Diagnosis:       Complication of vascular access for dialysis, initial encounter      (Complication of vascular access for dialysis, initial encounter [T82. 9XXA])    Surgeons: Jeremy Juarez DO Responsible Provider: SOSA Guillaume CRNA    Anesthesia Type: general ASA Status: 4          Anesthesia Type: No value filed.     Kendall Phase I: Kendall Score: 5    Kendall Phase II:        Anesthesia Post Evaluation    Patient location during evaluation: bedside  Patient participation: complete - patient participated  Level of consciousness: sleepy but conscious  Pain score: 0  Airway patency: patent  Nausea & Vomiting: no vomiting and no nausea  Complications: no  Cardiovascular status: hemodynamically stable and blood pressure returned to baseline  Respiratory status: acceptable and face mask  Hydration status: stable  Pain management: adequate

## 2023-08-17 VITALS
WEIGHT: 237.25 LBS | OXYGEN SATURATION: 99 % | HEART RATE: 54 BPM | HEIGHT: 68 IN | SYSTOLIC BLOOD PRESSURE: 93 MMHG | TEMPERATURE: 97.7 F | BODY MASS INDEX: 35.96 KG/M2 | DIASTOLIC BLOOD PRESSURE: 65 MMHG | RESPIRATION RATE: 14 BRPM

## 2023-08-17 LAB
ANION GAP SERPL CALCULATED.3IONS-SCNC: 18 MMOL/L (ref 7–19)
BASOPHILS # BLD: 0 K/UL (ref 0–0.2)
BASOPHILS NFR BLD: 0.1 % (ref 0–1)
BUN SERPL-MCNC: 35 MG/DL (ref 6–20)
CALCIUM SERPL-MCNC: 9.3 MG/DL (ref 8.6–10)
CHLORIDE SERPL-SCNC: 95 MMOL/L (ref 98–111)
CO2 SERPL-SCNC: 24 MMOL/L (ref 22–29)
CREAT SERPL-MCNC: 12.2 MG/DL (ref 0.5–1.2)
EOSINOPHIL # BLD: 0 K/UL (ref 0–0.6)
EOSINOPHIL NFR BLD: 0.3 % (ref 0–5)
ERYTHROCYTE [DISTWIDTH] IN BLOOD BY AUTOMATED COUNT: 14.1 % (ref 11.5–14.5)
GLUCOSE BLD-MCNC: 112 MG/DL (ref 70–99)
GLUCOSE BLD-MCNC: 115 MG/DL (ref 70–99)
GLUCOSE SERPL-MCNC: 93 MG/DL (ref 74–109)
HCT VFR BLD AUTO: 24 % (ref 42–52)
HGB BLD-MCNC: 7.7 G/DL (ref 14–18)
IMM GRANULOCYTES # BLD: 0.1 K/UL
LYMPHOCYTES # BLD: 1.4 K/UL (ref 1.1–4.5)
LYMPHOCYTES NFR BLD: 13.2 % (ref 20–40)
MCH RBC QN AUTO: 30.8 PG (ref 27–31)
MCHC RBC AUTO-ENTMCNC: 32.1 G/DL (ref 33–37)
MCV RBC AUTO: 96 FL (ref 80–94)
MONOCYTES # BLD: 1.1 K/UL (ref 0–0.9)
MONOCYTES NFR BLD: 9.9 % (ref 0–10)
NEUTROPHILS # BLD: 8.2 K/UL (ref 1.5–7.5)
NEUTS SEG NFR BLD: 75.9 % (ref 50–65)
PERFORMED ON: ABNORMAL
PERFORMED ON: ABNORMAL
PLATELET # BLD AUTO: 211 K/UL (ref 130–400)
PMV BLD AUTO: 9.6 FL (ref 9.4–12.4)
POTASSIUM SERPL-SCNC: 4.9 MMOL/L (ref 3.5–5)
RBC # BLD AUTO: 2.5 M/UL (ref 4.7–6.1)
SODIUM SERPL-SCNC: 137 MMOL/L (ref 136–145)
WBC # BLD AUTO: 10.7 K/UL (ref 4.8–10.8)

## 2023-08-17 PROCEDURE — 6370000000 HC RX 637 (ALT 250 FOR IP): Performed by: SURGERY

## 2023-08-17 PROCEDURE — 8010000000 HC HEMODIALYSIS ACUTE INPT

## 2023-08-17 PROCEDURE — 36415 COLL VENOUS BLD VENIPUNCTURE: CPT

## 2023-08-17 PROCEDURE — 99024 POSTOP FOLLOW-UP VISIT: CPT | Performed by: SURGERY

## 2023-08-17 PROCEDURE — 2580000003 HC RX 258: Performed by: SURGERY

## 2023-08-17 PROCEDURE — 82962 GLUCOSE BLOOD TEST: CPT

## 2023-08-17 PROCEDURE — 80048 BASIC METABOLIC PNL TOTAL CA: CPT

## 2023-08-17 PROCEDURE — 85025 COMPLETE CBC W/AUTO DIFF WBC: CPT

## 2023-08-17 RX ADMIN — CYANOCOBALAMIN TAB 500 MCG 250 MCG: 500 TAB at 08:28

## 2023-08-17 RX ADMIN — CINACALCET HYDROCHLORIDE 30 MG: 30 TABLET, FILM COATED ORAL at 08:29

## 2023-08-17 RX ADMIN — SEVELAMER CARBONATE 1600 MG: 800 TABLET, FILM COATED ORAL at 08:06

## 2023-08-17 RX ADMIN — Medication 10 ML: at 08:29

## 2023-08-17 RX ADMIN — HYDROCODONE BITARTRATE AND ACETAMINOPHEN 1 TABLET: 5; 325 TABLET ORAL at 08:33

## 2023-08-17 RX ADMIN — CALCIUM 1250 MG: 500 TABLET ORAL at 08:29

## 2023-08-17 RX ADMIN — NIFEDIPINE 60 MG: 30 TABLET, FILM COATED, EXTENDED RELEASE ORAL at 14:45

## 2023-08-17 ASSESSMENT — PAIN DESCRIPTION - LOCATION: LOCATION: ARM

## 2023-08-17 ASSESSMENT — PAIN DESCRIPTION - ORIENTATION: ORIENTATION: LEFT

## 2023-08-17 ASSESSMENT — PAIN - FUNCTIONAL ASSESSMENT: PAIN_FUNCTIONAL_ASSESSMENT: PREVENTS OR INTERFERES WITH MANY ACTIVE NOT PASSIVE ACTIVITIES

## 2023-08-17 ASSESSMENT — PAIN DESCRIPTION - DESCRIPTORS: DESCRIPTORS: THROBBING

## 2023-08-17 ASSESSMENT — PAIN SCALES - GENERAL: PAINLEVEL_OUTOF10: 9

## 2023-08-17 NOTE — PROGRESS NOTES
Comprehensive Nutrition Assessment    Type and Reason for Visit:  Reassess    Nutrition Recommendations/Plan:   Continue current POC     Malnutrition Assessment:  Malnutrition Status:  No malnutrition (08/17/23 1217)    Context:  Acute Illness     Findings of the 6 clinical characteristics of malnutrition:  Energy Intake:  No significant decrease in energy intake  Weight Loss:  No significant weight loss     Body Fat Loss:  No significant body fat loss     Muscle Mass Loss:  No significant muscle mass loss    Fluid Accumulation:  No significant fluid accumulation Extremities   Strength:  Not Performed    Nutrition Assessment:    Pt hoping to go home today. PO intake is good with intake usually %. Dialysis contiues. Modified current diet order with addes Carb control and Low Phosphorus. Nutrition Related Findings:    Glucose 112-168 Wound Type: Surgical Incision       Current Nutrition Intake & Therapies:    Average Meal Intake: %  Average Supplements Intake: None Ordered  ADULT DIET; Regular; 4 carb choices (60 gm/meal); Low Phosphorus (Less than 1000 mg)    Anthropometric Measures:  Height: 5' 8\" (172.7 cm)  Ideal Body Weight (IBW): 154 lbs (70 kg)    Admission Body Weight: 240 lb (108.9 kg)  Current Body Weight: 237 lb 4 oz (107.6 kg), 154.1 % IBW.  Weight Source: Stated  Current BMI (kg/m2): 36.1  Usual Body Weight: 241 lb (109.3 kg) (5/23/23)  % Weight Change (Calculated): -0.4  BMI Categories: Obese Class 2 (BMI 35.0 -39.9)    Nutrition Diagnosis:   Altered nutrition-related lab values related to renal dysfunction, endocrine dysfuntion as evidenced by lab values    Nutrition Interventions:   Food and/or Nutrient Delivery: Continue Current Diet  Nutrition Education/Counseling: No recommendation at this time  Coordination of Nutrition Care: Continue to monitor while inpatient       Goals:  Previous Goal Met: Goal(s) Achieved  Goals: Initiate PO diet       Nutrition Monitoring and Evaluation:

## 2023-08-17 NOTE — PROGRESS NOTES
by Josh Padron MD at [facility]    BRONCHOSCOPY  05/24/2023    BRONCHOSCOPY BIOPSY BRONCHUS ROBOTIC performed by Josh Padron MD at [facility]    COLONOSCOPY N/A 12/05/2019    Dr Malik Reich, internal hemorrhoids-Grade 1-2-HP, 3 yr recall    COLONOSCOPY N/A 12/19/2022    Dr Rajiv Marrero, Diffuse melanosis coli, mild diverticulosis left colon, Int hem Gr 1-2, inadequate-fair to poor prep, repeat 12-22-22    COLONOSCOPY N/A 12/22/2022    Dr Rajiv Marrero, W Ablation 5 sm 3-4 mm sessile benign appearing polyps in sig colon, int hem Gr 1, 3 year recall    DIALYSIS FISTULA CREATION Left 06/26/2020    LEFT BRACHIAL / CEPHALIC AV FISTULA performed by Amalia Stovall MD at [address] [place], [place] [address]      [facility] - removal of first cath due to infection and placement of new one    TUNNELED VENOUS CATHETER PLACEMENT  01/2020    hospitals    VASCULAR SURGERY  11/11/2020    [facility].  Removal of tunneled dialysis catheter left internal jugular vein    VASCULAR SURGERY  07/26/2021    [facility]- Left upper extremity fistulogram's including venography the superior cava, Balloon angioplasty left proximal upper arm cephalic vein stenosis with 7 mm x 80 mm conquest, 7 mm x 100 mm Lutonix, 8 mm x 60 mm Lutonix drug-coated balloon, Completion venograms left upper extremity    VASCULAR SURGERY  08/10/2023    Left upper extremity fistulogram, venogram.  Balloon angioplasty of left cephalic vein arch using 6 x 150  balloon, 7 x 150 drug-coated balloon, stening using 8 x 10 Viabahn followed by 8 x 7.5 Viabahn stent; VI    VASCULAR SURGERY Left 8/16/2023    RIGHT  [address] PLACEMENT; ANEURSYM REMOVAL AND FISTULAGRAM performed by Bell Connell DO at [facility] OR       Family History  Family History   Problem Relation Age of Onset    Cancer Father         prostate    Kidney Disease Brother        Social History  Social History     Socioeconomic History    Marital status: Single upper lobe cavitary lesion seen on prior CT scan not well seen with plain radiograph. 2.  Stent in the subclavian and axillary region. 3.  Otherwise unremarkable chest radiographs.    ______________________________________ Electronically signed by: Brenden Lei M.D.  Date:     08/11/2023 Time:    18:58        Assessment   End-stage renal disease  Diabetes type 2 with diabetic nephropathy  Dialysis access malfunction  Anemia in chronic kidney disease with acute blood loss  Chronic systolic congestive heart failure  Secondary hyperparathyroidism  Hyperkalemia  Staph epidermidis bacteremia      Plan:  Discussed with patient, nursing, ID  Dialysis transitioning to Tuesday Thursday Saturday scheduling  Work-up reviewed today  Monitor labs  Antibiotics per primary service/ID - requested vancomycin 750 mg postdialysis through September 8, 2023  Reassess in the morning    Jayden Sanchez MD  08/17/23  11:36 AM

## 2023-08-17 NOTE — CARE COORDINATION
Fili Bustos met with Pt at bedside at 1:26 PM to discuss the IMM (2nd) letter and to answer any questions/concerns the Pt may have at that time. The Pt did not have any questions/concerns at that time. The Pt waived the four hour wait period and signed the IMM (2nd) letter, it was filed in the Pt soft chart. The  provided the Pt with the IMM (2nd letter for their personal records.     08/17/23 1354   IMM Letter   IMM Letter given to Patient/Family/Significant other/Guardian/POA/by: Emil Guillen    IMM Letter date given: 08/17/23   IMM Letter time given: 1326

## 2023-08-17 NOTE — PLAN OF CARE
Problem: Discharge Planning  Goal: Discharge to home or other facility with appropriate resources  Outcome: Progressing  Flowsheets (Taken 8/16/2023 2152)  Discharge to home or other facility with appropriate resources: Identify barriers to discharge with patient and caregiver     Problem: Pain  Goal: Verbalizes/displays adequate comfort level or baseline comfort level  Outcome: Progressing     Problem: Safety - Adult  Goal: Free from fall injury  Outcome: Progressing  Flowsheets (Taken 8/17/2023 0308)  Free From Fall Injury: Instruct family/caregiver on patient safety     Problem: ABCDS Injury Assessment  Goal: Absence of physical injury  Outcome: Progressing  Flowsheets (Taken 8/17/2023 0308)  Absence of Physical Injury: Implement safety measures based on patient assessment

## 2023-08-18 ENCOUNTER — TELEPHONE (OUTPATIENT)
Dept: INTERNAL MEDICINE | Age: 53
End: 2023-08-18

## 2023-08-18 LAB
BACTERIA BLD CULT: ABNORMAL
BACTERIA BLD CULT: ABNORMAL
BACTERIA BLD CULT: NORMAL
ORGANISM: ABNORMAL

## 2023-08-18 NOTE — TELEPHONE ENCOUNTER
[address] [facility],[address] Floor Transitions Initial Follow Up Call    Outreach made within 2 business days of discharge: Yes    Patient: Sharyle Lush   Patient : 1970 MRN: [id number]    Reason for Admission: Fistula bleeding    Discharge Date: 23      Discharge Diagnoses:  Principal Problem:    Complication of AV dialysis fistula  Active Problems:    Hypertension    Diabetes ([address])    ESRD on hemodialysis ([address])    Hemorrhage of arteriovenous fistula ([address])    Bacteremia    Blood loss anemia    Hyperkalemia  Resolved Problems:    * No resolved hospital problems. *      Spoke with: Patient    Discharge department/facility: [address] [place] [address] Interactive Patient Contact:  Was patient able to fill all prescriptions: Yes  Was patient instructed to bring all medications to the follow-up visit: Yes  Is patient taking all medications as directed in the discharge summary? Yes  Does patient understand their discharge instructions: Yes  Does patient have questions or concerns that need addressed prior to 7-14 day follow up office visit: no    Spoke to the patient he is doing well. He is not had anymore bleeding at this time. He is keeping the area dressed. He said that he will have dialysis change the dressing for him tomorrow when he goes. Pt does not think he needs anything at this time and is scheduled for 23. He will let us know if anything comes up. Pt has all his medications.     RECORDS IN CHART  PT    Scheduled appointment with PCP within 7-14 days    Follow Up  Future Appointments   Date Time Provider [address]   2023 10:00 AM [facility]   2023  8:30 AM [facility]   2023  9:30 AM S[facility]A [place] [facility] [place]   11/3/2023  8:15 AM Daya Ramirez APRN - CNP [facility]   2023  7:15 AM Daya Ramirez APRN - CNP [facility]   2023  9:15 AM S[facility]A Carvalho [facility]io [place]   1/10/2024  8:20 AM [facility]

## 2023-08-19 LAB — BACTERIA BLD CULT: NORMAL

## 2023-08-20 LAB
BACTERIA SPEC AEROBE CULT: ABNORMAL
BACTERIA SPEC ANAEROBE CULT: ABNORMAL
GRAM STN SPEC: ABNORMAL

## 2023-08-21 ENCOUNTER — OFFICE VISIT (OUTPATIENT)
Dept: PRIMARY CARE CLINIC | Age: 53
End: 2023-08-21

## 2023-08-21 VITALS
BODY MASS INDEX: 36.89 KG/M2 | OXYGEN SATURATION: 97 % | HEIGHT: 68 IN | SYSTOLIC BLOOD PRESSURE: 108 MMHG | HEART RATE: 60 BPM | DIASTOLIC BLOOD PRESSURE: 60 MMHG | WEIGHT: 243.4 LBS | TEMPERATURE: 97 F

## 2023-08-21 DIAGNOSIS — N18.6 ESRD (END STAGE RENAL DISEASE) (HCC): ICD-10-CM

## 2023-08-21 DIAGNOSIS — Z09 HOSPITAL DISCHARGE FOLLOW-UP: Primary | ICD-10-CM

## 2023-08-21 DIAGNOSIS — I10 ESSENTIAL HYPERTENSION: ICD-10-CM

## 2023-08-21 DIAGNOSIS — E11.22 TYPE 2 DIABETES MELLITUS WITH CHRONIC KIDNEY DISEASE ON CHRONIC DIALYSIS, WITHOUT LONG-TERM CURRENT USE OF INSULIN (HCC): ICD-10-CM

## 2023-08-21 DIAGNOSIS — Z99.2 TYPE 2 DIABETES MELLITUS WITH CHRONIC KIDNEY DISEASE ON CHRONIC DIALYSIS, WITHOUT LONG-TERM CURRENT USE OF INSULIN (HCC): ICD-10-CM

## 2023-08-21 DIAGNOSIS — N18.6 TYPE 2 DIABETES MELLITUS WITH CHRONIC KIDNEY DISEASE ON CHRONIC DIALYSIS, WITHOUT LONG-TERM CURRENT USE OF INSULIN (HCC): ICD-10-CM

## 2023-08-21 DIAGNOSIS — D50.0 BLOOD LOSS ANEMIA: ICD-10-CM

## 2023-08-21 LAB
BASOPHILS # BLD: 0 K/UL (ref 0–0.2)
BASOPHILS NFR BLD: 0.4 % (ref 0–1)
EOSINOPHIL # BLD: 0.6 K/UL (ref 0–0.6)
EOSINOPHIL NFR BLD: 8.8 % (ref 0–5)
ERYTHROCYTE [DISTWIDTH] IN BLOOD BY AUTOMATED COUNT: 15 % (ref 11.5–14.5)
HCT VFR BLD AUTO: 25.3 % (ref 42–52)
HGB BLD-MCNC: 8 G/DL (ref 14–18)
IMM GRANULOCYTES # BLD: 0 K/UL
LYMPHOCYTES # BLD: 2 K/UL (ref 1.1–4.5)
LYMPHOCYTES NFR BLD: 27.2 % (ref 20–40)
MCH RBC QN AUTO: 31.1 PG (ref 27–31)
MCHC RBC AUTO-ENTMCNC: 31.6 G/DL (ref 33–37)
MCV RBC AUTO: 98.4 FL (ref 80–94)
MONOCYTES # BLD: 0.9 K/UL (ref 0–0.9)
MONOCYTES NFR BLD: 11.8 % (ref 0–10)
NEUTROPHILS # BLD: 3.7 K/UL (ref 1.5–7.5)
NEUTS SEG NFR BLD: 51.2 % (ref 50–65)
PLATELET # BLD AUTO: 256 K/UL (ref 130–400)
PMV BLD AUTO: 9.4 FL (ref 9.4–12.4)
RBC # BLD AUTO: 2.57 M/UL (ref 4.7–6.1)
WBC # BLD AUTO: 7.3 K/UL (ref 4.8–10.8)

## 2023-08-21 RX ORDER — HYDROCODONE BITARTRATE AND ACETAMINOPHEN 5; 325 MG/1; MG/1
1 TABLET ORAL EVERY 6 HOURS PRN
COMMUNITY

## 2023-08-21 NOTE — PROGRESS NOTES
Post-Discharge Transitional Care  Follow Up      Valentina Diaz   YOB: 1970    Date of Office Visit:  8/21/2023  Date of Hospital Admission: 8/9/23  Date of Hospital Discharge: 8/17/23  Risk of hospital readmission (high >=14%. Medium >=10%) :Readmission Risk Score: 26.6      Care management risk score Rising risk (score 2-5) and Complex Care (Scores >=6): No Risk Score On File     Non face to face  following discharge, date last encounter closed (first attempt may have been earlier): 08/18/2023    Call initiated 2 business days of discharge: Yes    ASSESSMENT/PLAN:   Hospital discharge follow-up  -     [place] DISCHARGE MEDS RECONCILED W/ CURRENT OUTPATIENT MED LIST  ESRD (end stage renal disease) ([address])  -     CBC with Auto Differential; Future  Type 2 diabetes mellitus with chronic kidney disease on chronic dialysis, without long-term current use of insulin (HCC)  Essential hypertension  -     CBC with Auto Differential; Future  Blood loss anemia    Medical Decision Making: high complexity  Return in 2 months (on 11/3/2023). On this date 8/21/2023 I have spent 45 minutes reviewing previous notes, test results and face to face with the patient discussing the diagnosis and importance of compliance with the treatment plan as well as documenting on the day of the visit. Subjective:   HPI:  Follow up of Hospital problems/diagnosis(es): complication of AV dialysis fistula, hypertension, diabetes, ESRD on hemodialysis, hemorrhage of arteriovenous fistula, bacteremia, blood loss anemia, and hyperkalemia    Inpatient course: Discharge summary reviewed- see chart. Interval history/Current status:     Patient reports he is doing well. Rates pain in left arm/fistula 4-5/10. At worse is 8-9/10. Is taking norco as needed. Blood pressure has been well controlled. His dressing is being changed at dialysis. Is still getting vancomycin at dialysis. Blood pressure has been well controlled.  Blood sugars are

## 2023-08-25 DIAGNOSIS — I10 ESSENTIAL HYPERTENSION: ICD-10-CM

## 2023-08-25 RX ORDER — NIFEDIPINE 60 MG/1
TABLET, FILM COATED, EXTENDED RELEASE ORAL
Qty: 60 TABLET | Refills: 2 | Status: SHIPPED | OUTPATIENT
Start: 2023-08-25

## 2023-08-25 RX ORDER — CLONIDINE HYDROCHLORIDE 0.1 MG/1
TABLET ORAL
Qty: 60 TABLET | Refills: 2 | Status: SHIPPED | OUTPATIENT
Start: 2023-08-25

## 2023-09-01 ENCOUNTER — TELEPHONE (OUTPATIENT)
Dept: VASCULAR SURGERY | Age: 53
End: 2023-09-01

## 2023-09-01 ENCOUNTER — OFFICE VISIT (OUTPATIENT)
Dept: VASCULAR SURGERY | Age: 53
End: 2023-09-01

## 2023-09-01 VITALS
DIASTOLIC BLOOD PRESSURE: 75 MMHG | SYSTOLIC BLOOD PRESSURE: 130 MMHG | HEIGHT: 68 IN | HEART RATE: 54 BPM | BODY MASS INDEX: 36.83 KG/M2 | OXYGEN SATURATION: 93 % | TEMPERATURE: 97.7 F | WEIGHT: 243 LBS

## 2023-09-01 DIAGNOSIS — Z99.2 ESRD ON HEMODIALYSIS (HCC): Primary | ICD-10-CM

## 2023-09-01 DIAGNOSIS — N18.6 ESRD ON HEMODIALYSIS (HCC): Primary | ICD-10-CM

## 2023-09-01 PROCEDURE — 99024 POSTOP FOLLOW-UP VISIT: CPT | Performed by: PHYSICIAN ASSISTANT

## 2023-09-01 NOTE — TELEPHONE ENCOUNTER
Called the [facility] dialysis clinic this morning per [facility] we have taken the sutures out of the patient's new fistula and once the patient's arm has completely healed in the next 3 weeks they may begin using it. I spoke with Marquita Carrion and she acknowledged.

## 2023-09-01 NOTE — PROGRESS NOTES
Patient Care Team:  SOSA Sales CNP as PCP - General (Nurse Practitioner Family)  SOSA Sales CNP as PCP - Empaneled Provider  Nini Lafleur MD as Consulting Physician (Nephrology)      History and Physical  Mr. Arabella Galeas is a 58-year-old male with end-stage renal disease on chronic dialysis. Foye Sumanth He underwent placement of a right IJ PermCath and Aneurysmorrhaphy left brachiocephalic AV fistula on 4/19/0936 by Dr. Yarely Aguilar. He comes in today for follow-up. He denies any fever. He denies any significant pain in his left upper extremity. He denies any numbness or tingling or decreased strength in his left hand.   He is dialyzing via right IJ PermCath    (Chart reviewed including PMH, medications and allergies, previous imaging/tests for comparison, current imaging/tests, labs, other pertinent providers office/consult notes)       Natalia Enciso is a 47 y.o. male with the following history reviewed and recorded in [facility]:  Patient Active Problem List    Diagnosis Date Noted    Pulmonary edema 01/25/2021     Priority: High    Volume overload 01/25/2021     Priority: High    Dependence on renal dialysis ([address]) 05/06/2020     Priority: Medium    Bacteremia 08/10/2023    Blood loss anemia 08/10/2023    Hyperkalemia 08/10/2023    Hemorrhage of arteriovenous fistula ([address]) 81/72/0362    Complication of AV dialysis fistula, initial encounter 08/09/2023    Cavitary pneumonia 05/21/2023    Lung infiltrate 05/21/2023     Added automatically from request for surgery [id number]        Acute hypoxemic respiratory failure ([address]) 55/19/2756    Complication of AV dialysis fistula 07/26/2021    Palliative care patient 01/25/2021    Erectile dysfunction due to arterial insufficiency     ESRD on hemodialysis ([address]) 05/21/2020    Chronic diastolic congestive heart failure ([address]) 04/30/2020    Diabetes ([address]) 01/29/2020    Hypertension 05/10/2017    Mild intermittent asthma without complication 07/30/8281    Family history of

## 2023-09-12 ENCOUNTER — APPOINTMENT (OUTPATIENT)
Dept: GENERAL RADIOLOGY | Age: 53
End: 2023-09-12
Payer: MEDICARE

## 2023-09-12 ENCOUNTER — HOSPITAL ENCOUNTER (OUTPATIENT)
Age: 53
Setting detail: OBSERVATION
LOS: 1 days | Discharge: HOME | End: 2023-09-13
Attending: EMERGENCY MEDICINE | Admitting: STUDENT IN AN ORGANIZED HEALTH CARE EDUCATION/TRAINING PROGRAM
Payer: MEDICARE

## 2023-09-12 DIAGNOSIS — N18.6 ESRD ON DIALYSIS (HCC): ICD-10-CM

## 2023-09-12 DIAGNOSIS — Z99.2 ESRD ON DIALYSIS (HCC): ICD-10-CM

## 2023-09-12 DIAGNOSIS — J96.01 ACUTE RESPIRATORY FAILURE WITH HYPOXIA (HCC): Primary | ICD-10-CM

## 2023-09-12 DIAGNOSIS — R05.1 ACUTE COUGH: ICD-10-CM

## 2023-09-12 DIAGNOSIS — J81.0 ACUTE PULMONARY EDEMA (HCC): ICD-10-CM

## 2023-09-12 PROBLEM — I50.33 ACUTE ON CHRONIC DIASTOLIC HEART FAILURE (HCC): Status: ACTIVE | Noted: 2020-04-30

## 2023-09-12 LAB
ALBUMIN SERPL-MCNC: 4.2 G/DL (ref 3.5–5.2)
ALP SERPL-CCNC: 65 U/L (ref 40–130)
ALT SERPL-CCNC: 14 U/L (ref 5–41)
ANION GAP SERPL CALCULATED.3IONS-SCNC: 14 MMOL/L (ref 7–19)
AST SERPL-CCNC: 18 U/L (ref 5–40)
B PARAP IS1001 DNA NPH QL NAA+NON-PROBE: NOT DETECTED
B PERT.PT PRMT NPH QL NAA+NON-PROBE: NOT DETECTED
BASE EXCESS VENOUS: 16 MMOL/L
BASOPHILS # BLD: 0 K/UL (ref 0–0.2)
BASOPHILS NFR BLD: 0.3 % (ref 0–1)
BILIRUB SERPL-MCNC: 0.6 MG/DL (ref 0.2–1.2)
BNP BLD-MCNC: 3905 PG/ML (ref 0–124)
BUN SERPL-MCNC: 41 MG/DL (ref 6–20)
C PNEUM DNA NPH QL NAA+NON-PROBE: NOT DETECTED
CALCIUM SERPL-MCNC: 9.7 MG/DL (ref 8.6–10)
CARBOXYHEMOGLOBIN: 4.9 %
CHLORIDE SERPL-SCNC: 93 MMOL/L (ref 98–111)
CO2 SERPL-SCNC: 29 MMOL/L (ref 22–29)
CREAT SERPL-MCNC: 9.5 MG/DL (ref 0.5–1.2)
EOSINOPHIL # BLD: 1 K/UL (ref 0–0.6)
EOSINOPHIL NFR BLD: 10.3 % (ref 0–5)
ERYTHROCYTE [DISTWIDTH] IN BLOOD BY AUTOMATED COUNT: 15.9 % (ref 11.5–14.5)
FLUAV RNA NPH QL NAA+NON-PROBE: NOT DETECTED
FLUBV RNA NPH QL NAA+NON-PROBE: NOT DETECTED
GLUCOSE SERPL-MCNC: 129 MG/DL (ref 74–109)
HADV DNA NPH QL NAA+NON-PROBE: NOT DETECTED
HCO3 VENOUS: 39 MMOL/L (ref 23–29)
HCOV 229E RNA NPH QL NAA+NON-PROBE: NOT DETECTED
HCOV HKU1 RNA NPH QL NAA+NON-PROBE: NOT DETECTED
HCOV NL63 RNA NPH QL NAA+NON-PROBE: NOT DETECTED
HCOV OC43 RNA NPH QL NAA+NON-PROBE: NOT DETECTED
HCT VFR BLD AUTO: 24.7 % (ref 42–52)
HGB BLD-MCNC: 8.4 G/DL (ref 14–18)
HMPV RNA NPH QL NAA+NON-PROBE: NOT DETECTED
HPIV1 RNA NPH QL NAA+NON-PROBE: NOT DETECTED
HPIV2 RNA NPH QL NAA+NON-PROBE: NOT DETECTED
HPIV3 RNA NPH QL NAA+NON-PROBE: NOT DETECTED
HPIV4 RNA NPH QL NAA+NON-PROBE: NOT DETECTED
IMM GRANULOCYTES # BLD: 0 K/UL
LYMPHOCYTES # BLD: 1.5 K/UL (ref 1.1–4.5)
LYMPHOCYTES NFR BLD: 15.7 % (ref 20–40)
M PNEUMO DNA NPH QL NAA+NON-PROBE: NOT DETECTED
MCH RBC QN AUTO: 30.8 PG (ref 27–31)
MCHC RBC AUTO-ENTMCNC: 34 G/DL (ref 33–37)
MCV RBC AUTO: 90.5 FL (ref 80–94)
METHEMOGLOBIN VENOUS: 0.8 %
MONOCYTES # BLD: 0.8 K/UL (ref 0–0.9)
MONOCYTES NFR BLD: 8.6 % (ref 0–10)
NEUTROPHILS # BLD: 6.1 K/UL (ref 1.5–7.5)
NEUTS SEG NFR BLD: 64.8 % (ref 50–65)
O2 CONTENT, VEN: 8 ML/DL
O2 SAT, VEN: 76 %
PCO2, VEN: 43 MMHG (ref 40–50)
PH VENOUS: 7.57 (ref 7.35–7.45)
PLATELET # BLD AUTO: 181 K/UL (ref 130–400)
PMV BLD AUTO: 9.4 FL (ref 9.4–12.4)
PO2, VEN: 43 MMHG
POTASSIUM SERPL-SCNC: 3.6 MMOL/L (ref 3.5–5)
PROCALCITONIN: 0.4 NG/ML (ref 0–0.09)
PROT SERPL-MCNC: 7.5 G/DL (ref 6.6–8.7)
RBC # BLD AUTO: 2.73 M/UL (ref 4.7–6.1)
RSV RNA NPH QL NAA+NON-PROBE: NOT DETECTED
RV+EV RNA NPH QL NAA+NON-PROBE: NOT DETECTED
SARS-COV-2 RNA NPH QL NAA+NON-PROBE: NOT DETECTED
SODIUM SERPL-SCNC: 136 MMOL/L (ref 136–145)
WBC # BLD AUTO: 9.4 K/UL (ref 4.8–10.8)

## 2023-09-12 PROCEDURE — 80053 COMPREHEN METABOLIC PANEL: CPT

## 2023-09-12 PROCEDURE — 85025 COMPLETE CBC W/AUTO DIFF WBC: CPT

## 2023-09-12 PROCEDURE — 96374 THER/PROPH/DIAG INJ IV PUSH: CPT

## 2023-09-12 PROCEDURE — 2580000003 HC RX 258: Performed by: HOSPITALIST

## 2023-09-12 PROCEDURE — 1210000000 HC MED SURG R&B

## 2023-09-12 PROCEDURE — 99285 EMERGENCY DEPT VISIT HI MDM: CPT

## 2023-09-12 PROCEDURE — 82800 BLOOD PH: CPT

## 2023-09-12 PROCEDURE — 82803 BLOOD GASES ANY COMBINATION: CPT

## 2023-09-12 PROCEDURE — 0202U NFCT DS 22 TRGT SARS-COV-2: CPT

## 2023-09-12 PROCEDURE — 84145 PROCALCITONIN (PCT): CPT

## 2023-09-12 PROCEDURE — 6360000002 HC RX W HCPCS: Performed by: HOSPITALIST

## 2023-09-12 PROCEDURE — 71045 X-RAY EXAM CHEST 1 VIEW: CPT

## 2023-09-12 PROCEDURE — 36415 COLL VENOUS BLD VENIPUNCTURE: CPT

## 2023-09-12 PROCEDURE — 83880 ASSAY OF NATRIURETIC PEPTIDE: CPT

## 2023-09-12 PROCEDURE — 6360000002 HC RX W HCPCS: Performed by: EMERGENCY MEDICINE

## 2023-09-12 RX ORDER — FUROSEMIDE 10 MG/ML
80 INJECTION INTRAMUSCULAR; INTRAVENOUS ONCE
Status: COMPLETED | OUTPATIENT
Start: 2023-09-12 | End: 2023-09-12

## 2023-09-12 RX ADMIN — CEFTRIAXONE SODIUM 2000 MG: 2 INJECTION, POWDER, FOR SOLUTION INTRAMUSCULAR; INTRAVENOUS at 23:39

## 2023-09-12 RX ADMIN — FUROSEMIDE 80 MG: 10 INJECTION, SOLUTION INTRAMUSCULAR; INTRAVENOUS at 21:50

## 2023-09-12 ASSESSMENT — ENCOUNTER SYMPTOMS
GASTROINTESTINAL NEGATIVE: 1
EYES NEGATIVE: 1
SHORTNESS OF BREATH: 1
COUGH: 1

## 2023-09-13 VITALS
HEIGHT: 68 IN | HEART RATE: 70 BPM | RESPIRATION RATE: 17 BRPM | OXYGEN SATURATION: 94 % | WEIGHT: 245.44 LBS | DIASTOLIC BLOOD PRESSURE: 79 MMHG | BODY MASS INDEX: 37.2 KG/M2 | TEMPERATURE: 97.7 F | SYSTOLIC BLOOD PRESSURE: 135 MMHG

## 2023-09-13 PROBLEM — J81.0 ACUTE PULMONARY EDEMA (HCC): Status: ACTIVE | Noted: 2023-09-13

## 2023-09-13 LAB
GLUCOSE BLD-MCNC: 136 MG/DL (ref 70–99)
GLUCOSE BLD-MCNC: 184 MG/DL (ref 70–99)
PERFORMED ON: ABNORMAL
PERFORMED ON: ABNORMAL

## 2023-09-13 PROCEDURE — 94150 VITAL CAPACITY TEST: CPT

## 2023-09-13 PROCEDURE — 94760 N-INVAS EAR/PLS OXIMETRY 1: CPT

## 2023-09-13 PROCEDURE — G0378 HOSPITAL OBSERVATION PER HR: HCPCS

## 2023-09-13 PROCEDURE — 6360000002 HC RX W HCPCS: Performed by: HOSPITALIST

## 2023-09-13 PROCEDURE — 2580000003 HC RX 258: Performed by: HOSPITALIST

## 2023-09-13 PROCEDURE — 2700000000 HC OXYGEN THERAPY PER DAY

## 2023-09-13 PROCEDURE — 87040 BLOOD CULTURE FOR BACTERIA: CPT

## 2023-09-13 PROCEDURE — 36415 COLL VENOUS BLD VENIPUNCTURE: CPT

## 2023-09-13 PROCEDURE — 6370000000 HC RX 637 (ALT 250 FOR IP): Performed by: HOSPITALIST

## 2023-09-13 PROCEDURE — 82962 GLUCOSE BLOOD TEST: CPT

## 2023-09-13 RX ORDER — ACETAMINOPHEN 650 MG/1
650 SUPPOSITORY RECTAL EVERY 4 HOURS PRN
Status: DISCONTINUED | OUTPATIENT
Start: 2023-09-13 | End: 2023-09-13 | Stop reason: HOSPADM

## 2023-09-13 RX ORDER — ONDANSETRON 4 MG/1
4 TABLET, ORALLY DISINTEGRATING ORAL EVERY 8 HOURS PRN
Status: DISCONTINUED | OUTPATIENT
Start: 2023-09-13 | End: 2023-09-13 | Stop reason: HOSPADM

## 2023-09-13 RX ORDER — ONDANSETRON 2 MG/ML
4 INJECTION INTRAMUSCULAR; INTRAVENOUS EVERY 6 HOURS PRN
Status: DISCONTINUED | OUTPATIENT
Start: 2023-09-13 | End: 2023-09-13 | Stop reason: HOSPADM

## 2023-09-13 RX ORDER — CLONIDINE HYDROCHLORIDE 0.1 MG/1
0.1 TABLET ORAL 2 TIMES DAILY
Status: DISCONTINUED | OUTPATIENT
Start: 2023-09-13 | End: 2023-09-13 | Stop reason: HOSPADM

## 2023-09-13 RX ORDER — CALCIUM CARBONATE 500(1250)
500 TABLET ORAL DAILY
Status: DISCONTINUED | OUTPATIENT
Start: 2023-09-13 | End: 2023-09-13 | Stop reason: HOSPADM

## 2023-09-13 RX ORDER — ACETAMINOPHEN 325 MG/1
650 TABLET ORAL EVERY 4 HOURS PRN
Status: DISCONTINUED | OUTPATIENT
Start: 2023-09-13 | End: 2023-09-13 | Stop reason: HOSPADM

## 2023-09-13 RX ORDER — MECOBALAMIN 5000 MCG
5 TABLET,DISINTEGRATING ORAL NIGHTLY PRN
Status: DISCONTINUED | OUTPATIENT
Start: 2023-09-13 | End: 2023-09-13 | Stop reason: HOSPADM

## 2023-09-13 RX ORDER — SODIUM CHLORIDE 0.9 % (FLUSH) 0.9 %
5-40 SYRINGE (ML) INJECTION EVERY 12 HOURS SCHEDULED
Status: DISCONTINUED | OUTPATIENT
Start: 2023-09-13 | End: 2023-09-13 | Stop reason: HOSPADM

## 2023-09-13 RX ORDER — CALCIUM CARBONATE 500 MG/1
500 TABLET, CHEWABLE ORAL 3 TIMES DAILY PRN
Status: DISCONTINUED | OUTPATIENT
Start: 2023-09-13 | End: 2023-09-13 | Stop reason: HOSPADM

## 2023-09-13 RX ORDER — HYDRALAZINE HYDROCHLORIDE 25 MG/1
25 TABLET, FILM COATED ORAL EVERY 8 HOURS SCHEDULED
Status: DISCONTINUED | OUTPATIENT
Start: 2023-09-13 | End: 2023-09-13 | Stop reason: HOSPADM

## 2023-09-13 RX ORDER — SEVELAMER CARBONATE 800 MG/1
800 TABLET, FILM COATED ORAL
Status: DISCONTINUED | OUTPATIENT
Start: 2023-09-13 | End: 2023-09-13 | Stop reason: HOSPADM

## 2023-09-13 RX ORDER — POLYETHYLENE GLYCOL 3350 17 G/17G
17 POWDER, FOR SOLUTION ORAL DAILY PRN
Status: DISCONTINUED | OUTPATIENT
Start: 2023-09-13 | End: 2023-09-13 | Stop reason: HOSPADM

## 2023-09-13 RX ORDER — NIFEDIPINE 30 MG/1
60 TABLET, EXTENDED RELEASE ORAL 2 TIMES DAILY
Status: DISCONTINUED | OUTPATIENT
Start: 2023-09-13 | End: 2023-09-13 | Stop reason: HOSPADM

## 2023-09-13 RX ORDER — ALBUTEROL SULFATE 2.5 MG/3ML
2.5 SOLUTION RESPIRATORY (INHALATION) EVERY 4 HOURS PRN
Status: DISCONTINUED | OUTPATIENT
Start: 2023-09-13 | End: 2023-09-13 | Stop reason: HOSPADM

## 2023-09-13 RX ORDER — SIMVASTATIN 10 MG
10 TABLET ORAL NIGHTLY
Status: DISCONTINUED | OUTPATIENT
Start: 2023-09-13 | End: 2023-09-13 | Stop reason: HOSPADM

## 2023-09-13 RX ORDER — HEPARIN SODIUM 5000 [USP'U]/ML
5000 INJECTION, SOLUTION INTRAVENOUS; SUBCUTANEOUS EVERY 8 HOURS SCHEDULED
Status: DISCONTINUED | OUTPATIENT
Start: 2023-09-13 | End: 2023-09-13 | Stop reason: HOSPADM

## 2023-09-13 RX ORDER — CINACALCET 30 MG/1
30 TABLET, FILM COATED ORAL DAILY
Status: DISCONTINUED | OUTPATIENT
Start: 2023-09-13 | End: 2023-09-13 | Stop reason: HOSPADM

## 2023-09-13 RX ORDER — SODIUM CHLORIDE 0.9 % (FLUSH) 0.9 %
5-40 SYRINGE (ML) INJECTION PRN
Status: DISCONTINUED | OUTPATIENT
Start: 2023-09-13 | End: 2023-09-13 | Stop reason: HOSPADM

## 2023-09-13 RX ORDER — SODIUM CHLORIDE 9 MG/ML
INJECTION, SOLUTION INTRAVENOUS PRN
Status: DISCONTINUED | OUTPATIENT
Start: 2023-09-13 | End: 2023-09-13 | Stop reason: HOSPADM

## 2023-09-13 RX ADMIN — Medication 5 MG: at 02:31

## 2023-09-13 RX ADMIN — SEVELAMER CARBONATE 800 MG: 800 TABLET, FILM COATED ORAL at 08:23

## 2023-09-13 RX ADMIN — SODIUM CHLORIDE, PRESERVATIVE FREE 10 ML: 5 INJECTION INTRAVENOUS at 08:23

## 2023-09-13 RX ADMIN — HYDRALAZINE HYDROCHLORIDE 25 MG: 25 TABLET, FILM COATED ORAL at 05:02

## 2023-09-13 RX ADMIN — HEPARIN SODIUM 5000 UNITS: 5000 INJECTION INTRAVENOUS; SUBCUTANEOUS at 05:02

## 2023-09-13 SDOH — ECONOMIC STABILITY: INCOME INSECURITY: HOW HARD IS IT FOR YOU TO PAY FOR THE VERY BASICS LIKE FOOD, HOUSING, MEDICAL CARE, AND HEATING?: NOT HARD AT ALL

## 2023-09-13 SDOH — ECONOMIC STABILITY: FOOD INSECURITY: WITHIN THE PAST 12 MONTHS, YOU WORRIED THAT YOUR FOOD WOULD RUN OUT BEFORE YOU GOT MONEY TO BUY MORE.: NEVER TRUE

## 2023-09-13 SDOH — ECONOMIC STABILITY: INCOME INSECURITY: IN THE PAST 12 MONTHS, HAS THE ELECTRIC, GAS, OIL, OR WATER COMPANY THREATENED TO SHUT OFF SERVICE IN YOUR HOME?: NO

## 2023-09-13 ASSESSMENT — PATIENT HEALTH QUESTIONNAIRE - PHQ9
SUM OF ALL RESPONSES TO PHQ9 QUESTIONS 1 & 2: 0
1. LITTLE INTEREST OR PLEASURE IN DOING THINGS: 0
SUM OF ALL RESPONSES TO PHQ QUESTIONS 1-9: 0
2. FEELING DOWN, DEPRESSED OR HOPELESS: 0
SUM OF ALL RESPONSES TO PHQ QUESTIONS 1-9: 0

## 2023-09-13 ASSESSMENT — PAIN SCALES - GENERAL
PAINLEVEL_OUTOF10: 0
PAINLEVEL_OUTOF10: 0

## 2023-09-13 NOTE — PLAN OF CARE
Problem: Discharge Planning  Goal: Discharge to home or other facility with appropriate resources  9/13/2023 1145 by Fausto Quiñonez RN  Outcome: Progressing  9/13/2023 0212 by Shayne Prado RN  Outcome: Progressing  Flowsheets (Taken 9/13/2023 0210)  Discharge to home or other facility with appropriate resources:   Identify barriers to discharge with patient and caregiver   Refer to discharge planning if patient needs post-hospital services based on physician order or complex needs related to functional status, cognitive ability or social support system     Problem: Safety - Adult  Goal: Free from fall injury  9/13/2023 1145 by Fausto Quiñonez RN  Outcome: Progressing  9/13/2023 0212 by Shayne Prado RN  Outcome: Progressing     Problem: ABCDS Injury Assessment  Goal: Absence of physical injury  9/13/2023 1145 by Fausto Quiñonez RN  Outcome: Progressing  9/13/2023 0212 by Shayne Prado RN  Outcome: Progressing  Flowsheets (Taken 9/13/2023 0207)  Absence of Physical Injury: Implement safety measures based on patient assessment     Problem: Respiratory - Adult  Goal: Achieves optimal ventilation and oxygenation  9/13/2023 1145 by Fausto Quiñonez RN  Outcome: Progressing  9/13/2023 0212 by Shayne Prado RN  Outcome: Progressing

## 2023-09-13 NOTE — H&P
abnormalities. Mild-moderate concentric left ventricular hypertrophy. Normal diastolic filling pattern. Normal right ventricular size with preserved RV function. Mildly dilated left atrium. No significant valvular regurgitation or stenosis. Aortic root is within normal limits. Mildly dilated ascending aorta (measured at 3.6 cm). IVC normal.   No evidence of significant pericardial effusion is noted. The rhythm is sinus. Signature      ----------------------------------------------------------------   Electronically signed by Jessica Encarnacion(Interpreting physician)   on 06/29/2023 12:57 PM   ----------------------------------------------------------------     Assessment/Plan:  Principal Problem:    Acute hypoxemic respiratory failure ([address])  Active Problems:    Hypertension    Acute on chronic diastolic heart failure ([address])    ESRD on hemodialysis ([address])  Resolved Problems:    * No resolved hospital problems. *     Principal Problem:    Acute hypoxemia/Acute on chronic diastolic heart failure (HCC)  -rocephin 2g iv q24hrs  -procalcitonin   -I's and O's  -daily weights  -trend serial troponin   -follow blood cultures   -incentive spirometry q2hrs   -monitor for increasing supplemental      oxygen requirements   -albuterol neb q4hrs prn  Active Problems:    Hypertension  -monitor blood pressure  -continue antihypertensive meds  -avoid hypotension    ESRD on hemodialysis [facility])  -consult nephrology  -monitor lytes  Resolved Problems:    * No resolved hospital problems.  *  Signed:  SOSA Rubalcava - CNP, 9/12/2023 11:16 PM

## 2023-09-13 NOTE — PROGRESS NOTES
4 Eyes Skin Assessment     NAME:  Lucila Ellsworth  YOB: 1970  MEDICAL RECORD NUMBER:  [id number]    The patient is being assessed for  Admission    I agree that at least one RN has performed a thorough Head to Toe Skin Assessment on the patient. ALL assessment sites listed below have been assessed. Areas assessed by both nurses:    Head, Face, Ears, Shoulders, Back, Chest, Arms, Elbows, Hands, Sacrum. Buttock, Coccyx, Ischium, Legs. Feet and Heels, and Under Medical Devices         Does the Patient have a Wound?  No noted wound(s)       Vitaliy Prevention initiated by RN: No  Wound Care Orders initiated by RN: No    Pressure Injury (Stage 3,4, Unstageable, DTI, NWPT, and Complex wounds) if present, place Wound referral order by RN under : No    New Ostomies, if present place, Ostomy referral order under : No     Nurse 1 eSignature: Electronically signed by Sabrina Shelley RN on 9/13/23 at 2:20 AM CDT    **SHARE this note so that the co-signing nurse can place an eSignature**    Nurse 2 eSignature: Electronically signed by Rosario Benton RN on 9/13/23 at 2:20 AM CDT

## 2023-09-13 NOTE — PLAN OF CARE
Problem: Discharge Planning  Goal: Discharge to home or other facility with appropriate resources  Outcome: Progressing  Flowsheets (Taken 9/13/2023 0210)  Discharge to home or other facility with appropriate resources:   Identify barriers to discharge with patient and caregiver   Refer to discharge planning if patient needs post-hospital services based on physician order or complex needs related to functional status, cognitive ability or social support system     Problem: Safety - Adult  Goal: Free from fall injury  Outcome: Progressing     Problem: ABCDS Injury Assessment  Goal: Absence of physical injury  Outcome: Progressing  Flowsheets (Taken 9/13/2023 0207)  Absence of Physical Injury: Implement safety measures based on patient assessment     Problem: Respiratory - Adult  Goal: Achieves optimal ventilation and oxygenation  Outcome: Progressing

## 2023-09-13 NOTE — CARE COORDINATION
SW attempted to discuss with the pt the [facility] letter. Pt had already discharged before the Sw arrived to the room.

## 2023-09-13 NOTE — ED PROVIDER NOTES
MEDICAL HISTORY     Past Medical History:   Diagnosis Date    Asthma     CHF (congestive heart failure) ([address])     Diabetes mellitus ([address])     Erectile dysfunction     Hemodialysis patient ([address])     monday, tuesday, thursday, and friday at home. hd    History of blood transfusion     Hypertension     Obesity     Palliative care patient 01/25/2021         SURGICAL HISTORY       Past Surgical History:   Procedure Laterality Date    BRONCHOSCOPY Left 05/24/2023    BRONCHOSCOPY ADD ON COMPUTER ASSISTED performed by Tony Angel MD at [facility]    BRONCHOSCOPY  05/24/2023    BRONCHOSCOPY ALVEOLAR LAVAGE ROBOTIC performed by Tony Angel MD at [facility]    BRONCHOSCOPY  05/24/2023    BRONCHOSCOPY BIOPSY BRONCHUS ROBOTIC performed by Tony Angel MD at [facility]    COLONOSCOPY N/A 12/05/2019    Dr Carlos Ring, internal hemorrhoids-Grade 1-2-HP, 3 yr recall    COLONOSCOPY N/A 12/19/2022    Dr Srikanth Mccann, Diffuse melanosis coli, mild diverticulosis left colon, Int hem Gr 1-2, inadequate-fair to poor prep, repeat 12-22-22    COLONOSCOPY N/A 12/22/2022    Dr Srikanth Mccann, W Ablation 5 sm 3-4 mm sessile benign appearing polyps in sig colon, int hem Gr 1, 3 year recall    DIALYSIS FISTULA CREATION Left 06/26/2020    LEFT BRACHIAL / CEPHALIC AV FISTULA performed by Tasneem Ruiz MD at [address] [place] [address] Ave      [facility] - removal of first cath due to infection and placement of new one    TUNNELED VENOUS CATHETER PLACEMENT  01/2020    [facility]    VASCULAR SURGERY  11/11/2020    [facility].  Removal of tunneled dialysis catheter left internal jugular vein    VASCULAR SURGERY  07/26/2021    [facility]- Left upper extremity fistulogram's including venography the superior cava, Balloon angioplasty left proximal upper arm cephalic vein stenosis with 7 mm x 80 mm conquest, 7 mm x 100 mm Lutonix, 8 mm x 60 mm Lutonix drug-coated balloon, Completion venograms left upper

## 2023-09-13 NOTE — CONSULTS
Renal Consult Note    Thank you to requesting provider: Dr Aniya Orr, for asking us to see [address]    Reason for consultation: ESRD     Chief Complaint:  Dyspnea. History of Presenting Illness      Patient is a 48-year-old -American pleasant man with a past medical history of hypertension, type 2 diabetes, obesity and end-stage renal disease. He has been on chronic maintenance hemodialysis thrice weekly. He denies missing any dialysis treatment. He was complaining of cough with some dyspnea. He reported to the ED and his pulse ox was 85% on room air. He was placed on some oxygen and his pulse ox is improved to 94%. He was subsequently admitted for further observation and management. Renal service was consulted to manage his ESRD. Patient states that he has been losing weight.     Past Medical/Surgical History      Active Ambulatory Problems     Diagnosis Date Noted    Hypertension 05/10/2017    Mild intermittent asthma without complication 48/05/1178    Family history of prostate cancer 05/10/2017    Morbid obesity due to excess calories ([address]) 05/10/2017    Diabetes ([address]) 01/29/2020    Acute on chronic diastolic heart failure ([address]) 04/30/2020    ESRD on hemodialysis ([address]) 05/21/2020    Erectile dysfunction due to arterial insufficiency     Pulmonary edema 01/25/2021    Volume overload 01/25/2021    Palliative care patient 87/58/1483    Complication of AV dialysis fistula 07/26/2021    Dependence on renal dialysis ([address]) 05/06/2020    Cavitary pneumonia 05/21/2023    Lung infiltrate 05/21/2023    Hemorrhage of arteriovenous fistula ([address]) 86/86/2632    Complication of AV dialysis fistula, initial encounter 08/09/2023    Bacteremia 08/10/2023    Blood loss anemia 08/10/2023    Hyperkalemia 08/10/2023     Resolved Ambulatory Problems     Diagnosis Date Noted    Acute renal failure ([address]) 69/26/8824    Acute diastolic (congestive) heart failure ([address]) 01/31/2020    RUPAL (acute kidney injury) ([address])

## 2023-09-13 NOTE — CARE COORDINATION
09/13/23 7505   Readmission Assessment   Number of Days since last admission? 8-30 days   Previous Disposition Home with Family   Who is being Sana Legacy  (medical records; RN)   What was the patient's/caregiver's perception as to why they think they needed to return back to the hospital? Other (Comment)  (SOB and cough)   Did you visit your Primary Care Physician after you left the hospital, before you returned this time? Yes   Did you see a specialist, such as Cardiac, Pulmonary, Orthopedic Physician, etc. after you left the hospital? Yes   Who advised the patient to return to the hospital? Self-referral   Does the patient report anything that got in the way of taking their medications? No   In our efforts to provide the best possible care to you and others like you, can you think of anything that we could have done to help you after you left the hospital the first time, so that you might not have needed to return so soon? Other (Comment)  (likely unavoidable;  Pt with SOB)     Electronically signed by CARMEN Jasso on 9/13/2023 at 9:25 AM

## 2023-09-13 NOTE — PLAN OF CARE
Problem: Discharge Planning  Goal: Discharge to home or other facility with appropriate resources  9/13/2023 1151 by Vito Riedel, RN  Outcome: Completed  9/13/2023 1145 by Vito Riedel, RN  Outcome: Progressing  9/13/2023 0212 by Theodore Vera RN  Outcome: Progressing  Flowsheets (Taken 9/13/2023 0210)  Discharge to home or other facility with appropriate resources:   Identify barriers to discharge with patient and caregiver   Refer to discharge planning if patient needs post-hospital services based on physician order or complex needs related to functional status, cognitive ability or social support system     Problem: Safety - Adult  Goal: Free from fall injury  9/13/2023 1151 by Vito Riedel, RN  Outcome: Completed  9/13/2023 1145 by Vito Riedel, RN  Outcome: Progressing  9/13/2023 0212 by Theodore Vera RN  Outcome: Progressing     Problem: ABCDS Injury Assessment  Goal: Absence of physical injury  9/13/2023 1151 by Vito Riedel, RN  Outcome: Completed  9/13/2023 1145 by Vito Riedel, RN  Outcome: Progressing  9/13/2023 0212 by Theodore Vera RN  Outcome: Progressing  Flowsheets (Taken 9/13/2023 0207)  Absence of Physical Injury: Implement safety measures based on patient assessment     Problem: Respiratory - Adult  Goal: Achieves optimal ventilation and oxygenation  9/13/2023 1151 by Vito Riedel, RN  Outcome: Completed  9/13/2023 1145 by Vito Riedel, RN  Outcome: Progressing  9/13/2023 0212 by Theodore Vera RN  Outcome: Progressing

## 2023-09-14 ENCOUNTER — TELEPHONE (OUTPATIENT)
Dept: INTERNAL MEDICINE | Age: 53
End: 2023-09-14

## 2023-09-14 LAB
BACTERIA BLD CULT ORG #2: NORMAL
BACTERIA BLD CULT: NORMAL

## 2023-09-14 RX ORDER — ALBUTEROL SULFATE 90 UG/1
2 AEROSOL, METERED RESPIRATORY (INHALATION) EVERY 6 HOURS PRN
Qty: 1 EACH | Refills: 3 | Status: SHIPPED | OUTPATIENT
Start: 2023-09-14

## 2023-09-14 NOTE — TELEPHONE ENCOUNTER
[address] [facility],[address] Floor Transitions Initial Follow Up Call    Outreach made within 2 business days of discharge: Yes    Patient: Malik Neri   Patient : 1970 MRN: [id number]    Reason for Admission: SOB    Discharge Date: 23      Discharge Diagnoses:  Principal Problem:    Pulmonary edema  Active Problems:    Hypertension    Acute on chronic diastolic heart failure ([address])    ESRD on hemodialysis ([address])    Acute pulmonary edema ([address])  Resolved Problems:    * No resolved hospital problems     Spoke with: Patient    Discharge department/facility: [address] [place] [address] Interactive Patient Contact:  Was patient able to fill all prescriptions: Yes  Was patient instructed to bring all medications to the follow-up visit: Yes  Is patient taking all medications as directed in the discharge summary? Yes  Does patient understand their discharge instructions: Yes  Does patient have questions or concerns that need addressed prior to 7-14 day follow up office visit: no    Spoke to the patient he states he is doing much better. He is not having any shortness of breath at this time. He did not need home health and is eating his normal diet. Pt did need a refill on his inhaler sent in. I have pended that to [place].  Pt is scheduled for 23    RECORDS IN CHART  PT    Scheduled appointment with PCP within 7-14 days    Follow Up  Future Appointments   Date Time Provider [address]   2023  2:45 PM Hao Ley APRN - CNP [facility]   10/23/2023  8:00 AM [facility]   11/3/2023  8:15 AM Naveed Toussaint APRN - CNP [facility]   2023  7:15 AM Naveed Toussaint APRN - CNP [facility]   2023  9:15 AM SOSA Luther [facility]   1/10/2024  8:20 AM [facility] CT RM 1 [facility] CT [facility] Rad   2024  9:45 AM [facility]       [place], [place]

## 2023-09-14 NOTE — TELEPHONE ENCOUNTER
Milagros Mendosa called to request a refill on his medication.       Last office visit : Visit date not found   Next office visit : Visit date not found     Requested Prescriptions     Pending Prescriptions Disp Refills    albuterol sulfate HFA (PROVENTIL;VENTOLIN;PROAIR) 108 (90 Base) MCG/ACT inhaler 1 each 3     Sig: Inhale 2 puffs into the lungs every 6 hours as needed for Wheezing            Cynthia Harding MA

## 2023-09-18 LAB
BACTERIA BLD CULT ORG #2: NORMAL
BACTERIA BLD CULT: NORMAL

## 2023-09-19 ENCOUNTER — OFFICE VISIT (OUTPATIENT)
Dept: PRIMARY CARE CLINIC | Age: 53
End: 2023-09-19

## 2023-09-19 VITALS
WEIGHT: 240.8 LBS | HEART RATE: 68 BPM | SYSTOLIC BLOOD PRESSURE: 138 MMHG | OXYGEN SATURATION: 96 % | TEMPERATURE: 97.8 F | HEIGHT: 67 IN | BODY MASS INDEX: 37.79 KG/M2 | DIASTOLIC BLOOD PRESSURE: 80 MMHG

## 2023-09-19 DIAGNOSIS — I10 ESSENTIAL HYPERTENSION: ICD-10-CM

## 2023-09-19 DIAGNOSIS — E66.01 SEVERE OBESITY (BMI 35.0-39.9) WITH COMORBIDITY (HCC): ICD-10-CM

## 2023-09-19 DIAGNOSIS — N18.6 ESRD (END STAGE RENAL DISEASE) (HCC): ICD-10-CM

## 2023-09-19 DIAGNOSIS — I50.33 ACUTE ON CHRONIC DIASTOLIC HEART FAILURE (HCC): ICD-10-CM

## 2023-09-19 DIAGNOSIS — Z09 HOSPITAL DISCHARGE FOLLOW-UP: Primary | ICD-10-CM

## 2023-09-19 NOTE — PROGRESS NOTES
([address])    Acute on chronic diastolic heart failure ([address])    ESRD on hemodialysis [facility])    Erectile dysfunction due to arterial insufficiency    Pulmonary edema    Volume overload    Palliative care patient    Complication of AV dialysis fistula    Dependence on renal dialysis ([address])    Cavitary pneumonia    Lung infiltrate    Hemorrhage of arteriovenous fistula (HCC)    Complication of AV dialysis fistula, initial encounter    Bacteremia    Blood loss anemia    Hyperkalemia    Acute pulmonary edema ([address])       Medications listed as ordered at the time of discharge from hospital     Medication List            Accurate as of September 19, 2023 11:59 PM. If you have any questions, ask your nurse or doctor.                 CONTINUE taking these medications      albuterol sulfate  (90 Base) MCG/ACT inhaler  Commonly known as: PROVENTIL;VENTOLIN;PROAIR  Inhale 2 puffs into the lungs every 6 hours as needed for Wheezing     Auryxia 1  MG(Fe) Tabs tablet  Generic drug: ferric citrate     calcium carbonate 1250 (500 Ca) MG tablet  Commonly known as: OYSTER SHELL CALCIUM 500 mg     cinacalcet 30 MG tablet  Commonly known as: SENSIPAR     cloNIDine 0.1 MG tablet  Commonly known as: CATAPRES  Take 1 tablet by mouth twice daily     hydrALAZINE 25 MG tablet  Commonly known as: APRESOLINE  Take 1 tablet by mouth every 8 hours     HYDROcodone-acetaminophen 5-325 MG per tablet  Commonly known as: NORCO     metoprolol tartrate 25 MG tablet  Commonly known as: LOPRESSOR  Take 1 tablet by mouth twice daily     NIFEdipine 60 MG extended release tablet  Commonly known as: ADALAT CC  Take 1 tablet by mouth twice daily     simvastatin 10 MG tablet  Commonly known as: ZOCOR  Take 1 tablet by mouth nightly     VITAMIN B 12 PO                Medications marked \"taking\" at this time  Outpatient Medications Marked as Taking for the 9/19/23 encounter (Office Visit) with SOSA Ervin CNP   Medication Sig Dispense Refill

## 2023-10-17 RX ORDER — HYDRALAZINE HYDROCHLORIDE 25 MG/1
25 TABLET, FILM COATED ORAL EVERY 8 HOURS SCHEDULED
Qty: 90 TABLET | Refills: 0 | Status: SHIPPED | OUTPATIENT
Start: 2023-10-17

## 2023-10-17 NOTE — TELEPHONE ENCOUNTER
Bren Chelsea called to request a refill on his medication.       Last office visit : 9/19/2023   Next office visit : 10/23/2023     Requested Prescriptions     Pending Prescriptions Disp Refills    hydrALAZINE (APRESOLINE) 25 MG tablet 90 tablet 0     Sig: Take 1 tablet by mouth every 8 hours            Octavia Gaitan LPN

## 2023-10-23 ENCOUNTER — OFFICE VISIT (OUTPATIENT)
Dept: PRIMARY CARE CLINIC | Age: 53
End: 2023-10-23
Payer: MEDICARE

## 2023-10-23 VITALS
OXYGEN SATURATION: 98 % | TEMPERATURE: 98 F | HEIGHT: 67 IN | WEIGHT: 240 LBS | DIASTOLIC BLOOD PRESSURE: 80 MMHG | BODY MASS INDEX: 37.67 KG/M2 | SYSTOLIC BLOOD PRESSURE: 130 MMHG | HEART RATE: 64 BPM

## 2023-10-23 DIAGNOSIS — Z99.2 TYPE 2 DIABETES MELLITUS WITH CHRONIC KIDNEY DISEASE ON CHRONIC DIALYSIS, WITHOUT LONG-TERM CURRENT USE OF INSULIN (HCC): Primary | ICD-10-CM

## 2023-10-23 DIAGNOSIS — E11.22 TYPE 2 DIABETES MELLITUS WITH CHRONIC KIDNEY DISEASE ON CHRONIC DIALYSIS, WITHOUT LONG-TERM CURRENT USE OF INSULIN (HCC): Primary | ICD-10-CM

## 2023-10-23 DIAGNOSIS — Z23 FLU VACCINE NEED: ICD-10-CM

## 2023-10-23 DIAGNOSIS — I10 ESSENTIAL HYPERTENSION: ICD-10-CM

## 2023-10-23 DIAGNOSIS — N18.6 ESRD (END STAGE RENAL DISEASE) (HCC): ICD-10-CM

## 2023-10-23 DIAGNOSIS — N18.6 TYPE 2 DIABETES MELLITUS WITH CHRONIC KIDNEY DISEASE ON CHRONIC DIALYSIS, WITHOUT LONG-TERM CURRENT USE OF INSULIN (HCC): Primary | ICD-10-CM

## 2023-10-23 LAB — HBA1C MFR BLD: 5 %

## 2023-10-23 PROCEDURE — 3079F DIAST BP 80-89 MM HG: CPT | Performed by: NURSE PRACTITIONER

## 2023-10-23 PROCEDURE — 3075F SYST BP GE 130 - 139MM HG: CPT | Performed by: NURSE PRACTITIONER

## 2023-10-23 PROCEDURE — 3044F HG A1C LEVEL LT 7.0%: CPT | Performed by: NURSE PRACTITIONER

## 2023-10-23 PROCEDURE — 90674 CCIIV4 VAC NO PRSV 0.5 ML IM: CPT | Performed by: NURSE PRACTITIONER

## 2023-10-23 PROCEDURE — G0008 ADMIN INFLUENZA VIRUS VAC: HCPCS | Performed by: NURSE PRACTITIONER

## 2023-10-23 PROCEDURE — 99214 OFFICE O/P EST MOD 30 MIN: CPT | Performed by: NURSE PRACTITIONER

## 2023-10-23 PROCEDURE — 90471 IMMUNIZATION ADMIN: CPT | Performed by: NURSE PRACTITIONER

## 2023-10-23 ASSESSMENT — ENCOUNTER SYMPTOMS
ABDOMINAL PAIN: 0
CHEST TIGHTNESS: 0
SHORTNESS OF BREATH: 0
COLOR CHANGE: 0
COUGH: 0
SORE THROAT: 0
VOMITING: 0
DIARRHEA: 0
NAUSEA: 0

## 2023-11-13 ENCOUNTER — OFFICE VISIT (OUTPATIENT)
Dept: PRIMARY CARE CLINIC | Age: 53
End: 2023-11-13
Payer: MEDICARE

## 2023-11-13 VITALS
BODY MASS INDEX: 39.33 KG/M2 | TEMPERATURE: 98.6 F | DIASTOLIC BLOOD PRESSURE: 80 MMHG | HEIGHT: 67 IN | OXYGEN SATURATION: 97 % | HEART RATE: 51 BPM | WEIGHT: 250.6 LBS | SYSTOLIC BLOOD PRESSURE: 132 MMHG

## 2023-11-13 DIAGNOSIS — Z99.2 TYPE 2 DIABETES MELLITUS WITH CHRONIC KIDNEY DISEASE ON CHRONIC DIALYSIS, WITHOUT LONG-TERM CURRENT USE OF INSULIN (HCC): ICD-10-CM

## 2023-11-13 DIAGNOSIS — Z99.2 ESRD ON HEMODIALYSIS (HCC): ICD-10-CM

## 2023-11-13 DIAGNOSIS — N18.6 ESRD ON HEMODIALYSIS (HCC): ICD-10-CM

## 2023-11-13 DIAGNOSIS — I10 ESSENTIAL HYPERTENSION: ICD-10-CM

## 2023-11-13 DIAGNOSIS — Z00.00 MEDICARE ANNUAL WELLNESS VISIT, SUBSEQUENT: Primary | ICD-10-CM

## 2023-11-13 DIAGNOSIS — N18.6 TYPE 2 DIABETES MELLITUS WITH CHRONIC KIDNEY DISEASE ON CHRONIC DIALYSIS, WITHOUT LONG-TERM CURRENT USE OF INSULIN (HCC): ICD-10-CM

## 2023-11-13 DIAGNOSIS — E11.22 TYPE 2 DIABETES MELLITUS WITH CHRONIC KIDNEY DISEASE ON CHRONIC DIALYSIS, WITHOUT LONG-TERM CURRENT USE OF INSULIN (HCC): ICD-10-CM

## 2023-11-13 PROCEDURE — 3075F SYST BP GE 130 - 139MM HG: CPT | Performed by: NURSE PRACTITIONER

## 2023-11-13 PROCEDURE — 3044F HG A1C LEVEL LT 7.0%: CPT | Performed by: NURSE PRACTITIONER

## 2023-11-13 PROCEDURE — 3079F DIAST BP 80-89 MM HG: CPT | Performed by: NURSE PRACTITIONER

## 2023-11-13 PROCEDURE — G0439 PPPS, SUBSEQ VISIT: HCPCS | Performed by: NURSE PRACTITIONER

## 2023-11-13 ASSESSMENT — PATIENT HEALTH QUESTIONNAIRE - PHQ9
SUM OF ALL RESPONSES TO PHQ QUESTIONS 1-9: 0
1. LITTLE INTEREST OR PLEASURE IN DOING THINGS: 0
SUM OF ALL RESPONSES TO PHQ QUESTIONS 1-9: 0
2. FEELING DOWN, DEPRESSED OR HOPELESS: 0
SUM OF ALL RESPONSES TO PHQ9 QUESTIONS 1 & 2: 0
SUM OF ALL RESPONSES TO PHQ QUESTIONS 1-9: 0
SUM OF ALL RESPONSES TO PHQ QUESTIONS 1-9: 0

## 2023-11-13 NOTE — PROGRESS NOTES
Medicare Annual Wellness Visit    Markie Hernandez is here for Medicare AWV    Assessment & Plan   Medicare annual wellness visit, subsequent  Type 2 diabetes mellitus with chronic kidney disease on chronic dialysis, without long-term current use of insulin ([address])  Essential hypertension  ESRD on hemodialysis [facility])  Recommendations for Preventive Services Due: see orders and patient instructions/AVS.  Recommended screening schedule for the next 5-10 years is provided to the patient in written form: see Patient Instructions/AVS.     Return in about 2 months (around 1/24/2024). Subjective   The following acute and/or chronic problems were also addressed today:  Is having trouble sleeping. Has tried melatonin, tylenol pm and benadryl. Is getting about 2 hours of sleep per night, does take naps during the day. Will exercise. Patient's complete Health Risk Assessment and screening values have been reviewed and are found in Flowsheets. The following problems were reviewed today and where indicated follow up appointments were made and/or referrals ordered. Positive Risk Factor Screenings with Interventions:                Opioid Risk: (Low risk score <55) Opioid risk score: 26    Patient is low risk for opioid use disorder or overdose. Last PDMP Sissy Thomas as Reviewed:  Review User Review Instant Review Result   [facility] 12/15/2022 11:18 PM     Reviewed PDMP [1]     Last Controlled Substance Monitoring Documentation      Two [facility] Office Visit from 11/13/2023 in [facility]   Periodic Controlled Substance Monitoring No signs of potential drug abuse or diversion identified.  filed at 11/13/2023 0738                Weight and Activity:  Physical Activity: Inactive (11/13/2023)    Exercise Vital Sign     Days of Exercise per Week: 0 days     Minutes of Exercise per Session: 0 min     On average, how many days per week do you engage in moderate to strenuous exercise (like a brisk walk)?: 0 days  Have you

## 2023-11-13 NOTE — PATIENT INSTRUCTIONS
Learning About Vision Tests  What are vision tests? The four most common vision tests are visual acuity tests, refraction, visual field tests, and color vision tests. Visual acuity (sharpness) tests  These tests are used: To see if you need glasses or contact lenses. To monitor an eye problem. To check an eye injury. Visual acuity tests are done as part of routine exams. You may also have this test when you get your 's license or apply for some types of jobs. Visual field tests  These tests are used: To check for vision loss in any area of your range of vision. To screen for certain eye diseases. To look for nerve damage after a stroke, head injury, or other problem that could reduce blood flow to the brain. Refraction and color tests  A refraction test is done to find the right prescription for glasses and contact lenses. A color vision test is done to check for color blindness. Color vision is often tested as part of a routine exam. You may also have this test when you apply for a job where recognizing different colors is important, such as , electronics, or the [employer]. How are vision tests done? Visual acuity test   You cover one eye at a time. You read aloud from a wall chart across the room. You read aloud from a small card that you hold in your hand. Refraction   You look into a special device. The device puts lenses of different strengths in front of each eye to see how strong your glasses or contact lenses need to be. Visual field tests   Your doctor may have you look through special machines. Or your doctor may simply have you stare straight ahead while they move a finger into and out of your field of vision. Color vision test   You look at pieces of printed test patterns in various colors. You say what number or symbol you see. Your doctor may have you trace the number or symbol using a pointer. How do these tests feel?   There is very little chance of

## 2023-11-25 DIAGNOSIS — I10 ESSENTIAL HYPERTENSION: ICD-10-CM

## 2023-11-25 RX ORDER — NIFEDIPINE 60 MG/1
TABLET, FILM COATED, EXTENDED RELEASE ORAL
Qty: 180 TABLET | Refills: 0 | Status: SHIPPED | OUTPATIENT
Start: 2023-11-25

## 2023-11-30 DIAGNOSIS — I10 ESSENTIAL HYPERTENSION: ICD-10-CM

## 2023-11-30 NOTE — TELEPHONE ENCOUNTER
Umm Cardoso called requesting a refill of the below medication which has been pended for you:     Requested Prescriptions     Pending Prescriptions Disp Refills    cloNIDine (CATAPRES) 0.1 MG tablet [Pharmacy Med Name: cloNIDine HCl 0.1 MG Oral Tablet] 60 tablet 0     Sig: Take 1 tablet by mouth twice daily    metoprolol tartrate (LOPRESSOR) 25 MG tablet [Pharmacy Med Name: Metoprolol Tartrate 25 MG Oral Tablet] 60 tablet 0     Sig: Take 1 tablet by mouth twice daily       Last Appointment Date: 11/13/2023  Next Appointment Date: 1/16/2024    Allergies   Allergen Reactions    Banana Anaphylaxis, Shortness Of Breath and Swelling    Atorvastatin Other (See Comments)     Chest pain, no muslce pains elsewhere    Lisinopril Other (See Comments)     Cough

## 2023-12-01 RX ORDER — CLONIDINE HYDROCHLORIDE 0.1 MG/1
TABLET ORAL
Qty: 60 TABLET | Refills: 1 | Status: SHIPPED | OUTPATIENT
Start: 2023-12-01

## 2023-12-07 ENCOUNTER — OFFICE VISIT (OUTPATIENT)
Dept: CARDIOLOGY CLINIC | Age: 53
End: 2023-12-07
Payer: MEDICARE

## 2023-12-07 VITALS
BODY MASS INDEX: 36.53 KG/M2 | HEART RATE: 55 BPM | DIASTOLIC BLOOD PRESSURE: 74 MMHG | WEIGHT: 241 LBS | HEIGHT: 68 IN | OXYGEN SATURATION: 97 % | SYSTOLIC BLOOD PRESSURE: 118 MMHG

## 2023-12-07 DIAGNOSIS — I10 HYPERTENSION, UNSPECIFIED TYPE: ICD-10-CM

## 2023-12-07 DIAGNOSIS — I50.32 CHRONIC DIASTOLIC CONGESTIVE HEART FAILURE (HCC): Primary | ICD-10-CM

## 2023-12-07 DIAGNOSIS — Z99.2 ESRD ON HEMODIALYSIS (HCC): ICD-10-CM

## 2023-12-07 DIAGNOSIS — N18.6 ESRD ON HEMODIALYSIS (HCC): ICD-10-CM

## 2023-12-07 PROCEDURE — 3078F DIAST BP <80 MM HG: CPT | Performed by: CLINICAL NURSE SPECIALIST

## 2023-12-07 PROCEDURE — 3074F SYST BP LT 130 MM HG: CPT | Performed by: CLINICAL NURSE SPECIALIST

## 2023-12-07 PROCEDURE — 99213 OFFICE O/P EST LOW 20 MIN: CPT | Performed by: CLINICAL NURSE SPECIALIST

## 2023-12-07 NOTE — PATIENT INSTRUCTIONS
Let us know if needing any testing for transplant work up     Maintain good blood pressure control-goal<130/80 at rest  Maintain good cholesterol control LDL goal<70 with arterial disease  If you are diabetic work to keep/obtain hemoglobin A1c< 7    Follow up in 1  year    Call with any questions or concerns  Follow up with SOSA Flannery CNP for non cardiac problems  Report any new problems  Cardiovascular Fitness-Exercise as tolerated. Strive for 30 minutes of exercise most days of the week. Cardiac / Healthy Diet- Avoid processed high fat foods, maintain low sodium/salt   Continue current medications as directed  Continue plan of treatment  It is always recommended that you bring your medications bottles with you to each visit - this is for your safety!

## 2023-12-07 NOTE — PROGRESS NOTES
[facility]  [address] [address]  Phone: [phone]  Fax: [phone]    OFFICE VISIT:  12/7/2023    Lily Vincent 951: 1970    Reason For Visit:  Mia Kohler is a 46 y.o. male who is here for 6 Month Follow-Up (No symptoms) and Congestive Heart Failure  History hypertension, diabetes, hyperlipidemia, end-stage renal disease on dialysis he does dialysis 4 days a week at home  Kidney transplant work-up in 2020 with heart catheterization showed mildly depressed LV function with EF 45% no significant valvular disease, right dominant coronary circulation with no significant disease     Patient had 2D echo done at [facility] 3/9/2023 that showed normal LV size with moderate concentric LVH. EF 70%. No regional wall abnormalities. Grade 1 diastolic dysfunction. No valvular disease  Patient establish care in June of this year. 2D echo showed normal LV size and function with EF 55 to 60%. No significant valvular disease noted    Patient was hospitalized in August for fistula bleeding. 9/12/2023 patient was hospitalized with worsening shortness of breath despite 2 L removed with dialysis. Pulmonary edema noted on chest x-ray. He is here today in follow-up. Weight has been stable. No fluid overload. Dialysis doing well at home. Blood pressures been well-controlled. No cardiac complaints    Subjective  Mia Kohler denies exertional chest pain, shortness of breath, orthopnea, paroxysmal nocturnal dyspnea, syncope, presyncope, arrhythmia, edema and fatigue. The patient denies numbness or weakness to suggest cerebrovascular accident or transient ischemic attack. SOSA Montelongo CNP is PCP and nephrology follows labs  On transplant list in [place]. Karen Pedersen has the following history as recorded in [facility]:    Patient Active Problem List    Diagnosis Date Noted    Pulmonary edema 01/25/2021    Volume overload 01/25/2021    Dependence on renal

## 2023-12-13 RX ORDER — HYDRALAZINE HYDROCHLORIDE 25 MG/1
25 TABLET, FILM COATED ORAL EVERY 8 HOURS SCHEDULED
Qty: 90 TABLET | Refills: 0 | Status: SHIPPED | OUTPATIENT
Start: 2023-12-13

## 2023-12-28 DIAGNOSIS — E78.2 MIXED HYPERLIPIDEMIA: ICD-10-CM

## 2023-12-29 RX ORDER — SIMVASTATIN 10 MG
10 TABLET ORAL NIGHTLY
Qty: 90 TABLET | Refills: 3 | Status: SHIPPED | OUTPATIENT
Start: 2023-12-29

## 2024-01-05 ENCOUNTER — TELEPHONE (OUTPATIENT)
Dept: PULMONOLOGY | Age: 54
End: 2024-01-05

## 2024-01-05 NOTE — TELEPHONE ENCOUNTER
Attempted to contact patient to reschedule appointment on 1/17/24, no answer, LVM for patient to return call to office.

## 2024-01-10 ENCOUNTER — HOSPITAL ENCOUNTER (OUTPATIENT)
Dept: CT IMAGING | Age: 54
Discharge: HOME | End: 2024-01-10
Payer: MEDICAID

## 2024-01-10 DIAGNOSIS — J18.9 CAVITARY PNEUMONIA: ICD-10-CM

## 2024-01-10 DIAGNOSIS — J98.4 CAVITARY PNEUMONIA: ICD-10-CM

## 2024-01-10 PROCEDURE — 71250 CT THORAX DX C-: CPT

## 2024-01-12 ENCOUNTER — OFFICE VISIT (OUTPATIENT)
Dept: PULMONOLOGY | Age: 54
End: 2024-01-12

## 2024-01-12 VITALS
HEIGHT: 67 IN | DIASTOLIC BLOOD PRESSURE: 76 MMHG | OXYGEN SATURATION: 98 % | WEIGHT: 238 LBS | TEMPERATURE: 97.4 F | HEART RATE: 84 BPM | SYSTOLIC BLOOD PRESSURE: 124 MMHG | BODY MASS INDEX: 37.35 KG/M2

## 2024-01-12 DIAGNOSIS — J45.20 MILD INTERMITTENT ASTHMA WITHOUT COMPLICATION: ICD-10-CM

## 2024-01-12 DIAGNOSIS — J98.4 CAVITARY PNEUMONIA: Primary | ICD-10-CM

## 2024-01-12 DIAGNOSIS — J18.9 CAVITARY PNEUMONIA: Primary | ICD-10-CM

## 2024-01-12 DIAGNOSIS — R91.8 LUNG INFILTRATE: ICD-10-CM

## 2024-01-12 DIAGNOSIS — Z99.2 ESRD ON HEMODIALYSIS (HCC): ICD-10-CM

## 2024-01-12 DIAGNOSIS — N18.6 ESRD ON HEMODIALYSIS (HCC): ICD-10-CM

## 2024-01-12 ASSESSMENT — ENCOUNTER SYMPTOMS
ABDOMINAL DISTENTION: 0
ABDOMINAL PAIN: 0
CHEST TIGHTNESS: 0
COUGH: 0
BACK PAIN: 0
RHINORRHEA: 0
ANAL BLEEDING: 0
WHEEZING: 1
SHORTNESS OF BREATH: 0
APNEA: 0

## 2024-01-12 NOTE — PROGRESS NOTES
Pulmonary and Sleep Medicine    Hema Gallagher (:  1970) is a 53 y.o. male,Established patient, here for evaluation of the following chief complaint(s):  Follow-up (6 mo follow up   chest CT)      Referring physician:  No referring provider defined for this encounter.     ASSESSMENT/PLAN:  1. Cavitary pneumonia  2. Mild intermittent asthma without complication  3. Lung infiltrate  4. ESRD on hemodialysis (HCC)        Resolution of the previously seen infiltrate in the left upper lobe.  Patient is at his baseline.  Rarely using rescue inhaler.  Continues to follow with nephrology with hemodialysis.  Will see the patient as needed.       Clarice French MD, [facility], [facility]    Return if symptoms worsen or fail to improve.    SUBJECTIVE/OBJECTIVE:  He is here for follow up on lung infiltrate post staph pneumonia.  He did have a CT of the chest yesterday.  My interpretation of the CT is that there is complete resolution of the right upper lobe infiltrate.        Prior to Visit Medications    Medication Sig Taking? Authorizing Provider   simvastatin (ZOCOR) 10 MG tablet Take 1 tablet by mouth nightly Yes Aletha Del Cid APRN - CNP   hydrALAZINE (APRESOLINE) 25 MG tablet TAKE 1 TABLET BY MOUTH EVERY 8 HOURS Yes Aletha Del Cid APRN - CNP   cloNIDine (CATAPRES) 0.1 MG tablet Take 1 tablet by mouth twice daily Yes Aletha Del Cid APRN - CNP   metoprolol tartrate (LOPRESSOR) 25 MG tablet Take 1 tablet by mouth twice daily Yes Aletha Del Cid APRN - CNP   NIFEdipine (ADALAT CC) 60 MG extended release tablet Take 1 tablet by mouth twice daily Yes Aletha Del Cid APRN - CNP   albuterol sulfate HFA (PROVENTIL;VENTOLIN;PROAIR) 108 (90 Base) MCG/ACT inhaler Inhale 2 puffs into the lungs every 6 hours as needed for Wheezing Yes Aletha Del Cid APRN - CNP   HYDROcodone-acetaminophen (NORCO) 5-325 MG per tablet Take 1 tablet by mouth every 6 hours as needed for Pain. Yes Provider, MD Susan   cinacalcet (SENSIPAR) 30 MG tablet

## 2024-01-16 ENCOUNTER — OFFICE VISIT (OUTPATIENT)
Dept: PRIMARY CARE CLINIC | Age: 54
End: 2024-01-16
Payer: MEDICARE

## 2024-01-16 VITALS
SYSTOLIC BLOOD PRESSURE: 118 MMHG | DIASTOLIC BLOOD PRESSURE: 74 MMHG | OXYGEN SATURATION: 99 % | HEART RATE: 54 BPM | TEMPERATURE: 97.3 F | WEIGHT: 239.4 LBS | HEIGHT: 67 IN | BODY MASS INDEX: 37.57 KG/M2

## 2024-01-16 DIAGNOSIS — E11.21 DIABETIC NEPHROPATHY ASSOCIATED WITH TYPE 2 DIABETES MELLITUS (HCC): ICD-10-CM

## 2024-01-16 DIAGNOSIS — N18.6 ESRD ON HEMODIALYSIS (HCC): ICD-10-CM

## 2024-01-16 DIAGNOSIS — Z99.2 ESRD ON HEMODIALYSIS (HCC): ICD-10-CM

## 2024-01-16 DIAGNOSIS — E11.22 TYPE 2 DIABETES MELLITUS WITH CHRONIC KIDNEY DISEASE ON CHRONIC DIALYSIS, WITHOUT LONG-TERM CURRENT USE OF INSULIN (HCC): ICD-10-CM

## 2024-01-16 DIAGNOSIS — Z99.2 TYPE 2 DIABETES MELLITUS WITH CHRONIC KIDNEY DISEASE ON CHRONIC DIALYSIS, WITHOUT LONG-TERM CURRENT USE OF INSULIN (HCC): ICD-10-CM

## 2024-01-16 DIAGNOSIS — N18.6 TYPE 2 DIABETES MELLITUS WITH CHRONIC KIDNEY DISEASE ON CHRONIC DIALYSIS, WITHOUT LONG-TERM CURRENT USE OF INSULIN (HCC): ICD-10-CM

## 2024-01-16 DIAGNOSIS — I10 ESSENTIAL HYPERTENSION: Primary | ICD-10-CM

## 2024-01-16 DIAGNOSIS — E66.01 SEVERE OBESITY (BMI 35.0-39.9) WITH COMORBIDITY (HCC): ICD-10-CM

## 2024-01-16 PROCEDURE — 99214 OFFICE O/P EST MOD 30 MIN: CPT | Performed by: NURSE PRACTITIONER

## 2024-01-16 PROCEDURE — 3078F DIAST BP <80 MM HG: CPT | Performed by: NURSE PRACTITIONER

## 2024-01-16 PROCEDURE — 3074F SYST BP LT 130 MM HG: CPT | Performed by: NURSE PRACTITIONER

## 2024-01-16 ASSESSMENT — PATIENT HEALTH QUESTIONNAIRE - PHQ9
SUM OF ALL RESPONSES TO PHQ QUESTIONS 1-9: 0
SUM OF ALL RESPONSES TO PHQ QUESTIONS 1-9: 0
1. LITTLE INTEREST OR PLEASURE IN DOING THINGS: 0
2. FEELING DOWN, DEPRESSED OR HOPELESS: 0
SUM OF ALL RESPONSES TO PHQ QUESTIONS 1-9: 0
SUM OF ALL RESPONSES TO PHQ QUESTIONS 1-9: 0
SUM OF ALL RESPONSES TO PHQ9 QUESTIONS 1 & 2: 0

## 2024-01-16 ASSESSMENT — ENCOUNTER SYMPTOMS
NAUSEA: 0
DIARRHEA: 0
VOMITING: 0
COLOR CHANGE: 0
SHORTNESS OF BREATH: 0
ABDOMINAL PAIN: 0
CHEST TIGHTNESS: 0
COUGH: 0
SORE THROAT: 0

## 2024-01-16 NOTE — PROGRESS NOTES
[address] [place] [address] Drive   [address] [facility], [address]     Phone:  [phone]  Fax:  [phone]      Hema Gallagher is a 53 y.o. male who presents today for his medical conditions/complaints as noted below.  Hema Gallagher is c/o of Follow-up, Diabetes, Chronic Kidney Disease, Hypertension, and Numbness      Chief Complaint   Patient presents with    Follow-up    Diabetes    Chronic Kidney Disease    Hypertension    Numbness       HPI:     HPI    Patient presents for follow up diabetes mellitus, chronic kidney disease and hypertension. Patient reports numbness that has started in toes but mainly the \"big toes.\" Is most noticeable at night. Is still working on losing weight. Blood pressure has been stable. Denies any blood sugar concerns.     Past Medical History:   Diagnosis Date    Asthma     CHF (congestive heart failure) (HCC)     Diabetes mellitus (HCC)     Erectile dysfunction     Hemodialysis patient (HCC)     monday, tuesday, thursday, and friday at home. hd    History of blood transfusion     Hypertension     Obesity     Palliative care patient 01/25/2021        Past Surgical History:   Procedure Laterality Date    BRONCHOSCOPY Left 05/24/2023    BRONCHOSCOPY ADD ON COMPUTER ASSISTED performed by Clarice French MD at [facility]    BRONCHOSCOPY  05/24/2023    BRONCHOSCOPY ALVEOLAR LAVAGE ROBOTIC performed by Clarice French MD at [facility]    BRONCHOSCOPY  05/24/2023    BRONCHOSCOPY BIOPSY BRONCHUS ROBOTIC performed by Clarice French MD at [facility]    COLONOSCOPY N/A 12/05/2019    Dr Coello-Diverticulosis, internal hemorrhoids-Grade 1-2-HP, 3 yr recall    COLONOSCOPY N/A 12/19/2022    Dr Coello, Diffuse melanosis coli, mild diverticulosis left colon, Int hem Gr 1-2, inadequate-fair to poor prep, repeat 12-22-22    COLONOSCOPY N/A 12/22/2022    Dr Coello, W Ablation 5 sm 3-4 mm sessile benign appearing polyps in sig colon, int hem Gr

## 2024-02-04 DIAGNOSIS — I10 ESSENTIAL HYPERTENSION: ICD-10-CM

## 2024-02-06 RX ORDER — CLONIDINE HYDROCHLORIDE 0.1 MG/1
TABLET ORAL
Qty: 60 TABLET | Refills: 2 | Status: SHIPPED | OUTPATIENT
Start: 2024-02-06

## 2024-03-09 NOTE — TELEPHONE ENCOUNTER
Called the patient to remind him of his appt on Friday with Dr. Cook.  The patient took the information down.   24

## 2024-03-23 DIAGNOSIS — I10 ESSENTIAL HYPERTENSION: ICD-10-CM

## 2024-03-25 NOTE — TELEPHONE ENCOUNTER
Hema called requesting a refill of the below medication which has been pended for you:     Requested Prescriptions     Pending Prescriptions Disp Refills    NIFEdipine (ADALAT CC) 60 MG extended release tablet [Pharmacy Med Name: NIFEdipine ER 60 MG Oral Tablet Extended Release 24 Hour] 180 tablet 0     Sig: Take 1 tablet by mouth twice daily       Last Appointment Date: 1/16/2024  Next Appointment Date: 4/30/2024    Allergies   Allergen Reactions    Banana Anaphylaxis, Shortness Of Breath and Swelling    Atorvastatin Other (See Comments)     Chest pain, no muslce pains elsewhere    Lisinopril Other (See Comments)     Cough

## 2024-04-01 RX ORDER — HYDRALAZINE HYDROCHLORIDE 25 MG/1
25 TABLET, FILM COATED ORAL EVERY 8 HOURS SCHEDULED
Qty: 90 TABLET | Refills: 0 | Status: SHIPPED | OUTPATIENT
Start: 2024-04-01

## 2024-04-30 ENCOUNTER — OFFICE VISIT (OUTPATIENT)
Dept: PRIMARY CARE CLINIC | Age: 54
End: 2024-04-30
Payer: MEDICARE

## 2024-04-30 VITALS
HEART RATE: 80 BPM | SYSTOLIC BLOOD PRESSURE: 110 MMHG | BODY MASS INDEX: 37.45 KG/M2 | OXYGEN SATURATION: 98 % | TEMPERATURE: 98 F | DIASTOLIC BLOOD PRESSURE: 60 MMHG | HEIGHT: 67 IN | WEIGHT: 238.6 LBS

## 2024-04-30 DIAGNOSIS — Z99.2 TYPE 2 DIABETES MELLITUS WITH CHRONIC KIDNEY DISEASE ON CHRONIC DIALYSIS, WITHOUT LONG-TERM CURRENT USE OF INSULIN (HCC): Primary | ICD-10-CM

## 2024-04-30 DIAGNOSIS — I50.32 CHRONIC DIASTOLIC CONGESTIVE HEART FAILURE (HCC): ICD-10-CM

## 2024-04-30 DIAGNOSIS — N18.6 ESRD ON HEMODIALYSIS (HCC): ICD-10-CM

## 2024-04-30 DIAGNOSIS — E78.2 MIXED HYPERLIPIDEMIA: ICD-10-CM

## 2024-04-30 DIAGNOSIS — Z99.2 ESRD ON HEMODIALYSIS (HCC): ICD-10-CM

## 2024-04-30 DIAGNOSIS — N18.6 TYPE 2 DIABETES MELLITUS WITH CHRONIC KIDNEY DISEASE ON CHRONIC DIALYSIS, WITHOUT LONG-TERM CURRENT USE OF INSULIN (HCC): Primary | ICD-10-CM

## 2024-04-30 DIAGNOSIS — E11.22 TYPE 2 DIABETES MELLITUS WITH CHRONIC KIDNEY DISEASE ON CHRONIC DIALYSIS, WITHOUT LONG-TERM CURRENT USE OF INSULIN (HCC): Primary | ICD-10-CM

## 2024-04-30 DIAGNOSIS — I10 ESSENTIAL HYPERTENSION: ICD-10-CM

## 2024-04-30 PROBLEM — J81.0 ACUTE PULMONARY EDEMA (HCC): Status: RESOLVED | Noted: 2023-09-13 | Resolved: 2024-04-30

## 2024-04-30 PROBLEM — I50.33 ACUTE ON CHRONIC DIASTOLIC HEART FAILURE (HCC): Status: RESOLVED | Noted: 2020-04-30 | Resolved: 2024-04-30

## 2024-04-30 LAB — HBA1C MFR BLD: 6.2 %

## 2024-04-30 PROCEDURE — 3044F HG A1C LEVEL LT 7.0%: CPT | Performed by: NURSE PRACTITIONER

## 2024-04-30 PROCEDURE — 3017F COLORECTAL CA SCREEN DOC REV: CPT | Performed by: NURSE PRACTITIONER

## 2024-04-30 PROCEDURE — 99214 OFFICE O/P EST MOD 30 MIN: CPT | Performed by: NURSE PRACTITIONER

## 2024-04-30 PROCEDURE — 2022F DILAT RTA XM EVC RTNOPTHY: CPT | Performed by: NURSE PRACTITIONER

## 2024-04-30 PROCEDURE — 3078F DIAST BP <80 MM HG: CPT | Performed by: NURSE PRACTITIONER

## 2024-04-30 PROCEDURE — 3074F SYST BP LT 130 MM HG: CPT | Performed by: NURSE PRACTITIONER

## 2024-04-30 PROCEDURE — 1036F TOBACCO NON-USER: CPT | Performed by: NURSE PRACTITIONER

## 2024-04-30 PROCEDURE — G8417 CALC BMI ABV UP PARAM F/U: HCPCS | Performed by: NURSE PRACTITIONER

## 2024-04-30 PROCEDURE — G8427 DOCREV CUR MEDS BY ELIG CLIN: HCPCS | Performed by: NURSE PRACTITIONER

## 2024-04-30 PROCEDURE — 83036 HEMOGLOBIN GLYCOSYLATED A1C: CPT | Performed by: NURSE PRACTITIONER

## 2024-04-30 RX ORDER — CLONIDINE HYDROCHLORIDE 0.1 MG/1
0.1 TABLET ORAL 2 TIMES DAILY
Qty: 180 TABLET | Refills: 1 | Status: SHIPPED | OUTPATIENT
Start: 2024-04-30

## 2024-04-30 ASSESSMENT — ENCOUNTER SYMPTOMS
NAUSEA: 0
COUGH: 0
SHORTNESS OF BREATH: 0
CHEST TIGHTNESS: 0
SORE THROAT: 0
COLOR CHANGE: 0
ABDOMINAL PAIN: 0
DIARRHEA: 0
VOMITING: 0

## 2024-04-30 NOTE — PROGRESS NOTES
[address] [place] [address] Drive   [address] [facility], [address]     Phone:  [phone]  Fax:  [phone]      Hema Gallagher is a 53 y.o. male who presents today for his medical conditions/complaints as noted below.  Hema Gallagher is c/o of Follow-up, Diabetes, Hypertension, and Chronic Kidney Disease      Chief Complaint   Patient presents with    Follow-up    Diabetes    Hypertension    Chronic Kidney Disease       HPI:     HPI     Patient presents for follow up diabetes mellitus, hypertension, end-stage renal disease on hemodialysis and chronic diastolic congestive heart failure. Voices no concerns at this time. Sees nephrology in [place] and reports he is going for a heart cath 5/17/2024. Then he should be available for a renal transplant. Is still doing dialysis 4 days a week at home.  Blood pressure has been well-controlled.  Patient has lost 12 pounds since November 2023.  Patient denies any swelling in his legs or shortness of breath.    Past Medical History:   Diagnosis Date    Asthma     CHF (congestive heart failure) (HCC)     Diabetes mellitus (HCC)     Erectile dysfunction     Hemodialysis patient (HCC)     monday, tuesday, thursday, and friday at home. hd    History of blood transfusion     Hypertension     Obesity     Palliative care patient 01/25/2021        Past Surgical History:   Procedure Laterality Date    BRONCHOSCOPY Left 05/24/2023    BRONCHOSCOPY ADD ON COMPUTER ASSISTED performed by Clarice French MD at [facility]    BRONCHOSCOPY  05/24/2023    BRONCHOSCOPY ALVEOLAR LAVAGE ROBOTIC performed by Clarice French MD at [facility]    BRONCHOSCOPY  05/24/2023    BRONCHOSCOPY BIOPSY BRONCHUS ROBOTIC performed by Clarice French MD at [facility]    COLONOSCOPY N/A 12/05/2019    Dr Coello-Diverticulosis, internal hemorrhoids-Grade 1-2-HP, 3 yr recall    COLONOSCOPY N/A 12/19/2022    Dr Coello, Diffuse melanosis coli, mild diverticulosis

## 2024-06-04 ENCOUNTER — OFFICE VISIT (OUTPATIENT)
Dept: PRIMARY CARE CLINIC | Age: 54
End: 2024-06-04
Payer: MEDICARE

## 2024-06-04 VITALS
TEMPERATURE: 96.8 F | BODY MASS INDEX: 37.64 KG/M2 | WEIGHT: 239.8 LBS | HEIGHT: 67 IN | DIASTOLIC BLOOD PRESSURE: 70 MMHG | HEART RATE: 68 BPM | SYSTOLIC BLOOD PRESSURE: 120 MMHG | OXYGEN SATURATION: 98 %

## 2024-06-04 DIAGNOSIS — N18.6 ESRD ON HEMODIALYSIS (HCC): ICD-10-CM

## 2024-06-04 DIAGNOSIS — Z00.00 MEDICARE ANNUAL WELLNESS VISIT, SUBSEQUENT: Primary | ICD-10-CM

## 2024-06-04 DIAGNOSIS — E78.2 MIXED HYPERLIPIDEMIA: ICD-10-CM

## 2024-06-04 DIAGNOSIS — Z99.2 TYPE 2 DIABETES MELLITUS WITH CHRONIC KIDNEY DISEASE ON CHRONIC DIALYSIS, WITHOUT LONG-TERM CURRENT USE OF INSULIN (HCC): ICD-10-CM

## 2024-06-04 DIAGNOSIS — Z99.2 ESRD ON HEMODIALYSIS (HCC): ICD-10-CM

## 2024-06-04 DIAGNOSIS — I10 ESSENTIAL HYPERTENSION: ICD-10-CM

## 2024-06-04 DIAGNOSIS — N18.6 TYPE 2 DIABETES MELLITUS WITH CHRONIC KIDNEY DISEASE ON CHRONIC DIALYSIS, WITHOUT LONG-TERM CURRENT USE OF INSULIN (HCC): ICD-10-CM

## 2024-06-04 DIAGNOSIS — E11.22 TYPE 2 DIABETES MELLITUS WITH CHRONIC KIDNEY DISEASE ON CHRONIC DIALYSIS, WITHOUT LONG-TERM CURRENT USE OF INSULIN (HCC): ICD-10-CM

## 2024-06-04 LAB
ALBUMIN SERPL-MCNC: 4.3 G/DL (ref 3.5–5.2)
ALP SERPL-CCNC: 61 U/L (ref 40–130)
ALT SERPL-CCNC: 48 U/L (ref 5–41)
ANION GAP SERPL CALCULATED.3IONS-SCNC: 17 MMOL/L (ref 7–19)
AST SERPL-CCNC: 19 U/L (ref 5–40)
BASOPHILS # BLD: 0 K/UL (ref 0–0.2)
BASOPHILS NFR BLD: 0.5 % (ref 0–1)
BILIRUB SERPL-MCNC: 0.3 MG/DL (ref 0.2–1.2)
BUN SERPL-MCNC: 67 MG/DL (ref 6–20)
CALCIUM SERPL-MCNC: 9.8 MG/DL (ref 8.6–10)
CHLORIDE SERPL-SCNC: 96 MMOL/L (ref 98–111)
CHOLEST SERPL-MCNC: 121 MG/DL (ref 160–199)
CO2 SERPL-SCNC: 27 MMOL/L (ref 22–29)
CREAT SERPL-MCNC: 16.8 MG/DL (ref 0.5–1.2)
EOSINOPHIL # BLD: 0.3 K/UL (ref 0–0.6)
EOSINOPHIL NFR BLD: 5.2 % (ref 0–5)
ERYTHROCYTE [DISTWIDTH] IN BLOOD BY AUTOMATED COUNT: 13.5 % (ref 11.5–14.5)
GLUCOSE SERPL-MCNC: 124 MG/DL (ref 74–109)
HCT VFR BLD AUTO: 41 % (ref 42–52)
HDLC SERPL-MCNC: 40 MG/DL (ref 55–121)
HGB BLD-MCNC: 13.4 G/DL (ref 14–18)
IMM GRANULOCYTES # BLD: 0 K/UL
LDLC SERPL CALC-MCNC: 61 MG/DL
LYMPHOCYTES # BLD: 2.1 K/UL (ref 1.1–4.5)
LYMPHOCYTES NFR BLD: 35.9 % (ref 20–40)
MCH RBC QN AUTO: 31.9 PG (ref 27–31)
MCHC RBC AUTO-ENTMCNC: 32.7 G/DL (ref 33–37)
MCV RBC AUTO: 97.6 FL (ref 80–94)
MONOCYTES # BLD: 0.7 K/UL (ref 0–0.9)
MONOCYTES NFR BLD: 12.3 % (ref 0–10)
NEUTROPHILS # BLD: 2.7 K/UL (ref 1.5–7.5)
NEUTS SEG NFR BLD: 45.9 % (ref 50–65)
PLATELET # BLD AUTO: 192 K/UL (ref 130–400)
PMV BLD AUTO: 9.7 FL (ref 9.4–12.4)
POTASSIUM SERPL-SCNC: 4.4 MMOL/L (ref 3.5–5)
PROT SERPL-MCNC: 7.4 G/DL (ref 6.6–8.7)
RBC # BLD AUTO: 4.2 M/UL (ref 4.7–6.1)
SODIUM SERPL-SCNC: 140 MMOL/L (ref 136–145)
TRIGL SERPL-MCNC: 98 MG/DL (ref 0–149)
WBC # BLD AUTO: 5.8 K/UL (ref 4.8–10.8)

## 2024-06-04 PROCEDURE — 3078F DIAST BP <80 MM HG: CPT | Performed by: NURSE PRACTITIONER

## 2024-06-04 PROCEDURE — 3017F COLORECTAL CA SCREEN DOC REV: CPT | Performed by: NURSE PRACTITIONER

## 2024-06-04 PROCEDURE — 3044F HG A1C LEVEL LT 7.0%: CPT | Performed by: NURSE PRACTITIONER

## 2024-06-04 PROCEDURE — 3074F SYST BP LT 130 MM HG: CPT | Performed by: NURSE PRACTITIONER

## 2024-06-04 PROCEDURE — G0439 PPPS, SUBSEQ VISIT: HCPCS | Performed by: NURSE PRACTITIONER

## 2024-06-04 SDOH — ECONOMIC STABILITY: FOOD INSECURITY: WITHIN THE PAST 12 MONTHS, YOU WORRIED THAT YOUR FOOD WOULD RUN OUT BEFORE YOU GOT MONEY TO BUY MORE.: NEVER TRUE

## 2024-06-04 SDOH — ECONOMIC STABILITY: FOOD INSECURITY: WITHIN THE PAST 12 MONTHS, THE FOOD YOU BOUGHT JUST DIDN'T LAST AND YOU DIDN'T HAVE MONEY TO GET MORE.: NEVER TRUE

## 2024-06-04 SDOH — ECONOMIC STABILITY: INCOME INSECURITY: HOW HARD IS IT FOR YOU TO PAY FOR THE VERY BASICS LIKE FOOD, HOUSING, MEDICAL CARE, AND HEATING?: NOT HARD AT ALL

## 2024-06-04 ASSESSMENT — PATIENT HEALTH QUESTIONNAIRE - PHQ9
SUM OF ALL RESPONSES TO PHQ QUESTIONS 1-9: 0
2. FEELING DOWN, DEPRESSED OR HOPELESS: NOT AT ALL
1. LITTLE INTEREST OR PLEASURE IN DOING THINGS: NOT AT ALL
SUM OF ALL RESPONSES TO PHQ QUESTIONS 1-9: 0
SUM OF ALL RESPONSES TO PHQ9 QUESTIONS 1 & 2: 0
SUM OF ALL RESPONSES TO PHQ QUESTIONS 1-9: 0
SUM OF ALL RESPONSES TO PHQ QUESTIONS 1-9: 0

## 2024-06-04 NOTE — PROGRESS NOTES
Medicare Annual Wellness Visit    Hema Gallagher is here for Medicare AWV    Assessment & Plan   Medicare annual wellness visit, subsequent  Recommendations for Preventive Services Due: see orders and patient instructions/AVS.  Recommended screening schedule for the next 5-10 years is provided to the patient in written form: see Patient Instructions/AVS.     Return in 3 months (on 9/4/2024).     Subjective   The following acute and/or chronic problems were also addressed today:  Had a diabetic eye exam at [facility] in 4/2024.     Patient's complete Health Risk Assessment and screening values have been reviewed and are found in Flowsheets. The following problems were reviewed today and where indicated follow up appointments were made and/or referrals ordered.    Positive Risk Factor Screenings with Interventions:            Controlled Medication Review:      Today's Pain Level: No data recorded   Opioid Risk: (Low risk score <55) Opioid risk score: 16    Patient is low risk for opioid use disorder or overdose.    Last PDMP Ab[place]rahman as Reviewed:  Review User Review Instant Review Result   STORM MAHONEY 12/15/2022 11:18 PM     Reviewed PDMP [1]     Last Controlled Substance Monitoring Documentation      Flowsheet Row Office Visit from 11/13/2023 in [facility]   Periodic Controlled Substance Monitoring No signs of potential drug abuse or diversion identified. filed at 11/13/2023 0738               Activity, Diet, and Weight:  On average, how many days per week do you engage in moderate to strenuous exercise (like a brisk walk)?: 2 days  On average, how many minutes do you engage in exercise at this level?: 40 min    Do you eat balanced/healthy meals regularly?: (!) No    Body mass index is 37.56 kg/m². (!) Abnormal    Do you eat balanced/healthy meals regularly Interventions:  Patient comments: is eating a renal diet  Obesity Interventions:  Patient comments: is exercising/walking 3 times per week

## 2024-06-13 ENCOUNTER — TELEPHONE (OUTPATIENT)
Dept: PRIMARY CARE CLINIC | Age: 54
End: 2024-06-13

## 2024-06-23 DIAGNOSIS — I10 ESSENTIAL HYPERTENSION: ICD-10-CM

## 2024-07-22 RX ORDER — HYDRALAZINE HYDROCHLORIDE 25 MG/1
25 TABLET, FILM COATED ORAL EVERY 8 HOURS SCHEDULED
Qty: 90 TABLET | Refills: 0 | Status: SHIPPED | OUTPATIENT
Start: 2024-07-22

## 2024-07-28 ENCOUNTER — HOSPITAL ENCOUNTER (EMERGENCY)
Age: 54
Discharge: HOME | End: 2024-07-28
Attending: EMERGENCY MEDICINE
Payer: MEDICARE

## 2024-07-28 VITALS
HEART RATE: 53 BPM | TEMPERATURE: 98.7 F | BODY MASS INDEX: 35.55 KG/M2 | OXYGEN SATURATION: 93 % | RESPIRATION RATE: 17 BRPM | DIASTOLIC BLOOD PRESSURE: 84 MMHG | WEIGHT: 227 LBS | SYSTOLIC BLOOD PRESSURE: 121 MMHG

## 2024-07-28 DIAGNOSIS — N18.6 ESRD (END STAGE RENAL DISEASE) (HCC): Primary | ICD-10-CM

## 2024-07-28 DIAGNOSIS — T82.868A THROMBOSIS OF ARTERIOVENOUS FISTULA, INITIAL ENCOUNTER (HCC): ICD-10-CM

## 2024-07-28 LAB
ALBUMIN SERPL-MCNC: 3.6 G/DL (ref 3.5–5.2)
ALP SERPL-CCNC: 63 U/L (ref 40–130)
ALT SERPL-CCNC: 33 U/L (ref 5–41)
ANION GAP SERPL CALCULATED.3IONS-SCNC: 20 MMOL/L (ref 7–19)
AST SERPL-CCNC: 14 U/L (ref 5–40)
BASOPHILS # BLD: 0 K/UL (ref 0–0.2)
BASOPHILS NFR BLD: 0.3 % (ref 0–1)
BILIRUB SERPL-MCNC: 0.4 MG/DL (ref 0.2–1.2)
BUN SERPL-MCNC: 90 MG/DL (ref 6–20)
CALCIUM SERPL-MCNC: 9 MG/DL (ref 8.6–10)
CHLORIDE SERPL-SCNC: 97 MMOL/L (ref 98–111)
CO2 SERPL-SCNC: 22 MMOL/L (ref 22–29)
CREAT SERPL-MCNC: 20.4 MG/DL (ref 0.5–1.2)
EOSINOPHIL # BLD: 0.3 K/UL (ref 0–0.6)
EOSINOPHIL NFR BLD: 4.2 % (ref 0–5)
ERYTHROCYTE [DISTWIDTH] IN BLOOD BY AUTOMATED COUNT: 13.5 % (ref 11.5–14.5)
GLUCOSE SERPL-MCNC: 116 MG/DL (ref 74–109)
HCT VFR BLD AUTO: 37.1 % (ref 42–52)
HGB BLD-MCNC: 12.4 G/DL (ref 14–18)
IMM GRANULOCYTES # BLD: 0 K/UL
LYMPHOCYTES # BLD: 1.9 K/UL (ref 1.1–4.5)
LYMPHOCYTES NFR BLD: 31.2 % (ref 20–40)
MCH RBC QN AUTO: 31.2 PG (ref 27–31)
MCHC RBC AUTO-ENTMCNC: 33.4 G/DL (ref 33–37)
MCV RBC AUTO: 93.2 FL (ref 80–94)
MONOCYTES # BLD: 0.8 K/UL (ref 0–0.9)
MONOCYTES NFR BLD: 12.9 % (ref 0–10)
NEUTROPHILS # BLD: 3.2 K/UL (ref 1.5–7.5)
NEUTS SEG NFR BLD: 51.1 % (ref 50–65)
PLATELET # BLD AUTO: 153 K/UL (ref 130–400)
PMV BLD AUTO: 9.1 FL (ref 9.4–12.4)
POTASSIUM SERPL-SCNC: 4.8 MMOL/L (ref 3.5–5)
PROT SERPL-MCNC: 6.4 G/DL (ref 6.6–8.7)
RBC # BLD AUTO: 3.98 M/UL (ref 4.7–6.1)
REASON FOR REJECTION: NORMAL
REJECTED TEST: NORMAL
SODIUM SERPL-SCNC: 139 MMOL/L (ref 136–145)
WBC # BLD AUTO: 6.2 K/UL (ref 4.8–10.8)

## 2024-07-28 PROCEDURE — 85025 COMPLETE CBC W/AUTO DIFF WBC: CPT

## 2024-07-28 PROCEDURE — 99283 EMERGENCY DEPT VISIT LOW MDM: CPT

## 2024-07-28 PROCEDURE — 80053 COMPREHEN METABOLIC PANEL: CPT

## 2024-07-28 PROCEDURE — 36415 COLL VENOUS BLD VENIPUNCTURE: CPT

## 2024-07-28 NOTE — ED PROVIDER NOTES
Palliative care patient 01/25/2021         SURGICAL HISTORY       Past Surgical History:   Procedure Laterality Date    BRONCHOSCOPY Left 05/24/2023    BRONCHOSCOPY ADD ON COMPUTER ASSISTED performed by Clarice French MD at [facility]    BRONCHOSCOPY  05/24/2023    BRONCHOSCOPY ALVEOLAR LAVAGE ROBOTIC performed by Clarice French MD at [facility]    BRONCHOSCOPY  05/24/2023    BRONCHOSCOPY BIOPSY BRONCHUS ROBOTIC performed by Clarice French MD at [facility]    COLONOSCOPY N/A 12/05/2019    Dr Coello-Diverticulosis, internal hemorrhoids-Grade 1-2-HP, 3 yr recall    COLONOSCOPY N/A 12/19/2022    Dr Coello, Diffuse melanosis coli, mild diverticulosis left colon, Int hem Gr 1-2, inadequate-fair to poor prep, repeat 12-22-22    COLONOSCOPY N/A 12/22/2022    Dr Coello, W Ablation 5 sm 3-4 mm sessile benign appearing polyps in sig colon, int hem Gr 1, 3 year recall    DIALYSIS FISTULA CREATION Left 06/26/2020    LEFT BRACHIAL / CEPHALIC AV FISTULA performed by Unruly Oseguera MD at [facility] OR    TUNNELED VENOUS CATHETER PLACEMENT      wbh - removal of first cath due to infection and placement of new one    TUNNELED VENOUS CATHETER PLACEMENT  01/2020    sjs    VASCULAR SURGERY  11/11/2020    [facility]. Removal of tunneled dialysis catheter left internal jugular vein    VASCULAR SURGERY  07/26/2021    [facility]- Left upper extremity fistulogram's including venography the superior cava, Balloon angioplasty left proximal upper arm cephalic vein stenosis with 7 mm x 80 mm conquest, 7 mm x 100 mm Lutonix, 8 mm x 60 mm Lutonix drug-coated balloon, Completion venograms left upper extremity    VASCULAR SURGERY  08/10/2023    Left upper extremity fistulogram, venogram.  Balloon angioplasty of left cephalic vein arch using 6 x 150  balloon, 7 x 150 drug-coated balloon, stening using 8 x 10 Viabahn followed by 8 x 7.5 Viabahn stent; VI    VASCULAR SURGERY Left 8/16/2023    RIGHT IJ

## 2024-07-29 ENCOUNTER — HOSPITAL ENCOUNTER (OUTPATIENT)
Dept: PREADMISSION TESTING | Age: 54
Discharge: HOME | End: 2024-08-02
Payer: MEDICARE

## 2024-07-29 LAB
ABO/RH: NORMAL
ANTIBODY SCREEN: NORMAL
EKG P AXIS: 40 DEGREES
EKG P-R INTERVAL: 186 MS
EKG Q-T INTERVAL: 446 MS
EKG QRS DURATION: 92 MS
EKG QTC CALCULATION (BAZETT): 431 MS
EKG T AXIS: 27 DEGREES

## 2024-07-29 PROCEDURE — 86901 BLOOD TYPING SEROLOGIC RH(D): CPT

## 2024-07-29 PROCEDURE — 93010 ELECTROCARDIOGRAM REPORT: CPT | Performed by: INTERNAL MEDICINE

## 2024-07-29 PROCEDURE — 93005 ELECTROCARDIOGRAM TRACING: CPT | Performed by: SURGERY

## 2024-07-29 PROCEDURE — 86850 RBC ANTIBODY SCREEN: CPT

## 2024-07-29 PROCEDURE — 86900 BLOOD TYPING SEROLOGIC ABO: CPT

## 2024-07-30 ENCOUNTER — ANESTHESIA (OUTPATIENT)
Dept: OPERATING ROOM | Age: 54
End: 2024-07-30
Payer: MEDICARE

## 2024-07-30 ENCOUNTER — ANESTHESIA EVENT (OUTPATIENT)
Dept: OPERATING ROOM | Age: 54
End: 2024-07-30
Payer: MEDICARE

## 2024-07-30 ENCOUNTER — APPOINTMENT (OUTPATIENT)
Dept: INTERVENTIONAL RADIOLOGY/VASCULAR | Age: 54
End: 2024-07-30
Attending: SURGERY
Payer: MEDICARE

## 2024-07-30 ENCOUNTER — HOSPITAL ENCOUNTER (OUTPATIENT)
Age: 54
Setting detail: OUTPATIENT SURGERY
Discharge: HOME | End: 2024-07-30
Attending: SURGERY | Admitting: SURGERY
Payer: MEDICARE

## 2024-07-30 VITALS
BODY MASS INDEX: 35.63 KG/M2 | DIASTOLIC BLOOD PRESSURE: 92 MMHG | OXYGEN SATURATION: 100 % | TEMPERATURE: 97.3 F | HEART RATE: 48 BPM | SYSTOLIC BLOOD PRESSURE: 171 MMHG | HEIGHT: 67 IN | WEIGHT: 227 LBS | RESPIRATION RATE: 16 BRPM

## 2024-07-30 DIAGNOSIS — T82.868A THROMBOSIS OF ARTERIOVENOUS FISTULA, INITIAL ENCOUNTER (HCC): Primary | ICD-10-CM

## 2024-07-30 DIAGNOSIS — G89.18 POSTOPERATIVE PAIN: ICD-10-CM

## 2024-07-30 DIAGNOSIS — N18.6 ESRD ON HEMODIALYSIS (HCC): ICD-10-CM

## 2024-07-30 DIAGNOSIS — Z99.2 ESRD ON HEMODIALYSIS (HCC): ICD-10-CM

## 2024-07-30 PROBLEM — T82.838A HEMORRHAGE OF ARTERIOVENOUS FISTULA (HCC): Status: RESOLVED | Noted: 2023-08-09 | Resolved: 2024-07-30

## 2024-07-30 PROBLEM — T82.9XXA COMPLICATION OF AV DIALYSIS FISTULA: Status: RESOLVED | Noted: 2021-07-26 | Resolved: 2024-07-30

## 2024-07-30 PROBLEM — T82.9XXA COMPLICATION OF AV DIALYSIS FISTULA, INITIAL ENCOUNTER: Status: RESOLVED | Noted: 2023-08-09 | Resolved: 2024-07-30

## 2024-07-30 LAB
ANION GAP SERPL CALCULATED.3IONS-SCNC: 20 MMOL/L (ref 7–19)
APTT PPP: 34.9 SEC (ref 26–36.2)
BASOPHILS # BLD: 0 K/UL (ref 0–0.2)
BASOPHILS NFR BLD: 0.4 % (ref 0–1)
BUN SERPL-MCNC: 73 MG/DL (ref 6–20)
CALCIUM SERPL-MCNC: 9.2 MG/DL (ref 8.6–10)
CHLORIDE SERPL-SCNC: 93 MMOL/L (ref 98–111)
CO2 SERPL-SCNC: 23 MMOL/L (ref 22–29)
CREAT SERPL-MCNC: 17.6 MG/DL (ref 0.5–1.2)
EOSINOPHIL # BLD: 0.3 K/UL (ref 0–0.6)
EOSINOPHIL NFR BLD: 5.2 % (ref 0–5)
ERYTHROCYTE [DISTWIDTH] IN BLOOD BY AUTOMATED COUNT: 13.3 % (ref 11.5–14.5)
GLUCOSE BLD-MCNC: 110 MG/DL (ref 70–99)
GLUCOSE BLD-MCNC: 96 MG/DL (ref 70–99)
GLUCOSE SERPL-MCNC: 93 MG/DL (ref 74–109)
HCT VFR BLD AUTO: 36.3 % (ref 42–52)
HGB BLD-MCNC: 12.2 G/DL (ref 14–18)
IMM GRANULOCYTES # BLD: 0 K/UL
INR PPP: 1.08 (ref 0.88–1.18)
LYMPHOCYTES # BLD: 1.5 K/UL (ref 1.1–4.5)
LYMPHOCYTES NFR BLD: 29.2 % (ref 20–40)
MCH RBC QN AUTO: 31.1 PG (ref 27–31)
MCHC RBC AUTO-ENTMCNC: 33.6 G/DL (ref 33–37)
MCV RBC AUTO: 92.6 FL (ref 80–94)
MONOCYTES # BLD: 0.7 K/UL (ref 0–0.9)
MONOCYTES NFR BLD: 14.5 % (ref 0–10)
NEUTROPHILS # BLD: 2.5 K/UL (ref 1.5–7.5)
NEUTS SEG NFR BLD: 50.5 % (ref 50–65)
PERFORMED ON: ABNORMAL
PERFORMED ON: NORMAL
PLATELET # BLD AUTO: 145 K/UL (ref 130–400)
PMV BLD AUTO: 9.6 FL (ref 9.4–12.4)
POTASSIUM SERPL-SCNC: 4.6 MMOL/L (ref 3.5–4.9)
PROTHROMBIN TIME: 13.7 SEC (ref 12–14.6)
RBC # BLD AUTO: 3.92 M/UL (ref 4.7–6.1)
SODIUM SERPL-SCNC: 136 MMOL/L (ref 136–145)
WBC # BLD AUTO: 5 K/UL (ref 4.8–10.8)

## 2024-07-30 PROCEDURE — 6370000000 HC RX 637 (ALT 250 FOR IP): Performed by: SURGERY

## 2024-07-30 PROCEDURE — 6360000002 HC RX W HCPCS: Performed by: NURSE ANESTHETIST, CERTIFIED REGISTERED

## 2024-07-30 PROCEDURE — 85610 PROTHROMBIN TIME: CPT

## 2024-07-30 PROCEDURE — 2500000003 HC RX 250 WO HCPCS: Performed by: NURSE ANESTHETIST, CERTIFIED REGISTERED

## 2024-07-30 PROCEDURE — C1757 CATH, THROMBECTOMY/EMBOLECT: HCPCS | Performed by: SURGERY

## 2024-07-30 PROCEDURE — 2580000003 HC RX 258: Performed by: ANESTHESIOLOGY

## 2024-07-30 PROCEDURE — 6360000002 HC RX W HCPCS: Performed by: SURGERY

## 2024-07-30 PROCEDURE — 82962 GLUCOSE BLOOD TEST: CPT

## 2024-07-30 PROCEDURE — C1887 CATHETER, GUIDING: HCPCS | Performed by: SURGERY

## 2024-07-30 PROCEDURE — 2580000003 HC RX 258: Performed by: SURGERY

## 2024-07-30 PROCEDURE — A4217 STERILE WATER/SALINE, 500 ML: HCPCS | Performed by: SURGERY

## 2024-07-30 PROCEDURE — 6360000004 HC RX CONTRAST MEDICATION: Performed by: SURGERY

## 2024-07-30 PROCEDURE — 3600000017 HC SURGERY HYBRID ADDL 15MIN: Performed by: SURGERY

## 2024-07-30 PROCEDURE — 85025 COMPLETE CBC W/AUTO DIFF WBC: CPT

## 2024-07-30 PROCEDURE — 80048 BASIC METABOLIC PNL TOTAL CA: CPT

## 2024-07-30 PROCEDURE — C1725 CATH, TRANSLUMIN NON-LASER: HCPCS | Performed by: SURGERY

## 2024-07-30 PROCEDURE — C1893 INTRO/SHEATH, FIXED,NON-PEEL: HCPCS | Performed by: SURGERY

## 2024-07-30 PROCEDURE — 3700000000 HC ANESTHESIA ATTENDED CARE: Performed by: SURGERY

## 2024-07-30 PROCEDURE — 36415 COLL VENOUS BLD VENIPUNCTURE: CPT

## 2024-07-30 PROCEDURE — 3600000007 HC SURGERY HYBRID BASE: Performed by: SURGERY

## 2024-07-30 PROCEDURE — 7100000000 HC PACU RECOVERY - FIRST 15 MIN: Performed by: SURGERY

## 2024-07-30 PROCEDURE — 7100000010 HC PHASE II RECOVERY - FIRST 15 MIN: Performed by: SURGERY

## 2024-07-30 PROCEDURE — 2709999900 HC NON-CHARGEABLE SUPPLY: Performed by: SURGERY

## 2024-07-30 PROCEDURE — 2580000003 HC RX 258: Performed by: NURSE ANESTHETIST, CERTIFIED REGISTERED

## 2024-07-30 PROCEDURE — C1769 GUIDE WIRE: HCPCS | Performed by: SURGERY

## 2024-07-30 PROCEDURE — 3700000001 HC ADD 15 MINUTES (ANESTHESIA): Performed by: SURGERY

## 2024-07-30 PROCEDURE — 7100000011 HC PHASE II RECOVERY - ADDTL 15 MIN: Performed by: SURGERY

## 2024-07-30 PROCEDURE — C1894 INTRO/SHEATH, NON-LASER: HCPCS | Performed by: SURGERY

## 2024-07-30 PROCEDURE — 85730 THROMBOPLASTIN TIME PARTIAL: CPT

## 2024-07-30 PROCEDURE — 7100000001 HC PACU RECOVERY - ADDTL 15 MIN: Performed by: SURGERY

## 2024-07-30 RX ORDER — LABETALOL HYDROCHLORIDE 5 MG/ML
10 INJECTION, SOLUTION INTRAVENOUS
Status: DISCONTINUED | OUTPATIENT
Start: 2024-07-30 | End: 2024-07-30 | Stop reason: HOSPADM

## 2024-07-30 RX ORDER — ONDANSETRON 2 MG/ML
4 INJECTION INTRAMUSCULAR; INTRAVENOUS
Status: DISCONTINUED | OUTPATIENT
Start: 2024-07-30 | End: 2024-07-30 | Stop reason: HOSPADM

## 2024-07-30 RX ORDER — SODIUM CHLORIDE 0.9 % (FLUSH) 0.9 %
5-40 SYRINGE (ML) INJECTION PRN
Status: DISCONTINUED | OUTPATIENT
Start: 2024-07-30 | End: 2024-07-30 | Stop reason: HOSPADM

## 2024-07-30 RX ORDER — IODIXANOL 320 MG/ML
INJECTION, SOLUTION INTRAVASCULAR PRN
Status: DISCONTINUED | OUTPATIENT
Start: 2024-07-30 | End: 2024-07-30 | Stop reason: ALTCHOICE

## 2024-07-30 RX ORDER — HYDROMORPHONE HYDROCHLORIDE 1 MG/ML
0.25 INJECTION, SOLUTION INTRAMUSCULAR; INTRAVENOUS; SUBCUTANEOUS
Status: COMPLETED | OUTPATIENT
Start: 2024-07-30 | End: 2024-07-30

## 2024-07-30 RX ORDER — IPRATROPIUM BROMIDE AND ALBUTEROL SULFATE 2.5; .5 MG/3ML; MG/3ML
1 SOLUTION RESPIRATORY (INHALATION)
Status: DISCONTINUED | OUTPATIENT
Start: 2024-07-30 | End: 2024-07-30 | Stop reason: HOSPADM

## 2024-07-30 RX ORDER — SODIUM CHLORIDE 9 MG/ML
INJECTION, SOLUTION INTRAVENOUS CONTINUOUS PRN
Status: DISCONTINUED | OUTPATIENT
Start: 2024-07-30 | End: 2024-07-30 | Stop reason: SDUPTHER

## 2024-07-30 RX ORDER — KETAMINE HYDROCHLORIDE 100 MG/ML
INJECTION, SOLUTION INTRAMUSCULAR; INTRAVENOUS PRN
Status: DISCONTINUED | OUTPATIENT
Start: 2024-07-30 | End: 2024-07-30 | Stop reason: SDUPTHER

## 2024-07-30 RX ORDER — OXYCODONE AND ACETAMINOPHEN 10; 325 MG/1; MG/1
1 TABLET ORAL EVERY 8 HOURS PRN
Qty: 20 TABLET | Refills: 0 | Status: SHIPPED | OUTPATIENT
Start: 2024-07-30 | End: 2024-08-06

## 2024-07-30 RX ORDER — SODIUM CHLORIDE 450 MG/100ML
INJECTION, SOLUTION INTRAVENOUS CONTINUOUS
Status: DISCONTINUED | OUTPATIENT
Start: 2024-07-30 | End: 2024-07-30 | Stop reason: HOSPADM

## 2024-07-30 RX ORDER — HYDROCODONE BITARTRATE AND ACETAMINOPHEN 5; 325 MG/1; MG/1
1 TABLET ORAL EVERY 4 HOURS PRN
Status: DISCONTINUED | OUTPATIENT
Start: 2024-07-30 | End: 2024-07-30 | Stop reason: HOSPADM

## 2024-07-30 RX ORDER — ONDANSETRON 2 MG/ML
INJECTION INTRAMUSCULAR; INTRAVENOUS PRN
Status: DISCONTINUED | OUTPATIENT
Start: 2024-07-30 | End: 2024-07-30 | Stop reason: SDUPTHER

## 2024-07-30 RX ORDER — SODIUM CHLORIDE 0.9 % (FLUSH) 0.9 %
5-40 SYRINGE (ML) INJECTION EVERY 12 HOURS SCHEDULED
Status: DISCONTINUED | OUTPATIENT
Start: 2024-07-30 | End: 2024-07-30 | Stop reason: HOSPADM

## 2024-07-30 RX ORDER — ONDANSETRON 2 MG/ML
4 INJECTION INTRAMUSCULAR; INTRAVENOUS EVERY 8 HOURS PRN
Status: DISCONTINUED | OUTPATIENT
Start: 2024-07-30 | End: 2024-07-30 | Stop reason: HOSPADM

## 2024-07-30 RX ORDER — ACETAMINOPHEN 325 MG/1
650 TABLET ORAL EVERY 4 HOURS PRN
Status: DISCONTINUED | OUTPATIENT
Start: 2024-07-30 | End: 2024-07-30 | Stop reason: HOSPADM

## 2024-07-30 RX ORDER — HEPARIN SODIUM 1000 [USP'U]/ML
INJECTION, SOLUTION INTRAVENOUS; SUBCUTANEOUS PRN
Status: DISCONTINUED | OUTPATIENT
Start: 2024-07-30 | End: 2024-07-30 | Stop reason: SDUPTHER

## 2024-07-30 RX ORDER — DIPHENHYDRAMINE HYDROCHLORIDE 50 MG/ML
12.5 INJECTION INTRAMUSCULAR; INTRAVENOUS
Status: DISCONTINUED | OUTPATIENT
Start: 2024-07-30 | End: 2024-07-30 | Stop reason: HOSPADM

## 2024-07-30 RX ORDER — FENTANYL CITRATE 50 UG/ML
25 INJECTION, SOLUTION INTRAMUSCULAR; INTRAVENOUS EVERY 10 MIN PRN
Status: DISCONTINUED | OUTPATIENT
Start: 2024-07-30 | End: 2024-07-30 | Stop reason: HOSPADM

## 2024-07-30 RX ORDER — FENTANYL CITRATE 50 UG/ML
INJECTION, SOLUTION INTRAMUSCULAR; INTRAVENOUS PRN
Status: DISCONTINUED | OUTPATIENT
Start: 2024-07-30 | End: 2024-07-30 | Stop reason: SDUPTHER

## 2024-07-30 RX ORDER — DEXMEDETOMIDINE HYDROCHLORIDE 100 UG/ML
INJECTION, SOLUTION INTRAVENOUS PRN
Status: DISCONTINUED | OUTPATIENT
Start: 2024-07-30 | End: 2024-07-30 | Stop reason: SDUPTHER

## 2024-07-30 RX ORDER — MIDAZOLAM HYDROCHLORIDE 1 MG/ML
INJECTION INTRAMUSCULAR; INTRAVENOUS PRN
Status: DISCONTINUED | OUTPATIENT
Start: 2024-07-30 | End: 2024-07-30 | Stop reason: SDUPTHER

## 2024-07-30 RX ORDER — HYDROCODONE BITARTRATE AND ACETAMINOPHEN 5; 325 MG/1; MG/1
2 TABLET ORAL EVERY 4 HOURS PRN
Status: DISCONTINUED | OUTPATIENT
Start: 2024-07-30 | End: 2024-07-30 | Stop reason: HOSPADM

## 2024-07-30 RX ORDER — PROPOFOL 10 MG/ML
INJECTION, EMULSION INTRAVENOUS PRN
Status: DISCONTINUED | OUTPATIENT
Start: 2024-07-30 | End: 2024-07-30 | Stop reason: SDUPTHER

## 2024-07-30 RX ORDER — NALOXONE HYDROCHLORIDE 0.4 MG/ML
INJECTION, SOLUTION INTRAMUSCULAR; INTRAVENOUS; SUBCUTANEOUS PRN
Status: DISCONTINUED | OUTPATIENT
Start: 2024-07-30 | End: 2024-07-30 | Stop reason: HOSPADM

## 2024-07-30 RX ORDER — SODIUM CHLORIDE 9 MG/ML
INJECTION, SOLUTION INTRAVENOUS PRN
Status: DISCONTINUED | OUTPATIENT
Start: 2024-07-30 | End: 2024-07-30 | Stop reason: HOSPADM

## 2024-07-30 RX ORDER — LIDOCAINE HYDROCHLORIDE 10 MG/ML
INJECTION, SOLUTION INFILTRATION; PERINEURAL PRN
Status: DISCONTINUED | OUTPATIENT
Start: 2024-07-30 | End: 2024-07-30 | Stop reason: SDUPTHER

## 2024-07-30 RX ADMIN — DEXMEDETOMIDINE HYDROCHLORIDE 8 MCG: 100 INJECTION, SOLUTION, CONCENTRATE INTRAVENOUS at 15:23

## 2024-07-30 RX ADMIN — FENTANYL CITRATE 50 MCG: 0.05 INJECTION, SOLUTION INTRAMUSCULAR; INTRAVENOUS at 14:22

## 2024-07-30 RX ADMIN — CEFAZOLIN 2000 MG: 2 INJECTION, POWDER, FOR SOLUTION INTRAMUSCULAR; INTRAVENOUS at 14:29

## 2024-07-30 RX ADMIN — PROPOFOL 50 MG: 10 INJECTION, EMULSION INTRAVENOUS at 15:32

## 2024-07-30 RX ADMIN — DEXMEDETOMIDINE HYDROCHLORIDE 8 MCG: 100 INJECTION, SOLUTION, CONCENTRATE INTRAVENOUS at 15:30

## 2024-07-30 RX ADMIN — SODIUM CHLORIDE: 9 INJECTION, SOLUTION INTRAVENOUS at 15:58

## 2024-07-30 RX ADMIN — ONDANSETRON 4 MG: 2 INJECTION INTRAMUSCULAR; INTRAVENOUS at 14:39

## 2024-07-30 RX ADMIN — LIDOCAINE HYDROCHLORIDE 25 MG: 10 INJECTION, SOLUTION INFILTRATION; PERINEURAL at 14:24

## 2024-07-30 RX ADMIN — PROPOFOL 150 MG: 10 INJECTION, EMULSION INTRAVENOUS at 14:25

## 2024-07-30 RX ADMIN — HEPARIN SODIUM 6000 UNITS: 1000 INJECTION, SOLUTION INTRAVENOUS; SUBCUTANEOUS at 14:51

## 2024-07-30 RX ADMIN — SODIUM CHLORIDE: 4.5 INJECTION, SOLUTION INTRAVENOUS at 14:20

## 2024-07-30 RX ADMIN — Medication 25 MG: at 14:43

## 2024-07-30 RX ADMIN — HEPARIN SODIUM 1000 UNITS: 1000 INJECTION, SOLUTION INTRAVENOUS; SUBCUTANEOUS at 15:39

## 2024-07-30 RX ADMIN — FENTANYL CITRATE 50 MCG: 0.05 INJECTION, SOLUTION INTRAMUSCULAR; INTRAVENOUS at 15:39

## 2024-07-30 RX ADMIN — HYDROCODONE BITARTRATE AND ACETAMINOPHEN 1 TABLET: 5; 325 TABLET ORAL at 18:29

## 2024-07-30 RX ADMIN — HYDROMORPHONE HYDROCHLORIDE 0.25 MG: 1 INJECTION, SOLUTION INTRAMUSCULAR; INTRAVENOUS; SUBCUTANEOUS at 17:10

## 2024-07-30 RX ADMIN — DEXMEDETOMIDINE HYDROCHLORIDE 8 MCG: 100 INJECTION, SOLUTION, CONCENTRATE INTRAVENOUS at 15:58

## 2024-07-30 RX ADMIN — Medication 25 MG: at 15:02

## 2024-07-30 RX ADMIN — FENTANYL CITRATE 50 MCG: 0.05 INJECTION, SOLUTION INTRAMUSCULAR; INTRAVENOUS at 15:33

## 2024-07-30 RX ADMIN — LIDOCAINE HYDROCHLORIDE 25 MG: 10 INJECTION, SOLUTION INFILTRATION; PERINEURAL at 14:25

## 2024-07-30 RX ADMIN — SODIUM CHLORIDE: 4.5 INJECTION, SOLUTION INTRAVENOUS at 12:08

## 2024-07-30 RX ADMIN — HYDROMORPHONE HYDROCHLORIDE 0.25 MG: 1 INJECTION, SOLUTION INTRAMUSCULAR; INTRAVENOUS; SUBCUTANEOUS at 16:58

## 2024-07-30 RX ADMIN — MIDAZOLAM 2 MG: 1 INJECTION INTRAMUSCULAR; INTRAVENOUS at 14:21

## 2024-07-30 RX ADMIN — FENTANYL CITRATE 50 MCG: 0.05 INJECTION, SOLUTION INTRAMUSCULAR; INTRAVENOUS at 14:39

## 2024-07-30 ASSESSMENT — PAIN SCALES - GENERAL
PAINLEVEL_OUTOF10: 9
PAINLEVEL_OUTOF10: 8
PAINLEVEL_OUTOF10: 5

## 2024-07-30 ASSESSMENT — LIFESTYLE VARIABLES: SMOKING_STATUS: 0

## 2024-07-30 ASSESSMENT — PAIN DESCRIPTION - LOCATION
LOCATION: ARM

## 2024-07-30 ASSESSMENT — PAIN DESCRIPTION - ORIENTATION: ORIENTATION: LEFT;UPPER

## 2024-07-30 ASSESSMENT — PAIN DESCRIPTION - DESCRIPTORS
DESCRIPTORS: ACHING

## 2024-07-30 ASSESSMENT — PAIN - FUNCTIONAL ASSESSMENT
PAIN_FUNCTIONAL_ASSESSMENT: ACTIVITIES ARE NOT PREVENTED
PAIN_FUNCTIONAL_ASSESSMENT: 0-10

## 2024-07-30 NOTE — DISCHARGE INSTRUCTIONS
to call the office.   A. New or unusual drainage from the incision   B. Fever/chills   C. Severe swelling at the incision site.   D. Severe pain or weakness in hand   E. Sudden swelling in the arm    **NO LIFTING MORE THAN 10 POUNDS X 24 HOURS

## 2024-07-30 NOTE — ANESTHESIA PRE PROCEDURE
Department of Anesthesiology  Preprocedure Note       Name:  Hema Gallagher   Age:  53 y.o.  :  1970                                          MRN:  [id number]         Date:  2024      Surgeon: Surgeon(s):  Unruly Oseguera MD    Procedure: Procedure(s):  LEFT UPPER FISTULAGRAM, THROMBECTOMY, POSSIBLE BALLOON, STENT, IVUS    Medications prior to admission:   Prior to Admission medications    Medication Sig Start Date End Date Taking? Authorizing Provider   hydrALAZINE (APRESOLINE) 25 MG tablet TAKE 1 TABLET BY MOUTH EVERY 8 HOURS 24   Aletha Del Cid APRN - CNP   NIFEdipine (ADALAT CC) 60 MG extended release tablet Take 1 tablet by mouth twice daily 24   Aletha Del Cid APRN - CNP   metoprolol tartrate (LOPRESSOR) 25 MG tablet Take 1 tablet by mouth 2 times daily 24   Aletha Del Cid APRN - CNP   cloNIDine (CATAPRES) 0.1 MG tablet Take 1 tablet by mouth 2 times daily 24   Aletha Del Cid APRN - CNP   simvastatin (ZOCOR) 10 MG tablet Take 1 tablet by mouth nightly 23   Aletha Del Cid APRN - CNP   albuterol sulfate HFA (PROVENTIL;VENTOLIN;PROAIR) 108 (90 Base) MCG/ACT inhaler Inhale 2 puffs into the lungs every 6 hours as needed for Wheezing 23   Aletha Del Cid APRN - CNP   HYDROcodone-acetaminophen (NORCO) 5-325 MG per tablet Take 1 tablet by mouth every 6 hours as needed for Pain.  Patient not taking: Reported on 2024    Susan Fox MD   cinacalcet (SENSIPAR) 30 MG tablet Take 1 tablet by mouth daily 23   Susan Fox MD   AURYXIA 1  MG(Fe) TABS tablet Take 3 tablets by mouth 3 times daily (with meals) 23   Susan Fox MD   Cyanocobalamin (VITAMIN B 12 PO) Take 1 tablet by mouth daily     Susan Fox MD   calcium carbonate (OYSTER SHELL CALCIUM 500 MG) 1250 (500 Ca) MG tablet Take 1 tablet by mouth daily    Susan Fox MD       Current medications:    Current Facility-Administered Medications   Medication Dose Route

## 2024-07-30 NOTE — PROGRESS NOTES
YSABEL SANDY AT BEDSIDE - SPOKE WITH PATIENT AND EXAMINED MOUTH, PATIENT AND FAMILY MEMBER TO MONITOR AND CALL MD OR RETURN TO EMERGENCY DEPARTMENT WITH CONTINUED BLEEDING OR IF BRUISED AREA BECOMES LARGER OR CAUSES ANY ISSUES

## 2024-07-30 NOTE — OP NOTE
until I have blood flow through the sheath.  Venogram show areas of stenosis in the cephalic vein greater than 50% even after dilating with a 9 mm diameter balloon.  Additional balloon angioplasty was performed of cephalic vein stenoses with 9 mm x 40 mm conquest balloon.    Micropuncture needle was then used to cannulate the cephalic vein in the proximal upper arm toward the arterial inflow.  A 6 Korean sheath was placed and then a 5 Nicholas was passed over an 035 wire into the brachial artery and I pulled the clots from the arteriovenous anastomosis.  Multiple passes were performed here as well.  Balloon angioplasty was performed of the distal upper arm cephalic vein stenosis with 9 mm x 40 mm conquest balloon.  Arteriograms and venograms show patent arteriovenous anastomosis and distal upper arm cephalic vein.  The 6 Korean proximal upper arm sheath was removed.  I performed 2 additional passes with the Inari device.  Venograms were again performed and there does not appear to be any significant outflow stenosis or occlusion.    The 8 Korean Inari sheath was removed and sheath site closed with 3-0 nylon suture.

## 2024-07-30 NOTE — ANESTHESIA POSTPROCEDURE EVALUATION
Department of Anesthesiology  Postprocedure Note    Patient: Hema Gallagher  MRN: [id number]  YOB: 1970  Date of evaluation: 7/30/2024    Procedure Summary       Date: 07/30/24 Room / Location: [address]    Anesthesia Start: 1420 Anesthesia Stop: 1632    Procedure: LEFT UPPER FISTULAGRAM, PERCUTANEOUS THROMBECTOMY, BALLOON ANGIOPLASTY (Left) Diagnosis:       End stage renal disease (HCC)      (End stage renal disease (HCC) [N18.6])    Surgeons: Unruly Oseguera MD Responsible Provider: Steven Rodríguez APRN - CRNA    Anesthesia Type: general ASA Status: 4            Anesthesia Type: No value filed.    Kendall Phase I: Kendall Score: 6    Kendall Phase II:      Anesthesia Post Evaluation    Patient location during evaluation: PACU  Patient participation: waiting for patient participation  Level of consciousness: responsive to physical stimuli  Pain score: 0  Airway patency: patent  Nausea & Vomiting: no nausea and no vomiting  Cardiovascular status: hemodynamically stable  Hydration status: euvolemic  Comments: /66   Pulse 71   Temp 98 °F (36.7 °C) (Temporal)   Resp 26   Ht 1.702 m (5' 7\")   Wt 103 kg (227 lb)   SpO2 100%   BMI 35.55 kg/m²     Multimodal analgesia pain management approach  Pain management: adequate    No notable events documented.

## 2024-07-30 NOTE — PROGRESS NOTES
CRNA NOTIFIED PATIENT IS BLEEDING FROM UNDERNEATH TONGUE, APPEARS PURPLE AND ENLARGED, REQUESTED FOR HER TO COME AND SPEAK WITH PATIENT

## 2024-07-30 NOTE — ANESTHESIA POSTPROCEDURE EVALUATION
Department of Anesthesiology  Postprocedure Note    Patient: Hema Gallagher  MRN: [id number]  YOB: 1970  Date of evaluation: 7/30/2024    Procedure Summary       Date: 07/30/24 Room / Location: [address]    Anesthesia Start: 1420 Anesthesia Stop: 1632    Procedure: LEFT UPPER FISTULAGRAM, PERCUTANEOUS THROMBECTOMY, BALLOON ANGIOPLASTY (Left) Diagnosis:       End stage renal disease (HCC)      (End stage renal disease (HCC) [N18.6])    Surgeons: Unruly Oseguera MD Responsible Provider: Steven Rodríguez APRN - CRNA    Anesthesia Type: general ASA Status: 4            Anesthesia Type: No value filed.    Kendall Phase I: Kendall Score: 10    Kendall Phase II: Kendall Score: 10    Anesthesia Post Evaluation    Patient location during evaluation: bedside  Patient participation: complete - patient participated  Pain score: 4  Comments: Patient with mild hematoma on posterior frenulum of tongue, possibly caused by initial introduction of LMA. Apologized to patient for discomfort. Educated patient to gargle with warm salt water, eat cold foods such as ice and/or popsicles, and use chloraseptic throat spray for pain relief. Instructed patient that swelling should decrease within the next 24 hours. Instructed patient if swelling and/or pain worsens, notifiy Dr. Oseguera or come to ER if appropriate. Pt states understanding and agrees with plan    No notable events documented.

## 2024-07-31 ENCOUNTER — CARE COORDINATION (OUTPATIENT)
Dept: CARE COORDINATION | Age: 54
End: 2024-07-31

## 2024-07-31 NOTE — CARE COORDINATION
Ambulatory Care Coordination Note     2024 2:52 PM     Patient Current Location:  [place]     This patient was received as a referral from Ambulatory Care Manager .    Patient contacted the patient by telephone. Verified name and  with patient as identifiers. Provided introduction to self, and explanation of the ACM role.   Patient accepted care management services at this time.          ACM: Zoraida Gu RN     Challenges to be reviewed by the provider   Additional needs identified to be addressed with provider Yes  Patient needs a follow-up with vascular post procedure, ACM contacted left  requesting appointment assistance.       ACM spoke with Glenna patient scheduled for  24         Method of communication with provider: phone.    Care Summary Note:     ACM RN spoke with patient who reports he is feeling OK at this moment. Patient denies fever or flu like symptoms, dyspnea or shortness of breath Patient reports to ACM. He is having difficulty swallowing and states he has a hematoma under his tongue. ACM educated on hematomas and care. Patient was encouraged to notify provider of worsening symptoms, difficulty breathing, or choking.patient agreed to do so. ACM offered a follow-up with PCP patient declined at this time. Patient agreed to notify provider of needs as they arise.     ACM reviewed patients upcoming follow up appointments not patient does not have a follow up appointment post yesterday‘s procedure. ACM reviewed patient’s discharge instructions educating patient on providers recommendations. ACM contacted vascular office to request a follow up appointment. ACM left voicemail and routed encounter to office for assistance with scheduling. Follow up. Patient requested a morning appointment. Patient denied transportation is at this time. Patient continues with peritoneal dialysis and states he typically begins treatment at HS. Patient denies DME needs at this time with

## 2024-08-06 ENCOUNTER — CARE COORDINATION (OUTPATIENT)
Dept: CARE COORDINATION | Age: 54
End: 2024-08-06

## 2024-08-06 NOTE — CARE COORDINATION
Ambulatory Care Coordination Note     2024 9:20 AM     Patient Current Location:  [place]     Patient contacted the patient by telephone. Verified name and  with patient as identifiers.         ACM: Zoraida Gu RN     Challenges to be reviewed by the provider   Additional needs identified to be addressed with provider No  none               Method of communication with provider: none.    Care Summary Note:     ACM RN spoke with patient who reports he is doing OK at this time. Patient reports to ACM hematoma underneath tongue has resolved and is feeling better. Patient confirms continuing with peritoneal dialysis at home and denies any adverse symptoms at this time. Patient confirms following up with dialysis clinic. Patient reported to dialysis nurse. He was unable to feel. AV fistula. Patient follow up with vascular next Thursday at 9:30. Patient denied transportation needs at this time and understanding of patient monitoring vitals at home. Patient states blood pressure runs around 140/60 at home. At this time. Patient reports taking clonidine once a day in the a.m. if he takes HS he notices increased episodes of hypotension. Patient encouraged to notify providers Patient educated on importance of following up with vascular provider patient voice understanding denying any questions. Patient agreed to continue to log blood pressure readings to review with ACM to follow up next week. vascular follow up. Patient encouraged to notify care provider of worsening symptoms. Patient agreed to do so denying any questions at this time.     Offered patient enrollment in the Remote Patient Monitoring (RPM) program for in-home monitoring: Yes, but did not enroll at this time: already monitoring with home equipment.     Assessments Completed:   Hypertension - Encounter Level    Symptom course: stable          Medications Reviewed:   Completed during this call    Advance Care Planning:   Not reviewed during this

## 2024-08-13 ENCOUNTER — CARE COORDINATION (OUTPATIENT)
Dept: CARE COORDINATION | Age: 54
End: 2024-08-13

## 2024-08-13 NOTE — CARE COORDINATION
Ambulatory Care Coordination Note     2024 1:17 PM     Patient Current Location:  [place]     Patient contacted the parent by telephone. Verified name and  with patient as identifiers.         ACM: Zoraida Gu RN     Challenges to be reviewed by the provider   Additional needs identified to be addressed with provider No  none               Method of communication with provider: none.    Care Summary Note:     ACM RN spoke with patient who reports he is feeling fairly well at this time. Patient reports to ACM he has a follow-up with vascular on 24. Patient states his current plan of care is to have a venous graph. Patient confirms completing dialysis via permacath.  ACM educated patient on dressing changes and signs and symptoms of infection. Patient educated on dressing changes and assess for the need of adequate supplies. Patient confirms having dressing supplies and denies questions. ACM educated on signs and symptoms of infection. Patient denies fever and flu like symptoms at this time. ACM educated patient on reporting adverse symptoms to PCP. Patient voiced understanding to do so and denied any adverse symptoms at this time.     Patient reports has a follow-up next week with dialysis clinic with skilled nurse for education patient confirms transportation at this time. ACM to follow-up and review blood pressure readings at that time.     Offered patient enrollment in the Remote Patient Monitoring (RPM) program for in-home monitoring: Yes, but did not enroll at this time: already monitoring with home equipment.     Assessments Completed:   Diabetes Assessment      How often do you test your blood sugar?: No Testing   Do you have barriers with adherence to non-pharmacologic self-management interventions? (Nutrition/Exercise/Self-Monitoring): No   Have you ever had to go to the ED for symptoms of low blood sugar?: No       No patient-reported symptoms   Do you have hyperglycemia symptoms?: No

## 2024-08-20 ENCOUNTER — CARE COORDINATION (OUTPATIENT)
Dept: CARE COORDINATION | Age: 54
End: 2024-08-20

## 2024-08-20 NOTE — CARE COORDINATION
Ambulatory Care Coordination Note     8/20/2024 3:55 PM     ACM outreach attempt by this ACM today to perform care management follow up . ACM was unable to reach the patient by telephone today;  Patient requested to speak with ACM at a later time as he is driving.      ACM: Zoraida Gu RN     Care Summary Note:     PCP/Specialist follow up:   Future Appointments         Provider Specialty Dept Phone    9/4/2024 7:30 AM Aletha Del Cid APRN - [facility] [phone]    12/10/2024 8:00 AM Kacey Hernandez APRN Cardiology [phone]            Follow Up:   Plan for next ACM outreach in approximately 1-2 days  to complete:  - medication review  - goal progression  - education     Zoraida Gu RN  Ambulatory Care Manager   C. [phone]  .

## 2024-08-26 ENCOUNTER — CARE COORDINATION (OUTPATIENT)
Dept: CARE COORDINATION | Age: 54
End: 2024-08-26

## 2024-08-26 NOTE — CARE COORDINATION
Ambulatory Care Coordination Note     8/26/2024 11:56 AM     Patient outreach attempt by this ACM today to perform care management follow up . ACM was unable to reach the patient by telephone today; left voice message requesting a return phone call to this ACM.     ACM: Zoraida Gu RN     Care Summary Note:     PCP/Specialist follow up:   Future Appointments         Provider Specialty Dept Phone    9/4/2024 7:30 AM Aletha Del Cid APRN - [facility] [phone]    12/10/2024 8:00 AM Kacey Hernandez APRN Cardiology [phone]            Follow Up:   Plan for next ACM outreach in approximately 1 week to complete:  - medication review  - advance care planning  - goal progression    Zoraida Gu RN  Ambulatory Care Manager   C. [phone]  .

## 2024-09-04 ENCOUNTER — OFFICE VISIT (OUTPATIENT)
Dept: PRIMARY CARE CLINIC | Age: 54
End: 2024-09-04
Payer: MEDICARE

## 2024-09-04 ENCOUNTER — CARE COORDINATION (OUTPATIENT)
Dept: CARE COORDINATION | Age: 54
End: 2024-09-04

## 2024-09-04 VITALS
BODY MASS INDEX: 37.51 KG/M2 | DIASTOLIC BLOOD PRESSURE: 86 MMHG | SYSTOLIC BLOOD PRESSURE: 138 MMHG | TEMPERATURE: 97.8 F | WEIGHT: 239 LBS | HEIGHT: 67 IN | HEART RATE: 63 BPM | OXYGEN SATURATION: 97 %

## 2024-09-04 DIAGNOSIS — N18.6 ESRD ON HEMODIALYSIS (HCC): ICD-10-CM

## 2024-09-04 DIAGNOSIS — J30.2 SEASONAL ALLERGIES: ICD-10-CM

## 2024-09-04 DIAGNOSIS — N18.6 TYPE 2 DIABETES MELLITUS WITH CHRONIC KIDNEY DISEASE ON CHRONIC DIALYSIS, WITHOUT LONG-TERM CURRENT USE OF INSULIN (HCC): Primary | ICD-10-CM

## 2024-09-04 DIAGNOSIS — Z99.2 TYPE 2 DIABETES MELLITUS WITH CHRONIC KIDNEY DISEASE ON CHRONIC DIALYSIS, WITHOUT LONG-TERM CURRENT USE OF INSULIN (HCC): Primary | ICD-10-CM

## 2024-09-04 DIAGNOSIS — I10 ESSENTIAL HYPERTENSION: ICD-10-CM

## 2024-09-04 DIAGNOSIS — E78.2 MIXED HYPERLIPIDEMIA: ICD-10-CM

## 2024-09-04 DIAGNOSIS — Z99.2 ESRD ON HEMODIALYSIS (HCC): ICD-10-CM

## 2024-09-04 DIAGNOSIS — R05.1 ACUTE COUGH: ICD-10-CM

## 2024-09-04 DIAGNOSIS — E11.22 TYPE 2 DIABETES MELLITUS WITH CHRONIC KIDNEY DISEASE ON CHRONIC DIALYSIS, WITHOUT LONG-TERM CURRENT USE OF INSULIN (HCC): Primary | ICD-10-CM

## 2024-09-04 PROBLEM — E87.70 VOLUME OVERLOAD: Status: RESOLVED | Noted: 2021-01-25 | Resolved: 2024-09-04

## 2024-09-04 PROBLEM — E87.5 HYPERKALEMIA: Status: RESOLVED | Noted: 2023-08-10 | Resolved: 2024-09-04

## 2024-09-04 PROBLEM — R78.81 BACTEREMIA: Status: RESOLVED | Noted: 2023-08-10 | Resolved: 2024-09-04

## 2024-09-04 PROBLEM — J18.9 CAVITARY PNEUMONIA: Status: RESOLVED | Noted: 2023-05-21 | Resolved: 2024-09-04

## 2024-09-04 PROBLEM — R91.8 LUNG INFILTRATE: Status: RESOLVED | Noted: 2023-05-21 | Resolved: 2024-09-04

## 2024-09-04 PROBLEM — D50.0 BLOOD LOSS ANEMIA: Status: RESOLVED | Noted: 2023-08-10 | Resolved: 2024-09-04

## 2024-09-04 PROBLEM — J81.1 PULMONARY EDEMA: Status: RESOLVED | Noted: 2021-01-25 | Resolved: 2024-09-04

## 2024-09-04 PROBLEM — J98.4 CAVITARY PNEUMONIA: Status: RESOLVED | Noted: 2023-05-21 | Resolved: 2024-09-04

## 2024-09-04 LAB — HBA1C MFR BLD: 6.5 %

## 2024-09-04 PROCEDURE — 99214 OFFICE O/P EST MOD 30 MIN: CPT | Performed by: NURSE PRACTITIONER

## 2024-09-04 PROCEDURE — G8427 DOCREV CUR MEDS BY ELIG CLIN: HCPCS | Performed by: NURSE PRACTITIONER

## 2024-09-04 PROCEDURE — 3079F DIAST BP 80-89 MM HG: CPT | Performed by: NURSE PRACTITIONER

## 2024-09-04 PROCEDURE — 2022F DILAT RTA XM EVC RTNOPTHY: CPT | Performed by: NURSE PRACTITIONER

## 2024-09-04 PROCEDURE — G8417 CALC BMI ABV UP PARAM F/U: HCPCS | Performed by: NURSE PRACTITIONER

## 2024-09-04 PROCEDURE — 3017F COLORECTAL CA SCREEN DOC REV: CPT | Performed by: NURSE PRACTITIONER

## 2024-09-04 PROCEDURE — 3044F HG A1C LEVEL LT 7.0%: CPT | Performed by: NURSE PRACTITIONER

## 2024-09-04 PROCEDURE — 1036F TOBACCO NON-USER: CPT | Performed by: NURSE PRACTITIONER

## 2024-09-04 PROCEDURE — 83036 HEMOGLOBIN GLYCOSYLATED A1C: CPT | Performed by: NURSE PRACTITIONER

## 2024-09-04 PROCEDURE — 3075F SYST BP GE 130 - 139MM HG: CPT | Performed by: NURSE PRACTITIONER

## 2024-09-04 RX ORDER — BENZONATATE 200 MG/1
200 CAPSULE ORAL 3 TIMES DAILY PRN
Qty: 30 CAPSULE | Refills: 0 | Status: SHIPPED | OUTPATIENT
Start: 2024-09-04 | End: 2024-09-14

## 2024-09-04 RX ORDER — FLUTICASONE PROPIONATE 50 MCG
2 SPRAY, SUSPENSION (ML) NASAL DAILY
Qty: 16 G | Refills: 0 | Status: SHIPPED | OUTPATIENT
Start: 2024-09-04

## 2024-09-04 ASSESSMENT — ENCOUNTER SYMPTOMS
COLOR CHANGE: 0
SHORTNESS OF BREATH: 0
NAUSEA: 0
DIARRHEA: 0
COUGH: 0
SORE THROAT: 0
CHEST TIGHTNESS: 0
ABDOMINAL PAIN: 0
VOMITING: 0

## 2024-09-04 NOTE — CARE COORDINATION
Attempted call to patient this date for care coordination follow up. Phone rings. No answer. Voicemail is full.     Soraya Long BSHIM  Care Coordination    [facility]   Cell: [phone]

## 2024-09-04 NOTE — PROGRESS NOTES
appearing polyps in sig colon, int hem Gr 1, 3 year recall    DIALYSIS FISTULA CREATION Left 06/26/2020    LEFT BRACHIAL / CEPHALIC AV FISTULA performed by Unruly Oseguera MD at [facility] OR    TUNNELED VENOUS CATHETER PLACEMENT      [facility] - removal of first cath due to infection and placement of new one    TUNNELED VENOUS CATHETER PLACEMENT  01/2020    [facility] [place]    VASCULAR SURGERY  11/11/2020    [facility]. Removal of tunneled dialysis catheter left internal jugular vein    VASCULAR SURGERY  07/26/2021    [facility]- Left upper extremity fistulogram's including venography the superior cava, Balloon angioplasty left proximal upper arm cephalic vein stenosis with 7 mm x 80 mm conquest, 7 mm x 100 mm Lutonix, 8 mm x 60 mm Lutonix drug-coated balloon, Completion venograms left upper extremity    VASCULAR SURGERY  08/10/2023    Left upper extremity fistulogram, venogram.  Balloon angioplasty of left cephalic vein arch using 6 x 150  balloon, 7 x 150 drug-coated balloon, stening using 8 x 10 Viabahn followed by 8 x 7.5 Viabahn stent; VI    VASCULAR SURGERY Left 8/16/2023    RIGHT IJ PERMACATH PLACEMENT; ANEURSYM REMOVAL AND FISTULAGRAM performed by Talisha Peoples DO at [facility] OR    VASCULAR SURGERY Left 7/30/2024    LEFT UPPER FISTULAGRAM, PERCUTANEOUS THROMBECTOMY, BALLOON ANGIOPLASTY performed by Unruly Oseguera MD at [facility] OR       Social History     Tobacco Use    Smoking status: Never    Smokeless tobacco: Never   Substance Use Topics    Alcohol use: Not Currently        Current Outpatient Medications   Medication Sig Dispense Refill    benzonatate (TESSALON) 200 MG capsule Take 1 capsule by mouth 3 times daily as needed for Cough 30 capsule 0    fluticasone (FLONASE) 50 MCG/ACT nasal spray 2 sprays by Each Nostril route daily 16 g 0    hydrALAZINE (APRESOLINE) 25 MG tablet TAKE 1 TABLET BY MOUTH EVERY 8 HOURS 90 tablet 0    NIFEdipine (ADALAT CC) 60 MG extended release tablet Take 1 tablet by mouth twice daily 180 tablet 0

## 2024-09-11 ENCOUNTER — CARE COORDINATION (OUTPATIENT)
Dept: CARE COORDINATION | Age: 54
End: 2024-09-11

## 2024-09-17 ENCOUNTER — CARE COORDINATION (OUTPATIENT)
Dept: CARE COORDINATION | Age: 54
End: 2024-09-17

## 2024-09-24 ENCOUNTER — ANESTHESIA (OUTPATIENT)
Dept: OPERATING ROOM | Age: 54
End: 2024-09-24
Payer: MEDICARE

## 2024-09-24 ENCOUNTER — ANESTHESIA EVENT (OUTPATIENT)
Dept: OPERATING ROOM | Age: 54
End: 2024-09-24
Payer: MEDICARE

## 2024-09-24 ENCOUNTER — HOSPITAL ENCOUNTER (OUTPATIENT)
Age: 54
Setting detail: OUTPATIENT SURGERY
Discharge: HOME | End: 2024-09-24
Attending: SURGERY | Admitting: SURGERY
Payer: MEDICARE

## 2024-09-24 VITALS
OXYGEN SATURATION: 100 % | HEART RATE: 73 BPM | RESPIRATION RATE: 16 BRPM | TEMPERATURE: 97.4 F | BODY MASS INDEX: 35.63 KG/M2 | WEIGHT: 227 LBS | HEIGHT: 67 IN | DIASTOLIC BLOOD PRESSURE: 91 MMHG | SYSTOLIC BLOOD PRESSURE: 152 MMHG

## 2024-09-24 DIAGNOSIS — N18.6 ESRD ON HEMODIALYSIS (HCC): Primary | ICD-10-CM

## 2024-09-24 DIAGNOSIS — Z99.2 ESRD ON HEMODIALYSIS (HCC): Primary | ICD-10-CM

## 2024-09-24 DIAGNOSIS — I10 ESSENTIAL HYPERTENSION: ICD-10-CM

## 2024-09-24 LAB
ANION GAP SERPL CALCULATED.3IONS-SCNC: 14 MMOL/L (ref 7–19)
APTT PPP: 33 SEC (ref 26–36.2)
BASOPHILS # BLD: 0 K/UL (ref 0–0.2)
BASOPHILS NFR BLD: 0.4 % (ref 0–1)
BUN SERPL-MCNC: 51 MG/DL (ref 6–20)
CALCIUM SERPL-MCNC: 9.7 MG/DL (ref 8.6–10)
CHLORIDE SERPL-SCNC: 93 MMOL/L (ref 98–111)
CO2 SERPL-SCNC: 28 MMOL/L (ref 22–29)
CREAT SERPL-MCNC: 11 MG/DL (ref 0.7–1.2)
EOSINOPHIL # BLD: 0.4 K/UL (ref 0–0.6)
EOSINOPHIL NFR BLD: 7.7 % (ref 0–5)
ERYTHROCYTE [DISTWIDTH] IN BLOOD BY AUTOMATED COUNT: 13 % (ref 11.5–14.5)
GLUCOSE SERPL-MCNC: 117 MG/DL (ref 70–99)
HCT VFR BLD AUTO: 34.6 % (ref 42–52)
HGB BLD-MCNC: 11.7 G/DL (ref 14–18)
IMM GRANULOCYTES # BLD: 0 K/UL
INR PPP: 1.03 (ref 0.88–1.18)
LYMPHOCYTES # BLD: 1.7 K/UL (ref 1.1–4.5)
LYMPHOCYTES NFR BLD: 32.1 % (ref 20–40)
MCH RBC QN AUTO: 30.8 PG (ref 27–31)
MCHC RBC AUTO-ENTMCNC: 33.8 G/DL (ref 33–37)
MCV RBC AUTO: 91.1 FL (ref 80–94)
MONOCYTES # BLD: 0.7 K/UL (ref 0–0.9)
MONOCYTES NFR BLD: 12.7 % (ref 0–10)
NEUTROPHILS # BLD: 2.5 K/UL (ref 1.5–7.5)
NEUTS SEG NFR BLD: 46.9 % (ref 50–65)
PLATELET # BLD AUTO: 183 K/UL (ref 130–400)
PMV BLD AUTO: 9.6 FL (ref 9.4–12.4)
POTASSIUM SERPL-SCNC: 3.8 MMOL/L (ref 3.5–4.9)
PROTHROMBIN TIME: 13.2 SEC (ref 12–14.6)
RBC # BLD AUTO: 3.8 M/UL (ref 4.7–6.1)
SODIUM SERPL-SCNC: 135 MMOL/L (ref 136–145)
WBC # BLD AUTO: 5.4 K/UL (ref 4.8–10.8)

## 2024-09-24 PROCEDURE — 6360000002 HC RX W HCPCS

## 2024-09-24 PROCEDURE — 6360000002 HC RX W HCPCS: Performed by: ANESTHESIOLOGY

## 2024-09-24 PROCEDURE — 6360000002 HC RX W HCPCS: Performed by: SURGERY

## 2024-09-24 PROCEDURE — 3700000001 HC ADD 15 MINUTES (ANESTHESIA): Performed by: SURGERY

## 2024-09-24 PROCEDURE — 6360000002 HC RX W HCPCS: Performed by: NURSE ANESTHETIST, CERTIFIED REGISTERED

## 2024-09-24 PROCEDURE — 3600000004 HC SURGERY LEVEL 4 BASE: Performed by: SURGERY

## 2024-09-24 PROCEDURE — C1889 IMPLANT/INSERT DEVICE, NOC: HCPCS | Performed by: SURGERY

## 2024-09-24 PROCEDURE — 3600000014 HC SURGERY LEVEL 4 ADDTL 15MIN: Performed by: SURGERY

## 2024-09-24 PROCEDURE — 7100000001 HC PACU RECOVERY - ADDTL 15 MIN: Performed by: SURGERY

## 2024-09-24 PROCEDURE — 7100000010 HC PHASE II RECOVERY - FIRST 15 MIN: Performed by: SURGERY

## 2024-09-24 PROCEDURE — 3700000000 HC ANESTHESIA ATTENDED CARE: Performed by: SURGERY

## 2024-09-24 PROCEDURE — 7100000011 HC PHASE II RECOVERY - ADDTL 15 MIN: Performed by: SURGERY

## 2024-09-24 PROCEDURE — 85025 COMPLETE CBC W/AUTO DIFF WBC: CPT

## 2024-09-24 PROCEDURE — 2580000003 HC RX 258: Performed by: ANESTHESIOLOGY

## 2024-09-24 PROCEDURE — 2580000003 HC RX 258: Performed by: SURGERY

## 2024-09-24 PROCEDURE — 2580000003 HC RX 258: Performed by: NURSE ANESTHETIST, CERTIFIED REGISTERED

## 2024-09-24 PROCEDURE — 2500000003 HC RX 250 WO HCPCS: Performed by: ANESTHESIOLOGY

## 2024-09-24 PROCEDURE — 2500000003 HC RX 250 WO HCPCS: Performed by: NURSE ANESTHETIST, CERTIFIED REGISTERED

## 2024-09-24 PROCEDURE — 85610 PROTHROMBIN TIME: CPT

## 2024-09-24 PROCEDURE — A4217 STERILE WATER/SALINE, 500 ML: HCPCS | Performed by: SURGERY

## 2024-09-24 PROCEDURE — 64415 NJX AA&/STRD BRCH PLXS IMG: CPT | Performed by: NURSE ANESTHETIST, CERTIFIED REGISTERED

## 2024-09-24 PROCEDURE — 85730 THROMBOPLASTIN TIME PARTIAL: CPT

## 2024-09-24 PROCEDURE — 36415 COLL VENOUS BLD VENIPUNCTURE: CPT

## 2024-09-24 PROCEDURE — 2709999900 HC NON-CHARGEABLE SUPPLY: Performed by: SURGERY

## 2024-09-24 PROCEDURE — 80048 BASIC METABOLIC PNL TOTAL CA: CPT

## 2024-09-24 PROCEDURE — 7100000000 HC PACU RECOVERY - FIRST 15 MIN: Performed by: SURGERY

## 2024-09-24 DEVICE — IMPLANTABLE DEVICE: Type: IMPLANTABLE DEVICE | Site: ARM | Status: FUNCTIONAL

## 2024-09-24 DEVICE — CLIP LIG M BLU TI HRT SHP WIRE HORZ 6 CLIPS PER PK: Type: IMPLANTABLE DEVICE | Site: ARM | Status: FUNCTIONAL

## 2024-09-24 RX ORDER — HEPARIN SODIUM 1000 [USP'U]/ML
INJECTION, SOLUTION INTRAVENOUS; SUBCUTANEOUS
Status: DISCONTINUED | OUTPATIENT
Start: 2024-09-24 | End: 2024-09-24 | Stop reason: SDUPTHER

## 2024-09-24 RX ORDER — SODIUM CHLORIDE 0.9 % (FLUSH) 0.9 %
5-40 SYRINGE (ML) INJECTION EVERY 12 HOURS SCHEDULED
Status: DISCONTINUED | OUTPATIENT
Start: 2024-09-24 | End: 2024-09-24 | Stop reason: HOSPADM

## 2024-09-24 RX ORDER — SODIUM CHLORIDE 9 MG/ML
INJECTION, SOLUTION INTRAVENOUS PRN
Status: DISCONTINUED | OUTPATIENT
Start: 2024-09-24 | End: 2024-09-24 | Stop reason: HOSPADM

## 2024-09-24 RX ORDER — MIDAZOLAM HYDROCHLORIDE 1 MG/ML
2 INJECTION INTRAMUSCULAR; INTRAVENOUS ONCE
Status: COMPLETED | OUTPATIENT
Start: 2024-09-24 | End: 2024-09-24

## 2024-09-24 RX ORDER — FENTANYL CITRATE 50 UG/ML
INJECTION, SOLUTION INTRAMUSCULAR; INTRAVENOUS
Status: DISCONTINUED | OUTPATIENT
Start: 2024-09-24 | End: 2024-09-24 | Stop reason: SDUPTHER

## 2024-09-24 RX ORDER — ACETAMINOPHEN 325 MG/1
650 TABLET ORAL EVERY 4 HOURS PRN
Status: DISCONTINUED | OUTPATIENT
Start: 2024-09-24 | End: 2024-09-24 | Stop reason: HOSPADM

## 2024-09-24 RX ORDER — ONDANSETRON 2 MG/ML
INJECTION INTRAMUSCULAR; INTRAVENOUS
Status: DISCONTINUED | OUTPATIENT
Start: 2024-09-24 | End: 2024-09-24 | Stop reason: SDUPTHER

## 2024-09-24 RX ORDER — ROPIVACAINE HYDROCHLORIDE 5 MG/ML
INJECTION, SOLUTION EPIDURAL; INFILTRATION; PERINEURAL
Status: COMPLETED | OUTPATIENT
Start: 2024-09-24 | End: 2024-09-24

## 2024-09-24 RX ORDER — OXYCODONE AND ACETAMINOPHEN 5; 325 MG/1; MG/1
1 TABLET ORAL EVERY 8 HOURS PRN
Qty: 20 TABLET | Refills: 0 | Status: SHIPPED | OUTPATIENT
Start: 2024-09-24 | End: 2024-10-01

## 2024-09-24 RX ORDER — OXYCODONE AND ACETAMINOPHEN 5; 325 MG/1; MG/1
2 TABLET ORAL EVERY 4 HOURS PRN
Status: DISCONTINUED | OUTPATIENT
Start: 2024-09-24 | End: 2024-09-24 | Stop reason: HOSPADM

## 2024-09-24 RX ORDER — ROPIVACAINE HYDROCHLORIDE 5 MG/ML
INJECTION, SOLUTION EPIDURAL; INFILTRATION; PERINEURAL
Status: COMPLETED
Start: 2024-09-24 | End: 2024-09-24

## 2024-09-24 RX ORDER — OXYCODONE AND ACETAMINOPHEN 5; 325 MG/1; MG/1
1 TABLET ORAL EVERY 4 HOURS PRN
Status: DISCONTINUED | OUTPATIENT
Start: 2024-09-24 | End: 2024-09-24 | Stop reason: HOSPADM

## 2024-09-24 RX ORDER — ONDANSETRON 2 MG/ML
4 INJECTION INTRAMUSCULAR; INTRAVENOUS EVERY 8 HOURS PRN
Status: DISCONTINUED | OUTPATIENT
Start: 2024-09-24 | End: 2024-09-24 | Stop reason: HOSPADM

## 2024-09-24 RX ORDER — LIDOCAINE HYDROCHLORIDE 10 MG/ML
INJECTION, SOLUTION INFILTRATION; PERINEURAL
Status: DISCONTINUED | OUTPATIENT
Start: 2024-09-24 | End: 2024-09-24 | Stop reason: SDUPTHER

## 2024-09-24 RX ORDER — ROPIVACAINE HYDROCHLORIDE 5 MG/ML
30 INJECTION, SOLUTION EPIDURAL; INFILTRATION; PERINEURAL ONCE
Status: DISCONTINUED | OUTPATIENT
Start: 2024-09-24 | End: 2024-09-24 | Stop reason: HOSPADM

## 2024-09-24 RX ORDER — HYDROMORPHONE HYDROCHLORIDE 1 MG/ML
0.25 INJECTION, SOLUTION INTRAMUSCULAR; INTRAVENOUS; SUBCUTANEOUS EVERY 5 MIN PRN
Status: DISCONTINUED | OUTPATIENT
Start: 2024-09-24 | End: 2024-09-24 | Stop reason: HOSPADM

## 2024-09-24 RX ORDER — SODIUM CHLORIDE 0.9 % (FLUSH) 0.9 %
5-40 SYRINGE (ML) INJECTION PRN
Status: DISCONTINUED | OUTPATIENT
Start: 2024-09-24 | End: 2024-09-24 | Stop reason: HOSPADM

## 2024-09-24 RX ORDER — SODIUM CHLORIDE, SODIUM LACTATE, POTASSIUM CHLORIDE, CALCIUM CHLORIDE 600; 310; 30; 20 MG/100ML; MG/100ML; MG/100ML; MG/100ML
INJECTION, SOLUTION INTRAVENOUS CONTINUOUS
Status: DISCONTINUED | OUTPATIENT
Start: 2024-09-24 | End: 2024-09-24 | Stop reason: HOSPADM

## 2024-09-24 RX ORDER — EPHEDRINE SULFATE/0.9% NACL/PF 25 MG/5 ML
SYRINGE (ML) INTRAVENOUS
Status: DISCONTINUED | OUTPATIENT
Start: 2024-09-24 | End: 2024-09-24 | Stop reason: SDUPTHER

## 2024-09-24 RX ORDER — PROPOFOL 10 MG/ML
INJECTION, EMULSION INTRAVENOUS
Status: DISCONTINUED | OUTPATIENT
Start: 2024-09-24 | End: 2024-09-24 | Stop reason: SDUPTHER

## 2024-09-24 RX ORDER — MIDAZOLAM HYDROCHLORIDE 1 MG/ML
INJECTION INTRAMUSCULAR; INTRAVENOUS
Status: COMPLETED
Start: 2024-09-24 | End: 2024-09-24

## 2024-09-24 RX ORDER — NALOXONE HYDROCHLORIDE 0.4 MG/ML
INJECTION, SOLUTION INTRAMUSCULAR; INTRAVENOUS; SUBCUTANEOUS PRN
Status: DISCONTINUED | OUTPATIENT
Start: 2024-09-24 | End: 2024-09-24 | Stop reason: HOSPADM

## 2024-09-24 RX ORDER — ROCURONIUM BROMIDE 10 MG/ML
INJECTION, SOLUTION INTRAVENOUS
Status: DISCONTINUED | OUTPATIENT
Start: 2024-09-24 | End: 2024-09-24 | Stop reason: SDUPTHER

## 2024-09-24 RX ORDER — ONDANSETRON 2 MG/ML
4 INJECTION INTRAMUSCULAR; INTRAVENOUS
Status: DISCONTINUED | OUTPATIENT
Start: 2024-09-24 | End: 2024-09-24 | Stop reason: HOSPADM

## 2024-09-24 RX ORDER — SODIUM CHLORIDE 450 MG/100ML
INJECTION, SOLUTION INTRAVENOUS
Status: DISCONTINUED | OUTPATIENT
Start: 2024-09-24 | End: 2024-09-24 | Stop reason: SDUPTHER

## 2024-09-24 RX ORDER — HYDROMORPHONE HYDROCHLORIDE 1 MG/ML
0.5 INJECTION, SOLUTION INTRAMUSCULAR; INTRAVENOUS; SUBCUTANEOUS EVERY 5 MIN PRN
Status: DISCONTINUED | OUTPATIENT
Start: 2024-09-24 | End: 2024-09-24 | Stop reason: HOSPADM

## 2024-09-24 RX ORDER — SODIUM CHLORIDE 450 MG/100ML
INJECTION, SOLUTION INTRAVENOUS CONTINUOUS
Status: DISCONTINUED | OUTPATIENT
Start: 2024-09-24 | End: 2024-09-24 | Stop reason: HOSPADM

## 2024-09-24 RX ADMIN — MIDAZOLAM HYDROCHLORIDE 2 MG: 1 INJECTION INTRAMUSCULAR; INTRAVENOUS at 09:57

## 2024-09-24 RX ADMIN — ROPIVACAINE HYDROCHLORIDE 20 ML: 5 INJECTION, SOLUTION EPIDURAL; INFILTRATION; PERINEURAL at 09:58

## 2024-09-24 RX ADMIN — ROCURONIUM BROMIDE 80 MG: 10 INJECTION, SOLUTION INTRAVENOUS at 10:48

## 2024-09-24 RX ADMIN — EPHEDRINE SULFATE 10 MG: 5 INJECTION INTRAVENOUS at 11:05

## 2024-09-24 RX ADMIN — HYDROMORPHONE HYDROCHLORIDE 0.5 MG: 1 INJECTION, SOLUTION INTRAMUSCULAR; INTRAVENOUS; SUBCUTANEOUS at 12:50

## 2024-09-24 RX ADMIN — MIDAZOLAM 2 MG: 1 INJECTION INTRAMUSCULAR; INTRAVENOUS at 09:57

## 2024-09-24 RX ADMIN — SODIUM CHLORIDE: 4.5 INJECTION, SOLUTION INTRAVENOUS at 10:40

## 2024-09-24 RX ADMIN — LIDOCAINE HYDROCHLORIDE 50 MG: 10 INJECTION, SOLUTION INFILTRATION; PERINEURAL at 10:48

## 2024-09-24 RX ADMIN — PHENYLEPHRINE HYDROCHLORIDE 100 MCG: 10 INJECTION INTRAVENOUS at 11:10

## 2024-09-24 RX ADMIN — HYDROMORPHONE HYDROCHLORIDE 0.5 MG: 1 INJECTION, SOLUTION INTRAMUSCULAR; INTRAVENOUS; SUBCUTANEOUS at 12:44

## 2024-09-24 RX ADMIN — HEPARIN SODIUM 4000 UNITS: 1000 INJECTION, SOLUTION INTRAVENOUS; SUBCUTANEOUS at 11:45

## 2024-09-24 RX ADMIN — HYDROMORPHONE HYDROCHLORIDE 0.25 MG: 1 INJECTION, SOLUTION INTRAMUSCULAR; INTRAVENOUS; SUBCUTANEOUS at 13:03

## 2024-09-24 RX ADMIN — SODIUM CHLORIDE: 4.5 INJECTION, SOLUTION INTRAVENOUS at 09:08

## 2024-09-24 RX ADMIN — SUGAMMADEX 400 MG: 100 INJECTION, SOLUTION INTRAVENOUS at 12:05

## 2024-09-24 RX ADMIN — CEFAZOLIN 2000 MG: 2 INJECTION, POWDER, FOR SOLUTION INTRAMUSCULAR; INTRAVENOUS at 10:55

## 2024-09-24 RX ADMIN — EPHEDRINE SULFATE 10 MG: 5 INJECTION INTRAVENOUS at 11:55

## 2024-09-24 RX ADMIN — PROPOFOL 120 MG: 10 INJECTION, EMULSION INTRAVENOUS at 10:48

## 2024-09-24 RX ADMIN — FENTANYL CITRATE 100 MCG: 0.05 INJECTION, SOLUTION INTRAMUSCULAR; INTRAVENOUS at 10:48

## 2024-09-24 RX ADMIN — FAMOTIDINE 20 MG: 10 INJECTION, SOLUTION INTRAVENOUS at 09:12

## 2024-09-24 RX ADMIN — ONDANSETRON 4 MG: 2 INJECTION INTRAMUSCULAR; INTRAVENOUS at 12:05

## 2024-09-24 ASSESSMENT — LIFESTYLE VARIABLES: SMOKING_STATUS: 0

## 2024-09-24 ASSESSMENT — PAIN - FUNCTIONAL ASSESSMENT
PAIN_FUNCTIONAL_ASSESSMENT: NONE - DENIES PAIN

## 2024-09-24 ASSESSMENT — PAIN SCALES - GENERAL
PAINLEVEL_OUTOF10: 8
PAINLEVEL_OUTOF10: 4
PAINLEVEL_OUTOF10: 7

## 2024-09-24 ASSESSMENT — PAIN DESCRIPTION - DESCRIPTORS: DESCRIPTORS: BURNING

## 2024-09-24 ASSESSMENT — PAIN DESCRIPTION - ORIENTATION: ORIENTATION: RIGHT

## 2024-09-24 ASSESSMENT — PAIN DESCRIPTION - LOCATION: LOCATION: ARM

## 2024-09-26 ENCOUNTER — CARE COORDINATION (OUTPATIENT)
Dept: CARE COORDINATION | Age: 54
End: 2024-09-26

## 2024-10-04 ENCOUNTER — CARE COORDINATION (OUTPATIENT)
Dept: CARE COORDINATION | Age: 54
End: 2024-10-04

## 2024-10-04 NOTE — CARE COORDINATION
Ambulatory Care Coordination Note     10/4/2024 10:29 AM     ACM outreach attempt by this ACM today to perform care management follow up . ACM was unable to reach the patient by telephone today;  ACM spoke with patient  who request to speak with ACM at a later time. ACM to follow-up.     ACM: Zoraida Gu RN     Care Summary Note:     PCP/Specialist follow up:   Future Appointments         Provider Specialty Dept Phone    12/9/2024 7:30 AM Aletha Del Cid APRN - [facility] [phone]    12/10/2024 8:00 AM Kacey Hernandez APRN Cardiology [phone]            Follow Up:   Plan for next ACM outreach in approximately 1 week to complete:  - disease specific assessments  - medication review  - goal progression.      Zoraida Gu RN  Ambulatory Care Manager   C. [phone]

## 2024-10-22 ENCOUNTER — CARE COORDINATION (OUTPATIENT)
Dept: CARE COORDINATION | Age: 54
End: 2024-10-22

## 2024-10-22 NOTE — CARE COORDINATION
demonstrates adherence to medications  Pt demonstrates understanding of self-monitoring  Patient is able to identify Red Flags:  Alert to potential adverse drug reactions(s) or side effects and actions to take should they arise  Discuss target symptoms and actions to take should they arise  Identify problems that require immediate PCP or specialist visit  Patient demonstrates understanding of access to PCP/Specialist:  Understands about scheduling routine Follow Up appointments   Understands about sick day appointment options for worsening of symptoms/progression (Same Day, E Visits)  Confidence: 6/10  Anticipated Goal Completion Date: 11/2024                 PCP/Specialist follow up:   Future Appointments         Provider Specialty Dept Phone    12/9/2024 7:30 AM Aletha Del Cid APRN - [facility] [phone]    12/10/2024 8:00 AM Kacey Hernandez APRN Cardiology [phone]            Follow Up:   Plan for next AC outreach in approximately 1 week to complete:  - disease specific assessments  - advance care planning   - goal progression  - education .   Patient  is agreeable to this plan.          Zoraida Gu RN  Ambulatory Care Manager   C. [phone]

## 2024-11-04 ENCOUNTER — CARE COORDINATION (OUTPATIENT)
Dept: CARE COORDINATION | Age: 54
End: 2024-11-04

## 2024-11-04 NOTE — CARE COORDINATION
Ambulatory Care Coordination Note     11/4/2024 12:38 PM     ACM outreach attempt by this ACM today to perform care management follow up . ACM was unable to reach the patient by telephone today; left voice message requesting a return phone call to this ACM.     ACM: Zoraida Gu RN     Care Summary Note: Discharge next call     PCP/Specialist follow up:   Future Appointments         Provider Specialty Dept Phone    12/9/2024 7:30 AM Aletha Del Cid APRN - [facility] [phone]    12/10/2024 8:00 AM Kacey Hernandez APRN Cardiology [phone]            Follow Up:   Plan for next ACM outreach in approximately 1 week to complete:  - disease specific assessments  Discharge next call .         Zoraida Gu RN  Ambulatory Care Manager   C. [phone]

## 2024-11-22 ENCOUNTER — CARE COORDINATION (OUTPATIENT)
Dept: CARE COORDINATION | Age: 54
End: 2024-11-22

## 2024-11-22 NOTE — CARE COORDINATION
Ambulatory Care Coordination Note     11/22/2024 1:04 PM     patient outreach attempt by this AC today to perform care management follow up . ACM was unable to reach the patient by telephone today;   left voice message requesting a return phone call to this ACM.     Patient closed (unable to reach patient) from the High Risk Care Management program on 11/22/2024.  Patient progressing towards self management. .  Care management goals have been completed. No further Ambulatory Care Manager follow up scheduled.      Zoraida Gu RN  Ambulatory Care Manager   C. [phone]

## 2024-11-25 ENCOUNTER — HOSPITAL ENCOUNTER (OUTPATIENT)
Dept: PREADMISSION TESTING | Age: 54
Discharge: HOME | End: 2024-11-29
Payer: MEDICARE

## 2024-11-26 ENCOUNTER — ANESTHESIA (OUTPATIENT)
Dept: OPERATING ROOM | Age: 54
End: 2024-11-26
Payer: MEDICARE

## 2024-11-26 ENCOUNTER — ANESTHESIA EVENT (OUTPATIENT)
Dept: OPERATING ROOM | Age: 54
End: 2024-11-26
Payer: MEDICARE

## 2024-11-26 ENCOUNTER — HOSPITAL ENCOUNTER (OUTPATIENT)
Age: 54
Setting detail: OUTPATIENT SURGERY
Discharge: HOME | End: 2024-11-26
Attending: SURGERY | Admitting: SURGERY
Payer: MEDICARE

## 2024-11-26 VITALS
HEART RATE: 85 BPM | BODY MASS INDEX: 35.94 KG/M2 | DIASTOLIC BLOOD PRESSURE: 56 MMHG | WEIGHT: 229 LBS | TEMPERATURE: 98.3 F | RESPIRATION RATE: 20 BRPM | HEIGHT: 67 IN | OXYGEN SATURATION: 94 % | SYSTOLIC BLOOD PRESSURE: 115 MMHG

## 2024-11-26 DIAGNOSIS — G89.18 POSTOPERATIVE PAIN: ICD-10-CM

## 2024-11-26 DIAGNOSIS — N18.6 ESRD ON HEMODIALYSIS (HCC): Primary | ICD-10-CM

## 2024-11-26 DIAGNOSIS — Z99.2 ESRD ON HEMODIALYSIS (HCC): Primary | ICD-10-CM

## 2024-11-26 LAB
ANION GAP SERPL CALCULATED.3IONS-SCNC: 15 MMOL/L (ref 7–19)
APTT PPP: 38.8 SEC (ref 26–36.2)
BUN SERPL-MCNC: 44 MG/DL (ref 6–20)
CALCIUM SERPL-MCNC: 10.2 MG/DL (ref 8.6–10)
CHLORIDE SERPL-SCNC: 93 MMOL/L (ref 98–111)
CO2 SERPL-SCNC: 27 MMOL/L (ref 22–29)
CREAT SERPL-MCNC: 8.4 MG/DL (ref 0.7–1.2)
ERYTHROCYTE [DISTWIDTH] IN BLOOD BY AUTOMATED COUNT: 13.9 % (ref 11.5–14.5)
GLUCOSE BLD-MCNC: 125 MG/DL (ref 70–99)
GLUCOSE SERPL-MCNC: 118 MG/DL (ref 70–99)
HCT VFR BLD AUTO: 36.9 % (ref 42–52)
HGB BLD-MCNC: 12.3 G/DL (ref 14–18)
INR PPP: 1.14 (ref 0.88–1.18)
MCH RBC QN AUTO: 30.9 PG (ref 27–31)
MCHC RBC AUTO-ENTMCNC: 33.3 G/DL (ref 33–37)
MCV RBC AUTO: 92.7 FL (ref 80–94)
PERFORMED ON: ABNORMAL
PLATELET # BLD AUTO: 228 K/UL (ref 130–400)
PMV BLD AUTO: 9.1 FL (ref 9.4–12.4)
POTASSIUM SERPL-SCNC: 3.8 MMOL/L (ref 3.5–4.9)
PROTHROMBIN TIME: 14.3 SEC (ref 12–14.6)
RBC # BLD AUTO: 3.98 M/UL (ref 4.7–6.1)
SODIUM SERPL-SCNC: 135 MMOL/L (ref 136–145)
WBC # BLD AUTO: 8.5 K/UL (ref 4.8–10.8)

## 2024-11-26 PROCEDURE — 6370000000 HC RX 637 (ALT 250 FOR IP): Performed by: NURSE ANESTHETIST, CERTIFIED REGISTERED

## 2024-11-26 PROCEDURE — 3600000004 HC SURGERY LEVEL 4 BASE: Performed by: SURGERY

## 2024-11-26 PROCEDURE — 7100000011 HC PHASE II RECOVERY - ADDTL 15 MIN: Performed by: SURGERY

## 2024-11-26 PROCEDURE — 3600000014 HC SURGERY LEVEL 4 ADDTL 15MIN: Performed by: SURGERY

## 2024-11-26 PROCEDURE — 2580000003 HC RX 258: Performed by: ANESTHESIOLOGY

## 2024-11-26 PROCEDURE — 7100000000 HC PACU RECOVERY - FIRST 15 MIN: Performed by: SURGERY

## 2024-11-26 PROCEDURE — 3700000001 HC ADD 15 MINUTES (ANESTHESIA): Performed by: SURGERY

## 2024-11-26 PROCEDURE — 85610 PROTHROMBIN TIME: CPT

## 2024-11-26 PROCEDURE — 2580000003 HC RX 258: Performed by: SURGERY

## 2024-11-26 PROCEDURE — 80048 BASIC METABOLIC PNL TOTAL CA: CPT

## 2024-11-26 PROCEDURE — 6360000002 HC RX W HCPCS: Performed by: SURGERY

## 2024-11-26 PROCEDURE — 85730 THROMBOPLASTIN TIME PARTIAL: CPT

## 2024-11-26 PROCEDURE — A4217 STERILE WATER/SALINE, 500 ML: HCPCS | Performed by: SURGERY

## 2024-11-26 PROCEDURE — 2500000003 HC RX 250 WO HCPCS: Performed by: ANESTHESIOLOGY

## 2024-11-26 PROCEDURE — C1768 GRAFT, VASCULAR: HCPCS | Performed by: SURGERY

## 2024-11-26 PROCEDURE — 85027 COMPLETE CBC AUTOMATED: CPT

## 2024-11-26 PROCEDURE — 6370000000 HC RX 637 (ALT 250 FOR IP): Performed by: SURGERY

## 2024-11-26 PROCEDURE — 36415 COLL VENOUS BLD VENIPUNCTURE: CPT

## 2024-11-26 PROCEDURE — 82962 GLUCOSE BLOOD TEST: CPT

## 2024-11-26 PROCEDURE — 7100000001 HC PACU RECOVERY - ADDTL 15 MIN: Performed by: SURGERY

## 2024-11-26 PROCEDURE — 7100000010 HC PHASE II RECOVERY - FIRST 15 MIN: Performed by: SURGERY

## 2024-11-26 PROCEDURE — 2580000003 HC RX 258: Performed by: NURSE ANESTHETIST, CERTIFIED REGISTERED

## 2024-11-26 PROCEDURE — 3700000000 HC ANESTHESIA ATTENDED CARE: Performed by: SURGERY

## 2024-11-26 PROCEDURE — 6360000002 HC RX W HCPCS: Performed by: NURSE ANESTHETIST, CERTIFIED REGISTERED

## 2024-11-26 PROCEDURE — 2500000003 HC RX 250 WO HCPCS: Performed by: NURSE ANESTHETIST, CERTIFIED REGISTERED

## 2024-11-26 PROCEDURE — 2709999900 HC NON-CHARGEABLE SUPPLY: Performed by: SURGERY

## 2024-11-26 DEVICE — 7MM X 40 CM
Type: IMPLANTABLE DEVICE | Site: ARM | Status: FUNCTIONAL
Brand: ARTEGRAFT VASCULAR GRAFT

## 2024-11-26 RX ORDER — MIDAZOLAM HYDROCHLORIDE 1 MG/ML
INJECTION, SOLUTION INTRAMUSCULAR; INTRAVENOUS
Status: DISCONTINUED | OUTPATIENT
Start: 2024-11-26 | End: 2024-11-26 | Stop reason: SDUPTHER

## 2024-11-26 RX ORDER — ALBUTEROL SULFATE 90 UG/1
INHALANT RESPIRATORY (INHALATION)
Status: DISCONTINUED | OUTPATIENT
Start: 2024-11-26 | End: 2024-11-26 | Stop reason: SDUPTHER

## 2024-11-26 RX ORDER — OXYCODONE AND ACETAMINOPHEN 10; 325 MG/1; MG/1
1 TABLET ORAL EVERY 8 HOURS PRN
Qty: 20 TABLET | Refills: 0 | Status: SHIPPED | OUTPATIENT
Start: 2024-11-26 | End: 2024-12-03

## 2024-11-26 RX ORDER — OXYCODONE AND ACETAMINOPHEN 5; 325 MG/1; MG/1
1 TABLET ORAL EVERY 4 HOURS PRN
Status: DISCONTINUED | OUTPATIENT
Start: 2024-11-26 | End: 2024-11-26 | Stop reason: HOSPADM

## 2024-11-26 RX ORDER — GLYCOPYRROLATE 0.2 MG/ML
INJECTION INTRAMUSCULAR; INTRAVENOUS
Status: DISCONTINUED | OUTPATIENT
Start: 2024-11-26 | End: 2024-11-26 | Stop reason: SDUPTHER

## 2024-11-26 RX ORDER — OXYCODONE AND ACETAMINOPHEN 5; 325 MG/1; MG/1
2 TABLET ORAL EVERY 4 HOURS PRN
Status: DISCONTINUED | OUTPATIENT
Start: 2024-11-26 | End: 2024-11-26 | Stop reason: HOSPADM

## 2024-11-26 RX ORDER — SODIUM CHLORIDE 0.9 % (FLUSH) 0.9 %
5-40 SYRINGE (ML) INJECTION PRN
Status: DISCONTINUED | OUTPATIENT
Start: 2024-11-26 | End: 2024-11-26 | Stop reason: HOSPADM

## 2024-11-26 RX ORDER — ONDANSETRON 2 MG/ML
4 INJECTION INTRAMUSCULAR; INTRAVENOUS
Status: DISCONTINUED | OUTPATIENT
Start: 2024-11-26 | End: 2024-11-26 | Stop reason: HOSPADM

## 2024-11-26 RX ORDER — SODIUM CHLORIDE 450 MG/100ML
INJECTION, SOLUTION INTRAVENOUS
Status: DISCONTINUED | OUTPATIENT
Start: 2024-11-26 | End: 2024-11-26 | Stop reason: SDUPTHER

## 2024-11-26 RX ORDER — ACETAMINOPHEN 325 MG/1
650 TABLET ORAL EVERY 4 HOURS PRN
Status: DISCONTINUED | OUTPATIENT
Start: 2024-11-26 | End: 2024-11-26 | Stop reason: HOSPADM

## 2024-11-26 RX ORDER — SODIUM CHLORIDE, SODIUM LACTATE, POTASSIUM CHLORIDE, CALCIUM CHLORIDE 600; 310; 30; 20 MG/100ML; MG/100ML; MG/100ML; MG/100ML
INJECTION, SOLUTION INTRAVENOUS CONTINUOUS
Status: DISCONTINUED | OUTPATIENT
Start: 2024-11-26 | End: 2024-11-26 | Stop reason: HOSPADM

## 2024-11-26 RX ORDER — ONDANSETRON 2 MG/ML
INJECTION INTRAMUSCULAR; INTRAVENOUS
Status: DISCONTINUED | OUTPATIENT
Start: 2024-11-26 | End: 2024-11-26 | Stop reason: SDUPTHER

## 2024-11-26 RX ORDER — HYDROMORPHONE HYDROCHLORIDE 1 MG/ML
0.25 INJECTION, SOLUTION INTRAMUSCULAR; INTRAVENOUS; SUBCUTANEOUS EVERY 5 MIN PRN
Status: DISCONTINUED | OUTPATIENT
Start: 2024-11-26 | End: 2024-11-26 | Stop reason: HOSPADM

## 2024-11-26 RX ORDER — SODIUM CHLORIDE 450 MG/100ML
INJECTION, SOLUTION INTRAVENOUS CONTINUOUS
Status: DISCONTINUED | OUTPATIENT
Start: 2024-11-26 | End: 2024-11-26 | Stop reason: HOSPADM

## 2024-11-26 RX ORDER — HEPARIN SODIUM 1000 [USP'U]/ML
INJECTION, SOLUTION INTRAVENOUS; SUBCUTANEOUS
Status: DISCONTINUED | OUTPATIENT
Start: 2024-11-26 | End: 2024-11-26 | Stop reason: SDUPTHER

## 2024-11-26 RX ORDER — RIFAMPIN 600 MG/1
INJECTION, POWDER, LYOPHILIZED, FOR SOLUTION INTRAVENOUS PRN
Status: DISCONTINUED | OUTPATIENT
Start: 2024-11-26 | End: 2024-11-26 | Stop reason: HOSPADM

## 2024-11-26 RX ORDER — SODIUM CHLORIDE 0.9 % (FLUSH) 0.9 %
5-40 SYRINGE (ML) INJECTION EVERY 12 HOURS SCHEDULED
Status: DISCONTINUED | OUTPATIENT
Start: 2024-11-26 | End: 2024-11-26 | Stop reason: HOSPADM

## 2024-11-26 RX ORDER — NALOXONE HYDROCHLORIDE 0.4 MG/ML
INJECTION, SOLUTION INTRAMUSCULAR; INTRAVENOUS; SUBCUTANEOUS PRN
Status: DISCONTINUED | OUTPATIENT
Start: 2024-11-26 | End: 2024-11-26 | Stop reason: HOSPADM

## 2024-11-26 RX ORDER — METOPROLOL TARTRATE 1 MG/ML
1 INJECTION, SOLUTION INTRAVENOUS ONCE
Status: COMPLETED | OUTPATIENT
Start: 2024-11-26 | End: 2024-11-26

## 2024-11-26 RX ORDER — SODIUM CHLORIDE 9 MG/ML
INJECTION, SOLUTION INTRAVENOUS PRN
Status: DISCONTINUED | OUTPATIENT
Start: 2024-11-26 | End: 2024-11-26 | Stop reason: HOSPADM

## 2024-11-26 RX ORDER — LIDOCAINE HYDROCHLORIDE 10 MG/ML
INJECTION, SOLUTION INFILTRATION; PERINEURAL
Status: DISCONTINUED | OUTPATIENT
Start: 2024-11-26 | End: 2024-11-26 | Stop reason: SDUPTHER

## 2024-11-26 RX ORDER — FENTANYL CITRATE 50 UG/ML
INJECTION, SOLUTION INTRAMUSCULAR; INTRAVENOUS
Status: DISCONTINUED | OUTPATIENT
Start: 2024-11-26 | End: 2024-11-26 | Stop reason: SDUPTHER

## 2024-11-26 RX ORDER — ROCURONIUM BROMIDE 10 MG/ML
INJECTION, SOLUTION INTRAVENOUS
Status: DISCONTINUED | OUTPATIENT
Start: 2024-11-26 | End: 2024-11-26 | Stop reason: SDUPTHER

## 2024-11-26 RX ORDER — HYDROMORPHONE HYDROCHLORIDE 1 MG/ML
0.5 INJECTION, SOLUTION INTRAMUSCULAR; INTRAVENOUS; SUBCUTANEOUS EVERY 5 MIN PRN
Status: DISCONTINUED | OUTPATIENT
Start: 2024-11-26 | End: 2024-11-26 | Stop reason: HOSPADM

## 2024-11-26 RX ORDER — PROPOFOL 10 MG/ML
INJECTION, EMULSION INTRAVENOUS
Status: DISCONTINUED | OUTPATIENT
Start: 2024-11-26 | End: 2024-11-26 | Stop reason: SDUPTHER

## 2024-11-26 RX ORDER — ONDANSETRON 2 MG/ML
4 INJECTION INTRAMUSCULAR; INTRAVENOUS EVERY 8 HOURS PRN
Status: DISCONTINUED | OUTPATIENT
Start: 2024-11-26 | End: 2024-11-26 | Stop reason: HOSPADM

## 2024-11-26 RX ADMIN — SODIUM CHLORIDE: 4.5 INJECTION, SOLUTION INTRAVENOUS at 07:30

## 2024-11-26 RX ADMIN — FENTANYL CITRATE 50 MCG: 0.05 INJECTION, SOLUTION INTRAMUSCULAR; INTRAVENOUS at 07:38

## 2024-11-26 RX ADMIN — SODIUM CHLORIDE, PRESERVATIVE FREE 20 MG: 5 INJECTION INTRAVENOUS at 07:17

## 2024-11-26 RX ADMIN — SUGAMMADEX 250 MG: 100 INJECTION, SOLUTION INTRAVENOUS at 09:23

## 2024-11-26 RX ADMIN — FENTANYL CITRATE 50 MCG: 0.05 INJECTION, SOLUTION INTRAMUSCULAR; INTRAVENOUS at 08:13

## 2024-11-26 RX ADMIN — ROCURONIUM BROMIDE 50 MG: 10 INJECTION, SOLUTION INTRAVENOUS at 07:40

## 2024-11-26 RX ADMIN — CEFAZOLIN 2000 MG: 2 INJECTION, POWDER, FOR SOLUTION INTRAMUSCULAR; INTRAVENOUS at 07:52

## 2024-11-26 RX ADMIN — FENTANYL CITRATE 50 MCG: 0.05 INJECTION, SOLUTION INTRAMUSCULAR; INTRAVENOUS at 08:10

## 2024-11-26 RX ADMIN — OXYCODONE HYDROCHLORIDE AND ACETAMINOPHEN 1 TABLET: 5; 325 TABLET ORAL at 11:00

## 2024-11-26 RX ADMIN — FENTANYL CITRATE 50 MCG: 0.05 INJECTION, SOLUTION INTRAMUSCULAR; INTRAVENOUS at 09:28

## 2024-11-26 RX ADMIN — HEPARIN SODIUM 4000 UNITS: 1000 INJECTION, SOLUTION INTRAVENOUS; SUBCUTANEOUS at 08:26

## 2024-11-26 RX ADMIN — ONDANSETRON 4 MG: 2 INJECTION INTRAMUSCULAR; INTRAVENOUS at 09:20

## 2024-11-26 RX ADMIN — Medication 2 PUFF: at 09:54

## 2024-11-26 RX ADMIN — LIDOCAINE HYDROCHLORIDE 50 MG: 10 INJECTION, SOLUTION INFILTRATION; PERINEURAL at 07:38

## 2024-11-26 RX ADMIN — METOPROLOL TARTRATE 1 MG: 5 INJECTION INTRAVENOUS at 07:18

## 2024-11-26 RX ADMIN — SODIUM CHLORIDE: 4.5 INJECTION, SOLUTION INTRAVENOUS at 06:46

## 2024-11-26 RX ADMIN — PHENYLEPHRINE HYDROCHLORIDE 50 MCG/MIN: 10 INJECTION INTRAVENOUS at 07:56

## 2024-11-26 RX ADMIN — PROPOFOL 200 MG: 10 INJECTION, EMULSION INTRAVENOUS at 07:39

## 2024-11-26 RX ADMIN — MIDAZOLAM 2 MG: 1 INJECTION INTRAMUSCULAR; INTRAVENOUS at 07:30

## 2024-11-26 RX ADMIN — GLYCOPYRROLATE 0.2 MG: 0.2 INJECTION, SOLUTION INTRAMUSCULAR; INTRAVENOUS at 08:07

## 2024-11-26 RX ADMIN — SODIUM CHLORIDE: 4.5 INJECTION, SOLUTION INTRAVENOUS at 09:28

## 2024-11-26 ASSESSMENT — PAIN DESCRIPTION - DESCRIPTORS
DESCRIPTORS: THROBBING
DESCRIPTORS: THROBBING

## 2024-11-26 ASSESSMENT — PAIN SCALES - GENERAL: PAINLEVEL_OUTOF10: 10

## 2024-11-26 ASSESSMENT — PAIN - FUNCTIONAL ASSESSMENT
PAIN_FUNCTIONAL_ASSESSMENT: 0-10
PAIN_FUNCTIONAL_ASSESSMENT: PREVENTS OR INTERFERES WITH MANY ACTIVE NOT PASSIVE ACTIVITIES
PAIN_FUNCTIONAL_ASSESSMENT: 0-10
PAIN_FUNCTIONAL_ASSESSMENT: PREVENTS OR INTERFERES SOME ACTIVE ACTIVITIES AND ADLS
PAIN_FUNCTIONAL_ASSESSMENT: 0-10

## 2024-11-26 ASSESSMENT — LIFESTYLE VARIABLES: SMOKING_STATUS: 0

## 2024-11-26 ASSESSMENT — PAIN DESCRIPTION - LOCATION: LOCATION: ARM

## 2024-11-26 ASSESSMENT — PAIN DESCRIPTION - ORIENTATION: ORIENTATION: RIGHT

## 2024-11-26 NOTE — DISCHARGE INSTRUCTIONS
AV GRAFT POSTOPERATIVE INSTRUCTIONS      1.  It is common to have some numbness/tingling in the fingers, bluish/cool fingers after this surgery.  This is called \"steal\".  If you develop severe pain in the hand or lose strength in the hand then call our office.    2.  You may shower after surgery as long as you don't have a dialysis catheter.  If you do have a dialysis catheter sponge bath only because the dialysis nurses want you to keep your dialysis catheter clean and dry.      3.  There are no special wound care instructions.  You have glue over incisions and stitches underneath and on the skin.  Bruising and discoloration of the area around your incision is normal and will disappear in time.  You may also develop a hard \"healing ridge\" under the incision.  This is a normal part of the healing process.    4.  You may expect some mild swelling in your arm after surgery, especially when you go home.  The swelling can be relieved by elevating your arm slightly.  If you have severe swelling, call your doctor.      5.  You should not drive for 2 days after surgery to make sure the anesthesia is out of your system.      6.  Squeeze a ball (or toy kidney if dialysis nurses gave you one) three times a day with your hand on the side of surgery.  This helps the circulation to your hand.    7.  Avoid clothing that may constrict the circulation in your arm, such as tight shirts.     8.  If you smoke, STOP.. If you do not smoke, don't start.  Smoking makes the blood vessels smaller, increases atherosclerosis, increases blood pressure and increases the chance of a wound infection.    9.  Your home prescriptions will include a pain pill for pain relief.  You should also take an over the counter baby aspirin or full strength aspirin daily to help keep the fistula from clotting off.      10.  You will have an appointment with your doctor in about 2-3 weeks to check your arteriovenous graft, check your wounds for healing, and

## 2024-11-26 NOTE — OP NOTE
Preoperative Diagnosis:  1.  End-stage renal disease on hemodialysis  2.  None maturing right brachial artery to basilic vein arteriovenous fistula    Postoperative Diagnosis:  Same    Operative Procedure:  1.  Right brachial artery to axillary vein arteriovenous graft with 7 mm artegraft  2.  Ligation of distal upper arm basilic vein fistula    Surgeon:  Unruly Oseguera M.D.    Anesthesia:   General    Estimated blood loss:  Less than 50 ml    Findings:  1. The artery had minimal in degree plaque and is around 5 mm in diameter.  2.  The axillary vein is around 8 mm in diameter.  3.  The distal and mid upper arm basilic vein is only around 3 to 4 mm maximum diameter.  I do not think it will ever mature.    Procedure in detail:    After the patient was consented and given intravenous antibiotics and a block was performed by anesthesia, he was brought to the operating room and placed on the operating room table in the supine position.  General anesthesia was administered and the patient's right arm was prepped and draped in the usual sterile fashion.      A longitudinal incision is made in the distal upper arm over the brachial artery with 15 blade.  This is carried down through subcutaneous tissue with bovie electrocautery and sharp dissection.  Vessel loops were placed proximally and distally for future vascular control.     An incision was made in the medial proximal arm with the scalpel over the axillary vein.  This was carried through subcutaneous tissue with Bovie electrocautery and sharp dissection.  The proximal basilic and axillary veins were then controlled with vessel loops for future vascular control.      The 7 mm artegraft was brought up into the field and rinsed per 's recommendations.  In addition, it was rinsed with vancomycin solution. The tunneler was passed subcutaneously from the brachial wound to the axillary wound.  The patient was then given intravenous heparin.    The

## 2024-11-26 NOTE — PROGRESS NOTES
CLINICAL PHARMACY NOTE: MEDS TO BEDS    Total # of Prescriptions Filled: 1   The following medications were delivered to the patient:  Discharge Medication List as of 11/26/2024 11:04 AM        START taking these medications    Details   oxyCODONE-acetaminophen (PERCOCET)  MG per tablet Take 1 tablet by mouth every 8 hours as needed for Pain for up to 7 days. Intended supply: 30 days Max Daily Amount: 3 tablets, Disp-20 tablet, R-0Normal               Additional Documentation:    Handed script to patients family at [facility]. Zero copay.

## 2024-11-26 NOTE — ANESTHESIA POSTPROCEDURE EVALUATION
Department of Anesthesiology  Postprocedure Note    Patient: Hema Gallagher  MRN: [id number]  YOB: 1970  Date of evaluation: 11/26/2024    Procedure Summary       Date: 11/26/24 Room / Location: [address]    Anesthesia Start: 0730 Anesthesia Stop: 1007    Procedure: RIGHT BASILIC VEIN TRANSPOSITION  WITH PLACEMENT OF RIGHT ARTEGRAFT (Right) Diagnosis:       End stage renal disease (HCC)      Dependence on renal dialysis (HCC)      (End stage renal disease (HCC) [N18.6])      (Dependence on renal dialysis (HCC) [Z99.2])    Surgeons: Unruly Oseguera MD Responsible Provider: Tom Allan APRN - CRNA    Anesthesia Type: general ASA Status: 4            Anesthesia Type: No value filed.    Kendall Phase I: Kendall Score: 10    Kendall Phase II:      Anesthesia Post Evaluation    Patient location during evaluation: PACU  Patient participation: complete - patient participated  Level of consciousness: sleepy but conscious  Pain score: 0  Airway patency: patent  Nausea & Vomiting: no nausea and no vomiting  Cardiovascular status: hemodynamically stable and blood pressure returned to baseline  Respiratory status: acceptable, spontaneous ventilation, nonlabored ventilation and nasal cannula  Hydration status: stable  Comments: BP (!) 167/63   Pulse 83   Temp 97.9 °F (36.6 °C) (Skin)   Resp 25   Ht 1.702 m (5' 7\")   Wt 103.9 kg (229 lb)   SpO2 95%   BMI 35.87 kg/m²     Pain management: adequate    No notable events documented.

## 2024-11-26 NOTE — ANESTHESIA PRE PROCEDURE
products discussed with patient whom consented to blood products.                      Piotr Brown MD   11/26/2024

## 2024-11-27 RX ORDER — HYDRALAZINE HYDROCHLORIDE 25 MG/1
25 TABLET, FILM COATED ORAL EVERY 8 HOURS SCHEDULED
Qty: 90 TABLET | Refills: 0 | Status: SHIPPED | OUTPATIENT
Start: 2024-11-27

## 2024-11-27 NOTE — TELEPHONE ENCOUNTER
Hema L Johnson called to request a refill on his medication.      Last office visit : 9/4/2024   Next office visit : 12/9/2024     Requested Prescriptions     Pending Prescriptions Disp Refills    hydrALAZINE (APRESOLINE) 25 MG tablet [Pharmacy Med Name: hydrALAZINE HCl 25 MG Oral Tablet] 90 tablet 0     Sig: TAKE 1 TABLET BY MOUTH EVERY 8 HOURS            Zohreh Pace LPN

## 2024-12-09 ENCOUNTER — OFFICE VISIT (OUTPATIENT)
Dept: PRIMARY CARE CLINIC | Age: 54
End: 2024-12-09
Payer: MEDICARE

## 2024-12-09 VITALS
HEIGHT: 67 IN | DIASTOLIC BLOOD PRESSURE: 70 MMHG | BODY MASS INDEX: 37.54 KG/M2 | TEMPERATURE: 97.6 F | SYSTOLIC BLOOD PRESSURE: 118 MMHG | HEART RATE: 74 BPM | OXYGEN SATURATION: 96 % | WEIGHT: 239.2 LBS

## 2024-12-09 DIAGNOSIS — Z99.2 ESRD ON HEMODIALYSIS (HCC): ICD-10-CM

## 2024-12-09 DIAGNOSIS — I10 ESSENTIAL HYPERTENSION: ICD-10-CM

## 2024-12-09 DIAGNOSIS — E66.01 SEVERE OBESITY (BMI 35.0-39.9) WITH COMORBIDITY: ICD-10-CM

## 2024-12-09 DIAGNOSIS — E78.2 MIXED HYPERLIPIDEMIA: ICD-10-CM

## 2024-12-09 DIAGNOSIS — N18.6 TYPE 2 DIABETES MELLITUS WITH CHRONIC KIDNEY DISEASE ON CHRONIC DIALYSIS, WITHOUT LONG-TERM CURRENT USE OF INSULIN (HCC): Primary | ICD-10-CM

## 2024-12-09 DIAGNOSIS — Z99.2 TYPE 2 DIABETES MELLITUS WITH CHRONIC KIDNEY DISEASE ON CHRONIC DIALYSIS, WITHOUT LONG-TERM CURRENT USE OF INSULIN (HCC): Primary | ICD-10-CM

## 2024-12-09 DIAGNOSIS — E11.22 TYPE 2 DIABETES MELLITUS WITH CHRONIC KIDNEY DISEASE ON CHRONIC DIALYSIS, WITHOUT LONG-TERM CURRENT USE OF INSULIN (HCC): Primary | ICD-10-CM

## 2024-12-09 DIAGNOSIS — N18.6 ESRD ON HEMODIALYSIS (HCC): ICD-10-CM

## 2024-12-09 LAB — HBA1C MFR BLD: 6.5 %

## 2024-12-09 PROCEDURE — 2022F DILAT RTA XM EVC RTNOPTHY: CPT | Performed by: NURSE PRACTITIONER

## 2024-12-09 PROCEDURE — 83036 HEMOGLOBIN GLYCOSYLATED A1C: CPT | Performed by: NURSE PRACTITIONER

## 2024-12-09 PROCEDURE — 3074F SYST BP LT 130 MM HG: CPT | Performed by: NURSE PRACTITIONER

## 2024-12-09 PROCEDURE — 3044F HG A1C LEVEL LT 7.0%: CPT | Performed by: NURSE PRACTITIONER

## 2024-12-09 PROCEDURE — G8484 FLU IMMUNIZE NO ADMIN: HCPCS | Performed by: NURSE PRACTITIONER

## 2024-12-09 PROCEDURE — G8417 CALC BMI ABV UP PARAM F/U: HCPCS | Performed by: NURSE PRACTITIONER

## 2024-12-09 PROCEDURE — 1036F TOBACCO NON-USER: CPT | Performed by: NURSE PRACTITIONER

## 2024-12-09 PROCEDURE — 99214 OFFICE O/P EST MOD 30 MIN: CPT | Performed by: NURSE PRACTITIONER

## 2024-12-09 PROCEDURE — 3078F DIAST BP <80 MM HG: CPT | Performed by: NURSE PRACTITIONER

## 2024-12-09 PROCEDURE — G8427 DOCREV CUR MEDS BY ELIG CLIN: HCPCS | Performed by: NURSE PRACTITIONER

## 2024-12-09 PROCEDURE — 3017F COLORECTAL CA SCREEN DOC REV: CPT | Performed by: NURSE PRACTITIONER

## 2024-12-09 RX ORDER — METOPROLOL TARTRATE 25 MG/1
25 TABLET, FILM COATED ORAL 2 TIMES DAILY
Qty: 180 TABLET | Refills: 1 | Status: SHIPPED | OUTPATIENT
Start: 2024-12-09

## 2024-12-09 RX ORDER — SIMVASTATIN 10 MG
10 TABLET ORAL NIGHTLY
Qty: 90 TABLET | Refills: 3 | Status: SHIPPED | OUTPATIENT
Start: 2024-12-09

## 2024-12-09 ASSESSMENT — ENCOUNTER SYMPTOMS
SORE THROAT: 0
COUGH: 0
ABDOMINAL PAIN: 0
COLOR CHANGE: 0
VOMITING: 0
SHORTNESS OF BREATH: 0
NAUSEA: 0
CHEST TIGHTNESS: 0
DIARRHEA: 0

## 2024-12-09 NOTE — PROGRESS NOTES
ESRD on hemodialysis ([facility])        5. Severe obesity (BMI 35.0-39.9) with comorbidity            Results for orders placed or performed in visit on 12/09/24   POCT glycosylated hemoglobin (Hb A1C)   Result Value Ref Range    Hemoglobin A1C 6.5 %       Plan:     1. Type 2 diabetes mellitus with chronic kidney disease on chronic dialysis, without long-term current use of insulin ([facility])  Continue to eat a diabetic diet.  - POCT glycosylated hemoglobin (Hb A1C)    2. Essential hypertension  Continue metoprolol and nifedipine.  Take clonidine as needed.  - metoprolol tartrate (LOPRESSOR) 25 MG tablet; Take 1 tablet by mouth 2 times daily  Dispense: 180 tablet; Refill: 1  - NIFEdipine (ADALAT CC) 60 MG extended release tablet; Take 1 tablet by mouth in the morning and at bedtime  Dispense: 180 tablet; Refill: 0    3. Mixed hyperlipidemia  Continue simvastatin and eat a low-cholesterol diet  - simvastatin (ZOCOR) 10 MG tablet; Take 1 tablet by mouth nightly  Dispense: 90 tablet; Refill: 3    4. ESRD on hemodialysis ([facility])  Encourage patient to continue to follow with nephrology    5. Severe obesity (BMI 35.0-39.9) with comorbidity  Encourage patient to exercise and eat a healthy diet.  Make sure to stay hydrated.        Return in about 3 months (around 3/9/2025) for dm, htn, esrd, .    Orders Placed This Encounter   Procedures    POCT glycosylated hemoglobin (Hb A1C)       Orders Placed This Encounter   Medications    metoprolol tartrate (LOPRESSOR) 25 MG tablet     Sig: Take 1 tablet by mouth 2 times daily     Dispense:  180 tablet     Refill:  1    NIFEdipine (ADALAT CC) 60 MG extended release tablet     Sig: Take 1 tablet by mouth in the morning and at bedtime     Dispense:  180 tablet     Refill:  0    simvastatin (ZOCOR) 10 MG tablet     Sig: Take 1 tablet by mouth nightly     Dispense:  90 tablet     Refill:  3            Patient offered educational handouts and has had all questions answered.  Patient voices

## 2024-12-10 ENCOUNTER — OFFICE VISIT (OUTPATIENT)
Dept: CARDIOLOGY CLINIC | Age: 54
End: 2024-12-10
Payer: MEDICARE

## 2024-12-10 VITALS
HEART RATE: 67 BPM | HEIGHT: 67 IN | WEIGHT: 239 LBS | OXYGEN SATURATION: 90 % | BODY MASS INDEX: 37.51 KG/M2 | DIASTOLIC BLOOD PRESSURE: 60 MMHG | SYSTOLIC BLOOD PRESSURE: 120 MMHG

## 2024-12-10 DIAGNOSIS — I50.32 CHRONIC DIASTOLIC CONGESTIVE HEART FAILURE (HCC): Primary | ICD-10-CM

## 2024-12-10 DIAGNOSIS — I10 HYPERTENSION, UNSPECIFIED TYPE: ICD-10-CM

## 2024-12-10 DIAGNOSIS — I10 ESSENTIAL HYPERTENSION: ICD-10-CM

## 2024-12-10 PROCEDURE — 3074F SYST BP LT 130 MM HG: CPT | Performed by: CLINICAL NURSE SPECIALIST

## 2024-12-10 PROCEDURE — G8417 CALC BMI ABV UP PARAM F/U: HCPCS | Performed by: CLINICAL NURSE SPECIALIST

## 2024-12-10 PROCEDURE — 3078F DIAST BP <80 MM HG: CPT | Performed by: CLINICAL NURSE SPECIALIST

## 2024-12-10 PROCEDURE — G8484 FLU IMMUNIZE NO ADMIN: HCPCS | Performed by: CLINICAL NURSE SPECIALIST

## 2024-12-10 PROCEDURE — 99213 OFFICE O/P EST LOW 20 MIN: CPT | Performed by: CLINICAL NURSE SPECIALIST

## 2024-12-10 PROCEDURE — 1036F TOBACCO NON-USER: CPT | Performed by: CLINICAL NURSE SPECIALIST

## 2024-12-10 PROCEDURE — G8427 DOCREV CUR MEDS BY ELIG CLIN: HCPCS | Performed by: CLINICAL NURSE SPECIALIST

## 2024-12-10 PROCEDURE — 3017F COLORECTAL CA SCREEN DOC REV: CPT | Performed by: CLINICAL NURSE SPECIALIST

## 2024-12-10 NOTE — PROGRESS NOTES
transcription/translation of spoken language to printed tach. Electronic translation of spoken language may be erroneous, or at times, nonsensical words or phrases may be inadvertently transcribed. Although, I have reviewed the note for such errors, some may still exist.

## 2024-12-10 NOTE — PATIENT INSTRUCTIONS
Maintain good blood pressure control-goal<130/80 at rest  Maintain good cholesterol control LDL goal<70 with arterial disease  If you are diabetic work to keep/obtain hemoglobin A1c< 7    Follow up in 1  year    Call with any questions or concerns  Follow up with Aletha Del Cid APRN - CNP for non cardiac problems  Report any new problems  Cardiovascular Fitness-Exercise as tolerated.  Strive for 30 minutes of exercise most days of the week.    Cardiac / Healthy Diet- Avoid processed high fat foods, maintain low sodium/salt   Continue current medications as directed  Continue plan of treatment  It is always recommended that you bring your medications bottles with you to each visit - this is for your safety!

## 2025-01-21 ENCOUNTER — PROCEDURE VISIT (OUTPATIENT)
Dept: SURGERY | Age: 55
End: 2025-01-21

## 2025-01-21 VITALS — HEART RATE: 80 BPM | OXYGEN SATURATION: 98 % | BODY MASS INDEX: 39.24 KG/M2 | HEIGHT: 67 IN | WEIGHT: 250 LBS

## 2025-01-21 DIAGNOSIS — R22.1 SUBCUTANEOUS MASS OF NECK: Primary | ICD-10-CM

## 2025-01-21 DIAGNOSIS — L98.9 SKIN LESION OF NECK: Primary | ICD-10-CM

## 2025-02-04 ENCOUNTER — OFFICE VISIT (OUTPATIENT)
Dept: SURGERY | Age: 55
End: 2025-02-04
Payer: MEDICARE

## 2025-02-04 VITALS — OXYGEN SATURATION: 96 % | HEIGHT: 67 IN | BODY MASS INDEX: 39.24 KG/M2 | WEIGHT: 250 LBS | HEART RATE: 78 BPM

## 2025-02-04 DIAGNOSIS — R22.1 SUBCUTANEOUS MASS OF NECK: Primary | ICD-10-CM

## 2025-02-04 PROCEDURE — 1036F TOBACCO NON-USER: CPT | Performed by: PHYSICIAN ASSISTANT

## 2025-02-04 PROCEDURE — G8417 CALC BMI ABV UP PARAM F/U: HCPCS | Performed by: PHYSICIAN ASSISTANT

## 2025-02-04 PROCEDURE — G8427 DOCREV CUR MEDS BY ELIG CLIN: HCPCS | Performed by: PHYSICIAN ASSISTANT

## 2025-02-04 PROCEDURE — 99212 OFFICE O/P EST SF 10 MIN: CPT | Performed by: PHYSICIAN ASSISTANT

## 2025-02-04 PROCEDURE — 3017F COLORECTAL CA SCREEN DOC REV: CPT | Performed by: PHYSICIAN ASSISTANT

## 2025-02-13 NOTE — PROGRESS NOTES
Subjective  Hema Gallagher comes today in follow-up for mass excision of the head and neck.  He is doing well.  He has had no new complaints.    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    Palliative care patient 01/25/2021    Erectile dysfunction due to arterial insufficiency     ESRD on hemodialysis ([facility]) 05/21/2020    Type 2 diabetes mellitus with chronic kidney disease on chronic dialysis, without long-term current use of insulin ([facility]) 01/29/2020    Essential hypertension 05/10/2017    Mild intermittent asthma without complication 05/10/2017    Family history of prostate cancer 05/10/2017    Morbid obesity due to excess calories 05/10/2017       Current Outpatient Medications   Medication Sig Dispense Refill    metoprolol tartrate (LOPRESSOR) 25 MG tablet Take 1 tablet by mouth 2 times daily 180 tablet 1    NIFEdipine (ADALAT CC) 60 MG extended release tablet Take 1 tablet by mouth in the morning and at bedtime 180 tablet 0    simvastatin (ZOCOR) 10 MG tablet Take 1 tablet by mouth nightly 90 tablet 3    hydrALAZINE (APRESOLINE) 25 MG tablet TAKE 1 TABLET BY MOUTH EVERY 8 HOURS 90 tablet 0    fluticasone (FLONASE) 50 MCG/ACT nasal spray 2 sprays by Each Nostril route daily 16 g 0    cloNIDine (CATAPRES) 0.1 MG tablet Take 1 tablet by mouth 2 times daily (Patient taking differently: Take 1 tablet by mouth nightly) 180 tablet 1    albuterol sulfate HFA (PROVENTIL;VENTOLIN;PROAIR) 108 (90 Base) MCG/ACT inhaler Inhale 2 puffs into the lungs every 6 hours as needed for Wheezing 1 each 3    cinacalcet (SENSIPAR) 30 MG tablet Take 1 tablet by mouth daily      AURYXIA 1  MG(Fe) TABS tablet Take 3 tablets by mouth 3 times daily (with meals)      Cyanocobalamin (VITAMIN B 12 PO) Take 1 tablet by mouth daily       calcium carbonate (OYSTER SHELL CALCIUM 500 MG) 1250 (500 Ca) MG tablet Take 1 tablet by mouth daily       No current facility-administered

## 2025-02-18 ENCOUNTER — PROCEDURE VISIT (OUTPATIENT)
Dept: SURGERY | Age: 55
End: 2025-02-18

## 2025-02-18 VITALS — BODY MASS INDEX: 39.24 KG/M2 | WEIGHT: 250 LBS | HEIGHT: 67 IN | OXYGEN SATURATION: 98 % | HEART RATE: 67 BPM

## 2025-02-18 DIAGNOSIS — R22.1 SUBCUTANEOUS MASS OF NECK: Primary | ICD-10-CM

## 2025-02-18 NOTE — PROGRESS NOTES
Subjective  Hema Gallagher comes today in follow-up for mass excision of the head and neck.  He is doing well.  He has had no new complaints.    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    Palliative care patient 01/25/2021    Erectile dysfunction due to arterial insufficiency     ESRD on hemodialysis ([facility]) 05/21/2020    Type 2 diabetes mellitus with chronic kidney disease on chronic dialysis, without long-term current use of insulin ([facility]) 01/29/2020    Essential hypertension 05/10/2017    Mild intermittent asthma without complication 05/10/2017    Family history of prostate cancer 05/10/2017    Morbid obesity due to excess calories 05/10/2017       Current Outpatient Medications   Medication Sig Dispense Refill    metoprolol tartrate (LOPRESSOR) 25 MG tablet Take 1 tablet by mouth 2 times daily 180 tablet 1    NIFEdipine (ADALAT CC) 60 MG extended release tablet Take 1 tablet by mouth in the morning and at bedtime 180 tablet 0    simvastatin (ZOCOR) 10 MG tablet Take 1 tablet by mouth nightly 90 tablet 3    hydrALAZINE (APRESOLINE) 25 MG tablet TAKE 1 TABLET BY MOUTH EVERY 8 HOURS 90 tablet 0    fluticasone (FLONASE) 50 MCG/ACT nasal spray 2 sprays by Each Nostril route daily 16 g 0    cloNIDine (CATAPRES) 0.1 MG tablet Take 1 tablet by mouth 2 times daily (Patient taking differently: Take 1 tablet by mouth nightly) 180 tablet 1    albuterol sulfate HFA (PROVENTIL;VENTOLIN;PROAIR) 108 (90 Base) MCG/ACT inhaler Inhale 2 puffs into the lungs every 6 hours as needed for Wheezing 1 each 3    cinacalcet (SENSIPAR) 30 MG tablet Take 1 tablet by mouth daily      AURYXIA 1  MG(Fe) TABS tablet Take 3 tablets by mouth 3 times daily (with meals)      Cyanocobalamin (VITAMIN B 12 PO) Take 1 tablet by mouth daily       calcium carbonate (OYSTER SHELL CALCIUM 500 MG) 1250 (500 Ca) MG tablet Take 1 tablet by mouth daily       No current facility-administered

## 2025-03-10 ENCOUNTER — RESULTS FOLLOW-UP (OUTPATIENT)
Dept: PRIMARY CARE CLINIC | Age: 55
End: 2025-03-10

## 2025-03-10 ENCOUNTER — OFFICE VISIT (OUTPATIENT)
Dept: PRIMARY CARE CLINIC | Age: 55
End: 2025-03-10
Payer: MEDICARE

## 2025-03-10 VITALS
HEART RATE: 91 BPM | TEMPERATURE: 98 F | WEIGHT: 248 LBS | DIASTOLIC BLOOD PRESSURE: 80 MMHG | OXYGEN SATURATION: 99 % | HEIGHT: 67 IN | BODY MASS INDEX: 38.92 KG/M2 | SYSTOLIC BLOOD PRESSURE: 128 MMHG

## 2025-03-10 DIAGNOSIS — Z99.2 TYPE 2 DIABETES MELLITUS WITH CHRONIC KIDNEY DISEASE ON CHRONIC DIALYSIS, WITHOUT LONG-TERM CURRENT USE OF INSULIN (HCC): Primary | ICD-10-CM

## 2025-03-10 DIAGNOSIS — I10 ESSENTIAL HYPERTENSION: ICD-10-CM

## 2025-03-10 DIAGNOSIS — Z99.2 TYPE 2 DIABETES MELLITUS WITH CHRONIC KIDNEY DISEASE ON CHRONIC DIALYSIS, WITHOUT LONG-TERM CURRENT USE OF INSULIN (HCC): ICD-10-CM

## 2025-03-10 DIAGNOSIS — N18.6 ESRD ON HEMODIALYSIS (HCC): ICD-10-CM

## 2025-03-10 DIAGNOSIS — N18.6 TYPE 2 DIABETES MELLITUS WITH CHRONIC KIDNEY DISEASE ON CHRONIC DIALYSIS, WITHOUT LONG-TERM CURRENT USE OF INSULIN (HCC): ICD-10-CM

## 2025-03-10 DIAGNOSIS — E11.22 TYPE 2 DIABETES MELLITUS WITH CHRONIC KIDNEY DISEASE ON CHRONIC DIALYSIS, WITHOUT LONG-TERM CURRENT USE OF INSULIN (HCC): ICD-10-CM

## 2025-03-10 DIAGNOSIS — E78.2 MIXED HYPERLIPIDEMIA: ICD-10-CM

## 2025-03-10 DIAGNOSIS — E11.22 TYPE 2 DIABETES MELLITUS WITH CHRONIC KIDNEY DISEASE ON CHRONIC DIALYSIS, WITHOUT LONG-TERM CURRENT USE OF INSULIN (HCC): Primary | ICD-10-CM

## 2025-03-10 DIAGNOSIS — G62.9 NEUROPATHY: ICD-10-CM

## 2025-03-10 DIAGNOSIS — Z99.2 ESRD ON HEMODIALYSIS (HCC): ICD-10-CM

## 2025-03-10 DIAGNOSIS — N18.6 TYPE 2 DIABETES MELLITUS WITH CHRONIC KIDNEY DISEASE ON CHRONIC DIALYSIS, WITHOUT LONG-TERM CURRENT USE OF INSULIN (HCC): Primary | ICD-10-CM

## 2025-03-10 LAB
ALBUMIN SERPL-MCNC: 4.1 G/DL (ref 3.5–5.2)
ALP SERPL-CCNC: 53 U/L (ref 40–129)
ALT SERPL-CCNC: 12 U/L (ref 10–50)
ANION GAP SERPL CALCULATED.3IONS-SCNC: 14 MMOL/L (ref 8–16)
AST SERPL-CCNC: 10 U/L (ref 10–50)
BASOPHILS # BLD: 0 K/UL (ref 0–0.2)
BASOPHILS NFR BLD: 0.3 % (ref 0–1)
BILIRUB SERPL-MCNC: 0.3 MG/DL (ref 0.2–1.2)
BUN SERPL-MCNC: 66 MG/DL (ref 6–20)
CALCIUM SERPL-MCNC: 10 MG/DL (ref 8.6–10)
CHLORIDE SERPL-SCNC: 94 MMOL/L (ref 98–107)
CHOLEST SERPL-MCNC: 126 MG/DL (ref 0–199)
CO2 SERPL-SCNC: 30 MMOL/L (ref 22–29)
CREAT SERPL-MCNC: 13.4 MG/DL (ref 0.7–1.2)
EOSINOPHIL # BLD: 0.3 K/UL (ref 0–0.6)
EOSINOPHIL NFR BLD: 5.3 % (ref 0–5)
ERYTHROCYTE [DISTWIDTH] IN BLOOD BY AUTOMATED COUNT: 13.2 % (ref 11.5–14.5)
GLUCOSE SERPL-MCNC: 104 MG/DL (ref 70–99)
HBA1C MFR BLD: 6.4 %
HCT VFR BLD AUTO: 30.1 % (ref 42–52)
HDLC SERPL-MCNC: 31 MG/DL (ref 40–60)
HGB BLD-MCNC: 9.8 G/DL (ref 14–18)
IMM GRANULOCYTES # BLD: 0 K/UL
LDLC SERPL CALC-MCNC: 73 MG/DL
LYMPHOCYTES # BLD: 1.5 K/UL (ref 1.1–4.5)
LYMPHOCYTES NFR BLD: 24.6 % (ref 20–40)
MCH RBC QN AUTO: 30.9 PG (ref 27–31)
MCHC RBC AUTO-ENTMCNC: 32.6 G/DL (ref 33–37)
MCV RBC AUTO: 95 FL (ref 80–94)
MONOCYTES # BLD: 0.9 K/UL (ref 0–0.9)
MONOCYTES NFR BLD: 14.4 % (ref 0–10)
NEUTROPHILS # BLD: 3.5 K/UL (ref 1.5–7.5)
NEUTS SEG NFR BLD: 55.1 % (ref 50–65)
PLATELET # BLD AUTO: 221 K/UL (ref 130–400)
PMV BLD AUTO: 9.8 FL (ref 9.4–12.4)
POTASSIUM SERPL-SCNC: 4.3 MMOL/L (ref 3.5–5.1)
PROT SERPL-MCNC: 7.4 G/DL (ref 6.4–8.3)
RBC # BLD AUTO: 3.17 M/UL (ref 4.7–6.1)
SODIUM SERPL-SCNC: 138 MMOL/L (ref 136–145)
TRIGL SERPL-MCNC: 108 MG/DL (ref 0–149)
WBC # BLD AUTO: 6.3 K/UL (ref 4.8–10.8)

## 2025-03-10 PROCEDURE — 3044F HG A1C LEVEL LT 7.0%: CPT | Performed by: NURSE PRACTITIONER

## 2025-03-10 PROCEDURE — G8427 DOCREV CUR MEDS BY ELIG CLIN: HCPCS | Performed by: NURSE PRACTITIONER

## 2025-03-10 PROCEDURE — 99214 OFFICE O/P EST MOD 30 MIN: CPT | Performed by: NURSE PRACTITIONER

## 2025-03-10 PROCEDURE — 83036 HEMOGLOBIN GLYCOSYLATED A1C: CPT | Performed by: NURSE PRACTITIONER

## 2025-03-10 PROCEDURE — 3074F SYST BP LT 130 MM HG: CPT | Performed by: NURSE PRACTITIONER

## 2025-03-10 PROCEDURE — G8417 CALC BMI ABV UP PARAM F/U: HCPCS | Performed by: NURSE PRACTITIONER

## 2025-03-10 PROCEDURE — 2022F DILAT RTA XM EVC RTNOPTHY: CPT | Performed by: NURSE PRACTITIONER

## 2025-03-10 PROCEDURE — 3017F COLORECTAL CA SCREEN DOC REV: CPT | Performed by: NURSE PRACTITIONER

## 2025-03-10 PROCEDURE — 1036F TOBACCO NON-USER: CPT | Performed by: NURSE PRACTITIONER

## 2025-03-10 PROCEDURE — 3079F DIAST BP 80-89 MM HG: CPT | Performed by: NURSE PRACTITIONER

## 2025-03-10 ASSESSMENT — ENCOUNTER SYMPTOMS
SORE THROAT: 0
COUGH: 0
VOMITING: 0
COLOR CHANGE: 0
DIARRHEA: 0
CHEST TIGHTNESS: 0
ABDOMINAL PAIN: 0
SHORTNESS OF BREATH: 0
NAUSEA: 0

## 2025-03-10 NOTE — PROGRESS NOTES
Subjective:      Review of Systems   Constitutional:  Negative for activity change and fever.   HENT:  Negative for congestion, ear pain and sore throat.    Respiratory:  Negative for cough, chest tightness and shortness of breath.    Cardiovascular:  Negative for chest pain.   Gastrointestinal:  Negative for abdominal pain, diarrhea, nausea and vomiting.   Genitourinary:  Negative for frequency and urgency.   Musculoskeletal:  Negative for arthralgias and myalgias.   Skin:  Negative for color change.   Neurological:  Negative for dizziness, weakness and numbness.   Psychiatric/Behavioral:  Negative for agitation. The patient is not nervous/anxious.        Objective:     Physical Exam  Vitals reviewed.   Constitutional:       Appearance: Normal appearance.   HENT:      Head: Normocephalic.      Right Ear: Tympanic membrane normal.      Left Ear: Tympanic membrane normal.      Nose: Nose normal.      Mouth/Throat:      Mouth: Mucous membranes are moist.      Pharynx: Oropharynx is clear.   Eyes:      Extraocular Movements: Extraocular movements intact.      Pupils: Pupils are equal, round, and reactive to light.   Cardiovascular:      Rate and Rhythm: Normal rate and regular rhythm.      Pulses: Normal pulses.      Heart sounds: Normal heart sounds, S1 normal and S2 normal.   Pulmonary:      Effort: Pulmonary effort is normal.      Breath sounds: Normal breath sounds. No decreased breath sounds, wheezing, rhonchi or rales.   Abdominal:      General: Bowel sounds are normal.      Palpations: Abdomen is soft.   Musculoskeletal:         General: Normal range of motion.      Cervical back: Normal range of motion.      Right lower leg: No edema.      Left lower leg: No edema.   Skin:     General: Skin is warm and dry.   Neurological:      Mental Status: He is alert and oriented to person, place, and time.   Psychiatric:         Mood and Affect: Mood normal.         Behavior: Behavior normal.         Thought

## 2025-03-11 ENCOUNTER — TELEPHONE (OUTPATIENT)
Dept: SURGERY | Age: 55
End: 2025-03-11

## 2025-03-11 NOTE — TELEPHONE ENCOUNTER
Hema called this morning to advise that he missed his appointment last Friday. Patient requests a call back please to reschedule procedure.    Thank you.

## 2025-03-14 ENCOUNTER — HOSPITAL ENCOUNTER (EMERGENCY)
Age: 55
Discharge: HOME | End: 2025-03-14
Payer: MEDICARE

## 2025-03-14 ENCOUNTER — PROCEDURE VISIT (OUTPATIENT)
Dept: SURGERY | Age: 55
End: 2025-03-14

## 2025-03-14 VITALS
WEIGHT: 248 LBS | DIASTOLIC BLOOD PRESSURE: 71 MMHG | TEMPERATURE: 97.9 F | HEIGHT: 67 IN | BODY MASS INDEX: 38.92 KG/M2 | OXYGEN SATURATION: 96 % | SYSTOLIC BLOOD PRESSURE: 169 MMHG | RESPIRATION RATE: 19 BRPM | HEART RATE: 85 BPM

## 2025-03-14 VITALS
TEMPERATURE: 98 F | HEIGHT: 67 IN | BODY MASS INDEX: 39.71 KG/M2 | OXYGEN SATURATION: 93 % | WEIGHT: 253 LBS | HEART RATE: 88 BPM

## 2025-03-14 DIAGNOSIS — R22.1 SUBCUTANEOUS MASS OF NECK: Primary | ICD-10-CM

## 2025-03-14 DIAGNOSIS — Z51.89 VISIT FOR WOUND CHECK: Primary | ICD-10-CM

## 2025-03-14 LAB
ALBUMIN SERPL-MCNC: 4 G/DL (ref 3.5–5.2)
ALP SERPL-CCNC: 57 U/L (ref 40–129)
ALT SERPL-CCNC: 13 U/L (ref 10–50)
ANION GAP SERPL CALCULATED.3IONS-SCNC: 22 MMOL/L (ref 8–16)
AST SERPL-CCNC: 9 U/L (ref 10–50)
BASOPHILS # BLD: 0 K/UL (ref 0–0.2)
BASOPHILS NFR BLD: 0 % (ref 0–1)
BILIRUB SERPL-MCNC: 0.4 MG/DL (ref 0.2–1.2)
BUN SERPL-MCNC: 112 MG/DL (ref 6–20)
CALCIUM SERPL-MCNC: 10.1 MG/DL (ref 8.6–10)
CHLORIDE SERPL-SCNC: 93 MMOL/L (ref 98–107)
CO2 SERPL-SCNC: 21 MMOL/L (ref 22–29)
CREAT SERPL-MCNC: 21.3 MG/DL (ref 0.7–1.2)
EOSINOPHIL # BLD: 0 K/UL (ref 0–0.6)
EOSINOPHIL NFR BLD: 0.1 % (ref 0–5)
ERYTHROCYTE [DISTWIDTH] IN BLOOD BY AUTOMATED COUNT: 13.2 % (ref 11.5–14.5)
GLUCOSE SERPL-MCNC: 148 MG/DL (ref 70–99)
HCT VFR BLD AUTO: 26.4 % (ref 42–52)
HGB BLD-MCNC: 8.8 G/DL (ref 14–18)
IMM GRANULOCYTES # BLD: 0 K/UL
LYMPHOCYTES # BLD: 0.4 K/UL (ref 1.1–4.5)
LYMPHOCYTES NFR BLD: 5.9 % (ref 20–40)
MCH RBC QN AUTO: 30.6 PG (ref 27–31)
MCHC RBC AUTO-ENTMCNC: 33.3 G/DL (ref 33–37)
MCV RBC AUTO: 91.7 FL (ref 80–94)
MONOCYTES # BLD: 0.2 K/UL (ref 0–0.9)
MONOCYTES NFR BLD: 3.1 % (ref 0–10)
NEUTROPHILS # BLD: 6.2 K/UL (ref 1.5–7.5)
NEUTS SEG NFR BLD: 90.6 % (ref 50–65)
PLATELET # BLD AUTO: 200 K/UL (ref 130–400)
PMV BLD AUTO: 9.7 FL (ref 9.4–12.4)
POTASSIUM SERPL-SCNC: 5.4 MMOL/L (ref 3.5–5.1)
PROT SERPL-MCNC: 7.5 G/DL (ref 6.4–8.3)
RBC # BLD AUTO: 2.88 M/UL (ref 4.7–6.1)
SODIUM SERPL-SCNC: 136 MMOL/L (ref 136–145)
WBC # BLD AUTO: 6.8 K/UL (ref 4.8–10.8)

## 2025-03-14 PROCEDURE — 36415 COLL VENOUS BLD VENIPUNCTURE: CPT

## 2025-03-14 PROCEDURE — 88305 TISSUE EXAM BY PATHOLOGIST: CPT

## 2025-03-14 PROCEDURE — 99283 EMERGENCY DEPT VISIT LOW MDM: CPT

## 2025-03-14 PROCEDURE — 85025 COMPLETE CBC W/AUTO DIFF WBC: CPT

## 2025-03-14 PROCEDURE — 80053 COMPREHEN METABOLIC PANEL: CPT

## 2025-03-14 PROCEDURE — 6370000000 HC RX 637 (ALT 250 FOR IP): Performed by: NURSE PRACTITIONER

## 2025-03-14 RX ORDER — CALCITRIOL 0.5 UG/1
0.5 CAPSULE, LIQUID FILLED ORAL 2 TIMES DAILY
COMMUNITY
Start: 2025-01-10

## 2025-03-14 RX ORDER — CLONIDINE HYDROCHLORIDE 0.1 MG/1
0.1 TABLET ORAL ONCE
Status: COMPLETED | OUTPATIENT
Start: 2025-03-14 | End: 2025-03-14

## 2025-03-14 RX ADMIN — CLONIDINE HYDROCHLORIDE 0.1 MG: 0.1 TABLET ORAL at 22:26

## 2025-03-14 ASSESSMENT — ENCOUNTER SYMPTOMS: RESPIRATORY NEGATIVE: 1

## 2025-03-14 NOTE — PROGRESS NOTES
Subjective  Hema Gallagher presents with a mass to the left side of the neck.  He has had some mild pain from this area.  He denies fevers.        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) 05/21/2020    Type 2 diabetes mellitus with chronic kidney disease on chronic dialysis, without long-term current use of insulin ([facility]) 01/29/2020    Essential hypertension 05/10/2017    Mild intermittent asthma without complication 05/10/2017    Family history of prostate cancer 05/10/2017    Morbid obesity due to excess calories 05/10/2017       Current Outpatient Medications   Medication Sig Dispense Refill    calcitRIOL (ROCALTROL) 0.5 MCG capsule Take 1 capsule by mouth 2 times daily      metoprolol tartrate (LOPRESSOR) 25 MG tablet Take 1 tablet by mouth 2 times daily 180 tablet 1    NIFEdipine (ADALAT CC) 60 MG extended release tablet Take 1 tablet by mouth in the morning and at bedtime 180 tablet 0    simvastatin (ZOCOR) 10 MG tablet Take 1 tablet by mouth nightly 90 tablet 3    hydrALAZINE (APRESOLINE) 25 MG tablet TAKE 1 TABLET BY MOUTH EVERY 8 HOURS 90 tablet 0    fluticasone (FLONASE) 50 MCG/ACT nasal spray 2 sprays by Each Nostril route daily 16 g 0    cloNIDine (CATAPRES) 0.1 MG tablet Take 1 tablet by mouth 2 times daily (Patient taking differently: Take 1 tablet by mouth nightly) 180 tablet 1    albuterol sulfate HFA (PROVENTIL;VENTOLIN;PROAIR) 108 (90 Base) MCG/ACT inhaler Inhale 2 puffs into the lungs every 6 hours as needed for Wheezing 1 each 3    cinacalcet (SENSIPAR) 30 MG tablet Take 1 tablet by mouth daily      AURYXIA 1  MG(Fe) TABS tablet Take 3 tablets by mouth 3 times daily (with meals)      Cyanocobalamin (VITAMIN B 12 PO) Take 1 tablet by mouth daily       calcium carbonate (OYSTER SHELL CALCIUM 500 MG) 1250 (500 Ca)

## 2025-03-15 NOTE — DISCHARGE INSTRUCTIONS
Complete your dialysis tonight.  Call Marshal on Monday to let him know you had some bleeding.  Your hemoglobin is stable today.  Keep the dressing in place overnight.  Rest this weekend.  Increased activity may cause the bleeding to restart.  Return to ER for any new, worsening, or change in condition.

## 2025-03-15 NOTE — ED NOTES
Wound with sutures noted to left upper chest/neck area, steadily bleeding. Surgicel placed, gauze applied and ace wrapped for pressure. Bleeding controlled at this time

## 2025-03-15 NOTE — ED PROVIDER NOTES
[facility] EMERGENCY DEPARTMENT  EMERGENCY DEPARTMENT ENCOUNTER      Pt Name: Hema Gallagher  MRN: [id number]  Birthdate 1970  Date of evaluation: 3/14/2025  Provider: SOSA Redmond NP    CHIEF COMPLAINT       Chief Complaint   Patient presents with    Wound Check     PT states had keloid taken off of neck today, stitches possibly loose, bleeding          HISTORY OF PRESENT ILLNESS   (Location/Symptom, Timing/Onset,Context/Setting, Quality, Duration, Modifying Factors, Severity)  Note limiting factors.   Hema Gallagher is a 54 y.o. male who presents to the emergency department with bleeding from an incision to the left side of his neck.  Patient had a scar revision earlier today and has been \"running around\" and went to [facility].  He noticed the bleeding while he was in [facility].  Patient has not taken his evening blood pressure medication either.        The history is provided by the patient and the spouse.       NursingNotes were reviewed.    REVIEW OF SYSTEMS    (2-9 systems for level 4, 10 or more for level 5)     Review of Systems   Constitutional:  Negative for chills and fever.        As per HPI   Respiratory: Negative.     Cardiovascular: Negative.    Musculoskeletal: Negative.    Skin:  Positive for wound.   All other systems reviewed and are negative.      A complete review of systems was performed and is negative except as noted above in the HPI.       PAST MEDICAL HISTORY     Past Medical History:   Diagnosis Date    Asthma     CHF (congestive heart failure) (HCC)     Diabetes mellitus (HCC)     Erectile dysfunction     Hemodialysis patient     monday, tuesday, thursday, and friday at home. hd    History of blood transfusion     Hyperlipidemia     Hypertension     Obesity     Palliative care patient 01/25/2021         SURGICAL HISTORY       Past Surgical History:   Procedure Laterality Date    BRONCHOSCOPY Left 05/24/2023    BRONCHOSCOPY ADD ON COMPUTER ASSISTED performed by Clarice LINARES  Use    Vaping status: Never Used   Substance and Sexual Activity    Alcohol use: Not Currently    Drug use: No    Sexual activity: Yes     Partners: Female     Social Drivers of Health     Financial Resource Strain: Low Risk  (6/4/2024)    Overall Financial Resource Strain (CARDIA)     Difficulty of Paying Living Expenses: Not hard at all   Food Insecurity: No Food Insecurity (6/4/2024)    Hunger Vital Sign     Worried About Running Out of Food in the Last Year: Never true     Ran Out of Food in the Last Year: Never true   Transportation Needs: Unknown (6/4/2024)    PRAPARE - Transportation     Lack of Transportation (Non-Medical): No   Physical Activity: Insufficiently Active (6/4/2024)    Exercise Vital Sign     Days of Exercise per Week: 2 days     Minutes of Exercise per Session: 40 min   Stress: No Stress Concern Present (5/21/2023)    [facility] - Occupational Stress Questionnaire     Feeling of Stress : Not at all    [facility]    Family and Community Support    [facility]    Abuse Screen   Housing Stability: Unknown (6/4/2024)    Housing Stability Vital Sign     Unstable Housing in the Last Year: No       SCREENINGS    [place] Coma Scale  Eye Opening: Spontaneous  Best Verbal Response: Oriented  Best Motor Response: Obeys commands  [place] Coma Scale Score: 15        PHYSICAL EXAM    (up to 7 for level 4, 8 or more for level 5)     ED Triage Vitals [03/14/25 2215]   BP Systolic BP Percentile Diastolic BP Percentile Temp Temp Source Pulse Respirations SpO2   (!) 169/71 -- -- 97.9 °F (36.6 °C) Oral 85 19 96 %      Height Weight - Scale         1.702 m (5' 7\") 112.5 kg (248 lb)             Physical Exam  Vitals and nursing note reviewed.   Constitutional:       General: He is not in acute distress.     Appearance: He is obese. He is not ill-appearing.   Cardiovascular:

## 2025-03-24 ENCOUNTER — HOSPITAL ENCOUNTER (INPATIENT)
Age: 55
LOS: 5 days | Discharge: LEFT BEFORE DISCHARGE | DRG: 314 | End: 2025-03-31
Attending: PEDIATRICS | Admitting: HOSPITALIST
Payer: MEDICARE

## 2025-03-24 DIAGNOSIS — R78.81 BACTEREMIA: ICD-10-CM

## 2025-03-24 DIAGNOSIS — N18.6 END STAGE RENAL DISEASE ON DIALYSIS (HCC): ICD-10-CM

## 2025-03-24 DIAGNOSIS — J44.1 COPD EXACERBATION (HCC): ICD-10-CM

## 2025-03-24 DIAGNOSIS — R60.9 EDEMA: Primary | ICD-10-CM

## 2025-03-24 DIAGNOSIS — Z99.2 END STAGE RENAL DISEASE ON DIALYSIS (HCC): ICD-10-CM

## 2025-03-24 DIAGNOSIS — T82.7XXA AV FISTULA INFECTION, INITIAL ENCOUNTER: ICD-10-CM

## 2025-03-24 PROCEDURE — 99285 EMERGENCY DEPT VISIT HI MDM: CPT

## 2025-03-25 ENCOUNTER — APPOINTMENT (OUTPATIENT)
Dept: GENERAL RADIOLOGY | Age: 55
DRG: 314 | End: 2025-03-25
Payer: MEDICARE

## 2025-03-25 ENCOUNTER — HOSPITAL ENCOUNTER (EMERGENCY)
Dept: VASCULAR LAB | Age: 55
Discharge: HOME | DRG: 314 | End: 2025-03-27
Attending: PEDIATRICS
Payer: MEDICARE

## 2025-03-25 PROBLEM — T82.41XA HEMODIALYSIS CATHETER MALFUNCTION, INITIAL ENCOUNTER: Status: ACTIVE | Noted: 2025-03-25

## 2025-03-25 LAB
ALBUMIN SERPL-MCNC: 3.8 G/DL (ref 3.5–5.2)
ALP SERPL-CCNC: 45 U/L (ref 40–129)
ALT SERPL-CCNC: 21 U/L (ref 10–50)
ANION GAP SERPL CALCULATED.3IONS-SCNC: 16 MMOL/L (ref 8–16)
AST SERPL-CCNC: 19 U/L (ref 10–50)
B PARAP IS1001 DNA NPH QL NAA+NON-PROBE: NOT DETECTED
B PERT.PT PRMT NPH QL NAA+NON-PROBE: NOT DETECTED
BASOPHILS # BLD: 0 K/UL (ref 0–0.2)
BASOPHILS NFR BLD: 0.2 % (ref 0–1)
BILIRUB SERPL-MCNC: 0.3 MG/DL (ref 0.2–1.2)
BNP BLD-MCNC: 3488 PG/ML (ref 0–124)
BUN SERPL-MCNC: 67 MG/DL (ref 6–20)
C PNEUM DNA NPH QL NAA+NON-PROBE: NOT DETECTED
CALCIUM SERPL-MCNC: 10.1 MG/DL (ref 8.6–10)
CHLORIDE SERPL-SCNC: 95 MMOL/L (ref 98–107)
CO2 SERPL-SCNC: 30 MMOL/L (ref 22–29)
CREAT SERPL-MCNC: 14.4 MG/DL (ref 0.7–1.2)
CRP SERPL-MCNC: 114 MG/L (ref 0–5)
EKG P AXIS: 50 DEGREES
EKG P AXIS: 59 DEGREES
EKG P-R INTERVAL: 164 MS
EKG P-R INTERVAL: 168 MS
EKG Q-T INTERVAL: 370 MS
EKG Q-T INTERVAL: 380 MS
EKG QRS DURATION: 88 MS
EKG QRS DURATION: 92 MS
EKG QTC CALCULATION (BAZETT): 413 MS
EKG QTC CALCULATION (BAZETT): 423 MS
EKG T AXIS: 50 DEGREES
EKG T AXIS: 55 DEGREES
EOSINOPHIL # BLD: 0.2 K/UL (ref 0–0.6)
EOSINOPHIL NFR BLD: 1.8 % (ref 0–5)
ERYTHROCYTE [DISTWIDTH] IN BLOOD BY AUTOMATED COUNT: 13.2 % (ref 11.5–14.5)
ERYTHROCYTE [SEDIMENTATION RATE] IN BLOOD BY WESTERGREN METHOD: 91 MM/HR (ref 0–15)
FLUAV RNA NPH QL NAA+NON-PROBE: NOT DETECTED
FLUBV RNA NPH QL NAA+NON-PROBE: NOT DETECTED
GLUCOSE BLD-MCNC: 133 MG/DL (ref 70–99)
GLUCOSE BLD-MCNC: 157 MG/DL (ref 70–99)
GLUCOSE SERPL-MCNC: 127 MG/DL (ref 70–99)
HADV DNA NPH QL NAA+NON-PROBE: NOT DETECTED
HBV SURFACE AB SERPL IA-ACNC: 249 M[IU]/ML
HCOV 229E RNA NPH QL NAA+NON-PROBE: NOT DETECTED
HCOV HKU1 RNA NPH QL NAA+NON-PROBE: NOT DETECTED
HCOV NL63 RNA NPH QL NAA+NON-PROBE: NOT DETECTED
HCOV OC43 RNA NPH QL NAA+NON-PROBE: NOT DETECTED
HCT VFR BLD AUTO: 24.6 % (ref 42–52)
HGB BLD-MCNC: 8.1 G/DL (ref 14–18)
HMPV RNA NPH QL NAA+NON-PROBE: NOT DETECTED
HPIV1 RNA NPH QL NAA+NON-PROBE: NOT DETECTED
HPIV2 RNA NPH QL NAA+NON-PROBE: NOT DETECTED
HPIV3 RNA NPH QL NAA+NON-PROBE: NOT DETECTED
HPIV4 RNA NPH QL NAA+NON-PROBE: NOT DETECTED
IMM GRANULOCYTES # BLD: 0 K/UL
LYMPHOCYTES # BLD: 1.6 K/UL (ref 1.1–4.5)
LYMPHOCYTES NFR BLD: 16.2 % (ref 20–40)
M PNEUMO DNA NPH QL NAA+NON-PROBE: NOT DETECTED
MAGNESIUM SERPL-MCNC: 2.4 MG/DL (ref 1.6–2.6)
MCH RBC QN AUTO: 30.6 PG (ref 27–31)
MCHC RBC AUTO-ENTMCNC: 32.9 G/DL (ref 33–37)
MCV RBC AUTO: 92.8 FL (ref 80–94)
MONOCYTES # BLD: 1.4 K/UL (ref 0–0.9)
MONOCYTES NFR BLD: 14.2 % (ref 0–10)
NEUTROPHILS # BLD: 6.8 K/UL (ref 1.5–7.5)
NEUTS SEG NFR BLD: 67.3 % (ref 50–65)
PERFORMED ON: ABNORMAL
PERFORMED ON: ABNORMAL
PLATELET # BLD AUTO: 210 K/UL (ref 130–400)
PMV BLD AUTO: 9.4 FL (ref 9.4–12.4)
POTASSIUM SERPL-SCNC: 4.4 MMOL/L (ref 3.5–5.1)
PROT SERPL-MCNC: 7 G/DL (ref 6.4–8.3)
RBC # BLD AUTO: 2.65 M/UL (ref 4.7–6.1)
RSV RNA NPH QL NAA+NON-PROBE: NOT DETECTED
RV+EV RNA NPH QL NAA+NON-PROBE: NOT DETECTED
SARS-COV-2 RNA NPH QL NAA+NON-PROBE: NOT DETECTED
SODIUM SERPL-SCNC: 141 MMOL/L (ref 136–145)
TROPONIN, HIGH SENSITIVITY: 106 NG/L (ref 0–22)
TROPONIN, HIGH SENSITIVITY: 109 NG/L (ref 0–22)
VAS RIGHT AX A MID PSV: 224 CM/S
VAS RIGHT BRACHIAL A DIST EDV: 135 CM/S
VAS RIGHT BRACHIAL A DIST PSV: 274 CM/S
VAS RIGHT BRACHIAL A MID PSV: 326 CM/S
VAS RIGHT BRACHIAL A PROX EDV: 125 CM/S
VAS RIGHT BRACHIAL A PROX PSV: 259 CM/S
VAS RIGHT RADIAL A DIST PSV: 96.3 CM/S
VAS RIGHT RADIAL A PROX PSV: 73.9 CM/S
VAS RIGHT SUBCLAVIAN DIST PSV: 268 CM/S
VAS RIGHT SUBCLAVIAN PROX EDV: 109 CM/S
VAS RIGHT SUBCLAVIAN PROX PSV: 238 CM/S
VAS RIGHT ULNAR A DIST PSV: 68.3 CM/S
VAS RIGHT ULNAR A PROX PSV: 75.2 CM/S
WBC # BLD AUTO: 10.1 K/UL (ref 4.8–10.8)

## 2025-03-25 PROCEDURE — 71045 X-RAY EXAM CHEST 1 VIEW: CPT

## 2025-03-25 PROCEDURE — 85652 RBC SED RATE AUTOMATED: CPT

## 2025-03-25 PROCEDURE — 36415 COLL VENOUS BLD VENIPUNCTURE: CPT

## 2025-03-25 PROCEDURE — 87040 BLOOD CULTURE FOR BACTERIA: CPT

## 2025-03-25 PROCEDURE — 85025 COMPLETE CBC W/AUTO DIFF WBC: CPT

## 2025-03-25 PROCEDURE — 87150 DNA/RNA AMPLIFIED PROBE: CPT

## 2025-03-25 PROCEDURE — 96372 THER/PROPH/DIAG INJ SC/IM: CPT

## 2025-03-25 PROCEDURE — G0378 HOSPITAL OBSERVATION PER HR: HCPCS

## 2025-03-25 PROCEDURE — 87186 SC STD MICRODIL/AGAR DIL: CPT

## 2025-03-25 PROCEDURE — 2580000003 HC RX 258: Performed by: PEDIATRICS

## 2025-03-25 PROCEDURE — 6360000002 HC RX W HCPCS: Performed by: PEDIATRICS

## 2025-03-25 PROCEDURE — 83735 ASSAY OF MAGNESIUM: CPT

## 2025-03-25 PROCEDURE — 86140 C-REACTIVE PROTEIN: CPT

## 2025-03-25 PROCEDURE — 83880 ASSAY OF NATRIURETIC PEPTIDE: CPT

## 2025-03-25 PROCEDURE — 6370000000 HC RX 637 (ALT 250 FOR IP): Performed by: PEDIATRICS

## 2025-03-25 PROCEDURE — 99222 1ST HOSP IP/OBS MODERATE 55: CPT | Performed by: PHYSICIAN ASSISTANT

## 2025-03-25 PROCEDURE — 93010 ELECTROCARDIOGRAM REPORT: CPT | Performed by: INTERNAL MEDICINE

## 2025-03-25 PROCEDURE — 0202U NFCT DS 22 TRGT SARS-COV-2: CPT

## 2025-03-25 PROCEDURE — 96365 THER/PROPH/DIAG IV INF INIT: CPT

## 2025-03-25 PROCEDURE — 96367 TX/PROPH/DG ADDL SEQ IV INF: CPT

## 2025-03-25 PROCEDURE — 84484 ASSAY OF TROPONIN QUANT: CPT

## 2025-03-25 PROCEDURE — 6360000002 HC RX W HCPCS: Performed by: EMERGENCY MEDICINE

## 2025-03-25 PROCEDURE — 86706 HEP B SURFACE ANTIBODY: CPT

## 2025-03-25 PROCEDURE — 94640 AIRWAY INHALATION TREATMENT: CPT

## 2025-03-25 PROCEDURE — 6370000000 HC RX 637 (ALT 250 FOR IP): Performed by: EMERGENCY MEDICINE

## 2025-03-25 PROCEDURE — 82962 GLUCOSE BLOOD TEST: CPT

## 2025-03-25 PROCEDURE — 2580000003 HC RX 258: Performed by: EMERGENCY MEDICINE

## 2025-03-25 PROCEDURE — 93931 UPPER EXTREMITY STUDY: CPT | Performed by: SURGERY

## 2025-03-25 PROCEDURE — 2500000003 HC RX 250 WO HCPCS: Performed by: EMERGENCY MEDICINE

## 2025-03-25 PROCEDURE — 80053 COMPREHEN METABOLIC PANEL: CPT

## 2025-03-25 PROCEDURE — 93005 ELECTROCARDIOGRAM TRACING: CPT | Performed by: PEDIATRICS

## 2025-03-25 PROCEDURE — 87077 CULTURE AEROBIC IDENTIFY: CPT

## 2025-03-25 PROCEDURE — 93931 UPPER EXTREMITY STUDY: CPT

## 2025-03-25 PROCEDURE — 96366 THER/PROPH/DIAG IV INF ADDON: CPT

## 2025-03-25 RX ORDER — ACETAMINOPHEN 500 MG
1000 TABLET ORAL ONCE
Status: COMPLETED | OUTPATIENT
Start: 2025-03-25 | End: 2025-03-25

## 2025-03-25 RX ORDER — CALCITRIOL 0.25 UG/1
0.5 CAPSULE, LIQUID FILLED ORAL 2 TIMES DAILY
Status: DISCONTINUED | OUTPATIENT
Start: 2025-03-25 | End: 2025-03-31 | Stop reason: HOSPADM

## 2025-03-25 RX ORDER — SODIUM CHLORIDE 0.9 % (FLUSH) 0.9 %
5-40 SYRINGE (ML) INJECTION EVERY 12 HOURS SCHEDULED
Status: DISCONTINUED | OUTPATIENT
Start: 2025-03-25 | End: 2025-03-31 | Stop reason: HOSPADM

## 2025-03-25 RX ORDER — POLYETHYLENE GLYCOL 3350 17 G/17G
17 POWDER, FOR SOLUTION ORAL DAILY PRN
Status: DISCONTINUED | OUTPATIENT
Start: 2025-03-25 | End: 2025-03-31 | Stop reason: HOSPADM

## 2025-03-25 RX ORDER — ACETAMINOPHEN 650 MG/1
650 SUPPOSITORY RECTAL EVERY 6 HOURS PRN
Status: DISCONTINUED | OUTPATIENT
Start: 2025-03-25 | End: 2025-03-31 | Stop reason: HOSPADM

## 2025-03-25 RX ORDER — ONDANSETRON 2 MG/ML
4 INJECTION INTRAMUSCULAR; INTRAVENOUS EVERY 6 HOURS PRN
Status: DISCONTINUED | OUTPATIENT
Start: 2025-03-25 | End: 2025-03-31 | Stop reason: HOSPADM

## 2025-03-25 RX ORDER — HEPARIN SODIUM 5000 [USP'U]/ML
5000 INJECTION, SOLUTION INTRAVENOUS; SUBCUTANEOUS EVERY 8 HOURS SCHEDULED
Status: DISCONTINUED | OUTPATIENT
Start: 2025-03-25 | End: 2025-03-31 | Stop reason: HOSPADM

## 2025-03-25 RX ORDER — ONDANSETRON 4 MG/1
4 TABLET, ORALLY DISINTEGRATING ORAL EVERY 8 HOURS PRN
Status: DISCONTINUED | OUTPATIENT
Start: 2025-03-25 | End: 2025-03-31 | Stop reason: HOSPADM

## 2025-03-25 RX ORDER — SIMVASTATIN 10 MG
10 TABLET ORAL DAILY
Status: DISCONTINUED | OUTPATIENT
Start: 2025-03-25 | End: 2025-03-31 | Stop reason: HOSPADM

## 2025-03-25 RX ORDER — METOPROLOL TARTRATE 25 MG/1
25 TABLET, FILM COATED ORAL 2 TIMES DAILY
Status: DISCONTINUED | OUTPATIENT
Start: 2025-03-25 | End: 2025-03-31 | Stop reason: HOSPADM

## 2025-03-25 RX ORDER — IPRATROPIUM BROMIDE AND ALBUTEROL SULFATE 2.5; .5 MG/3ML; MG/3ML
1 SOLUTION RESPIRATORY (INHALATION) ONCE
Status: COMPLETED | OUTPATIENT
Start: 2025-03-25 | End: 2025-03-25

## 2025-03-25 RX ORDER — ACETAMINOPHEN 325 MG/1
650 TABLET ORAL EVERY 6 HOURS PRN
Status: DISCONTINUED | OUTPATIENT
Start: 2025-03-25 | End: 2025-03-31 | Stop reason: HOSPADM

## 2025-03-25 RX ORDER — SODIUM CHLORIDE 9 MG/ML
INJECTION, SOLUTION INTRAVENOUS PRN
Status: DISCONTINUED | OUTPATIENT
Start: 2025-03-25 | End: 2025-03-31 | Stop reason: HOSPADM

## 2025-03-25 RX ORDER — NIFEDIPINE 30 MG/1
60 TABLET, EXTENDED RELEASE ORAL 2 TIMES DAILY
Status: DISCONTINUED | OUTPATIENT
Start: 2025-03-25 | End: 2025-03-31 | Stop reason: HOSPADM

## 2025-03-25 RX ORDER — HYDRALAZINE HYDROCHLORIDE 25 MG/1
25 TABLET, FILM COATED ORAL EVERY 8 HOURS SCHEDULED
Status: DISCONTINUED | OUTPATIENT
Start: 2025-03-25 | End: 2025-03-31 | Stop reason: HOSPADM

## 2025-03-25 RX ORDER — CINACALCET 30 MG/1
30 TABLET, FILM COATED ORAL DAILY
Status: DISCONTINUED | OUTPATIENT
Start: 2025-03-25 | End: 2025-03-31 | Stop reason: HOSPADM

## 2025-03-25 RX ORDER — SODIUM CHLORIDE 0.9 % (FLUSH) 0.9 %
5-40 SYRINGE (ML) INJECTION PRN
Status: DISCONTINUED | OUTPATIENT
Start: 2025-03-25 | End: 2025-03-31 | Stop reason: HOSPADM

## 2025-03-25 RX ADMIN — SODIUM CHLORIDE, PRESERVATIVE FREE 10 ML: 5 INJECTION INTRAVENOUS at 21:44

## 2025-03-25 RX ADMIN — IPRATROPIUM BROMIDE AND ALBUTEROL SULFATE 1 DOSE: 2.5; .5 SOLUTION RESPIRATORY (INHALATION) at 00:31

## 2025-03-25 RX ADMIN — CALCITRIOL CAPSULES 0.25 MCG 0.5 MCG: 0.25 CAPSULE ORAL at 21:42

## 2025-03-25 RX ADMIN — NIFEDIPINE 60 MG: 30 TABLET, EXTENDED RELEASE ORAL at 21:42

## 2025-03-25 RX ADMIN — VANCOMYCIN HYDROCHLORIDE 2500 MG: 5 INJECTION, POWDER, LYOPHILIZED, FOR SOLUTION INTRAVENOUS at 04:33

## 2025-03-25 RX ADMIN — METOPROLOL TARTRATE 25 MG: 25 TABLET, FILM COATED ORAL at 10:35

## 2025-03-25 RX ADMIN — CALCITRIOL CAPSULES 0.25 MCG 0.5 MCG: 0.25 CAPSULE ORAL at 10:35

## 2025-03-25 RX ADMIN — HYDRALAZINE HYDROCHLORIDE 25 MG: 25 TABLET ORAL at 21:43

## 2025-03-25 RX ADMIN — CEFEPIME 2000 MG: 2 INJECTION, POWDER, FOR SOLUTION INTRAVENOUS at 07:45

## 2025-03-25 RX ADMIN — CINACALCET HYDROCHLORIDE 30 MG: 30 TABLET, FILM COATED ORAL at 10:35

## 2025-03-25 RX ADMIN — SODIUM CHLORIDE, PRESERVATIVE FREE 10 ML: 5 INJECTION INTRAVENOUS at 10:35

## 2025-03-25 RX ADMIN — METOPROLOL TARTRATE 25 MG: 25 TABLET, FILM COATED ORAL at 21:43

## 2025-03-25 RX ADMIN — HYDRALAZINE HYDROCHLORIDE 25 MG: 25 TABLET ORAL at 13:11

## 2025-03-25 RX ADMIN — ACETAMINOPHEN 1000 MG: 500 TABLET ORAL at 04:35

## 2025-03-25 RX ADMIN — HEPARIN SODIUM 5000 UNITS: 5000 INJECTION INTRAVENOUS; SUBCUTANEOUS at 08:59

## 2025-03-25 RX ADMIN — HEPARIN SODIUM 5000 UNITS: 5000 INJECTION INTRAVENOUS; SUBCUTANEOUS at 21:43

## 2025-03-25 RX ADMIN — NIFEDIPINE 60 MG: 30 TABLET, EXTENDED RELEASE ORAL at 10:35

## 2025-03-25 SDOH — ECONOMIC STABILITY: INCOME INSECURITY: HOW HARD IS IT FOR YOU TO PAY FOR THE VERY BASICS LIKE FOOD, HOUSING, MEDICAL CARE, AND HEATING?: NOT HARD AT ALL

## 2025-03-25 SDOH — ECONOMIC STABILITY: FOOD INSECURITY: WITHIN THE PAST 12 MONTHS, YOU WORRIED THAT YOUR FOOD WOULD RUN OUT BEFORE YOU GOT MONEY TO BUY MORE.: NEVER TRUE

## 2025-03-25 SDOH — ECONOMIC STABILITY: INCOME INSECURITY
IN THE PAST 12 MONTHS, HAS THE ELECTRIC, GAS, OIL, OR WATER COMPANY THREATENED TO SHUT OFF SERVICE IN YOUR HOME?: PATIENT UNABLE TO ANSWER

## 2025-03-25 ASSESSMENT — PATIENT HEALTH QUESTIONNAIRE - PHQ9
SUM OF ALL RESPONSES TO PHQ QUESTIONS 1-9: 0
2. FEELING DOWN, DEPRESSED OR HOPELESS: NOT AT ALL
SUM OF ALL RESPONSES TO PHQ QUESTIONS 1-9: 0
SUM OF ALL RESPONSES TO PHQ QUESTIONS 1-9: 0
1. LITTLE INTEREST OR PLEASURE IN DOING THINGS: NOT AT ALL
SUM OF ALL RESPONSES TO PHQ QUESTIONS 1-9: 0

## 2025-03-25 NOTE — ED PROVIDER NOTES
[facility] EMERGENCY DEPARTMENT  eMERGENCY dEPARTMENT eNCOUnter      Pt Name: Hema Gallagher  MRN: [id number]  Birthdate 1970  Date of evaluation: 3/24/2025  Provider: Michelle Conner MD    CHIEF COMPLAINT       Chief Complaint   Patient presents with    Vascular Access Problem     Swelling to right arm fistula x3 days         HISTORY OF PRESENT ILLNESS   (Location/Symptom, Timing/Onset,Context/Setting, Quality, Duration, Modifying Factors, Severity)  Note limiting factors.   Hema Gallagher is a 54 y.o. male who presents to the emergency department with right arm swelling.  Patient states that the area over his AV fistula is swollen.  Patient states that he had home dialysis today and the swelling began after dialysis.  Patient denies redness, fever, or pain.  Patient has had a cough for the past 2 to 3 weeks productive of clear sputum.  Patient has also experienced shortness of breath with exertion.  Patient denies chest pain, lower extremity pain, chills, congestion, vomiting or diarrhea.  Patient denies difficulty with home hemodialysis.    HPI    NursingNotes were reviewed.    REVIEW OF SYSTEMS    (2-9 systems for level 4, 10 or more for level 5)     Review of Systems   Constitutional:  Negative for chills and fever.   HENT:  Negative for congestion and rhinorrhea.    Respiratory:  Positive for cough and shortness of breath.    Cardiovascular:  Positive for leg swelling. Negative for chest pain and palpitations.   Gastrointestinal:  Negative for abdominal pain, diarrhea, nausea and vomiting.   Genitourinary:  Negative for difficulty urinating and dysuria.        Patient urinates approximately 1 time daily   Musculoskeletal:  Negative for back pain and neck pain.        Swelling over right sided AV fistula   Skin:  Negative for color change and rash.   Neurological:  Negative for syncope and light-headedness.   Psychiatric/Behavioral:  Negative for agitation and confusion.    All other systems reviewed

## 2025-03-25 NOTE — ED NOTES
Phlebotomy at bedside for blood cultures.  Cultures ordered after vancomycin had been initiated prior to my shift, vanc just finishing up.  Michael SWAN notified.

## 2025-03-25 NOTE — ED NOTES
ED TO INPATIENT SBAR HANDOFF    Patient Name: Hema Gallagher   : 1970  54 y.o.   Family/Caregiver Present: yes  Code Status Order: Full Code    C-SSRS: Risk of Suicide: No Risk  Sitter No  Restraints:         Situation  Chief Complaint:   Chief Complaint   Patient presents with    Vascular Access Problem     Swelling to right arm fistula x3 days     Patient Diagnosis: Hemodialysis catheter malfunction, initial encounter [T82.41XA]     Brief Description of Patient's Condition: presents to ED with c/o right arm fistula swelling.  Does home hemodialysis, right upper arm fistula, states he dialyzed yesterday 3/24 and swelling started after; no fever, tenderness, redness.  Also reports nonproductive cough x2-3 weeks.  Labs show CKD, elevated sed rate and crp.  Respiratory panel negative.  Vancomycin given in ED prior to shift change, cultures were obtained after vanc completed as they were ordered this morning by hospitalist.  Given tylenol for HA this morning.  Pt sats were dropping while sleeping, suspicious for undiagnosed sleep apnea, placed on 2L oxygen and doing better.  Pt resting, vitals stable  Mental Status: alert, coherent, logical, thought processes intact, and able to concentrate and follow conversation  Arrived from: home    Imaging:   Vascular duplex upper extremity arteries right         XR CHEST PORTABLE   Final Result       1. Mild cardiomegaly with central vascular congestion.                       ______________________________________    Electronically signed by: JOSE ALBERTO MORALES M.D.   Date:     2025   Time:    00:51         COVID-19 Results:   Internal Administration   First Dose COVID-19, PFIZER PURPLE top, DILUTE for use, (age 12 y+), 30mcg/0.3mL  2021   Second Dose COVID-19, PFIZER PURPLE top, DILUTE for use, (age 12 y+), 30mcg/0.3mL   10/04/2021       Last COVID Lab SARS-CoV-2 (no units)   Date Value   2020 Not Detected     SARS-CoV-2, PCR (no units)   Date Value

## 2025-03-25 NOTE — H&P
by PCR Not Detected Not Detected    SARS-CoV-2, PCR Not Detected Not Detected    Human Metapneumovirus by PCR Not Detected Not Detected    Human Rhinovirus/Enterovirus by PCR Not Detected Not Detected    Influenza A by PCR Not Detected Not Detected    Influenza B by PCR Not Detected Not Detected    Mycoplasma pneumoniae by PCR Not Detected Not Detected    Parainfluenza Virus 1 by PCR Not Detected Not Detected    Parainfluenza Virus 2 by PCR Not Detected Not Detected    Parainfluenza Virus 3 by PCR Not Detected Not Detected    Parainfluenza Virus 4 by PCR Not Detected Not Detected    Respiratory Syncytial Virus by PCR Not Detected Not Detected       Imaging: XR CHEST PORTABLE  Result Date: 3/25/2025  EXAM:  FRONTAL VIEW OF THE CHEST.  HISTORY:  Shortness of breath.  COMPARISON:  09/12/2023.  FINDINGS:   Cardiac silhouette is enlarged.Central vascular congestion.  No organized infiltrate/consolidation.  No visible effusion or pneumothorax.  No acute osseous abnormality.       1. Mild cardiomegaly with central vascular congestion.      ______________________________________ Electronically signed by: JOSE ALBERTO MORALES M.D. Date:     03/25/2025 Time:    00:51       Assessment/Plan:     Principal Problem:    Hemodialysis catheter malfunction, initial encounter  Resolved Problems:    * No resolved hospital problems. *       No new Assessment & Plan notes have been filed under this hospital service since the last note was generated.  Service: Internal Medicine    Suspected Dialysis Catheter Site Infection:  - Continue current antibiotic therapy of vancomycin and cefepime  - Vascular surgery consultation scheduled for tomorrow morning for fistula site evaluation  - Infectious disease consultation pending  - Possible surgical debridement may be needed based on specialist recommendations    End-Stage Renal Disease:  -Consult nephrologist so that patient may receive inpatient hemodialysis    Chronic debility:  - Patient seen by 
30 or less

## 2025-03-26 ENCOUNTER — APPOINTMENT (OUTPATIENT)
Age: 55
DRG: 314 | End: 2025-03-26
Payer: MEDICARE

## 2025-03-26 PROBLEM — R78.81 BACTEREMIA: Status: ACTIVE | Noted: 2025-03-26

## 2025-03-26 LAB
25(OH)D3 SERPL-MCNC: 28.5 NG/ML
A BAUMANNII DNA BLD POS QL NAA+NON-PROBE: NOT DETECTED
ANION GAP SERPL CALCULATED.3IONS-SCNC: 14 MMOL/L (ref 8–16)
BASOPHILS # BLD: 0 K/UL (ref 0–0.2)
BASOPHILS NFR BLD: 0.4 % (ref 0–1)
BUN SERPL-MCNC: 79 MG/DL (ref 6–20)
C ALBICANS DNA BLD POS QL NAA+NON-PROBE: NOT DETECTED
C AURIS DNA BLD POS QL NAA+PROBE: NOT DETECTED
C GLABRATA DNA BLD POS QL NAA+NON-PROBE: NOT DETECTED
C KRUSEI DNA BLD POS QL NAA+NON-PROBE: NOT DETECTED
C PARAP DNA BLD POS QL NAA+NON-PROBE: NOT DETECTED
C TROPICLS DNA BLD POS QL NAA+NON-PROBE: NOT DETECTED
CALCIUM SERPL-MCNC: 9.3 MG/DL (ref 8.6–10)
CHLORIDE SERPL-SCNC: 99 MMOL/L (ref 98–107)
CO2 SERPL-SCNC: 28 MMOL/L (ref 22–29)
CREAT SERPL-MCNC: 17.1 MG/DL (ref 0.7–1.2)
CRYPTOCOCCUS NEOFORMANS/GATTII BY PCR: NOT DETECTED
E CLOAC COMP DNA BLD POS NAA+NON-PROBE: NOT DETECTED
E COLI DNA BLD POS QL NAA+NON-PROBE: NOT DETECTED
E FAECALIS DNA BLD POS QL NAA+PROBE: NOT DETECTED
E FAECIUM DNA BLD POS QL NAA+PROBE: NOT DETECTED
ENTEROBACT DNA BLD POS QL NAA+NON-PROBE: NOT DETECTED
ENTEROCOC DNA BLD POS QL NAA+NON-PROBE: NOT DETECTED
EOSINOPHIL # BLD: 0.3 K/UL (ref 0–0.6)
EOSINOPHIL NFR BLD: 3.3 % (ref 0–5)
ERYTHROCYTE [DISTWIDTH] IN BLOOD BY AUTOMATED COUNT: 13.1 % (ref 11.5–14.5)
GLUCOSE BLD-MCNC: 154 MG/DL (ref 70–99)
GLUCOSE SERPL-MCNC: 127 MG/DL (ref 70–99)
GN BLD CULTURE PNL BLD POS NAA+PROBE: NOT DETECTED
GP B STREP DNA BLD POS QL NAA+NON-PROBE: NOT DETECTED
HCT VFR BLD AUTO: 23.1 % (ref 42–52)
HGB BLD-MCNC: 7.5 G/DL (ref 14–18)
IMM GRANULOCYTES # BLD: 0 K/UL
K OXYTOCA DNA BLD POS QL NAA+NON-PROBE: NOT DETECTED
K PNEUMON DNA SPEC QL NAA+PROBE: NOT DETECTED
K. AEROGENES DNA SPEC QL NAA+PROBE: NOT DETECTED
L MONOCYTOG DNA BLD POS QL NAA+NON-PROBE: NOT DETECTED
LYMPHOCYTES # BLD: 1.4 K/UL (ref 1.1–4.5)
LYMPHOCYTES NFR BLD: 16.7 % (ref 20–40)
MAGNESIUM SERPL-MCNC: 2.5 MG/DL (ref 1.6–2.6)
MCH RBC QN AUTO: 30.4 PG (ref 27–31)
MCHC RBC AUTO-ENTMCNC: 32.5 G/DL (ref 33–37)
MCV RBC AUTO: 93.5 FL (ref 80–94)
MECA BLD POS QL NAA+NON-PROBE: DETECTED
MONOCYTES # BLD: 1.3 K/UL (ref 0–0.9)
MONOCYTES NFR BLD: 16.5 % (ref 0–10)
N MEN DNA BLD POS QL NAA+NON-PROBE: NOT DETECTED
NEUTROPHILS # BLD: 5.1 K/UL (ref 1.5–7.5)
NEUTS SEG NFR BLD: 62.7 % (ref 50–65)
P AERUGINOSA DNA BLD POS NAA+NON-PROBE: NOT DETECTED
PERFORMED ON: ABNORMAL
PHOSPHATE SERPL-MCNC: 8.5 MG/DL (ref 2.5–4.5)
PLATELET # BLD AUTO: 194 K/UL (ref 130–400)
PMV BLD AUTO: 9.4 FL (ref 9.4–12.4)
POTASSIUM SERPL-SCNC: 4.8 MMOL/L (ref 3.5–5)
PROTEUS SP DNA BLD POS QL NAA+NON-PROBE: NOT DETECTED
PTH-INTACT SERPL-MCNC: 372 PG/ML (ref 15–65)
RBC # BLD AUTO: 2.47 M/UL (ref 4.7–6.1)
S AUREUS DNA BLD POS QL NAA+NON-PROBE: NOT DETECTED
S AUREUS+CONS DNA BLD POS NAA+NON-PROBE: DETECTED
S EPIDERMIDIS DNA BLD POS QL NAA+PROBE: DETECTED
S LUGDUNENSIS DNA BLD POS QL NAA+PROBE: NOT DETECTED
S MALTOPH DNA BLD POS QL NAA+PROBE: NOT DETECTED
S MARCESCENS DNA BLD POS NAA+NON-PROBE: NOT DETECTED
S PNEUM DNA BLD POS QL NAA+NON-PROBE: NOT DETECTED
S PYO DNA BLD POS QL NAA+NON-PROBE: NOT DETECTED
SALMONELLA DNA BLD POS QL NAA+PROBE: NOT DETECTED
SODIUM SERPL-SCNC: 141 MMOL/L (ref 136–145)
STREPTOCOCCUS DNA BLD POS NAA+NON-PROBE: NOT DETECTED
WBC # BLD AUTO: 8.1 K/UL (ref 4.8–10.8)

## 2025-03-26 PROCEDURE — 85025 COMPLETE CBC W/AUTO DIFF WBC: CPT

## 2025-03-26 PROCEDURE — 2580000003 HC RX 258: Performed by: PEDIATRICS

## 2025-03-26 PROCEDURE — 2580000003 HC RX 258: Performed by: EMERGENCY MEDICINE

## 2025-03-26 PROCEDURE — 1200000000 HC SEMI PRIVATE

## 2025-03-26 PROCEDURE — 83735 ASSAY OF MAGNESIUM: CPT

## 2025-03-26 PROCEDURE — 8010000000 HC HEMODIALYSIS ACUTE INPT

## 2025-03-26 PROCEDURE — 94760 N-INVAS EAR/PLS OXIMETRY 1: CPT

## 2025-03-26 PROCEDURE — 36415 COLL VENOUS BLD VENIPUNCTURE: CPT

## 2025-03-26 PROCEDURE — 80048 BASIC METABOLIC PNL TOTAL CA: CPT

## 2025-03-26 PROCEDURE — 6360000002 HC RX W HCPCS: Performed by: EMERGENCY MEDICINE

## 2025-03-26 PROCEDURE — 5A1D70Z PERFORMANCE OF URINARY FILTRATION, INTERMITTENT, LESS THAN 6 HOURS PER DAY: ICD-10-PCS | Performed by: EMERGENCY MEDICINE

## 2025-03-26 PROCEDURE — 6370000000 HC RX 637 (ALT 250 FOR IP): Performed by: EMERGENCY MEDICINE

## 2025-03-26 PROCEDURE — 2500000003 HC RX 250 WO HCPCS: Performed by: EMERGENCY MEDICINE

## 2025-03-26 PROCEDURE — 82306 VITAMIN D 25 HYDROXY: CPT

## 2025-03-26 PROCEDURE — 83970 ASSAY OF PARATHORMONE: CPT

## 2025-03-26 PROCEDURE — 82962 GLUCOSE BLOOD TEST: CPT

## 2025-03-26 PROCEDURE — 6360000002 HC RX W HCPCS: Performed by: PEDIATRICS

## 2025-03-26 PROCEDURE — 84100 ASSAY OF PHOSPHORUS: CPT

## 2025-03-26 RX ADMIN — NIFEDIPINE 60 MG: 30 TABLET, EXTENDED RELEASE ORAL at 09:35

## 2025-03-26 RX ADMIN — CALCITRIOL CAPSULES 0.25 MCG 0.5 MCG: 0.25 CAPSULE ORAL at 09:34

## 2025-03-26 RX ADMIN — CALCITRIOL CAPSULES 0.25 MCG 0.5 MCG: 0.25 CAPSULE ORAL at 21:33

## 2025-03-26 RX ADMIN — NIFEDIPINE 60 MG: 30 TABLET, EXTENDED RELEASE ORAL at 21:33

## 2025-03-26 RX ADMIN — HYDRALAZINE HYDROCHLORIDE 25 MG: 25 TABLET ORAL at 21:33

## 2025-03-26 RX ADMIN — SODIUM CHLORIDE, PRESERVATIVE FREE 10 ML: 5 INJECTION INTRAVENOUS at 09:35

## 2025-03-26 RX ADMIN — CEFEPIME 500 MG: 1 INJECTION, POWDER, FOR SOLUTION INTRAMUSCULAR; INTRAVENOUS at 18:19

## 2025-03-26 RX ADMIN — CINACALCET HYDROCHLORIDE 30 MG: 30 TABLET, FILM COATED ORAL at 09:35

## 2025-03-26 RX ADMIN — SODIUM CHLORIDE, PRESERVATIVE FREE 10 ML: 5 INJECTION INTRAVENOUS at 21:33

## 2025-03-26 RX ADMIN — METOPROLOL TARTRATE 25 MG: 25 TABLET, FILM COATED ORAL at 09:34

## 2025-03-26 RX ADMIN — METOPROLOL TARTRATE 25 MG: 25 TABLET, FILM COATED ORAL at 21:33

## 2025-03-26 RX ADMIN — VANCOMYCIN HYDROCHLORIDE 1000 MG: 1 INJECTION, POWDER, LYOPHILIZED, FOR SOLUTION INTRAVENOUS at 17:21

## 2025-03-26 NOTE — PLAN OF CARE
Problem: Chronic Conditions and Co-morbidities  Goal: Patient's chronic conditions and co-morbidity symptoms are monitored and maintained or improved  Outcome: Progressing  Flowsheets (Taken 3/26/2025 0137)  Care Plan - Patient's Chronic Conditions and Co-Morbidity Symptoms are Monitored and Maintained or Improved:   Monitor and assess patient's chronic conditions and comorbid symptoms for stability, deterioration, or improvement   Update acute care plan with appropriate goals if chronic or comorbid symptoms are exacerbated and prevent overall improvement and discharge   Collaborate with multidisciplinary team to address chronic and comorbid conditions and prevent exacerbation or deterioration     Problem: Discharge Planning  Goal: Discharge to home or other facility with appropriate resources  Outcome: Progressing  Flowsheets  Taken 3/26/2025 0137 by Nolvia Santacruz LPN  Discharge to home or other facility with appropriate resources:   Identify barriers to discharge with patient and caregiver   Arrange for needed discharge resources and transportation as appropriate   Identify discharge learning needs (meds, wound care, etc)   Refer to discharge planning if patient needs post-hospital services based on physician order or complex needs related to functional status, cognitive ability or social support system   Arrange for interpreters to assist at discharge as needed  Taken 3/25/2025 1417 by Yulisa Cotter, RN  Discharge to home or other facility with appropriate resources:   Identify barriers to discharge with patient and caregiver   Arrange for needed discharge resources and transportation as appropriate   Identify discharge learning needs (meds, wound care, etc)   Arrange for interpreters to assist at discharge as needed   Refer to discharge planning if patient needs post-hospital services based on physician order or complex needs related to functional status, cognitive ability or social support

## 2025-03-26 NOTE — CARE COORDINATION
03/26/25 0904   IMM Letter   IMM Letter given to Patient/Family/Significant other/Guardian/POA/by: letter explained to and signed by jenaro Friedman RNCM   IMM Letter date given: 03/26/25   IMM Letter time given: 0900     Upgraded from obs to inpt. Initial letter  Important Message from Medicare letter explained and provided to patient by Nalini Reynolds RN CM. All questions answered. Patient voiced understanding. Copy placed in soft chart.

## 2025-03-27 ENCOUNTER — APPOINTMENT (OUTPATIENT)
Age: 55
DRG: 314 | End: 2025-03-27
Payer: MEDICARE

## 2025-03-27 ENCOUNTER — OUTSIDE FACILITY SERVICE (OUTPATIENT)
Age: 55
End: 2025-03-27
Payer: MEDICARE

## 2025-03-27 LAB
ALBUMIN SERPL-MCNC: 3.6 G/DL (ref 3.5–5.2)
ALP SERPL-CCNC: 50 U/L (ref 40–129)
ALT SERPL-CCNC: 25 U/L (ref 10–50)
ANION GAP SERPL CALCULATED.3IONS-SCNC: 13 MMOL/L (ref 8–16)
AST SERPL-CCNC: 12 U/L (ref 10–50)
BASOPHILS # BLD: 0 K/UL (ref 0–0.2)
BASOPHILS NFR BLD: 0.3 % (ref 0–1)
BILIRUB SERPL-MCNC: 0.2 MG/DL (ref 0.2–1.2)
BUN SERPL-MCNC: 41 MG/DL (ref 6–20)
CALCIUM SERPL-MCNC: 9.1 MG/DL (ref 8.6–10)
CHLORIDE SERPL-SCNC: 101 MMOL/L (ref 98–107)
CO2 SERPL-SCNC: 27 MMOL/L (ref 22–29)
CREAT SERPL-MCNC: 11 MG/DL (ref 0.7–1.2)
ECHO AO ASC DIAM: 3.4 CM
ECHO AO ASCENDING AORTA INDEX: 1.56 CM/M2
ECHO AO ROOT DIAM: 2.6 CM
ECHO AO ROOT INDEX: 1.19 CM/M2
ECHO AR MAX VEL PISA: 4.6 M/S
ECHO AV AREA PEAK VELOCITY: 3.1 CM2
ECHO AV AREA VTI: 3.1 CM2
ECHO AV AREA/BSA PEAK VELOCITY: 1.4 CM2/M2
ECHO AV AREA/BSA VTI: 1.4 CM2/M2
ECHO AV MEAN GRADIENT: 7 MMHG
ECHO AV MEAN VELOCITY: 1.2 M/S
ECHO AV PEAK GRADIENT: 12 MMHG
ECHO AV PEAK VELOCITY: 1.7 M/S
ECHO AV REGURGITANT PHT: 195 MS
ECHO AV VELOCITY RATIO: 0.94
ECHO AV VTI: 36.1 CM
ECHO BSA: 2.27 M2
ECHO EST RA PRESSURE: 3 MMHG
ECHO IVC PROX: 1.4 CM
ECHO LA AREA 2C: 22.7 CM2
ECHO LA AREA 4C: 24.9 CM2
ECHO LA DIAMETER INDEX: 2.02 CM/M2
ECHO LA DIAMETER: 4.4 CM
ECHO LA MAJOR AXIS: 6.4 CM
ECHO LA MINOR AXIS: 6 CM
ECHO LA TO AORTIC ROOT RATIO: 1.69
ECHO LA VOL BP: 78 ML (ref 18–58)
ECHO LA VOL MOD A2C: 72 ML (ref 18–58)
ECHO LA VOL MOD A4C: 79 ML (ref 18–58)
ECHO LA VOL/BSA BIPLANE: 36 ML/M2 (ref 16–34)
ECHO LA VOLUME INDEX MOD A2C: 33 ML/M2 (ref 16–34)
ECHO LA VOLUME INDEX MOD A4C: 36 ML/M2 (ref 16–34)
ECHO LV E' LATERAL VELOCITY: 8.16 CM/S
ECHO LV E' SEPTAL VELOCITY: 8.49 CM/S
ECHO LV EDV A2C: 165 ML
ECHO LV EDV A4C: 169 ML
ECHO LV EDV INDEX A4C: 78 ML/M2
ECHO LV EDV NDEX A2C: 76 ML/M2
ECHO LV EJECTION FRACTION A2C: 65 %
ECHO LV EJECTION FRACTION A4C: 69 %
ECHO LV EJECTION FRACTION BIPLANE: 68 % (ref 55–100)
ECHO LV ESV A2C: 57 ML
ECHO LV ESV A4C: 53 ML
ECHO LV ESV INDEX A2C: 26 ML/M2
ECHO LV ESV INDEX A4C: 24 ML/M2
ECHO LV FRACTIONAL SHORTENING: 38 % (ref 28–44)
ECHO LV INTERNAL DIMENSION DIASTOLE INDEX: 2.66 CM/M2
ECHO LV INTERNAL DIMENSION DIASTOLIC: 5.8 CM (ref 4.2–5.9)
ECHO LV INTERNAL DIMENSION SYSTOLIC INDEX: 1.65 CM/M2
ECHO LV INTERNAL DIMENSION SYSTOLIC: 3.6 CM
ECHO LV IVSD: 1.4 CM (ref 0.6–1)
ECHO LV MASS 2D: 349.2 G (ref 88–224)
ECHO LV MASS INDEX 2D: 160.2 G/M2 (ref 49–115)
ECHO LV POSTERIOR WALL DIASTOLIC: 1.3 CM (ref 0.6–1)
ECHO LV RELATIVE WALL THICKNESS RATIO: 0.45
ECHO LVOT AREA: 3.5 CM2
ECHO LVOT AV VTI INDEX: 0.91
ECHO LVOT DIAM: 2.1 CM
ECHO LVOT MEAN GRADIENT: 5 MMHG
ECHO LVOT PEAK GRADIENT: 10 MMHG
ECHO LVOT PEAK VELOCITY: 1.6 M/S
ECHO LVOT STROKE VOLUME INDEX: 52.1 ML/M2
ECHO LVOT SV: 113.5 ML
ECHO LVOT VTI: 32.8 CM
ECHO MV A VELOCITY: 1.13 M/S
ECHO MV AREA VTI: 2.4 CM2
ECHO MV E DECELERATION TIME (DT): 151 MS
ECHO MV E VELOCITY: 1.49 M/S
ECHO MV E/A RATIO: 1.32
ECHO MV E/E' LATERAL: 18.26
ECHO MV E/E' RATIO (AVERAGED): 17.9
ECHO MV E/E' SEPTAL: 17.55
ECHO MV LVOT VTI INDEX: 1.46
ECHO MV MAX VELOCITY: 1.7 M/S
ECHO MV MEAN GRADIENT: 5 MMHG
ECHO MV MEAN VELOCITY: 1 M/S
ECHO MV PEAK GRADIENT: 11 MMHG
ECHO MV VTI: 47.9 CM
ECHO RA AREA 4C: 19.5 CM2
ECHO RA END SYSTOLIC VOLUME APICAL 4 CHAMBER INDEX BSA: 25 ML/M2
ECHO RA VOLUME: 55 ML
ECHO RIGHT VENTRICULAR SYSTOLIC PRESSURE (RVSP): 44 MMHG
ECHO RV BASAL DIMENSION: 5.2 CM
ECHO RV INTERNAL DIMENSION: 3.6 CM
ECHO RV LONGITUDINAL DIMENSION: 7.3 CM
ECHO RV MID DIMENSION: 3.4 CM
ECHO RV TAPSE: 2.2 CM (ref 1.7–?)
ECHO TV REGURGITANT MAX VELOCITY: 3.2 M/S
ECHO TV REGURGITANT PEAK GRADIENT: 41 MMHG
EOSINOPHIL # BLD: 0.3 K/UL (ref 0–0.6)
EOSINOPHIL NFR BLD: 4 % (ref 0–5)
ERYTHROCYTE [DISTWIDTH] IN BLOOD BY AUTOMATED COUNT: 13 % (ref 11.5–14.5)
GLUCOSE SERPL-MCNC: 115 MG/DL (ref 70–99)
HCT VFR BLD AUTO: 25.2 % (ref 42–52)
HGB BLD-MCNC: 7.9 G/DL (ref 14–18)
IMM GRANULOCYTES # BLD: 0.1 K/UL
LYMPHOCYTES # BLD: 1.3 K/UL (ref 1.1–4.5)
LYMPHOCYTES NFR BLD: 17.4 % (ref 20–40)
MCH RBC QN AUTO: 30 PG (ref 27–31)
MCHC RBC AUTO-ENTMCNC: 31.3 G/DL (ref 33–37)
MCV RBC AUTO: 95.8 FL (ref 80–94)
MONOCYTES # BLD: 1.1 K/UL (ref 0–0.9)
MONOCYTES NFR BLD: 14.8 % (ref 0–10)
NEUTROPHILS # BLD: 4.8 K/UL (ref 1.5–7.5)
NEUTS SEG NFR BLD: 62.8 % (ref 50–65)
PLATELET # BLD AUTO: 215 K/UL (ref 130–400)
PMV BLD AUTO: 9.7 FL (ref 9.4–12.4)
POTASSIUM SERPL-SCNC: 4.5 MMOL/L (ref 3.5–5)
PROT SERPL-MCNC: 6 G/DL (ref 6.4–8.3)
RBC # BLD AUTO: 2.63 M/UL (ref 4.7–6.1)
SODIUM SERPL-SCNC: 141 MMOL/L (ref 136–145)
WBC # BLD AUTO: 7.6 K/UL (ref 4.8–10.8)

## 2025-03-27 PROCEDURE — 6370000000 HC RX 637 (ALT 250 FOR IP): Performed by: EMERGENCY MEDICINE

## 2025-03-27 PROCEDURE — 2500000003 HC RX 250 WO HCPCS: Performed by: EMERGENCY MEDICINE

## 2025-03-27 PROCEDURE — 1200000000 HC SEMI PRIVATE

## 2025-03-27 PROCEDURE — 85025 COMPLETE CBC W/AUTO DIFF WBC: CPT

## 2025-03-27 PROCEDURE — 80053 COMPREHEN METABOLIC PANEL: CPT

## 2025-03-27 PROCEDURE — 87040 BLOOD CULTURE FOR BACTERIA: CPT

## 2025-03-27 PROCEDURE — 6360000004 HC RX CONTRAST MEDICATION

## 2025-03-27 PROCEDURE — 93306 TTE W/DOPPLER COMPLETE: CPT | Performed by: INTERNAL MEDICINE

## 2025-03-27 PROCEDURE — 2580000003 HC RX 258: Performed by: EMERGENCY MEDICINE

## 2025-03-27 PROCEDURE — 94760 N-INVAS EAR/PLS OXIMETRY 1: CPT

## 2025-03-27 PROCEDURE — 6360000002 HC RX W HCPCS: Performed by: EMERGENCY MEDICINE

## 2025-03-27 PROCEDURE — 99231 SBSQ HOSP IP/OBS SF/LOW 25: CPT | Performed by: PHYSICIAN ASSISTANT

## 2025-03-27 PROCEDURE — C8929 TTE W OR WO FOL WCON,DOPPLER: HCPCS

## 2025-03-27 PROCEDURE — 36415 COLL VENOUS BLD VENIPUNCTURE: CPT

## 2025-03-27 RX ADMIN — ACETAMINOPHEN 650 MG: 325 TABLET ORAL at 17:38

## 2025-03-27 RX ADMIN — SODIUM CHLORIDE, PRESERVATIVE FREE 10 ML: 5 INJECTION INTRAVENOUS at 08:46

## 2025-03-27 RX ADMIN — HYDRALAZINE HYDROCHLORIDE 25 MG: 25 TABLET ORAL at 20:56

## 2025-03-27 RX ADMIN — NIFEDIPINE 60 MG: 30 TABLET, EXTENDED RELEASE ORAL at 20:56

## 2025-03-27 RX ADMIN — CEFEPIME 500 MG: 1 INJECTION, POWDER, FOR SOLUTION INTRAMUSCULAR; INTRAVENOUS at 15:56

## 2025-03-27 RX ADMIN — SULFUR HEXAFLUORIDE 2 ML: 60.7; .19; .19 INJECTION, POWDER, LYOPHILIZED, FOR SUSPENSION INTRAVENOUS; INTRAVESICAL at 07:23

## 2025-03-27 RX ADMIN — CALCITRIOL CAPSULES 0.25 MCG 0.5 MCG: 0.25 CAPSULE ORAL at 20:56

## 2025-03-27 RX ADMIN — SODIUM CHLORIDE, PRESERVATIVE FREE 10 ML: 5 INJECTION INTRAVENOUS at 20:59

## 2025-03-27 RX ADMIN — HEPARIN SODIUM 5000 UNITS: 5000 INJECTION INTRAVENOUS; SUBCUTANEOUS at 13:15

## 2025-03-27 RX ADMIN — HYDRALAZINE HYDROCHLORIDE 25 MG: 25 TABLET ORAL at 13:16

## 2025-03-27 RX ADMIN — NIFEDIPINE 60 MG: 30 TABLET, EXTENDED RELEASE ORAL at 08:46

## 2025-03-27 RX ADMIN — HEPARIN SODIUM 5000 UNITS: 5000 INJECTION INTRAVENOUS; SUBCUTANEOUS at 20:56

## 2025-03-27 RX ADMIN — CALCITRIOL CAPSULES 0.25 MCG 0.5 MCG: 0.25 CAPSULE ORAL at 08:46

## 2025-03-27 RX ADMIN — METOPROLOL TARTRATE 25 MG: 25 TABLET, FILM COATED ORAL at 08:46

## 2025-03-27 RX ADMIN — METOPROLOL TARTRATE 25 MG: 25 TABLET, FILM COATED ORAL at 20:56

## 2025-03-27 RX ADMIN — CINACALCET HYDROCHLORIDE 30 MG: 30 TABLET, FILM COATED ORAL at 08:46

## 2025-03-27 ASSESSMENT — PAIN DESCRIPTION - DESCRIPTORS: DESCRIPTORS: ACHING

## 2025-03-27 ASSESSMENT — PAIN DESCRIPTION - LOCATION: LOCATION: HEAD

## 2025-03-27 ASSESSMENT — PAIN DESCRIPTION - ORIENTATION: ORIENTATION: MID

## 2025-03-27 ASSESSMENT — PAIN SCALES - GENERAL: PAINLEVEL_OUTOF10: 6

## 2025-03-27 NOTE — PLAN OF CARE
Problem: Chronic Conditions and Co-morbidities  Goal: Patient's chronic conditions and co-morbidity symptoms are monitored and maintained or improved  3/26/2025 2306 by Nolvia Santacruz LPN  Outcome: Progressing  3/26/2025 1014 by Betty Gallagher RN  Outcome: Progressing  Flowsheets (Taken 3/26/2025 0815)  Care Plan - Patient's Chronic Conditions and Co-Morbidity Symptoms are Monitored and Maintained or Improved: Monitor and assess patient's chronic conditions and comorbid symptoms for stability, deterioration, or improvement     Problem: Discharge Planning  Goal: Discharge to home or other facility with appropriate resources  3/26/2025 2306 by Nolvia Santacruz LPN  Outcome: Progressing  3/26/2025 1014 by Betty Gallagher RN  Outcome: Progressing  Flowsheets (Taken 3/26/2025 0815)  Discharge to home or other facility with appropriate resources: Identify barriers to discharge with patient and caregiver     Problem: ABCDS Injury Assessment  Goal: Absence of physical injury  3/26/2025 2306 by Nolvia Santacruz LPN  Outcome: Progressing  3/26/2025 1014 by Betty Gallagher RN  Outcome: Progressing

## 2025-03-27 NOTE — PLAN OF CARE
Problem: Chronic Conditions and Co-morbidities  Goal: Patient's chronic conditions and co-morbidity symptoms are monitored and maintained or improved  Outcome: Progressing  Flowsheets (Taken 3/27/2025 0846)  Care Plan - Patient's Chronic Conditions and Co-Morbidity Symptoms are Monitored and Maintained or Improved: Monitor and assess patient's chronic conditions and comorbid symptoms for stability, deterioration, or improvement     Problem: Discharge Planning  Goal: Discharge to home or other facility with appropriate resources  Outcome: Progressing  Flowsheets (Taken 3/27/2025 0846)  Discharge to home or other facility with appropriate resources: Identify barriers to discharge with patient and caregiver     Problem: ABCDS Injury Assessment  Goal: Absence of physical injury  Outcome: Progressing

## 2025-03-27 NOTE — CONSULTS
INFECTIOUS DISEASES CONSULT NOTE    Patient:  Hema Gallagher 54 y.o. male  ROOM # [unfilled]  YOB: 1970  MRN: [id number]  CSN:  [id number]  Admit date: 3/24/2025   Admitting Physician: Beverly Neal MD  Primary Care Physician: Aletha Del Cid APRN - CNP  REFERRING PROVIDER: No ref. provider found    Reason for Consultation: \"Positive blood cultures, current AVF placed in November\"    History of Present Illness/Chief Complaint: Pleasant 54-year-old man.  He has been on dialysis for quite a few years.  He previously had had a permacath.  His current right arm AV fistula has been in place since last November.  He does hemodialysis at home.  He indicates on Friday he had noticed some swelling at his AV fistula site.  He indicates symptoms persisted through the weekend.  There has been some warmth present as well.  He was not having fever or chills.  He presented to the hospital on March 25.  He was admitted for further management.  He has received antibiotic treatment vancomycin.  He feels the situation with regard to the right fistula has stabilized.  He does not feel it has been any worse.  He has had multiple blood cultures (4 of 4 bottles) from the 25th demonstrating Staphylococcus epidermidis.  Additional blood cultures from today are currently pending.  Infectious disease asked to evaluate and offer recommendations.    Current Scheduled Medications:    vancomycin (VANCOCIN) intermittent dosing (placeholder)   Other RX Placeholder    sodium chloride flush  5-40 mL IntraVENous 2 times per day    heparin (porcine)  5,000 Units SubCUTAneous 3 times per day    NIFEdipine  60 mg Oral BID    simvastatin  10 mg Oral Daily    metoprolol tartrate  25 mg Oral BID    hydrALAZINE  25 mg Oral 3 times per day    cinacalcet  30 mg Oral Daily    calcitRIOL  0.5 mcg Oral BID    cefepime  500 mg IntraVENous Q24H     Current PRN Medications:  sodium chloride flush, sodium chloride, ondansetron **OR** ondansetron, 
Vascular Surgery Consultation    I was asked to see the patient for dialysis fistula/graft problems.    HPI    Hema Gallagher is a 55 yo AA man with history of ESRD, currently dialyzed at home through brachial axillary av graft.   He noted some warmth and a raised area around old suture distal upper arm, well away from cannulation zone. No drainage. No pain.      Current Medical History    Hema Gallagher is a 54 y.o. male with the following history reviewed and recorded in 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    Palliative care patient 01/25/2021    Erectile dysfunction due to arterial insufficiency     ESRD on hemodialysis ([facility]) 05/21/2020    Type 2 diabetes mellitus with chronic kidney disease on chronic dialysis, without long-term current use of insulin ([facility]) 01/29/2020    Essential hypertension 05/10/2017    Mild intermittent asthma without complication 05/10/2017    Family history of prostate cancer 05/10/2017    Morbid obesity due to excess calories 05/10/2017     Current Facility-Administered Medications   Medication Dose Route Frequency Provider Last Rate Last Admin    vancomycin (VANCOCIN) intermittent dosing (placeholder)   Other RX Placeholder Phillip Thao MD        sodium chloride flush 0.9 % injection 5-40 mL  5-40 mL IntraVENous 2 times per day Phillip Thao MD   10 mL at 03/26/25 2133    sodium chloride flush 0.9 % injection 5-40 mL  5-40 mL IntraVENous PRN Phillip Thao MD        0.9 % sodium chloride infusion   IntraVENous PRN Phillip Thao MD        heparin (porcine) injection 5,000 Units  5,000 Units SubCUTAneous 3 times per day Phillip Thao MD   5,000 Units at 03/25/25 2143    ondansetron (ZOFRAN-ODT) disintegrating tablet 4 mg  4 mg Oral Q8H PRN Phillip Thao MD        Or    ondansetron (ZOFRAN) injection 4 mg  4 
COLONOSCOPY N/A 12/19/2022    Dr Coello, Diffuse melanosis coli, mild diverticulosis left colon, Int hem Gr 1-2, inadequate-fair to poor prep, repeat 12-22-22    COLONOSCOPY N/A 12/22/2022    Dr Coello, W Ablation 5 sm 3-4 mm sessile benign appearing polyps in sig colon, int hem Gr 1, 3 year recall    DIALYSIS FISTULA CREATION Left 06/26/2020    LEFT BRACHIAL / CEPHALIC AV FISTULA performed by Unruly Oseguera MD at [facility] OR    DIALYSIS FISTULA CREATION Right 9/24/2024    RIGHT BRACHIAL CEPHALIC FISTULA CREATION performed by Unruly Oseguera MD at [facility] OR    TUNNELED VENOUS CATHETER PLACEMENT      [facility] - removal of first cath due to infection and placement of new one    TUNNELED VENOUS CATHETER PLACEMENT  01/2020    [facility]    VASCULAR SURGERY  11/11/2020    [facility]. Removal of tunneled dialysis catheter left internal jugular vein    VASCULAR SURGERY  07/26/2021    [facility]- Left upper extremity fistulogram's including venography the superior cava, Balloon angioplasty left proximal upper arm cephalic vein stenosis with 7 mm x 80 mm conquest, 7 mm x 100 mm Lutonix, 8 mm x 60 mm Lutonix drug-coated balloon, Completion venograms left upper extremity    VASCULAR SURGERY  08/10/2023    Left upper extremity fistulogram, venogram.  Balloon angioplasty of left cephalic vein arch using 6 x 150  balloon, 7 x 150 drug-coated balloon, stening using 8 x 10 Viabahn followed by 8 x 7.5 Viabahn stent; VI    VASCULAR SURGERY Left 8/16/2023    RIGHT IJ PERMACATH PLACEMENT; ANEURSYM REMOVAL AND FISTULAGRAM performed by Talisha Peoples DO at [facility] OR    VASCULAR SURGERY Left 7/30/2024    LEFT UPPER FISTULAGRAM, PERCUTANEOUS THROMBECTOMY, BALLOON ANGIOPLASTY performed by Unruly Oseguera MD at [facility] OR    VASCULAR SURGERY Right 11/26/2024    RIGHT BASILIC VEIN TRANSPOSITION  WITH PLACEMENT OF RIGHT ARTEGRAFT performed by Unruly Oseguera MD at [facility] OR     Home Medications:  Prior to Admission medications    Medication Sig Start 
fever or chills  Respiratory ROS: No cough, shortness of breath, wheezing  Cardiovascular ROS: No chest pain or palpitations  Gastrointestinal ROS: No abdominal pain or melena  Genito-Urinary ROS: No dysuria or hematuria  Musculoskeletal ROS: No joint pain or swelling   14 point ROS reviewed with the patient and negative except as noted above and in the HPI unless unable to obtain.    Objective:  Patient Vitals for the past 24 hrs:   BP Temp Pulse Resp SpO2 Weight   03/25/25 1035 (!) 143/70 -- 72 -- -- --   03/25/25 1023 (!) 148/65 -- 73 20 92 % --   03/25/25 1020 -- -- 72 19 100 % --   03/25/25 0800 (!) 116/56 -- 67 22 98 % --   03/25/25 0732 (!) 151/67 -- 74 19 96 % --   03/25/25 0702 137/65 -- 71 23 97 % --   03/25/25 0632 (!) 146/66 -- 74 22 97 % --   03/25/25 0602 (!) 143/43 -- 82 18 94 % --   03/25/25 0532 (!) 153/54 -- 80 23 94 % --   03/25/25 0524 -- -- 80 22 92 % --   03/25/25 0512 -- -- 84 26 (!) 83 % --   03/25/25 0502 138/62 -- 81 27 (!) 87 % --   03/25/25 0458 -- -- 84 12 96 % --   03/25/25 0431 138/75 -- 85 18 94 % --   03/25/25 0402 (!) 162/75 -- 83 17 91 % --   03/25/25 0344 (!) 142/60 -- -- -- 94 % --   03/25/25 0032 119/72 -- -- -- 95 % --   03/25/25 0012 -- -- -- -- 94 % --   03/25/25 0011 (!) 118/59 -- -- -- 92 % --   03/24/25 2145 (!) 116/51 97.5 °F (36.4 °C) 81 16 95 % 108.9 kg (240 lb)     No intake or output data in the 24 hours ending 03/25/25 1040  General: awake/alert   Chest:  clear to auscultation bilaterally  CVS: regular rate and rhythm  Abdominal: soft, nontender, normal bowel sounds  Extremities: no cyanosis or edema  Skin: warm and dry without rash      Labs:  BMP:   Recent Labs     03/25/25  0049      K 4.4   CL 95*   CO2 30*   BUN 67*   CREATININE 14.4*   CALCIUM 10.1*     CBC:   Recent Labs     03/25/25 0049   WBC 10.1   HGB 8.1*   HCT 24.6*   MCV 92.8        LIVER PROFILE:   Recent Labs     03/25/25 0049   AST 19   ALT 21   BILITOT 0.3   ALKPHOS 45     PT/INR: No

## 2025-03-27 NOTE — CARE COORDINATION
Case Management Assessment  Initial Evaluation    Date/Time of Evaluation: 3/27/2025 2:05 PM  Assessment Completed by: Matt Sadler    If patient is discharged prior to next notation, then this note serves as note for discharge by case management.    Patient Name: Hema Gallagher                   YOB: 1970  Diagnosis: Edema [R60.9]  COPD exacerbation (HCC) [J44.1]  End stage renal disease on dialysis (HCC) [N18.6, Z99.2]  AV fistula infection, initial encounter [T82.7XXA]  Hemodialysis catheter malfunction, initial encounter [T82.41XA]  Bacteremia [R78.81]                   Date / Time: 3/24/2025 11:54 PM    Patient Admission Status: Inpatient   Readmission Risk (Low < 19, Mod (19-27), High > 27): Readmission Risk Score: 17.1    Current PCP: Aletha Del Cid APRN - CNP  PCP verified by CM? Yes    Chart Reviewed: Yes      History Provided by: Patient  Patient Orientation: Alert and Oriented    Patient Cognition: Alert    Hospitalization in the last 30 days (Readmission):  No    If yes, Readmission Assessment in CM Navigator will be completed.    Advance Directives:      Code Status: Full Code   Patient's Primary Decision Maker is: Legal Next of Kin    Primary Decision Maker: Susanna Gallaghera - Child - [phone]    Supplemental (Other) Decision Maker: Nolvia Santacruz - Brother/Sister - [phone]    Discharge Planning:    Patient lives with: Alone Type of Home: House  Primary Care Giver: Self  Patient Support Systems include: Children, Family Members, Friends/Neighbors   Current Financial resources: None  Current community resources: None  Current services prior to admission: None            Current DME:              Type of Home Care services:  None    ADLS  Prior functional level: Independent in ADLs/IADLs  Current functional level: Independent in ADLs/IADLs    PT AM-PAC:   /24  OT AM-PAC:   /24    Family can provide assistance at DC: Yes  Would you like Case Management to discuss the discharge plan  none

## 2025-03-28 LAB
ALBUMIN SERPL-MCNC: 3.4 G/DL (ref 3.5–5.2)
ALP SERPL-CCNC: 43 U/L (ref 40–129)
ALT SERPL-CCNC: 21 U/L (ref 10–50)
ANION GAP SERPL CALCULATED.3IONS-SCNC: 14 MMOL/L (ref 8–16)
AST SERPL-CCNC: 12 U/L (ref 10–50)
BACTERIA BLD CULT ORG #2: ABNORMAL
BACTERIA BLD CULT ORG #2: ABNORMAL
BACTERIA BLD CULT: ABNORMAL
BACTERIA BLD CULT: ABNORMAL
BASOPHILS # BLD: 0 K/UL (ref 0–0.2)
BASOPHILS NFR BLD: 0.3 % (ref 0–1)
BILIRUB SERPL-MCNC: 0.2 MG/DL (ref 0.2–1.2)
BNP BLD-MCNC: 6009 PG/ML (ref 0–124)
BUN SERPL-MCNC: 57 MG/DL (ref 6–20)
CALCIUM SERPL-MCNC: 9.6 MG/DL (ref 8.6–10)
CHLORIDE SERPL-SCNC: 102 MMOL/L (ref 98–107)
CO2 SERPL-SCNC: 25 MMOL/L (ref 22–29)
CREAT SERPL-MCNC: 13.6 MG/DL (ref 0.7–1.2)
EOSINOPHIL # BLD: 0.3 K/UL (ref 0–0.6)
EOSINOPHIL NFR BLD: 4.8 % (ref 0–5)
ERYTHROCYTE [DISTWIDTH] IN BLOOD BY AUTOMATED COUNT: 13 % (ref 11.5–14.5)
GLUCOSE SERPL-MCNC: 153 MG/DL (ref 70–99)
HCT VFR BLD AUTO: 24.6 % (ref 42–52)
HGB BLD-MCNC: 7.7 G/DL (ref 14–18)
IMM GRANULOCYTES # BLD: 0 K/UL
LYMPHOCYTES # BLD: 1.6 K/UL (ref 1.1–4.5)
LYMPHOCYTES NFR BLD: 22.7 % (ref 20–40)
MCH RBC QN AUTO: 29.8 PG (ref 27–31)
MCHC RBC AUTO-ENTMCNC: 31.3 G/DL (ref 33–37)
MCV RBC AUTO: 95.3 FL (ref 80–94)
MONOCYTES # BLD: 0.8 K/UL (ref 0–0.9)
MONOCYTES NFR BLD: 11.8 % (ref 0–10)
NEUTROPHILS # BLD: 4.1 K/UL (ref 1.5–7.5)
NEUTS SEG NFR BLD: 59.8 % (ref 50–65)
ORGANISM: ABNORMAL
ORGANISM: ABNORMAL
PLATELET # BLD AUTO: 218 K/UL (ref 130–400)
PMV BLD AUTO: 9.9 FL (ref 9.4–12.4)
POTASSIUM SERPL-SCNC: 4.2 MMOL/L (ref 3.5–5)
PROT SERPL-MCNC: 6.3 G/DL (ref 6.4–8.3)
RBC # BLD AUTO: 2.58 M/UL (ref 4.7–6.1)
SODIUM SERPL-SCNC: 141 MMOL/L (ref 136–145)
VANCOMYCIN SERPL-MCNC: 22.7 UG/ML
WBC # BLD AUTO: 6.8 K/UL (ref 4.8–10.8)

## 2025-03-28 PROCEDURE — 6360000002 HC RX W HCPCS: Performed by: EMERGENCY MEDICINE

## 2025-03-28 PROCEDURE — 2580000003 HC RX 258: Performed by: INTERNAL MEDICINE

## 2025-03-28 PROCEDURE — 1200000000 HC SEMI PRIVATE

## 2025-03-28 PROCEDURE — 6370000000 HC RX 637 (ALT 250 FOR IP): Performed by: EMERGENCY MEDICINE

## 2025-03-28 PROCEDURE — 2580000003 HC RX 258: Performed by: EMERGENCY MEDICINE

## 2025-03-28 PROCEDURE — 83880 ASSAY OF NATRIURETIC PEPTIDE: CPT

## 2025-03-28 PROCEDURE — 94760 N-INVAS EAR/PLS OXIMETRY 1: CPT

## 2025-03-28 PROCEDURE — 80053 COMPREHEN METABOLIC PANEL: CPT

## 2025-03-28 PROCEDURE — 8010000000 HC HEMODIALYSIS ACUTE INPT

## 2025-03-28 PROCEDURE — 36415 COLL VENOUS BLD VENIPUNCTURE: CPT

## 2025-03-28 PROCEDURE — 80202 ASSAY OF VANCOMYCIN: CPT

## 2025-03-28 PROCEDURE — 85025 COMPLETE CBC W/AUTO DIFF WBC: CPT

## 2025-03-28 PROCEDURE — 6360000002 HC RX W HCPCS: Performed by: INTERNAL MEDICINE

## 2025-03-28 PROCEDURE — 2500000003 HC RX 250 WO HCPCS: Performed by: EMERGENCY MEDICINE

## 2025-03-28 RX ADMIN — METOPROLOL TARTRATE 25 MG: 25 TABLET, FILM COATED ORAL at 13:09

## 2025-03-28 RX ADMIN — NIFEDIPINE 60 MG: 30 TABLET, EXTENDED RELEASE ORAL at 17:53

## 2025-03-28 RX ADMIN — CALCITRIOL CAPSULES 0.25 MCG 0.5 MCG: 0.25 CAPSULE ORAL at 20:25

## 2025-03-28 RX ADMIN — CALCITRIOL CAPSULES 0.25 MCG 0.5 MCG: 0.25 CAPSULE ORAL at 13:09

## 2025-03-28 RX ADMIN — HEPARIN SODIUM 5000 UNITS: 5000 INJECTION INTRAVENOUS; SUBCUTANEOUS at 06:02

## 2025-03-28 RX ADMIN — VANCOMYCIN HYDROCHLORIDE 1000 MG: 1 INJECTION, POWDER, LYOPHILIZED, FOR SOLUTION INTRAVENOUS at 20:35

## 2025-03-28 RX ADMIN — CINACALCET HYDROCHLORIDE 30 MG: 30 TABLET, FILM COATED ORAL at 13:09

## 2025-03-28 RX ADMIN — HYDRALAZINE HYDROCHLORIDE 25 MG: 25 TABLET ORAL at 20:25

## 2025-03-28 RX ADMIN — SODIUM CHLORIDE, PRESERVATIVE FREE 10 ML: 5 INJECTION INTRAVENOUS at 13:12

## 2025-03-28 RX ADMIN — SODIUM CHLORIDE, PRESERVATIVE FREE 10 ML: 5 INJECTION INTRAVENOUS at 20:25

## 2025-03-28 RX ADMIN — HYDRALAZINE HYDROCHLORIDE 25 MG: 25 TABLET ORAL at 17:53

## 2025-03-28 RX ADMIN — HYDRALAZINE HYDROCHLORIDE 25 MG: 25 TABLET ORAL at 06:02

## 2025-03-28 RX ADMIN — METOPROLOL TARTRATE 25 MG: 25 TABLET, FILM COATED ORAL at 20:24

## 2025-03-28 RX ADMIN — NIFEDIPINE 60 MG: 30 TABLET, EXTENDED RELEASE ORAL at 20:24

## 2025-03-28 RX ADMIN — HEPARIN SODIUM 5000 UNITS: 5000 INJECTION INTRAVENOUS; SUBCUTANEOUS at 20:25

## 2025-03-28 RX ADMIN — CEFEPIME 500 MG: 1 INJECTION, POWDER, FOR SOLUTION INTRAMUSCULAR; INTRAVENOUS at 16:22

## 2025-03-28 ASSESSMENT — PAIN SCALES - GENERAL: PAINLEVEL_OUTOF10: 0

## 2025-03-28 NOTE — CARE COORDINATION
Spoke with pt at bedside about HD.  Pt does home Hemo at this time.  Depending on vascular intervention and need for antibiotics, pt may have to switch to in-center temporarily.  Pt likes early morning appts and transportation not an issues.  Zahra mooney [facility] is aware.  Will follow  Electronically signed by Nalini Reynolds RN on 3/28/2025 at 3:24 PM

## 2025-03-29 LAB
ALBUMIN SERPL-MCNC: 3.4 G/DL (ref 3.5–5.2)
ALP SERPL-CCNC: 45 U/L (ref 40–129)
ALT SERPL-CCNC: 18 U/L (ref 10–50)
ANION GAP SERPL CALCULATED.3IONS-SCNC: 12 MMOL/L (ref 8–16)
AST SERPL-CCNC: 12 U/L (ref 10–50)
BASOPHILS # BLD: 0 K/UL (ref 0–0.2)
BASOPHILS NFR BLD: 0.4 % (ref 0–1)
BILIRUB SERPL-MCNC: <0.2 MG/DL (ref 0.2–1.2)
BUN SERPL-MCNC: 36 MG/DL (ref 6–20)
CALCIUM SERPL-MCNC: 9.3 MG/DL (ref 8.6–10)
CHLORIDE SERPL-SCNC: 102 MMOL/L (ref 98–107)
CO2 SERPL-SCNC: 25 MMOL/L (ref 22–29)
CREAT SERPL-MCNC: 9 MG/DL (ref 0.7–1.2)
EOSINOPHIL # BLD: 0.3 K/UL (ref 0–0.6)
EOSINOPHIL NFR BLD: 4.3 % (ref 0–5)
ERYTHROCYTE [DISTWIDTH] IN BLOOD BY AUTOMATED COUNT: 13.1 % (ref 11.5–14.5)
GLUCOSE SERPL-MCNC: 121 MG/DL (ref 70–99)
HCT VFR BLD AUTO: 25.4 % (ref 42–52)
HGB BLD-MCNC: 8.2 G/DL (ref 14–18)
IMM GRANULOCYTES # BLD: 0.1 K/UL
LYMPHOCYTES # BLD: 1.7 K/UL (ref 1.1–4.5)
LYMPHOCYTES NFR BLD: 21.8 % (ref 20–40)
MCH RBC QN AUTO: 30.5 PG (ref 27–31)
MCHC RBC AUTO-ENTMCNC: 32.3 G/DL (ref 33–37)
MCV RBC AUTO: 94.4 FL (ref 80–94)
MONOCYTES # BLD: 1 K/UL (ref 0–0.9)
MONOCYTES NFR BLD: 12.7 % (ref 0–10)
NEUTROPHILS # BLD: 4.8 K/UL (ref 1.5–7.5)
NEUTS SEG NFR BLD: 59.9 % (ref 50–65)
PLATELET # BLD AUTO: 223 K/UL (ref 130–400)
PMV BLD AUTO: 9.2 FL (ref 9.4–12.4)
POTASSIUM SERPL-SCNC: 4.5 MMOL/L (ref 3.5–5)
PROT SERPL-MCNC: 6.3 G/DL (ref 6.4–8.3)
RBC # BLD AUTO: 2.69 M/UL (ref 4.7–6.1)
SODIUM SERPL-SCNC: 139 MMOL/L (ref 136–145)
WBC # BLD AUTO: 8 K/UL (ref 4.8–10.8)

## 2025-03-29 PROCEDURE — 94760 N-INVAS EAR/PLS OXIMETRY 1: CPT

## 2025-03-29 PROCEDURE — 6370000000 HC RX 637 (ALT 250 FOR IP): Performed by: EMERGENCY MEDICINE

## 2025-03-29 PROCEDURE — 2580000003 HC RX 258: Performed by: EMERGENCY MEDICINE

## 2025-03-29 PROCEDURE — 80053 COMPREHEN METABOLIC PANEL: CPT

## 2025-03-29 PROCEDURE — 2500000003 HC RX 250 WO HCPCS: Performed by: EMERGENCY MEDICINE

## 2025-03-29 PROCEDURE — 1200000000 HC SEMI PRIVATE

## 2025-03-29 PROCEDURE — 6370000000 HC RX 637 (ALT 250 FOR IP)

## 2025-03-29 PROCEDURE — 87205 SMEAR GRAM STAIN: CPT

## 2025-03-29 PROCEDURE — 87070 CULTURE OTHR SPECIMN AEROBIC: CPT

## 2025-03-29 PROCEDURE — 36415 COLL VENOUS BLD VENIPUNCTURE: CPT

## 2025-03-29 PROCEDURE — 85025 COMPLETE CBC W/AUTO DIFF WBC: CPT

## 2025-03-29 PROCEDURE — 6360000002 HC RX W HCPCS: Performed by: EMERGENCY MEDICINE

## 2025-03-29 RX ORDER — HYDROCODONE BITARTRATE AND ACETAMINOPHEN 5; 325 MG/1; MG/1
1 TABLET ORAL EVERY 6 HOURS PRN
Status: DISCONTINUED | OUTPATIENT
Start: 2025-03-29 | End: 2025-03-31 | Stop reason: HOSPADM

## 2025-03-29 RX ADMIN — METOPROLOL TARTRATE 25 MG: 25 TABLET, FILM COATED ORAL at 08:44

## 2025-03-29 RX ADMIN — HYDROCODONE BITARTRATE AND ACETAMINOPHEN 1 TABLET: 5; 325 TABLET ORAL at 10:52

## 2025-03-29 RX ADMIN — METOPROLOL TARTRATE 25 MG: 25 TABLET, FILM COATED ORAL at 19:48

## 2025-03-29 RX ADMIN — NIFEDIPINE 60 MG: 30 TABLET, EXTENDED RELEASE ORAL at 08:44

## 2025-03-29 RX ADMIN — CALCITRIOL CAPSULES 0.25 MCG 0.5 MCG: 0.25 CAPSULE ORAL at 19:48

## 2025-03-29 RX ADMIN — HEPARIN SODIUM 5000 UNITS: 5000 INJECTION INTRAVENOUS; SUBCUTANEOUS at 05:22

## 2025-03-29 RX ADMIN — SODIUM CHLORIDE, PRESERVATIVE FREE 10 ML: 5 INJECTION INTRAVENOUS at 08:44

## 2025-03-29 RX ADMIN — HYDRALAZINE HYDROCHLORIDE 25 MG: 25 TABLET ORAL at 20:48

## 2025-03-29 RX ADMIN — HEPARIN SODIUM 5000 UNITS: 5000 INJECTION INTRAVENOUS; SUBCUTANEOUS at 14:31

## 2025-03-29 RX ADMIN — CEFEPIME 500 MG: 1 INJECTION, POWDER, FOR SOLUTION INTRAMUSCULAR; INTRAVENOUS at 14:38

## 2025-03-29 RX ADMIN — HEPARIN SODIUM 5000 UNITS: 5000 INJECTION INTRAVENOUS; SUBCUTANEOUS at 20:48

## 2025-03-29 RX ADMIN — CALCITRIOL CAPSULES 0.25 MCG 0.5 MCG: 0.25 CAPSULE ORAL at 08:44

## 2025-03-29 RX ADMIN — NIFEDIPINE 60 MG: 30 TABLET, EXTENDED RELEASE ORAL at 19:48

## 2025-03-29 RX ADMIN — CINACALCET HYDROCHLORIDE 30 MG: 30 TABLET, FILM COATED ORAL at 08:44

## 2025-03-29 RX ADMIN — HYDRALAZINE HYDROCHLORIDE 25 MG: 25 TABLET ORAL at 05:22

## 2025-03-29 ASSESSMENT — PAIN SCALES - GENERAL
PAINLEVEL_OUTOF10: 8
PAINLEVEL_OUTOF10: 3

## 2025-03-29 ASSESSMENT — PAIN DESCRIPTION - ORIENTATION: ORIENTATION: RIGHT

## 2025-03-29 ASSESSMENT — PAIN DESCRIPTION - DESCRIPTORS: DESCRIPTORS: THROBBING;STABBING

## 2025-03-29 ASSESSMENT — PAIN DESCRIPTION - LOCATION: LOCATION: ARM

## 2025-03-29 ASSESSMENT — PAIN - FUNCTIONAL ASSESSMENT: PAIN_FUNCTIONAL_ASSESSMENT: PREVENTS OR INTERFERES WITH ALL ACTIVE AND SOME PASSIVE ACTIVITIES

## 2025-03-29 NOTE — PLAN OF CARE
Problem: Chronic Conditions and Co-morbidities  Goal: Patient's chronic conditions and co-morbidity symptoms are monitored and maintained or improved  3/28/2025 2247 by Jo Abarca LPN  Outcome: Progressing  Flowsheets (Taken 3/28/2025 2243)  Care Plan - Patient's Chronic Conditions and Co-Morbidity Symptoms are Monitored and Maintained or Improved: Monitor and assess patient's chronic conditions and comorbid symptoms for stability, deterioration, or improvement  3/28/2025 1430 by Missy Sherwood, RN  Outcome: Progressing     Problem: ABCDS Injury Assessment  Goal: Absence of physical injury  3/28/2025 2247 by Jo Abarca LPN  Outcome: Progressing  Flowsheets (Taken 3/28/2025 2246)  Absence of Physical Injury: Implement safety measures based on patient assessment  3/28/2025 1430 by Missy Sherwood, RN  Outcome: Progressing

## 2025-03-30 LAB
ALBUMIN SERPL-MCNC: 3.5 G/DL (ref 3.5–5.2)
ALP SERPL-CCNC: 55 U/L (ref 40–129)
ALT SERPL-CCNC: 19 U/L (ref 10–50)
ANION GAP SERPL CALCULATED.3IONS-SCNC: 14 MMOL/L (ref 8–16)
AST SERPL-CCNC: 15 U/L (ref 10–50)
BASOPHILS # BLD: 0 K/UL (ref 0–0.2)
BASOPHILS NFR BLD: 0.5 % (ref 0–1)
BILIRUB SERPL-MCNC: <0.2 MG/DL (ref 0.2–1.2)
BUN SERPL-MCNC: 50 MG/DL (ref 6–20)
CALCIUM SERPL-MCNC: 9.5 MG/DL (ref 8.6–10)
CHLORIDE SERPL-SCNC: 101 MMOL/L (ref 98–107)
CO2 SERPL-SCNC: 23 MMOL/L (ref 22–29)
CREAT SERPL-MCNC: 11.8 MG/DL (ref 0.7–1.2)
EOSINOPHIL # BLD: 0.3 K/UL (ref 0–0.6)
EOSINOPHIL NFR BLD: 4.1 % (ref 0–5)
ERYTHROCYTE [DISTWIDTH] IN BLOOD BY AUTOMATED COUNT: 13.1 % (ref 11.5–14.5)
GLUCOSE SERPL-MCNC: 112 MG/DL (ref 70–99)
HCT VFR BLD AUTO: 26.6 % (ref 42–52)
HGB BLD-MCNC: 8.2 G/DL (ref 14–18)
IMM GRANULOCYTES # BLD: 0.1 K/UL
LYMPHOCYTES # BLD: 1.6 K/UL (ref 1.1–4.5)
LYMPHOCYTES NFR BLD: 21.5 % (ref 20–40)
MCH RBC QN AUTO: 29.9 PG (ref 27–31)
MCHC RBC AUTO-ENTMCNC: 30.8 G/DL (ref 33–37)
MCV RBC AUTO: 97.1 FL (ref 80–94)
MONOCYTES # BLD: 0.9 K/UL (ref 0–0.9)
MONOCYTES NFR BLD: 11.3 % (ref 0–10)
NEUTROPHILS # BLD: 4.7 K/UL (ref 1.5–7.5)
NEUTS SEG NFR BLD: 61.9 % (ref 50–65)
PLATELET # BLD AUTO: 226 K/UL (ref 130–400)
PMV BLD AUTO: 9.5 FL (ref 9.4–12.4)
POTASSIUM SERPL-SCNC: 4.5 MMOL/L (ref 3.5–5)
PROT SERPL-MCNC: 6.5 G/DL (ref 6.4–8.3)
RBC # BLD AUTO: 2.74 M/UL (ref 4.7–6.1)
SODIUM SERPL-SCNC: 138 MMOL/L (ref 136–145)
WBC # BLD AUTO: 7.6 K/UL (ref 4.8–10.8)

## 2025-03-30 PROCEDURE — 2500000003 HC RX 250 WO HCPCS: Performed by: EMERGENCY MEDICINE

## 2025-03-30 PROCEDURE — 36415 COLL VENOUS BLD VENIPUNCTURE: CPT

## 2025-03-30 PROCEDURE — 94760 N-INVAS EAR/PLS OXIMETRY 1: CPT

## 2025-03-30 PROCEDURE — 85025 COMPLETE CBC W/AUTO DIFF WBC: CPT

## 2025-03-30 PROCEDURE — 6370000000 HC RX 637 (ALT 250 FOR IP): Performed by: EMERGENCY MEDICINE

## 2025-03-30 PROCEDURE — 6360000002 HC RX W HCPCS: Performed by: EMERGENCY MEDICINE

## 2025-03-30 PROCEDURE — 1200000000 HC SEMI PRIVATE

## 2025-03-30 PROCEDURE — 80053 COMPREHEN METABOLIC PANEL: CPT

## 2025-03-30 RX ADMIN — NIFEDIPINE 60 MG: 30 TABLET, EXTENDED RELEASE ORAL at 21:41

## 2025-03-30 RX ADMIN — HEPARIN SODIUM 5000 UNITS: 5000 INJECTION INTRAVENOUS; SUBCUTANEOUS at 14:37

## 2025-03-30 RX ADMIN — CINACALCET HYDROCHLORIDE 30 MG: 30 TABLET, FILM COATED ORAL at 07:57

## 2025-03-30 RX ADMIN — NIFEDIPINE 60 MG: 30 TABLET, EXTENDED RELEASE ORAL at 07:57

## 2025-03-30 RX ADMIN — CALCITRIOL CAPSULES 0.25 MCG 0.5 MCG: 0.25 CAPSULE ORAL at 21:42

## 2025-03-30 RX ADMIN — HYDRALAZINE HYDROCHLORIDE 25 MG: 25 TABLET ORAL at 05:17

## 2025-03-30 RX ADMIN — METOPROLOL TARTRATE 25 MG: 25 TABLET, FILM COATED ORAL at 21:41

## 2025-03-30 RX ADMIN — HYDRALAZINE HYDROCHLORIDE 25 MG: 25 TABLET ORAL at 14:37

## 2025-03-30 RX ADMIN — METOPROLOL TARTRATE 25 MG: 25 TABLET, FILM COATED ORAL at 07:57

## 2025-03-30 RX ADMIN — HEPARIN SODIUM 5000 UNITS: 5000 INJECTION INTRAVENOUS; SUBCUTANEOUS at 05:17

## 2025-03-30 RX ADMIN — HEPARIN SODIUM 5000 UNITS: 5000 INJECTION INTRAVENOUS; SUBCUTANEOUS at 21:42

## 2025-03-30 RX ADMIN — HYDRALAZINE HYDROCHLORIDE 25 MG: 25 TABLET ORAL at 21:42

## 2025-03-30 RX ADMIN — SODIUM CHLORIDE, PRESERVATIVE FREE 10 ML: 5 INJECTION INTRAVENOUS at 21:42

## 2025-03-30 RX ADMIN — SODIUM CHLORIDE, PRESERVATIVE FREE 10 ML: 5 INJECTION INTRAVENOUS at 07:57

## 2025-03-30 RX ADMIN — CALCITRIOL CAPSULES 0.25 MCG 0.5 MCG: 0.25 CAPSULE ORAL at 07:57

## 2025-03-30 ASSESSMENT — PAIN DESCRIPTION - DESCRIPTORS: DESCRIPTORS: THROBBING

## 2025-03-30 ASSESSMENT — PAIN DESCRIPTION - LOCATION: LOCATION: ARM

## 2025-03-30 ASSESSMENT — PAIN SCALES - GENERAL: PAINLEVEL_OUTOF10: 3

## 2025-03-30 ASSESSMENT — PAIN DESCRIPTION - ORIENTATION: ORIENTATION: RIGHT

## 2025-03-30 NOTE — PLAN OF CARE
Problem: Chronic Conditions and Co-morbidities  Goal: Patient's chronic conditions and co-morbidity symptoms are monitored and maintained or improved  3/29/2025 2206 by Jo Abarca LPN  Outcome: Progressing  Flowsheets (Taken 3/29/2025 1948)  Care Plan - Patient's Chronic Conditions and Co-Morbidity Symptoms are Monitored and Maintained or Improved: Monitor and assess patient's chronic conditions and comorbid symptoms for stability, deterioration, or improvement  3/29/2025 1607 by Trang Palomares, RN  Outcome: Progressing  Flowsheets (Taken 3/29/2025 0844)  Care Plan - Patient's Chronic Conditions and Co-Morbidity Symptoms are Monitored and Maintained or Improved: Monitor and assess patient's chronic conditions and comorbid symptoms for stability, deterioration, or improvement     Problem: ABCDS Injury Assessment  Goal: Absence of physical injury  3/29/2025 2206 by Jo Abarca LPN  Outcome: Progressing  Flowsheets (Taken 3/29/2025 2205)  Absence of Physical Injury: Implement safety measures based on patient assessment  3/29/2025 1607 by Trang Palomares, RN  Outcome: Progressing

## 2025-03-31 ENCOUNTER — OUTSIDE FACILITY SERVICE (OUTPATIENT)
Age: 55
End: 2025-03-31

## 2025-03-31 VITALS
DIASTOLIC BLOOD PRESSURE: 69 MMHG | HEART RATE: 67 BPM | HEIGHT: 67 IN | BODY MASS INDEX: 38.75 KG/M2 | OXYGEN SATURATION: 97 % | TEMPERATURE: 97.3 F | WEIGHT: 246.91 LBS | SYSTOLIC BLOOD PRESSURE: 165 MMHG | RESPIRATION RATE: 18 BRPM

## 2025-03-31 LAB
ALBUMIN SERPL-MCNC: 3.6 G/DL (ref 3.5–5.2)
ALP SERPL-CCNC: 46 U/L (ref 40–129)
ALT SERPL-CCNC: 25 U/L (ref 10–50)
ANION GAP SERPL CALCULATED.3IONS-SCNC: 18 MMOL/L (ref 8–16)
AST SERPL-CCNC: 18 U/L (ref 10–50)
BASOPHILS # BLD: 0 K/UL (ref 0–0.2)
BASOPHILS NFR BLD: 0.3 % (ref 0–1)
BILIRUB SERPL-MCNC: 0.2 MG/DL (ref 0.2–1.2)
BNP BLD-MCNC: 8109 PG/ML (ref 0–124)
BUN SERPL-MCNC: 66 MG/DL (ref 6–20)
CALCIUM SERPL-MCNC: 9.6 MG/DL (ref 8.6–10)
CHLORIDE SERPL-SCNC: 100 MMOL/L (ref 98–107)
CO2 SERPL-SCNC: 21 MMOL/L (ref 22–29)
CREAT SERPL-MCNC: 15.1 MG/DL (ref 0.7–1.2)
EOSINOPHIL # BLD: 0.3 K/UL (ref 0–0.6)
EOSINOPHIL NFR BLD: 3.2 % (ref 0–5)
ERYTHROCYTE [DISTWIDTH] IN BLOOD BY AUTOMATED COUNT: 13.2 % (ref 11.5–14.5)
GLUCOSE SERPL-MCNC: 183 MG/DL (ref 70–99)
HCT VFR BLD AUTO: 25.2 % (ref 42–52)
HGB BLD-MCNC: 8.1 G/DL (ref 14–18)
IMM GRANULOCYTES # BLD: 0.1 K/UL
LYMPHOCYTES # BLD: 1.9 K/UL (ref 1.1–4.5)
LYMPHOCYTES NFR BLD: 21.2 % (ref 20–40)
MCH RBC QN AUTO: 30.2 PG (ref 27–31)
MCHC RBC AUTO-ENTMCNC: 32.1 G/DL (ref 33–37)
MCV RBC AUTO: 94 FL (ref 80–94)
MONOCYTES # BLD: 0.8 K/UL (ref 0–0.9)
MONOCYTES NFR BLD: 9.2 % (ref 0–10)
NEUTROPHILS # BLD: 5.9 K/UL (ref 1.5–7.5)
NEUTS SEG NFR BLD: 65.4 % (ref 50–65)
PLATELET # BLD AUTO: 220 K/UL (ref 130–400)
PMV BLD AUTO: 8.5 FL (ref 9.4–12.4)
POTASSIUM SERPL-SCNC: 4.2 MMOL/L (ref 3.5–5)
PROT SERPL-MCNC: 6.8 G/DL (ref 6.4–8.3)
RBC # BLD AUTO: 2.68 M/UL (ref 4.7–6.1)
SODIUM SERPL-SCNC: 139 MMOL/L (ref 136–145)
VANCOMYCIN SERPL-MCNC: 23.2 UG/ML
WBC # BLD AUTO: 9 K/UL (ref 4.8–10.8)

## 2025-03-31 PROCEDURE — 85025 COMPLETE CBC W/AUTO DIFF WBC: CPT

## 2025-03-31 PROCEDURE — 94640 AIRWAY INHALATION TREATMENT: CPT

## 2025-03-31 PROCEDURE — 6360000002 HC RX W HCPCS: Performed by: STUDENT IN AN ORGANIZED HEALTH CARE EDUCATION/TRAINING PROGRAM

## 2025-03-31 PROCEDURE — 80053 COMPREHEN METABOLIC PANEL: CPT

## 2025-03-31 PROCEDURE — 80202 ASSAY OF VANCOMYCIN: CPT

## 2025-03-31 PROCEDURE — 94760 N-INVAS EAR/PLS OXIMETRY 1: CPT

## 2025-03-31 PROCEDURE — 6360000002 HC RX W HCPCS: Performed by: EMERGENCY MEDICINE

## 2025-03-31 PROCEDURE — 36415 COLL VENOUS BLD VENIPUNCTURE: CPT

## 2025-03-31 PROCEDURE — 8010000000 HC HEMODIALYSIS ACUTE INPT

## 2025-03-31 PROCEDURE — 6370000000 HC RX 637 (ALT 250 FOR IP): Performed by: EMERGENCY MEDICINE

## 2025-03-31 PROCEDURE — 83880 ASSAY OF NATRIURETIC PEPTIDE: CPT

## 2025-03-31 RX ORDER — LINEZOLID 600 MG/1
600 TABLET, FILM COATED ORAL 2 TIMES DAILY
Qty: 28 TABLET | Refills: 0 | Status: SHIPPED | OUTPATIENT
Start: 2025-03-31 | End: 2025-04-14

## 2025-03-31 RX ORDER — ALBUTEROL SULFATE 0.83 MG/ML
2.5 SOLUTION RESPIRATORY (INHALATION) ONCE
Status: COMPLETED | OUTPATIENT
Start: 2025-03-31 | End: 2025-03-31

## 2025-03-31 RX ADMIN — ALBUTEROL SULFATE 2.5 MG: 2.5 SOLUTION RESPIRATORY (INHALATION) at 06:58

## 2025-03-31 RX ADMIN — HYDRALAZINE HYDROCHLORIDE 25 MG: 25 TABLET ORAL at 05:33

## 2025-03-31 RX ADMIN — HEPARIN SODIUM 5000 UNITS: 5000 INJECTION INTRAVENOUS; SUBCUTANEOUS at 05:33

## 2025-03-31 ASSESSMENT — PAIN SCALES - GENERAL: PAINLEVEL_OUTOF10: 0

## 2025-03-31 NOTE — PROGRESS NOTES
[facility]      Patient:  Hema Gallagher  YOB: 1970  Date of Service: 3/26/2025  MRN: [id number]   Acct: [id number]   Primary Care Physician: Aletha Del Cid APRN - CNP  Advance Directive: Full Code  Admit Date: 3/24/2025       Hospital Day: 0  Portions of this note have been copied forward, however, changed to reflect the most current clinical status of this patient.  CHIEF COMPLAINT Right arm swelling    SUBJECTIVE:  Patient resting in the bed, no complaints at this time.  Reports swelling in his arm has improved.  Denies drainage from area.      CUMULATIVE HOSPITAL COURSE:  The patient is a 54-year-old male with a significant past medical history of asthma, congestive heart failure, type 2 diabetes, hypertension, hyperlipidemia, end-stage renal disease on home hemodialysis, and obesity.  Patient presented to the ED with the right arm AV fistula site.  Patient reported he noticed the swelling approximately 3 days ago, which she initially attributed to fluid accumulation.  He stated that the swelling fibrillary improved after dialysis but reoccurrence within a few hours.  Patient denied fever, chills or nausea or vomiting.  He reports the area was tender yesterday but has improved this a.m. admission of antibiotics.  Patient initially resistant, over the past 2 to 3 weeks, noted productive of clear some shortness of breath with exertion.  Patient denies chest pain, lower extremity pain, vomiting or diarrhea.    Vascular surgery consulted, pending recommendation.  Nephrology consult, no emergent need for dialysis, awaiting vascular evaluation and clearance prior to next treatment if possible.    Blood cultures times 2, noted positive gram +, Gram positive cocci in clusters, resembling Staphylococcus, continue to monitor for sensitivity. Infectious disease consulted for evaluation and recommendation.  Repeat blood cultures in the a.m.  Nephrology to manage dialysis, continue to monitor CMP 
  [facility]      Patient:  Hema Gallagher  YOB: 1970  Date of Service: 3/27/2025  MRN: [id number]   Acct: [id number]   Primary Care Physician: Aletha Del Cid APRN - CNP  Advance Directive: Full Code  Admit Date: 3/24/2025       Hospital Day: 1  Portions of this note have been copied forward, however, changed to reflect the most current clinical status of this patient.  CHIEF COMPLAINT Right arm swelling    SUBJECTIVE:  Patient resting in the bed, no complaints at this time.  Reports swelling in his arm has improved, but remains swollen.  Denies drainage from area.      CUMULATIVE HOSPITAL COURSE:  The patient is a 54-year-old male with a significant past medical history of asthma, congestive heart failure, type 2 diabetes, hypertension, hyperlipidemia, end-stage renal disease on home hemodialysis, and obesity.  Patient presented to the ED with the right arm AV fistula site.  Patient reported he noticed the swelling approximately 3 days ago, which she initially attributed to fluid accumulation.  He stated that the swelling fibrillary improved after dialysis but reoccurrence within a few hours.  Patient denied fever, chills or nausea or vomiting.  He reports the area was tender yesterday but has improved this a.m. admission of antibiotics.  Patient initially resistant, over the past 2 to 3 weeks, noted productive of clear some shortness of breath with exertion.  Patient denies chest pain, lower extremity pain, vomiting or diarrhea.    Vascular surgery consulted, pending recommendation.  Nephrology consult, no emergent need for dialysis, awaiting vascular evaluation and clearance prior to next treatment if possible.    Blood cultures times 2, noted positive gram +, Gram positive cocci in clusters, resembling Staphylococcus, continue to monitor for sensitivity. Infectious disease consulted for evaluation and recommendation.  Repeat blood cultures in the a.m.  Nephrology to manage dialysis, continue 
  [facility]      Patient:  Hema Gallagher  YOB: 1970  Date of Service: 3/25/2025  MRN: [id number]   Acct: [id number]   Primary Care Physician: Aletha Del Cid APRN - CNP  Advance Directive: Full Code  Admit Date: 3/24/2025       Hospital Day: 0  Portions of this note have been copied forward, however, changed to reflect the most current clinical status of this patient.  CHIEF COMPLAINT Right arm swelling    SUBJECTIVE:  Patient resting in the bed, no complaints at this time.  Reports swelling in his arm has improved.  Denies drainage from area.      CUMULATIVE HOSPITAL COURSE:  The patient is a 54-year-old male with a significant past medical history of asthma, congestive heart failure, type 2 diabetes, hypertension, hyperlipidemia, end-stage renal disease on home hemodialysis, and obesity.  Patient presented to the ED with the right arm AV fistula site.  Patient reported he noticed the swelling approximately 3 days ago, which she initially attributed to fluid accumulation.  He stated that the swelling fibrillary improved after dialysis but reoccurrence within a few hours.  Patient denied fever, chills or nausea or vomiting.  He reports the area was tender yesterday but has improved this a.m. admission of antibiotics.  Patient initially resistant, over the past 2 to 3 weeks, noted productive of clear some shortness of breath with exertion.  Patient denies chest pain, lower extremity pain, vomiting or diarrhea.    Vascular surgery consulted, pending recommendation.  Nephrology consult, no emergent need for dialysis, awaiting vascular evaluation and clearance prior to next treatment if possible.      Review of Systems:   Review of Systems   Constitutional:  Negative for activity change, appetite change and fatigue.   Respiratory:  Positive for shortness of breath (With exertion).    Gastrointestinal:  Negative for abdominal distention, abdominal pain, diarrhea, nausea and vomiting. 
  [facility]      Patient:  Hema Gallagher  YOB: 1970  Date of Service: 3/29/2025  MRN: [id number]   Acct: [id number]   Primary Care Physician: Aletha Del Cid APRN - CNP  Advance Directive: Full Code  Admit Date: 3/24/2025       Hospital Day: 3  Portions of this note have been copied forward, however, changed to reflect the most current clinical status of this patient.  CHIEF COMPLAINT Right arm swelling    SUBJECTIVE:  Patient resting on the edge of the bed. Continues to have swelling at AVF site.       CUMULATIVE HOSPITAL COURSE:  The patient is a 54-year-old male with a significant past medical history of asthma, congestive heart failure, type 2 diabetes, hypertension, hyperlipidemia, end-stage renal disease on home hemodialysis, and obesity.  Patient presented to the ED with the right arm AV fistula site.  Patient reported he noticed the swelling approximately 3 days ago, which she initially attributed to fluid accumulation.  He stated that the swelling fibrillary improved after dialysis but reoccurrence within a few hours.  Patient denied fever, chills or nausea or vomiting.  He reports the area was tender yesterday but has improved this a.m. admission of antibiotics.  Patient initially resistant, over the past 2 to 3 weeks, noted productive of clear some shortness of breath with exertion.  Patient denies chest pain, lower extremity pain, vomiting or diarrhea.    Vascular surgery consulted, pending recommendation.  Nephrology consult, no emergent need for dialysis, awaiting vascular evaluation and clearance prior to next treatment if possible.    Blood cultures times 2, noted positive gram +, Gram positive cocci in clusters, resembling Staphylococcus, continue to monitor for sensitivity. Infectious disease consulted for evaluation and recommendation.  Repeat blood cultures in the a.m.  Nephrology to manage dialysis, continue to monitor CMP daily, plan for treatment today.  ECHO rule out 
  Palliative Care Progress Note  3/27/2025 12:29 PM    Patient:  Hema Gallagher  YOB: 1970  Primary Care Physician: Aletha Del Cid APRN - CNP  Advance Directive: Full Code  Admit Date: 3/24/2025       Hospital Day: 1  Portions of this note have been copied forward, however, changed to reflect the most current clinical status of this patient.    CHIEF COMPLAINT/REASON FOR CONSULTATION Goals of care, family support, Code status, symptom management     SUBJECTIVE:  Hema has no new complaints. He is alert and interactive.    HPI:  The patient is a 54 y.o. male with PMH ESRD on HD at home, s/p renal transplant 2020, chronic diastolic CHF, HTN, DM II, HLD who presented to [facility] ED on 03/24/2025 complaining of swelling, warmth to RUE fistula site that started after home dialysis w/ additional concern for dyspnea worsened on exertion, productive cough that started 2-3 weeks prior.CXR reported mild cardiomegaly w/ central vascular congestion. He's reported as afebrile w/o leukocytosis. Creatinine 14.4, BNP 3,488, troponin 109 and 106 on repeat. Respiratory viral panel unremarkable. Blood cultures pending.He was started on Cefepime and Vancomycin with consults placed to Nephrology and Vascular surgery. Palliative care has been consulted for chronic debility.     Review of Systems:   14 point review of systems is negative except as specifically addressed above.    Objective:   VITALS:  BP (!) 149/57   Pulse 80   Temp 97.1 °F (36.2 °C) (Oral)   Resp 18   Ht 1.702 m (5' 7\")   Wt 108.9 kg (240 lb)   SpO2 98%   BMI 37.59 kg/m²   24HR INTAKE/OUTPUT:    Intake/Output Summary (Last 24 hours) at 3/27/2025 1229  Last data filed at 3/26/2025 1637  Gross per 24 hour   Intake 500 ml   Output 3000 ml   Net -2500 ml       General appearance: alert, appears stated age, cooperative and no distress, sitting comfortably in bed  Head: Normocephalic, without obvious abnormality, atraumatic  Eyes: conjunctivae/corneas clear. 
4 Eyes Skin Assessment     NAME:  Hema Gallagher  YOB: 1970  MEDICAL RECORD NUMBER:  [id number]    The patient is being assessed for  Admission    I agree that at least one RN has performed a thorough Head to Toe Skin Assessment on the patient. ALL assessment sites listed below have been assessed.      Areas assessed by both nurses:    Head, Face, Ears, Shoulders, Back, Chest, Arms, Elbows, Hands, Sacrum. Buttock, Coccyx, Ischium, and Legs. Feet and Heels        Does the Patient have a Wound? No noted wound(s)       Vitaliy Prevention initiated by RN: No  Wound Care Orders initiated by RN: No    Pressure Injury (Stage 3,4, Unstageable, DTI, NWPT, and Complex wounds) if present, place Wound referral order by RN under : No    New Ostomies, if present place, Ostomy referral order under : No     Nurse 1 eSignature: Electronically signed by Yulisa Cotter RN on 3/25/25 at 1:00 PM CDT    **SHARE this note so that the co-signing nurse can place an eSignature**    Nurse 2 eSignature: Electronically signed by Edna Walton RN on 3/25/25 at 1:42 PM CDT   
CLINICAL PHARMACY NOTE: MEDS TO BEDS    Total # of Prescriptions Filled: 0   The following medications were delivered to the patient:  Discharge Medication List as of 3/31/2025  1:32 PM        START taking these medications    Details   linezolid (ZYVOX) 600 MG tablet Take 1 tablet by mouth 2 times daily for 14 days, Disp-28 tablet, R-0Normal               Additional Documentation:    Patient had already left when the script came to us and I called and spoke with the patient and he said he would like for it to be transferred to [facility].   Transferred 1 script out.   
Hema Gallagher arrived to room # 333.   Presented with: dialysis access malfunction  Mental Status: Patient is oriented, alert, and coherent.   Vitals:    03/25/25 1225   BP:    Pulse:    Resp:    Temp: 97.7 °F (36.5 °C)   SpO2:      Patient safety contract and falls prevention contract reviewed with patient Yes.  Oriented Patient to room.  Call light within reach. Yes.  Needs, issues or concerns expressed at this time: no.      Electronically signed by Yulisa Cotter RN on 3/25/2025 at 12:59 PM   
Infectious Diseases Progress Note    Patient:  Hema Gallagher  YOB: 1970  MRN: [id number]   Admit date: 3/24/2025   Admitting Physician: Beverly Neal MD  Primary Care Physician: Aletha Del Cid APRN - CNP    Chief Complaint/Interval History: He is without fever.  He is without chills.  He is on dialysis currently.  He had had some hematoma drained from the region of the right arm fistula.  His cultures have remained no growth.  He is tolerating vancomycin without side effect.  He typically does home hemodialysis 4 days/week.  Culture results reviewed.  See updated results outlined below.    In/Out  No intake or output data in the 24 hours ending 03/31/25 0910  Allergies:   Allergies   Allergen Reactions    Banana Anaphylaxis, Shortness Of Breath and Swelling    Atorvastatin Other (See Comments)     Chest pain, no muslce pains elsewhere    Lisinopril Other (See Comments)     Cough     Current Meds: HYDROcodone-acetaminophen (NORCO) 5-325 MG per tablet 1 tablet, Q6H PRN  vancomycin (VANCOCIN) intermittent dosing (placeholder), RX Placeholder  sodium chloride flush 0.9 % injection 5-40 mL, 2 times per day  sodium chloride flush 0.9 % injection 5-40 mL, PRN  0.9 % sodium chloride infusion, PRN  heparin (porcine) injection 5,000 Units, 3 times per day  ondansetron (ZOFRAN-ODT) disintegrating tablet 4 mg, Q8H PRN   Or  ondansetron (ZOFRAN) injection 4 mg, Q6H PRN  polyethylene glycol (GLYCOLAX) packet 17 g, Daily PRN  acetaminophen (TYLENOL) tablet 650 mg, Q6H PRN   Or  acetaminophen (TYLENOL) suppository 650 mg, Q6H PRN  NIFEdipine (PROCARDIA XL) extended release tablet 60 mg, BID  simvastatin (ZOCOR) tablet 10 mg (Patient Supplied), Daily  metoprolol tartrate (LOPRESSOR) tablet 25 mg, BID  hydrALAZINE (APRESOLINE) tablet 25 mg, 3 times per day  cinacalcet (SENSIPAR) tablet 30 mg, Daily  calcitRIOL (ROCALTROL) capsule 0.5 mcg, BID      Review of Systems see HPI    VitalSigns:  BP (!) 87/42   Pulse 61   
[facility]   Pharmacy Pharmacokinetic Monitoring Service - Vancomycin    Consulting Provider: Dr Thao/Dr Juarez   Indication: AV fistula infection /Bloodstream infection staph epi   Target Concentration: Pre-Dialysis Concentration 15-20 mg/L  Day of Therapy: 7  Additional Antimicrobials: Cefepime    Pertinent Laboratory Values:   Wt Readings from Last 1 Encounters:   03/29/25 112 kg (246 lb 14.6 oz)     Temp Readings from Last 1 Encounters:   03/31/25 97.3 °F (36.3 °C) (Temporal)     Recent Labs     03/30/25  0230 03/31/25  0407   CREATININE 11.8* 15.1*   BUN 50* 66*   WBC 7.6 9.0     Procalcitonin:     Pertinent Cultures:  Culture Date Source Results   03/29/25 03/27/25 Wound  Blood No organisms seen  No growth   03/25/25 Blood Staph epidermidis   MRSA Nasal Swab: N/A. Non-respiratory infection.    Recent vancomycin administrations                     vancomycin (VANCOCIN) 1,000 mg in sodium chloride 0.9 % 250 mL IVPB ([id number]) (mg) 1,000 mg New Bag 03/28/25 2035                    Assessment:  Date/Time Current Dose Concentration Dialysis Session   03/31/25 @ 0407 1000 mg 23.2 MWF     Plan:  Concentration-guided dosing due to renal impairment and intermittent hemodialysis   Current dosing regimen of 1000 mg is supra-therapeutic   Decrease dose to 750 mg  Vancomycin concentration ordered for 04/02/25 @ 0500, prior to HD session   Pharmacy will continue to monitor patient and adjust therapy as indicated    Thank you for the consult,YEISON Dougherty [facility]  3/31/2025 11:26 AM    
[facility]   Pharmacy Pharmacokinetic Monitoring Service - Vancomycin    Consulting Provider: Dr Thao/Dr Juarez    Indication: Bloodstream Staph Epi  Target Concentration: Pre-Dialysis Concentration 15-20 mg/L  Day of Therapy: 4  Additional Antimicrobials: Cefeoime    Pertinent Laboratory Values:   Wt Readings from Last 1 Encounters:   03/27/25 108.9 kg (240 lb)     Temp Readings from Last 1 Encounters:   03/28/25 98.6 °F (37 °C) (Oral)     Recent Labs     03/27/25  0310 03/28/25  0353   CREATININE 11.0* 13.6*   BUN 41* 57*   WBC 7.6 6.8     Procalcitonin:     Pertinent Cultures:  Culture Date Source Results   03/27/25 Blood x 2  NGTD   03/25/25 Blood  Staphylococcus EPI - MRSE   03/25/25 Blood ID Molecular Staph Epi  Staph species  Methicillin Resistant - so MRSE      MRSA Nasal Swab: N/A. Non-respiratory infection.      Recent vancomycin administrations                     vancomycin (VANCOCIN) 1,000 mg in sodium chloride 0.9 % 250 mL IVPB ([id number]) (mg) 1,000 mg New Bag 03/26/25 1721                    Assessment:  Date/Time Current Dose Concentration Dialysis Session   03/28/25 @ 0353 1000 mg 22.7 MWF     Plan:  Concentration-guided dosing due to renal impairment and intermittent hemodialysis   Current dosing regimen of 1000 mg  is  is slightly supratherpeutic, and guidebook would have me decrease dose bey 250mg however since this is Friday dose & next HD Monday.  I will redose again today with 1000 mg    Vancomycin concentration ordered for 03/31/25 @ 0400, prior to HD session   Pharmacy will continue to monitor patient and adjust therapy as indicated    Thank you for the consult,YEISON Dougherty [facility]  3/28/2025 1:33 PM    
[facility]   Pharmacy Pharmacokinetic Monitoring Service - Vancomycin    Consulting Provider: Dr Conner   Indication: AV fistula infection  Target Concentration: Pre-Dialysis Concentration 15-20 mg/L  Day of Therapy: 2  Additional Antimicrobials: Cefepime    Pertinent Laboratory Values:   Wt Readings from Last 1 Encounters:   03/24/25 108.9 kg (240 lb)     Temp Readings from Last 1 Encounters:   03/26/25 99 °F (37.2 °C)     Estimated Creatinine Clearance: 6 mL/min (A) (based on SCr of 17.1 mg/dL (H)).  Recent Labs     03/25/25  0049 03/26/25  0342 03/26/25  0343   CREATININE 14.4*  --  17.1*   BUN 67*  --  79*   WBC 10.1 8.1  --      Procalcitonin:     Pertinent Cultures:  Culture Date Source Results   03/25/25 Blood Gram positive cocci in clusters   resembling Staphylococcus    03/25/25 Blood ID Molecular Staph Epi  Staph species  Methicillin Resistant - so MRSE   MRSA Nasal Swab: N/A. Non-respiratory infection.    Recent vancomycin administrations                     vancomycin (VANCOCIN) 2,500 mg in sodium chloride 0.9 % 500 mL IVPB (mg) 2,500 mg New Bag 03/25/25 0433                      Plan:  He had dialysis today, I rescheduled 1gram dose to be given 1800 tonight  Pharmacy will continue to monitor patient and adjust therapy as indicated  Repeat vancomycin concentration ordered for 03/28/25 @ 0400     Thank you for the consult,  Juan José Dougherty RPH  3/26/2025 4:55 PM    
[facility]   Pharmacy Pharmacokinetic Monitoring Service - Vancomycin     Hema Gallagher is a 54 y.o. male starting on vancomycin therapy for AV fistula infection . Pharmacy consulted by Dr. Conner for monitoring and adjustment.    Target Concentration:  Pre-Dialysis Concentration 15-20 mg/L    Additional Antimicrobials: None    Pertinent Laboratory Values:   Wt Readings from Last 1 Encounters:   03/24/25 108.9 kg (240 lb)     Temp Readings from Last 1 Encounters:   03/24/25 97.5 °F (36.4 °C)     Estimated Creatinine Clearance: 7 mL/min (A) (based on SCr of 14.4 mg/dL (H)).  Recent Labs     03/25/25  0049   CREATININE 14.4*   BUN 67*   WBC 10.1     Procalcitonin: None    Pertinent Cultures:  None    Plan:  Concentration-guided dosing due to renal impairment/insufficiency  Start vancomycin 2500 mg IV once, watch for dialysis schedule   Renal labs as indicated   Pharmacy will continue to monitor patient and adjust therapy as indicated    Thank you for the consult,  ZEINAB AVINA RPH  3/25/2025 4:09 AM   
Nephrology ([facility]) Consult Note      Patient:  Hema Gallagher  YOB: 1970  Date of Service: 3/29/2025  MRN: [id number]   Acct: [id number]   Primary Care Physician: Aletha Del Cid APRN - CNP  Advance Directive: Full Code  Admit Date: 3/24/2025       Hospital Day: 3  Referring Provider: Beverly Neal MD    Patient independently seen and examined, Chart, Consults, Notes, Operative notes, Labs, Cardiology, and Radiology studies reviewed as available.        Subjective:  Hema Gallagher is a 54 y.o. male for whom we were consulted for evaluation and treatment of end-stage renal disease on home hemodialysis.  He Is on hemodialysis and typically self cannulates his AV fistula for dialysis at home.  He has a history of type II diabetic nephropathy, hypertension, congestive heart failure and anemia of chronic kidney disease.  He was admitted for evaluation of right arm swelling which is his fistula side.  Dialysis completed 3/24 without issue aside from the edema.  Had some occasional cough but denied other complaints upon questioning.  Tolerating diet.  Completed treatments without issue.  Seen with family.    Today, no overnight events.  Reviewed most recent vascular/ID evaluation.  Patient denied new chest pain, shortness of air at rest, nausea or vomiting.  Patient up ambulating without issue today        Allergies:  Banana, Atorvastatin, and Lisinopril    Medicines:  Current Facility-Administered Medications   Medication Dose Route Frequency Provider Last Rate Last Admin    vancomycin (VANCOCIN) intermittent dosing (placeholder)   Other RX Placeholder Phillip Thao MD        sodium chloride flush 0.9 % injection 5-40 mL  5-40 mL IntraVENous 2 times per day Phillip Thao MD   10 mL at 03/29/25 0844    sodium chloride flush 0.9 % injection 5-40 mL  5-40 mL IntraVENous PRN Phillip Thao MD        0.9 % sodium chloride infusion   IntraVENous PRN Phillip Thao 
Nephrology ([facility]) Consult Note      Patient:  Hema Gallagher  YOB: 1970  Date of Service: 3/27/2025  MRN: [id number]   Acct: [id number]   Primary Care Physician: Aletha Del Cid APRN - CNP  Advance Directive: Full Code  Admit Date: 3/24/2025       Hospital Day: 1  Referring Provider: Beverly Neal MD    Patient independently seen and examined, Chart, Consults, Notes, Operative notes, Labs, Cardiology, and Radiology studies reviewed as available.        Subjective:  Hema Gallagher is a 54 y.o. male for whom we were consulted for evaluation and treatment of end-stage renal disease on home hemodialysis.  He Is on hemodialysis and typically self cannulates his AV fistula for dialysis at home.  He has a history of type II diabetic nephropathy, hypertension, congestive heart failure and anemia of chronic kidney disease.  He was admitted for evaluation of right arm swelling which is his fistula side.  Dialysis completed 3/24 without issue aside from the edema.  Had some occasional cough but denied other complaints upon questioning.  Tolerating diet.  Completed treatments without issue.  Seen with family.    Today, no overnight events.  Reviewed vascular evaluation.  Patient denies new chest pain, shortness of air at rest, nausea or vomiting.        Allergies:  Banana, Atorvastatin, and Lisinopril    Medicines:  Current Facility-Administered Medications   Medication Dose Route Frequency Provider Last Rate Last Admin    vancomycin (VANCOCIN) intermittent dosing (placeholder)   Other RX Placeholder Phillip Thao MD        sodium chloride flush 0.9 % injection 5-40 mL  5-40 mL IntraVENous 2 times per day Phillip Thao MD   10 mL at 03/27/25 0846    sodium chloride flush 0.9 % injection 5-40 mL  5-40 mL IntraVENous PRN Phillip Thao MD        0.9 % sodium chloride infusion   IntraVENous PRN Phillip Thao MD        heparin (porcine) injection 5,000 Units  5,000 
Nephrology ([facility]) progress Note      Patient:  Hema Gallagher  YOB: 1970  Date of Service: 3/31/2025  MRN: [id number]   Acct: [id number]   Primary Care Physician: Aletha Del Cid APRN - CNP  Advance Directive: Full Code  Admit Date: 3/24/2025       Hospital Day: 5  Referring Provider: Beverly Neal MD    Patient independently seen and examined, Chart, Consults, Notes, Operative notes, Labs, Cardiology, and Radiology studies reviewed as available.        Subjective:  Hema Gallagher is a 54 y.o. male for whom we were consulted for evaluation and treatment of end-stage renal disease on home hemodialysis.  He Is on hemodialysis and typically self cannulates his AV fistula for dialysis at home.  He has a history of type II diabetic nephropathy, hypertension, congestive heart failure and anemia of chronic kidney disease.  He was admitted for evaluation of right arm swelling which is his fistula side.  Dialysis completed 3/24 without issue aside from the edema.  Had some occasional cough but denied other complaints upon questioning.  Tolerating diet.  Completed treatments without issue.  Seen with family.    Today, no overnight events.  Reviewed most recent vascular evaluation.  Patient denied new chest pain, shortness of air at rest, nausea or vomiting.     Dialysis   Pt was seen on RRT  Modality: Hemodialysis  Access: Arterial Venous Fistula  Location: right upper  QB: 450  QD: 700  UF: 2.5 liters      Allergies:  Banana, Atorvastatin, and Lisinopril    Medicines:  Current Facility-Administered Medications   Medication Dose Route Frequency Provider Last Rate Last Admin    HYDROcodone-acetaminophen (NORCO) 5-325 MG per tablet 1 tablet  1 tablet Oral Q6H PRN Day, SOSA Bangura CNP   1 tablet at 03/29/25 1052    vancomycin (VANCOCIN) intermittent dosing (placeholder)   Other RX Placeholder Phillip Thao MD        sodium chloride flush 0.9 % injection 5-40 mL  5-40 mL 
Nephrology ([facility]) Consult Note      Patient:  Hema Gallagher  YOB: 1970  Date of Service: 3/28/2025  MRN: [id number]   Acct: [id number]   Primary Care Physician: Aletha Del Cid APRN - CNP  Advance Directive: Full Code  Admit Date: 3/24/2025       Hospital Day: 2  Referring Provider: Beverly Neal MD    Patient independently seen and examined, Chart, Consults, Notes, Operative notes, Labs, Cardiology, and Radiology studies reviewed as available.        Subjective:  Hema Gallagher is a 54 y.o. male for whom we were consulted for evaluation and treatment of end-stage renal disease on home hemodialysis.  He Is on hemodialysis and typically self cannulates his AV fistula for dialysis at home.  He has a history of type II diabetic nephropathy, hypertension, congestive heart failure and anemia of chronic kidney disease.  He was admitted for evaluation of right arm swelling which is his fistula side.  Dialysis completed 3/24 without issue aside from the edema.  Had some occasional cough but denied other complaints upon questioning.  Tolerating diet.  Completed treatments without issue.  Seen with family.    Today, no overnight events.  Reviewed vascular/ID evaluation.  Patient denied new chest pain, shortness of air at rest, nausea or vomiting.    Dialysis   Pt was seen on renal replacement therapy and I have personally seen and evaluated the patient and directed the therapy  Modality: Hemodialysis  Access: Arterial Venous Fistula  Location: right upper  QB: 450  QD: 700  UF: 860      Allergies:  Banana, Atorvastatin, and Lisinopril    Medicines:  Current Facility-Administered Medications   Medication Dose Route Frequency Provider Last Rate Last Admin    vancomycin (VANCOCIN) intermittent dosing (placeholder)   Other RX Placeholder Phillip Thao MD        sodium chloride flush 0.9 % injection 5-40 mL  5-40 mL IntraVENous 2 times per day Phillip Thao MD   10 mL at 
Patient left AMA at 1220.  
Patient wanted to leave AMA. NP notified.AMA forms signed.  
Physical Therapy  Name: Hema Gallagher  MRN:  [id number]  Date of service:  3/30/2025    Trang SALAZAR, states pt is up ad ernesto in room. Will defer PT order.    Electronically signed by Michelle Delgado PT on 3/30/2025 at 3:21 PM      
Physical Therapy  Name: Hema Gallagher  MRN:  [id number]  Date of service:  3/28/2025    Missy SALAZAR, states pt is up ad ernesto in room, but he is in HD at this time. Will f/u at a later time.    Electronically signed by Michelle Delgado PT on 3/28/2025 at 9:04 AM      
Vascular Surgery -  KRISTY CASTRO MD M.D.   Daily Progress Note    Pt Name: Hema Gallagher  Medical Record Number: [id number]  Date of Birth 1970       Chief Complaint:  Chief Complaint   Patient presents with    Vascular Access Problem     Swelling to right arm fistula x3 days       SUBJECTIVE:     No pain in the arm.  He does say that the spot in the distal upper arm away from puncture site appears to be a little more swollen.    OBJECTIVE:     Patient Vitals for the past 24 hrs:   BP Temp Temp src Pulse Resp SpO2   03/30/25 1014 -- -- -- -- -- 97 %   03/30/25 0744 (!) 150/61 97 °F (36.1 °C) Temporal 76 18 97 %   03/30/25 0451 139/61 97.7 °F (36.5 °C) Oral 75 20 96 %   03/29/25 1924 (!) 163/53 97.9 °F (36.6 °C) Oral 72 18 99 %   03/29/25 1736 127/61 97 °F (36.1 °C) Temporal 69 18 99 %   03/29/25 1430 (!) 130/59 -- -- 66 -- --   03/29/25 1122 -- -- -- -- 16 --   03/29/25 1052 -- -- -- -- 16 --       No intake or output data in the 24 hours ending 03/30/25 1046    No intake/output data recorded.    No intake/output data recorded.         Wt Readings from Last 3 Encounters:   03/29/25 112 kg (246 lb 14.6 oz)   03/14/25 112.5 kg (248 lb)   03/14/25 114.8 kg (253 lb)        Body mass index is 38.67 kg/m².     Diet: ADULT DIET; Regular; Low Sodium (2 gm); 1000 ml        MEDS:     Scheduled Meds:   vancomycin (VANCOCIN) intermittent dosing (placeholder)   Other RX Placeholder    sodium chloride flush  5-40 mL IntraVENous 2 times per day    heparin (porcine)  5,000 Units SubCUTAneous 3 times per day    NIFEdipine  60 mg Oral BID    simvastatin  10 mg Oral Daily    metoprolol tartrate  25 mg Oral BID    hydrALAZINE  25 mg Oral 3 times per day    cinacalcet  30 mg Oral Daily    calcitRIOL  0.5 mcg Oral BID    cefepime  500 mg IntraVENous Q24H     Continuous Infusions:   sodium chloride       PRN Meds:HYDROcodone 5 mg - acetaminophen, 1 tablet, Q6H PRN  sodium chloride flush, 5-40 mL, PRN  sodium chloride, , 
flush 0.9 % injection 5-40 mL  5-40 mL IntraVENous PRN Phillip Thao MD        0.9 % sodium chloride infusion   IntraVENous PRN Phillip Thao MD        heparin (porcine) injection 5,000 Units  5,000 Units SubCUTAneous 3 times per day Phillip Thao MD   5,000 Units at 03/30/25 0517    ondansetron (ZOFRAN-ODT) disintegrating tablet 4 mg  4 mg Oral Q8H PRN Phillip Thao MD        Or    ondansetron (ZOFRAN) injection 4 mg  4 mg IntraVENous Q6H PRN Phillip Thao MD        polyethylene glycol (GLYCOLAX) packet 17 g  17 g Oral Daily PRN Phillip Thao MD        acetaminophen (TYLENOL) tablet 650 mg  650 mg Oral Q6H PRN Phillip Thao MD   650 mg at 03/27/25 1738    Or    acetaminophen (TYLENOL) suppository 650 mg  650 mg Rectal Q6H PRN Phillip Thao MD        NIFEdipine (PROCARDIA XL) extended release tablet 60 mg  60 mg Oral BID Phillip Thao MD   60 mg at 03/30/25 0757    simvastatin (ZOCOR) tablet 10 mg (Patient Supplied)  10 mg Oral Daily Phillip Thao MD        metoprolol tartrate (LOPRESSOR) tablet 25 mg  25 mg Oral BID Phillip Thao MD   25 mg at 03/30/25 0757    hydrALAZINE (APRESOLINE) tablet 25 mg  25 mg Oral 3 times per day Phillip Thao MD   25 mg at 03/30/25 0517    cinacalcet (SENSIPAR) tablet 30 mg  30 mg Oral Daily Phillip Thao MD   30 mg at 03/30/25 0757    calcitRIOL (ROCALTROL) capsule 0.5 mcg  0.5 mcg Oral BID Phillip Thao MD   0.5 mcg at 03/30/25 0757    cefepime (MAXIPIME) 500 mg in sodium chloride 0.9 % 50 mL IVPB  500 mg IntraVENous Q24H Phillip Thao MD   Stopped at 03/29/25 1655       Past Medical History:  Past Medical History:   Diagnosis Date    Asthma     CHF (congestive heart failure) (HCC)     Diabetes mellitus (HCC)     Erectile dysfunction     Hemodialysis patient     monday, tuesday, thursday, and friday at home. hd    History of blood transfusion     Hyperlipidemia     Hypertension     Obesity     Palliative care 
insulin (HCC)    ESRD on hemodialysis (HCC)    Erectile dysfunction due to arterial insufficiency    Palliative care patient    AV fistula thrombosis    Hemodialysis catheter malfunction, initial encounter    Bacteremia               PLAN:     Continue IV abx with dialysis  I told him I would like to keep in hospital a couple of more days  Risk of discharge too soon could be catastrophic with hemorrhage and possible death if it happens at home.                     
Bacteremia  Suspected Dialysis Catheter Site Infection:  - Continue current antibiotic therapy of vancomycin and cefepime  - Vascular surgery consulted, plan for OR 3/28/2025 for I&D, possible removal of AVF  - Vascular US duplex upper extremity arteries right, completed, Good flow of the right upper extremity. Patent brachial axillary AV graft.   -   - Blood cultures noted gram + staphylococcus, continue to monitor  - Infectious disease consultation- following  - Repeat blood cultures NGTD  - ECHO without valvular vegetation noted, reviewed    End-Stage Renal Disease:  -Consult nephrologist for management of hemodialysis     History of asthma:  - Does not appear to be in exacerbation for now, however, may have some volume overload  - Serial nebulizers ordered     CHF:  - Monitor  -- proBNP 3,488, repeat q3days    Generalizability:  - Consult palliative care  - Consult PT/OT for functional status evaluation     Productive of clear sputum, and he has experienced shortness of breath with exertion  -- Respiratory pan negative    DVT prophylaxis- SCDs    Antibiotic: Cefepime and Vancomycin    SOSA Martin - CNP, 3/28/2025 8:15 AM  
nephrologist for management of hemodialysis     History of asthma:  - Does not appear to be in exacerbation for now, however, may have some volume overload  - Serial nebulizers ordered     CHF:  - Monitor  -- proBNP 3,488, repeat q3days, 6,009  -- HD per Nephrology  -- Fluid restriction    Generalizability:  - Consult palliative care  - Consult PT/OT for functional status evaluation     Productive of clear sputum, and he has experienced shortness of breath with exertion  -- Respiratory pan negative    DVT prophylaxis- SCDs    Antibiotic: Cefepime discontinued , continue Vancomycin    SOSA Martin - CNP, 3/30/2025 7:47 AM  
awaiting vascular evaluation    Thank you for the consult, we appreciate the opportunity to provide care to your patients.  Feel free to contact me if I can be of any further assistance.      Juan Vegas MD  03/26/25  10:09 AM

## 2025-03-31 NOTE — CARE COORDINATION
03/31/25 1528   IMM Letter   IMM Letter given to Patient/Family/Significant other/Guardian/POA/by: not given Left AMA     Electronically signed by Nalini Reynolds RN on 3/31/2025 at 3:28 PM

## 2025-03-31 NOTE — DISCHARGE SUMMARY
[facility]    Discharge Summary      Hema Gallagher  :  1970  MRN:  [id number]    Admit date:  3/24/2025  Discharge date:    3/31/2025    Discharging Physician:  Dr. Neal    Advance Directive: Full Code    Consults: ID, nephrology, and vascular surgery    Primary Care Physician:  Aletha Del Cid, APRN - CNP    Discharge Diagnoses:  Principal Problem:    Bacteremia  Active Problems:    Palliative care patient    Hemodialysis catheter malfunction, initial encounter  Resolved Problems:    * No resolved hospital problems. *      Portions of this note have been copied forward, however, changed to reflect the most current clinical status of this patient.    Hospital Course:    This patient is a 54-year-old male with PMH CHF, T2DM, hypertension, hyperlipidemia, ESRD on dialysis who presented to [facility] ED on 3/24 for evaluation of RUE AV fistula site swelling x 3 days.  He was admitted to hospitalist and placed on empiric antibiotics with vancomycin and cefepime.  Initial blood cultures grew staph epi, sensitive to Vanc.  ID consulted, initially recommended long-term antibiotics with vancomycin x 4 weeks.  Repeat cultures have shown no growth.  Vascular surgery was consulted, s/p bedside I&D on 3/29 with sanguinous drainage, sent for culture which has shown no growth.  Vascular surgery recommended continued admission with antibiotics due to risk of graft blowout with true infection. Nephrology has followed throughout admission for management of dialysis.  His lab work and vitals remained stable.  After dialysis today, 3/31, patient is requesting to leave AGAINST MEDICAL ADVICE.  He was advised that we were waiting for final ID recommendations and still wishes to leave.  Will go ahead and send prescription for linezolid to pharmacy.  He will need to follow-up with his PCP, nephrology, vascular surgery, and ID on discharge.    Significant Diagnostic Studies:   Vascular duplex upper extremity arteries

## 2025-03-31 NOTE — CARE COORDINATION
Pt left AMA- IMM not given.  Electronically signed by Nalini Reynolds RN on 3/31/2025 at 3:28 PM

## 2025-03-31 NOTE — DIALYSIS
[facility]IENT SERVICES  DIALYSIS TREATMENT SUMMARY      Note: Consult with the attending physician for patient treatment orders, this document is not a physician order.      Patient Information   Patient Hema Gallagher   Date of Birth November 15, 1970   Chart Number [id number]   Location [facility] MRN [id number]   Gender Male   SSN (last 4) 1922     Treatment Information   Treatment Type Hemodialysis   Treatment Id [id number]   Start Time March 31, 2025 08:32   End Time March 31, 2025 11:30   Acutal Duration 02:58     Treatment Balances   Total Saline Administered 500   Net Fluid Balance 2100    Hemodialysis Orders   Therapy Standard   Orders Verified Time 03/31/2025 08:10    Date Verified 03/31/2025   Duration 3:30   Isolated UF/Bypass No   BFR (mL) 450   DFR (mL) 700   Dialyzer Type OPTIFLUX 160NR   UF Order UF Goal Ordered   UF Goal Ordered (mL) 2500   Crit-Line used No   Heparin Initial Units Bolus No   Heparin IV Maintenance Bolus No   Heparin IV Infusion No   Potassium (mEq/L) 3   Calcium (mEq/L) 2.5   Bicarb (mg/dL) 35   Sodium (mEq/L) 138   Clinician Alta Brown RN    Dialysis Access   Access Type AV Access   AV Access   Access Location Upper Arm - R   Needle 15g   Bruit Yes   Thrill Yes      Vitals   Pre-Treatment Vitals   Time Is BP being recorded? Pre BP Map BP Method Pulse RR Temp How was Weight Obtained? Pre Weight Previous Dry Weight Previous Post Weight Metric Target Fluid Removal (mL) Dialysate Confirmed Clinician    03/31/2025 08:24 BP/Map 186/72 (110) Noninvasive 91 16 97.8 How Obtained: Reported Floor Weight 112 106.5  Kgs 3000 Yes Anne Nash    Comments: pre-vitals      Post Treatment Vitals   Time Is BP being recorded? BP Map Pulse RR Temp How was Weight Obtained? Post Weight Metric BVP UF Goal Ordered NSS Given Intra-Procedure Total Machine UF Removed (mL) Crit-Line Ending Profile Crit-Line Refill Crit-Line Ending HCT Crit-Line Max BV% Clinician  
 [facility] IN[facility]  DIALYSIS TREATMENT SUMMARY      Note: Consult with the attending physician for patient treatment orders, this document is not a physician order.      Patient Information   Patient Hema Gallagher   Date of Birth November 15, 1970   Chart Number [id number]   Location [facility] MRN [id number]   Gender Male   SSN (last 4) 1922     Treatment Information   Treatment Type Hemodialysis   Treatment Id [id number]   Start Time March 28, 2025 08:08   End Time March 28, 2025 11:38   Acutal Duration 03:30     Treatment Balances   Total Saline Administered 500   Net Fluid Balance 2500    Hemodialysis Orders   Therapy Standard   Orders Verified Time 03/28/2025 07:00    Date Verified 03/28/2025   Duration 3:30   Isolated UF/Bypass No   BFR (mL) 400   DFR (mL) 700   Dialyzer Type OPTIFLUX 160NR   UF Order UF Range   UF Range (mL) 1500 - 2500   With BP Parameters Yes   As Tolerated Yes   Crit-Line used No   Heparin Initial Units Bolus No   Heparin IV Maintenance Bolus No   Heparin IV Infusion No   Potassium (mEq/L) 2.0   Calcium (mEq/L) 2.5   Bicarb (mg/dL) 33   Sodium (mEq/L) 140   Clinician Ruby Gaines, RN    Dialysis Access   Access Type AV Access   AV Access   Access Location Upper Arm - R   Needle 15g   Bruit Yes   Thrill Yes      Vitals   Pre-Treatment Vitals   Time Is BP being recorded? Pre BP Map BP Method Pulse RR Temp How was Weight Obtained? Pre Weight Previous Dry Weight Previous Post Weight Metric Target Fluid Removal (mL) Dialysate Confirmed Clinician    03/28/2025 08:01 BP/Map 141/71 (94) Noninvasive 71 16 97.8 How Obtained: Reported Floor Weight 108.9 106  Kgs 3000 Yes Anne Nash    Comments: pre-vitals      Post Treatment Vitals   Time Is BP being recorded? BP Map Pulse RR Temp How was Weight Obtained? Post Weight Metric BVP UF Goal Ordered NSS Given Intra-Procedure Total Machine UF Removed (mL) Crit-Line Ending Profile Crit-Line Refill Crit-Line Ending 
  03/26/2025 16:36 BP/Map 114/62 (79) 57 16 97.7 How Obtained: Reported Floor Weight 106.4 Kgs 84.4 2500 0 3000     Anne Nash    Comments: Pt tolerated treatment well, returned to floor via transport in stable condition.      Safety checks include: access uncovered and secured, Hemaclip secured for all central line access, machine checks performed, and alarm limits confirmed.     Labs   Hepatitis   HBsAG Lab Result HBsAG Lab Result HBsAG Draw Date Transient Antigenemia(MD Diagnosis Only) Anti-HBs Lab Result Anti-HBs Lab Value Anti-HBs Draw Date Documented By Documented Date Hepatitis Status Hepatitis Status   Negative  07/01/2024  Positive  07/01/2024 Anne Nash 03/26/2025  Immune   Notes:       Pre-Treatment Hepatitis Precautions Hepatitis Information Entered By Alta Brown RN Signed By Alta Brown RN   Hard copy verified by nurse and placed in patient’s hospital chart Yes Signing      Pre-Treatment Handoff   Staff Report Received Yes   Report Given by Primary Nurse Betty Gallagher   Time 12:00     Patient Arrival   Patient ID Verified Date of Birth    Full Name   Patient Consent to treatment verified Yes   Blood Transfusion Consent Verified N/A     Treatment Comments   Treatment Notes Pt tolerated treatment well, returned to floor via transport in stable condition.     Post-Treatment Handoff   Report Given to Primary Nurse Betty Gallagher   Time Report Given 16:40   Report Given By Alta Brown RN    Machine Validation   Time 11:58   Date 3/26/2025   Auto Alarm Test Passed Yes   Machine Serial # [id number]   Portable RO    RO Serial#    Residual Bleach Negative Yes   Was a manufactured mix used? Yes   Machine Log Completed Yes   Total Chlorine (less than 0.1)? Yes   Total Chlorine Log Completed Yes   Bicarb BiBag   Bibag Size 900   Machine Temp 36.5   Machine Conductivity 13.9   Meter Type N/A   Meter Conductivity    Independent Conductivity 14   pH Status Pass Pass   pH    TCD Value

## 2025-04-01 ENCOUNTER — TELEPHONE (OUTPATIENT)
Dept: PRIMARY CARE CLINIC | Age: 55
End: 2025-04-01

## 2025-04-01 LAB
BACTERIA BLD CULT ORG #2: NORMAL
BACTERIA BLD CULT: NORMAL

## 2025-04-01 NOTE — TELEPHONE ENCOUNTER
Care Transitions Initial Follow Up Call    Outreach made within 2 business days of discharge: Yes    Patient: Hema Gallagher   Patient : 1970   MRN: [id number]    Reason for Admission: Bacteremia   Discharge Date: 3/31/25       Spoke with: Unidentified voicemail reached. Unable to leave a message. First attempt.     Discharge department/facility: [facility]

## 2025-04-02 ENCOUNTER — TELEPHONE (OUTPATIENT)
Dept: PRIMARY CARE CLINIC | Age: 55
End: 2025-04-02

## 2025-04-02 LAB
BACTERIA SPEC ANAEROBE+AEROBE CULT: NORMAL
GRAM STN SPEC: NORMAL

## 2025-04-02 NOTE — TELEPHONE ENCOUNTER
Care Transitions Initial Follow Up Call    Outreach made within 2 business days of discharge: Yes    Patient: Hema Gallagher   Patient : 1970   MRN: [id number]   Reason for Admission: Bacteremia   Discharge Date: 3/31/25       Spoke with: Hema    Discharge department/facility: [facility]            Scheduled appointment with PCP within 7-14 days - no    Spoke with patient. He states he does not want or need to be seen by his PCP. He states he is doing fine and his arm looks good.

## 2025-04-15 ENCOUNTER — OFFICE VISIT (OUTPATIENT)
Dept: SURGERY | Age: 55
End: 2025-04-15
Payer: MEDICARE

## 2025-04-15 ENCOUNTER — TELEPHONE (OUTPATIENT)
Dept: PRIMARY CARE CLINIC | Age: 55
End: 2025-04-15

## 2025-04-15 VITALS — BODY MASS INDEX: 37.67 KG/M2 | WEIGHT: 240 LBS | HEIGHT: 67 IN | TEMPERATURE: 97.4 F

## 2025-04-15 DIAGNOSIS — J30.2 SEASONAL ALLERGIES: ICD-10-CM

## 2025-04-15 DIAGNOSIS — R22.1 SUBCUTANEOUS MASS OF NECK: Primary | ICD-10-CM

## 2025-04-15 PROCEDURE — 1111F DSCHRG MED/CURRENT MED MERGE: CPT | Performed by: PHYSICIAN ASSISTANT

## 2025-04-15 PROCEDURE — G8417 CALC BMI ABV UP PARAM F/U: HCPCS | Performed by: PHYSICIAN ASSISTANT

## 2025-04-15 PROCEDURE — G8427 DOCREV CUR MEDS BY ELIG CLIN: HCPCS | Performed by: PHYSICIAN ASSISTANT

## 2025-04-15 PROCEDURE — 99212 OFFICE O/P EST SF 10 MIN: CPT | Performed by: PHYSICIAN ASSISTANT

## 2025-04-15 PROCEDURE — 3017F COLORECTAL CA SCREEN DOC REV: CPT | Performed by: PHYSICIAN ASSISTANT

## 2025-04-15 PROCEDURE — 1036F TOBACCO NON-USER: CPT | Performed by: PHYSICIAN ASSISTANT

## 2025-04-15 RX ORDER — FLUTICASONE PROPIONATE 50 MCG
2 SPRAY, SUSPENSION (ML) NASAL DAILY
Qty: 16 G | Refills: 0 | Status: SHIPPED | OUTPATIENT
Start: 2025-04-15

## 2025-04-15 NOTE — PROGRESS NOTES
Subjective  Hema Gallagher patient comes in follow-up for masses in left side of neck.  Final pathology report revealed dermatofibroma.  He is doing well from the excision.  He has had some problems with a recent fall and a pulled hamstring.        Objective  Patient Active Problem List    Diagnosis Date Noted    Bacteremia 03/26/2025    Hemodialysis catheter malfunction, initial encounter 03/25/2025    AV fistula thrombosis 07/30/2024    Palliative care patient 01/25/2021    Erectile dysfunction due to arterial insufficiency     ESRD on hemodialysis ([facility]) 05/21/2020    Type 2 diabetes mellitus with chronic kidney disease on chronic dialysis, without long-term current use of insulin ([facility]) 01/29/2020    Essential hypertension 05/10/2017    Mild intermittent asthma without complication 05/10/2017    Family history of prostate cancer 05/10/2017    Morbid obesity due to excess calories 05/10/2017       Current Outpatient Medications   Medication Sig Dispense Refill    calcitRIOL (ROCALTROL) 0.5 MCG capsule Take 1 capsule by mouth 2 times daily      metoprolol tartrate (LOPRESSOR) 25 MG tablet Take 1 tablet by mouth 2 times daily 180 tablet 1    NIFEdipine (ADALAT CC) 60 MG extended release tablet Take 1 tablet by mouth in the morning and at bedtime 180 tablet 0    simvastatin (ZOCOR) 10 MG tablet Take 1 tablet by mouth nightly 90 tablet 3    hydrALAZINE (APRESOLINE) 25 MG tablet TAKE 1 TABLET BY MOUTH EVERY 8 HOURS 90 tablet 0    fluticasone (FLONASE) 50 MCG/ACT nasal spray 2 sprays by Each Nostril route daily 16 g 0    cloNIDine (CATAPRES) 0.1 MG tablet Take 1 tablet by mouth 2 times daily (Patient taking differently: Take 1 tablet by mouth nightly) 180 tablet 1    albuterol sulfate HFA (PROVENTIL;VENTOLIN;PROAIR) 108 (90 Base) MCG/ACT inhaler Inhale 2 puffs into the lungs every 6 hours as needed for Wheezing 1 each 3    cinacalcet (SENSIPAR) 30 MG tablet Take 1 tablet by mouth daily      AURYXIA 1  MG(Fe) TABS

## 2025-04-15 NOTE — TELEPHONE ENCOUNTER
Hema L Johnson called to request a refill on his medication.      Last office visit : 3/10/2025   Next office visit : 2025     Requested Prescriptions     Pending Prescriptions Disp Refills    fluticasone (FLONASE) 50 MCG/ACT nasal spray 16 g 0     Si sprays by Each Nostril route daily            Hoang Hoyos MA

## 2025-04-22 ENCOUNTER — HOSPITAL ENCOUNTER (INPATIENT)
Age: 55
LOS: 1 days | Discharge: HOME | DRG: 811 | End: 2025-04-23
Attending: EMERGENCY MEDICINE | Admitting: STUDENT IN AN ORGANIZED HEALTH CARE EDUCATION/TRAINING PROGRAM
Payer: MEDICARE

## 2025-04-22 ENCOUNTER — APPOINTMENT (OUTPATIENT)
Dept: CT IMAGING | Age: 55
DRG: 811 | End: 2025-04-22
Payer: MEDICARE

## 2025-04-22 DIAGNOSIS — D64.9 SYMPTOMATIC ANEMIA: Primary | ICD-10-CM

## 2025-04-22 LAB
ABO/RH: NORMAL
ALBUMIN SERPL-MCNC: 4.2 G/DL (ref 3.5–5.2)
ALP SERPL-CCNC: 57 U/L (ref 40–129)
ALT SERPL-CCNC: 29 U/L (ref 10–50)
ANION GAP SERPL CALCULATED.3IONS-SCNC: 15 MMOL/L (ref 8–16)
ANTIBODY SCREEN: NORMAL
AST SERPL-CCNC: 24 U/L (ref 10–50)
BASOPHILS # BLD: 0 K/UL (ref 0–0.2)
BASOPHILS NFR BLD: 0.3 % (ref 0–1)
BILIRUB SERPL-MCNC: 0.7 MG/DL (ref 0.2–1.2)
BLOOD BANK DISPENSE STATUS: NORMAL
BLOOD BANK DISPENSE STATUS: NORMAL
BLOOD BANK PRODUCT CODE: NORMAL
BLOOD BANK PRODUCT CODE: NORMAL
BPU ID: NORMAL
BPU ID: NORMAL
BUN SERPL-MCNC: 74 MG/DL (ref 6–20)
CALCIUM SERPL-MCNC: 9.7 MG/DL (ref 8.6–10)
CHLORIDE SERPL-SCNC: 96 MMOL/L (ref 98–107)
CO2 SERPL-SCNC: 29 MMOL/L (ref 22–29)
CREAT SERPL-MCNC: 14.5 MG/DL (ref 0.7–1.2)
DESCRIPTION BLOOD BANK: NORMAL
DESCRIPTION BLOOD BANK: NORMAL
EKG P AXIS: 57 DEGREES
EKG P-R INTERVAL: 172 MS
EKG Q-T INTERVAL: 430 MS
EKG QRS DURATION: 92 MS
EKG QTC CALCULATION (BAZETT): 445 MS
EKG T AXIS: 45 DEGREES
EOSINOPHIL # BLD: 0.4 K/UL (ref 0–0.6)
EOSINOPHIL NFR BLD: 4 % (ref 0–5)
ERYTHROCYTE [DISTWIDTH] IN BLOOD BY AUTOMATED COUNT: 14.1 % (ref 11.5–14.5)
FERRITIN SERPL-MCNC: 1966 NG/ML (ref 30–400)
FOLATE SERPL-MCNC: 15.5 NG/ML (ref 4.5–32.2)
GLUCOSE BLD-MCNC: 110 MG/DL (ref 70–99)
GLUCOSE BLD-MCNC: 183 MG/DL (ref 70–99)
GLUCOSE SERPL-MCNC: 110 MG/DL (ref 70–99)
HAPTOGLOB SERPL-MCNC: 111 MG/DL (ref 30–200)
HCT VFR BLD AUTO: 15 % (ref 42–52)
HCT VFR BLD AUTO: 18 % (ref 42–52)
HCT VFR BLD AUTO: 18.1 % (ref 42–52)
HGB BLD-MCNC: 4.9 G/DL (ref 14–18)
HGB BLD-MCNC: 6.4 G/DL (ref 14–18)
IMM GRANULOCYTES # BLD: 0.3 K/UL
INR PPP: 1.21 (ref 0.88–1.18)
IRON SATN MFR SERPL: 41 % (ref 20–50)
IRON SERPL-MCNC: 102 UG/DL (ref 59–158)
IRON SERPL-MCNC: 89 UG/DL (ref 59–158)
LDH SERPL-CCNC: 226 U/L (ref 135–225)
LYMPHOCYTES # BLD: 1.7 K/UL (ref 1.1–4.5)
LYMPHOCYTES NFR BLD: 16.6 % (ref 20–40)
MCH RBC QN AUTO: 29.9 PG (ref 27–31)
MCHC RBC AUTO-ENTMCNC: 32.7 G/DL (ref 33–37)
MCV RBC AUTO: 91.5 FL (ref 80–94)
MONOCYTES # BLD: 1.5 K/UL (ref 0–0.9)
MONOCYTES NFR BLD: 15 % (ref 0–10)
NEUTROPHILS # BLD: 6.1 K/UL (ref 1.5–7.5)
NEUTS SEG NFR BLD: 61 % (ref 50–65)
PERFORMED ON: ABNORMAL
PERFORMED ON: ABNORMAL
PLATELET # BLD AUTO: 170 K/UL (ref 130–400)
PMV BLD AUTO: 9.4 FL (ref 9.4–12.4)
POTASSIUM SERPL-SCNC: 5.1 MMOL/L (ref 3.5–5.1)
PROT SERPL-MCNC: 7.4 G/DL (ref 6.4–8.3)
PROTHROMBIN TIME: 15.3 SEC (ref 12–14.6)
RBC # BLD AUTO: 1.64 M/UL (ref 4.7–6.1)
RETICS # AUTO: 0.07 M/UL (ref 0.03–0.12)
RETICS/RBC NFR: 3.47 % (ref 0.5–1.5)
SODIUM SERPL-SCNC: 140 MMOL/L (ref 136–145)
TIBC SERPL-MCNC: 215 UG/DL (ref 250–400)
TROPONIN, HIGH SENSITIVITY: 91 NG/L (ref 0–22)
TROPONIN, HIGH SENSITIVITY: 93 NG/L (ref 0–22)
VIT B12 SERPL-MCNC: 2508 PG/ML (ref 232–1245)
WBC # BLD AUTO: 10 K/UL (ref 4.8–10.8)

## 2025-04-22 PROCEDURE — 85610 PROTHROMBIN TIME: CPT

## 2025-04-22 PROCEDURE — 93005 ELECTROCARDIOGRAM TRACING: CPT | Performed by: EMERGENCY MEDICINE

## 2025-04-22 PROCEDURE — 83615 LACTATE (LD) (LDH) ENZYME: CPT

## 2025-04-22 PROCEDURE — 1200000000 HC SEMI PRIVATE

## 2025-04-22 PROCEDURE — P9016 RBC LEUKOCYTES REDUCED: HCPCS

## 2025-04-22 PROCEDURE — 86850 RBC ANTIBODY SCREEN: CPT

## 2025-04-22 PROCEDURE — 82746 ASSAY OF FOLIC ACID SERUM: CPT

## 2025-04-22 PROCEDURE — G0378 HOSPITAL OBSERVATION PER HR: HCPCS

## 2025-04-22 PROCEDURE — 86901 BLOOD TYPING SEROLOGIC RH(D): CPT

## 2025-04-22 PROCEDURE — 82728 ASSAY OF FERRITIN: CPT

## 2025-04-22 PROCEDURE — 36430 TRANSFUSION BLD/BLD COMPNT: CPT

## 2025-04-22 PROCEDURE — 86900 BLOOD TYPING SEROLOGIC ABO: CPT

## 2025-04-22 PROCEDURE — 70450 CT HEAD/BRAIN W/O DYE: CPT

## 2025-04-22 PROCEDURE — 85025 COMPLETE CBC W/AUTO DIFF WBC: CPT

## 2025-04-22 PROCEDURE — 80053 COMPREHEN METABOLIC PANEL: CPT

## 2025-04-22 PROCEDURE — 36415 COLL VENOUS BLD VENIPUNCTURE: CPT

## 2025-04-22 PROCEDURE — 30233N1 TRANSFUSION OF NONAUTOLOGOUS RED BLOOD CELLS INTO PERIPHERAL VEIN, PERCUTANEOUS APPROACH: ICD-10-PCS | Performed by: EMERGENCY MEDICINE

## 2025-04-22 PROCEDURE — 6370000000 HC RX 637 (ALT 250 FOR IP): Performed by: NURSE PRACTITIONER

## 2025-04-22 PROCEDURE — 85014 HEMATOCRIT: CPT

## 2025-04-22 PROCEDURE — 83010 ASSAY OF HAPTOGLOBIN QUANT: CPT

## 2025-04-22 PROCEDURE — 82607 VITAMIN B-12: CPT

## 2025-04-22 PROCEDURE — 93010 ELECTROCARDIOGRAM REPORT: CPT | Performed by: INTERNAL MEDICINE

## 2025-04-22 PROCEDURE — 82962 GLUCOSE BLOOD TEST: CPT

## 2025-04-22 PROCEDURE — 86923 COMPATIBILITY TEST ELECTRIC: CPT

## 2025-04-22 PROCEDURE — 83550 IRON BINDING TEST: CPT

## 2025-04-22 PROCEDURE — 85018 HEMOGLOBIN: CPT

## 2025-04-22 PROCEDURE — 85045 AUTOMATED RETICULOCYTE COUNT: CPT

## 2025-04-22 PROCEDURE — 83540 ASSAY OF IRON: CPT

## 2025-04-22 PROCEDURE — 84484 ASSAY OF TROPONIN QUANT: CPT

## 2025-04-22 PROCEDURE — 99285 EMERGENCY DEPT VISIT HI MDM: CPT

## 2025-04-22 RX ORDER — DEXTROSE MONOHYDRATE 100 MG/ML
INJECTION, SOLUTION INTRAVENOUS CONTINUOUS PRN
Status: DISCONTINUED | OUTPATIENT
Start: 2025-04-22 | End: 2025-04-23 | Stop reason: HOSPADM

## 2025-04-22 RX ORDER — CALCIUM CARBONATE 500(1250)
1250 TABLET ORAL DAILY
Status: DISCONTINUED | OUTPATIENT
Start: 2025-04-22 | End: 2025-04-23 | Stop reason: HOSPADM

## 2025-04-22 RX ORDER — LABETALOL HYDROCHLORIDE 5 MG/ML
10 INJECTION, SOLUTION INTRAVENOUS EVERY 4 HOURS PRN
Status: DISCONTINUED | OUTPATIENT
Start: 2025-04-22 | End: 2025-04-23 | Stop reason: HOSPADM

## 2025-04-22 RX ORDER — NIFEDIPINE 30 MG/1
60 TABLET, EXTENDED RELEASE ORAL 2 TIMES DAILY
Status: DISCONTINUED | OUTPATIENT
Start: 2025-04-22 | End: 2025-04-23 | Stop reason: HOSPADM

## 2025-04-22 RX ORDER — METOPROLOL TARTRATE 25 MG/1
25 TABLET, FILM COATED ORAL 2 TIMES DAILY
Status: DISCONTINUED | OUTPATIENT
Start: 2025-04-22 | End: 2025-04-23 | Stop reason: HOSPADM

## 2025-04-22 RX ORDER — ALBUTEROL SULFATE 90 UG/1
2 INHALANT RESPIRATORY (INHALATION) EVERY 6 HOURS PRN
Status: DISCONTINUED | OUTPATIENT
Start: 2025-04-22 | End: 2025-04-23 | Stop reason: HOSPADM

## 2025-04-22 RX ORDER — ACETAMINOPHEN 325 MG/1
650 TABLET ORAL EVERY 6 HOURS PRN
Status: DISCONTINUED | OUTPATIENT
Start: 2025-04-22 | End: 2025-04-23 | Stop reason: HOSPADM

## 2025-04-22 RX ORDER — ONDANSETRON 4 MG/1
4 TABLET, ORALLY DISINTEGRATING ORAL EVERY 8 HOURS PRN
Status: DISCONTINUED | OUTPATIENT
Start: 2025-04-22 | End: 2025-04-23 | Stop reason: HOSPADM

## 2025-04-22 RX ORDER — SODIUM CHLORIDE 9 MG/ML
INJECTION, SOLUTION INTRAVENOUS PRN
Status: DISCONTINUED | OUTPATIENT
Start: 2025-04-22 | End: 2025-04-23 | Stop reason: HOSPADM

## 2025-04-22 RX ORDER — SODIUM CHLORIDE 0.9 % (FLUSH) 0.9 %
5-40 SYRINGE (ML) INJECTION EVERY 12 HOURS SCHEDULED
Status: DISCONTINUED | OUTPATIENT
Start: 2025-04-22 | End: 2025-04-23 | Stop reason: HOSPADM

## 2025-04-22 RX ORDER — INSULIN LISPRO 100 [IU]/ML
0-4 INJECTION, SOLUTION INTRAVENOUS; SUBCUTANEOUS
Status: DISCONTINUED | OUTPATIENT
Start: 2025-04-22 | End: 2025-04-23 | Stop reason: HOSPADM

## 2025-04-22 RX ORDER — ONDANSETRON 2 MG/ML
4 INJECTION INTRAMUSCULAR; INTRAVENOUS EVERY 6 HOURS PRN
Status: DISCONTINUED | OUTPATIENT
Start: 2025-04-22 | End: 2025-04-23 | Stop reason: HOSPADM

## 2025-04-22 RX ORDER — ACETAMINOPHEN 650 MG/1
650 SUPPOSITORY RECTAL EVERY 6 HOURS PRN
Status: DISCONTINUED | OUTPATIENT
Start: 2025-04-22 | End: 2025-04-23 | Stop reason: HOSPADM

## 2025-04-22 RX ORDER — CALCITRIOL 0.25 UG/1
0.5 CAPSULE, LIQUID FILLED ORAL 2 TIMES DAILY
Status: DISCONTINUED | OUTPATIENT
Start: 2025-04-22 | End: 2025-04-23 | Stop reason: HOSPADM

## 2025-04-22 RX ORDER — GLUCAGON 1 MG/ML
1 KIT INJECTION PRN
Status: DISCONTINUED | OUTPATIENT
Start: 2025-04-22 | End: 2025-04-23 | Stop reason: HOSPADM

## 2025-04-22 RX ORDER — SODIUM CHLORIDE 0.9 % (FLUSH) 0.9 %
5-40 SYRINGE (ML) INJECTION PRN
Status: DISCONTINUED | OUTPATIENT
Start: 2025-04-22 | End: 2025-04-23 | Stop reason: HOSPADM

## 2025-04-22 RX ORDER — HYDRALAZINE HYDROCHLORIDE 25 MG/1
25 TABLET, FILM COATED ORAL EVERY 8 HOURS SCHEDULED
Status: DISCONTINUED | OUTPATIENT
Start: 2025-04-22 | End: 2025-04-23 | Stop reason: HOSPADM

## 2025-04-22 RX ORDER — CINACALCET 30 MG/1
30 TABLET, FILM COATED ORAL DAILY
Status: DISCONTINUED | OUTPATIENT
Start: 2025-04-22 | End: 2025-04-23 | Stop reason: HOSPADM

## 2025-04-22 RX ADMIN — CALCITRIOL CAPSULES 0.25 MCG 0.5 MCG: 0.25 CAPSULE ORAL at 21:57

## 2025-04-22 RX ADMIN — HYDRALAZINE HYDROCHLORIDE 25 MG: 25 TABLET ORAL at 21:57

## 2025-04-22 RX ADMIN — CALCIUM 1250 MG: 500 TABLET ORAL at 17:11

## 2025-04-22 RX ADMIN — NIFEDIPINE 60 MG: 30 TABLET, EXTENDED RELEASE ORAL at 21:51

## 2025-04-22 RX ADMIN — METOPROLOL TARTRATE 25 MG: 25 TABLET, FILM COATED ORAL at 21:57

## 2025-04-22 RX ADMIN — CINACALCET HYDROCHLORIDE 30 MG: 30 TABLET, FILM COATED ORAL at 17:11

## 2025-04-22 RX ADMIN — HYDRALAZINE HYDROCHLORIDE 25 MG: 25 TABLET ORAL at 17:11

## 2025-04-22 RX ADMIN — INSULIN LISPRO 1 UNITS: 100 INJECTION, SOLUTION INTRAVENOUS; SUBCUTANEOUS at 22:04

## 2025-04-22 SDOH — ECONOMIC STABILITY: FOOD INSECURITY: WITHIN THE PAST 12 MONTHS, YOU WORRIED THAT YOUR FOOD WOULD RUN OUT BEFORE YOU GOT MONEY TO BUY MORE.: NEVER TRUE

## 2025-04-22 SDOH — ECONOMIC STABILITY: INCOME INSECURITY: HOW HARD IS IT FOR YOU TO PAY FOR THE VERY BASICS LIKE FOOD, HOUSING, MEDICAL CARE, AND HEATING?: NOT HARD AT ALL

## 2025-04-22 SDOH — ECONOMIC STABILITY: INCOME INSECURITY: IN THE PAST 12 MONTHS, HAS THE ELECTRIC, GAS, OIL, OR WATER COMPANY THREATENED TO SHUT OFF SERVICE IN YOUR HOME?: NO

## 2025-04-22 ASSESSMENT — PATIENT HEALTH QUESTIONNAIRE - PHQ9
SUM OF ALL RESPONSES TO PHQ QUESTIONS 1-9: 1
1. LITTLE INTEREST OR PLEASURE IN DOING THINGS: SEVERAL DAYS
2. FEELING DOWN, DEPRESSED OR HOPELESS: NOT AT ALL

## 2025-04-22 NOTE — CONSULTS
Renal Consult Note    Thank you to requesting provider: Dr Bravo, for asking us to see Hema Gallagher    Reason for consultation: ESRD     Chief Complaint:  Fatigue.     History of Presenting Illness      Patient is a 54-year-old a pleasant -American man with a past medical history of obesity, hypertension, type 2 diabetes and ESRD.  He has been on chronic home hemodialysis using a right upper extremity AV fistula.  Patient was encountering some problems with his dialysis machine at home and has lost blood return on 3 separate occasions.  He denies fever or chills, nausea or vomiting.  He started to feel very weak and tired.  He also admitted feeling dizzy.  He reported to the ED and his hemoglobin was surprising low at 4.9.  Patient was subsequently admitted to the medical floor and was ordered to receive 2 units of PRBCs.  Renal service is consulted to manage his ESRD.    Past Medical/Surgical History      Active Ambulatory Problems     Diagnosis Date Noted    Essential hypertension 05/10/2017    Mild intermittent asthma without complication 05/10/2017    Family history of prostate cancer 05/10/2017    Morbid obesity due to excess calories 05/10/2017    Type 2 diabetes mellitus with chronic kidney disease on chronic dialysis, without long-term current use of insulin (HCC) 01/29/2020    ESRD on hemodialysis ([facility]) 05/21/2020    Erectile dysfunction due to arterial insufficiency     Palliative care patient 01/25/2021    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 diastolic (congestive) heart failure 01/31/2020    RUPAL (acute kidney injury)     Acute on chronic diastolic heart failure (HCC) 04/30/2020    Hemodialysis catheter infection, sequela 04/30/2020    COVID-19 01/24/2021    Pulmonary edema 01/25/2021    Volume

## 2025-04-22 NOTE — H&P
[facility]      Hospitalist - History & Physical      PCP: Aletha Del Cid APRN - CNP    Date of Admission: 4/22/2025    Date of Service: 4/22/2025    Chief Complaint: Dizziness and fatigue     History Of Present Illness:   The patient is a 54 y.o. male who presented to [facility] ED for evaluation of dizziness and fatigue. Pt has history of chronic diastolic heart failure, diabetes, hypertension, ESRD on home dialysis 4xs per week, hyperlipidemia, anemia and asthma.     Pt relates that he awoke this morning with generalized weakness, fatigue and feeling of imbalance. He denies associated concern for bloody stools, abdominal pain, chest pain as well as shortness of breath. He relates that he was missing some of his iron supplementation that he receives during dialysis treatments.     In ED, 2units prbc initiated, troponin 93, hgb 4.9, platelets 170k, CT head-  No acute intracranial abnormality is detected. Pt is admitted observation to hospitalist.    Past Medical History:        Diagnosis Date    Asthma     CHF (congestive heart failure) (HCC)     Diabetes mellitus (HCC)     Erectile dysfunction     Hemodialysis patient     monday, tuesday, thursday, and friday at home. hd    History of blood transfusion     Hyperlipidemia     Hypertension     Obesity     Palliative care patient 01/25/2021       Past Surgical History:        Procedure Laterality Date    BRONCHOSCOPY Left 05/24/2023    BRONCHOSCOPY ADD ON COMPUTER ASSISTED performed by Clarice French MD at [facility]    BRONCHOSCOPY  05/24/2023    BRONCHOSCOPY ALVEOLAR LAVAGE ROBOTIC performed by Clarice French MD at [facility]    BRONCHOSCOPY  05/24/2023    BRONCHOSCOPY BIOPSY BRONCHUS ROBOTIC performed by Clarice French MD at [facility]    COLONOSCOPY N/A 12/05/2019    Dr Coello-Diverticulosis, internal hemorrhoids-Grade 1-2-HP, 3 yr recall    COLONOSCOPY N/A 12/19/2022    Dr Coello, Diffuse melanosis coli,

## 2025-04-22 NOTE — PROGRESS NOTES
4 Eyes Skin Assessment     NAME:  Hema Gallagher  YOB: 1970  MEDICAL RECORD NUMBER:  [id number]    The patient is being assessed for  Admission    I agree that at least one RN has performed a thorough Head to Toe Skin Assessment on the patient. ALL assessment sites listed below have been assessed.      Areas assessed by both nurses:    Head, Face, Ears, Arms, Elbows, Hands, Legs. Feet and Heels, and Other pt dressed, but denies any issues/problems w/ skin breakdown        Does the Patient have a Wound? No noted wound(s)       Vitaliy Prevention initiated by RN: No  Wound Care Orders initiated by RN: No    Pressure Injury (Stage 3,4, Unstageable, DTI, NWPT, and Complex wounds) if present, place Wound referral order by RN under : No    New Ostomies, if present place, Ostomy referral order under : No     Nurse 1 eSignature: Electronically signed by Thelma Martin RN on 4/22/25 at 4:45 PM CDT    **SHARE this note so that the co-signing nurse can place an eSignature**    Nurse 2 eSignature: Electronically signed by Maame Carrion RN on 4/22/25 at 5:31 PM CDT

## 2025-04-22 NOTE — PROGRESS NOTES
Hema Gallagher arrived to room # 435.   Presented with: anemia  Mental Status: Patient is oriented, alert, coherent, logical, thought processes intact, and able to concentrate and follow conversation.   Vitals:    04/22/25 1528   BP: (!) 142/69   Pulse: 73   Resp: 18   Temp: 97.5 °F (36.4 °C)   SpO2:      Patient safety contract and falls prevention contract reviewed with patient Yes.  Oriented Patient to room.  Call light within reach. Yes.  Needs, issues or concerns expressed at this time: no.      Electronically signed by Thelma Martin RN on 4/22/2025 at 4:44 PM

## 2025-04-22 NOTE — ED NOTES
ED TO INPATIENT SBAR HANDOFF    Patient Name: Hema Gallagher   : 1970  54 y.o.   Family/Caregiver Present: No  Code Status Order: Full Code    C-SSRS: Risk of Suicide: No Risk  Sitter No  Restraints: No      Situation  Chief Complaint:   Chief Complaint   Patient presents with    Dizziness     Woke up dizzy this morning, PD at home. Was out of iron last week     Patient Diagnosis: Acute on chronic anemia [D64.9]     Brief Description of Patient's Condition: Hema Gallagher is a 54 year-old male who presents to the emergency department complaining of fatigue and generalized weakness for about x2 weeks, that has gradually been worsening.  He says it has been present since discharge from the hospital recently. He has anemia due to iron deficiency and kidney disease. He has end-stage renal disease he is on PD dialysis at home. He says that there was a error in his iron prescription recently and he was not getting as much as he was supposed to. He says he took some over-the-counter iron supplementation. He says this morning he woke up  - stood up and felt dizzy dropped to knees. Feels better now. No N/V/D, CP/HA. Hgb 4.9, RBC transfusing at 125mL/hr.   Mental Status: A&O X4  Arrived from: Home    Imaging:   CT HEAD WO CONTRAST   Final Result   No acute intracranial abnormality is detected        All CT scans are performed using dose optimization techniques as appropriate to the performed exam and include    at least one of the following: Automated exposure control, adjustment of the mA and/or kV according to size, and the use of iterative reconstruction technique.        ______________________________________    Electronically signed by: AMANDA FARFAN M.D.   Date:     2025   Time:    11:21         COVID-19 Results:   Internal Administration   First Dose COVID-19, PFIZER PURPLE top, DILUTE for use, (age 12 y+), 30mcg/0.3mL  2021   Second Dose COVID-19, PFIZER PURPLE top, DILUTE for use, (age 12 y+),

## 2025-04-22 NOTE — ED PROVIDER NOTES
antibiotics due to risk of graft blowout with true infection. Nephrology has followed throughout admission for management of dialysis.  His lab work and vitals remained stable.  After dialysis today, 3/31, patient is requesting to leave AGAINST MEDICAL ADVICE.  He was advised that we were waiting for final ID recommendations and still wishes to leave.  Will go ahead and send prescription for linezolid to pharmacy.  He will need to follow-up with his PCP, nephrology, vascular surgery, and ID on discharge.    EMERGENCY DEPARTMENT COURSE and DIFFERENTIAL DIAGNOSIS/MDM:   Vitals:    Vitals:    04/22/25 1010   BP: (!) 126/49   Pulse: 70   Resp: 20   Temp: 97.7 °F (36.5 °C)   TempSrc: Oral   SpO2: 92%   Weight: 108.9 kg (240 lb)   Height: 1.702 m (5' 7\")       MDM      @HPI@      Prior records reviewed by me as noted above. I independently reviewed the results of the ED workup. ***    Results discussed with the patient *** at bedside. Return and follow up instructions given verbally and as written below. The patient verbalized understanding and agreement with the plan. All questions answered.     ED Course as of 04/22/25 1300   Tue Apr 22, 2025   1300 Recheck at patient bedside:  Resting comfortably [JS]      ED Course User Index  [JS] Whitney Fernández, DO         :  Unless otherwise noted below, none     Procedures    FINAL IMPRESSION    No diagnosis found.      DISPOSITION/PLAN   DISPOSITION                  PATIENT REFERRED TO:  No follow-up provider specified.    DISCHARGE MEDICATIONS:  New Prescriptions    No medications on file          (Please note that portions of this note were completed with a voice recognition program.  Efforts were made to edit thedictations but occasionally words are mis-transcribed.)EMR Dragon/Transcription disclaimer:   Much of this encounter note is an electronic transcription/translation of spoken language to printed text. The electronic translation of spoken language may permit erroneous,

## 2025-04-22 NOTE — PROGRESS NOTES
Lab refused to draw labs in rue. Pt does have fistula in bue but left is not being used. Both pt and myself informed lab that they have been drawling blood from lue and have beween for several weeks. Lab refused stated she had to get ok from supervisor andie ceballos

## 2025-04-22 NOTE — CONSENT
Informed Consent for Blood Component Transfusion Note    I have discussed with the patient the rationale for blood component transfusion; its benefits in treating or preventing fatigue, organ damage, or death; and its risk which includes mild transfusion reactions, rare risk of blood borne infection, or more serious but rare reactions. I have discussed the alternatives to transfusion, including the risk and consequences of not receiving transfusion. The patient had an opportunity to ask questions and had agreed to proceed with transfusion of blood components.    Electronically signed by Whitney Fernández DO on 4/22/25 at 1:02 PM CDT

## 2025-04-22 NOTE — ED NOTES
Attempted to gain IV access, no suitable vessel for access at this time per this RN. Another RN to attempt.

## 2025-04-23 VITALS
OXYGEN SATURATION: 99 % | RESPIRATION RATE: 18 BRPM | BODY MASS INDEX: 39.87 KG/M2 | TEMPERATURE: 97.9 F | HEIGHT: 67 IN | HEART RATE: 69 BPM | DIASTOLIC BLOOD PRESSURE: 60 MMHG | SYSTOLIC BLOOD PRESSURE: 126 MMHG | WEIGHT: 254 LBS

## 2025-04-23 LAB
ANION GAP SERPL CALCULATED.3IONS-SCNC: 17 MMOL/L (ref 8–16)
APTT PPP: 36.4 SEC (ref 26–36.2)
BASOPHILS # BLD: 0 K/UL (ref 0–0.2)
BASOPHILS NFR BLD: 0 % (ref 0–1)
BUN SERPL-MCNC: 83 MG/DL (ref 6–20)
CALCIUM SERPL-MCNC: 9.9 MG/DL (ref 8.6–10)
CHLORIDE SERPL-SCNC: 95 MMOL/L (ref 98–107)
CO2 SERPL-SCNC: 30 MMOL/L (ref 22–29)
CREAT SERPL-MCNC: 15.8 MG/DL (ref 0.7–1.2)
EOSINOPHIL # BLD: 0.37 K/UL (ref 0–0.6)
EOSINOPHIL NFR BLD: 3 % (ref 0–5)
ERYTHROCYTE [DISTWIDTH] IN BLOOD BY AUTOMATED COUNT: 14.3 % (ref 11.5–14.5)
GLUCOSE BLD-MCNC: 147 MG/DL (ref 70–99)
GLUCOSE BLD-MCNC: 149 MG/DL (ref 70–99)
GLUCOSE SERPL-MCNC: 90 MG/DL (ref 70–99)
HBA1C MFR BLD: 5.5 % (ref 4–5.6)
HCT VFR BLD AUTO: 21.9 % (ref 42–52)
HCT VFR BLD AUTO: 23.5 % (ref 42–52)
HGB BLD-MCNC: 7.3 G/DL (ref 14–18)
HGB BLD-MCNC: 7.7 G/DL (ref 14–18)
IMM GRANULOCYTES # BLD: 0.3 K/UL
LYMPHOCYTES # BLD: 3.2 K/UL (ref 1.1–4.5)
LYMPHOCYTES NFR BLD: 26 % (ref 20–40)
MCH RBC QN AUTO: 30.4 PG (ref 27–31)
MCHC RBC AUTO-ENTMCNC: 33.3 G/DL (ref 33–37)
MCV RBC AUTO: 91.3 FL (ref 80–94)
MONOCYTES # BLD: 0.6 K/UL (ref 0–0.9)
MONOCYTES NFR BLD: 5 % (ref 0–10)
NEUTROPHILS # BLD: 8.1 K/UL (ref 1.5–7.5)
NEUTS SEG NFR BLD: 66 % (ref 50–65)
NRBC BLD-RTO: 2 /100 WBC
OVALOCYTES BLD QL SMEAR: ABNORMAL
PERFORMED ON: ABNORMAL
PERFORMED ON: ABNORMAL
PLATELET # BLD AUTO: 170 K/UL (ref 130–400)
PLATELET SLIDE REVIEW: ADEQUATE
PMV BLD AUTO: 9.7 FL (ref 9.4–12.4)
POTASSIUM SERPL-SCNC: 4.9 MMOL/L (ref 3.5–5)
RBC # BLD AUTO: 2.4 M/UL (ref 4.7–6.1)
SODIUM SERPL-SCNC: 142 MMOL/L (ref 136–145)
WBC # BLD AUTO: 12.3 K/UL (ref 4.8–10.8)

## 2025-04-23 PROCEDURE — 83036 HEMOGLOBIN GLYCOSYLATED A1C: CPT

## 2025-04-23 PROCEDURE — 5A1D70Z PERFORMANCE OF URINARY FILTRATION, INTERMITTENT, LESS THAN 6 HOURS PER DAY: ICD-10-PCS | Performed by: INTERNAL MEDICINE

## 2025-04-23 PROCEDURE — 6370000000 HC RX 637 (ALT 250 FOR IP): Performed by: NURSE PRACTITIONER

## 2025-04-23 PROCEDURE — 36415 COLL VENOUS BLD VENIPUNCTURE: CPT

## 2025-04-23 PROCEDURE — 85025 COMPLETE CBC W/AUTO DIFF WBC: CPT

## 2025-04-23 PROCEDURE — 8010000000 HC HEMODIALYSIS ACUTE INPT

## 2025-04-23 PROCEDURE — 85730 THROMBOPLASTIN TIME PARTIAL: CPT

## 2025-04-23 PROCEDURE — 85014 HEMATOCRIT: CPT

## 2025-04-23 PROCEDURE — 82962 GLUCOSE BLOOD TEST: CPT

## 2025-04-23 PROCEDURE — 99222 1ST HOSP IP/OBS MODERATE 55: CPT | Performed by: INTERNAL MEDICINE

## 2025-04-23 PROCEDURE — 85018 HEMOGLOBIN: CPT

## 2025-04-23 PROCEDURE — 80048 BASIC METABOLIC PNL TOTAL CA: CPT

## 2025-04-23 PROCEDURE — 94760 N-INVAS EAR/PLS OXIMETRY 1: CPT

## 2025-04-23 RX ADMIN — HYDRALAZINE HYDROCHLORIDE 25 MG: 25 TABLET ORAL at 12:23

## 2025-04-23 RX ADMIN — METOPROLOL TARTRATE 25 MG: 25 TABLET, FILM COATED ORAL at 12:24

## 2025-04-23 RX ADMIN — CINACALCET HYDROCHLORIDE 30 MG: 30 TABLET, FILM COATED ORAL at 12:24

## 2025-04-23 RX ADMIN — CALCITRIOL CAPSULES 0.25 MCG 0.5 MCG: 0.25 CAPSULE ORAL at 12:23

## 2025-04-23 RX ADMIN — CALCIUM 1250 MG: 500 TABLET ORAL at 12:23

## 2025-04-23 RX ADMIN — NIFEDIPINE 60 MG: 30 TABLET, EXTENDED RELEASE ORAL at 12:24

## 2025-04-23 RX ADMIN — HYDRALAZINE HYDROCHLORIDE 25 MG: 25 TABLET ORAL at 05:12

## 2025-04-23 NOTE — CARE COORDINATION
Case Management Assessment  Initial Evaluation    Date/Time of Evaluation: 4/23/2025 3:51 PM  Assessment Completed by: DANGELO ZABALA    If patient is discharged prior to next notation, then this note serves as note for discharge by case management.    Patient Name: Hema Gallagher                   YOB: 1970  Diagnosis: Symptomatic anemia [D64.9]  Acute on chronic anemia [D64.9]  Severe anemia [D64.9]                   Date / Time: 4/22/2025  9:59 AM    Patient Admission Status: Inpatient   Readmission Risk (Low < 19, Mod (19-27), High > 27): Readmission Risk Score: 24.3    Current PCP: Aletha Del Cid APRN - CNP  PCP verified by CM? (P) Yes    Chart Reviewed: Yes      History Provided by: (P) Patient  Patient Orientation: (P) Alert and Oriented, Person, Place, Situation, Self    Patient Cognition: (P) Alert    Hospitalization in the last 30 days (Readmission):  No    If yes, Readmission Assessment in CM Navigator will be completed.    Advance Directives:      Code Status: Full Code   Patient's Primary Decision Maker is: (P) Legal Next of Kin    Primary Decision Maker (Active): Susanna Gallaghera - Child - [phone]    Supplemental (Other) Decision Maker: Nolvia Santacruz - Brother/Sister - [phone]    Discharge Planning:    Patient lives with: (P) Spouse/Significant Other Type of Home: (P) House  Primary Care Giver: (P) Self  Patient Support Systems include: (P) Spouse/Significant Other   Current Financial resources: (P) Medicare  Current community resources: (P) None  Current services prior to admission: (P) Other (Comment) (PD at home 4/7 days as week)            Current DME:              Type of Home Care services:  (P) None    ADLS  Prior functional level: (P) Independent in ADLs/IADLs  Current functional level: (P) Independent in ADLs/IADLs    PT AM-PAC:   /24  OT AM-PAC:   /24    Family can provide assistance at DC: (P) Yes  Would you like Case Management to discuss the discharge plan with

## 2025-04-23 NOTE — PLAN OF CARE
Problem: Chronic Conditions and Co-morbidities  Goal: Patient's chronic conditions and co-morbidity symptoms are monitored and maintained or improved  Recent Flowsheet Documentation  Taken 4/23/2025 0730 by Imelda Walker RN  Care Plan - Patient's Chronic Conditions and Co-Morbidity Symptoms are Monitored and Maintained or Improved:   Monitor and assess patient's chronic conditions and comorbid symptoms for stability, deterioration, or improvement   Collaborate with multidisciplinary team to address chronic and comorbid conditions and prevent exacerbation or deterioration  4/23/2025 0236 by Anabell Carlisle RN  Outcome: Progressing     Problem: Safety - Adult  Goal: Free from fall injury  Recent Flowsheet Documentation  Taken 4/23/2025 0736 by Imelda Walker RN  Free From Fall Injury: Instruct family/caregiver on patient safety  4/23/2025 0236 by Anabell Carlisle RN  Outcome: Progressing     Problem: ABCDS Injury Assessment  Goal: Absence of physical injury  Recent Flowsheet Documentation  Taken 4/23/2025 0736 by Imelda Walker RN  Absence of Physical Injury: Implement safety measures based on patient assessment  4/23/2025 0236 by Anabell Carlisle RN  Outcome: Progressing

## 2025-04-23 NOTE — DIALYSIS
[facility] INPATIENT SERVICES  DIALYSIS TREATMENT SUMMARY      Note: Consult with the attending physician for patient treatment orders, this document is not a physician order.      Patient Information   Patient Hema Gallagher   Date of Birth November 15, 1970   Chart Number [id number]   [facility] MRN [id number]   Gender Male   SSN (last 4) 1921     Treatment Information   Treatment Type Hemodialysis   Treatment Id [id number]   Start Time April 23, 2025 08:21   End Time April 23, 2025 11:50   Acutal Duration 03:29     Treatment Balances   Total Saline Administered 500   Other 500   Net Fluid Balance -2500    Hemodialysis Orders   Therapy Standard   Orders Verified Time 04/23/2025 08:00    Date Verified 04/23/2025   Duration 3:30   Isolated UF/Bypass No   BFR (mL) 450   DFR (mL) 700   Dialyzer Type OPTIFLUX 160NR   UF Order UF Goal Ordered   UF Goal Ordered (mL) 3000   Crit-Line used No   Heparin Initial Units Bolus No   Heparin IV Maintenance Bolus No   Heparin IV Infusion No   Potassium (mEq/L) 2   Calcium (mEq/L) 2.5   Bicarb (mg/dL) 35   Sodium (mEq/L) 138   Clinician Alta Brown, RN    Dialysis Access   Access Type AV Access   AV Access   Access Location Upper Arm - R   Needle 15g   Bruit Yes   Thrill Yes      Vitals   Pre-Treatment Vitals   Time Is BP being recorded? Pre BP Map BP Method Pulse RR Temp How was Weight Obtained? Pre Weight Previous Dry Weight Previous Post Weight Metric Target Fluid Removal (mL) Dialysate Confirmed Clinician    04/23/2025 08:06 BP/Map 143/77 (99) Noninvasive 77 16 97.7 How Obtained: Reported Floor Weight 115.2 108  Kgs 3500 Yes Anne Nash    Comments: pre-vitals      Post Treatment Vitals   Time Is BP being recorded? BP Map Pulse RR Temp How was Weight Obtained? Post Weight Metric BVP UF Goal Ordered NSS Given Intra-Procedure Total Machine UF Removed (mL) Crit-Line Ending Profile Crit-Line Refill Crit-Line Ending HCT Crit-Line Max BV%

## 2025-04-23 NOTE — PROGRESS NOTES
Renal Progress Note    Thank you to requesting provider: Dr Bravo, for asking us to see Hema Gallagher    Reason for consultation: ESRD     Chief Complaint:  Fatigue.     History of Presenting Illness      Patient is a 54-year-old a pleasant -American man with a past medical history of obesity, hypertension, type 2 diabetes and ESRD.  He has been on chronic home hemodialysis using a right upper extremity AV fistula.  Patient was encountering some problems with his dialysis machine at home and has lost blood return on 3 separate occasions.  He denies fever or chills, nausea or vomiting.  He started to feel very weak and tired.  He also admitted feeling dizzy.  He reported to the ED and his hemoglobin was surprising low at 4.9.  Patient was subsequently admitted to the medical floor and was ordered to receive 2 units of PRBCs.  Renal service is consulted to manage his ESRD.  Patient felt better after his blood transfusion.  He denies bruise or dizziness.  He was anxious to go home.    Dialysis   Pt was seen on RRT  Modality: Hemodialysis  Access: Arterial Venous Fistula  Location: right upper  QB: 450  QD: 700  UF: 3 liters      Past Medical/Surgical History      Active Ambulatory Problems     Diagnosis Date Noted    Essential hypertension 05/10/2017    Mild intermittent asthma without complication 05/10/2017    Family history of prostate cancer 05/10/2017    Morbid obesity due to excess calories 05/10/2017    Type 2 diabetes mellitus with chronic kidney disease on chronic dialysis, without long-term current use of insulin ([facility]) 01/29/2020    ESRD on hemodialysis ([facility]) 05/21/2020    Erectile dysfunction due to arterial insufficiency     Palliative care patient 01/25/2021    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

## 2025-04-23 NOTE — CONSULTS
Pt Name: Hema Gallagher  MRN: [id number]  [id number]  YOB: 1970  Admit Date: 4/22/2025  9:59 AM  Date of evaluation: 4/23/2025  Primary Care Physician: Aletha Del Cid, S[facility] - [facility]   [id number]       Requesting Provider: Dr. Phill Bates    GI Consult    Indication: Anemia.    History:  The patient is a 54 y.o. male admitted to the hospital for multiple somatic symptoms.  Patient has multiple medical problems including end-stage renal disease and on hemodialysis.  He is complaining of fatigue and tiredness and weakness over the last few days and decided to come to the hospital.  He was noted to be anemic.  He is chronically anemic due to his underlying end-stage renal disease.  He denies any GI symptoms to me.  No heartburns or regurgitation.  No dysphagia odynophagia.  No abdominal pain, cramping, gas or bloating.  No constipation or diarrhea.  No hematochezia or melanotic stool.  His colonoscopy in December 2022 was done and apparently polyps were removed.  Unfortunately I could not retrieve his full report.  He denies any previous history of any upper endoscopy and also denies any upper GI symptoms.  He is adamant that his blood loss is during his dialysis and last few times as he thinks that all the blood is not returned to his body because of machines malfunctioning.      Past Medical History:        Diagnosis Date    Asthma     CHF (congestive heart failure) (HCC)     Diabetes mellitus (HCC)     Erectile dysfunction     Hemodialysis patient     monday, tuesday, thursday, and friday at home. hd    History of blood transfusion     Hyperlipidemia     Hypertension     Obesity     Palliative care patient 01/25/2021     Past Surgical History:        Procedure Laterality Date    BRONCHOSCOPY Left 05/24/2023    BRONCHOSCOPY ADD ON COMPUTER ASSISTED performed by Clarice French MD at [facility]    BRONCHOSCOPY  05/24/2023    BRONCHOSCOPY ALVEOLAR LAVAGE ROBOTIC performed by Clarice LINARES

## 2025-04-23 NOTE — PROGRESS NOTES
[facility]    Patient:  Hema Gallagher  YOB: 1970  Date of Service: 4/23/2025  MRN: [id number]   Acct: [id number]   Primary Care Physician: Aletha Del Cid APRN - CNP  Advance Directive: Full Code  Admit Date: 4/22/2025       Hospital Day: 1        CHIEF COMPLAINT:     Chief Complaint   Patient presents with    Dizziness     Woke up dizzy this morning, PD at home. Was out of iron last week       4/23/2025 8:01 AM  Subjective / Interval History:   04/23/2025  Patient seen and examined this a.m.   No new complaints.    No acute changes or acute overnight event reported.   Laying comfortably in bed in no apparent acute distress.    Denies any acute complaints or distress.    Endorses overall improvement.        Review of Systems:   Review of Systems  ROS: 14 point review of systems is negative except as specifically addressed above.    ADULT DIET; Regular; 4 carb choices (60 gm/meal); Low Fat/Low Chol/High Fiber/2 gm Na; Low Potassium (Less than 3000 mg/day)    Intake/Output Summary (Last 24 hours) at 4/23/2025 0801  Last data filed at 4/23/2025 0015  Gross per 24 hour   Intake 734.17 ml   Output --   Net 734.17 ml       Medications:   sodium chloride      sodium chloride      dextrose       Current Facility-Administered Medications   Medication Dose Route Frequency Provider Last Rate Last Admin    0.9 % sodium chloride infusion   IntraVENous PRN Whitney Fernández,         sodium chloride flush 0.9 % injection 5-40 mL  5-40 mL IntraVENous 2 times per day Darrick Andres APRN - CNP        sodium chloride flush 0.9 % injection 5-40 mL  5-40 mL IntraVENous PRN Darrick Andres APRN - CNP        0.9 % sodium chloride infusion   IntraVENous PRN Darrick Andres APRN - CNP        ondansetron (ZOFRAN-ODT) disintegrating tablet 4 mg  4 mg Oral Q8H PRN Darrick Andres APRN - CNP        Or    ondansetron (ZOFRAN) injection 4 mg  4 mg IntraVENous Q6H PRN Darrick Andres APRN - CNP

## 2025-04-23 NOTE — CONSULTS
Palliative Care:   Pleasant 54 yr old pt who presented to ED with c/o fatigue and gen weakness.  Pt is on PD at home x 5 yrs and doing well per pt.  He is up on EOB getting ready to eat lunch. States he is ready to return home today.  Nephrology and GI have seen and stated pt stable for dc. Waiting on Dr. Bates to agree to dc.  Pt frustrated stating \"I feel like doctors are not listening to me\". He explains his home machine numbers, iron supplied by clinic and he felt all was ok until he was one week without his iron. He states this is the cause of weakness and fatigue.  He went for HD this morning and is now ready to return home.           Past Medical History:        Past Medical History:   Diagnosis Date    Asthma     CHF (congestive heart failure) (HCC)     Diabetes mellitus (HCC)     Erectile dysfunction     Hemodialysis patient     monday, tuesday, thursday, and friday at home. hd    History of blood transfusion     Hyperlipidemia     Hypertension     Obesity     Palliative care patient 01/25/2021       Advance Directives:   Full Code            Pain/Other Symptoms:   Denies            How are symptoms affecting QOL:  States he has very good quality.  Independent at home.  Still drives nd manages yard work.  States \"I do it all\".       Psychological/Spiritual:  [facility]  Good family support.                     Plan: HD this morning. GI and Nephrology have seen and are ok with dc to home.           Patient/family discussion r/t goals:           (what does living well look like to you)?    Resume normal activities, continue PD at home.            Palliative Performance Scale:        90    Palliative Care team will continue to follow and support, with ongoing review of pt's goals of care.                Electronically signed by Cinthia Maldonado RN on 4/23/2025 at 12:58 PM

## 2025-04-23 NOTE — CARE COORDINATION
Attempted to see pt pt is out of room at this time. Will attempt later for sw assessment and needs.  Electronically signed by DANGELO ZABALA on 4/23/2025 at 10:24 AM

## 2025-04-23 NOTE — CARE COORDINATION
04/23/25 0709   Readmission Assessment   Number of Days since last admission? 8-30 days   Previous Disposition Other (comment)  (left AMA)   Who is being Interviewed   (from chart)   What was the patient's/caregiver's perception as to why they think they needed to return back to the hospital? Unable to obtain medications;AMA discharge on prior admission;Other (Comment)  (Dizziness & pt states Iron RX not enough & PCP out of town)   Did you visit your Primary Care Physician after you left the hospital, before you returned this time? No   Why weren't you able to visit your PCP? Did not have an appointment   Did you see a specialist, such as Cardiac, Pulmonary, Orthopedic Physician, etc. after you left the hospital? Yes   Who advised the patient to return to the hospital? Self-referral   Does the patient report anything that got in the way of taking their medications? Yes   What reasons did they give? Other (Comment)  (Iron RX not enough & PCP out of town so took OTC Iron supplement)   In our efforts to provide the best possible care to you and others like you, can you think of anything that we could have done to help you after you left the hospital the first time, so that you might not have needed to return so soon? Other (Comment)  (nothing noted in chart)     Electronically signed by Vanessa Hawkins on 4/23/2025 at 2:52 PM

## 2025-04-23 NOTE — ACP (ADVANCE CARE PLANNING)
Advance Care Planning     Palliative Team Advance Care Planning (ACP) Conversation    Date of Conversation: 04/23/25    Individuals present for the conversation: Patient with decision making capacity     ACP documents on file prior to discussion:  -Other Pt states he has a notorized written document naming his wishes and his dtr Marly Gallagher as HCS. Asked him to please provide this document either to [facility] physician office or next visit to  [facility].    Previously completed document/s not on file:  Written notarized  document was completed previously but it is not available for review. This writer requested a copy of the document for patient's chart.     Healthcare Decision Maker:    Primary Decision Maker (Active): Marly Gallagher - Child - [phone]    Supplemental (Other) Decision Maker: Nolvia Santacruz - Brother/Sister - [phone]     Conversation Summary:      Resuscitation Status:   Code Status: Full Code     Documentation Completed:  -No new documents completed.    I spent 15 minutes with the patient and/or surrogate decision maker discussing the patient's wishes and goals.      Cinthia Maldonado RN     Electronically signed by Cinthia Maldonado RN on 4/23/2025 at 1:04 PM

## 2025-04-24 ENCOUNTER — TELEPHONE (OUTPATIENT)
Dept: PRIMARY CARE CLINIC | Age: 55
End: 2025-04-24

## 2025-04-24 NOTE — TELEPHONE ENCOUNTER
Care Transitions Initial Follow Up Call    Outreach made within 2 business days of discharge: Yes    Patient: Hema Gallagher   Patient : 1970   MRN: [id number]    Reason for Admission: Acute on chronic anemia   Discharge Date: 25       Spoke with: Raheem    Discharge department/facility: [facility], Patient left AMA    TCM Interactive Patient Contact:  Was patient able to fill all prescriptions: No: No new meds  Was patient instructed to bring all medications to the follow-up visit: Yes  Is patient taking all medications as directed in the discharge summary? Yes  Does patient understand their discharge instructions: Yes  Does patient have questions or concerns that need addressed prior to 7-14 day follow up office visit: no    Additional needs identified to be addressed with provider  No needs identified             Scheduled appointment with PCP within 7-14 days    Spoke with Hema. He states he feels fine now. He has not had any additional weakness or fatigue. He did not agree with the physicians at the hospital and chose to leave AMA . He voices no needs or concerns at this time. He will see his PCP on 25    Follow Up  Future Appointments   Date Time Provider Department Center   2025  7:15 AM Ale[facility] - [facility]   12/10/2025  8:00 AM Kacey Hernandez APRN [facility]       Tyra Jones MA

## 2025-04-24 NOTE — PROGRESS NOTES
PT left adlib with  and belongings AMA, refused to sign AMA paperwork, reports \"the doctors here don't listen\" but refused to elaborate, refused education on risks, hospitalist and charge RN notified, appeared to be upset but under no physical distress and reported he \"was fine and wanted to go home.\"

## 2025-04-24 NOTE — DISCHARGE SUMMARY
[address] [place] [address]  [facility]    DISCHARGE SUMMARY:        Hema Gallagher  :  1970  MRN:  [id number]    Admit date:  2025  Discharge date:    2025    Admitting Physician:  Braydon Bravo MD    Advance Directive: Prior    Consults Made:   IP CONSULT TO NEPHROLOGY  PALLIATIVE CARE EVAL  IP CONSULT TO GI      Primary Care Physician:  Aletha Del Cid, APRN - CNP    Discharge Diagnoses:  Principal Problem:    Acute on chronic anemia  Active Problems:    Essential hypertension    Type 2 diabetes mellitus with chronic kidney disease on chronic dialysis, without long-term current use of insulin (HCC)    ESRD on hemodialysis (HCC)    Severe anemia  Resolved Problems:    * No resolved hospital problems. *          Pertinent Labs:  CBC with DIFF:  Recent Labs     25  1206 25  1945 25  0154 25  1751   WBC 10.0  --  12.3*  --    RBC 1.64*  --  2.40*  --    HGB 4.9* 6.4* 7.3* 7.7*   HCT 15.0* 18.1*  18.0* 21.9* 23.5*   MCV 91.5  --  91.3  --    MCH 29.9  --  30.4  --    MCHC 32.7*  --  33.3  --    RDW 14.1  --  14.3  --      --  170  --    MPV 9.4  --  9.7  --    NEUTOPHILPCT 61.0  --  66.0*  --    LYMPHOPCT 16.6*  --  26.0  --    MONOPCT 15.0*  --  5.0  --    BASOPCT 0.3  --  0.0  --    NEUTROABS 6.1  --  8.1*  --    LYMPHSABS 1.7  --  3.2  --    MONOSABS 1.50*  --  0.60  --    EOSABS 0.40  --  0.37  --    BASOSABS 0.00  --  0.00  --        CMP/BMP:  Recent Labs     25  1206 25  0155    142   K 5.1 4.9   CL 96* 95*   CO2 29 30*   ANIONGAP 15 17*   GLUCOSE 110* 90   BUN 74* 83*   CREATININE 14.5* 15.8*   LABGLOM 4* 3*   CALCIUM 9.7 9.9   BILITOT 0.7  --    ALKPHOS 57  --    ALT 29  --    AST 24  --          CRP:  No results for input(s): \"CRP\" in the last 72 hours.  Sed Rate:  No results for input(s): \"SEDRATE\" in the last 72 hours.      HgBA1c:  No components found for: \"HGBA1C\"  FLP:    Lab Results   Component Value

## 2025-04-28 ENCOUNTER — HOSPITAL ENCOUNTER (INPATIENT)
Age: 55
LOS: 1 days | Discharge: HOME | DRG: 291 | End: 2025-04-30
Attending: STUDENT IN AN ORGANIZED HEALTH CARE EDUCATION/TRAINING PROGRAM
Payer: MEDICARE

## 2025-04-28 ENCOUNTER — RESULTS FOLLOW-UP (OUTPATIENT)
Dept: PRIMARY CARE CLINIC | Age: 55
End: 2025-04-28

## 2025-04-28 ENCOUNTER — OFFICE VISIT (OUTPATIENT)
Dept: PRIMARY CARE CLINIC | Age: 55
End: 2025-04-28

## 2025-04-28 VITALS
WEIGHT: 257 LBS | OXYGEN SATURATION: 94 % | TEMPERATURE: 97.9 F | BODY MASS INDEX: 40.34 KG/M2 | HEART RATE: 64 BPM | HEIGHT: 67 IN | DIASTOLIC BLOOD PRESSURE: 68 MMHG | SYSTOLIC BLOOD PRESSURE: 130 MMHG

## 2025-04-28 DIAGNOSIS — N18.6 ESRD ON HEMODIALYSIS (HCC): ICD-10-CM

## 2025-04-28 DIAGNOSIS — N18.6 ESRD (END STAGE RENAL DISEASE) (HCC): ICD-10-CM

## 2025-04-28 DIAGNOSIS — N18.6 ESRD (END STAGE RENAL DISEASE) ON DIALYSIS (HCC): ICD-10-CM

## 2025-04-28 DIAGNOSIS — Z99.2 ESRD (END STAGE RENAL DISEASE) ON DIALYSIS (HCC): ICD-10-CM

## 2025-04-28 DIAGNOSIS — J30.2 SEASONAL ALLERGIES: ICD-10-CM

## 2025-04-28 DIAGNOSIS — D64.9 ACUTE ON CHRONIC ANEMIA: ICD-10-CM

## 2025-04-28 DIAGNOSIS — Z09 HOSPITAL DISCHARGE FOLLOW-UP: Primary | ICD-10-CM

## 2025-04-28 DIAGNOSIS — Z99.2 ESRD ON HEMODIALYSIS (HCC): ICD-10-CM

## 2025-04-28 DIAGNOSIS — I10 ESSENTIAL HYPERTENSION: ICD-10-CM

## 2025-04-28 DIAGNOSIS — E87.5 HYPERKALEMIA: ICD-10-CM

## 2025-04-28 DIAGNOSIS — D63.8 ANEMIA IN OTHER CHRONIC DISEASES CLASSIFIED ELSEWHERE: Primary | ICD-10-CM

## 2025-04-28 LAB
ABO/RH: NORMAL
ALBUMIN SERPL-MCNC: 4 G/DL (ref 3.5–5.2)
ALP SERPL-CCNC: 58 U/L (ref 40–129)
ALT SERPL-CCNC: 25 U/L (ref 10–50)
ANION GAP SERPL CALCULATED.3IONS-SCNC: 18 MMOL/L (ref 8–16)
ANTIBODY SCREEN: NORMAL
APTT PPP: 39.4 SEC (ref 26–36.2)
AST SERPL-CCNC: 20 U/L (ref 10–50)
BASOPHILS # BLD: 0 K/UL (ref 0–0.2)
BASOPHILS # BLD: 0 K/UL (ref 0–0.2)
BASOPHILS NFR BLD: 0.3 % (ref 0–1)
BASOPHILS NFR BLD: 0.3 % (ref 0–1)
BILIRUB SERPL-MCNC: 0.5 MG/DL (ref 0.2–1.2)
BLOOD BANK DISPENSE STATUS: NORMAL
BLOOD BANK PRODUCT CODE: NORMAL
BPU ID: NORMAL
BUN SERPL-MCNC: 94 MG/DL (ref 6–20)
CALCIUM SERPL-MCNC: 9.5 MG/DL (ref 8.6–10)
CHLORIDE SERPL-SCNC: 97 MMOL/L (ref 98–107)
CO2 SERPL-SCNC: 25 MMOL/L (ref 22–29)
CREAT SERPL-MCNC: 16.4 MG/DL (ref 0.7–1.2)
DESCRIPTION BLOOD BANK: NORMAL
EOSINOPHIL # BLD: 0.4 K/UL (ref 0–0.6)
EOSINOPHIL # BLD: 0.4 K/UL (ref 0–0.6)
EOSINOPHIL NFR BLD: 6.5 % (ref 0–5)
EOSINOPHIL NFR BLD: 7 % (ref 0–5)
ERYTHROCYTE [DISTWIDTH] IN BLOOD BY AUTOMATED COUNT: 15.1 % (ref 11.5–14.5)
ERYTHROCYTE [DISTWIDTH] IN BLOOD BY AUTOMATED COUNT: 15.2 % (ref 11.5–14.5)
GLUCOSE SERPL-MCNC: 126 MG/DL (ref 70–99)
HCT VFR BLD AUTO: 20.4 % (ref 42–52)
HCT VFR BLD AUTO: 21.5 % (ref 42–52)
HGB BLD-MCNC: 6.7 G/DL (ref 14–18)
HGB BLD-MCNC: 6.9 G/DL (ref 14–18)
IMM GRANULOCYTES # BLD: 0 K/UL
IMM GRANULOCYTES # BLD: 0 K/UL
INR PPP: 1.18 (ref 0.88–1.18)
LYMPHOCYTES # BLD: 1.3 K/UL (ref 1.1–4.5)
LYMPHOCYTES # BLD: 1.4 K/UL (ref 1.1–4.5)
LYMPHOCYTES NFR BLD: 21.7 % (ref 20–40)
LYMPHOCYTES NFR BLD: 22.6 % (ref 20–40)
MCH RBC QN AUTO: 30.2 PG (ref 27–31)
MCH RBC QN AUTO: 30.4 PG (ref 27–31)
MCHC RBC AUTO-ENTMCNC: 32.1 G/DL (ref 33–37)
MCHC RBC AUTO-ENTMCNC: 32.8 G/DL (ref 33–37)
MCV RBC AUTO: 91.9 FL (ref 80–94)
MCV RBC AUTO: 94.7 FL (ref 80–94)
MONOCYTES # BLD: 0.6 K/UL (ref 0–0.9)
MONOCYTES # BLD: 0.7 K/UL (ref 0–0.9)
MONOCYTES NFR BLD: 10.2 % (ref 0–10)
MONOCYTES NFR BLD: 11 % (ref 0–10)
NEUTROPHILS # BLD: 3.4 K/UL (ref 1.5–7.5)
NEUTROPHILS # BLD: 3.9 K/UL (ref 1.5–7.5)
NEUTS SEG NFR BLD: 58.9 % (ref 50–65)
NEUTS SEG NFR BLD: 61 % (ref 50–65)
PLATELET # BLD AUTO: 128 K/UL (ref 130–400)
PLATELET # BLD AUTO: 135 K/UL (ref 130–400)
PMV BLD AUTO: 10.4 FL (ref 9.4–12.4)
PMV BLD AUTO: 9.2 FL (ref 9.4–12.4)
POTASSIUM SERPL-SCNC: 5.4 MMOL/L (ref 3.5–5.1)
PROT SERPL-MCNC: 7 G/DL (ref 6.4–8.3)
PROTHROMBIN TIME: 14.9 SEC (ref 12–14.6)
RBC # BLD AUTO: 2.22 M/UL (ref 4.7–6.1)
RBC # BLD AUTO: 2.27 M/UL (ref 4.7–6.1)
SODIUM SERPL-SCNC: 140 MMOL/L (ref 136–145)
WBC # BLD AUTO: 5.8 K/UL (ref 4.8–10.8)
WBC # BLD AUTO: 6.5 K/UL (ref 4.8–10.8)

## 2025-04-28 PROCEDURE — 99285 EMERGENCY DEPT VISIT HI MDM: CPT

## 2025-04-28 PROCEDURE — 86850 RBC ANTIBODY SCREEN: CPT

## 2025-04-28 PROCEDURE — 86923 COMPATIBILITY TEST ELECTRIC: CPT

## 2025-04-28 PROCEDURE — 85730 THROMBOPLASTIN TIME PARTIAL: CPT

## 2025-04-28 PROCEDURE — 6360000002 HC RX W HCPCS: Performed by: NURSE PRACTITIONER

## 2025-04-28 PROCEDURE — 6370000000 HC RX 637 (ALT 250 FOR IP): Performed by: NURSE PRACTITIONER

## 2025-04-28 PROCEDURE — 36430 TRANSFUSION BLD/BLD COMPNT: CPT

## 2025-04-28 PROCEDURE — 80053 COMPREHEN METABOLIC PANEL: CPT

## 2025-04-28 PROCEDURE — 30233N1 TRANSFUSION OF NONAUTOLOGOUS RED BLOOD CELLS INTO PERIPHERAL VEIN, PERCUTANEOUS APPROACH: ICD-10-PCS | Performed by: HOSPITALIST

## 2025-04-28 PROCEDURE — 86900 BLOOD TYPING SEROLOGIC ABO: CPT

## 2025-04-28 PROCEDURE — 96374 THER/PROPH/DIAG INJ IV PUSH: CPT

## 2025-04-28 PROCEDURE — 85610 PROTHROMBIN TIME: CPT

## 2025-04-28 PROCEDURE — 36415 COLL VENOUS BLD VENIPUNCTURE: CPT

## 2025-04-28 PROCEDURE — 86901 BLOOD TYPING SEROLOGIC RH(D): CPT

## 2025-04-28 PROCEDURE — 85025 COMPLETE CBC W/AUTO DIFF WBC: CPT

## 2025-04-28 PROCEDURE — P9016 RBC LEUKOCYTES REDUCED: HCPCS

## 2025-04-28 RX ORDER — LORATADINE 10 MG/1
10 TABLET ORAL DAILY
Qty: 30 TABLET | Refills: 0 | Status: SHIPPED | OUTPATIENT
Start: 2025-04-28 | End: 2025-05-28

## 2025-04-28 RX ORDER — SODIUM CHLORIDE 9 MG/ML
INJECTION, SOLUTION INTRAVENOUS PRN
Status: DISCONTINUED | OUTPATIENT
Start: 2025-04-28 | End: 2025-04-30 | Stop reason: HOSPADM

## 2025-04-28 RX ORDER — PANTOPRAZOLE SODIUM 40 MG/10ML
80 INJECTION, POWDER, LYOPHILIZED, FOR SOLUTION INTRAVENOUS ONCE
Status: COMPLETED | OUTPATIENT
Start: 2025-04-28 | End: 2025-04-28

## 2025-04-28 RX ADMIN — SODIUM ZIRCONIUM CYCLOSILICATE 10 G: 10 POWDER, FOR SUSPENSION ORAL at 23:48

## 2025-04-28 RX ADMIN — PANTOPRAZOLE SODIUM 80 MG: 40 INJECTION, POWDER, FOR SOLUTION INTRAVENOUS at 23:48

## 2025-04-28 ASSESSMENT — ENCOUNTER SYMPTOMS
SHORTNESS OF BREATH: 1
BLOOD IN STOOL: 0
VOMITING: 0

## 2025-04-28 NOTE — PROGRESS NOTES
Post-Discharge Transitional Care  Follow Up      Hema Gallagher   YOB: 1970    Date of Office Visit:  4/28/2025  Date of Hospital Admission: 4/22/25  Date of Hospital Discharge: 4/23/25  Risk of hospital readmission (high >=14%. Medium >=10%) :Readmission Risk Score: 24.4      Care management risk score Rising risk (score 2-5) and Complex Care (Scores >=6): No Risk Score On File     Non face to face  following discharge, date last encounter closed (first attempt may have been earlier): 04/24/2025    Call initiated 2 business days of discharge: Yes    ASSESSMENT/PLAN:   Hospital discharge follow-up  -     [place] DISCHARGE MEDS RECONCILED W/ CURRENT OUTPATIENT MED LIST  ESRD on hemodialysis (HCC)  Essential hypertension  -     CBC with Auto Differential; Future  ESRD (end stage renal disease) (HCC)  -     CBC with Auto Differential; Future  Acute on chronic anemia  -     CBC with Auto Differential; Future  Seasonal allergies  -     loratadine (CLARITIN) 10 MG tablet; Take 1 tablet by mouth daily, Disp-30 tablet, R-0Normal    Medical Decision Making: high complexity  Return in about 6 weeks (around 6/11/2025), or if symptoms worsen or fail to improve.    On this date 4/28/2025 I have spent 35 minutes reviewing previous notes, test results and face to face with the patient discussing the diagnosis and importance of compliance with the treatment plan as well as documenting on the day of the visit.       Subjective:   HPI:  Follow up of Hospital problems/diagnosis(es): Acute on chronic anemia, essential hypertension, type 2 diabetes mellitus with chronic kidney disease on chronic dialysis without long-term current use of insulin, end-stage renal disease on hemodialysis, severe anemia    Inpatient course: Discharge summary reviewed- see chart.    Interval history/Current status: Is feeling better and denies any weakness or fatigue. Did get 2 units of packed red blood cells and is feeling better. Did get his

## 2025-04-29 PROBLEM — D64.9 ANEMIA: Status: ACTIVE | Noted: 2025-04-29

## 2025-04-29 PROBLEM — K92.2 GASTROINTESTINAL HEMORRHAGE: Status: ACTIVE | Noted: 2025-04-28

## 2025-04-29 LAB
ALBUMIN SERPL-MCNC: 4.3 G/DL (ref 3.5–5.2)
ALP SERPL-CCNC: 60 U/L (ref 40–129)
ALT SERPL-CCNC: 28 U/L (ref 10–50)
ANION GAP SERPL CALCULATED.3IONS-SCNC: 21 MMOL/L (ref 8–16)
AST SERPL-CCNC: 25 U/L (ref 10–50)
BILIRUB SERPL-MCNC: 0.8 MG/DL (ref 0.2–1.2)
BUN SERPL-MCNC: 98 MG/DL (ref 6–20)
CALCIUM SERPL-MCNC: 9.5 MG/DL (ref 8.6–10)
CHLORIDE SERPL-SCNC: 98 MMOL/L (ref 98–107)
CO2 SERPL-SCNC: 21 MMOL/L (ref 22–29)
CREAT SERPL-MCNC: 17 MG/DL (ref 0.7–1.2)
FERRITIN SERPL-MCNC: 1925 NG/ML (ref 30–400)
GLUCOSE BLD-MCNC: 103 MG/DL (ref 70–99)
GLUCOSE BLD-MCNC: 186 MG/DL (ref 70–99)
GLUCOSE SERPL-MCNC: 103 MG/DL (ref 70–99)
HCT VFR BLD AUTO: 24 % (ref 42–52)
HGB BLD-MCNC: 7.8 G/DL (ref 14–18)
IRON SATN MFR SERPL: 33 % (ref 20–50)
IRON SERPL-MCNC: 78 UG/DL (ref 59–158)
PERFORMED ON: ABNORMAL
PERFORMED ON: ABNORMAL
POTASSIUM SERPL-SCNC: 5.3 MMOL/L (ref 3.5–5)
PROT SERPL-MCNC: 7.1 G/DL (ref 6.4–8.3)
PTH-INTACT SERPL-MCNC: 558 PG/ML (ref 15–65)
SODIUM SERPL-SCNC: 140 MMOL/L (ref 136–145)
TIBC SERPL-MCNC: 240 UG/DL (ref 250–400)

## 2025-04-29 PROCEDURE — 80053 COMPREHEN METABOLIC PANEL: CPT

## 2025-04-29 PROCEDURE — 2580000003 HC RX 258: Performed by: NURSE PRACTITIONER

## 2025-04-29 PROCEDURE — 6370000000 HC RX 637 (ALT 250 FOR IP): Performed by: EMERGENCY MEDICINE

## 2025-04-29 PROCEDURE — 85018 HEMOGLOBIN: CPT

## 2025-04-29 PROCEDURE — 94760 N-INVAS EAR/PLS OXIMETRY 1: CPT

## 2025-04-29 PROCEDURE — 6370000000 HC RX 637 (ALT 250 FOR IP): Performed by: STUDENT IN AN ORGANIZED HEALTH CARE EDUCATION/TRAINING PROGRAM

## 2025-04-29 PROCEDURE — 8010000000 HC HEMODIALYSIS ACUTE INPT

## 2025-04-29 PROCEDURE — 6370000000 HC RX 637 (ALT 250 FOR IP): Performed by: NURSE PRACTITIONER

## 2025-04-29 PROCEDURE — 5A1D70Z PERFORMANCE OF URINARY FILTRATION, INTERMITTENT, LESS THAN 6 HOURS PER DAY: ICD-10-PCS | Performed by: INTERNAL MEDICINE

## 2025-04-29 PROCEDURE — 6360000002 HC RX W HCPCS: Performed by: NURSE PRACTITIONER

## 2025-04-29 PROCEDURE — 82962 GLUCOSE BLOOD TEST: CPT

## 2025-04-29 PROCEDURE — 6360000002 HC RX W HCPCS: Performed by: STUDENT IN AN ORGANIZED HEALTH CARE EDUCATION/TRAINING PROGRAM

## 2025-04-29 PROCEDURE — 1200000000 HC SEMI PRIVATE

## 2025-04-29 PROCEDURE — 83970 ASSAY OF PARATHORMONE: CPT

## 2025-04-29 PROCEDURE — 36415 COLL VENOUS BLD VENIPUNCTURE: CPT

## 2025-04-29 PROCEDURE — 99222 1ST HOSP IP/OBS MODERATE 55: CPT | Performed by: INTERNAL MEDICINE

## 2025-04-29 PROCEDURE — 83540 ASSAY OF IRON: CPT

## 2025-04-29 PROCEDURE — 85014 HEMATOCRIT: CPT

## 2025-04-29 PROCEDURE — 83550 IRON BINDING TEST: CPT

## 2025-04-29 PROCEDURE — 82728 ASSAY OF FERRITIN: CPT

## 2025-04-29 PROCEDURE — 2500000003 HC RX 250 WO HCPCS: Performed by: STUDENT IN AN ORGANIZED HEALTH CARE EDUCATION/TRAINING PROGRAM

## 2025-04-29 RX ORDER — ONDANSETRON 2 MG/ML
4 INJECTION INTRAMUSCULAR; INTRAVENOUS EVERY 6 HOURS PRN
Status: DISCONTINUED | OUTPATIENT
Start: 2025-04-29 | End: 2025-04-30 | Stop reason: HOSPADM

## 2025-04-29 RX ORDER — DEXTROSE MONOHYDRATE 100 MG/ML
INJECTION, SOLUTION INTRAVENOUS CONTINUOUS PRN
Status: DISCONTINUED | OUTPATIENT
Start: 2025-04-29 | End: 2025-04-30 | Stop reason: HOSPADM

## 2025-04-29 RX ORDER — CINACALCET 30 MG/1
30 TABLET, FILM COATED ORAL DAILY
Status: DISCONTINUED | OUTPATIENT
Start: 2025-04-29 | End: 2025-04-30 | Stop reason: HOSPADM

## 2025-04-29 RX ORDER — CLONIDINE HYDROCHLORIDE 0.1 MG/1
0.1 TABLET ORAL 2 TIMES DAILY
Status: DISCONTINUED | OUTPATIENT
Start: 2025-04-29 | End: 2025-04-30 | Stop reason: HOSPADM

## 2025-04-29 RX ORDER — ACETAMINOPHEN 650 MG/1
650 SUPPOSITORY RECTAL EVERY 6 HOURS PRN
Status: DISCONTINUED | OUTPATIENT
Start: 2025-04-29 | End: 2025-04-30 | Stop reason: HOSPADM

## 2025-04-29 RX ORDER — INSULIN LISPRO 100 [IU]/ML
0-4 INJECTION, SOLUTION INTRAVENOUS; SUBCUTANEOUS
Status: DISCONTINUED | OUTPATIENT
Start: 2025-04-29 | End: 2025-04-30 | Stop reason: HOSPADM

## 2025-04-29 RX ORDER — NIFEDIPINE 60 MG/1
60 TABLET, EXTENDED RELEASE ORAL 2 TIMES DAILY
Status: DISCONTINUED | OUTPATIENT
Start: 2025-04-29 | End: 2025-04-30 | Stop reason: HOSPADM

## 2025-04-29 RX ORDER — HYDRALAZINE HYDROCHLORIDE 25 MG/1
25 TABLET, FILM COATED ORAL EVERY 8 HOURS SCHEDULED
Status: DISCONTINUED | OUTPATIENT
Start: 2025-04-29 | End: 2025-04-30 | Stop reason: HOSPADM

## 2025-04-29 RX ORDER — CLONIDINE HYDROCHLORIDE 0.1 MG/1
0.1 TABLET ORAL ONCE
Status: COMPLETED | OUTPATIENT
Start: 2025-04-29 | End: 2025-04-29

## 2025-04-29 RX ORDER — SEVELAMER CARBONATE 800 MG/1
1600 TABLET, FILM COATED ORAL
Status: DISCONTINUED | OUTPATIENT
Start: 2025-04-29 | End: 2025-04-30 | Stop reason: HOSPADM

## 2025-04-29 RX ORDER — ACETAMINOPHEN 325 MG/1
650 TABLET ORAL EVERY 6 HOURS PRN
Status: DISCONTINUED | OUTPATIENT
Start: 2025-04-29 | End: 2025-04-30 | Stop reason: HOSPADM

## 2025-04-29 RX ORDER — SODIUM CHLORIDE 0.9 % (FLUSH) 0.9 %
5-40 SYRINGE (ML) INJECTION PRN
Status: DISCONTINUED | OUTPATIENT
Start: 2025-04-29 | End: 2025-04-30 | Stop reason: HOSPADM

## 2025-04-29 RX ORDER — SODIUM CHLORIDE 9 MG/ML
INJECTION, SOLUTION INTRAVENOUS PRN
Status: DISCONTINUED | OUTPATIENT
Start: 2025-04-29 | End: 2025-04-30 | Stop reason: HOSPADM

## 2025-04-29 RX ORDER — POLYETHYLENE GLYCOL 3350 17 G/17G
17 POWDER, FOR SOLUTION ORAL DAILY PRN
Status: DISCONTINUED | OUTPATIENT
Start: 2025-04-29 | End: 2025-04-30 | Stop reason: HOSPADM

## 2025-04-29 RX ORDER — GLUCAGON 1 MG/ML
1 KIT INJECTION PRN
Status: DISCONTINUED | OUTPATIENT
Start: 2025-04-29 | End: 2025-04-30 | Stop reason: HOSPADM

## 2025-04-29 RX ORDER — CALCITRIOL 0.25 UG/1
0.5 CAPSULE, LIQUID FILLED ORAL 2 TIMES DAILY
Status: DISCONTINUED | OUTPATIENT
Start: 2025-04-29 | End: 2025-04-30 | Stop reason: HOSPADM

## 2025-04-29 RX ORDER — METOPROLOL TARTRATE 25 MG/1
25 TABLET, FILM COATED ORAL 2 TIMES DAILY
Status: DISCONTINUED | OUTPATIENT
Start: 2025-04-29 | End: 2025-04-30 | Stop reason: HOSPADM

## 2025-04-29 RX ORDER — SODIUM CHLORIDE 0.9 % (FLUSH) 0.9 %
5-40 SYRINGE (ML) INJECTION EVERY 12 HOURS SCHEDULED
Status: DISCONTINUED | OUTPATIENT
Start: 2025-04-29 | End: 2025-04-30 | Stop reason: HOSPADM

## 2025-04-29 RX ORDER — HYDRALAZINE HYDROCHLORIDE 20 MG/ML
20 INJECTION INTRAMUSCULAR; INTRAVENOUS EVERY 6 HOURS PRN
Status: DISCONTINUED | OUTPATIENT
Start: 2025-04-29 | End: 2025-04-30 | Stop reason: HOSPADM

## 2025-04-29 RX ORDER — ONDANSETRON 4 MG/1
4 TABLET, ORALLY DISINTEGRATING ORAL EVERY 8 HOURS PRN
Status: DISCONTINUED | OUTPATIENT
Start: 2025-04-29 | End: 2025-04-30 | Stop reason: HOSPADM

## 2025-04-29 RX ADMIN — CINACALCET HYDROCHLORIDE 30 MG: 30 TABLET, FILM COATED ORAL at 09:17

## 2025-04-29 RX ADMIN — CALCITRIOL CAPSULES 0.25 MCG 0.5 MCG: 0.25 CAPSULE ORAL at 20:13

## 2025-04-29 RX ADMIN — SODIUM CHLORIDE, PRESERVATIVE FREE 40 MG: 5 INJECTION INTRAVENOUS at 17:08

## 2025-04-29 RX ADMIN — METOPROLOL TARTRATE 25 MG: 25 TABLET, FILM COATED ORAL at 09:17

## 2025-04-29 RX ADMIN — SODIUM ZIRCONIUM CYCLOSILICATE 10 G: 5 POWDER, FOR SUSPENSION ORAL at 09:17

## 2025-04-29 RX ADMIN — SEVELAMER CARBONATE 1600 MG: 800 TABLET, FILM COATED ORAL at 17:09

## 2025-04-29 RX ADMIN — HYDRALAZINE HYDROCHLORIDE 20 MG: 20 INJECTION INTRAMUSCULAR; INTRAVENOUS at 05:59

## 2025-04-29 RX ADMIN — NIFEDIPINE 60 MG: 60 TABLET, EXTENDED RELEASE ORAL at 09:17

## 2025-04-29 RX ADMIN — CLONIDINE HYDROCHLORIDE 0.1 MG: 0.1 TABLET ORAL at 04:52

## 2025-04-29 RX ADMIN — METOPROLOL TARTRATE 25 MG: 25 TABLET, FILM COATED ORAL at 20:15

## 2025-04-29 RX ADMIN — SEVELAMER CARBONATE 1600 MG: 800 TABLET, FILM COATED ORAL at 11:32

## 2025-04-29 RX ADMIN — CALCITRIOL CAPSULES 0.25 MCG 0.5 MCG: 0.25 CAPSULE ORAL at 09:17

## 2025-04-29 RX ADMIN — SODIUM CHLORIDE, PRESERVATIVE FREE 10 ML: 5 INJECTION INTRAVENOUS at 09:22

## 2025-04-29 RX ADMIN — NIFEDIPINE 60 MG: 60 TABLET, EXTENDED RELEASE ORAL at 20:15

## 2025-04-29 RX ADMIN — SODIUM ZIRCONIUM CYCLOSILICATE 10 G: 5 POWDER, FOR SUSPENSION ORAL at 20:15

## 2025-04-29 RX ADMIN — SODIUM CHLORIDE, PRESERVATIVE FREE 10 ML: 5 INJECTION INTRAVENOUS at 20:16

## 2025-04-29 SDOH — ECONOMIC STABILITY: FOOD INSECURITY: WITHIN THE PAST 12 MONTHS, YOU WORRIED THAT YOUR FOOD WOULD RUN OUT BEFORE YOU GOT MONEY TO BUY MORE.: NEVER TRUE

## 2025-04-29 SDOH — ECONOMIC STABILITY: INCOME INSECURITY: HOW HARD IS IT FOR YOU TO PAY FOR THE VERY BASICS LIKE FOOD, HOUSING, MEDICAL CARE, AND HEATING?: NOT HARD AT ALL

## 2025-04-29 SDOH — ECONOMIC STABILITY: INCOME INSECURITY: IN THE PAST 12 MONTHS, HAS THE ELECTRIC, GAS, OIL, OR WATER COMPANY THREATENED TO SHUT OFF SERVICE IN YOUR HOME?: NO

## 2025-04-29 ASSESSMENT — ENCOUNTER SYMPTOMS
COUGH: 0
WHEEZING: 0
ABDOMINAL DISTENTION: 0
BLOOD IN STOOL: 0
DIARRHEA: 0
NAUSEA: 0
COLOR CHANGE: 0
SHORTNESS OF BREATH: 1
ABDOMINAL PAIN: 0
VOMITING: 0
CONSTIPATION: 0

## 2025-04-29 ASSESSMENT — PATIENT HEALTH QUESTIONNAIRE - PHQ9
SUM OF ALL RESPONSES TO PHQ QUESTIONS 1-9: 1
1. LITTLE INTEREST OR PLEASURE IN DOING THINGS: SEVERAL DAYS
SUM OF ALL RESPONSES TO PHQ QUESTIONS 1-9: 1
2. FEELING DOWN, DEPRESSED OR HOPELESS: NOT AT ALL

## 2025-04-29 NOTE — H&P
Home Medications:  Prior to Admission medications    Medication Sig Start Date End Date Taking? Authorizing Provider   loratadine (CLARITIN) 10 MG tablet Take 1 tablet by mouth daily 4/28/25 5/28/25  Aletha Del Cid APRN - CNP   calcitRIOL (ROCALTROL) 0.5 MCG capsule Take 1 capsule by mouth 2 times daily 1/10/25   Susan Fox MD   metoprolol tartrate (LOPRESSOR) 25 MG tablet Take 1 tablet by mouth 2 times daily 12/9/24   Aletha Del Cid APRN - CNP   NIFEdipine (ADALAT CC) 60 MG extended release tablet Take 1 tablet by mouth in the morning and at bedtime 12/9/24   Aletha Del Cid APRN - CNP   simvastatin (ZOCOR) 10 MG tablet Take 1 tablet by mouth nightly 12/9/24   Aletha Del Cid APRN - CNP   hydrALAZINE (APRESOLINE) 25 MG tablet TAKE 1 TABLET BY MOUTH EVERY 8 HOURS 11/27/24   Aletha Del Cid APRN - CNP   cloNIDine (CATAPRES) 0.1 MG tablet Take 1 tablet by mouth 2 times daily  Patient taking differently: Take 1 tablet by mouth as needed for High Blood Pressure 4/30/24   Aletha Del Cid APRN - CNP   albuterol sulfate HFA (PROVENTIL;VENTOLIN;PROAIR) 108 (90 Base) MCG/ACT inhaler Inhale 2 puffs into the lungs every 6 hours as needed for Wheezing 9/14/23   Aletha Del Cid APRN - CNP   cinacalcet (SENSIPAR) 30 MG tablet Take 1 tablet by mouth daily 2/5/23   Susan Fox MD   AURYXIA 1  MG(Fe) TABS tablet Take 3 tablets by mouth 3 times daily (with meals) 1/30/23   Susan Fox MD   Cyanocobalamin (VITAMIN B 12 PO) Take 1 tablet by mouth daily     Susan Fox MD   calcium carbonate (OYSTER SHELL CALCIUM 500 MG) 1250 (500 Ca) MG tablet Take 1 tablet by mouth daily    Susan Fox MD       Allergies:    Banana, Atorvastatin, and Lisinopril    Social History:    The patient currently lives at home  Tobacco:   reports that he has never smoked. He has never used smokeless tobacco.  Alcohol:   reports that he does not currently use alcohol.  Illicit Drugs: denies    Family History:     100mg/dL 08/02/2021 12:00 AM    GLUCOSEU 100 MG 12/23/2020 03:39 PM         Rad:    No results found.    Assessment/Plan:   Principal Problem:    Acute on chronic anemia  Active Problems:    Essential hypertension    Type 2 diabetes mellitus with chronic kidney disease on chronic dialysis, without long-term current use of insulin ([facility])    ESRD on hemodialysis ([facility])    Anemia  Resolved Problems:    * No resolved hospital problems. *     Principal Problem:    Acute on chronic anemia/ Anemia of CKD-    - GI consulted               - IV Protonix twice daily               - Avoid NSAIDs/anticoagulations              - Monitor H&H closely              - Transfuse for hemoglobin less than 7 per protocol              - Monitor stools for color               - Monitor on telemetry       Active Problems:    Essential hypertension-    - Resume home medications       Type 2 diabetes mellitus with chronic kidney disease on chronic dialysis, without long-term current use of insulin ([facility])-    - Recent A1c (4/23/25) 5.5   - SSI   - Accu-Checks    - Hypoglycemia treatment protocol in place       ESRD on hemodialysis (HCC)/ Hyperkalemia-    - Nephrology consulted    - HD per nephrology    [facility]    - Monitor labs         DVT Prophylaxis: SCDs      GI prophylaxis:  Protonix      Discharge planning:  TBD     Advance Directive: Full Code    Diet: Diet NPO Exceptions are: Sips of Water with Meds  ADULT DIET; Regular; 3 carb choices (45 gm/meal); Low Sodium (2 gm); Low Potassium (Less than 3000 mg/day)     Consults Made:   IP CONSULT TO GI  IP CONSULT TO NEPHROLOGY  PALLIATIVE CARE EVAL    Further Orders per Clinical course/attending.     Signed:  Electronically signed by SOSA Miller CNP on 4/29/25 at 12:46 PM CDT     EMR Dragon/Transcription disclaimer:   Much of this encounter note is an electronic transcription/translation of spoken language to printed text. The electronic translation of spoken language may permit

## 2025-04-29 NOTE — ED PROVIDER NOTES
REMOVAL AND FISTULAGRAM performed by Talisha Peoples DO at [facility] OR    VASCULAR SURGERY Left 7/30/2024    LEFT UPPER FISTULAGRAM, PERCUTANEOUS THROMBECTOMY, BALLOON ANGIOPLASTY performed by Unruly Oseguera MD at [facility] OR    VASCULAR SURGERY Right 11/26/2024    RIGHT BASILIC VEIN TRANSPOSITION  WITH PLACEMENT OF RIGHT ARTEGRAFT performed by Unruly Oseguera MD at [facility] OR         CURRENT MEDICATIONS       Previous Medications    ALBUTEROL SULFATE HFA (PROVENTIL;VENTOLIN;PROAIR) 108 (90 BASE) MCG/ACT INHALER    Inhale 2 puffs into the lungs every 6 hours as needed for Wheezing    AURYXIA 1  MG(FE) TABS TABLET    Take 3 tablets by mouth 3 times daily (with meals)    CALCITRIOL (ROCALTROL) 0.5 MCG CAPSULE    Take 1 capsule by mouth 2 times daily    CALCIUM CARBONATE (OYSTER SHELL CALCIUM 500 MG) 1250 (500 CA) MG TABLET    Take 1 tablet by mouth daily    CINACALCET (SENSIPAR) 30 MG TABLET    Take 1 tablet by mouth daily    CLONIDINE (CATAPRES) 0.1 MG TABLET    Take 1 tablet by mouth 2 times daily    CYANOCOBALAMIN (VITAMIN B 12 PO)    Take 1 tablet by mouth daily     HYDRALAZINE (APRESOLINE) 25 MG TABLET    TAKE 1 TABLET BY MOUTH EVERY 8 HOURS    LORATADINE (CLARITIN) 10 MG TABLET    Take 1 tablet by mouth daily    METOPROLOL TARTRATE (LOPRESSOR) 25 MG TABLET    Take 1 tablet by mouth 2 times daily    NIFEDIPINE (ADALAT CC) 60 MG EXTENDED RELEASE TABLET    Take 1 tablet by mouth in the morning and at bedtime    SIMVASTATIN (ZOCOR) 10 MG TABLET    Take 1 tablet by mouth nightly            Banana, Atorvastatin, and Lisinopril    FAMILY HISTORY       Family History   Problem Relation Age of Onset    Cancer Father         prostate    Kidney Disease Brother           SOCIAL HISTORY       Social History     Socioeconomic History    Marital status: Single     Spouse name: None    Number of children: None    Years of education: None    Highest education level: None   Tobacco Use    Smoking status: Never    Smokeless  tobacco: Never   Vaping Use    Vaping status: Never Used   Substance and Sexual Activity    Alcohol use: Not Currently    Drug use: No    Sexual activity: Yes     Partners: Female     Social Drivers of Health     Financial Resource Strain: Low Risk  (4/22/2025)    Overall Financial Resource Strain (CARDIA)     Difficulty of Paying Living Expenses: Not hard at all   Food Insecurity: No Food Insecurity (4/22/2025)    Hunger Vital Sign     Worried About Running Out of Food in the Last Year: Never true     Ran Out of Food in the Last Year: Never true   Transportation Needs: No Transportation Needs (4/22/2025)    Transportation Problems ([facility] HRSN)     In the past 12 months, has lack of reliable transportation kept you from medical appointments, meetings, work or from getting things needed for daily living?: No   Physical Activity: Unknown (4/22/2025)    Physical Activity ([facility] HRSN)     In the last 30 days, other than the activities you did for work, on average, how many days per week did you engage in moderate exercise (like walking fast, running, jogging, dancing, swimming, biking, or other similar activites)?: 0     Number of minutes of exercise per week:: 0   Stress: No Stress Concern Present (5/21/2023)    [employer] - Occupational Stress Questionnaire     Feeling of Stress : Not at all   Social Connections: Socially Integrated (4/22/2025)    Social Connections ([facility] HRSN)     If for any reason you need help with day-to-day activities such as bathing, preparing meals, shopping, managing finances, etc., do you get the help you need?: I get all the help I need    [facility]    Abuse Screen       SCREENINGS    Bracey Coma Scale  Eye Opening: Spontaneous  Best Verbal Response: Oriented  Best Motor Response: Obeys commands  Bracey Coma Scale Score: 15        PHYSICAL EXAM    (up to 7 for level 4, 8 or more for level 5)     ED Triage

## 2025-04-29 NOTE — CONSULTS
Palliative Care:   Mr. Gallagher is known to palliative care.  He was seen by PC RN last week on admission.  On previous admission pt declined intervention from DIDI leblanc he did not believe he had \"bleeding going on \" and he felt his machine(dialysis at home) was wrong.  He returned to ED 4/28 with c/o profound fatigue, SOA. His hbg on admission was 6.7   This morning pt is sitting up in chair, his fiance' is lying in bed.  He reports GI has seen him in consult and plans for EGD tomorrow.  Per Dr. Rayo, note pt is to rec routine HD today.         Past Medical History:        Past Medical History:   Diagnosis Date    Asthma     CHF (congestive heart failure) (HCC)     Diabetes mellitus (HCC)     Erectile dysfunction     Hemodialysis patient     monday, tuesday, thursday, and friday at home. hd    History of blood transfusion     Hyperlipidemia     Hypertension     Obesity     Palliative care patient 01/25/2021       Advance Directives:    Full Code           Pain/Other Symptoms:  Denies                   Psychological/Spiritual:  Has good spiritual and family support. Attends [employer].                     Plan:  EGD tomorrow, medical mangement        Patient/family discussion r/t goals:           (what does living well look like to you)?            Plans to return to his home and continue routine of home dialysis.     Palliative Performance Scale:        80    Palliative Care team will continue to follow and support, with ongoing review of pt's goals of care.                Electronically signed by Cinthia Maldonado RN on 4/29/2025 at 11:49 AM

## 2025-04-29 NOTE — PROGRESS NOTES
Rashmi called to 5th floor     Electronically signed by Nan Hernandez RN on 4/29/2025 at 6:05 AM

## 2025-04-29 NOTE — ED NOTES
Pt called out c/o SOB and requesting oxygen. Pt states that he does not wear O2 @ baseline, O2 93-94% on bedside monitor. Annia SWAN at bedside.

## 2025-04-29 NOTE — CONSULTS
Pt Name: Hema Gallagher  MRN: [id number]  [id number]  YOB: 1970  Admit Date: 4/28/2025 10:01 PM  Date of evaluation: 4/29/2025  Primary Care Physician: Aletha Del Cid, APRN - CNP   [id number]       Requesting Provider: Dr. Beverly Neal    GI Consult    Indication: Anemia.    History:  The patient is a 54 y.o. male admitted to the hospital for anemia.  Patient has multiple medical problems including end-stage renal disease and is on hemodialysis.  He was seen by me on April 23 and I offered him an upper GI endoscopy which he declined.  Apparently his labs been checked at his PCP office and was sent to the hospital as his hemoglobin was low.  Required 1 unit of blood transfusion.  He remained asymptomatic as far as the GI symptoms are concerned.  He has no heartburns or regurgitation.  No abdominal pain, cramping, gas or bloating.  No dysphagia odynophagia.  No change in stool color, consistency or caliber.  No hematochezia or melanotic stool.  His colonoscopy in December 2022 was essentially normal except for a polyp.  He does not recall any upper GI endoscopy examination.      Past Medical History:        Diagnosis Date    Asthma     CHF (congestive heart failure) (HCC)     Diabetes mellitus (HCC)     Erectile dysfunction     Hemodialysis patient     monday, tuesday, thursday, and friday at home. hd    History of blood transfusion     Hyperlipidemia     Hypertension     Obesity     Palliative care patient 01/25/2021     Past Surgical History:        Procedure Laterality Date    BRONCHOSCOPY Left 05/24/2023    BRONCHOSCOPY ADD ON COMPUTER ASSISTED performed by Clarice French MD at [facility]    BRONCHOSCOPY  05/24/2023    BRONCHOSCOPY ALVEOLAR LAVAGE ROBOTIC performed by Clarice French MD at [facility]    BRONCHOSCOPY  05/24/2023    BRONCHOSCOPY BIOPSY BRONCHUS ROBOTIC performed by Clarice French MD at [facility]    COLONOSCOPY N/A 12/05/2019

## 2025-04-29 NOTE — CARE COORDINATION
Patient returned to ED for SOA and abn labs from hospital followup appointment. Patient left AMA at last visit, did not want any more workup.     04/29/25 0702   Readmission Assessment   Number of Days since last admission? 1-7 days   Previous Disposition Home Alone   Who is being Interviewed   (from chart)   What was the patient's/caregiver's perception as to why they think they needed to return back to the hospital? Other (Comment)  (shortness of breath & abnormal labs)   Did you visit your Primary Care Physician after you left the hospital, before you returned this time? Yes   Why weren't you able to visit your PCP?  --    Did you see a specialist, such as Cardiac, Pulmonary, Orthopedic Physician, etc. after you left the hospital? No   Who advised the patient to return to the hospital? Self-referral   Does the patient report anything that got in the way of taking their medications? No   In our efforts to provide the best possible care to you and others like you, can you think of anything that we could have done to help you after you left the hospital the first time, so that you might not have needed to return so soon? Other (Comment)  (patient left AMA at last visit. did not want to continue with treatments)     Electronically signed by Antoine Pro RN, BSN on 4/29/2025 at 8:19 AM

## 2025-04-29 NOTE — DIALYSIS
[facility] INPATIENT SERVICES  DIALYSIS TREATMENT SUMMARY      Note: Consult with the attending physician for patient treatment orders, this document is not a physician order.      Patient Information   Patient Hema Gallagher   Date of Birth November 15, 1970   Chart Number [id number]   Location [facility] MRN [id number]   Gender Male   SSN (last 4) 1922     Treatment Information   Treatment Type Hemodialysis   Treatment Id [id number]   Start Time April 29, 2025 12:23   End Time April 29, 2025 15:56   Acutal Duration 03:33     Treatment Balances   Total Saline Administered 500   Net Fluid Balance -2500    Hemodialysis Orders   Therapy Standard   Orders Verified Time 04/29/2025 12:00    Date Verified 04/29/2025   Duration 3:30   Isolated UF/Bypass No   BFR (mL) 400   DFR (mL) 600   Dialyzer Type OPTIFLUX 160NR   UF Order UF Goal Ordered   UF Goal Ordered (mL) 3000   Crit-Line used No   Heparin Initial Units Bolus No   Heparin IV Maintenance Bolus No   Heparin IV Infusion No   Potassium (mEq/L) 2.0   Calcium (mEq/L) 2.5   Bicarb (mg/dL) 35   Sodium (mEq/L) 138   Clinician Alta Brown, RN    Dialysis Access   Access Type AV Access   AV Access   Access Location Upper Arm - R   Needle 15g   Bruit Yes   Thrill Yes      Vitals   Pre-Treatment Vitals   Time Is BP being recorded? Pre BP Map BP Method Pulse RR Temp How was Weight Obtained? Pre Weight Previous Dry Weight Previous Post Weight Metric Target Fluid Removal (mL) Dialysate Confirmed Clinician    04/29/2025 12:10 BP/Map 145/72 (96) Noninvasive 70 17 97.7 How Obtained: Reported Floor Weight 113.9 108  Kgs 3500 Yes Anne Nash    Comments: pre-vitals      Post Treatment Vitals   Time Is BP being recorded? BP Map Pulse RR Temp How was Weight Obtained? Post Weight Metric BVP UF Goal Ordered NSS Given Intra-Procedure Total Machine UF Removed (mL) Crit-Line Ending Profile Crit-Line Refill Crit-Line Ending HCT Crit-Line Max BV% Clinician

## 2025-04-29 NOTE — CARE COORDINATION
04/29/25 0702   Readmission Assessment   Number of Days since last admission? 1-7 days   Previous Disposition Home Alone   Who is being Interviewed   (from chart)   What was the patient's/caregiver's perception as to why they think they needed to return back to the hospital? Other (Comment)  (shortness of breath & abnormal labs)   Did you visit your Primary Care Physician after you left the hospital, before you returned this time? No   Why weren't you able to visit your PCP? Did not have an appointment   Did you see a specialist, such as Cardiac, Pulmonary, Orthopedic Physician, etc. after you left the hospital? No   Who advised the patient to return to the hospital? Self-referral   Does the patient report anything that got in the way of taking their medications? No   In our efforts to provide the best possible care to you and others like you, can you think of anything that we could have done to help you after you left the hospital the first time, so that you might not have needed to return so soon? Other (Comment)  (nothing noted in chart)     Electronically signed by Vanessa Hawkins on 4/29/2025 at 7:03 AM

## 2025-04-29 NOTE — CONSULTS
injection 4 mg  4 mg IntraVENous Q6H PRN Rayo Shields MD        polyethylene glycol (GLYCOLAX) packet 17 g  17 g Oral Daily PRN Rayo Shields MD        acetaminophen (TYLENOL) tablet 650 mg  650 mg Oral Q6H PRN Rayo Shields MD        Or    acetaminophen (TYLENOL) suppository 650 mg  650 mg Rectal Q6H PRN Rayo Shields MD        sodium zirconium cyclosilicate (LOKELMA) oral suspension 10 g  10 g Oral TID Rayo Shields MD   10 g at 04/29/25 0917    hydrALAZINE (APRESOLINE) injection 20 mg  20 mg IntraVENous Q6H PRN Rayo Shields MD   20 mg at 04/29/25 0559    cloNIDine (CATAPRES) tablet 0.1 mg  0.1 mg Oral BID Magy Pastor APRN - CNP        calcitRIOL (ROCALTROL) capsule 0.5 mcg  0.5 mcg Oral BID Magy Pastor APRN - CNP   0.5 mcg at 04/29/25 0917    sevelamer (RENVELA) tablet 1,600 mg  1,600 mg Oral TID WC Magy Pastor APRN - CNP        cinacalcet (SENSIPAR) tablet 30 mg  30 mg Oral Daily Magy Pastor APRN - CNP   30 mg at 04/29/25 0917    hydrALAZINE (APRESOLINE) tablet 25 mg  25 mg Oral 3 times per day Magy Pastor APRN - CNP        metoprolol tartrate (LOPRESSOR) tablet 25 mg  25 mg Oral BID Magy Pastor APRN - CNP   25 mg at 04/29/25 0917    NIFEdipine (PROCARDIA XL) extended release tablet 60 mg  60 mg Oral BID Magy Pastor APRN - CNP   60 mg at 04/29/25 0917    0.9 % sodium chloride infusion   IntraVENous PRN Kelly Bell APRN           Past Medical History:  Past Medical History:   Diagnosis Date    Asthma     CHF (congestive heart failure) (HCC)     Diabetes mellitus (HCC)     Erectile dysfunction     Hemodialysis patient     monday, tuesday, thursday, and friday at home. hd    History of blood transfusion     Hyperlipidemia     Hypertension     Obesity     Palliative care patient 01/25/2021       Past Surgical History:  Past Surgical History:   Procedure Laterality Date    BRONCHOSCOPY Left 05/24/2023    BRONCHOSCOPY ADD ON  0445 (!) 163/58 -- -- 81 16 95 % -- --   04/29/25 0430 (!) 184/62 -- -- 78 21 93 % -- --   04/29/25 0400 (!) 175/72 -- -- 80 20 93 % -- --   04/29/25 0245 -- -- -- 74 19 94 % -- --   04/29/25 0230 (!) 162/56 -- -- 90 23 93 % -- --   04/29/25 0200 (!) 173/56 -- -- 76 27 94 % -- --   04/29/25 0130 (!) 187/53 -- -- 83 20 93 % -- --   04/29/25 0100 (!) 187/70 -- -- 80 22 93 % -- --   04/29/25 0030 (!) 163/66 -- -- 85 27 92 % -- --   04/29/25 0001 (!) 144/58 -- -- 87 19 91 % -- --   04/28/25 2338 (!) 159/72 97.5 °F (36.4 °C) -- 87 23 100 % -- --   04/28/25 2239 -- -- -- 86 17 95 % -- --   04/28/25 2235 -- -- -- 82 17 96 % -- --   04/28/25 2232 (!) 166/74 -- -- 83 21 -- -- --   04/28/25 2200 (!) 159/66 97.3 °F (36.3 °C) -- 88 16 94 % 1.702 m (5' 7\") 113.9 kg (251 lb)       Intake/Output Summary (Last 24 hours) at 4/29/2025 1109  Last data filed at 4/29/2025 0224  Gross per 24 hour   Intake 345.83 ml   Output --   Net 345.83 ml     General: awake/alert   HEENT: Normocephalic atraumatic head  Neck: Supple with no JVD or carotid bruits.  Chest:  decreased breath sounds noted-bilaterally  CVS: regular rate and rhythm  Abdominal: soft, nontender, normal bowel sounds  Extremities: no cyanosis or edema  Skin: warm and dry without rash      Labs:  BMP:   Recent Labs     04/28/25 2208 04/29/25  0733    140   K 5.4* 5.3*   CL 97* 98   CO2 25 21*   BUN 94* 98*   CREATININE 16.4* 17.0*   CALCIUM 9.5 9.5     CBC:   Recent Labs     04/28/25  1316 04/28/25 2208 04/29/25  0400   WBC 6.5 5.8  --    HGB 6.9* 6.7* 7.8*   HCT 21.5* 20.4* 24.0*   MCV 94.7* 91.9  --     128*  --      LIVER PROFILE:   Recent Labs     04/28/25 2208 04/29/25  0733   AST 20 25   ALT 25 28   BILITOT 0.5 0.8   ALKPHOS 58 60     PT/INR:   Recent Labs     04/28/25 2208   PROTIME 14.9*   INR 1.18     APTT:   Recent Labs     04/28/25 2208   APTT 39.4*     BNP:  No results for input(s): \"BNP\" in the last 72 hours.  Ionized Calcium:Invalid input(s):

## 2025-04-29 NOTE — CARE COORDINATION
Case Management Assessment  Initial Evaluation    Date/Time of Evaluation: 4/29/2025 1:22 PM  Assessment Completed by: Antoine Pro, RN, BSN    If patient is discharged prior to next notation, then this note serves as note for discharge by case management.    Patient Name: Hema Gallagher                   YOB: 1970  Diagnosis: Hyperkalemia [E87.5]  Anemia [D64.9]  ESRD (end stage renal disease) on dialysis (HCC) [N18.6, Z99.2]  Anemia in other chronic diseases classified elsewhere [D63.8]                   Date / Time: 4/28/2025 10:01 PM    Patient Admission Status: Inpatient   Readmission Risk (Low < 19, Mod (19-27), High > 27): Readmission Risk Score: 29.6    Current PCP: Aletha Del Cid APRN - CNP  PCP verified by CM? Yes    Chart Reviewed: Yes      History Provided by: Patient, Medical Record  Patient Orientation: Alert and Oriented, Person, Place, Situation, Self    Patient Cognition: Alert    Hospitalization in the last 30 days (Readmission):  Yes    If yes, Readmission Assessment in  Navigator will be completed.    Advance Directives:      Code Status: Full Code   Patient's Primary Decision Maker is: Legal Next of Kin    Primary Decision Maker (Active): ElliottMarly - Child - [phone]    Supplemental (Other) Decision Maker: Nolvia Santacruz - Brother/Sister - [phone]    Discharge Planning:    Patient lives with: (P) Spouse/Significant Other Type of Home: (P) House  Primary Care Giver: Self  Patient Support Systems include: Spouse/Significant Other, Children, Family Members   Current Financial resources: Medicaid, Medicare  Current community resources: None  Current services prior to admission: (P) None            Current DME:              Type of Home Care services:  (P) None    ADLS  Prior functional level: Independent in ADLs/IADLs  Current functional level: Independent in ADLs/IADLs    PT AM-PAC:   /24  OT AM-PAC:   /24    Family can provide assistance at DC: Yes  Would you like  Case Management to discuss the discharge plan with any other family members/significant others, and if so, who? Yes (family)  Plans to Return to Present Housing: Yes  Other Identified Issues/Barriers to RETURNING to current housing: none  Potential Assistance needed at discharge: (P) N/A            Potential DME:  none  Patient expects to discharge to: (P) House  Plan for transportation at discharge: (P) Family    Financial    Payor: HU[employer]NA MEDICARE / Plan: HU[employer]NA GOLD PLUS HMO / Product Type: *No Product type* /     Does insurance require precert for SNF: Yes    Potential assistance Purchasing Medications: (P) No  Meds-to-Beds request:        [facility] [address] [place] [address] -  [phone] - F [phone]  [address] [place] [id number]  Phone: [phone] Fax: [phone]      Notes:    Factors facilitating achievement of predicted outcomes: Family support, Cooperative, Pleasant, and Home is wheelchair accessible    Barriers to discharge: Anxiety and Medical complications    Additional Case Management Notes:   Patient lives at home alone but has a SO that assists him. Patient states he does not need any equipment or home health at this time. Will continue to follow for needs.     The Plan for Transition of Care is related to the following treatment goals of Hyperkalemia [E87.5]  Anemia [D64.9]  ESRD (end stage renal disease) on dialysis (HCC) [N18.6, Z99.2]  Anemia in other chronic diseases classified elsewhere [D63.8]    IF APPLICABLE: The Patient and/or patient representative Hema and his family were provided with a choice of provider and agrees with the discharge plan. Freedom of choice list with basic dialogue that supports the patient's individualized plan of care/goals and shares the quality data associated with the providers was provided to: (P) Patient   Patient Representative Name:       The Patient and/or Patient Representative Agree with the Discharge Plan? (P)

## 2025-04-29 NOTE — ED NOTES
ED TO INPATIENT SBAR HANDOFF    Patient Name: Hema Gallagher   : 1970  54 y.o.   Family/Caregiver Present: Yes  Code Status Order: Prior    C-SSRS: Risk of Suicide: No Risk  Sitter No  Restraints:         Situation  Chief Complaint:   Chief Complaint   Patient presents with    Abnormal Lab     Low hgb     Patient Diagnosis: No admission diagnoses are documented for this encounter.     Brief Description of Patient's Condition: Pt presented to ED after labs today showed Hgb 6.9, labs in ED showed 6.7. Hemoccult -. Given 80g protonix bolus and dose of Lokelma for K+ 5.4. 1 Unit PRBC started in ED, blood consent signed. Dialysis 4 days/week. VSS. A&o. Has current fistula in R Arm and old fistula in L Arm. 18g L FA USG. Ambulatory.    Hgb 7.8 post transfusion. Pt hypertensive, clonidine and hydralazine given in ED.  Mental Status: oriented and alert  Arrived from: home    Imaging:   No orders to display     COVID-19 Results:   Internal Administration   First Dose COVID-19, PFIZER PURPLE top, DILUTE for use, (age 12 y+), 30mcg/0.3mL  2021   Second Dose COVID-19, PFIZER PURPLE top, DILUTE for use, (age 12 y+), 30mcg/0.3mL   10/04/2021       Last COVID Lab SARS-CoV-2 (no units)   Date Value   2020 Not Detected     SARS-CoV-2, PCR (no units)   Date Value   2025 Not Detected     SARS-CoV-2, NANY (no units)   Date Value   2020 NOT DETECTED     SARS-CoV-2, NAAT (no units)   Date Value   2021 DETECTED (AA)           Abnormal labs:   Abnormal Labs Reviewed   CBC WITH AUTO DIFFERENTIAL - Abnormal; Notable for the following components:       Result Value    RBC 2.22 (*)     Hemoglobin 6.7 (*)     Hematocrit 20.4 (*)     MCHC 32.8 (*)     RDW 15.2 (*)     Platelets 128 (*)     MPV 9.2 (*)     Monocytes % 11.0 (*)     Eosinophils % 7.0 (*)     All other components within normal limits   COMPREHENSIVE METABOLIC PANEL - Abnormal; Notable for the following components:    Potassium 5.4 (*)      dry & intact 04/28/25 2211   Line Status Blood return noted;Brisk blood return;Specimen collected;Normal saline locked 04/28/25 2211   Phlebitis Assessment No symptoms 04/28/25 2211   Infiltration Assessment 0 04/28/25 2211   Dressing Status Clean, dry & intact;New dressing applied 04/28/25 2211   Dressing Intervention New 04/28/25 2211     Pertinent or High Risk Medications/Drips: no   If Yes, please provide details:    Blood Product Administration: no  If Yes, please provide details:    Sepsis Risk Score      Admitted with Sepsis? No    Recommendation  Incomplete orders: hospitalist  Patient Belongings: w pt  Additional Comments: will need IV pole  If any further questions, please call Sending RN at [phone]    Electronically signed by: Electronically signed by Jacoby Silver RN on 4/29/2025 at 12:05 AM    Electronically signed by Nan Hernandez RN on 4/29/2025 at 4:54 AM

## 2025-04-30 ENCOUNTER — ANESTHESIA (OUTPATIENT)
Dept: ENDOSCOPY | Age: 55
End: 2025-04-30
Payer: MEDICARE

## 2025-04-30 ENCOUNTER — ANESTHESIA EVENT (OUTPATIENT)
Dept: ENDOSCOPY | Age: 55
End: 2025-04-30
Payer: MEDICARE

## 2025-04-30 VITALS
DIASTOLIC BLOOD PRESSURE: 50 MMHG | OXYGEN SATURATION: 92 % | RESPIRATION RATE: 18 BRPM | WEIGHT: 251 LBS | TEMPERATURE: 97.5 F | HEART RATE: 71 BPM | BODY MASS INDEX: 39.39 KG/M2 | SYSTOLIC BLOOD PRESSURE: 116 MMHG | HEIGHT: 67 IN

## 2025-04-30 LAB
ALBUMIN SERPL-MCNC: 3.8 G/DL (ref 3.5–5.2)
ALP SERPL-CCNC: 56 U/L (ref 40–129)
ALT SERPL-CCNC: 23 U/L (ref 10–50)
ANION GAP SERPL CALCULATED.3IONS-SCNC: 14 MMOL/L (ref 8–16)
AST SERPL-CCNC: 18 U/L (ref 10–50)
BASOPHILS # BLD: 0 K/UL (ref 0–0.2)
BASOPHILS NFR BLD: 0.3 % (ref 0–1)
BILIRUB SERPL-MCNC: 0.6 MG/DL (ref 0.2–1.2)
BUN SERPL-MCNC: 59 MG/DL (ref 6–20)
CALCIUM SERPL-MCNC: 8.9 MG/DL (ref 8.6–10)
CHLORIDE SERPL-SCNC: 99 MMOL/L (ref 98–107)
CO2 SERPL-SCNC: 26 MMOL/L (ref 22–29)
CREAT SERPL-MCNC: 12.5 MG/DL (ref 0.7–1.2)
EOSINOPHIL # BLD: 0.4 K/UL (ref 0–0.6)
EOSINOPHIL NFR BLD: 5.8 % (ref 0–5)
ERYTHROCYTE [DISTWIDTH] IN BLOOD BY AUTOMATED COUNT: 15.6 % (ref 11.5–14.5)
GLUCOSE BLD-MCNC: 104 MG/DL (ref 70–99)
GLUCOSE BLD-MCNC: 140 MG/DL (ref 70–99)
GLUCOSE BLD-MCNC: 156 MG/DL (ref 70–99)
GLUCOSE SERPL-MCNC: 181 MG/DL (ref 70–99)
HCT VFR BLD AUTO: 22.6 % (ref 42–52)
HGB BLD-MCNC: 7.3 G/DL (ref 14–18)
IMM GRANULOCYTES # BLD: 0 K/UL
LYMPHOCYTES # BLD: 0.9 K/UL (ref 1.1–4.5)
LYMPHOCYTES NFR BLD: 14.1 % (ref 20–40)
MCH RBC QN AUTO: 30.3 PG (ref 27–31)
MCHC RBC AUTO-ENTMCNC: 32.3 G/DL (ref 33–37)
MCV RBC AUTO: 93.8 FL (ref 80–94)
MONOCYTES # BLD: 0.7 K/UL (ref 0–0.9)
MONOCYTES NFR BLD: 11.2 % (ref 0–10)
NEUTROPHILS # BLD: 4.3 K/UL (ref 1.5–7.5)
NEUTS SEG NFR BLD: 68.3 % (ref 50–65)
PERFORMED ON: ABNORMAL
PLATELET # BLD AUTO: 140 K/UL (ref 130–400)
PMV BLD AUTO: 10 FL (ref 9.4–12.4)
POTASSIUM SERPL-SCNC: 4.5 MMOL/L (ref 3.5–5)
PROT SERPL-MCNC: 6.7 G/DL (ref 6.4–8.3)
RBC # BLD AUTO: 2.41 M/UL (ref 4.7–6.1)
SODIUM SERPL-SCNC: 139 MMOL/L (ref 136–145)
WBC # BLD AUTO: 6.2 K/UL (ref 4.8–10.8)

## 2025-04-30 PROCEDURE — 2580000003 HC RX 258: Performed by: INTERNAL MEDICINE

## 2025-04-30 PROCEDURE — 43235 EGD DIAGNOSTIC BRUSH WASH: CPT | Performed by: INTERNAL MEDICINE

## 2025-04-30 PROCEDURE — 36415 COLL VENOUS BLD VENIPUNCTURE: CPT

## 2025-04-30 PROCEDURE — 6360000002 HC RX W HCPCS

## 2025-04-30 PROCEDURE — 2500000003 HC RX 250 WO HCPCS

## 2025-04-30 PROCEDURE — 6360000002 HC RX W HCPCS: Performed by: INTERNAL MEDICINE

## 2025-04-30 PROCEDURE — 0DJ08ZZ INSPECTION OF UPPER INTESTINAL TRACT, VIA NATURAL OR ARTIFICIAL OPENING ENDOSCOPIC: ICD-10-PCS | Performed by: INTERNAL MEDICINE

## 2025-04-30 PROCEDURE — 7100000000 HC PACU RECOVERY - FIRST 15 MIN: Performed by: INTERNAL MEDICINE

## 2025-04-30 PROCEDURE — 6360000002 HC RX W HCPCS: Performed by: NURSE PRACTITIONER

## 2025-04-30 PROCEDURE — 2500000003 HC RX 250 WO HCPCS: Performed by: INTERNAL MEDICINE

## 2025-04-30 PROCEDURE — 7100000001 HC PACU RECOVERY - ADDTL 15 MIN: Performed by: INTERNAL MEDICINE

## 2025-04-30 PROCEDURE — 3700000000 HC ANESTHESIA ATTENDED CARE: Performed by: INTERNAL MEDICINE

## 2025-04-30 PROCEDURE — 6370000000 HC RX 637 (ALT 250 FOR IP): Performed by: INTERNAL MEDICINE

## 2025-04-30 PROCEDURE — 3609017100 HC EGD: Performed by: INTERNAL MEDICINE

## 2025-04-30 PROCEDURE — 2709999900 HC NON-CHARGEABLE SUPPLY: Performed by: INTERNAL MEDICINE

## 2025-04-30 PROCEDURE — 82962 GLUCOSE BLOOD TEST: CPT

## 2025-04-30 PROCEDURE — 3700000001 HC ADD 15 MINUTES (ANESTHESIA): Performed by: INTERNAL MEDICINE

## 2025-04-30 PROCEDURE — 6370000000 HC RX 637 (ALT 250 FOR IP)

## 2025-04-30 PROCEDURE — 80053 COMPREHEN METABOLIC PANEL: CPT

## 2025-04-30 PROCEDURE — 2580000003 HC RX 258: Performed by: NURSE PRACTITIONER

## 2025-04-30 PROCEDURE — 94760 N-INVAS EAR/PLS OXIMETRY 1: CPT

## 2025-04-30 PROCEDURE — 85025 COMPLETE CBC W/AUTO DIFF WBC: CPT

## 2025-04-30 RX ORDER — IPRATROPIUM BROMIDE AND ALBUTEROL SULFATE 2.5; .5 MG/3ML; MG/3ML
1 SOLUTION RESPIRATORY (INHALATION)
Status: CANCELLED | OUTPATIENT
Start: 2025-04-30

## 2025-04-30 RX ORDER — HYDROMORPHONE HYDROCHLORIDE 1 MG/ML
0.25 INJECTION, SOLUTION INTRAMUSCULAR; INTRAVENOUS; SUBCUTANEOUS EVERY 5 MIN PRN
Refills: 0 | Status: CANCELLED | OUTPATIENT
Start: 2025-04-30

## 2025-04-30 RX ORDER — SODIUM CHLORIDE 0.9 % (FLUSH) 0.9 %
5-40 SYRINGE (ML) INJECTION EVERY 12 HOURS SCHEDULED
Status: CANCELLED | OUTPATIENT
Start: 2025-04-30

## 2025-04-30 RX ORDER — PROPOFOL 10 MG/ML
INJECTION, EMULSION INTRAVENOUS
Status: DISCONTINUED | OUTPATIENT
Start: 2025-04-30 | End: 2025-04-30 | Stop reason: SDUPTHER

## 2025-04-30 RX ORDER — ALBUTEROL SULFATE 90 UG/1
INHALANT RESPIRATORY (INHALATION)
Status: DISCONTINUED | OUTPATIENT
Start: 2025-04-30 | End: 2025-04-30 | Stop reason: SDUPTHER

## 2025-04-30 RX ORDER — LABETALOL HYDROCHLORIDE 5 MG/ML
10 INJECTION, SOLUTION INTRAVENOUS
Status: CANCELLED | OUTPATIENT
Start: 2025-04-30

## 2025-04-30 RX ORDER — SODIUM CHLORIDE 0.9 % (FLUSH) 0.9 %
5-40 SYRINGE (ML) INJECTION PRN
Status: CANCELLED | OUTPATIENT
Start: 2025-04-30

## 2025-04-30 RX ORDER — DIPHENHYDRAMINE HYDROCHLORIDE 50 MG/ML
12.5 INJECTION, SOLUTION INTRAMUSCULAR; INTRAVENOUS
Status: CANCELLED | OUTPATIENT
Start: 2025-04-30

## 2025-04-30 RX ORDER — SODIUM CHLORIDE 9 MG/ML
INJECTION, SOLUTION INTRAVENOUS PRN
Status: CANCELLED | OUTPATIENT
Start: 2025-04-30

## 2025-04-30 RX ORDER — NALOXONE HYDROCHLORIDE 0.4 MG/ML
INJECTION, SOLUTION INTRAMUSCULAR; INTRAVENOUS; SUBCUTANEOUS PRN
Status: CANCELLED | OUTPATIENT
Start: 2025-04-30

## 2025-04-30 RX ORDER — LIDOCAINE HYDROCHLORIDE 10 MG/ML
INJECTION, SOLUTION EPIDURAL; INFILTRATION; INTRACAUDAL; PERINEURAL
Status: DISCONTINUED | OUTPATIENT
Start: 2025-04-30 | End: 2025-04-30 | Stop reason: SDUPTHER

## 2025-04-30 RX ORDER — HYDRALAZINE HYDROCHLORIDE 20 MG/ML
10 INJECTION INTRAMUSCULAR; INTRAVENOUS
Status: CANCELLED | OUTPATIENT
Start: 2025-04-30

## 2025-04-30 RX ORDER — HYDROMORPHONE HYDROCHLORIDE 1 MG/ML
0.5 INJECTION, SOLUTION INTRAMUSCULAR; INTRAVENOUS; SUBCUTANEOUS EVERY 5 MIN PRN
Refills: 0 | Status: CANCELLED | OUTPATIENT
Start: 2025-04-30

## 2025-04-30 RX ORDER — DEXMEDETOMIDINE HYDROCHLORIDE 100 UG/ML
INJECTION, SOLUTION INTRAVENOUS
Status: DISCONTINUED | OUTPATIENT
Start: 2025-04-30 | End: 2025-04-30 | Stop reason: SDUPTHER

## 2025-04-30 RX ORDER — PROCHLORPERAZINE EDISYLATE 5 MG/ML
5 INJECTION INTRAMUSCULAR; INTRAVENOUS
Status: CANCELLED | OUTPATIENT
Start: 2025-04-30

## 2025-04-30 RX ORDER — PANTOPRAZOLE SODIUM 40 MG/1
40 TABLET, DELAYED RELEASE ORAL
Qty: 60 TABLET | Refills: 0 | Status: SHIPPED | OUTPATIENT
Start: 2025-04-30

## 2025-04-30 RX ADMIN — CINACALCET HYDROCHLORIDE 30 MG: 30 TABLET, FILM COATED ORAL at 12:07

## 2025-04-30 RX ADMIN — PROPOFOL 50 MG: 10 INJECTION, EMULSION INTRAVENOUS at 10:30

## 2025-04-30 RX ADMIN — CALCITRIOL CAPSULES 0.25 MCG 0.5 MCG: 0.25 CAPSULE ORAL at 12:07

## 2025-04-30 RX ADMIN — POLYETHYLENE GLYCOL 3350 17 G: 17 POWDER, FOR SOLUTION ORAL at 12:07

## 2025-04-30 RX ADMIN — METOPROLOL TARTRATE 25 MG: 25 TABLET, FILM COATED ORAL at 12:07

## 2025-04-30 RX ADMIN — HYDRALAZINE HYDROCHLORIDE 25 MG: 25 TABLET ORAL at 13:23

## 2025-04-30 RX ADMIN — SODIUM CHLORIDE, PRESERVATIVE FREE 10 ML: 5 INJECTION INTRAVENOUS at 12:15

## 2025-04-30 RX ADMIN — SEVELAMER CARBONATE 1600 MG: 800 TABLET, FILM COATED ORAL at 12:07

## 2025-04-30 RX ADMIN — DEXMEDETOMIDINE HYDROCHLORIDE 4 MCG: 100 INJECTION, SOLUTION, CONCENTRATE INTRAVENOUS at 10:31

## 2025-04-30 RX ADMIN — ALBUTEROL SULFATE 2 PUFF: 90 AEROSOL, METERED RESPIRATORY (INHALATION) at 10:25

## 2025-04-30 RX ADMIN — NIFEDIPINE 60 MG: 60 TABLET, EXTENDED RELEASE ORAL at 12:07

## 2025-04-30 RX ADMIN — CLONIDINE HYDROCHLORIDE 0.1 MG: 0.1 TABLET ORAL at 12:07

## 2025-04-30 RX ADMIN — SODIUM CHLORIDE, PRESERVATIVE FREE 40 MG: 5 INJECTION INTRAVENOUS at 12:08

## 2025-04-30 RX ADMIN — SODIUM CHLORIDE, PRESERVATIVE FREE 40 MG: 5 INJECTION INTRAVENOUS at 01:47

## 2025-04-30 RX ADMIN — LIDOCAINE HYDROCHLORIDE 50 MG: 10 INJECTION, SOLUTION EPIDURAL; INFILTRATION; INTRACAUDAL; PERINEURAL at 10:30

## 2025-04-30 ASSESSMENT — PAIN - FUNCTIONAL ASSESSMENT
PAIN_FUNCTIONAL_ASSESSMENT: NONE - DENIES PAIN

## 2025-04-30 ASSESSMENT — ENCOUNTER SYMPTOMS: SHORTNESS OF BREATH: 1

## 2025-04-30 ASSESSMENT — LIFESTYLE VARIABLES: SMOKING_STATUS: 0

## 2025-04-30 NOTE — DISCHARGE SUMMARY
[facility]    Discharge Summary      Hema Gallagher  :  1970  MRN:  [id number]    Admit date:  2025  Discharge date:    2025    Discharging Physician:  Dr. Beverly Neal     Advance Directive: Full Code    Consults: GI and nephrology    Primary Care Physician:  Aletha Del Cid, APRN - CNP    Discharge Diagnoses:  Principal Problem:    Acute on chronic anemia  Active Problems:    Essential hypertension    Type 2 diabetes mellitus with chronic kidney disease on chronic dialysis, without long-term current use of insulin (HCC)    ESRD on hemodialysis (HCC)    Anemia  Resolved Problems:    * No resolved hospital problems. *      Portions of this note have been copied forward, however, changed to reflect the most current clinical status of this patient.    Hospital Course:   The patient is a 54 y.o. male with past medical history of ESRD on Home dialysis 4 times a week, chronic anemia, hypertension, hyperlipidemia, diabetes and chronic diastolic CHF who presented to [facility] ED for evaluation of abnormal labs. Stated he had routine lab work with PCP and sent to ED for evaluation of low Hgb. Denied hematochezia or melanotic stools. Denied hematemesis. Denied lightheadedness or dizziness. Denied shortness of breath at baseline. Denied chest pain. Denied nausea or vomiting. Of, note patient was recently discharged from here on 25, after being admitted for acute on chronic anemia, patient refused GI interventions at that time. Workup in ED revealed K+ 5.4, Cr 16.4, Hgb 6.7, Hct 20.4, platelets 128. Received 1 unit of PRBC while in ED. Patient was admitted to hospital medicine for further evaluation with GI and nephrology consultations. GI suggested EGD. Underwent EGD this morning with findings of mild gastritis, large amounts of food residue in the stomach suggesting gastroparesis, no blood or source of anemia identified. Recommended follow up with GI clinic, will need gastric emptying study and  ED immediately with any concerning signs or symptoms       Time spent on discharge 36 minutes spent in assessing patient, reviewing medications, discussion with nursing, confirming safe discharge plan and preparation of discharge summary.    Signed:  Electronically signed by SOSA Miller CNP on 4/30/25 at 12:35 PM CDT         EMR Dragon/Transcription disclaimer:   Much of this encounter note is an electronic transcription/translation of spoken language to printed text. The electronic translation of spoken language may permit erroneous, or at times, nonsensical words or phrases to be inadvertently transcribed; although attempts have made to review the note for such errors, some may still exist.

## 2025-04-30 NOTE — PROCEDURES
Endoscopic Procedure Note    Patient: Hema Gallagher : 1970  [facility] Rec#: [id number] Acc#: [id number]     Primary Care Provider Aletha Del Cid APRN - CNP    Referring Provider: Dr. Beverly Neal    Endoscopist: Francis Saldana MD    Date of Procedure: 2025    Pre-Op Diagnosis: Rule out peptic ulcer disease.    Post-Op Diagnosis: 1.  Mild gastritis.  2.  Large amount of food residue in the stomach suggesting gastroparesis.  3.  No blood or source of anemia identified.       Procedure(s):  ESOPHAGOGASTRODUODENOSCOPY      Indications:   Anemia.  Heme positive stool.    Anesthesia:  Sedation was administered by anesthesia who monitored the patient during the procedure.    Estimated Blood Loss: none    Procedure:   After reviewing the patient's chart and obtaining informed consent, the patient was placed in the left lateral decubitus position.  A forward-viewing Olympus endoscope was lubricated and inserted through the mouth into the oropharynx. Under direct visualization, the upper esophagus was intubated. The scope was advanced to the level of the second portion of duodenum without any difficulty. Scope was slowly withdrawn with careful inspection of the mucosal surfaces. The scope was retroflexed for inspection of the gastric fundus and incisura.  The patient tolerated the procedure well. The scope was removed.     Findings:     Esophagus: The esophageal mucosa was normal.  There was no evidence of inflammation, ulceration or any masses.  No evidence of any Momin's esophagus, hiatal hernia or stricture.  No esophageal varices are noted.  The scope was passed without difficulty in the stomach.    Stomach: Upon entering into the stomach large amount of food residue was noted in the fundus, body and gastric antrum.  Most of the gastric mucosa could not be visualized.  Minimal amount of inflammation was noted at the pyloric channel area without any definite erosions, ulcerations or any masses.  No

## 2025-04-30 NOTE — PROGRESS NOTES
Nephrology ([facility]) Progress Note      Patient:  Hema Gallagher  YOB: 1970  Date of Service: 4/30/2025  MRN: [id number]   Acct: [id number]   Primary Care Physician: Aletha Del Cid APRN - CNP  Advance Directive: Full Code  Admit Date: 4/28/2025       Hospital Day: 1  Referring Provider: Beverly Neal MD    Patient independently seen and examined, Chart, Consults, Notes, Operative notes, Labs, Cardiology, and Radiology studies reviewed as available.    Chief complaint: Profound fatigue.    Subjective:  Hema Gallagher is a 54 y.o. male for whom we were consulted for evaluation and treatment of end-stage renal disease, currently on home hemodialysis.  Last hemodialysis treatment was on Sunday.  Presented with profound fatigue and lack of energy.  He was directed to go to the emergency room as his lab indicated hemoglobin of 6.7 g.  He has received blood transfusion today.  Patient has end-stage renal disease secondary to type II diabetic nephropathy, benign essential hypertension, anemia of chronic kidney disease and congestive heart failure.  He is now scheduled to get routine hemodialysis treatment today.  Previously patient has refused GI workup    This morning patient feels well.  He has a EGD today consistent with no active bleeding.  However gastroenterologist Dr. Saldana thought he has gastroparesis.    Allergies:  Banana, Atorvastatin, and Lisinopril    Medicines:  Current Facility-Administered Medications   Medication Dose Route Frequency Provider Last Rate Last Admin    sodium chloride flush 0.9 % injection 5-40 mL  5-40 mL IntraVENous 2 times per day Rayo Shields MD   10 mL at 04/29/25 2016    sodium chloride flush 0.9 % injection 5-40 mL  5-40 mL IntraVENous PRN Rayo Shields MD        0.9 % sodium chloride infusion   IntraVENous PRN Rayo Shields MD        ondansetron (ZOFRAN-ODT) disintegrating tablet 4 mg  4 mg Oral Q8H PRN Rayo Shields

## 2025-04-30 NOTE — PROGRESS NOTES
4 Eyes Skin Assessment     NAME:  Hema Gallagher  YOB: 1970  MEDICAL RECORD NUMBER:  [id number]    The patient is being assessed for  Admission    I agree that at least one RN has performed a thorough Head to Toe Skin Assessment on the patient. ALL assessment sites listed below have been assessed.      Areas assessed by both nurses:    Head, Face, Ears, Shoulders, Back, Chest, Arms, Elbows, Hands, Sacrum. Buttock, Coccyx, Ischium, and Legs. Feet and Heels        Does the Patient have a Wound? No noted wound(s)       Vitaliy Prevention initiated by RN: No  Wound Care Orders initiated by RN: No    Pressure Injury (Stage 3,4, Unstageable, DTI, NWPT, and Complex wounds) if present, place Wound referral order by RN under : No    New Ostomies, if present place, Ostomy referral order under : No     Nurse 1 eSignature: Electronically signed by Isha Son LPN on 4/30/25 at 9:50 AM CDT    **SHARE this note so that the co-signing nurse can place an eSignature**    Nurse 2 eSignature: Electronically signed by Sujatha Olivas RN on 4/30/25 at 3:22 PM CDT

## 2025-04-30 NOTE — ANESTHESIA PRE PROCEDURE
Department of Anesthesiology  Preprocedure Note       Name:  Hema Gallagher   Age:  54 y.o.  :  1970                                          MRN:  [id number]         Date:  2025      Surgeon: Surgeon(s):  Francis Saldana MD    Procedure: Procedure(s):  ESOPHAGOGASTRODUODENOSCOPY    Medications prior to admission:   Prior to Admission medications    Medication Sig Start Date End Date Taking? Authorizing Provider   loratadine (CLARITIN) 10 MG tablet Take 1 tablet by mouth daily 25  Aletha Del Cid APRN - CNP   calcitRIOL (ROCALTROL) 0.5 MCG capsule Take 1 capsule by mouth 2 times daily 1/10/25   Susan Fox MD   metoprolol tartrate (LOPRESSOR) 25 MG tablet Take 1 tablet by mouth 2 times daily 24   Aletha Del Cid APRN - CNP   NIFEdipine (ADALAT CC) 60 MG extended release tablet Take 1 tablet by mouth in the morning and at bedtime 24   Aletha Del Cid APRN - CNP   simvastatin (ZOCOR) 10 MG tablet Take 1 tablet by mouth nightly 24   Aletha Del Cid APRN - CNP   hydrALAZINE (APRESOLINE) 25 MG tablet TAKE 1 TABLET BY MOUTH EVERY 8 HOURS 24   Alteha Del Cid APRN - CNP   cloNIDine (CATAPRES) 0.1 MG tablet Take 1 tablet by mouth 2 times daily  Patient taking differently: Take 1 tablet by mouth as needed for High Blood Pressure 24   Aletha Del Cid APRN - CNP   albuterol sulfate HFA (PROVENTIL;VENTOLIN;PROAIR) 108 (90 Base) MCG/ACT inhaler Inhale 2 puffs into the lungs every 6 hours as needed for Wheezing 23   Aletha Del Cid APRN - CNP   cinacalcet (SENSIPAR) 30 MG tablet Take 1 tablet by mouth daily 23   Susan Fox MD   AURYXIA 1  MG(Fe) TABS tablet Take 3 tablets by mouth 3 times daily (with meals) 23   Susan Fox MD   Cyanocobalamin (VITAMIN B 12 PO) Take 1 tablet by mouth daily     Susan Fox MD   calcium carbonate (OYSTER SHELL CALCIUM 500 MG) 1250 (500 Ca) MG tablet Take 1 tablet by mouth daily    Provider, MD Susan

## 2025-04-30 NOTE — PROGRESS NOTES
This  visited with pt and his significant other to follow up and provide spiritual care. 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 This  provided spiritual care with sustaining presence, support, and prayer. Pt expressed gratitude for spiritual care.         Spiritual Health History and Assessment/Progress Note  [facility]    Spiritual/Emotional Needs,  ,  ,      Name: Hema Gallagher MRN: [id number]    Age: 54 y.o.     Sex: male   Language: English   Rastafarian: None   Acute on chronic anemia     Date: 4/30/2025            Total Time Calculated: 10 min              Spiritual Assessment began in [facility]        Referral/Consult From: Palliative Care   Encounter Overview/Reason: Spiritual/Emotional Needs  Service Provided For: Patient    Maria G, Belief, Meaning:   Patient identifies as spiritual and is connected with a maria g tradition or spiritual practice  Family/Friends Other: SO did not discuss her maria g.      Importance and Influence:  Patient has spiritual/personal beliefs that influence decisions regarding their health  Family/Friends Other: Did not discuss    Community:  Patient is connected with a spiritual community  Family/Friends Other: did not discuss    Assessment and Plan of Care:     Patient Interventions include: Affirmed coping skills/support systems and Provided sacramental/Baptist ritual  Family/Friends Interventions include: Other: Pt's SO was present during visit and prayer.     Patient Plan of Care: Spiritual Care available upon further referral  Family/Friends Plan of Care: Spiritual Care available upon further referral    Electronically signed by Mary Behrens, Chaplain on 4/30/2025 at 2:46 PM

## 2025-04-30 NOTE — PLAN OF CARE
Problem: Chronic Conditions and Co-morbidities  Goal: Patient's chronic conditions and co-morbidity symptoms are monitored and maintained or improved  4/30/2025 1726 by Michelle Maciel LPN  Outcome: Progressing  4/30/2025 0508 by Alonzo Vance, RN  Outcome: Progressing     Problem: Discharge Planning  Goal: Discharge to home or other facility with appropriate resources  Outcome: Progressing     Problem: Safety - Adult  Goal: Free from fall injury  4/30/2025 1726 by Michelle Maciel LPN  Outcome: Progressing  4/30/2025 0508 by Alonzo Vance, RN  Outcome: Progressing     Problem: ABCDS Injury Assessment  Goal: Absence of physical injury  4/30/2025 1726 by Michelle Maciel LPN  Outcome: Progressing  4/30/2025 0508 by Alonzo Vance, RN  Outcome: Progressing

## 2025-04-30 NOTE — DISCHARGE INSTR - DIET

## 2025-04-30 NOTE — ANESTHESIA POSTPROCEDURE EVALUATION
Department of Anesthesiology  Postprocedure Note    Patient: Hema Gallagher  MRN: [id number]  YOB: 1970  Date of evaluation: 4/30/2025    Procedure Summary       Date: 04/30/25 Room / Location: [place] [address] / [facility]    Anesthesia Start: 1017 Anesthesia Stop: 1038    Procedure: ESOPHAGOGASTRODUODENOSCOPY Diagnosis:       Gastrointestinal hemorrhage, unspecified gastrointestinal hemorrhage type      (Gastrointestinal hemorrhage, unspecified gastrointestinal hemorrhage type [K92.2])    Surgeons: Francis Saldana MD Responsible Provider: Andra Downey APRN - CRNA    Anesthesia Type: general, MAC, TIVA ASA Status: 4            Anesthesia Type: No value filed.    Kendall Phase I:      Kendall Phase II:      Anesthesia Post Evaluation    Patient location during evaluation: bedside  Patient participation: complete - patient participated  Level of consciousness: awake and alert  Airway patency: patent  Nausea & Vomiting: no vomiting and no nausea  Cardiovascular status: blood pressure returned to baseline  Respiratory status: acceptable and nasal cannula  Hydration status: euvolemic  Comments: /68   Pulse 73   Temp 98.2 °F (36.8 °C) (Temporal)   Resp 20   Ht 1.702 m (5' 7\")   Wt 113.9 kg (251 lb)   SpO2 94%   BMI 39.31 kg/m²     Pain management: adequate    No notable events documented.

## 2025-04-30 NOTE — DISCHARGE INSTRUCTIONS
Keep follow up appointments  Take medications as directed  Return to ED immediately with any concerning signs or symptoms   No driving or operating heavy machinery today

## 2025-05-01 ENCOUNTER — TELEPHONE (OUTPATIENT)
Dept: PRIMARY CARE CLINIC | Age: 55
End: 2025-05-01

## 2025-05-01 NOTE — TELEPHONE ENCOUNTER
Care Transitions Initial Follow Up Call    Outreach made within 2 business days of discharge: Yes    Patient: Hema Gallagher   Patient : 1970   MRN: [id number]    Reason for Admission: Acute on chronic anemia   Discharge Date: 25       Spoke with: Hema    Discharge department/facility: [facility] Patient Contact:  Was patient able to fill all prescriptions: Yes  Was patient instructed to bring all medications to the follow-up visit: Yes  Is patient taking all medications as directed in the discharge summary? Yes  Does patient understand their discharge instructions: Yes  Does patient have questions or concerns that need addressed prior to 7-14 day follow up office visit: no    Additional needs identified to be addressed with provider  No needs identified             Scheduled appointment with PCP within 7-14 days    Spoke with Hema. He states he is feeling really good. He voices no needs or concerns at this time and will see his PCP tomorrow 25.    Follow Up  Future Appointments   Date Time Provider Department Center   2025  2:30 PM Aletha Del Cid APRN - CNP [facility] DEP   2025 10:30 AM Inocencia Huitron APRN N [id number]   2025  8:45 AM Aletha Del Cid APRN - CNP [facility] DEP   2025  7:15 AM Aletha Del Cid APRN - CNP [facility] DEP   12/10/2025  8:00 AM Kacey Hernandez APRN N [id number]       Tyra Jones MA

## 2025-05-02 ENCOUNTER — RESULTS FOLLOW-UP (OUTPATIENT)
Dept: PRIMARY CARE CLINIC | Age: 55
End: 2025-05-02

## 2025-05-02 ENCOUNTER — OFFICE VISIT (OUTPATIENT)
Dept: PRIMARY CARE CLINIC | Age: 55
End: 2025-05-02

## 2025-05-02 VITALS
HEIGHT: 67 IN | BODY MASS INDEX: 39.71 KG/M2 | DIASTOLIC BLOOD PRESSURE: 63 MMHG | WEIGHT: 253 LBS | SYSTOLIC BLOOD PRESSURE: 137 MMHG | HEART RATE: 83 BPM | OXYGEN SATURATION: 98 % | TEMPERATURE: 97.7 F

## 2025-05-02 DIAGNOSIS — N18.6 ESRD ON HEMODIALYSIS (HCC): ICD-10-CM

## 2025-05-02 DIAGNOSIS — D64.9 ACUTE ON CHRONIC ANEMIA: ICD-10-CM

## 2025-05-02 DIAGNOSIS — I10 ESSENTIAL HYPERTENSION: ICD-10-CM

## 2025-05-02 DIAGNOSIS — Z99.2 TYPE 2 DIABETES MELLITUS WITH CHRONIC KIDNEY DISEASE ON CHRONIC DIALYSIS, WITHOUT LONG-TERM CURRENT USE OF INSULIN (HCC): ICD-10-CM

## 2025-05-02 DIAGNOSIS — Z99.2 ESRD ON HEMODIALYSIS (HCC): ICD-10-CM

## 2025-05-02 DIAGNOSIS — E11.22 TYPE 2 DIABETES MELLITUS WITH CHRONIC KIDNEY DISEASE ON CHRONIC DIALYSIS, WITHOUT LONG-TERM CURRENT USE OF INSULIN (HCC): ICD-10-CM

## 2025-05-02 DIAGNOSIS — Z09 HOSPITAL DISCHARGE FOLLOW-UP: Primary | ICD-10-CM

## 2025-05-02 DIAGNOSIS — N18.6 TYPE 2 DIABETES MELLITUS WITH CHRONIC KIDNEY DISEASE ON CHRONIC DIALYSIS, WITHOUT LONG-TERM CURRENT USE OF INSULIN (HCC): ICD-10-CM

## 2025-05-02 LAB
BASOPHILS # BLD: 0 K/UL (ref 0–0.2)
BASOPHILS NFR BLD: 0.4 % (ref 0–1)
EOSINOPHIL # BLD: 0.3 K/UL (ref 0–0.6)
EOSINOPHIL NFR BLD: 6.1 % (ref 0–5)
ERYTHROCYTE [DISTWIDTH] IN BLOOD BY AUTOMATED COUNT: 15.1 % (ref 11.5–14.5)
HCT VFR BLD AUTO: 25.4 % (ref 42–52)
HGB BLD-MCNC: 8.3 G/DL (ref 14–18)
IMM GRANULOCYTES # BLD: 0 K/UL
LYMPHOCYTES # BLD: 1 K/UL (ref 1.1–4.5)
LYMPHOCYTES NFR BLD: 20.7 % (ref 20–40)
MCH RBC QN AUTO: 30.2 PG (ref 27–31)
MCHC RBC AUTO-ENTMCNC: 32.7 G/DL (ref 33–37)
MCV RBC AUTO: 92.4 FL (ref 80–94)
MONOCYTES # BLD: 0.9 K/UL (ref 0–0.9)
MONOCYTES NFR BLD: 17.7 % (ref 0–10)
NEUTROPHILS # BLD: 2.6 K/UL (ref 1.5–7.5)
NEUTS SEG NFR BLD: 54.9 % (ref 50–65)
PLATELET # BLD AUTO: 168 K/UL (ref 130–400)
PMV BLD AUTO: 10.9 FL (ref 9.4–12.4)
RBC # BLD AUTO: 2.75 M/UL (ref 4.7–6.1)
WBC # BLD AUTO: 4.8 K/UL (ref 4.8–10.8)

## 2025-05-02 SDOH — ECONOMIC STABILITY: FOOD INSECURITY: WITHIN THE PAST 12 MONTHS, THE FOOD YOU BOUGHT JUST DIDN'T LAST AND YOU DIDN'T HAVE MONEY TO GET MORE.: NEVER TRUE

## 2025-05-02 SDOH — ECONOMIC STABILITY: FOOD INSECURITY: WITHIN THE PAST 12 MONTHS, YOU WORRIED THAT YOUR FOOD WOULD RUN OUT BEFORE YOU GOT MONEY TO BUY MORE.: NEVER TRUE

## 2025-05-02 NOTE — PROGRESS NOTES
Post-Discharge Transitional Care  Follow Up      Hema Gallagher   YOB: 1970    Date of Office Visit:  5/2/2025  Date of Hospital Admission: 4/28/25  Date of Hospital Discharge: 4/30/25  Risk of hospital readmission (high >=14%. Medium >=10%) :Readmission Risk Score: 28.9      Care management risk score Rising risk (score 2-5) and Complex Care (Scores >=6): No Risk Score On File     Non face to face  following discharge, date last encounter closed (first attempt may have been earlier): 05/01/2025    Call initiated 2 business days of discharge: Yes    ASSESSMENT/PLAN:   Acute on chronic anemia  Hospital discharge follow-up  -     IN DISCHARGE MEDS RECONCILED W/ CURRENT OUTPATIENT MED LIST      Medical Decision Making: moderate complexity  No follow-ups on file.    On this date 5/2/2025 I have spent 37 minutes reviewing previous notes, test results and face to face with the patient discussing the diagnosis and importance of compliance with the treatment plan as well as documenting on the day of the visit.       Subjective:   HPI:  Follow up of Hospital problems/diagnosis(es): Acute on chronic anemia, essential hypertension, type 2 diabetes mellitus with chronic kidney disease on chronic dialysis without long-term insulin use, and end stage renal disease on hemodialysis    Inpatient course: Discharge summary reviewed- see chart.    Interval history/Current status: Patient reports he has been feeling better and denies any current concerns.  GERD symptoms are denied but is taking pantoprazole that was started during his most recent hospitalization.  Patient reports his stool is typically green but recently had dark brown stool but no bright red blood was seen in his stool or urine.  Discussed recommendation to follow-up with gastroenterology and discussed that patient is aware of his appointment on May 14, 2025.  Instructed patient to complete CBC today to assess for any worsening anemia since he is

## 2025-05-07 ENCOUNTER — RESULTS FOLLOW-UP (OUTPATIENT)
Dept: PRIMARY CARE CLINIC | Age: 55
End: 2025-05-07

## 2025-05-07 ENCOUNTER — HOSPITAL ENCOUNTER (OUTPATIENT)
Dept: GENERAL RADIOLOGY | Age: 55
Discharge: HOME | End: 2025-05-07
Payer: MEDICARE

## 2025-05-07 ENCOUNTER — OFFICE VISIT (OUTPATIENT)
Dept: PRIMARY CARE CLINIC | Age: 55
End: 2025-05-07
Payer: MEDICARE

## 2025-05-07 VITALS
SYSTOLIC BLOOD PRESSURE: 154 MMHG | BODY MASS INDEX: 41.12 KG/M2 | OXYGEN SATURATION: 95 % | WEIGHT: 262 LBS | DIASTOLIC BLOOD PRESSURE: 78 MMHG | HEIGHT: 67 IN | HEART RATE: 98 BPM | TEMPERATURE: 98.2 F

## 2025-05-07 DIAGNOSIS — R05.1 ACUTE COUGH: ICD-10-CM

## 2025-05-07 DIAGNOSIS — N18.6 ESRD ON HEMODIALYSIS (HCC): ICD-10-CM

## 2025-05-07 DIAGNOSIS — Z99.2 ESRD ON HEMODIALYSIS (HCC): ICD-10-CM

## 2025-05-07 DIAGNOSIS — J18.9 PNEUMONIA OF BOTH LOWER LOBES DUE TO INFECTIOUS ORGANISM: Primary | ICD-10-CM

## 2025-05-07 DIAGNOSIS — R06.02 SHORTNESS OF BREATH: ICD-10-CM

## 2025-05-07 DIAGNOSIS — I10 ESSENTIAL HYPERTENSION: ICD-10-CM

## 2025-05-07 PROCEDURE — G8428 CUR MEDS NOT DOCUMENT: HCPCS | Performed by: NURSE PRACTITIONER

## 2025-05-07 PROCEDURE — 71046 X-RAY EXAM CHEST 2 VIEWS: CPT

## 2025-05-07 PROCEDURE — 3077F SYST BP >= 140 MM HG: CPT | Performed by: NURSE PRACTITIONER

## 2025-05-07 PROCEDURE — 3017F COLORECTAL CA SCREEN DOC REV: CPT | Performed by: NURSE PRACTITIONER

## 2025-05-07 PROCEDURE — G8417 CALC BMI ABV UP PARAM F/U: HCPCS | Performed by: NURSE PRACTITIONER

## 2025-05-07 PROCEDURE — 3078F DIAST BP <80 MM HG: CPT | Performed by: NURSE PRACTITIONER

## 2025-05-07 PROCEDURE — 99214 OFFICE O/P EST MOD 30 MIN: CPT | Performed by: NURSE PRACTITIONER

## 2025-05-07 PROCEDURE — 1036F TOBACCO NON-USER: CPT | Performed by: NURSE PRACTITIONER

## 2025-05-07 PROCEDURE — 1111F DSCHRG MED/CURRENT MED MERGE: CPT | Performed by: NURSE PRACTITIONER

## 2025-05-07 RX ORDER — CLINDAMYCIN HYDROCHLORIDE 300 MG/1
300 CAPSULE ORAL 3 TIMES DAILY
Qty: 21 CAPSULE | Refills: 0 | Status: SHIPPED | OUTPATIENT
Start: 2025-05-07 | End: 2025-05-14

## 2025-05-07 RX ORDER — PREDNISONE 10 MG/1
10 TABLET ORAL 2 TIMES DAILY
Qty: 10 TABLET | Refills: 0 | Status: SHIPPED | OUTPATIENT
Start: 2025-05-07 | End: 2025-05-12

## 2025-05-07 ASSESSMENT — ENCOUNTER SYMPTOMS
SINUS PRESSURE: 1
VOMITING: 0
WHEEZING: 1
SORE THROAT: 0
ABDOMINAL PAIN: 0
COLOR CHANGE: 0
DIARRHEA: 0
SHORTNESS OF BREATH: 1
COUGH: 1
CHEST TIGHTNESS: 0
NAUSEA: 0

## 2025-05-07 NOTE — PROGRESS NOTES
[address] [place] [address] Drive   [address] [facility], [address]     Phone:  [phone]  Fax:  [phone]      Hema Gallagher is a 54 y.o. male who presents today for his medical conditions/complaints as noted below.  Hema Gallagher is c/o of Cough and Chest Congestion      Chief Complaint   Patient presents with    Cough    Chest Congestion       HPI:     History of Present Illness  The patient presents for evaluation of a persistent cough.    He has been experiencing an intermittent cough for approximately 1.5 to 2 years, which has recently worsened in the last week. The cough is non-productive but is associated with dizziness. He reports chest congestion and sinus pressure but does not have a fever, chills, fatigue, sore throat, or difficulty swallowing. He also reports sneezing and shortness of breath, with wheezing at night. He has not been prescribed any antibiotics outside of the hospital setting.    He has not taken his blood pressure medication today.     He also plans to do his hemodialysis when he gets home as he has not done that yet today.    Past Medical History:   Diagnosis Date    Asthma     CHF (congestive heart failure) (HCC)     Diabetes mellitus (HCC)     Erectile dysfunction     Hemodialysis patient     monday, tuesday, thursday, and friday at home. hd    History of blood transfusion     Hyperlipidemia     Hypertension     Obesity     Palliative care patient 01/25/2021        Past Surgical History:   Procedure Laterality Date    BRONCHOSCOPY Left 05/24/2023    BRONCHOSCOPY ADD ON COMPUTER ASSISTED performed by Clarice French MD at [facility]    BRONCHOSCOPY  05/24/2023    BRONCHOSCOPY ALVEOLAR LAVAGE ROBOTIC performed by Clarice French MD at [facility]    BRONCHOSCOPY  05/24/2023    BRONCHOSCOPY BIOPSY BRONCHUS ROBOTIC performed by Clarice French MD at [facility]    COLONOSCOPY N/A 12/05/2019    Dr Coello-Diverticulosis, internal

## 2025-05-14 ENCOUNTER — OFFICE VISIT (OUTPATIENT)
Dept: GASTROENTEROLOGY | Age: 55
End: 2025-05-14
Payer: MEDICARE

## 2025-05-14 VITALS
BODY MASS INDEX: 40.81 KG/M2 | HEIGHT: 67 IN | HEART RATE: 74 BPM | DIASTOLIC BLOOD PRESSURE: 80 MMHG | OXYGEN SATURATION: 90 % | WEIGHT: 260 LBS | SYSTOLIC BLOOD PRESSURE: 120 MMHG

## 2025-05-14 DIAGNOSIS — Z09 HOSPITAL DISCHARGE FOLLOW-UP: ICD-10-CM

## 2025-05-14 DIAGNOSIS — D64.9 ANEMIA, UNSPECIFIED TYPE: ICD-10-CM

## 2025-05-14 DIAGNOSIS — K31.89 RETAINED FOOD IN STOMACH: Primary | ICD-10-CM

## 2025-05-14 PROCEDURE — G8428 CUR MEDS NOT DOCUMENT: HCPCS | Performed by: NURSE PRACTITIONER

## 2025-05-14 PROCEDURE — 3017F COLORECTAL CA SCREEN DOC REV: CPT | Performed by: NURSE PRACTITIONER

## 2025-05-14 PROCEDURE — 3074F SYST BP LT 130 MM HG: CPT | Performed by: NURSE PRACTITIONER

## 2025-05-14 PROCEDURE — 1036F TOBACCO NON-USER: CPT | Performed by: NURSE PRACTITIONER

## 2025-05-14 PROCEDURE — G8417 CALC BMI ABV UP PARAM F/U: HCPCS | Performed by: NURSE PRACTITIONER

## 2025-05-14 PROCEDURE — 1111F DSCHRG MED/CURRENT MED MERGE: CPT | Performed by: NURSE PRACTITIONER

## 2025-05-14 PROCEDURE — 3079F DIAST BP 80-89 MM HG: CPT | Performed by: NURSE PRACTITIONER

## 2025-05-14 PROCEDURE — 99214 OFFICE O/P EST MOD 30 MIN: CPT | Performed by: NURSE PRACTITIONER

## 2025-05-14 NOTE — PROGRESS NOTES
other similar activites)?: 0     Number of minutes of exercise per week:: 0   Stress: No Stress Concern Present (5/21/2023)    [employer] - Occupational Stress Questionnaire     Feeling of Stress : Not at all   Social Connections: Socially Integrated (5/19/2025)    Social Connections ([facility] HRSN)     If for any reason you need help with day-to-day activities such as bathing, preparing meals, shopping, managing finances, etc., do you get the help you need?: I get all the help I need    [facility]    Abuse Screen       No current facility-administered medications for this visit.     No current outpatient medications on file.     Facility-Administered Medications Ordered in Other Visits   Medication Dose Route Frequency Provider Last Rate Last Admin    melatonin disintegrating tablet 5 mg  5 mg Oral Nightly PRN Darrick Arango MD   5 mg at 05/20/25 0112    0.9 % sodium chloride infusion   IntraVENous PRN Jaquan Latham APRN - CNP        cefepime (MAXIPIME) 1,000 mg in sodium chloride 0.9 % 50 mL IVPB ([id number])  1,000 mg IntraVENous Q24H Braydon Bravo MD        pantoprazole (PROTONIX) 40 mg in sodium chloride (PF) 0.9 % 10 mL injection  40 mg IntraVENous Q12H Jaquan Latham APRN - CNP   40 mg at 05/21/25 0912    cinacalcet (SENSIPAR) tablet 30 mg  30 mg Oral Daily Claudine Lake APRN - CNP   30 mg at 05/21/25 0912    calcitRIOL (ROCALTROL) capsule 0.5 mcg  0.5 mcg Oral BID Claudine Lake APRN - CNP   0.5 mcg at 05/21/25 0912    sevelamer (RENVELA) tablet 800 mg  800 mg Oral TID  Claudine Lake APRN - CNP   800 mg at 05/20/25 1717    hydrALAZINE (APRESOLINE) tablet 25 mg  25 mg Oral 3 times per day Claudine Lake APRN - CNP   25 mg at 05/20/25 0606    metoprolol tartrate (LOPRESSOR) tablet 25 mg  25 mg Oral BID Claudine Lake APRN - CNP   25 mg at 05/21/25 0912    NIFEdipine (PROCARDIA XL) extended release tablet 60

## 2025-05-16 ENCOUNTER — HOSPITAL ENCOUNTER (EMERGENCY)
Age: 55
Discharge: HOME | DRG: 640 | End: 2025-05-16
Attending: STUDENT IN AN ORGANIZED HEALTH CARE EDUCATION/TRAINING PROGRAM
Payer: MEDICARE

## 2025-05-16 VITALS
DIASTOLIC BLOOD PRESSURE: 78 MMHG | BODY MASS INDEX: 39.16 KG/M2 | TEMPERATURE: 98.3 F | RESPIRATION RATE: 20 BRPM | SYSTOLIC BLOOD PRESSURE: 168 MMHG | HEART RATE: 96 BPM | OXYGEN SATURATION: 90 % | WEIGHT: 250 LBS

## 2025-05-16 DIAGNOSIS — D64.9 ANEMIA, UNSPECIFIED TYPE: Primary | ICD-10-CM

## 2025-05-16 DIAGNOSIS — R53.83 OTHER FATIGUE: ICD-10-CM

## 2025-05-16 LAB
ALBUMIN SERPL-MCNC: 3.9 G/DL (ref 3.5–5.2)
ALP SERPL-CCNC: 55 U/L (ref 40–129)
ALT SERPL-CCNC: 29 U/L (ref 10–50)
ANION GAP SERPL CALCULATED.3IONS-SCNC: 17 MMOL/L (ref 8–16)
AST SERPL-CCNC: 24 U/L (ref 10–50)
BASOPHILS # BLD: 0 K/UL (ref 0–0.2)
BASOPHILS NFR BLD: 0.3 % (ref 0–1)
BILIRUB SERPL-MCNC: 0.5 MG/DL (ref 0.2–1.2)
BUN SERPL-MCNC: 80 MG/DL (ref 6–20)
CALCIUM SERPL-MCNC: 10.1 MG/DL (ref 8.6–10)
CHLORIDE SERPL-SCNC: 98 MMOL/L (ref 98–107)
CO2 SERPL-SCNC: 25 MMOL/L (ref 22–29)
CREAT SERPL-MCNC: 14.3 MG/DL (ref 0.7–1.2)
EOSINOPHIL # BLD: 0.1 K/UL (ref 0–0.6)
EOSINOPHIL NFR BLD: 1.2 % (ref 0–5)
ERYTHROCYTE [DISTWIDTH] IN BLOOD BY AUTOMATED COUNT: 15.4 % (ref 11.5–14.5)
GLUCOSE SERPL-MCNC: 118 MG/DL (ref 70–99)
HCT VFR BLD AUTO: 21.2 % (ref 42–52)
HGB BLD-MCNC: 7.1 G/DL (ref 14–18)
IMM GRANULOCYTES # BLD: 0.1 K/UL
LYMPHOCYTES # BLD: 1.3 K/UL (ref 1.1–4.5)
LYMPHOCYTES NFR BLD: 11.9 % (ref 20–40)
MAGNESIUM SERPL-MCNC: 2.4 MG/DL (ref 1.6–2.6)
MCH RBC QN AUTO: 31.7 PG (ref 27–31)
MCHC RBC AUTO-ENTMCNC: 33.5 G/DL (ref 33–37)
MCV RBC AUTO: 94.6 FL (ref 80–94)
MONOCYTES # BLD: 1.3 K/UL (ref 0–0.9)
MONOCYTES NFR BLD: 11.9 % (ref 0–10)
NEUTROPHILS # BLD: 8.4 K/UL (ref 1.5–7.5)
NEUTS SEG NFR BLD: 74.2 % (ref 50–65)
PHOSPHATE SERPL-MCNC: 6.4 MG/DL (ref 2.5–4.5)
PLATELET # BLD AUTO: 168 K/UL (ref 130–400)
PMV BLD AUTO: 9.7 FL (ref 9.4–12.4)
POTASSIUM SERPL-SCNC: 4.7 MMOL/L (ref 3.5–5.1)
PROT SERPL-MCNC: 6.8 G/DL (ref 6.4–8.3)
RBC # BLD AUTO: 2.24 M/UL (ref 4.7–6.1)
SODIUM SERPL-SCNC: 140 MMOL/L (ref 136–145)
WBC # BLD AUTO: 11.3 K/UL (ref 4.8–10.8)

## 2025-05-16 PROCEDURE — 83735 ASSAY OF MAGNESIUM: CPT

## 2025-05-16 PROCEDURE — 85025 COMPLETE CBC W/AUTO DIFF WBC: CPT

## 2025-05-16 PROCEDURE — 84100 ASSAY OF PHOSPHORUS: CPT

## 2025-05-16 PROCEDURE — 80053 COMPREHEN METABOLIC PANEL: CPT

## 2025-05-16 PROCEDURE — 36415 COLL VENOUS BLD VENIPUNCTURE: CPT

## 2025-05-17 ASSESSMENT — ENCOUNTER SYMPTOMS
ABDOMINAL PAIN: 0
VOMITING: 0
SHORTNESS OF BREATH: 0
NAUSEA: 0

## 2025-05-17 NOTE — ED PROVIDER NOTES
[facility] EMERGENCY DEPARTMENT  eMERGENCY dEPARTMENT eNCOUnter      Pt Name: Hema Gallagher  MRN: [id number]  Birthdate 1970  Date of evaluation: 5/16/2025  Provider: Jeffery Harrison MD    Chief Complaint:  Chief Complaint   Patient presents with    Fatigue     Fatigue and weakness for a couple days, wants his Hgb checked     HPI    Hema Gallagher is a 54 y.o. male who presents to the emergency department with generalized weakness.  Whenever he walks he feels tired in the legs, and has been more tired than usual in general.  He endorses a recent history for the past couple of months of dealing with progressive recurrent anemia and says his symptoms are consistent with previous symptoms r/t downtrending hemoglobin.  He has had workup including dual endoscopy which was nondiagnostic and did not show any evidence of hemorrhage.  He has no black stools or blood in his stool and is defecating normally.  He has no abdominal pain.  He does not feel short of breath.  He endorses he has not had a referral to a hematologist.  He does have a history of ESRD, does dialysis, and does get EPO injections.  He wants his hemoglobin checked today to see if he needs another transfusion but denies other acute focal symptoms.      Review of Systems   Constitutional:  Negative for fever.   Respiratory:  Negative for shortness of breath.    Cardiovascular:  Negative for chest pain.   Gastrointestinal:  Negative for abdominal pain, nausea and vomiting.   Neurological:  Negative for syncope.       Past Medical History:   Diagnosis Date    Asthma     CHF (congestive heart failure) (HCC)     Diabetes mellitus (HCC)     Erectile dysfunction     Hemodialysis patient     monday, tuesday, thursday, and friday at home. hd    History of blood transfusion     Hyperlipidemia     Hypertension     Obesity     Palliative care patient 01/25/2021       Past Surgical History:   Procedure Laterality Date    BRONCHOSCOPY Left 05/24/2023     understands return precautions.    ED Course as of 05/17/25 0855   Fri May 16, 2025   1604 Hb >7 but is low. Trending anemic. No unusual acute sx likely appropriate for outpt f/u.  [AS]      ED Course User Index  [AS] Jeffery Harrison MD       Final Impression: See below.    Procedures    1. Anemia, unspecified type    2. Other fatigue      DISPOSITION Decision To Discharge 05/16/2025 04:29:04 PM   DISPOSITION CONDITION Stable         Aletha Del Cid, APRN - CNP  [address] [place] [address]  [address] [place], [place] [id number]  [phone]            DISCHARGE MEDICATIONS:  Discharge Medication List as of 5/16/2025  4:51 PM             (Please note that portions of this note were completed with a voice recognition program.  Efforts were made to edit thedictations but occasionally words are mis-transcribed.)    Jeffery Harrison MD (electronically signed)Emergency Physician        Jeffery Harrison MD  05/17/25 0856

## 2025-05-19 ENCOUNTER — OFFICE VISIT (OUTPATIENT)
Dept: PRIMARY CARE CLINIC | Age: 55
End: 2025-05-19
Payer: MEDICARE

## 2025-05-19 ENCOUNTER — TELEPHONE (OUTPATIENT)
Dept: PRIMARY CARE CLINIC | Age: 55
End: 2025-05-19

## 2025-05-19 ENCOUNTER — APPOINTMENT (OUTPATIENT)
Dept: GENERAL RADIOLOGY | Age: 55
DRG: 640 | End: 2025-05-19
Payer: MEDICARE

## 2025-05-19 ENCOUNTER — HOSPITAL ENCOUNTER (INPATIENT)
Age: 55
LOS: 3 days | Discharge: HOME | DRG: 640 | End: 2025-05-22
Attending: EMERGENCY MEDICINE | Admitting: STUDENT IN AN ORGANIZED HEALTH CARE EDUCATION/TRAINING PROGRAM
Payer: MEDICARE

## 2025-05-19 VITALS
BODY MASS INDEX: 40.84 KG/M2 | SYSTOLIC BLOOD PRESSURE: 132 MMHG | DIASTOLIC BLOOD PRESSURE: 72 MMHG | HEART RATE: 89 BPM | TEMPERATURE: 99.3 F | OXYGEN SATURATION: 82 % | WEIGHT: 260.2 LBS | HEIGHT: 67 IN

## 2025-05-19 DIAGNOSIS — Z99.2 ESRD (END STAGE RENAL DISEASE) ON DIALYSIS (HCC): ICD-10-CM

## 2025-05-19 DIAGNOSIS — D64.9 CHRONIC ANEMIA: ICD-10-CM

## 2025-05-19 DIAGNOSIS — J96.01 ACUTE HYPOXIC RESPIRATORY FAILURE (HCC): ICD-10-CM

## 2025-05-19 DIAGNOSIS — N18.6 ESRD (END STAGE RENAL DISEASE) ON DIALYSIS (HCC): ICD-10-CM

## 2025-05-19 DIAGNOSIS — D64.9 ANEMIA, UNSPECIFIED TYPE: Primary | ICD-10-CM

## 2025-05-19 DIAGNOSIS — E87.70 HYPERVOLEMIA, UNSPECIFIED HYPERVOLEMIA TYPE: ICD-10-CM

## 2025-05-19 DIAGNOSIS — Z99.2 ANEMIA DUE TO CHRONIC KIDNEY DISEASE, ON CHRONIC DIALYSIS (HCC): ICD-10-CM

## 2025-05-19 DIAGNOSIS — K92.1 MELENA: ICD-10-CM

## 2025-05-19 DIAGNOSIS — R06.02 SHORTNESS OF BREATH: Primary | ICD-10-CM

## 2025-05-19 DIAGNOSIS — D63.1 ANEMIA DUE TO CHRONIC KIDNEY DISEASE, ON CHRONIC DIALYSIS (HCC): ICD-10-CM

## 2025-05-19 DIAGNOSIS — N18.6 ESRD ON HEMODIALYSIS (HCC): ICD-10-CM

## 2025-05-19 DIAGNOSIS — N18.6 ANEMIA DUE TO CHRONIC KIDNEY DISEASE, ON CHRONIC DIALYSIS (HCC): ICD-10-CM

## 2025-05-19 DIAGNOSIS — Z99.2 ESRD ON HEMODIALYSIS (HCC): ICD-10-CM

## 2025-05-19 PROBLEM — D72.829 LEUKOCYTOSIS: Status: ACTIVE | Noted: 2025-05-19

## 2025-05-19 LAB
ALBUMIN SERPL-MCNC: 3.5 G/DL (ref 3.5–5.2)
ALP SERPL-CCNC: 54 U/L (ref 40–129)
ALT SERPL-CCNC: 23 U/L (ref 10–50)
ANION GAP SERPL CALCULATED.3IONS-SCNC: 17 MMOL/L (ref 8–16)
AST SERPL-CCNC: 25 U/L (ref 10–50)
BASOPHILS # BLD: 0 K/UL (ref 0–0.2)
BASOPHILS NFR BLD: 0.1 % (ref 0–1)
BILIRUB SERPL-MCNC: 0.6 MG/DL (ref 0.2–1.2)
BNP BLD-MCNC: ABNORMAL PG/ML (ref 0–124)
BUN SERPL-MCNC: 79 MG/DL (ref 6–20)
CALCIUM SERPL-MCNC: 9.2 MG/DL (ref 8.6–10)
CHLORIDE SERPL-SCNC: 95 MMOL/L (ref 98–107)
CO2 SERPL-SCNC: 25 MMOL/L (ref 22–29)
CREAT SERPL-MCNC: 16.1 MG/DL (ref 0.7–1.2)
EKG P AXIS: 57 DEGREES
EKG P-R INTERVAL: 160 MS
EKG Q-T INTERVAL: 376 MS
EKG QRS DURATION: 84 MS
EKG QTC CALCULATION (BAZETT): 426 MS
EKG T AXIS: 31 DEGREES
EOSINOPHIL # BLD: 0.1 K/UL (ref 0–0.6)
EOSINOPHIL NFR BLD: 0.3 % (ref 0–5)
ERYTHROCYTE [DISTWIDTH] IN BLOOD BY AUTOMATED COUNT: 15.5 % (ref 11.5–14.5)
GLUCOSE BLD-MCNC: 112 MG/DL (ref 70–99)
GLUCOSE BLD-MCNC: 167 MG/DL (ref 70–99)
GLUCOSE SERPL-MCNC: 118 MG/DL (ref 70–99)
HCT VFR BLD AUTO: 21.8 % (ref 42–52)
HGB BLD-MCNC: 7 G/DL (ref 14–18)
IMM GRANULOCYTES # BLD: 0.1 K/UL
INR PPP: 1.27 (ref 0.88–1.18)
LACTATE BLDV-SCNC: 0.8 MG/DL (ref 0.5–1.9)
LYMPHOCYTES # BLD: 0.9 K/UL (ref 1.1–4.5)
LYMPHOCYTES NFR BLD: 5.6 % (ref 20–40)
MAGNESIUM SERPL-MCNC: 2.3 MG/DL (ref 1.6–2.6)
MCH RBC QN AUTO: 31.1 PG (ref 27–31)
MCHC RBC AUTO-ENTMCNC: 32.1 G/DL (ref 33–37)
MCV RBC AUTO: 96.9 FL (ref 80–94)
MONOCYTES # BLD: 1.8 K/UL (ref 0–0.9)
MONOCYTES NFR BLD: 11.6 % (ref 0–10)
NEUTROPHILS # BLD: 12.8 K/UL (ref 1.5–7.5)
NEUTS SEG NFR BLD: 81.8 % (ref 50–65)
PERFORMED ON: ABNORMAL
PERFORMED ON: ABNORMAL
PLATELET # BLD AUTO: 161 K/UL (ref 130–400)
PMV BLD AUTO: 9.5 FL (ref 9.4–12.4)
POTASSIUM SERPL-SCNC: 4.7 MMOL/L (ref 3.5–5.1)
PROCALCITONIN: 1.21 NG/ML (ref 0–0.09)
PROT SERPL-MCNC: 6.9 G/DL (ref 6.4–8.3)
PROTHROMBIN TIME: 15.8 SEC (ref 12–14.6)
RBC # BLD AUTO: 2.25 M/UL (ref 4.7–6.1)
SODIUM SERPL-SCNC: 137 MMOL/L (ref 136–145)
TROPONIN, HIGH SENSITIVITY: 184 NG/L (ref 0–22)
VANCOMYCIN SERPL-MCNC: 27.9 UG/ML
WBC # BLD AUTO: 15.6 K/UL (ref 4.8–10.8)

## 2025-05-19 PROCEDURE — 85025 COMPLETE CBC W/AUTO DIFF WBC: CPT

## 2025-05-19 PROCEDURE — 94760 N-INVAS EAR/PLS OXIMETRY 1: CPT

## 2025-05-19 PROCEDURE — 85610 PROTHROMBIN TIME: CPT

## 2025-05-19 PROCEDURE — 3017F COLORECTAL CA SCREEN DOC REV: CPT | Performed by: NURSE PRACTITIONER

## 2025-05-19 PROCEDURE — 83735 ASSAY OF MAGNESIUM: CPT

## 2025-05-19 PROCEDURE — 8010000000 HC HEMODIALYSIS ACUTE INPT

## 2025-05-19 PROCEDURE — 83605 ASSAY OF LACTIC ACID: CPT

## 2025-05-19 PROCEDURE — 99215 OFFICE O/P EST HI 40 MIN: CPT | Performed by: NURSE PRACTITIONER

## 2025-05-19 PROCEDURE — 2500000003 HC RX 250 WO HCPCS

## 2025-05-19 PROCEDURE — 96365 THER/PROPH/DIAG IV INF INIT: CPT

## 2025-05-19 PROCEDURE — 84484 ASSAY OF TROPONIN QUANT: CPT

## 2025-05-19 PROCEDURE — 6360000002 HC RX W HCPCS: Performed by: EMERGENCY MEDICINE

## 2025-05-19 PROCEDURE — 1200000000 HC SEMI PRIVATE

## 2025-05-19 PROCEDURE — 82962 GLUCOSE BLOOD TEST: CPT

## 2025-05-19 PROCEDURE — 1036F TOBACCO NON-USER: CPT | Performed by: NURSE PRACTITIONER

## 2025-05-19 PROCEDURE — 3078F DIAST BP <80 MM HG: CPT | Performed by: NURSE PRACTITIONER

## 2025-05-19 PROCEDURE — G8427 DOCREV CUR MEDS BY ELIG CLIN: HCPCS | Performed by: NURSE PRACTITIONER

## 2025-05-19 PROCEDURE — 80053 COMPREHEN METABOLIC PANEL: CPT

## 2025-05-19 PROCEDURE — 6360000002 HC RX W HCPCS

## 2025-05-19 PROCEDURE — 5A1D70Z PERFORMANCE OF URINARY FILTRATION, INTERMITTENT, LESS THAN 6 HOURS PER DAY: ICD-10-PCS | Performed by: INTERNAL MEDICINE

## 2025-05-19 PROCEDURE — 71045 X-RAY EXAM CHEST 1 VIEW: CPT

## 2025-05-19 PROCEDURE — 1111F DSCHRG MED/CURRENT MED MERGE: CPT | Performed by: NURSE PRACTITIONER

## 2025-05-19 PROCEDURE — 83880 ASSAY OF NATRIURETIC PEPTIDE: CPT

## 2025-05-19 PROCEDURE — 2580000003 HC RX 258: Performed by: EMERGENCY MEDICINE

## 2025-05-19 PROCEDURE — 36415 COLL VENOUS BLD VENIPUNCTURE: CPT

## 2025-05-19 PROCEDURE — 84145 PROCALCITONIN (PCT): CPT

## 2025-05-19 PROCEDURE — 3075F SYST BP GE 130 - 139MM HG: CPT | Performed by: NURSE PRACTITIONER

## 2025-05-19 PROCEDURE — 93005 ELECTROCARDIOGRAM TRACING: CPT | Performed by: STUDENT IN AN ORGANIZED HEALTH CARE EDUCATION/TRAINING PROGRAM

## 2025-05-19 PROCEDURE — 93010 ELECTROCARDIOGRAM REPORT: CPT | Performed by: INTERNAL MEDICINE

## 2025-05-19 PROCEDURE — 80202 ASSAY OF VANCOMYCIN: CPT

## 2025-05-19 PROCEDURE — G8417 CALC BMI ABV UP PARAM F/U: HCPCS | Performed by: NURSE PRACTITIONER

## 2025-05-19 PROCEDURE — 6370000000 HC RX 637 (ALT 250 FOR IP)

## 2025-05-19 PROCEDURE — 87040 BLOOD CULTURE FOR BACTERIA: CPT

## 2025-05-19 PROCEDURE — 99285 EMERGENCY DEPT VISIT HI MDM: CPT

## 2025-05-19 RX ORDER — CALCITRIOL 0.25 UG/1
0.5 CAPSULE, LIQUID FILLED ORAL 2 TIMES DAILY
Status: DISCONTINUED | OUTPATIENT
Start: 2025-05-19 | End: 2025-05-22 | Stop reason: HOSPADM

## 2025-05-19 RX ORDER — CLONIDINE HYDROCHLORIDE 0.1 MG/1
0.1 TABLET ORAL 2 TIMES DAILY
Status: DISCONTINUED | OUTPATIENT
Start: 2025-05-19 | End: 2025-05-22 | Stop reason: HOSPADM

## 2025-05-19 RX ORDER — SEVELAMER CARBONATE 800 MG/1
800 TABLET, FILM COATED ORAL
Status: DISCONTINUED | OUTPATIENT
Start: 2025-05-19 | End: 2025-05-22 | Stop reason: HOSPADM

## 2025-05-19 RX ORDER — NIFEDIPINE 60 MG/1
60 TABLET, EXTENDED RELEASE ORAL 2 TIMES DAILY
Status: DISCONTINUED | OUTPATIENT
Start: 2025-05-19 | End: 2025-05-22 | Stop reason: HOSPADM

## 2025-05-19 RX ORDER — ACETAMINOPHEN 650 MG/1
650 SUPPOSITORY RECTAL EVERY 6 HOURS PRN
Status: DISCONTINUED | OUTPATIENT
Start: 2025-05-19 | End: 2025-05-22 | Stop reason: HOSPADM

## 2025-05-19 RX ORDER — HYDRALAZINE HYDROCHLORIDE 25 MG/1
25 TABLET, FILM COATED ORAL EVERY 8 HOURS SCHEDULED
Status: DISCONTINUED | OUTPATIENT
Start: 2025-05-19 | End: 2025-05-22 | Stop reason: HOSPADM

## 2025-05-19 RX ORDER — ACETAMINOPHEN 325 MG/1
650 TABLET ORAL EVERY 6 HOURS PRN
Status: DISCONTINUED | OUTPATIENT
Start: 2025-05-19 | End: 2025-05-22 | Stop reason: HOSPADM

## 2025-05-19 RX ORDER — HEPARIN SODIUM 5000 [USP'U]/ML
5000 INJECTION, SOLUTION INTRAVENOUS; SUBCUTANEOUS EVERY 8 HOURS SCHEDULED
Status: DISCONTINUED | OUTPATIENT
Start: 2025-05-19 | End: 2025-05-22 | Stop reason: HOSPADM

## 2025-05-19 RX ORDER — CINACALCET 30 MG/1
30 TABLET, FILM COATED ORAL DAILY
Status: DISCONTINUED | OUTPATIENT
Start: 2025-05-20 | End: 2025-05-22 | Stop reason: HOSPADM

## 2025-05-19 RX ORDER — PANTOPRAZOLE SODIUM 40 MG/1
40 TABLET, DELAYED RELEASE ORAL
Status: DISCONTINUED | OUTPATIENT
Start: 2025-05-20 | End: 2025-05-20

## 2025-05-19 RX ORDER — SODIUM CHLORIDE 0.9 % (FLUSH) 0.9 %
5-40 SYRINGE (ML) INJECTION EVERY 12 HOURS SCHEDULED
Status: DISCONTINUED | OUTPATIENT
Start: 2025-05-19 | End: 2025-05-22 | Stop reason: HOSPADM

## 2025-05-19 RX ORDER — METOPROLOL TARTRATE 25 MG/1
25 TABLET, FILM COATED ORAL 2 TIMES DAILY
Status: DISCONTINUED | OUTPATIENT
Start: 2025-05-19 | End: 2025-05-22 | Stop reason: HOSPADM

## 2025-05-19 RX ORDER — POLYETHYLENE GLYCOL 3350 17 G/17G
17 POWDER, FOR SOLUTION ORAL DAILY PRN
Status: DISCONTINUED | OUTPATIENT
Start: 2025-05-19 | End: 2025-05-22 | Stop reason: HOSPADM

## 2025-05-19 RX ORDER — SODIUM CHLORIDE 0.9 % (FLUSH) 0.9 %
5-40 SYRINGE (ML) INJECTION PRN
Status: DISCONTINUED | OUTPATIENT
Start: 2025-05-19 | End: 2025-05-22 | Stop reason: HOSPADM

## 2025-05-19 RX ORDER — ONDANSETRON 4 MG/1
4 TABLET, ORALLY DISINTEGRATING ORAL EVERY 8 HOURS PRN
Status: DISCONTINUED | OUTPATIENT
Start: 2025-05-19 | End: 2025-05-22 | Stop reason: HOSPADM

## 2025-05-19 RX ORDER — SODIUM CHLORIDE 9 MG/ML
INJECTION, SOLUTION INTRAVENOUS PRN
Status: DISCONTINUED | OUTPATIENT
Start: 2025-05-19 | End: 2025-05-22 | Stop reason: HOSPADM

## 2025-05-19 RX ORDER — ONDANSETRON 2 MG/ML
4 INJECTION INTRAMUSCULAR; INTRAVENOUS EVERY 6 HOURS PRN
Status: DISCONTINUED | OUTPATIENT
Start: 2025-05-19 | End: 2025-05-22 | Stop reason: HOSPADM

## 2025-05-19 RX ADMIN — HYDRALAZINE HYDROCHLORIDE 25 MG: 25 TABLET ORAL at 20:02

## 2025-05-19 RX ADMIN — NIFEDIPINE 60 MG: 60 TABLET, EXTENDED RELEASE ORAL at 20:03

## 2025-05-19 RX ADMIN — CALCITRIOL CAPSULES 0.25 MCG 0.5 MCG: 0.25 CAPSULE ORAL at 20:02

## 2025-05-19 RX ADMIN — CEFEPIME 1000 MG: 1 INJECTION, POWDER, FOR SOLUTION INTRAMUSCULAR; INTRAVENOUS at 14:03

## 2025-05-19 RX ADMIN — SODIUM CHLORIDE, PRESERVATIVE FREE 10 ML: 5 INJECTION INTRAVENOUS at 20:08

## 2025-05-19 RX ADMIN — VANCOMYCIN HYDROCHLORIDE 2500 MG: 5 INJECTION, POWDER, LYOPHILIZED, FOR SOLUTION INTRAVENOUS at 15:11

## 2025-05-19 RX ADMIN — METOPROLOL TARTRATE 25 MG: 25 TABLET, FILM COATED ORAL at 20:03

## 2025-05-19 RX ADMIN — HEPARIN SODIUM 5000 UNITS: 5000 INJECTION INTRAVENOUS; SUBCUTANEOUS at 20:03

## 2025-05-19 RX ADMIN — CLONIDINE HYDROCHLORIDE 0.1 MG: 0.1 TABLET ORAL at 20:03

## 2025-05-19 SDOH — ECONOMIC STABILITY: FOOD INSECURITY: WITHIN THE PAST 12 MONTHS, YOU WORRIED THAT YOUR FOOD WOULD RUN OUT BEFORE YOU GOT MONEY TO BUY MORE.: NEVER TRUE

## 2025-05-19 SDOH — ECONOMIC STABILITY: INCOME INSECURITY: HOW HARD IS IT FOR YOU TO PAY FOR THE VERY BASICS LIKE FOOD, HOUSING, MEDICAL CARE, AND HEATING?: NOT HARD AT ALL

## 2025-05-19 SDOH — ECONOMIC STABILITY: INCOME INSECURITY: IN THE PAST 12 MONTHS, HAS THE ELECTRIC, GAS, OIL, OR WATER COMPANY THREATENED TO SHUT OFF SERVICE IN YOUR HOME?: NO

## 2025-05-19 ASSESSMENT — ENCOUNTER SYMPTOMS
SHORTNESS OF BREATH: 1
ABDOMINAL PAIN: 0
EYES NEGATIVE: 1
CHEST TIGHTNESS: 0
DIARRHEA: 0
BACK PAIN: 0
COLOR CHANGE: 0
COLOR CHANGE: 0
ABDOMINAL PAIN: 0
SHORTNESS OF BREATH: 1
GASTROINTESTINAL NEGATIVE: 1
CHEST TIGHTNESS: 0
STRIDOR: 0
NAUSEA: 0
COUGH: 1
SORE THROAT: 0
WHEEZING: 1
COUGH: 0
NAUSEA: 0
DIARRHEA: 0
WHEEZING: 1
VOMITING: 0
VOMITING: 0

## 2025-05-19 ASSESSMENT — PATIENT HEALTH QUESTIONNAIRE - PHQ9
SUM OF ALL RESPONSES TO PHQ QUESTIONS 1-9: 1
2. FEELING DOWN, DEPRESSED OR HOPELESS: NOT AT ALL
1. LITTLE INTEREST OR PLEASURE IN DOING THINGS: SEVERAL DAYS
SUM OF ALL RESPONSES TO PHQ QUESTIONS 1-9: 1

## 2025-05-19 NOTE — TELEPHONE ENCOUNTER
Called list-informed of Aletha's note as follows:2 referrals have been placed. 1 was placed by the emergency room provider to an external hematologist. I have placed 1 to [facility] hematology.

## 2025-05-19 NOTE — PROGRESS NOTES
[facility]   Pharmacy Pharmacokinetic Monitoring Service - Vancomycin    Hema Gallagher is a 54 y.o. male starting on vancomycin therapy for potential sepsis. Pharmacy consulted by Dewey Garcia Jr., MD, for monitoring and adjustment.    Target Concentration: Pre-Dialysis Concentration 15-20 mg/L  Additional Antimicrobials: Cefepime once in ER    Pertinent Laboratory Values:   Wt Readings from Last 1 Encounters:   05/19/25 118 kg (260 lb 3.2 oz)     Temp Readings from Last 1 Encounters:   05/19/25 99.3 °F (37.4 °C) (Temporal)     Recent Labs     05/16/25  1457 05/19/25  1145   CREATININE 14.3* 16.1*   BUN 80* 79*   WBC 11.3* 15.6*     Procalcitonin: 05/19/25 = 1.21    Pertinent Cultures:  Culture Date Source Results   05/19/25 Blood x 2 Sent   MRSA Nasal Swab: N/A. Non-respiratory infection.    Plan:  Concentration-guided dosing due to renal impairment and intermittent hemodialysis   Start vancomycin pulse dose for HD, give loading dose of 2500 mg IV x 1  Vancomycin concentration to be ordered after first maintenance dose, awaiting dialysis schedule / orders  Pharmacy will continue to monitor patient and adjust therapy as indicated    Thank you for the consult,    Reanna Marmolejo, [facility]  5/19/2025 1:26 PM

## 2025-05-19 NOTE — H&P
[facility]      Hospitalist - History & Physical      PCP: Aletha Del Cid APRN - CNP    Date of Admission: 5/19/2025    Date of Service: 5/19/2025    Chief Complaint:  Shortness of breath    History Of Present Illness:   The patient is a 54 y.o. male with CHF, diabetes, hyperlipidemia, HTN, ESRD on dialysis comes to ED complaining of shortness of breath.  Patient states that this has been ongoing for several weeks now.  He is a dialysis patient and does home hemodialysis on Monday, Tuesday, Thursday, and Friday.  Patient states that for the past couple of weeks he has increased his treatments to 5 days a week.  He does report completing his full treatment for 3 hours and 20 minutes.  Patient states that last week he was diagnosed with \"double pneumonia\" and completed a course of clindamycin.  He does report improvement in symptoms while on the antibiotics however has declined since coming off antibiotics.  Patient denies fever, nausea, vomiting, abdominal pain.  Patient does report a cough productive of clear and sometimes yellow sputum.  He also reports leg swelling with weight gain. Denies fever, chills, nausea, vomiting, abdominal pain, and chest pain.     In ED: EKG NSR HR 89; CXR with no acute findings in the chest, stable cardiomegaly; blood cultures pending; WBC 15.6, H&H 7/21.8, BNP 93211, troponin 184, procalcitonin 1.21, lactic acid 0.8, creatinine 16.1, BUN 79, GFR 3, CMP otherwise unremarkable.  Patient will be admitted inpatient to hospitalist with consult to nephrology.    Past Medical History:        Diagnosis Date    Asthma     CHF (congestive heart failure) (HCC)     Diabetes mellitus (HCC)     Erectile dysfunction     Hemodialysis patient     monday, tuesday, thursday, and friday at home. hd    History of blood transfusion     Hyperlipidemia     Hypertension     Obesity     Palliative care patient 01/25/2021       Past Surgical History:        Procedure Laterality Date    BRONCHOSCOPY       Rate and Rhythm: Normal rate and regular rhythm.      Pulses: Normal pulses.      Heart sounds: Normal heart sounds. No murmur heard.  Pulmonary:      Effort: Pulmonary effort is normal. No respiratory distress.      Breath sounds: Wheezing present. No rhonchi or rales.   Abdominal:      General: Bowel sounds are normal. There is no distension.      Palpations: Abdomen is soft.      Tenderness: There is no abdominal tenderness. There is no guarding.   Musculoskeletal:         General: Normal range of motion.      Right lower le+ Pitting Edema present.      Left lower le+ Pitting Edema present.   Skin:     General: Skin is warm and dry.      Capillary Refill: Capillary refill takes less than 2 seconds.   Neurological:      Mental Status: He is alert and oriented to person, place, and time. Mental status is at baseline.      Motor: Weakness present.      Diagnostic Data:  CBC:  Recent Labs     25  1457 25  1145   WBC 11.3* 15.6*   HGB 7.1* 7.0*   HCT 21.2* 21.8*    161     BMP:  Recent Labs     25  1457 25  1145    137   K 4.7 4.7   CL 98 95*   CO2 25 25   BUN 80* 79*   CREATININE 14.3* 16.1*   CALCIUM 10.1* 9.2   PHOS 6.4*  --      Recent Labs     25  1457 25  1145   AST 24 25   ALT 29 23   BILITOT 0.5 0.6   ALKPHOS 55 54     Coag Panel:   Recent Labs     25  1145   INR 1.27*   PROTIME 15.8*     XR CHEST PORTABLE  Result Date: 2025  EXAM: CHEST RADIOGRAPH  TECHNIQUE: Single frontal chest radiograph.  HISTORY: Chest pain and shortness of breath.  COMPARISON: 2025  FINDINGS:  Vascular stent in the left axilla, again noted. No pulmonary infiltrate is identified. No pleural effusion or pneumothorax is seen. Stable cardiomegaly. No acute displaced rib fractures are identified.        1.  No acute findings in the chest. 2. Stable cardiomegaly.    ______________________________________ Electronically signed by: ESTEFANIA ANN M.D. Date:     2025

## 2025-05-19 NOTE — TELEPHONE ENCOUNTER
Please let Yun know 2 referrals have been placed.  1 was placed by the emergency room provider to an external hematologist.  I have placed 1 to [facility] hematology.

## 2025-05-19 NOTE — CONSULTS
Nephrology ([facility]) Consult Note      Patient:  Hema Gallagher  YOB: 1970  Date of Service: 5/19/2025  MRN: [id number]   Acct: [id number]   Primary Care Physician: Aletha Del Cid APRN - CNP  Advance Directive: Prior  Admit Date: 5/19/2025       Hospital Day: 0  Referring Provider: Braydon Bravo MD    Patient independently seen and examined, Chart, Consults, Notes, Operative notes, Labs, Cardiology, and Radiology studies reviewed as available.        Subjective:  Hema Gallagher is a 54 y.o. male for whom we were consulted for evaluation and treatment of end-stage renal disease.  He is a home hemodialysis patient with last treatment on Sunday prior to admission.  Has had ongoing issues with anemia and fatigue with dyspnea on exertion.  He presented and was subsequently admitted for further evaluation treatment.  He was recently treated for pneumonia as an outpatient with clindamycin.  He was initially seen on dialysis was tolerating without further issues.  He was last discharged in this facility on 4/30.  Denied current chest pain, nausea or vomiting.    Dialysis   Pt was seen on renal replacement therapy and I have personally seen and evaluated the patient and directed the therapy  Modality: Hemodialysis  Access: Arterial Venous Fistula  Location: right upper  QB: 450  QD: 700  UF: 970      Allergies:  Banana, Atorvastatin, and Lisinopril    Medicines:  Current Facility-Administered Medications   Medication Dose Route Frequency Provider Last Rate Last Admin    vancomycin (VANCOCIN) 2,500 mg in sodium chloride 0.9 % 500 mL IVPB  2,500 mg IntraVENous Once Dewey Garcia Jr., .7 mL/hr at 05/19/25 1511 2,500 mg at 05/19/25 1511    vancomycin (VANCOCIN) intermittent dosing (placeholder)   Other RX Placeholder Dewey Garcia Jr., MD         Current Outpatient Medications   Medication Sig Dispense Refill    pantoprazole (PROTONIX) 40 MG tablet Take 1 tablet by mouth 2

## 2025-05-19 NOTE — TELEPHONE ENCOUNTER
Yun calls asking about referral to Hematology. She stated she called and they don't have the referral. Please advise.

## 2025-05-19 NOTE — PROGRESS NOTES
[address] [place] [address] Drive   [address] [facility], [address]     Phone:  [phone]  Fax:  [phone]      Hema Gallagher is a 54 y.o. male who presents today for his medical conditions/complaints as noted below.  Hema Gallagher is c/o of Shortness of Breath (Started two days ago, went to ER Friday.)      Chief Complaint   Patient presents with    Shortness of Breath     Started two days ago, went to ER Friday.       HPI:     History of Present Illness  The patient presents for evaluation of shortness of breath.    He has been experiencing dyspnea for the past 2 days, which started on Saturday. He reports a mild cough with some expectoration. He completed a 7-day course of clindamycin, which he found beneficial, but has started with the dyspnea now. He also reports mild chest pain.  Patient is unable to walk on his own due to his shortness of breath.  He was brought in the office in a wheelchair today.  Denies any fevers but is fatigued    He was seen in the ER on 05/16/2025 due to fatigue in his legs. His hemoglobin level was 7.1, and he was not given any blood transfusion. He also noticed blood in his stool on 05/16/2025. Additionally, he has gained 10 pounds since 05/16/2025.         Past Medical History:   Diagnosis Date    Asthma     CHF (congestive heart failure) (HCC)     Diabetes mellitus (HCC)     Erectile dysfunction     Hemodialysis patient     monday, tuesday, thursday, and friday at home. hd    History of blood transfusion     Hyperlipidemia     Hypertension     Obesity     Palliative care patient 01/25/2021        Past Surgical History:   Procedure Laterality Date    BRONCHOSCOPY Left 05/24/2023    BRONCHOSCOPY ADD ON COMPUTER ASSISTED performed by Clarice French MD at [facility]    BRONCHOSCOPY  05/24/2023    BRONCHOSCOPY ALVEOLAR LAVAGE ROBOTIC performed by Clarice French MD at [facility]    BRONCHOSCOPY  05/24/2023    BRONCHOSCOPY BIOPSY BRONCHUS ROBOTIC performed  Spoke with Dr. Breaux's office and appointment was made and patient placed on wait list.

## 2025-05-19 NOTE — ED PROVIDER NOTES
[facility]  EMERGENCY DEPARTMENT ENCOUNTER      Pt Name: Hema Gallagher  MRN: [id number]  Birthdate 1970  Date of evaluation: 5/19/2025  Provider: Dewey Garcia Jr, MD    CHIEF COMPLAINT       Chief Complaint   Patient presents with    Chest Pain    Shortness of Breath         HISTORY OF PRESENT ILLNESS   (Location/Symptom, Timing/Onset,Context/Setting, Quality, Duration, Modifying Factors, Severity)  Note limiting factors.   Hema Gallagher is a 54 y.o. male who presents to the emergency department with complaint of shortness of breath and fatigue that been ongoing for the last several weeks and gradually worsening.  Says that he was initially diagnosed with \"double pneumonia.\"  Was treated with clindamycin for that and says symptoms did seem to improve while he was on the antibiotic but then worsened again after the course was completed.  Has not had significant cough and denies fever.  Has had leg swelling.  He is on home hemodialysis and says that he is been doing dialysis sessions about 5 times per week trying to get extra fluid off but has not seem to make any improvement was seen here earlier this month and found to have anemia with hemoglobin of 7.1 which was attributed to anemia of chronic renal disease.  Had an echo in March of this year that showed EF of 65 to 70% with RV dilation    HPI    NursingNotes were reviewed.    REVIEW OF SYSTEMS    (2-9 systems for level 4, 10 or more for level 5)     Review of Systems   Constitutional:  Positive for activity change and fatigue.   HENT: Negative.     Eyes: Negative.    Respiratory:  Positive for shortness of breath.    Cardiovascular:  Positive for leg swelling.   Gastrointestinal: Negative.    Genitourinary: Negative.    Musculoskeletal: Negative.    Skin: Negative.    Neurological: Negative.    Hematological: Negative.    Psychiatric/Behavioral: Negative.         A complete review of systems was performed and is negative except as noted above in the

## 2025-05-19 NOTE — ED NOTES
ED TO INPATIENT SBAR HANDOFF    Patient Name: Hema Gallagher   : 1970  54 y.o.   Family/Caregiver Present: No  Code Status Order: Prior    C-SSRS: Risk of Suicide: No Risk  Sitter No  Restraints:         Situation  Chief Complaint:   Chief Complaint   Patient presents with    Chest Pain    Shortness of Breath     Patient Diagnosis: ESRD needing dialysis (HCC) [N18.6, Z99.2]     Brief Description of Patient's Condition: pt dx PNE few weeks ago, finished ABX already, pt states increased sob over last couple days, worse today.   Mental Status: oriented, alert, coherent, and logical  Arrived from: home    Imaging:   XR CHEST PORTABLE   Final Result       1.  No acute findings in the chest.   2. Stable cardiomegaly.               ______________________________________    Electronically signed by: ESTEFANIA ANN M.D.   Date:     2025   Time:    11:42         COVID-19 Results:   Internal Administration   First Dose COVID-19, PFIZER PURPLE top, DILUTE for use, (age 12 y+), 30mcg/0.3mL  2021   Second Dose COVID-19, PFIZER PURPLE top, DILUTE for use, (age 12 y+), 30mcg/0.3mL   10/04/2021       Last COVID Lab SARS-CoV-2 (no units)   Date Value   2020 Not Detected     SARS-CoV-2, PCR (no units)   Date Value   2025 Not Detected     SARS-CoV-2, NANY (no units)   Date Value   2020 NOT DETECTED     SARS-CoV-2, NAAT (no units)   Date Value   2021 DETECTED (AA)           Abnormal labs:   Abnormal Labs Reviewed   CBC WITH AUTO DIFFERENTIAL - Abnormal; Notable for the following components:       Result Value    WBC 15.6 (*)     RBC 2.25 (*)     Hemoglobin 7.0 (*)     Hematocrit 21.8 (*)     MCV 96.9 (*)     MCH 31.1 (*)     MCHC 32.1 (*)     RDW 15.5 (*)     Neutrophils % 81.8 (*)     Lymphocytes % 5.6 (*)     Monocytes % 11.6 (*)     Neutrophils Absolute 12.8 (*)     Lymphocytes Absolute 0.9 (*)     Monocytes Absolute 1.80 (*)     All other components within normal limits   COMPREHENSIVE  Admin Date  05/19/2025 Action  New Bag Dose  2,500 mg Rate  166.7 mL/hr Route  IntraVENous Documented By  Blair Samaniego RN            History:   Past Medical History:   Diagnosis Date    Asthma     CHF (congestive heart failure) (HCC)     Diabetes mellitus (HCC)     Erectile dysfunction     Hemodialysis patient     monday, tuesday, thursday, and friday at home. hd    History of blood transfusion     Hyperlipidemia     Hypertension     Obesity     Palliative care patient 01/25/2021       Assessment  Vitals:     Vitals:    05/19/25 1330 05/19/25 1400 05/19/25 1430 05/19/25 1502   BP: 129/63 125/70 124/65 (!) 113/59   Pulse: 83 83 84 80   Resp: 24 27 25 24   SpO2: 94% 95% 95% 96%     Predictive Model Details          34 (Caution)  Factor Value    Calculated 5/19/2025 15:15 30% Respiratory rate 24    Deterioration Index Model 27% Age 54 years old     17% Hematocrit abnormal (21.8 %)     10% WBC count abnormal (15.6 K/uL)     10% Potassium 4.7 mmol/L     3% BUN abnormal (79 mg/dL)     3% Sodium 137 mmol/L     0% Pulse 80     0% Systolic 113     0% Pulse oximetry 96 %       NPO? No  O2 Flow Rate:      Cardiac Rhythm:    NIH Score: NIH     Active LDA's:   Peripheral IV 05/19/25 Distal;Left;Anterior Forearm (Active)     Pertinent or High Risk Medications/Drips: no   If Yes, please provide details:    Blood Product Administration: no  If Yes, please provide details:    Sepsis Risk Score      Admitted with Sepsis? No    Recommendation  Incomplete orders:    Patient Belongings: bedside  Additional Comments: pt getting vancomycin and headed to 3rd floor dialysis at this time.  If any further questions, please call Sending RN at [phone]    Electronically signed by: Electronically signed by Blair Samaniego RN on 5/19/2025 at 3:15 PM

## 2025-05-19 NOTE — DIALYSIS
[facility] INPATIENT SERVICES  DIALYSIS TREATMENT SUMMARY      Note: Consult with the attending physician for patient treatment orders, this document is not a physician order.      Patient Information   Patient Hema Gallagher   Date of Birth November 15, 1970   Chart Number [id number]   [facility] MRN [id number]   Gender Male   SSN (last 4) 1921     Treatment Information   Treatment Type Hemodialysis   Treatment Id [id number]   Start Time May 19, 2025 15:31   End Time May 19, 2025 18:02   Acutal Duration 02:31     Treatment Balances   Total Saline Administered 500   Net Fluid Balance -2000    Hemodialysis Orders   Therapy Standard   Orders Verified Time 05/19/2025 16:00    Date Verified 05/19/2025   Duration 2:30   Isolated UF/Bypass No   BFR (mL) 450   DFR (mL) 700   Dialyzer Type OPTIFLUX 160NR   UF Order UF Range   UF Range (mL) 1500 - 2500   With BP Parameters Yes   As Tolerated Yes   Crit-Line used No   Heparin Initial Units Bolus No   Heparin IV Maintenance Bolus No   Heparin IV Infusion No   Potassium (mEq/L) 2.0   Calcium (mEq/L) 2.5   Bicarb (mg/dL) 35   Sodium (mEq/L) 138   Clinician Ruby Gaines, RN    Dialysis Access   Access Type AV Access   AV Access   Access Location Upper Arm - R   Needle 15g   Bruit Yes   Thrill Yes      Vitals   Pre-Treatment Vitals   Time Is BP being recorded? Pre BP Map BP Method Pulse RR Temp How was Weight Obtained? Pre Weight Previous Dry Weight Previous Post Weight Metric Target Fluid Removal (mL) Dialysate Confirmed Clinician    05/19/2025 15:30 BP/Map 152/73 (99) Noninvasive 81 16 98 How Obtained: Bed Scale 118 - - Kgs 3000 Yes Ruby Gaines, MARIE    Comments: Patient received from ED; patient needing dialysis per diagnosis of ED assessment. Patient alert, talking to HD RN. Patient denies pain at this time. Education about compliance to HD tx given to patient; patient verbalized understanding.      Post Treatment Vitals   Time Is BP being  recorded? BP Map Pulse RR Temp How was Weight Obtained? Post Weight Metric BVP UF Goal Ordered NSS Given Intra-Procedure Total Machine UF Removed (mL) Crit-Line Ending Profile Crit-Line Refill Crit-Line Ending HCT Crit-Line Max BV% Clinician    05/19/2025 18:20 BP/Map 182/72 (109) 76 16 98 How Obtained: Bed scale 116 Kgs 62.1 2000 0 2500     Ruby Gaines RN    Comments: Tolerated tx well today.      Safety checks include: access uncovered and secured, Hemaclip secured for all central line access, machine checks performed, and alarm limits confirmed.     Labs   Hepatitis   HBsAG Lab Result HBsAG Lab Result HBsAG Draw Date Transient Antigenemia(MD Diagnosis Only) Anti-HBs Lab Result Anti-HBs Lab Value Anti-HBs Draw Date Documented By Documented Date Hepatitis Status Hepatitis Status   Negative  04/05/2025  Positive  04/05/2025 Ruby Gaines 05/19/2025  Immune   Notes:       Pre-Treatment Hepatitis Precautions Hepatitis Information Entered By Ruby Gaines RN Signed By Ruby Gaines RN   Copy of hepatitis results verified in hospital EMR Yes Signing      Pre-Treatment Handoff   Staff Report Received Yes   Report Given by Primary Nurse Mushtaq Dahl RN   Time 15:15     Patient Arrival   Patient ID Verified Date of Birth    Full Name   Patient Consent to treatment verified Yes   Blood Transfusion Consent Verified N/A     Treatment Comments   Treatment Notes Hemodynamically stable post HD today.     Post-Treatment Handoff   Report Given to Primary Nurse Edna Walton RN   Time Report Given 18:22   Report Given By Ruby Gaines RN    Machine Validation   Time 14:30   Date 5/19/2025   Auto Alarm Test Passed Yes   Machine Serial # [id number]   Portable RO Yes   RO Serial# 21   Residual Bleach Negative Yes   Was a manufactured mix used? Yes   Machine Log Completed Yes   Total Chlorine (less than 0.1)? Yes   Total Chlorine Log Completed Yes   Bicarb BiBag   Bibag Size 900   Machine Temp 36   Machine Conductivity 13.8

## 2025-05-20 ENCOUNTER — APPOINTMENT (OUTPATIENT)
Dept: CT IMAGING | Age: 55
DRG: 640 | End: 2025-05-20
Payer: MEDICARE

## 2025-05-20 PROBLEM — K92.1 MELENA: Status: ACTIVE | Noted: 2025-05-19

## 2025-05-20 PROBLEM — J18.9 PNEUMONIA DUE TO INFECTIOUS ORGANISM: Status: ACTIVE | Noted: 2025-05-20

## 2025-05-20 LAB
25(OH)D3 SERPL-MCNC: 36.2 NG/ML
ABO/RH: NORMAL
ANION GAP SERPL CALCULATED.3IONS-SCNC: 15 MMOL/L (ref 8–16)
ANTIBODY SCREEN: NORMAL
BASOPHILS # BLD: 0 K/UL (ref 0–0.2)
BASOPHILS NFR BLD: 0.2 % (ref 0–1)
BLOOD BANK DISPENSE STATUS: NORMAL
BLOOD BANK PRODUCT CODE: NORMAL
BPU ID: NORMAL
BUN SERPL-MCNC: 53 MG/DL (ref 6–20)
CALCIUM SERPL-MCNC: 9.1 MG/DL (ref 8.6–10)
CHLORIDE SERPL-SCNC: 96 MMOL/L (ref 98–107)
CO2 SERPL-SCNC: 27 MMOL/L (ref 22–29)
CREAT SERPL-MCNC: 11.8 MG/DL (ref 0.7–1.2)
CRP SERPL-MCNC: 231 MG/L (ref 0–5)
DESCRIPTION BLOOD BANK: NORMAL
EOSINOPHIL # BLD: 0.2 K/UL (ref 0–0.6)
EOSINOPHIL NFR BLD: 1.5 % (ref 0–5)
ERYTHROCYTE [DISTWIDTH] IN BLOOD BY AUTOMATED COUNT: 15.2 % (ref 11.5–14.5)
FERRITIN SERPL-MCNC: 1809 NG/ML (ref 30–400)
FOLATE SERPL-MCNC: 11.8 NG/ML (ref 4.5–32.2)
GLUCOSE BLD-MCNC: 108 MG/DL (ref 70–99)
GLUCOSE BLD-MCNC: 108 MG/DL (ref 70–99)
GLUCOSE BLD-MCNC: 118 MG/DL (ref 70–99)
GLUCOSE SERPL-MCNC: 113 MG/DL (ref 70–99)
HCT VFR BLD AUTO: 20.6 % (ref 42–52)
HCT VFR BLD AUTO: 20.7 % (ref 42–52)
HCT VFR BLD AUTO: 26.3 % (ref 42–52)
HGB BLD-MCNC: 6.6 G/DL (ref 14–18)
HGB BLD-MCNC: 6.8 G/DL (ref 14–18)
HGB BLD-MCNC: 8.6 G/DL (ref 14–18)
IMM GRANULOCYTES # BLD: 0.1 K/UL
IRON SATN MFR SERPL: 18 % (ref 20–50)
IRON SERPL-MCNC: 24 UG/DL (ref 59–158)
LYMPHOCYTES # BLD: 1.3 K/UL (ref 1.1–4.5)
LYMPHOCYTES NFR BLD: 9.3 % (ref 20–40)
MAGNESIUM SERPL-MCNC: 2.2 MG/DL (ref 1.6–2.6)
MCH RBC QN AUTO: 30 PG (ref 27–31)
MCHC RBC AUTO-ENTMCNC: 32 G/DL (ref 33–37)
MCV RBC AUTO: 93.6 FL (ref 80–94)
MONOCYTES # BLD: 1.7 K/UL (ref 0–0.9)
MONOCYTES NFR BLD: 11.8 % (ref 0–10)
NEUTROPHILS # BLD: 10.8 K/UL (ref 1.5–7.5)
NEUTS SEG NFR BLD: 76.7 % (ref 50–65)
PERFORMED ON: ABNORMAL
PHOSPHATE SERPL-MCNC: 6.1 MG/DL (ref 2.5–4.5)
PLATELET # BLD AUTO: 171 K/UL (ref 130–400)
PMV BLD AUTO: 10.4 FL (ref 9.4–12.4)
POTASSIUM SERPL-SCNC: 4.3 MMOL/L (ref 3.5–5)
PTH-INTACT SERPL-MCNC: 399 PG/ML (ref 15–65)
RBC # BLD AUTO: 2.2 M/UL (ref 4.7–6.1)
SODIUM SERPL-SCNC: 138 MMOL/L (ref 136–145)
TIBC SERPL-MCNC: 132 UG/DL (ref 250–400)
VIT B12 SERPL-MCNC: 1786 PG/ML (ref 232–1245)
WBC # BLD AUTO: 14.1 K/UL (ref 4.8–10.8)

## 2025-05-20 PROCEDURE — 83970 ASSAY OF PARATHORMONE: CPT

## 2025-05-20 PROCEDURE — 2580000003 HC RX 258

## 2025-05-20 PROCEDURE — 71250 CT THORAX DX C-: CPT

## 2025-05-20 PROCEDURE — 85018 HEMOGLOBIN: CPT

## 2025-05-20 PROCEDURE — 82607 VITAMIN B-12: CPT

## 2025-05-20 PROCEDURE — 83735 ASSAY OF MAGNESIUM: CPT

## 2025-05-20 PROCEDURE — 6360000002 HC RX W HCPCS

## 2025-05-20 PROCEDURE — 86140 C-REACTIVE PROTEIN: CPT

## 2025-05-20 PROCEDURE — 82746 ASSAY OF FOLIC ACID SERUM: CPT

## 2025-05-20 PROCEDURE — 83550 IRON BINDING TEST: CPT

## 2025-05-20 PROCEDURE — 85014 HEMATOCRIT: CPT

## 2025-05-20 PROCEDURE — 2500000003 HC RX 250 WO HCPCS

## 2025-05-20 PROCEDURE — 99222 1ST HOSP IP/OBS MODERATE 55: CPT | Performed by: INTERNAL MEDICINE

## 2025-05-20 PROCEDURE — 6370000000 HC RX 637 (ALT 250 FOR IP)

## 2025-05-20 PROCEDURE — 94150 VITAL CAPACITY TEST: CPT

## 2025-05-20 PROCEDURE — 86901 BLOOD TYPING SEROLOGIC RH(D): CPT

## 2025-05-20 PROCEDURE — 85025 COMPLETE CBC W/AUTO DIFF WBC: CPT

## 2025-05-20 PROCEDURE — 30233N1 TRANSFUSION OF NONAUTOLOGOUS RED BLOOD CELLS INTO PERIPHERAL VEIN, PERCUTANEOUS APPROACH: ICD-10-PCS | Performed by: STUDENT IN AN ORGANIZED HEALTH CARE EDUCATION/TRAINING PROGRAM

## 2025-05-20 PROCEDURE — 86900 BLOOD TYPING SEROLOGIC ABO: CPT

## 2025-05-20 PROCEDURE — 83540 ASSAY OF IRON: CPT

## 2025-05-20 PROCEDURE — 36415 COLL VENOUS BLD VENIPUNCTURE: CPT

## 2025-05-20 PROCEDURE — 84100 ASSAY OF PHOSPHORUS: CPT

## 2025-05-20 PROCEDURE — P9016 RBC LEUKOCYTES REDUCED: HCPCS

## 2025-05-20 PROCEDURE — 80048 BASIC METABOLIC PNL TOTAL CA: CPT

## 2025-05-20 PROCEDURE — 86850 RBC ANTIBODY SCREEN: CPT

## 2025-05-20 PROCEDURE — 82728 ASSAY OF FERRITIN: CPT

## 2025-05-20 PROCEDURE — 82962 GLUCOSE BLOOD TEST: CPT

## 2025-05-20 PROCEDURE — 2580000003 HC RX 258: Performed by: STUDENT IN AN ORGANIZED HEALTH CARE EDUCATION/TRAINING PROGRAM

## 2025-05-20 PROCEDURE — 1200000000 HC SEMI PRIVATE

## 2025-05-20 PROCEDURE — 6370000000 HC RX 637 (ALT 250 FOR IP): Performed by: HOSPITALIST

## 2025-05-20 PROCEDURE — 86923 COMPATIBILITY TEST ELECTRIC: CPT

## 2025-05-20 PROCEDURE — 82306 VITAMIN D 25 HYDROXY: CPT

## 2025-05-20 PROCEDURE — 6360000002 HC RX W HCPCS: Performed by: STUDENT IN AN ORGANIZED HEALTH CARE EDUCATION/TRAINING PROGRAM

## 2025-05-20 PROCEDURE — 8010000000 HC HEMODIALYSIS ACUTE INPT

## 2025-05-20 PROCEDURE — 94760 N-INVAS EAR/PLS OXIMETRY 1: CPT

## 2025-05-20 RX ORDER — SODIUM CHLORIDE 9 MG/ML
INJECTION, SOLUTION INTRAVENOUS PRN
Status: DISCONTINUED | OUTPATIENT
Start: 2025-05-20 | End: 2025-05-22 | Stop reason: HOSPADM

## 2025-05-20 RX ORDER — MECOBALAMIN 5000 MCG
5 TABLET,DISINTEGRATING ORAL NIGHTLY PRN
Status: DISCONTINUED | OUTPATIENT
Start: 2025-05-20 | End: 2025-05-22 | Stop reason: HOSPADM

## 2025-05-20 RX ADMIN — METOPROLOL TARTRATE 25 MG: 25 TABLET, FILM COATED ORAL at 13:14

## 2025-05-20 RX ADMIN — SEVELAMER CARBONATE 800 MG: 800 TABLET, FILM COATED ORAL at 17:17

## 2025-05-20 RX ADMIN — PANTOPRAZOLE SODIUM 40 MG: 40 TABLET, DELAYED RELEASE ORAL at 06:06

## 2025-05-20 RX ADMIN — HEPARIN SODIUM 5000 UNITS: 5000 INJECTION INTRAVENOUS; SUBCUTANEOUS at 06:06

## 2025-05-20 RX ADMIN — METOPROLOL TARTRATE 25 MG: 25 TABLET, FILM COATED ORAL at 22:06

## 2025-05-20 RX ADMIN — SODIUM CHLORIDE, PRESERVATIVE FREE 10 ML: 5 INJECTION INTRAVENOUS at 22:06

## 2025-05-20 RX ADMIN — Medication 5 MG: at 01:12

## 2025-05-20 RX ADMIN — SODIUM CHLORIDE, PRESERVATIVE FREE 10 ML: 5 INJECTION INTRAVENOUS at 13:17

## 2025-05-20 RX ADMIN — CEFEPIME 2000 MG: 2 INJECTION, POWDER, FOR SOLUTION INTRAVENOUS at 13:11

## 2025-05-20 RX ADMIN — CINACALCET HYDROCHLORIDE 30 MG: 30 TABLET, FILM COATED ORAL at 13:14

## 2025-05-20 RX ADMIN — NIFEDIPINE 60 MG: 60 TABLET, EXTENDED RELEASE ORAL at 13:15

## 2025-05-20 RX ADMIN — CALCITRIOL CAPSULES 0.25 MCG 0.5 MCG: 0.25 CAPSULE ORAL at 22:06

## 2025-05-20 RX ADMIN — SODIUM CHLORIDE, PRESERVATIVE FREE 40 MG: 5 INJECTION INTRAVENOUS at 22:06

## 2025-05-20 RX ADMIN — HYDRALAZINE HYDROCHLORIDE 25 MG: 25 TABLET ORAL at 06:06

## 2025-05-20 RX ADMIN — SODIUM CHLORIDE, PRESERVATIVE FREE 40 MG: 5 INJECTION INTRAVENOUS at 13:21

## 2025-05-20 RX ADMIN — NIFEDIPINE 60 MG: 60 TABLET, EXTENDED RELEASE ORAL at 22:06

## 2025-05-20 RX ADMIN — CALCITRIOL CAPSULES 0.25 MCG 0.5 MCG: 0.25 CAPSULE ORAL at 13:13

## 2025-05-20 NOTE — PLAN OF CARE
Problem: Chronic Conditions and Co-morbidities  Goal: Patient's chronic conditions and co-morbidity symptoms are monitored and maintained or improved  5/20/2025 1705 by Missy Sherwood RN  Outcome: Progressing  5/20/2025 0516 by Tanisha Conklin RN  Outcome: Progressing     Problem: Discharge Planning  Goal: Discharge to home or other facility with appropriate resources  5/20/2025 1705 by Missy Sherwood RN  Outcome: Progressing  5/20/2025 0516 by Tanisha Conklin RN  Outcome: Progressing     Problem: ABCDS Injury Assessment  Goal: Absence of physical injury  5/20/2025 1705 by Missy Sherwood RN  Outcome: Progressing  5/20/2025 0516 by Tanisha Conklin RN  Outcome: Progressing  Flowsheets (Taken 5/19/2025 1833 by Missy Sherwood RN)  Absence of Physical Injury: Implement safety measures based on patient assessment     Problem: Safety - Adult  Goal: Free from fall injury  5/20/2025 1705 by Missy Sherwood RN  Outcome: Progressing  5/20/2025 0516 by Tanisha Conklin RN  Outcome: Progressing

## 2025-05-20 NOTE — CARE COORDINATION
05/20/25 0643   Readmission Assessment   Number of Days since last admission? 8-30 days   Previous Disposition Home Alone   Who is being Interviewed   (from chart)   What was the patient's/caregiver's perception as to why they think they needed to return back to the hospital? Other (Comment)  (chest pain & shortness of breath)   Did you visit your Primary Care Physician after you left the hospital, before you returned this time? Yes   Did you see a specialist, such as Cardiac, Pulmonary, Orthopedic Physician, etc. after you left the hospital? Yes   Who advised the patient to return to the hospital? Self-referral   Does the patient report anything that got in the way of taking their medications? No   In our efforts to provide the best possible care to you and others like you, can you think of anything that we could have done to help you after you left the hospital the first time, so that you might not have needed to return so soon? Other (Comment)  (not noted in chart)     Electronically signed by Vanessa Hawkins on 5/20/2025 at 6:43 AM

## 2025-05-20 NOTE — PROGRESS NOTES
Pharmacy Adjustment per [facility] protocol    Hema Gallagher is a 54 y.o. male. Pharmacy has adjusted medications per [facility] protocol.    Recent Labs     05/19/25  1145 05/20/25  0318   BUN 79* 53*       Recent Labs     05/19/25  1145 05/20/25  0318   CREATININE 16.1* 11.8*       Estimated Creatinine Clearance: 9 mL/min (A) (based on SCr of 11.8 mg/dL (H)).    Height:   Ht Readings from Last 1 Encounters:   05/19/25 1.702 m (5' 7.01\")     Weight:  Wt Readings from Last 1 Encounters:   05/19/25 118 kg (260 lb 3.2 oz)    BMI:  BMI Readings from Last 1 Encounters:   05/19/25 40.74 kg/m²         Plan: Adjust the following medications based on [facility] protocol:           Cefepime to 2000 mg IV once over 30 minutes followed by 1000 mg IV every 24 hours extended infusion over 240 minutes    Electronically signed by Alta Araujo RPh, BCPS, 5/20/2025,8:29 AM

## 2025-05-20 NOTE — CARE COORDINATION
Case Management Assessment  Initial Evaluation    Date/Time of Evaluation: 5/20/2025 1:42 PM  Assessment Completed by: Nalini Reynolds RN    If patient is discharged prior to next notation, then this note serves as note for discharge by case management.    Patient Name: Hema Gallagher                   YOB: 1970  Diagnosis: Shortness of breath [R06.02]  ESRD (end stage renal disease) on dialysis (HCC) [N18.6, Z99.2]  Chronic anemia [D64.9]  ESRD needing dialysis (HCC) [N18.6, Z99.2]  Hypervolemia, unspecified hypervolemia type [E87.70]  Acute hypoxic respiratory failure (HCC) [J96.01]                   Date / Time: 5/19/2025 10:58 AM    Patient Admission Status: Inpatient   Readmission Risk (Low < 19, Mod (19-27), High > 27): Readmission Risk Score: 30.9    Current PCP: Aletha Del Cid APRN - CNP  PCP verified by CM? (P) Yes    Chart Reviewed: Yes      History Provided by: (P) Patient  Patient Orientation: (P) Alert and Oriented, Person, Place, Situation    Patient Cognition: (P) Alert    Hospitalization in the last 30 days (Readmission):  Yes    If yes, Readmission Assessment in CM Navigator will be completed.    Advance Directives:      Code Status: Full Code   Patient's Primary Decision Maker is: (P) Legal Next of Kin    Primary Decision Maker (Active): Marly Gallagher - Child - [phone]    Supplemental (Other) Decision Maker: Nolvia Santacruz - Brother/Sister - [phone]    Discharge Planning:    Patient lives with: (P) Spouse/Significant Other Type of Home: (P) House  Primary Care Giver: (P) Self  Patient Support Systems include: (P) Spouse/Significant Other   Current Financial resources: (P) Medicaid, Medicare  Current community resources: (P) None  Current services prior to admission: (P) Other (Comment) (Home Hemo)            Current DME:              Type of Home Care services:  (P) None    ADLS  Prior functional level: (P) Independent in ADLs/IADLs  Current functional level: (P)  patient's individualized plan of care/goals and shares the quality data associated with the providers was provided to:     Patient Representative Name:       The Patient and/or Patient Representative Agree with the Discharge Plan?      Nalini Reynolds RN  Case Management Department  Ph: [phone] Fax:      05/20/25 7926   Service Assessment   Patient Orientation Alert and Oriented;Person;Place;Situation   Cognition Alert   History Provided By Patient   Primary Caregiver Self   Accompanied By/Relationship no one in room   Support Systems Spouse/Significant Other   Patient's Healthcare Decision Maker is: Legal Next of Kin   PCP Verified by CM Yes   Last Visit to PCP Within last 3 months   Prior Functional Level Independent in ADLs/IADLs   Current Functional Level Independent in ADLs/IADLs   Can patient return to prior living arrangement Yes   Ability to make needs known: Good   Family able to assist with home care needs: Yes   Would you like for me to discuss the discharge plan with any other family members/significant others, and if so, who? No   Financial Resources Medicaid;Medicare   Community Resources None   CM/SW Referral Other (see comment)  (none)   Social/Functional History   Lives With Significant other   Type of Home House   Home Layout One level   Home Access Level entry   Bathroom Shower/Tub Tub/Shower unit   Bathroom Toilet Standard   Bathroom Equipment None   Bathroom Accessibility Accessible   Home Equipment Cane  (Does Home Hemodialysis)   Receives Help From Family;Friend(s);Neighbor   Prior Level of Assist for ADLs Independent   Prior Level of Assist for Homemaking Independent   Homemaking Responsibilities No   Ambulation Assistance Independent   Prior Level of Assist for Transfers Independent   Active  Yes   Mode of Transportation Car   Occupation On disability   Discharge Planning   Type of Residence House   Living Arrangements Spouse/Significant Other   Current Services Prior To Admission Other

## 2025-05-20 NOTE — CONSULTS
Palliative Care:     Pt is a 54 year old male who came to ER for SOB. Pt was admitted for volume overload. Pt was seen in room shortly after finishing dialysis. Pt states he completes dialysis in home x4 a week. Pt has no concerns at this time. Pt is alert, pleasant and talkative.           Past Medical History:        Past Medical History:   Diagnosis Date    Asthma     CHF (congestive heart failure) (HCC)     Diabetes mellitus (HCC)     Erectile dysfunction     Hemodialysis patient     monday, tuesday, thursday, and friday at home. hd    History of blood transfusion     Hyperlipidemia     Hypertension     Obesity     Palliative care patient 01/25/2021       Advance Directives: Code status reviewed pt elects to remain Full Code. Pt states, \" He wants everything done to keep him alive.\"   Pt reports having legal documents completed. Requested a copy for medical chart.             Pain/Other Symptoms:   Pt reports no concerns at this time.               Psychological/Spiritual:  Pt reports support from his daughter. Pt does not share if he has any additional support from friends, neighbors, [employer].     Plan:  Continue medical management.           Patient/family discussion r/t goals:   Pt states he is mostly independent and currently his only goal is to get back home.       Palliative Performance Scale:  70%          Palliative Care team will continue to follow and support, with ongoing review of pt's goals of care.                Electronically signed by Zahra Young RN on 5/20/2025 at 1:18 PM

## 2025-05-20 NOTE — PROGRESS NOTES
Patient is a HD patient and does not produce urine.  Legionella and strep pneumonia is unable to collected.

## 2025-05-20 NOTE — PROGRESS NOTES
[facility]      Patient:  Hema Gallagher  YOB: 1970  Date of Service: 5/20/2025  MRN: [id number]   Acct: [id number]   Primary Care Physician: Aletha Del Cid APRN - CNP  Advance Directive: Full Code  Admit Date: 5/19/2025       Hospital Day: 1  Portions of this note have been copied forward, however, changed to reflect the most current clinical status of this patient.  CHIEF COMPLAINT shortness of breath     SUBJECTIVE:  seen in dialysis, has noted improvement of dyspnea     Cumulative hospital course   54 year old male with chronic diastolic dysfunction, ESRD on home hemodialysis, HTN, hyperlipidemia, type II DM, with complaints of shortness of breath.  Patient was recently admitted on 5/28 due to acute on chronic anemia.  Underwent EGD found to have mild gastritis amount of fluid residual in the stomach suggesting gastroparesis and no source of bleed identified.  Recommended capsule endoscopy outpatient if recurrent hemoglobin drop.  Patient was discharged in stable condition home on 4/30.  Was then seen on 5/7-due to ongoing cough and congestion was found to have bilateral lower lobe pneumonia and initiated on oral clindamycin.  Return to [facility] ER due to worsening shortness of breath.  Patient states that he has been increasing his dialysis treatment past few weeks up to 5 times a day due to ongoing dyspnea.  States that he has had continued productive cough and fatigue. Patient admitted to hospitalist service with consultation to nephrology. Dialysis 5/19 removed 2 L, with dialysis treatment today.  Given ongoing symptoms CT chest obtained tree-in-bud opacities consistent with infection/inflammation, mediastinal lymphadenopathy.  Patient was initiated on IV cefepime. Discussed with patient bowel movements, reported melanotic stools ongoing for the past few days.  Denied lightheadedness or syncopal event.  Denied NSAID use. Consulted GI and initiated on PPI twice daily. Hgb 6.8, discussed with

## 2025-05-20 NOTE — PROGRESS NOTES
Nephrology ([facility]) Consult Note      Patient:  Hema Gallagher  YOB: 1970  Date of Service: 5/20/2025  MRN: [id number]   Acct: [id number]   Primary Care Physician: Aletha Del Cid APRN - CNP  Advance Directive: Full Code  Admit Date: 5/19/2025       Hospital Day: 1  Referring Provider: Braydon Bravo MD    Patient independently seen and examined, Chart, Consults, Notes, Operative notes, Labs, Cardiology, and Radiology studies reviewed as available.        Subjective:  Hema Gallagher is a 54 y.o. male for whom we were consulted for evaluation and treatment of end-stage renal disease.  He is a home hemodialysis patient with last treatment on Sunday prior to admission.  Has had ongoing issues with anemia and fatigue with dyspnea on exertion.  He presented and was subsequently admitted for further evaluation treatment.  He was recently treated for pneumonia as an outpatient with clindamycin.  He was initially seen on dialysis was tolerating without further issues.  He was last discharged in this facility on 4/30.  Denied current chest pain, nausea or vomiting.    Today, patient developed melena and GI consult was ordered.  No issues with dialysis so far this morning    Dialysis   Pt was seen on renal replacement therapy and I have personally seen and evaluated the patient and directed the therapy  Modality: Hemodialysis  Access: Arterial Venous Fistula  Location: right upper  QB: 450  QD: 700  UF: 880      Allergies:  Banana, Atorvastatin, and Lisinopril    Medicines:  Current Facility-Administered Medications   Medication Dose Route Frequency Provider Last Rate Last Admin    melatonin disintegrating tablet 5 mg  5 mg Oral Nightly PRN Darrick Arango MD   5 mg at 05/20/25 0112    0.9 % sodium chloride infusion   IntraVENous PRN Jaquan Latham APRN - CNP        ceFEPIme (MAXIPIME) 2,000 mg in sodium chloride 0.9 % 100 mL IVPB ([id number])  2,000 mg IntraVENous Once Shelly  170/73 -- -- 77 -- -- -- --   05/19/25 1531 (!) 152/73 -- -- 81 -- -- -- --   05/19/25 1502 (!) 113/59 -- -- 80 24 96 % -- --   05/19/25 1430 124/65 -- -- 84 25 95 % -- --   05/19/25 1400 125/70 -- -- 83 27 95 % -- --   05/19/25 1330 129/63 -- -- 83 24 94 % -- --       Intake/Output Summary (Last 24 hours) at 5/20/2025 1309  Last data filed at 5/20/2025 1143  Gross per 24 hour   Intake 1600 ml   Output 5800 ml   Net -4200 ml     General: awake/alert   Chest:  clear to auscultation bilaterally  CVS: regular rate and rhythm  Abdominal: soft, nontender, normal bowel sounds  Extremities: no cyanosis, ble edema  Skin: warm and dry without rash      Labs:  BMP:   Recent Labs     05/19/25  1145 05/20/25  0318    138   K 4.7 4.3   CL 95* 96*   CO2 25 27   PHOS  --  6.1*   BUN 79* 53*   CREATININE 16.1* 11.8*   CALCIUM 9.2 9.1     CBC:   Recent Labs     05/19/25  1145 05/20/25  0318 05/20/25  0630   WBC 15.6* 14.1*  --    HGB 7.0* 6.6* 6.8*   HCT 21.8* 20.6* 20.7*   MCV 96.9* 93.6  --     171  --      LIVER PROFILE:   Recent Labs     05/19/25  1145   AST 25   ALT 23   BILITOT 0.6   ALKPHOS 54     PT/INR:   Recent Labs     05/19/25  1145   PROTIME 15.8*   INR 1.27*     APTT: No results for input(s): \"APTT\" in the last 72 hours.  BNP:  No results for input(s): \"BNP\" in the last 72 hours.  Ionized Calcium:Invalid input(s): \"IONCA\"  Magnesium:  Recent Labs     05/19/25  1145 05/20/25  0318   MG 2.3 2.2     Phosphorus:  Recent Labs     05/20/25 0318   PHOS 6.1*     HgbA1C: No results for input(s): \"LABA1C\" in the last 72 hours.  Hepatic:   Recent Labs     05/19/25  1145   ALKPHOS 54   ALT 23   AST 25   BILITOT 0.6     Lactic Acid: No results for input(s): \"LACTA\" in the last 72 hours.  Troponin: No results for input(s): \"CKTOTAL\", \"CKMB\", \"TROPONINT\" in the last 72 hours.  ABGs: No results for input(s): \"PH\", \"PCO2\", \"PO2\", \"HCO3\", \"O2SAT\" in the last 72 hours.  CRP:    Recent Labs     05/20/25 0318   .0*

## 2025-05-20 NOTE — CARE COORDINATION
Attempted to see for dc assessment/planning, pt back in Dialysis.  Will follow up once pt back in his room.  Electronically signed by Nalini Reynolds RN on 5/20/2025 at 11:08 AM

## 2025-05-20 NOTE — CONSENT
Informed Consent for Blood Component Transfusion Note    I have discussed with the patient the rationale for blood component transfusion; its benefits in treating or preventing fatigue, organ damage, or death; and its risk which includes mild transfusion reactions, rare risk of blood borne infection, or more serious but rare reactions. I have discussed the alternatives to transfusion, including the risk and consequences of not receiving transfusion. The patient had an opportunity to ask questions and had agreed to proceed with transfusion of blood components.    Electronically signed by SOSA Plummer CNP on 5/20/25 at 9:36 AM [email or web address]

## 2025-05-20 NOTE — DIALYSIS
2.5 L removed (accounting for 300ml PRBCs). Pt stable. Hemostasis achieved.      [facility]  DIALYSIS TREATMENT SUMMARY      Note: Consult with the attending physician for patient treatment orders, this document is not a physician order.      Patient Information   Patient Hema Gallagher   Date of Birth November 15, 1970   Chart Number [id number]   Location [facility] MRN [id number]   Gender Male   SSN (last 4) 1921     Treatment Information   Treatment Type Hemodialysis   Treatment Id [id number]   Start Time May 20, 2025 07:56   End Time May 20, 2025 11:28   Acutal Duration 03:32     Treatment Balances   Total Saline Administered 500   Other 300   Net Fluid Balance -2500    Hemodialysis Orders   Therapy Standard   Orders Verified Time 05/20/2025 07:36    Date Verified 05/20/2025   Duration 3:30   Isolated UF/Bypass No   BFR (mL) 450   DFR (mL) 700   Dialyzer Type OPTIFLUX 160NR   UF Order UF Range   UF Range (mL) 2000 - 3000   With BP Parameters Yes   Crit-Line used No   Heparin Initial Units Bolus No   Heparin IV Maintenance Bolus No   Heparin IV Infusion No   Potassium (mEq/L) 3   Calcium (mEq/L) 2.5   Bicarb (mg/dL) 35   Sodium (mEq/L) 137   Additional Orders to keep sbp greater than 90   Clinician Toma Moise RN    Dialysis Access   Access Type AV Access   AV Access   Access Location Upper Arm - R   Needle 15g   Bruit Yes   Thrill Yes      Vitals   Pre-Treatment Vitals   Start Time Start Date Is BP being recorded? Pre BP Map BP Method Pulse RR Temp How was Weight Obtained? Pre Weight Previous Dry Weight Previous Post Weight Metric Target Fluid Removal (mL) Dialysate Confirmed Clinician    05/20/2025 07:48 BP/Map 111/56 (74) Noninvasive 74 18 97.7 How Obtained: Reported Floor Weight 118   Kgs 3500 Yes Toma Moise RN    Comments: pt is a & o x 3. hemodynamically stable.      Post Treatment Vitals   Start Time Start Date Is BP being recorded? BP Map Pulse RR Temp  Yes   Bicarb BiBag   Bibag Size 900   Machine Temp 37   Machine Conductivity 13.7   Meter Type N/A   Meter Conductivity    Independent Conductivity 13.6   pH Status Pass Pass   pH    TCD Value 13.6   TCD Alarm with +/- 0.5 Yes   NVL enabled validated 100 asymmetric Yes   Safety check complete Toma Moise RN   Second Verification Performed? Yes   Second Verification Performed By Fernandez Cardenas RN   Reason Not Verified       Serum Lab Values   Start Time Start Date BUN Creatinine Na K (mEq/L) Cl CO2 Ca (mEq/L) Phos Mg (mg/dL) Alb (g/dL) Glucose Hgb Hct WBC Plt PT aPTT INR Other Clinician    05/20/2025 03:18 53 11.8 138 4.3 96 27 9.1 6.1 2.2 - 113 6.6 20.6 14.1 171     Toma Moise RN    Comments:         Blood Products   Start Time Start Date End Time Consent Signed # of Units Administered Patient Tolerance Interventions Clinician    05/20/2025 08:10 09:15 Yes 1 Tolerated Followed approved algorithm where applicable Toma Moise RN    Notes: [id number] 300ml        Facility Information Bed # 4 Isolation Information   Facility Information Stat Treatment No Isolation Required? N   Admission Date 05/19/2025 Patient Type Chronic dialysis patient with diagnosis of ESRD Completed by   Ordering MD [place] Patient Chronic Unit [facility] information Entered by Toma Moise RN   Account/Finance Number [id number] Patient Home Unit [facility] Facility Information   Admission Status InPatient Code Status Full Code Notes pt is a & o x 3. hemodynamically stable.   Location Dialysis Suite 1:1 Diagnosis    Room # 328 Diagnosis Volume overload      Start Treatment Time Out Completed 07:45 Treatment Start Date 05/20/2025   Treatment Initiation Connections Secured Correct patient verified Yes Treatment Start Time 07:56    Saline line double clamped Correct procedure verified Yes Patient/Family questions and concerns addressed Yes   Time Out Confirmed by Toma Moise/Bryan Cardenas Correct access site verified Yes      Pre

## 2025-05-20 NOTE — CONSULTS
Consult Note            Date:5/20/2025        Patient Name:Hema Gallagher     YOB: 1970     Age:54 y.o.    Reason for consult: Melena    History of Present Illness   This is a 54-year-old male with a history of end-stage renal disease on hemodialysis, diastolic heart failure, hypertension, hyperlipidemia, and obesity (BMI of 40), diabetes mellitus type 2 who presented with dyspnea and reports of melena.  Hemoglobin is 6.8 on admission.  Baseline hemoglobin is typically between 7 and 8.  We have been asked to further evaluate.    He states that he has had intermittent black stools.  He started iron recently but only took 1 dose.  Hemoglobin is 6.8 today.  His baseline hemoglobin is usually between 7 and 8.  INR is 1.2.  Platelet count is 171.  He denies any abdominal pain, nausea, or vomiting.  No NSAID use.     He had a similar presentation in April.  EGD attempt at that time 4/30/2025 showed food in stomach therefore it was not fully evaluated.  His last colonoscopy was December 2022 with several polyps and a recommended 3-year follow-up.  He was treated for pneumonia recently.  He denies any further ongoing dyspnea since 1 unit transfusion.  No cough or congestion or signs of infection from an upper respiratory tract standpoint.    Past Medical History     Past Medical History:   Diagnosis Date    Asthma     CHF (congestive heart failure) (HCC)     Diabetes mellitus (HCC)     Erectile dysfunction     Hemodialysis patient     monday, tuesday, thursday, and friday at home. hd    History of blood transfusion     Hyperlipidemia     Hypertension     Obesity     Palliative care patient 01/25/2021        Past Surgical History     Past Surgical History:   Procedure Laterality Date    BRONCHOSCOPY Left 05/24/2023    BRONCHOSCOPY ADD ON COMPUTER ASSISTED performed by Clarice French MD at [facility]    BRONCHOSCOPY  05/24/2023    BRONCHOSCOPY ALVEOLAR LAVAGE ROBOTIC performed by Clarice LINARES  Pt continues with mild n/v, PRN provided. Not tolerating food. VSS. Site CDI.
Report wilL be given to night RN.

## 2025-05-20 NOTE — PROGRESS NOTES
Physical Therapy  Pt is up ad ernesto in room and denies need for skilled PT in this setting.  Electronically signed by Carmen Rossi PT on 5/20/2025 at 2:40 PM

## 2025-05-21 ENCOUNTER — ANESTHESIA EVENT (OUTPATIENT)
Dept: ENDOSCOPY | Age: 55
End: 2025-05-21
Payer: MEDICARE

## 2025-05-21 ENCOUNTER — ANESTHESIA (OUTPATIENT)
Dept: ENDOSCOPY | Age: 55
End: 2025-05-21
Payer: MEDICARE

## 2025-05-21 LAB
ANION GAP SERPL CALCULATED.3IONS-SCNC: 15 MMOL/L (ref 8–16)
BASOPHILS # BLD: 0 K/UL (ref 0–0.2)
BASOPHILS NFR BLD: 0.4 % (ref 0–1)
BUN SERPL-MCNC: 34 MG/DL (ref 6–20)
CALCIUM SERPL-MCNC: 10.2 MG/DL (ref 8.6–10)
CHLORIDE SERPL-SCNC: 96 MMOL/L (ref 98–107)
CO2 SERPL-SCNC: 27 MMOL/L (ref 22–29)
CREAT SERPL-MCNC: 8.9 MG/DL (ref 0.7–1.2)
EOSINOPHIL # BLD: 0.3 K/UL (ref 0–0.6)
EOSINOPHIL NFR BLD: 3.4 % (ref 0–5)
ERYTHROCYTE [DISTWIDTH] IN BLOOD BY AUTOMATED COUNT: 17.2 % (ref 11.5–14.5)
GLUCOSE BLD-MCNC: 105 MG/DL (ref 70–99)
GLUCOSE SERPL-MCNC: 99 MG/DL (ref 70–99)
HCT VFR BLD AUTO: 25.2 % (ref 42–52)
HCT VFR BLD AUTO: 26.1 % (ref 42–52)
HCT VFR BLD AUTO: 27.2 % (ref 42–52)
HCT VFR BLD AUTO: 27.8 % (ref 42–52)
HCT VFR BLD AUTO: 27.9 % (ref 42–52)
HGB BLD-MCNC: 8.1 G/DL (ref 14–18)
HGB BLD-MCNC: 8.3 G/DL (ref 14–18)
HGB BLD-MCNC: 8.7 G/DL (ref 14–18)
HGB BLD-MCNC: 9 G/DL (ref 14–18)
HGB BLD-MCNC: 9.1 G/DL (ref 14–18)
IMM GRANULOCYTES # BLD: 0.1 K/UL
LYMPHOCYTES # BLD: 1.2 K/UL (ref 1.1–4.5)
LYMPHOCYTES NFR BLD: 12 % (ref 20–40)
MCH RBC QN AUTO: 28.8 PG (ref 27–31)
MCHC RBC AUTO-ENTMCNC: 32.4 G/DL (ref 33–37)
MCV RBC AUTO: 88.8 FL (ref 80–94)
MONOCYTES # BLD: 1.2 K/UL (ref 0–0.9)
MONOCYTES NFR BLD: 12.1 % (ref 0–10)
NEUTROPHILS # BLD: 6.9 K/UL (ref 1.5–7.5)
NEUTS SEG NFR BLD: 71.5 % (ref 50–65)
PERFORMED ON: ABNORMAL
PLATELET # BLD AUTO: 205 K/UL (ref 130–400)
PMV BLD AUTO: 9.7 FL (ref 9.4–12.4)
POTASSIUM SERPL-SCNC: 3.9 MMOL/L (ref 3.5–5)
RBC # BLD AUTO: 3.13 M/UL (ref 4.7–6.1)
SODIUM SERPL-SCNC: 138 MMOL/L (ref 136–145)
WBC # BLD AUTO: 9.7 K/UL (ref 4.8–10.8)

## 2025-05-21 PROCEDURE — 85025 COMPLETE CBC W/AUTO DIFF WBC: CPT

## 2025-05-21 PROCEDURE — 0DJ08ZZ INSPECTION OF UPPER INTESTINAL TRACT, VIA NATURAL OR ARTIFICIAL OPENING ENDOSCOPIC: ICD-10-PCS | Performed by: STUDENT IN AN ORGANIZED HEALTH CARE EDUCATION/TRAINING PROGRAM

## 2025-05-21 PROCEDURE — 6370000000 HC RX 637 (ALT 250 FOR IP)

## 2025-05-21 PROCEDURE — 3700000000 HC ANESTHESIA ATTENDED CARE: Performed by: INTERNAL MEDICINE

## 2025-05-21 PROCEDURE — 2580000003 HC RX 258: Performed by: INTERNAL MEDICINE

## 2025-05-21 PROCEDURE — 7100000001 HC PACU RECOVERY - ADDTL 15 MIN: Performed by: INTERNAL MEDICINE

## 2025-05-21 PROCEDURE — 6370000000 HC RX 637 (ALT 250 FOR IP): Performed by: INTERNAL MEDICINE

## 2025-05-21 PROCEDURE — 2500000003 HC RX 250 WO HCPCS

## 2025-05-21 PROCEDURE — 2709999900 HC NON-CHARGEABLE SUPPLY: Performed by: INTERNAL MEDICINE

## 2025-05-21 PROCEDURE — 2580000003 HC RX 258

## 2025-05-21 PROCEDURE — 1200000000 HC SEMI PRIVATE

## 2025-05-21 PROCEDURE — 85014 HEMATOCRIT: CPT

## 2025-05-21 PROCEDURE — 6360000002 HC RX W HCPCS

## 2025-05-21 PROCEDURE — 85018 HEMOGLOBIN: CPT

## 2025-05-21 PROCEDURE — 7100000000 HC PACU RECOVERY - FIRST 15 MIN: Performed by: INTERNAL MEDICINE

## 2025-05-21 PROCEDURE — 36415 COLL VENOUS BLD VENIPUNCTURE: CPT

## 2025-05-21 PROCEDURE — 94760 N-INVAS EAR/PLS OXIMETRY 1: CPT

## 2025-05-21 PROCEDURE — 2500000003 HC RX 250 WO HCPCS: Performed by: INTERNAL MEDICINE

## 2025-05-21 PROCEDURE — 6360000002 HC RX W HCPCS: Performed by: INTERNAL MEDICINE

## 2025-05-21 PROCEDURE — 82962 GLUCOSE BLOOD TEST: CPT

## 2025-05-21 PROCEDURE — 43235 EGD DIAGNOSTIC BRUSH WASH: CPT | Performed by: INTERNAL MEDICINE

## 2025-05-21 PROCEDURE — 80048 BASIC METABOLIC PNL TOTAL CA: CPT

## 2025-05-21 PROCEDURE — 3700000001 HC ADD 15 MINUTES (ANESTHESIA): Performed by: INTERNAL MEDICINE

## 2025-05-21 PROCEDURE — 3609017100 HC EGD: Performed by: INTERNAL MEDICINE

## 2025-05-21 RX ORDER — SODIUM CHLORIDE 450 MG/100ML
INJECTION, SOLUTION INTRAVENOUS
Status: DISCONTINUED | OUTPATIENT
Start: 2025-05-21 | End: 2025-05-21 | Stop reason: SDUPTHER

## 2025-05-21 RX ORDER — GLYCOPYRROLATE 0.2 MG/ML
INJECTION INTRAMUSCULAR; INTRAVENOUS
Status: DISCONTINUED | OUTPATIENT
Start: 2025-05-21 | End: 2025-05-21 | Stop reason: SDUPTHER

## 2025-05-21 RX ORDER — LIDOCAINE HYDROCHLORIDE 10 MG/ML
INJECTION, SOLUTION INFILTRATION; PERINEURAL
Status: DISCONTINUED | OUTPATIENT
Start: 2025-05-21 | End: 2025-05-21 | Stop reason: SDUPTHER

## 2025-05-21 RX ORDER — PROPOFOL 10 MG/ML
INJECTION, EMULSION INTRAVENOUS
Status: DISCONTINUED | OUTPATIENT
Start: 2025-05-21 | End: 2025-05-21 | Stop reason: SDUPTHER

## 2025-05-21 RX ORDER — FENTANYL CITRATE 50 UG/ML
INJECTION, SOLUTION INTRAMUSCULAR; INTRAVENOUS
Status: DISCONTINUED | OUTPATIENT
Start: 2025-05-21 | End: 2025-05-21 | Stop reason: SDUPTHER

## 2025-05-21 RX ADMIN — NIFEDIPINE 60 MG: 60 TABLET, EXTENDED RELEASE ORAL at 20:43

## 2025-05-21 RX ADMIN — CALCITRIOL CAPSULES 0.25 MCG 0.5 MCG: 0.25 CAPSULE ORAL at 20:43

## 2025-05-21 RX ADMIN — SODIUM CHLORIDE, PRESERVATIVE FREE 10 ML: 5 INJECTION INTRAVENOUS at 09:12

## 2025-05-21 RX ADMIN — CLONIDINE HYDROCHLORIDE 0.1 MG: 0.1 TABLET ORAL at 20:43

## 2025-05-21 RX ADMIN — PROPOFOL 200 MG: 10 INJECTION, EMULSION INTRAVENOUS at 13:04

## 2025-05-21 RX ADMIN — FENTANYL CITRATE 100 MCG: 50 INJECTION INTRAMUSCULAR; INTRAVENOUS at 13:01

## 2025-05-21 RX ADMIN — METOPROLOL TARTRATE 25 MG: 25 TABLET, FILM COATED ORAL at 09:12

## 2025-05-21 RX ADMIN — LIDOCAINE HYDROCHLORIDE 100 MG: 10 INJECTION, SOLUTION INFILTRATION; PERINEURAL at 13:02

## 2025-05-21 RX ADMIN — HYDRALAZINE HYDROCHLORIDE 25 MG: 25 TABLET ORAL at 22:37

## 2025-05-21 RX ADMIN — SEVELAMER CARBONATE 800 MG: 800 TABLET, FILM COATED ORAL at 17:06

## 2025-05-21 RX ADMIN — GLYCOPYRROLATE 0.2 MG: 0.2 INJECTION, SOLUTION INTRAMUSCULAR; INTRAVENOUS at 13:02

## 2025-05-21 RX ADMIN — SODIUM CHLORIDE, PRESERVATIVE FREE 10 ML: 5 INJECTION INTRAVENOUS at 20:44

## 2025-05-21 RX ADMIN — CLONIDINE HYDROCHLORIDE 0.1 MG: 0.1 TABLET ORAL at 09:12

## 2025-05-21 RX ADMIN — METOPROLOL TARTRATE 25 MG: 25 TABLET, FILM COATED ORAL at 20:43

## 2025-05-21 RX ADMIN — SODIUM CHLORIDE, PRESERVATIVE FREE 40 MG: 5 INJECTION INTRAVENOUS at 09:12

## 2025-05-21 RX ADMIN — CEFEPIME 1000 MG: 1 INJECTION, POWDER, FOR SOLUTION INTRAMUSCULAR; INTRAVENOUS at 14:34

## 2025-05-21 RX ADMIN — CALCITRIOL CAPSULES 0.25 MCG 0.5 MCG: 0.25 CAPSULE ORAL at 09:12

## 2025-05-21 RX ADMIN — CINACALCET HYDROCHLORIDE 30 MG: 30 TABLET, FILM COATED ORAL at 09:12

## 2025-05-21 RX ADMIN — SODIUM CHLORIDE, PRESERVATIVE FREE 40 MG: 5 INJECTION INTRAVENOUS at 20:43

## 2025-05-21 RX ADMIN — SODIUM CHLORIDE: 4.5 INJECTION, SOLUTION INTRAVENOUS at 13:00

## 2025-05-21 RX ADMIN — Medication 5 MG: at 21:26

## 2025-05-21 RX ADMIN — NIFEDIPINE 60 MG: 60 TABLET, EXTENDED RELEASE ORAL at 09:15

## 2025-05-21 ASSESSMENT — ENCOUNTER SYMPTOMS
VOMITING: 0
ABDOMINAL PAIN: 0
CONSTIPATION: 0
NAUSEA: 0
ABDOMINAL DISTENTION: 0
RECTAL PAIN: 0
DIARRHEA: 0
SHORTNESS OF BREATH: 0
CHOKING: 0
ANAL BLEEDING: 0
TROUBLE SWALLOWING: 0
BLOOD IN STOOL: 0
COUGH: 0

## 2025-05-21 ASSESSMENT — LIFESTYLE VARIABLES: SMOKING_STATUS: 0

## 2025-05-21 NOTE — PROGRESS NOTES
4 Eyes Skin Assessment     NAME:  Hema Gallagher  YOB: 1970  MEDICAL RECORD NUMBER:  [id number]    The patient is being assessed for  Shift Handoff    I agree that at least one RN has performed a thorough Head to Toe Skin Assessment on the patient. ALL assessment sites listed below have been assessed.      Areas assessed by both nurses:    Head, Face, Ears, Shoulders, Back, Chest, Arms, Elbows, Hands, Sacrum. Buttock, Coccyx, Ischium, and Legs. Feet and Heels        Does the Patient have a Wound? No noted wound(s)       Vitaliy Prevention initiated by RN: Yes  Wound Care Orders initiated by RN: No    Pressure Injury (Stage 3,4, Unstageable, DTI, NWPT, and Complex wounds) if present, place Wound referral order by RN under : No    New Ostomies, if present place, Ostomy referral order under : No     Nurse 1 eSignature: Electronically signed by Niki Meng RN on 5/21/25 at 6:23 AM CDT    **SHARE this note so that the co-signing nurse can place an eSignature**    Nurse 2 eSignature: Electronically signed by Maame Hughes RN on 5/21/25 at 6:36 AM CDT

## 2025-05-21 NOTE — ANESTHESIA PRE PROCEDURE
Department of Anesthesiology  Preprocedure Note       Name:  Hema Gallagher   Age:  54 y.o.  :  1970                                          MRN:  [id number]         Date:  2025      Surgeon: Surgeon(s):  Elana Otto MD    Procedure: Procedure(s):  ESOPHAGOGASTRODUODENOSCOPY    Medications prior to admission:   Prior to Admission medications    Medication Sig Start Date End Date Taking? Authorizing Provider   pantoprazole (PROTONIX) 40 MG tablet Take 1 tablet by mouth 2 times daily (before meals) 25   Magy Pastor APRN - CNP   loratadine (CLARITIN) 10 MG tablet Take 1 tablet by mouth daily 25  Aletha Del Cid APRN - CNP   calcitRIOL (ROCALTROL) 0.5 MCG capsule Take 1 capsule by mouth 2 times daily 1/10/25   Susan Fox MD   metoprolol tartrate (LOPRESSOR) 25 MG tablet Take 1 tablet by mouth 2 times daily 24   Aletha Del Cid APRN - CNP   NIFEdipine (ADALAT CC) 60 MG extended release tablet Take 1 tablet by mouth in the morning and at bedtime 24   Aletha Del Cid APRN - CNP   simvastatin (ZOCOR) 10 MG tablet Take 1 tablet by mouth nightly 24   Aletha Del Cid APRN - CNP   hydrALAZINE (APRESOLINE) 25 MG tablet TAKE 1 TABLET BY MOUTH EVERY 8 HOURS 24   Aletha Del Cid APRN - CNP   cloNIDine (CATAPRES) 0.1 MG tablet Take 1 tablet by mouth 2 times daily 24   Aletha Del Cid APRN - CNP   albuterol sulfate HFA (PROVENTIL;VENTOLIN;PROAIR) 108 (90 Base) MCG/ACT inhaler Inhale 2 puffs into the lungs every 6 hours as needed for Wheezing 23   Aletha Del Cid APRN - CNP   cinacalcet (SENSIPAR) 30 MG tablet Take 1 tablet by mouth daily 23   Susan Fox MD   AURYXIA 1  MG(Fe) TABS tablet Take 3 tablets by mouth 3 times daily (with meals) 23   Susan Fox MD   Cyanocobalamin (VITAMIN B 12 PO) Take 1 tablet by mouth daily     Dominique MD Susan   calcium carbonate (OYSTER SHELL CALCIUM 500 MG) 1250 (500 Ca) MG tablet Take 1 tablet

## 2025-05-21 NOTE — PLAN OF CARE
Problem: Chronic Conditions and Co-morbidities  Goal: Patient's chronic conditions and co-morbidity symptoms are monitored and maintained or improved  Outcome: Progressing     Problem: Discharge Planning  Goal: Discharge to home or other facility with appropriate resources  Outcome: Progressing     Problem: ABCDS Injury Assessment  Goal: Absence of physical injury  Outcome: Progressing     Problem: Safety - Adult  Goal: Free from fall injury  Outcome: Progressing     Problem: Cardiovascular - Adult  Goal: Maintains optimal cardiac output and hemodynamic stability  Outcome: Progressing     Problem: Skin/Tissue Integrity - Adult  Goal: Skin integrity remains intact  Outcome: Progressing  Goal: Incisions, wounds, or drain sites healing without S/S of infection  Outcome: Progressing  Goal: Oral mucous membranes remain intact  Outcome: Progressing     Problem: Gastrointestinal - Adult  Goal: Minimal or absence of nausea and vomiting  Outcome: Progressing  Goal: Maintains or returns to baseline bowel function  Outcome: Progressing  Goal: Maintains adequate nutritional intake  Outcome: Progressing     Problem: Genitourinary - Adult  Goal: Absence of urinary retention  Outcome: Progressing     Problem: Metabolic/Fluid and Electrolytes - Adult  Goal: Electrolytes maintained within normal limits  Outcome: Progressing  Goal: Hemodynamic stability and optimal renal function maintained  Outcome: Progressing  Goal: Glucose maintained within prescribed range  Outcome: Progressing

## 2025-05-21 NOTE — PLAN OF CARE
Problem: Chronic Conditions and Co-morbidities  Goal: Patient's chronic conditions and co-morbidity symptoms are monitored and maintained or improved  5/20/2025 2247 by Niki Meng RN  Outcome: Progressing  5/20/2025 1705 by Missy Sherwood RN  Outcome: Progressing     Problem: Discharge Planning  Goal: Discharge to home or other facility with appropriate resources  5/20/2025 2247 by Niki Meng RN  Outcome: Progressing  5/20/2025 1705 by Missy Sherwood RN  Outcome: Progressing     Problem: ABCDS Injury Assessment  Goal: Absence of physical injury  5/20/2025 2247 by Niki Meng RN  Outcome: Progressing  5/20/2025 1705 by Missy Sherwood RN  Outcome: Progressing     Problem: Safety - Adult  Goal: Free from fall injury  5/20/2025 2247 by Niki Meng RN  Outcome: Progressing  5/20/2025 1705 by Missy Sherwood RN  Outcome: Progressing

## 2025-05-21 NOTE — ANESTHESIA POSTPROCEDURE EVALUATION
Department of Anesthesiology  Postprocedure Note    Patient: Hema Gallagher  MRN: [id number]  YOB: 1970  Date of evaluation: 5/21/2025    Procedure Summary       Date: 05/21/25 Room / Location: [place] [address] / [facility]    Anesthesia Start: 1300 Anesthesia Stop:     Procedure: ESOPHAGOGASTRODUODENOSCOPY Diagnosis:       Melena      (Melena [K92.1])    Surgeons: Elana Otto MD Responsible Provider: Liane Hoang APRN - CRNA    Anesthesia Type: General, TIVA ASA Status: 3            Anesthesia Type: General, TIVA    Kendall Phase I:      Kendall Phase II:      Anesthesia Post Evaluation    Patient location during evaluation: bedside  Patient participation: complete - patient participated  Level of consciousness: sleepy but conscious  Pain score: 0  Airway patency: patent  Nausea & Vomiting: no nausea and no vomiting  Cardiovascular status: hemodynamically stable  Respiratory status: spontaneous ventilation and nasal cannula  Hydration status: stable  Comments: BP (!) 100/54   Pulse 66   Temp 97.8 °F (36.6 °C) (Temporal)   Resp 15    SpO2 93%     VSS, on 4L O2    Pain management: adequate    No notable events documented.

## 2025-05-21 NOTE — OP NOTE
Endoscopy Note          Operative Report    Patient: Hema Gallagher MRN: [id number]      YOB: 1970  Age: 54 y.o.  Sex: male            Indications: Reports of dark stools and anemia history of end-stage renal disease on hemodialysis.    Preoperative Evaluation: The patient was evaluated prior to the procedure in the GI lab admission area, the patient's ASA was recorded in the chart.  Consent was obtained from the patient with discussion to include the risk of perforation, bleeding, infection, and aspiration.    Anesthesia: RAYA-per anesthesia.    Complication: None; patient tolerated the procedure well.    Estimated blood loss: Insignificant.    Procedure: The patient was sedated into the left lateral decubitus position.  Scope was advanced from the mouth to the third portion of duodenum.  Scope was withdrawn to the stomach and retroflexed view was performed.     Findings:  Normal esophagus.  Regular Z-line at 40 cm, located at the top of the gastric folds.  Normal stomach.  Normal retroflexion.  Normal bulb and duodenum.    Postoperative Diagnosis:  Normal exam.  No source for bleeding on this exam.      Recommendations:   Return patient hospital [facility] for ongoing care.  Continue pantoprazole for GI prophylaxis.  Okay for a regular diet and to resume medications.  Would recommend outpatient colonoscopy this year.  Weight loss advised.      Signed By:  Elana Otto MD     May 21, 2025

## 2025-05-21 NOTE — PROGRESS NOTES
[facility]      Patient:  Hema Gallagher  YOB: 1970  Date of Service: 5/21/2025  MRN: [id number]   Acct: [id number]   Primary Care Physician: Aletha Del Cid APRN - CNP  Advance Directive: Full Code  Admit Date: 5/19/2025       Hospital Day: 2  Portions of this note have been copied forward, however, changed to reflect the most current clinical status of this patient.  CHIEF COMPLAINT shortness of breath     SUBJECTIVE: no issues overnight, currently utilizing incentive spirometry     Cumulative hospital course   54 year old male with chronic diastolic dysfunction, ESRD on home hemodialysis, HTN, hyperlipidemia, type II DM, with complaints of shortness of breath.  Patient was recently admitted on 5/28 due to acute on chronic anemia.  Underwent EGD found to have mild gastritis amount of fluid residual in the stomach suggesting gastroparesis and no source of bleed identified.  Recommended capsule endoscopy outpatient if recurrent hemoglobin drop.  Patient was discharged in stable condition home on 4/30.  Was then seen on 5/7-due to ongoing cough and congestion was found to have bilateral lower lobe pneumonia and initiated on oral clindamycin.  Return to [facility] ER due to worsening shortness of breath.  Patient states that he has been increasing his dialysis treatment past few weeks up to 5 times a day due to ongoing dyspnea.  States that he has had continued productive cough and fatigue. Patient admitted to hospitalist service with consultation to nephrology. Dialysis 5/19 removed 2 L, with dialysis treatment today.  Given ongoing symptoms CT chest obtained tree-in-bud opacities consistent with infection/inflammation, mediastinal lymphadenopathy.  Patient was initiated on IV cefepime. Discussed with patient bowel movements, reported melanotic stools ongoing for the past few days.  Denied lightheadedness or syncopal event.  Denied NSAID use. Consulted GI and initiated on PPI twice daily. Hgb 6.8,  hours.    Invalid input(s): \"KETONESU\"  A1C: No results for input(s): \"LABA1C\" in the last 72 hours.  ABG:No results for input(s): \"PHART\", \"ILA8JQM\", \"PO2ART\", \"WQJ3VGI\", \"BEART\", \"HGBAE\", \"G5NJRACR\", \"CARBOXHGBART\" in the last 72 hours.    RAD:   CT CHEST WO CONTRAST  Result Date: 5/20/2025  1.  Centrilobular nodules and tree in bud opacities consistent with small airways infection/inflammation. 2.  Stable bilateral areas of scarring and bilateral micronodules. 3.  Mediastinal lymphadenopathy which is new, and may be due to #1 above.  Follow-up recommended. 4.  Cardiomegaly and atherosclerosis.  ---------------------------  All CT scans are performed using dose optimization techniques as appropriate to the performed exam and include at least one of the following: Automated exposure control, adjustment of the mA and/or kV according to size, and the use of iterative reconstruction technique.  ______________________________________ Electronically signed by: RAMÓN KATZ M.D. Date:     05/20/2025 Time:    12:40     XR CHEST PORTABLE  Result Date: 5/19/2025   1.  No acute findings in the chest. 2. Stable cardiomegaly.    ______________________________________ Electronically signed by: ESTEFANIA ANN M.D. Date:     05/19/2025 Time:    11:42     Assessment/Plan   Principal Problem:    Volume overload/ ESRD needing dialysis      - Nephrology following   Dialysis 5/19 removed 2 L, with dialysis treatment 5/20 removed 2.5L              - Monitor I's and O's closely              - Monitor labs closely              - Avoid hypotension              - Avoid nephrotoxic agents  Active Problems:    Pneumonia due to infectious organism   CT chest WO  - IV abx Cefepime    Procal 1.21              - Encourage deep breathing and cough              - Incentive spirometry              - Supplemental oxygen as needed              - BC NGTD    PT/OT   Monitor on telemetry   Melena    - GI following              - IV Protonix twice daily

## 2025-05-21 NOTE — PROGRESS NOTES
Nephrology ([facility]) Consult Note      Patient:  Hema Gallagher  YOB: 1970  Date of Service: 5/21/2025  MRN: [id number]   Acct: [id number]   Primary Care Physician: Aletha Del Cid APRN - CNP  Advance Directive: Full Code  Admit Date: 5/19/2025       Hospital Day: 2  Referring Provider: Phill Bates MD    Patient independently seen and examined, Chart, Consults, Notes, Operative notes, Labs, Cardiology, and Radiology studies reviewed as available.        Subjective:  Hema Gallagher is a 54 y.o. male for whom we were consulted for evaluation and treatment of end-stage renal disease.  He is a home hemodialysis patient with last treatment on Sunday prior to admission.  Has had ongoing issues with anemia and fatigue with dyspnea on exertion.  He presented and was subsequently admitted for further evaluation treatment.  He was recently treated for pneumonia as an outpatient with clindamycin.  He was initially seen on dialysis was tolerating without further issues.  He was last discharged in this facility on 4/30.  Denied current chest pain, nausea or vomiting.    Today, patient developed melena and GI consult was ordered with plans for EGD on 5/21.  No issues with dialysis so far this admission.      Allergies:  Banana, Atorvastatin, and Lisinopril    Medicines:  Current Facility-Administered Medications   Medication Dose Route Frequency Provider Last Rate Last Admin    melatonin disintegrating tablet 5 mg  5 mg Oral Nightly PRN Darrick Arango MD   5 mg at 05/20/25 0112    0.9 % sodium chloride infusion   IntraVENous PRN Jaquan Latham APRN - CNP        cefepime (MAXIPIME) 1,000 mg in sodium chloride 0.9 % 50 mL IVPB ([id number])  1,000 mg IntraVENous Q24H Braydon Bravo MD        pantoprazole (PROTONIX) 40 mg in sodium chloride (PF) 0.9 % 10 mL injection  40 mg IntraVENous Q12H Jaquan Latham APRN - CNP   40 mg at 05/21/25 0912    cinacalcet (SENSIPAR) tablet 30 mg

## 2025-05-22 VITALS
HEIGHT: 67 IN | OXYGEN SATURATION: 99 % | WEIGHT: 260.2 LBS | TEMPERATURE: 96.8 F | BODY MASS INDEX: 40.84 KG/M2 | HEART RATE: 69 BPM | SYSTOLIC BLOOD PRESSURE: 128 MMHG | DIASTOLIC BLOOD PRESSURE: 63 MMHG | RESPIRATION RATE: 16 BRPM

## 2025-05-22 LAB
ANION GAP SERPL CALCULATED.3IONS-SCNC: 18 MMOL/L (ref 8–16)
BASOPHILS # BLD: 0 K/UL (ref 0–0.2)
BASOPHILS NFR BLD: 0.4 % (ref 0–1)
BUN SERPL-MCNC: 45 MG/DL (ref 6–20)
CALCIUM SERPL-MCNC: 9.5 MG/DL (ref 8.6–10)
CHLORIDE SERPL-SCNC: 97 MMOL/L (ref 98–107)
CO2 SERPL-SCNC: 23 MMOL/L (ref 22–29)
CREAT SERPL-MCNC: 10.7 MG/DL (ref 0.7–1.2)
EOSINOPHIL # BLD: 0.3 K/UL (ref 0–0.6)
EOSINOPHIL NFR BLD: 4.1 % (ref 0–5)
ERYTHROCYTE [DISTWIDTH] IN BLOOD BY AUTOMATED COUNT: 16.7 % (ref 11.5–14.5)
GLUCOSE SERPL-MCNC: 152 MG/DL (ref 70–99)
HCT VFR BLD AUTO: 24.5 % (ref 42–52)
HCT VFR BLD AUTO: 27.3 % (ref 42–52)
HGB BLD-MCNC: 7.8 G/DL (ref 14–18)
HGB BLD-MCNC: 8.9 G/DL (ref 14–18)
IMM GRANULOCYTES # BLD: 0.1 K/UL
LYMPHOCYTES # BLD: 1.1 K/UL (ref 1.1–4.5)
LYMPHOCYTES NFR BLD: 13.9 % (ref 20–40)
MCH RBC QN AUTO: 28.5 PG (ref 27–31)
MCHC RBC AUTO-ENTMCNC: 31.7 G/DL (ref 33–37)
MCV RBC AUTO: 89.9 FL (ref 80–94)
MONOCYTES # BLD: 1.1 K/UL (ref 0–0.9)
MONOCYTES NFR BLD: 13.9 % (ref 0–10)
NEUTROPHILS # BLD: 5.5 K/UL (ref 1.5–7.5)
NEUTS SEG NFR BLD: 66.8 % (ref 50–65)
PLATELET # BLD AUTO: 196 K/UL (ref 130–400)
PMV BLD AUTO: 10 FL (ref 9.4–12.4)
POTASSIUM SERPL-SCNC: 4.6 MMOL/L (ref 3.5–5)
RBC # BLD AUTO: 2.77 M/UL (ref 4.7–6.1)
SODIUM SERPL-SCNC: 138 MMOL/L (ref 136–145)
WBC # BLD AUTO: 8.2 K/UL (ref 4.8–10.8)

## 2025-05-22 PROCEDURE — 8010000000 HC HEMODIALYSIS ACUTE INPT

## 2025-05-22 PROCEDURE — 85018 HEMOGLOBIN: CPT

## 2025-05-22 PROCEDURE — 6370000000 HC RX 637 (ALT 250 FOR IP): Performed by: INTERNAL MEDICINE

## 2025-05-22 PROCEDURE — 2580000003 HC RX 258: Performed by: INTERNAL MEDICINE

## 2025-05-22 PROCEDURE — 85014 HEMATOCRIT: CPT

## 2025-05-22 PROCEDURE — 2500000003 HC RX 250 WO HCPCS: Performed by: INTERNAL MEDICINE

## 2025-05-22 PROCEDURE — 94760 N-INVAS EAR/PLS OXIMETRY 1: CPT

## 2025-05-22 PROCEDURE — 85025 COMPLETE CBC W/AUTO DIFF WBC: CPT

## 2025-05-22 PROCEDURE — 80048 BASIC METABOLIC PNL TOTAL CA: CPT

## 2025-05-22 PROCEDURE — 6360000002 HC RX W HCPCS: Performed by: INTERNAL MEDICINE

## 2025-05-22 PROCEDURE — 36415 COLL VENOUS BLD VENIPUNCTURE: CPT

## 2025-05-22 RX ORDER — DOXYCYCLINE HYCLATE 100 MG
100 TABLET ORAL 2 TIMES DAILY
Qty: 14 TABLET | Refills: 0 | Status: SHIPPED | OUTPATIENT
Start: 2025-05-22 | End: 2025-05-29

## 2025-05-22 RX ORDER — AMOXICILLIN 500 MG/1
1000 CAPSULE ORAL 2 TIMES DAILY
Qty: 16 CAPSULE | Refills: 0 | Status: SHIPPED | OUTPATIENT
Start: 2025-05-22 | End: 2025-05-26

## 2025-05-22 RX ADMIN — SODIUM CHLORIDE, PRESERVATIVE FREE 40 MG: 5 INJECTION INTRAVENOUS at 12:40

## 2025-05-22 RX ADMIN — CINACALCET HYDROCHLORIDE 30 MG: 30 TABLET, FILM COATED ORAL at 12:36

## 2025-05-22 RX ADMIN — HYDRALAZINE HYDROCHLORIDE 25 MG: 25 TABLET ORAL at 04:59

## 2025-05-22 RX ADMIN — METOPROLOL TARTRATE 25 MG: 25 TABLET, FILM COATED ORAL at 12:38

## 2025-05-22 RX ADMIN — SEVELAMER CARBONATE 800 MG: 800 TABLET, FILM COATED ORAL at 12:36

## 2025-05-22 RX ADMIN — CALCITRIOL CAPSULES 0.25 MCG 0.5 MCG: 0.25 CAPSULE ORAL at 12:36

## 2025-05-22 RX ADMIN — CLONIDINE HYDROCHLORIDE 0.1 MG: 0.1 TABLET ORAL at 12:38

## 2025-05-22 RX ADMIN — NIFEDIPINE 60 MG: 60 TABLET, EXTENDED RELEASE ORAL at 12:38

## 2025-05-22 RX ADMIN — SODIUM CHLORIDE, PRESERVATIVE FREE 10 ML: 5 INJECTION INTRAVENOUS at 12:39

## 2025-05-22 NOTE — DISCHARGE SUMMARY
Hema Gallagher  :  1970  MRN:  [id number]    Admit date:  2025 Discharge date: 25    Discharging Physician:  DR Bates     Advance Directive: Full Code    Consults: IP CONSULT TO NEPHROLOGY  PALLIATIVE CARE EVAL  IP CONSULT TO GI     Primary Care Physician:  Aletha Del Cid, APRN - CNP    Discharge Diagnoses:  Principal Problem:    Volume overload  Active Problems:    Pneumonia due to infectious organism    Essential hypertension    Anemia    ESRD needing dialysis (HCC)    Melena  Resolved Problems:    * No resolved hospital problems. *      Portions of this note have been copied forward, however, changed to reflect the most current clinical status of this patient.  Hospital Course:   54 year old male with chronic diastolic dysfunction, ESRD on home hemodialysis, HTN, hyperlipidemia, type II DM, with complaints of shortness of breath.  Patient was recently admitted on  due to acute on chronic anemia.  Underwent EGD found to have mild gastritis amount of fluid residual in the stomach suggesting gastroparesis and no source of bleed identified.  Recommended capsule endoscopy outpatient if recurrent hemoglobin drop.  Patient was discharged in stable condition home on .  Was then seen on -due to ongoing cough and congestion was found to have bilateral lower lobe pneumonia and initiated on oral clindamycin.  Return to [facility] ER due to worsening shortness of breath.  Patient states that he has been increasing his dialysis treatment past few weeks up to 5 times a day due to ongoing dyspnea.  States that he has had continued productive cough and fatigue. Patient admitted to hospitalist service with consultation to nephrology. Dialysis  removed 2 L, with dialysis treatment today.  Given ongoing symptoms CT chest obtained tree-in-bud opacities consistent with infection/inflammation, mediastinal lymphadenopathy.  Patient was initiated on IV cefepime. Discussed with patient bowel movements, reported

## 2025-05-22 NOTE — PROGRESS NOTES
Nephrology ([facility]) Consult Note      Patient:  Hema Gallagher  YOB: 1970  Date of Service: 5/22/2025  MRN: [id number]   Acct: [id number]   Primary Care Physician: Aletha Del Cid APRN - CNP  Advance Directive: Full Code  Admit Date: 5/19/2025       Hospital Day: 3  Referring Provider: Phill Bates MD    Patient independently seen and examined, Chart, Consults, Notes, Operative notes, Labs, Cardiology, and Radiology studies reviewed as available.        Subjective:  Hema Gallagher is a 54 y.o. male for whom we were consulted for evaluation and treatment of end-stage renal disease.  He is a home hemodialysis patient with last treatment on Sunday prior to admission.  Has had ongoing issues with anemia and fatigue with dyspnea on exertion.  He presented and was subsequently admitted for further evaluation treatment.  He was recently treated for pneumonia as an outpatient with clindamycin.  He was initially seen on dialysis was tolerating without further issues.  He was last discharged in this facility on 4/30.  Denied current chest pain, nausea or vomiting.    Today, patient developed melena and GI consult was ordered with plans for EGD on 5/21 without acute issues report.  No issues with dialysis so far this admission.  Hopeful for discharge after dialysis today.    Dialysis   Pt was seen on renal replacement therapy and I have personally seen and evaluated the patient and directed the therapy  Modality: Hemodialysis  Access: Arterial Venous Fistula  Location: right upper  QB: 450  QD: 700  UF: 880      Allergies:  Banana, Atorvastatin, and Lisinopril    Medicines:  Current Facility-Administered Medications   Medication Dose Route Frequency Provider Last Rate Last Admin    melatonin disintegrating tablet 5 mg  5 mg Oral Nightly PRN Elana Otto MD   5 mg at 05/21/25 2126    0.9 % sodium chloride infusion   IntraVENous PRN Elana Otto MD        cefepime

## 2025-05-22 NOTE — PROGRESS NOTES
CLINICAL PHARMACY NOTE: MEDS TO BEDS    Total # of Prescriptions Filled: 2   The following medications were delivered to the patient:  Discharge Medication List as of 5/22/2025 12:47 PM        START taking these medications    Details   doxycycline hyclate (VIBRA-TABS) 100 MG tablet Take 1 tablet by mouth 2 times daily for 7 days, Disp-14 tablet, R-0Normal      amoxicillin (AMOXIL) 500 MG capsule Take 2 capsules by mouth 2 times daily for 4 days, Disp-16 capsule, R-0Normal               Additional Documentation:  Delivered to patients nurse at nurses station. No copay.

## 2025-05-22 NOTE — PLAN OF CARE
Problem: Chronic Conditions and Co-morbidities  Goal: Patient's chronic conditions and co-morbidity symptoms are monitored and maintained or improved  5/22/2025 1223 by Josephine Bhatia RN  Outcome: Adequate for Discharge  5/21/2025 2243 by Jo Abarca LPN  Outcome: Progressing     Problem: Discharge Planning  Goal: Discharge to home or other facility with appropriate resources  Outcome: Adequate for Discharge     Problem: ABCDS Injury Assessment  Goal: Absence of physical injury  5/22/2025 1223 by Josephine Bhatia RN  Outcome: Adequate for Discharge  5/21/2025 2243 by Jo Abarca LPN  Outcome: Progressing  Flowsheets (Taken 5/21/2025 2243)  Absence of Physical Injury: Implement safety measures based on patient assessment     Problem: Safety - Adult  Goal: Free from fall injury  Outcome: Adequate for Discharge  Flowsheets (Taken 5/21/2025 2243 by Jo Abarca LPN)  Free From Fall Injury: Instruct family/caregiver on patient safety     Problem: Cardiovascular - Adult  Goal: Maintains optimal cardiac output and hemodynamic stability  Outcome: Adequate for Discharge     Problem: Skin/Tissue Integrity - Adult  Goal: Skin integrity remains intact  Outcome: Adequate for Discharge  Flowsheets (Taken 5/21/2025 2243 by Jo Abarca LPN)  Skin Integrity Remains Intact: Monitor for areas of redness and/or skin breakdown  Goal: Incisions, wounds, or drain sites healing without S/S of infection  Outcome: Adequate for Discharge  Flowsheets (Taken 5/21/2025 2243 by Jo Abarca LPN)  Incisions, Wounds, or Drain Sites Healing Without Sign and Symptoms of Infection: TWICE DAILY: Assess and document skin integrity  Goal: Oral mucous membranes remain intact  Outcome: Adequate for Discharge  Flowsheets (Taken 5/21/2025 2243 by Jo Abarca LPN)  Oral Mucous Membranes Remain Intact: Assess oral mucosa and hygiene practices     Problem: Gastrointestinal - Adult  Goal: Minimal or absence of nausea and vomiting  Outcome:  Adequate for Discharge  Goal: Maintains or returns to baseline bowel function  Outcome: Adequate for Discharge  Goal: Maintains adequate nutritional intake  Outcome: Adequate for Discharge     Problem: Genitourinary - Adult  Goal: Absence of urinary retention  Outcome: Adequate for Discharge     Problem: Metabolic/Fluid and Electrolytes - Adult  Goal: Electrolytes maintained within normal limits  Outcome: Adequate for Discharge  Goal: Hemodynamic stability and optimal renal function maintained  Outcome: Adequate for Discharge  Goal: Glucose maintained within prescribed range  Outcome: Adequate for Discharge

## 2025-05-22 NOTE — PLAN OF CARE
Problem: Chronic Conditions and Co-morbidities  Goal: Patient's chronic conditions and co-morbidity symptoms are monitored and maintained or improved  5/21/2025 2243 by Jo Abarca LPN  Outcome: Progressing  5/21/2025 1552 by Josephine Bhatia RN  Outcome: Progressing     Problem: ABCDS Injury Assessment  Goal: Absence of physical injury  5/21/2025 2243 by Jo Abarca LPN  Outcome: Progressing  Flowsheets (Taken 5/21/2025 2243)  Absence of Physical Injury: Implement safety measures based on patient assessment  5/21/2025 1552 by Josephine Bhatia RN  Outcome: Progressing

## 2025-05-23 ENCOUNTER — OFFICE VISIT (OUTPATIENT)
Dept: PRIMARY CARE CLINIC | Age: 55
End: 2025-05-23

## 2025-05-23 VITALS
WEIGHT: 251 LBS | DIASTOLIC BLOOD PRESSURE: 63 MMHG | SYSTOLIC BLOOD PRESSURE: 138 MMHG | OXYGEN SATURATION: 97 % | HEART RATE: 79 BPM | HEIGHT: 67 IN | TEMPERATURE: 97.3 F | BODY MASS INDEX: 39.39 KG/M2

## 2025-05-23 DIAGNOSIS — N18.6 ESRD (END STAGE RENAL DISEASE) (HCC): ICD-10-CM

## 2025-05-23 DIAGNOSIS — I10 ESSENTIAL HYPERTENSION: ICD-10-CM

## 2025-05-23 DIAGNOSIS — Z99.2 ESRD ON HEMODIALYSIS (HCC): ICD-10-CM

## 2025-05-23 DIAGNOSIS — J18.9 PNEUMONIA OF BOTH LOWER LOBES DUE TO INFECTIOUS ORGANISM: ICD-10-CM

## 2025-05-23 DIAGNOSIS — N18.6 ESRD ON HEMODIALYSIS (HCC): ICD-10-CM

## 2025-05-23 DIAGNOSIS — D64.9 ANEMIA, UNSPECIFIED TYPE: ICD-10-CM

## 2025-05-23 DIAGNOSIS — E87.70 HYPERVOLEMIA, UNSPECIFIED HYPERVOLEMIA TYPE: ICD-10-CM

## 2025-05-23 DIAGNOSIS — Z09 HOSPITAL DISCHARGE FOLLOW-UP: Primary | ICD-10-CM

## 2025-05-23 NOTE — PROGRESS NOTES
bruits  Abdomen: soft, non-tender, non-distended, normal bowel sounds, no masses or organomegaly  Extremities: no cyanosis, clubbing or edema  Musculoskeletal: normal range of motion, no joint swelling, deformity or tenderness  Neurologic: reflexes normal and symmetric, no cranial nerve deficit, gait, coordination and speech normal      An electronic signature was used to authenticate this note.  --Aletha Del Cid, APRN - CNP

## 2025-05-24 LAB
BACTERIA BLD CULT ORG #2: NORMAL
BACTERIA BLD CULT: NORMAL

## 2025-06-08 DIAGNOSIS — J30.2 SEASONAL ALLERGIES: ICD-10-CM

## 2025-06-09 RX ORDER — LORATADINE 10 MG/1
10 TABLET ORAL DAILY
Qty: 30 TABLET | Refills: 0 | Status: SHIPPED | OUTPATIENT
Start: 2025-06-09

## 2025-06-11 ENCOUNTER — OFFICE VISIT (OUTPATIENT)
Dept: PRIMARY CARE CLINIC | Age: 55
End: 2025-06-11
Payer: MEDICARE

## 2025-06-11 VITALS
TEMPERATURE: 97.4 F | HEART RATE: 69 BPM | DIASTOLIC BLOOD PRESSURE: 60 MMHG | HEIGHT: 67 IN | WEIGHT: 256.4 LBS | OXYGEN SATURATION: 95 % | SYSTOLIC BLOOD PRESSURE: 121 MMHG | BODY MASS INDEX: 40.24 KG/M2

## 2025-06-11 DIAGNOSIS — D63.1 ANEMIA DUE TO CHRONIC KIDNEY DISEASE, ON CHRONIC DIALYSIS (HCC): ICD-10-CM

## 2025-06-11 DIAGNOSIS — Z99.2 ANEMIA DUE TO CHRONIC KIDNEY DISEASE, ON CHRONIC DIALYSIS (HCC): ICD-10-CM

## 2025-06-11 DIAGNOSIS — N18.6 ANEMIA DUE TO CHRONIC KIDNEY DISEASE, ON CHRONIC DIALYSIS (HCC): ICD-10-CM

## 2025-06-11 DIAGNOSIS — Z00.00 MEDICARE ANNUAL WELLNESS VISIT, SUBSEQUENT: Primary | ICD-10-CM

## 2025-06-11 DIAGNOSIS — I10 ESSENTIAL HYPERTENSION: ICD-10-CM

## 2025-06-11 LAB
ALBUMIN SERPL-MCNC: 4.2 G/DL (ref 3.5–5.2)
ALP SERPL-CCNC: 59 U/L (ref 40–129)
ALT SERPL-CCNC: 18 U/L (ref 10–50)
ANION GAP SERPL CALCULATED.3IONS-SCNC: 14 MMOL/L (ref 8–16)
AST SERPL-CCNC: 21 U/L (ref 10–50)
BASOPHILS # BLD: 0 K/UL (ref 0–0.2)
BASOPHILS NFR BLD: 0.3 % (ref 0–1)
BILIRUB SERPL-MCNC: 0.5 MG/DL (ref 0.2–1.2)
BUN SERPL-MCNC: 43 MG/DL (ref 6–20)
CALCIUM SERPL-MCNC: 10.1 MG/DL (ref 8.6–10)
CHLORIDE SERPL-SCNC: 97 MMOL/L (ref 98–107)
CO2 SERPL-SCNC: 28 MMOL/L (ref 22–29)
CREAT SERPL-MCNC: 9.9 MG/DL (ref 0.7–1.2)
EOSINOPHIL # BLD: 0.2 K/UL (ref 0–0.6)
EOSINOPHIL NFR BLD: 3.3 % (ref 0–5)
ERYTHROCYTE [DISTWIDTH] IN BLOOD BY AUTOMATED COUNT: 16.3 % (ref 11.5–14.5)
GLUCOSE SERPL-MCNC: 157 MG/DL (ref 70–99)
HCT VFR BLD AUTO: 25.2 % (ref 42–52)
HGB BLD-MCNC: 8 G/DL (ref 14–18)
IMM GRANULOCYTES # BLD: 0 K/UL
LYMPHOCYTES # BLD: 1.3 K/UL (ref 1.1–4.5)
LYMPHOCYTES NFR BLD: 19.8 % (ref 20–40)
MCH RBC QN AUTO: 28.3 PG (ref 27–31)
MCHC RBC AUTO-ENTMCNC: 31.7 G/DL (ref 33–37)
MCV RBC AUTO: 89 FL (ref 80–94)
MONOCYTES # BLD: 0.9 K/UL (ref 0–0.9)
MONOCYTES NFR BLD: 13.9 % (ref 0–10)
NEUTROPHILS # BLD: 4.1 K/UL (ref 1.5–7.5)
NEUTS SEG NFR BLD: 62.5 % (ref 50–65)
PLATELET # BLD AUTO: 165 K/UL (ref 130–400)
PMV BLD AUTO: 9.9 FL (ref 9.4–12.4)
POTASSIUM SERPL-SCNC: 4 MMOL/L (ref 3.5–5.1)
PROT SERPL-MCNC: 7.3 G/DL (ref 6.4–8.3)
RBC # BLD AUTO: 2.83 M/UL (ref 4.7–6.1)
SODIUM SERPL-SCNC: 139 MMOL/L (ref 136–145)
WBC # BLD AUTO: 6.6 K/UL (ref 4.8–10.8)

## 2025-06-11 PROCEDURE — 3074F SYST BP LT 130 MM HG: CPT | Performed by: NURSE PRACTITIONER

## 2025-06-11 PROCEDURE — 3017F COLORECTAL CA SCREEN DOC REV: CPT | Performed by: NURSE PRACTITIONER

## 2025-06-11 PROCEDURE — 3078F DIAST BP <80 MM HG: CPT | Performed by: NURSE PRACTITIONER

## 2025-06-11 PROCEDURE — G0439 PPPS, SUBSEQ VISIT: HCPCS | Performed by: NURSE PRACTITIONER

## 2025-06-11 RX ORDER — CLONIDINE HYDROCHLORIDE 0.1 MG/1
0.1 TABLET ORAL 2 TIMES DAILY PRN
Qty: 180 TABLET | Refills: 1 | Status: SHIPPED | OUTPATIENT
Start: 2025-06-11

## 2025-06-11 ASSESSMENT — PATIENT HEALTH QUESTIONNAIRE - PHQ9
SUM OF ALL RESPONSES TO PHQ QUESTIONS 1-9: 0
1. LITTLE INTEREST OR PLEASURE IN DOING THINGS: NOT AT ALL
SUM OF ALL RESPONSES TO PHQ QUESTIONS 1-9: 0
2. FEELING DOWN, DEPRESSED OR HOPELESS: NOT AT ALL

## 2025-06-11 ASSESSMENT — LIFESTYLE VARIABLES
HOW OFTEN DO YOU HAVE A DRINK CONTAINING ALCOHOL: NEVER
HOW MANY STANDARD DRINKS CONTAINING ALCOHOL DO YOU HAVE ON A TYPICAL DAY: PATIENT DOES NOT DRINK

## 2025-06-11 NOTE — PROGRESS NOTES
Medicare Annual Wellness Visit    Hema Gallagher is here for Medicare AWV, Diabetes, and Hypertension    Assessment & Plan   Medicare annual wellness visit, subsequent  Anemia due to chronic kidney disease, on chronic dialysis (HCC)  -     CBC with Auto Differential; Future  -     Comprehensive Metabolic Panel; Future  Essential hypertension  -     cloNIDine (CATAPRES) 0.1 MG tablet; Take 1 tablet by mouth 2 times daily as needed for High Blood Pressure (>160/90), Disp-180 tablet, R-1Normal     Return in 3 months (on 9/11/2025).     Subjective   The following acute and/or chronic problems were also addressed today:    He reports experiencing chest congestion, uncertain whether it is pulmonary or cardiac in nature. This symptom has been present for approximately 4 to 5 days. Dyspnea occurs upon exertion, such as walking, necessitating a brief pause for recovery. He occasionally expectorates phlegm. No episodes of syncope, lightheadedness, or vertigo are reported. His current weight is recorded at 256 pounds, a decrease from his previous weight of 262 pounds. He attributes this weight loss to fluid retention following blood transfusions. Dialysis is currently performed 4 to 5 times per week, with daily weight monitoring. This morning, his dialysis machine removed 2.3 liters of fluid, resulting in a post-dialysis weight of 255 pounds. He reports feeling slightly improved post-dialysis. An upcoming appointment with nephrology is scheduled for 07/15/2025. Additionally, he has an appointment tomorrow for a gastric emptying study. Blood pressure is well controlled. Has anemia due to his chronic kidney disease, on chronic dialysis.     Patient's complete Health Risk Assessment and screening values have been reviewed and are found in Flowsheets. The following problems were reviewed today and where indicated follow up appointments were made and/or referrals ordered.    Positive Risk Factor Screenings with Interventions:

## 2025-06-12 ENCOUNTER — RESULTS FOLLOW-UP (OUTPATIENT)
Dept: GASTROENTEROLOGY | Age: 55
End: 2025-06-12

## 2025-06-14 ENCOUNTER — RESULTS FOLLOW-UP (OUTPATIENT)
Dept: PRIMARY CARE CLINIC | Age: 55
End: 2025-06-14

## 2025-07-07 DIAGNOSIS — J30.2 SEASONAL ALLERGIES: ICD-10-CM

## 2025-07-07 RX ORDER — LORATADINE 10 MG/1
10 TABLET ORAL DAILY
Qty: 90 TABLET | Refills: 1 | Status: SHIPPED | OUTPATIENT
Start: 2025-07-07

## 2025-07-07 NOTE — TELEPHONE ENCOUNTER
Hema L Johnson called to request a refill on his medication.      Last office visit : 6/11/2025   Next office visit : 9/15/2025     Requested Prescriptions     Pending Prescriptions Disp Refills    loratadine (CLARITIN) 10 MG tablet [Pharmacy Med Name: Loratadine 10 MG Oral Tablet] 30 tablet 0     Sig: Take 1 tablet by mouth once daily            Yulisa Callaway MA

## 2025-07-14 ENCOUNTER — OFFICE VISIT (OUTPATIENT)
Dept: PRIMARY CARE CLINIC | Age: 55
End: 2025-07-14
Payer: MEDICARE

## 2025-07-14 VITALS
HEIGHT: 67 IN | WEIGHT: 254 LBS | TEMPERATURE: 97.2 F | DIASTOLIC BLOOD PRESSURE: 80 MMHG | OXYGEN SATURATION: 97 % | SYSTOLIC BLOOD PRESSURE: 128 MMHG | BODY MASS INDEX: 39.87 KG/M2 | HEART RATE: 64 BPM

## 2025-07-14 DIAGNOSIS — N18.6 ANEMIA DUE TO CHRONIC KIDNEY DISEASE, ON CHRONIC DIALYSIS (HCC): ICD-10-CM

## 2025-07-14 DIAGNOSIS — G47.9 SLEEP DISTURBANCE: ICD-10-CM

## 2025-07-14 DIAGNOSIS — R97.20 ELEVATED PSA: ICD-10-CM

## 2025-07-14 DIAGNOSIS — D63.1 ANEMIA DUE TO CHRONIC KIDNEY DISEASE, ON CHRONIC DIALYSIS (HCC): ICD-10-CM

## 2025-07-14 DIAGNOSIS — G47.33 OBSTRUCTIVE SLEEP APNEA: Primary | ICD-10-CM

## 2025-07-14 DIAGNOSIS — Z99.2 ANEMIA DUE TO CHRONIC KIDNEY DISEASE, ON CHRONIC DIALYSIS (HCC): ICD-10-CM

## 2025-07-14 LAB
BASOPHILS # BLD: 0 K/UL (ref 0–0.2)
BASOPHILS NFR BLD: 0.4 % (ref 0–1)
EOSINOPHIL # BLD: 0.2 K/UL (ref 0–0.6)
EOSINOPHIL NFR BLD: 4.2 % (ref 0–5)
ERYTHROCYTE [DISTWIDTH] IN BLOOD BY AUTOMATED COUNT: 16.9 % (ref 11.5–14.5)
HCT VFR BLD AUTO: 25.5 % (ref 42–52)
HGB BLD-MCNC: 8.2 G/DL (ref 14–18)
IMM GRANULOCYTES # BLD: 0.1 K/UL
LYMPHOCYTES # BLD: 1.2 K/UL (ref 1.1–4.5)
LYMPHOCYTES NFR BLD: 22.2 % (ref 20–40)
MCH RBC QN AUTO: 29 PG (ref 27–31)
MCHC RBC AUTO-ENTMCNC: 32.2 G/DL (ref 33–37)
MCV RBC AUTO: 90.1 FL (ref 80–94)
MONOCYTES # BLD: 0.8 K/UL (ref 0–0.9)
MONOCYTES NFR BLD: 14.4 % (ref 0–10)
NEUTROPHILS # BLD: 3.1 K/UL (ref 1.5–7.5)
NEUTS SEG NFR BLD: 57.9 % (ref 50–65)
PLATELET # BLD AUTO: 197 K/UL (ref 130–400)
PMV BLD AUTO: 10.5 FL (ref 9.4–12.4)
RBC # BLD AUTO: 2.83 M/UL (ref 4.7–6.1)
WBC # BLD AUTO: 5.3 K/UL (ref 4.8–10.8)

## 2025-07-14 PROCEDURE — 3017F COLORECTAL CA SCREEN DOC REV: CPT | Performed by: NURSE PRACTITIONER

## 2025-07-14 PROCEDURE — G8427 DOCREV CUR MEDS BY ELIG CLIN: HCPCS | Performed by: NURSE PRACTITIONER

## 2025-07-14 PROCEDURE — 99214 OFFICE O/P EST MOD 30 MIN: CPT | Performed by: NURSE PRACTITIONER

## 2025-07-14 PROCEDURE — 3079F DIAST BP 80-89 MM HG: CPT | Performed by: NURSE PRACTITIONER

## 2025-07-14 PROCEDURE — 3074F SYST BP LT 130 MM HG: CPT | Performed by: NURSE PRACTITIONER

## 2025-07-14 PROCEDURE — G8417 CALC BMI ABV UP PARAM F/U: HCPCS | Performed by: NURSE PRACTITIONER

## 2025-07-14 PROCEDURE — 1036F TOBACCO NON-USER: CPT | Performed by: NURSE PRACTITIONER

## 2025-07-14 ASSESSMENT — ENCOUNTER SYMPTOMS
ABDOMINAL PAIN: 0
COLOR CHANGE: 0
NAUSEA: 0
VOMITING: 0
COUGH: 0
SORE THROAT: 0
CHEST TIGHTNESS: 0
DIARRHEA: 0
SHORTNESS OF BREATH: 0

## 2025-07-14 NOTE — PROGRESS NOTES
[address] [place] [address] Drive   [address] [facility], [address]     Phone:  [phone]  Fax:  [phone]      Hema Gallagher is a 54 y.o. male who presents today for his medical conditions/complaints as noted below.  Hema Gallagher is c/o of Sleep Problem (Nephrologist in [place] would like a sleep study ordered)      Chief Complaint   Patient presents with    Sleep Problem     Nephrologist in [place] would like a sleep study ordered       HPI:     History of Present Illness  The patient presents for evaluation of sleep issues and elevated PSA.    He was seen by his nephrologist in [place] last week and was advised to undergo a sleep study due to difficulty sleeping, with only an hour of sleep achieved most nights. He does not experience gasping for air upon waking but never feels rested. Despite trying melatonin, he has not found relief. This will be his first sleep study. His sleep pattern includes approximately 3 hours of daytime sleep but almost no sleep at night. He occasionally feels fatigued and experiences headaches when he rises quickly. He does not report any palpitations.    He is seeking a referral to a urologist due to an elevated PSA level of 6.97 detected last week. His PSA level was previously checked in 03/2023, with a result of 2.4. He reports no urinary issues but mentions a family history of prostate cancer involving his uncle and father.    He is unsure if his hemoglobin levels have been checked recently. He does not experience shortness of breath or increased fatigue but notes that his legs feel tired. He recalls a previous episode of blood loss that resulted in difficulty walking and breathing.              Past Medical History:   Diagnosis Date    Asthma     CHF (congestive heart failure) (HCC)     Diabetes mellitus (HCC)     Erectile dysfunction     Hemodialysis patient     monday, tuesday, thursday, and friday at home. hd    History of blood transfusion     Hyperlipidemia

## 2025-07-15 ENCOUNTER — OFFICE VISIT (OUTPATIENT)
Dept: GASTROENTEROLOGY | Age: 55
End: 2025-07-15
Payer: MEDICARE

## 2025-07-15 VITALS
HEART RATE: 76 BPM | SYSTOLIC BLOOD PRESSURE: 139 MMHG | OXYGEN SATURATION: 91 % | BODY MASS INDEX: 40.49 KG/M2 | DIASTOLIC BLOOD PRESSURE: 60 MMHG | HEIGHT: 67 IN | WEIGHT: 258 LBS

## 2025-07-15 DIAGNOSIS — Z86.0100 HISTORY OF COLON POLYPS: Primary | ICD-10-CM

## 2025-07-15 DIAGNOSIS — K59.00 CONSTIPATION, UNSPECIFIED CONSTIPATION TYPE: ICD-10-CM

## 2025-07-15 PROCEDURE — 1036F TOBACCO NON-USER: CPT | Performed by: NURSE PRACTITIONER

## 2025-07-15 PROCEDURE — G8428 CUR MEDS NOT DOCUMENT: HCPCS | Performed by: NURSE PRACTITIONER

## 2025-07-15 PROCEDURE — G8417 CALC BMI ABV UP PARAM F/U: HCPCS | Performed by: NURSE PRACTITIONER

## 2025-07-15 PROCEDURE — 3075F SYST BP GE 130 - 139MM HG: CPT | Performed by: NURSE PRACTITIONER

## 2025-07-15 PROCEDURE — 3078F DIAST BP <80 MM HG: CPT | Performed by: NURSE PRACTITIONER

## 2025-07-15 PROCEDURE — 3017F COLORECTAL CA SCREEN DOC REV: CPT | Performed by: NURSE PRACTITIONER

## 2025-07-15 PROCEDURE — 99214 OFFICE O/P EST MOD 30 MIN: CPT | Performed by: NURSE PRACTITIONER

## 2025-07-15 NOTE — PROGRESS NOTES
Subjective:     Patient ID: Hema Gallagher is a 54 y.o. male  PCP: Aletha Del Cid APRN - CNP  Referring Provider: No ref. provider found    HPI  History of Present Illness  The patient presents for a follow-up visit.    He reports no new health issues since his last visit. He has been managing his anemia well and is not experiencing any nausea. However, he mentions that he has not had a bowel movement in the past 2 to 3 days. He recalls being prescribed a medication for this issue in the past, but it did not seem to alleviate his symptoms. He has also tried MiraLAX without success.    He is scheduled for a sleep apnea test at home.    Results  Imaging   - Gastric emptying study: Normal      Assessment:     Assessment & Plan  1. Constipation:  - Reports not having a bowel movement for 2 to 3 days.  - Previous medication prescribed did not work, and the name is not recalled.  - Advised to take one capful of MiraLAX every morning to help with bowel movements.  - Provided samples of MiraLAX packets to get started.    2. Anemia:  - Anemia is stable, though tends to run low.  - No new symptoms or concerns noted.    3. Health Maintenance:  - Gastric emptying study conducted due to the presence of food in the stomach during an endoscopy; results were normal.  - Due for a colonoscopy in 12/2025.  - Advised to schedule the colonoscopy.    4. Sleep apnea:  - Scheduled for a sleep apnea test at home.    Follow-up: 12/2025 for colonoscopy.    1. History of colon polyps  2. Constipation, unspecified constipation type       Review of Systems   Constitutional:  Negative for activity change, appetite change, fatigue, fever and unexpected weight change.   HENT:  Negative for trouble swallowing.    Respiratory:  Negative for cough, choking and shortness of breath.    Cardiovascular:  Negative for chest pain.   Gastrointestinal:  Positive for constipation. Negative for abdominal distention, abdominal pain, anal bleeding, blood in stool,

## 2025-07-22 ENCOUNTER — FOLLOWUP TELEPHONE ENCOUNTER (OUTPATIENT)
Dept: SLEEP CENTER | Age: 55
End: 2025-07-22

## 2025-07-22 ASSESSMENT — ENCOUNTER SYMPTOMS
NAUSEA: 0
TROUBLE SWALLOWING: 0
DIARRHEA: 0
CONSTIPATION: 1
SHORTNESS OF BREATH: 0
RECTAL PAIN: 0
CHOKING: 0
COUGH: 0
ANAL BLEEDING: 0
ABDOMINAL DISTENTION: 0
VOMITING: 0
BLOOD IN STOOL: 0
ABDOMINAL PAIN: 0

## 2025-07-24 ENCOUNTER — OFFICE VISIT (OUTPATIENT)
Dept: UROLOGY | Age: 55
End: 2025-07-24
Payer: MEDICARE

## 2025-07-24 VITALS — TEMPERATURE: 97.4 F | WEIGHT: 262 LBS | HEIGHT: 67 IN | BODY MASS INDEX: 41.12 KG/M2

## 2025-07-24 DIAGNOSIS — Z99.2 ESRD NEEDING DIALYSIS (HCC): ICD-10-CM

## 2025-07-24 DIAGNOSIS — N18.6 ESRD NEEDING DIALYSIS (HCC): ICD-10-CM

## 2025-07-24 DIAGNOSIS — R97.20 ELEVATED PSA: Primary | ICD-10-CM

## 2025-07-24 DIAGNOSIS — Z80.42 FAMILY HX OF PROSTATE CANCER: ICD-10-CM

## 2025-07-24 PROCEDURE — 3017F COLORECTAL CA SCREEN DOC REV: CPT | Performed by: NURSE PRACTITIONER

## 2025-07-24 PROCEDURE — G8427 DOCREV CUR MEDS BY ELIG CLIN: HCPCS | Performed by: NURSE PRACTITIONER

## 2025-07-24 PROCEDURE — G8417 CALC BMI ABV UP PARAM F/U: HCPCS | Performed by: NURSE PRACTITIONER

## 2025-07-24 PROCEDURE — 99214 OFFICE O/P EST MOD 30 MIN: CPT | Performed by: NURSE PRACTITIONER

## 2025-07-24 PROCEDURE — 1036F TOBACCO NON-USER: CPT | Performed by: NURSE PRACTITIONER

## 2025-07-24 ASSESSMENT — ENCOUNTER SYMPTOMS
ABDOMINAL PAIN: 0
VOMITING: 0
ABDOMINAL DISTENTION: 0
NAUSEA: 0

## 2025-07-24 NOTE — PROGRESS NOTES
Hema Gallagher is a 54 y.o., male, Established patient who presents today   Chief Complaint   Patient presents with    Follow-up     I am here for a follow up for elevated PSA      Elevated PSA:  Patient is here today for history of an elevated PSA.  He has not been seen in the office since 8/8/2022.  His last several PSA values are as follows:      Lab Results   Component Value Date    PSA 3.78 11/11/2022    PSA 5.01 (H) 08/02/2022    PSA 1.83 02/02/2022    PSA 2.96 06/18/2021    PSA 5.35 (H) 11/24/2020     Overall the PSA level is: increased from previous and increased from time of previous biopsy.  Patient reports he was constipated around the time his lab was drawn which could be contributory.  Recent history of urinary tract infection/prostatitis? no  Previous prostate biopsy? yes -done 1/4/2021 for PSA of 5.35 prostate volume was 22 g. PSAD 0.24  Lower urinary tract symptoms: He has no significant lower urinary tract symptoms he is on dialysis he does not make much urine.  Reports he only dribbles.  He does have a significant familial history of prostate cancer and both his father and his paternal uncle.  He has never been a smoker, but possibly has some secondary exposure to chemicals from [facility].     Renal Cyst:  Patient also with history of hyperdense renal cyst  The mass(es) is(are): left   Overall the size of the cyst(s) are: stable and 1.7 cm in diameter.  Flank pain? no  Hematuria?  None  Bosniak renal cyst classification: Type 2, patient has known hyperdense cyst in the upper pole the left kidney.  The cyst is unchanged in size previously it was hyperdense but last follow-up has a density more consistent with a simple cyst.  He he is due a follow-up CT scan in 6 months.  he denies any flank pain or hematuria he is on dialysis with end-stage renal disease.  He also has some smaller cyst in the lower pole of the left kidney and on the right side.  He is actually being followed by the [facility]

## 2025-07-25 RX ORDER — HYDRALAZINE HYDROCHLORIDE 25 MG/1
25 TABLET, FILM COATED ORAL EVERY 8 HOURS SCHEDULED
Qty: 90 TABLET | Refills: 0 | Status: SHIPPED | OUTPATIENT
Start: 2025-07-25

## 2025-07-30 ENCOUNTER — APPOINTMENT (OUTPATIENT)
Dept: GENERAL RADIOLOGY | Age: 55
DRG: 640 | End: 2025-07-30
Payer: MEDICARE

## 2025-07-30 ENCOUNTER — HOSPITAL ENCOUNTER (INPATIENT)
Age: 55
LOS: 3 days | Discharge: HOME | DRG: 640 | End: 2025-08-02
Attending: PEDIATRICS
Payer: MEDICARE

## 2025-07-30 ENCOUNTER — APPOINTMENT (OUTPATIENT)
Dept: CT IMAGING | Age: 55
DRG: 640 | End: 2025-07-30
Payer: MEDICARE

## 2025-07-30 DIAGNOSIS — S09.90XA CLOSED HEAD INJURY, INITIAL ENCOUNTER: ICD-10-CM

## 2025-07-30 DIAGNOSIS — G89.29 CHRONIC RIGHT-SIDED LOW BACK PAIN WITH RIGHT-SIDED SCIATICA: ICD-10-CM

## 2025-07-30 DIAGNOSIS — M54.41 CHRONIC RIGHT-SIDED LOW BACK PAIN WITH RIGHT-SIDED SCIATICA: ICD-10-CM

## 2025-07-30 DIAGNOSIS — N18.6 END STAGE RENAL DISEASE (HCC): ICD-10-CM

## 2025-07-30 DIAGNOSIS — R41.82 ALTERED MENTAL STATUS, UNSPECIFIED ALTERED MENTAL STATUS TYPE: ICD-10-CM

## 2025-07-30 DIAGNOSIS — M54.41 ACUTE BILATERAL LOW BACK PAIN WITH RIGHT-SIDED SCIATICA: ICD-10-CM

## 2025-07-30 DIAGNOSIS — T80.90XA: Primary | ICD-10-CM

## 2025-07-30 DIAGNOSIS — D64.9 ANEMIA, UNSPECIFIED TYPE: ICD-10-CM

## 2025-07-30 LAB
ABO + RH BLD: NORMAL
ALBUMIN SERPL-MCNC: 4.1 G/DL (ref 3.5–5.2)
ALP SERPL-CCNC: 42 U/L (ref 40–129)
ALT SERPL-CCNC: 18 U/L (ref 10–50)
ANION GAP SERPL CALCULATED.3IONS-SCNC: 21 MMOL/L (ref 8–16)
AST SERPL-CCNC: 16 U/L (ref 10–50)
BASOPHILS # BLD: 0 K/UL (ref 0–0.2)
BASOPHILS NFR BLD: 0.1 % (ref 0–1)
BILIRUB SERPL-MCNC: 0.3 MG/DL (ref 0.2–1.2)
BLD GP AB SCN SERPL QL: NORMAL
BNP BLD-MCNC: ABNORMAL PG/ML (ref 0–124)
BUN SERPL-MCNC: 104 MG/DL (ref 6–20)
CALCIUM SERPL-MCNC: 9.9 MG/DL (ref 8.6–10)
CHLORIDE SERPL-SCNC: 100 MMOL/L (ref 98–107)
CO2 SERPL-SCNC: 23 MMOL/L (ref 22–29)
CREAT SERPL-MCNC: 16.7 MG/DL (ref 0.7–1.2)
EOSINOPHIL # BLD: 0.2 K/UL (ref 0–0.6)
EOSINOPHIL NFR BLD: 3.3 % (ref 0–5)
ERYTHROCYTE [DISTWIDTH] IN BLOOD BY AUTOMATED COUNT: 18 % (ref 11.5–14.5)
GLUCOSE SERPL-MCNC: 98 MG/DL (ref 70–99)
HCT VFR BLD AUTO: 19.8 % (ref 42–52)
HGB BLD-MCNC: 6.6 G/DL (ref 14–18)
IMM GRANULOCYTES # BLD: 0 K/UL
LACTATE BLDV-SCNC: 1.1 MG/DL (ref 0.5–1.9)
LYMPHOCYTES # BLD: 1.2 K/UL (ref 1.1–4.5)
LYMPHOCYTES NFR BLD: 18.5 % (ref 20–40)
MAGNESIUM SERPL-MCNC: 2.6 MG/DL (ref 1.6–2.6)
MCH RBC QN AUTO: 29.7 PG (ref 27–31)
MCHC RBC AUTO-ENTMCNC: 33.3 G/DL (ref 33–37)
MCV RBC AUTO: 89.2 FL (ref 80–94)
MONOCYTES # BLD: 0.9 K/UL (ref 0–0.9)
MONOCYTES NFR BLD: 13.2 % (ref 0–10)
NEUTROPHILS # BLD: 4.3 K/UL (ref 1.5–7.5)
NEUTS SEG NFR BLD: 64.6 % (ref 50–65)
PLATELET # BLD AUTO: 130 K/UL (ref 130–400)
PMV BLD AUTO: 9.4 FL (ref 9.4–12.4)
POTASSIUM SERPL-SCNC: 4.7 MMOL/L (ref 3.5–5.1)
PROCALCITONIN: 0.27 NG/ML (ref 0–0.09)
PROT SERPL-MCNC: 7 G/DL (ref 6.4–8.3)
RBC # BLD AUTO: 2.22 M/UL (ref 4.7–6.1)
SODIUM SERPL-SCNC: 144 MMOL/L (ref 136–145)
WBC # BLD AUTO: 6.7 K/UL (ref 4.8–10.8)

## 2025-07-30 PROCEDURE — 82728 ASSAY OF FERRITIN: CPT

## 2025-07-30 PROCEDURE — P9016 RBC LEUKOCYTES REDUCED: HCPCS

## 2025-07-30 PROCEDURE — 36430 TRANSFUSION BLD/BLD COMPNT: CPT

## 2025-07-30 PROCEDURE — 86850 RBC ANTIBODY SCREEN: CPT

## 2025-07-30 PROCEDURE — 36415 COLL VENOUS BLD VENIPUNCTURE: CPT

## 2025-07-30 PROCEDURE — 80053 COMPREHEN METABOLIC PANEL: CPT

## 2025-07-30 PROCEDURE — 85025 COMPLETE CBC W/AUTO DIFF WBC: CPT

## 2025-07-30 PROCEDURE — 83010 ASSAY OF HAPTOGLOBIN QUANT: CPT

## 2025-07-30 PROCEDURE — 85045 AUTOMATED RETICULOCYTE COUNT: CPT

## 2025-07-30 PROCEDURE — 82746 ASSAY OF FOLIC ACID SERUM: CPT

## 2025-07-30 PROCEDURE — 83540 ASSAY OF IRON: CPT

## 2025-07-30 PROCEDURE — 86901 BLOOD TYPING SEROLOGIC RH(D): CPT

## 2025-07-30 PROCEDURE — 82607 VITAMIN B-12: CPT

## 2025-07-30 PROCEDURE — 83735 ASSAY OF MAGNESIUM: CPT

## 2025-07-30 PROCEDURE — 83605 ASSAY OF LACTIC ACID: CPT

## 2025-07-30 PROCEDURE — 71046 X-RAY EXAM CHEST 2 VIEWS: CPT

## 2025-07-30 PROCEDURE — 83550 IRON BINDING TEST: CPT

## 2025-07-30 PROCEDURE — 93005 ELECTROCARDIOGRAM TRACING: CPT

## 2025-07-30 PROCEDURE — 83880 ASSAY OF NATRIURETIC PEPTIDE: CPT

## 2025-07-30 PROCEDURE — 84145 PROCALCITONIN (PCT): CPT

## 2025-07-30 PROCEDURE — 86923 COMPATIBILITY TEST ELECTRIC: CPT

## 2025-07-30 PROCEDURE — 72131 CT LUMBAR SPINE W/O DYE: CPT

## 2025-07-30 PROCEDURE — 99285 EMERGENCY DEPT VISIT HI MDM: CPT

## 2025-07-30 PROCEDURE — 30233N1 TRANSFUSION OF NONAUTOLOGOUS RED BLOOD CELLS INTO PERIPHERAL VEIN, PERCUTANEOUS APPROACH: ICD-10-PCS | Performed by: HOSPITALIST

## 2025-07-30 PROCEDURE — 70450 CT HEAD/BRAIN W/O DYE: CPT

## 2025-07-30 PROCEDURE — 1200000000 HC SEMI PRIVATE

## 2025-07-30 PROCEDURE — 86900 BLOOD TYPING SEROLOGIC ABO: CPT

## 2025-07-30 RX ORDER — HEPARIN SODIUM 5000 [USP'U]/ML
5000 INJECTION, SOLUTION INTRAVENOUS; SUBCUTANEOUS EVERY 8 HOURS SCHEDULED
Status: CANCELLED | OUTPATIENT
Start: 2025-07-30

## 2025-07-30 ASSESSMENT — ENCOUNTER SYMPTOMS: ROS SKIN COMMENTS: BILATERAL LOWER EXTREMITY EDEMA

## 2025-07-31 ENCOUNTER — APPOINTMENT (OUTPATIENT)
Dept: GENERAL RADIOLOGY | Age: 55
DRG: 640 | End: 2025-07-31
Payer: MEDICARE

## 2025-07-31 PROBLEM — M54.50 LOW BACK PAIN: Status: ACTIVE | Noted: 2025-07-31

## 2025-07-31 PROBLEM — K21.9 GERD (GASTROESOPHAGEAL REFLUX DISEASE): Status: ACTIVE | Noted: 2025-07-31

## 2025-07-31 LAB
ANION GAP SERPL CALCULATED.3IONS-SCNC: 19 MMOL/L (ref 8–16)
BASOPHILS # BLD: 0 K/UL (ref 0–0.2)
BASOPHILS NFR BLD: 0.3 % (ref 0–1)
BLOOD BANK DISPENSE STATUS: NORMAL
BLOOD BANK PRODUCT CODE: NORMAL
BPU ID: NORMAL
BUN SERPL-MCNC: 103 MG/DL (ref 6–20)
CALCIUM SERPL-MCNC: 9.6 MG/DL (ref 8.6–10)
CHLORIDE SERPL-SCNC: 100 MMOL/L (ref 98–107)
CO2 SERPL-SCNC: 23 MMOL/L (ref 22–29)
CREAT SERPL-MCNC: 17.4 MG/DL (ref 0.7–1.2)
DESCRIPTION BLOOD BANK: NORMAL
EOSINOPHIL # BLD: 0.2 K/UL (ref 0–0.6)
EOSINOPHIL NFR BLD: 2.2 % (ref 0–5)
ERYTHROCYTE [DISTWIDTH] IN BLOOD BY AUTOMATED COUNT: 18.1 % (ref 11.5–14.5)
FERRITIN SERPL-MCNC: 1479 NG/ML (ref 30–400)
FOLATE SERPL-MCNC: 33.9 NG/ML (ref 4.5–32.2)
GLUCOSE BLD-MCNC: 111 MG/DL (ref 70–99)
GLUCOSE BLD-MCNC: 159 MG/DL (ref 70–99)
GLUCOSE SERPL-MCNC: 107 MG/DL (ref 70–99)
HAPTOGLOB SERPL-MCNC: 84.4 MG/DL (ref 30–200)
HCT VFR BLD AUTO: 19.8 % (ref 42–52)
HCT VFR BLD AUTO: 20.6 % (ref 42–52)
HCT VFR BLD AUTO: 22.4 % (ref 42–52)
HGB BLD-MCNC: 6.7 G/DL (ref 14–18)
HGB BLD-MCNC: 7.4 G/DL (ref 14–18)
IMM GRANULOCYTES # BLD: 0 K/UL
IRON SATN MFR SERPL: 47 % (ref 20–50)
IRON SERPL-MCNC: 90 UG/DL (ref 59–158)
LDH SERPL-CCNC: 212 U/L (ref 135–225)
LYMPHOCYTES # BLD: 1.5 K/UL (ref 1.1–4.5)
LYMPHOCYTES NFR BLD: 19.4 % (ref 20–40)
MCH RBC QN AUTO: 29.8 PG (ref 27–31)
MCHC RBC AUTO-ENTMCNC: 32.5 G/DL (ref 33–37)
MCV RBC AUTO: 91.6 FL (ref 80–94)
MONOCYTES # BLD: 0.8 K/UL (ref 0–0.9)
MONOCYTES NFR BLD: 11 % (ref 0–10)
NEUTROPHILS # BLD: 5.1 K/UL (ref 1.5–7.5)
NEUTS SEG NFR BLD: 66.7 % (ref 50–65)
PERFORMED ON: ABNORMAL
PERFORMED ON: ABNORMAL
PLATELET # BLD AUTO: 141 K/UL (ref 130–400)
PMV BLD AUTO: 10.4 FL (ref 9.4–12.4)
POTASSIUM SERPL-SCNC: 4.9 MMOL/L (ref 3.5–5)
RBC # BLD AUTO: 2.25 M/UL (ref 4.7–6.1)
RETICS # AUTO: 0.06 M/UL (ref 0.03–0.12)
RETICS/RBC NFR: 2.92 % (ref 0.5–1.5)
SODIUM SERPL-SCNC: 142 MMOL/L (ref 136–145)
TIBC SERPL-MCNC: 192 UG/DL (ref 250–400)
VIT B12 SERPL-MCNC: >4000 PG/ML (ref 232–1245)
WBC # BLD AUTO: 7.6 K/UL (ref 4.8–10.8)

## 2025-07-31 PROCEDURE — 85018 HEMOGLOBIN: CPT

## 2025-07-31 PROCEDURE — 82962 GLUCOSE BLOOD TEST: CPT

## 2025-07-31 PROCEDURE — 6370000000 HC RX 637 (ALT 250 FOR IP): Performed by: HOSPITALIST

## 2025-07-31 PROCEDURE — 1200000000 HC SEMI PRIVATE

## 2025-07-31 PROCEDURE — 83615 LACTATE (LD) (LDH) ENZYME: CPT

## 2025-07-31 PROCEDURE — 6370000000 HC RX 637 (ALT 250 FOR IP): Performed by: NURSE PRACTITIONER

## 2025-07-31 PROCEDURE — 80048 BASIC METABOLIC PNL TOTAL CA: CPT

## 2025-07-31 PROCEDURE — 85014 HEMATOCRIT: CPT

## 2025-07-31 PROCEDURE — 6360000002 HC RX W HCPCS: Performed by: PEDIATRICS

## 2025-07-31 PROCEDURE — 8010000000 HC HEMODIALYSIS ACUTE INPT

## 2025-07-31 PROCEDURE — 2500000003 HC RX 250 WO HCPCS: Performed by: HOSPITALIST

## 2025-07-31 PROCEDURE — 85025 COMPLETE CBC W/AUTO DIFF WBC: CPT

## 2025-07-31 PROCEDURE — 5A1D70Z PERFORMANCE OF URINARY FILTRATION, INTERMITTENT, LESS THAN 6 HOURS PER DAY: ICD-10-PCS | Performed by: HOSPITALIST

## 2025-07-31 PROCEDURE — 71045 X-RAY EXAM CHEST 1 VIEW: CPT

## 2025-07-31 PROCEDURE — 36415 COLL VENOUS BLD VENIPUNCTURE: CPT

## 2025-07-31 RX ORDER — HYDROCODONE BITARTRATE AND ACETAMINOPHEN 10; 325 MG/1; MG/1
1 TABLET ORAL EVERY 6 HOURS PRN
Refills: 0 | Status: DISCONTINUED | OUTPATIENT
Start: 2025-07-31 | End: 2025-08-02 | Stop reason: HOSPADM

## 2025-07-31 RX ORDER — CINACALCET 30 MG/1
30 TABLET, FILM COATED ORAL DAILY
Status: DISCONTINUED | OUTPATIENT
Start: 2025-07-31 | End: 2025-08-02 | Stop reason: HOSPADM

## 2025-07-31 RX ORDER — MECOBALAMIN 5000 MCG
5 TABLET,DISINTEGRATING ORAL NIGHTLY PRN
Status: DISCONTINUED | OUTPATIENT
Start: 2025-07-31 | End: 2025-08-02 | Stop reason: HOSPADM

## 2025-07-31 RX ORDER — SODIUM CHLORIDE 9 MG/ML
INJECTION, SOLUTION INTRAVENOUS PRN
Status: DISCONTINUED | OUTPATIENT
Start: 2025-07-31 | End: 2025-08-02 | Stop reason: HOSPADM

## 2025-07-31 RX ORDER — ROSUVASTATIN CALCIUM 10 MG/1
5 TABLET, COATED ORAL NIGHTLY
Status: DISCONTINUED | OUTPATIENT
Start: 2025-07-31 | End: 2025-08-02 | Stop reason: HOSPADM

## 2025-07-31 RX ORDER — FERROUS SULFATE 325(65) MG
325 TABLET ORAL 2 TIMES DAILY WITH MEALS
Status: DISCONTINUED | OUTPATIENT
Start: 2025-07-31 | End: 2025-08-02 | Stop reason: HOSPADM

## 2025-07-31 RX ORDER — UBIDECARENONE 75 MG
100 CAPSULE ORAL DAILY
Status: DISCONTINUED | OUTPATIENT
Start: 2025-07-31 | End: 2025-08-02 | Stop reason: HOSPADM

## 2025-07-31 RX ORDER — HYDRALAZINE HYDROCHLORIDE 25 MG/1
25 TABLET, FILM COATED ORAL EVERY 8 HOURS SCHEDULED
Status: DISCONTINUED | OUTPATIENT
Start: 2025-07-31 | End: 2025-08-02 | Stop reason: HOSPADM

## 2025-07-31 RX ORDER — SEVELAMER CARBONATE 800 MG/1
800 TABLET, FILM COATED ORAL
Status: DISCONTINUED | OUTPATIENT
Start: 2025-07-31 | End: 2025-08-02 | Stop reason: HOSPADM

## 2025-07-31 RX ORDER — CALCIUM CARBONATE 500 MG/1
1250 TABLET, CHEWABLE ORAL DAILY
Status: DISCONTINUED | OUTPATIENT
Start: 2025-07-31 | End: 2025-08-02 | Stop reason: HOSPADM

## 2025-07-31 RX ORDER — METOPROLOL TARTRATE 25 MG/1
25 TABLET, FILM COATED ORAL 2 TIMES DAILY
Status: DISCONTINUED | OUTPATIENT
Start: 2025-07-31 | End: 2025-08-02 | Stop reason: HOSPADM

## 2025-07-31 RX ORDER — ACETAMINOPHEN 325 MG/1
650 TABLET ORAL EVERY 4 HOURS PRN
Status: DISCONTINUED | OUTPATIENT
Start: 2025-07-31 | End: 2025-08-02 | Stop reason: HOSPADM

## 2025-07-31 RX ORDER — SODIUM CHLORIDE 0.9 % (FLUSH) 0.9 %
5-40 SYRINGE (ML) INJECTION EVERY 12 HOURS SCHEDULED
Status: DISCONTINUED | OUTPATIENT
Start: 2025-07-31 | End: 2025-08-02 | Stop reason: HOSPADM

## 2025-07-31 RX ORDER — CALCITRIOL 0.25 UG/1
0.5 CAPSULE, LIQUID FILLED ORAL 2 TIMES DAILY
Status: DISCONTINUED | OUTPATIENT
Start: 2025-07-31 | End: 2025-08-02 | Stop reason: HOSPADM

## 2025-07-31 RX ORDER — POLYETHYLENE GLYCOL 3350 17 G/17G
17 POWDER, FOR SOLUTION ORAL 2 TIMES DAILY PRN
Status: DISCONTINUED | OUTPATIENT
Start: 2025-07-31 | End: 2025-08-02 | Stop reason: HOSPADM

## 2025-07-31 RX ORDER — SODIUM CHLORIDE 0.9 % (FLUSH) 0.9 %
5-40 SYRINGE (ML) INJECTION PRN
Status: DISCONTINUED | OUTPATIENT
Start: 2025-07-31 | End: 2025-08-02 | Stop reason: HOSPADM

## 2025-07-31 RX ORDER — HYDRALAZINE HYDROCHLORIDE 20 MG/ML
10 INJECTION INTRAMUSCULAR; INTRAVENOUS EVERY 4 HOURS PRN
Status: DISCONTINUED | OUTPATIENT
Start: 2025-07-31 | End: 2025-08-02 | Stop reason: HOSPADM

## 2025-07-31 RX ORDER — HYDROMORPHONE HYDROCHLORIDE 1 MG/ML
0.5 INJECTION, SOLUTION INTRAMUSCULAR; INTRAVENOUS; SUBCUTANEOUS ONCE
Status: COMPLETED | OUTPATIENT
Start: 2025-07-31 | End: 2025-07-31

## 2025-07-31 RX ORDER — NIFEDIPINE 30 MG/1
60 TABLET, EXTENDED RELEASE ORAL 2 TIMES DAILY
Status: DISCONTINUED | OUTPATIENT
Start: 2025-07-31 | End: 2025-08-02 | Stop reason: HOSPADM

## 2025-07-31 RX ORDER — PANTOPRAZOLE SODIUM 40 MG/1
40 TABLET, DELAYED RELEASE ORAL
Status: DISCONTINUED | OUTPATIENT
Start: 2025-07-31 | End: 2025-08-02 | Stop reason: HOSPADM

## 2025-07-31 RX ORDER — ONDANSETRON 4 MG/1
4 TABLET, ORALLY DISINTEGRATING ORAL EVERY 8 HOURS PRN
Status: DISCONTINUED | OUTPATIENT
Start: 2025-07-31 | End: 2025-08-02 | Stop reason: HOSPADM

## 2025-07-31 RX ORDER — CETIRIZINE HYDROCHLORIDE 10 MG/1
5 TABLET ORAL DAILY PRN
Status: DISCONTINUED | OUTPATIENT
Start: 2025-07-31 | End: 2025-08-02 | Stop reason: HOSPADM

## 2025-07-31 RX ORDER — ONDANSETRON 2 MG/ML
4 INJECTION INTRAMUSCULAR; INTRAVENOUS EVERY 6 HOURS PRN
Status: DISCONTINUED | OUTPATIENT
Start: 2025-07-31 | End: 2025-08-02 | Stop reason: HOSPADM

## 2025-07-31 RX ORDER — ACETAMINOPHEN 650 MG/1
650 SUPPOSITORY RECTAL EVERY 4 HOURS PRN
Status: DISCONTINUED | OUTPATIENT
Start: 2025-07-31 | End: 2025-08-02 | Stop reason: HOSPADM

## 2025-07-31 RX ORDER — MECOBALAMIN 5000 MCG
5 TABLET,DISINTEGRATING ORAL NIGHTLY
Status: DISCONTINUED | OUTPATIENT
Start: 2025-07-31 | End: 2025-08-02 | Stop reason: HOSPADM

## 2025-07-31 RX ORDER — CALCIUM CARBONATE 500 MG/1
1000 TABLET, CHEWABLE ORAL 3 TIMES DAILY PRN
Status: DISCONTINUED | OUTPATIENT
Start: 2025-07-31 | End: 2025-08-02 | Stop reason: HOSPADM

## 2025-07-31 RX ADMIN — Medication 5 MG: at 21:51

## 2025-07-31 RX ADMIN — PANTOPRAZOLE SODIUM 40 MG: 40 TABLET, DELAYED RELEASE ORAL at 17:34

## 2025-07-31 RX ADMIN — ROSUVASTATIN 5 MG: 10 TABLET, FILM COATED ORAL at 01:43

## 2025-07-31 RX ADMIN — HYDROCODONE BITARTRATE AND ACETAMINOPHEN 1 TABLET: 10; 325 TABLET ORAL at 12:34

## 2025-07-31 RX ADMIN — FERROUS SULFATE TAB 325 MG (65 MG ELEMENTAL FE) 325 MG: 325 (65 FE) TAB at 17:33

## 2025-07-31 RX ADMIN — FERROUS SULFATE TAB 325 MG (65 MG ELEMENTAL FE) 325 MG: 325 (65 FE) TAB at 12:33

## 2025-07-31 RX ADMIN — ROSUVASTATIN 5 MG: 10 TABLET, FILM COATED ORAL at 21:52

## 2025-07-31 RX ADMIN — METOPROLOL TARTRATE 25 MG: 25 TABLET, FILM COATED ORAL at 13:00

## 2025-07-31 RX ADMIN — ACETAMINOPHEN 650 MG: 325 TABLET ORAL at 08:10

## 2025-07-31 RX ADMIN — CALCITRIOL CAPSULES 0.25 MCG 0.5 MCG: 0.25 CAPSULE ORAL at 21:52

## 2025-07-31 RX ADMIN — ACETAMINOPHEN 650 MG: 325 TABLET ORAL at 17:33

## 2025-07-31 RX ADMIN — CALCITRIOL CAPSULES 0.25 MCG 0.5 MCG: 0.25 CAPSULE ORAL at 01:43

## 2025-07-31 RX ADMIN — HYDROCODONE BITARTRATE AND ACETAMINOPHEN 1 TABLET: 10; 325 TABLET ORAL at 18:25

## 2025-07-31 RX ADMIN — PANTOPRAZOLE SODIUM 40 MG: 40 TABLET, DELAYED RELEASE ORAL at 05:40

## 2025-07-31 RX ADMIN — HYDROMORPHONE HYDROCHLORIDE 0.5 MG: 1 INJECTION, SOLUTION INTRAMUSCULAR; INTRAVENOUS; SUBCUTANEOUS at 00:41

## 2025-07-31 RX ADMIN — METOPROLOL TARTRATE 25 MG: 25 TABLET, FILM COATED ORAL at 01:43

## 2025-07-31 RX ADMIN — SODIUM CHLORIDE, PRESERVATIVE FREE 10 ML: 5 INJECTION INTRAVENOUS at 21:56

## 2025-07-31 RX ADMIN — Medication 5 MG: at 01:45

## 2025-07-31 RX ADMIN — CINACALCET HYDROCHLORIDE 30 MG: 30 TABLET, FILM COATED ORAL at 12:34

## 2025-07-31 RX ADMIN — SEVELAMER CARBONATE 800 MG: 800 TABLET, FILM COATED ORAL at 17:33

## 2025-07-31 RX ADMIN — HYDRALAZINE HYDROCHLORIDE 25 MG: 25 TABLET ORAL at 21:53

## 2025-07-31 RX ADMIN — CALCITRIOL CAPSULES 0.25 MCG 0.5 MCG: 0.25 CAPSULE ORAL at 12:34

## 2025-07-31 RX ADMIN — METOPROLOL TARTRATE 25 MG: 25 TABLET, FILM COATED ORAL at 21:52

## 2025-07-31 RX ADMIN — NIFEDIPINE 60 MG: 30 TABLET, EXTENDED RELEASE ORAL at 12:58

## 2025-07-31 RX ADMIN — HYDRALAZINE HYDROCHLORIDE 25 MG: 25 TABLET ORAL at 05:40

## 2025-07-31 RX ADMIN — NIFEDIPINE 60 MG: 30 TABLET, EXTENDED RELEASE ORAL at 21:52

## 2025-07-31 RX ADMIN — NIFEDIPINE 60 MG: 30 TABLET, EXTENDED RELEASE ORAL at 01:45

## 2025-07-31 ASSESSMENT — PAIN - FUNCTIONAL ASSESSMENT
PAIN_FUNCTIONAL_ASSESSMENT: 0-10
PAIN_FUNCTIONAL_ASSESSMENT: 0-10
PAIN_FUNCTIONAL_ASSESSMENT: NONE - DENIES PAIN

## 2025-07-31 ASSESSMENT — ENCOUNTER SYMPTOMS
SHORTNESS OF BREATH: 1
BLOOD IN STOOL: 0
ABDOMINAL PAIN: 0
COUGH: 0
COLOR CHANGE: 0
DIARRHEA: 0
RHINORRHEA: 0
NAUSEA: 0
BACK PAIN: 1
VOMITING: 0

## 2025-07-31 ASSESSMENT — PAIN SCALES - GENERAL
PAINLEVEL_OUTOF10: 8
PAINLEVEL_OUTOF10: 10
PAINLEVEL_OUTOF10: 9
PAINLEVEL_OUTOF10: 7
PAINLEVEL_OUTOF10: 10

## 2025-07-31 ASSESSMENT — PAIN DESCRIPTION - ORIENTATION
ORIENTATION: RIGHT
ORIENTATION: RIGHT

## 2025-07-31 ASSESSMENT — PAIN DESCRIPTION - LOCATION
LOCATION: BACK
LOCATION: BACK
LOCATION: BACK;LEG
LOCATION: BACK;LEG

## 2025-07-31 ASSESSMENT — PAIN DESCRIPTION - PAIN TYPE: TYPE: CHRONIC PAIN

## 2025-07-31 ASSESSMENT — PAIN DESCRIPTION - DESCRIPTORS
DESCRIPTORS: ACHING;CRAMPING;DISCOMFORT
DESCRIPTORS: ACHING;DISCOMFORT
DESCRIPTORS: ACHING;CRAMPING;DISCOMFORT
DESCRIPTORS: ACHING;DISCOMFORT;CRAMPING

## 2025-07-31 NOTE — DIALYSIS
[facility] [place] [facility]  [place]ALYSIS TREATMENT SUMMARY      Note: Consult with the attending physician for patient treatment orders, this document is not a physician order.      Patient Information   Patient Hema Gallagher   Date of Birth November 15, 1970   Chart Number [id number]   Location [facility] MRN [id number]   Gender Male   SSN (last 4) 1921     Treatment Information   Treatment Type Hemodialysis   Treatment Id [id number]   Start Time July 31, 2025 08:26   End Time July 31, 2025 11:24   Acutal Duration 02:58     Treatment Balances   Total Saline Administered 500   Net Fluid Balance -2429    Hemodialysis Orders   Therapy Standard   Orders Verified Time 07/31/2025 08:00    Date Verified 07/31/2025   Duration 3:30   Isolated UF/Bypass No   BFR (mL) 450   DFR (mL) 700   Dialyzer Type OPTIFLUX 160NR   UF Order UF Range   UF Range (mL) 3000 - 4000   With BP Parameters Yes   Crit-Line used No   Heparin Initial Units Bolus No   Heparin IV Maintenance Bolus No   Heparin IV Infusion No   Potassium (mEq/L) 2   Calcium (mEq/L) 2.5   Bicarb (mg/dL) 40   Sodium (mEq/L) 137   Additional Orders to keep sbp greater than 90   Clinician Toma Moise RN    AV Access   Access Type AV Access   AV Access   Access Location Upper Arm - R   Needle 15g   Bruit Yes   Thrill Yes      Vitals   Pre-Treatment Vitals   Time Is BP being recorded? Pre BP Map BP Method Pulse RR Temp How was Weight Obtained? Pre Weight Previous Dry Weight Previous Post Weight Metric Target Fluid Removal (mL) Dialysate Confirmed Clinician    07/31/2025 08:19 BP/Map 132/61 (85) Noninvasive 64 20 98 How Obtained: Reported Floor Weight 118.8   Kgs 3500 Yes Toma Moise RN    Comments: pt is a & o x 3. hemodynamically stable.      Post Treatment Vitals   Time Is BP being recorded? BP Map Pulse RR Temp How was Weight Obtained? Post Weight Metric BVP UF Goal Ordered NSS Given Intra-Procedure Total Machine UF Removed (mL) Crit-Line  Treatment Focused Assessment Completed by Toma Moise RN   Respiratory Skin Date 07/31/2025   Lung Sounds Wheezing Skin Warm Time 11:40   Location Bilateral  Dry Signing    Bases LOC Signed By Toma Moise RN

## 2025-07-31 NOTE — CONSENT
Informed Consent for Blood Component Transfusion Note    I have discussed with the patient the rationale for blood component transfusion; its benefits in treating or preventing fatigue, organ damage, or death; and its risk which includes mild transfusion reactions, rare risk of blood borne infection, or more serious but rare reactions. I have discussed the alternatives to transfusion, including the risk and consequences of not receiving transfusion. The patient had an opportunity to ask questions and had agreed to proceed with transfusion of blood components.    Electronically signed by SOSA Foote on 7/31/25 at 9:22 AM BOOGIE   Sub-Acute rehab
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
## 2025-07-31 NOTE — PROGRESS NOTES
[facility]      Patient:  Hema Gallagher  YOB: 1970  Date of Service: 7/31/2025  MRN: [id number]   Acct: [id number]   Primary Care Physician: lAetha Del Cid APRN - CNP  Advance Directive: Full Code  Admit Date: 7/30/2025       Hospital Day: 1  Portions of this note have been copied forward, however, changed to reflect the most current clinical status of this patient.    CHIEF COMPLAINT Back pain    SUBJECTIVE:  He is seen in dialysis. He is drowsy but awakens easily. He wishes to have blood. He continues to have ongoing right sciatic pain. VSS. Currently on 2LNC.     CUMULATIVE HOSPITAL STAY:  The patient is a 53 yo male with a PMH of ESRD on home HD 4x's weekly, anemia due to CKD, HTN, and CHF who presented to [facility] ED on 7/30/2025 with c/o back pain. He reported that he had right sided back pain that was similar to sciatic nerve pain he had experienced in the past after he was drowsy and fell out of his home recliner several times. He did strike his face during one of these falls. He additionally reported that his home dialysis machine has been showing a code that indicated that it was not working correctly when he performed his home dialysis ~3 days prior to his admission. He denied any melena, rectal bleeding, or hematochezia. He has not had abdominal pain. Further ED work-up revealed-Chest x-ray-no acute findings in the chest. BNP35,610, LA-1.1. CT of the head No acute intracranial abnormality. CT lumbar spine-no acute fracture of lumbar spine. NA-144, K-4.7, BUN-104, creatinine-16.7. WBC-6.7, H&H-6.6/19.8 and plt-130. The patient was admitted to hospital medicine for further treatment and work-up.    AM hgb-6.7. Discussed the need for a blood transfusion and he is agreeable. Recrit 10,000 units MWF ordered. Anemia is felt to be secondary to CKD, not acute anemia. He additionally tells me that he had residual blood in his home HD machine, which could be an exacerbating factor.

## 2025-07-31 NOTE — PLAN OF CARE
SUBJECTIVE:    Acute on Chronic Anemia as per labs, is 6.6 tonight typically is at ~8  Needs HD as per ED doc, K 4.7 and , patient c/o dyspnea but satting 92-93% on RA with RR 18bpm      OBJECTIVE:    BP (!) 162/51   Pulse 78   Temp 97.9 °F (36.6 °C)   Resp 18   SpO2 92%       ASSESSMENTS & PLANS:    Acute on Chronic Anemia: in setting of ESRD on HD, GIB progression of ESRD suspected  Admit to [facility]   OCCULT BLOOD TEST OF STOOL  No NSAIDs - excepting baby ASA if a TOMER stent or vascular bypass in last year, or a BMS in last 3 months  CBC with Diff & BMP with Mag reflex daily from tomorrow - AM labs tomorrow will be early  Type & Screen -  added on  LDH - with early AM labs  Haptoglobin, Reticulocyte count, Ferritin, TIBC and Iron Saturation, & B12 and Folate levels -  added on to ED labs  Has BID Rx strength PPI which will be continued  No IVF as per ESRD on HD  Nephrology consultation order placed by ED doc already  Strict Is and Os  Daily weights  CLEAR LIQUID - Renal diet with a liter daily fluid restriction    Chronic Medical Problems:  Continue home regimen as Indicated  Any puffers will be subbed to nebulizers as able    Supportive and Prophylactic TXx:  DVT PPx: SCDs only for now, would anticipate change to Heparin SQ after GIB ruled out  GI (PUD) PPx: covered as per above  PT: not indicated      Case flagged by ED team for admission  Case d/w ED team  Chart reviewed   Orders entered by me with CPOE  Case d/w NP swing shift hospitalist   Yes

## 2025-07-31 NOTE — ED NOTES
Returned from dialysis per stretcher with increased shortness of breath , with exertion breathing pattern becomes even more labored. Received report from the dialysis nurse and was informed that the patient wanted to get off dialysis early. However they were able to pull off 2 1/2 liters of fluid.He has increased work of breathing ,skin turgor is edematous . Lung fields anteriorly diminished throughot
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
## 2025-07-31 NOTE — ED NOTES
Sitting on the side of the bed states that he has right leg pain and lower back pain to which he rates 8 out of 10. Patient states that he had last dialysis treatment on Monday. When talking he sounds wet throat sounds

## 2025-07-31 NOTE — CARE COORDINATION
Case Management Assessment  Initial Evaluation    Date/Time of Evaluation: 7/31/2025 8:48 AM  Assessment Completed by: Cayla Jensen    If patient is discharged prior to next notation, then this note serves as note for discharge by case management.    Patient Name: Hema Gallagher                   YOB: 1970  Diagnosis: Acute on chronic anemia [D64.9]                   Date / Time: 7/30/2025  8:58 PM    Patient Admission Status: Inpatient   Readmission Risk (Low < 19, Mod (19-27), High > 27): Readmission Risk Score: 27.2    Current PCP: Aletha Del Cid APRN - CNP  PCP verified by CM? (P) Yes    Chart Reviewed: Yes      History Provided by: (P) Significant Other  Patient Orientation: (P) Other (see comment) (not in room)    Patient Cognition: (P) Other (see comment) (not in room)    Hospitalization in the last 30 days (Readmission):  No    If yes, Readmission Assessment in  Navigator will be completed.    Advance Directives:      Code Status: Full Code   Patient's Primary Decision Maker is: (P) Legal Next of Kin    Primary Decision Maker (Active): Marly Gallagher - Child - [phone]    Supplemental (Other) Decision Maker: Nolvia Santacruz - Brother/Sister - [phone]    Discharge Planning:    Patient lives with: (P) Alone Type of Home: (P) House  Primary Care Giver: (P) Self  Patient Support Systems include: (P) Spouse/Significant Other, Children, Family Members   Current Financial resources: (P) Medicare  Current community resources: (P) None  Current services prior to admission: (P) None            Current DME:              Type of Home Care services:  (P) None    ADLS  Prior functional level: (P) Independent in ADLs/IADLs  Current functional level: (P) Independent in ADLs/IADLs    PT AM-PAC:   /24  OT AM-PAC:   /24    Family can provide assistance at DC: (P) Yes  Would you like Case Management to discuss the discharge plan with any other family members/significant others, and if so, who? (P)
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
## 2025-08-01 LAB
ANION GAP SERPL CALCULATED.3IONS-SCNC: 15 MMOL/L (ref 8–16)
BASOPHILS # BLD: 0 K/UL (ref 0–0.2)
BASOPHILS NFR BLD: 0.4 % (ref 0–1)
BUN SERPL-MCNC: 60 MG/DL (ref 6–20)
CALCIUM SERPL-MCNC: 9.4 MG/DL (ref 8.6–10)
CHLORIDE SERPL-SCNC: 100 MMOL/L (ref 98–107)
CO2 SERPL-SCNC: 25 MMOL/L (ref 22–29)
CREAT SERPL-MCNC: 12 MG/DL (ref 0.7–1.2)
EOSINOPHIL # BLD: 0.2 K/UL (ref 0–0.6)
EOSINOPHIL NFR BLD: 3.9 % (ref 0–5)
ERYTHROCYTE [DISTWIDTH] IN BLOOD BY AUTOMATED COUNT: 18.7 % (ref 11.5–14.5)
GLUCOSE BLD-MCNC: 105 MG/DL (ref 70–99)
GLUCOSE BLD-MCNC: 158 MG/DL (ref 70–99)
GLUCOSE BLD-MCNC: 158 MG/DL (ref 70–99)
GLUCOSE BLD-MCNC: 92 MG/DL (ref 70–99)
GLUCOSE SERPL-MCNC: 113 MG/DL (ref 70–99)
HCT VFR BLD AUTO: 22.5 % (ref 42–52)
HGB BLD-MCNC: 7.2 G/DL (ref 14–18)
IMM GRANULOCYTES # BLD: 0 K/UL
LYMPHOCYTES # BLD: 1.1 K/UL (ref 1.1–4.5)
LYMPHOCYTES NFR BLD: 22 % (ref 20–40)
MCH RBC QN AUTO: 29 PG (ref 27–31)
MCHC RBC AUTO-ENTMCNC: 32 G/DL (ref 33–37)
MCV RBC AUTO: 90.7 FL (ref 80–94)
MONOCYTES # BLD: 0.7 K/UL (ref 0–0.9)
MONOCYTES NFR BLD: 14.1 % (ref 0–10)
NEUTROPHILS # BLD: 2.9 K/UL (ref 1.5–7.5)
NEUTS SEG NFR BLD: 59.4 % (ref 50–65)
PERFORMED ON: ABNORMAL
PERFORMED ON: NORMAL
PLATELET # BLD AUTO: 129 K/UL (ref 130–400)
PMV BLD AUTO: 10.6 FL (ref 9.4–12.4)
POTASSIUM SERPL-SCNC: 4.8 MMOL/L (ref 3.5–5)
RBC # BLD AUTO: 2.48 M/UL (ref 4.7–6.1)
SODIUM SERPL-SCNC: 140 MMOL/L (ref 136–145)
WBC # BLD AUTO: 4.9 K/UL (ref 4.8–10.8)

## 2025-08-01 PROCEDURE — 6360000002 HC RX W HCPCS: Performed by: NURSE PRACTITIONER

## 2025-08-01 PROCEDURE — 36415 COLL VENOUS BLD VENIPUNCTURE: CPT

## 2025-08-01 PROCEDURE — 6370000000 HC RX 637 (ALT 250 FOR IP): Performed by: HOSPITALIST

## 2025-08-01 PROCEDURE — 8010000000 HC HEMODIALYSIS ACUTE INPT

## 2025-08-01 PROCEDURE — 85025 COMPLETE CBC W/AUTO DIFF WBC: CPT

## 2025-08-01 PROCEDURE — 80048 BASIC METABOLIC PNL TOTAL CA: CPT

## 2025-08-01 PROCEDURE — 6360000002 HC RX W HCPCS: Performed by: INTERNAL MEDICINE

## 2025-08-01 PROCEDURE — 82962 GLUCOSE BLOOD TEST: CPT

## 2025-08-01 PROCEDURE — 1200000000 HC SEMI PRIVATE

## 2025-08-01 PROCEDURE — 6370000000 HC RX 637 (ALT 250 FOR IP): Performed by: NURSE PRACTITIONER

## 2025-08-01 PROCEDURE — 2500000003 HC RX 250 WO HCPCS: Performed by: HOSPITALIST

## 2025-08-01 PROCEDURE — 94760 N-INVAS EAR/PLS OXIMETRY 1: CPT

## 2025-08-01 PROCEDURE — 2700000000 HC OXYGEN THERAPY PER DAY

## 2025-08-01 RX ORDER — DIPHENHYDRAMINE HYDROCHLORIDE 50 MG/ML
25 INJECTION, SOLUTION INTRAMUSCULAR; INTRAVENOUS
Status: COMPLETED | OUTPATIENT
Start: 2025-08-01 | End: 2025-08-01

## 2025-08-01 RX ADMIN — ACETAMINOPHEN 650 MG: 325 TABLET ORAL at 14:22

## 2025-08-01 RX ADMIN — SODIUM CHLORIDE, PRESERVATIVE FREE 10 ML: 5 INJECTION INTRAVENOUS at 14:33

## 2025-08-01 RX ADMIN — EPOETIN ALFA-EPBX 10000 UNITS: 10000 INJECTION, SOLUTION INTRAVENOUS; SUBCUTANEOUS at 21:20

## 2025-08-01 RX ADMIN — METOPROLOL TARTRATE 25 MG: 25 TABLET, FILM COATED ORAL at 20:37

## 2025-08-01 RX ADMIN — VITAM B12 100 MCG: 100 TAB at 14:20

## 2025-08-01 RX ADMIN — HYDROCODONE BITARTRATE AND ACETAMINOPHEN 1 TABLET: 10; 325 TABLET ORAL at 12:10

## 2025-08-01 RX ADMIN — FERROUS SULFATE TAB 325 MG (65 MG ELEMENTAL FE) 325 MG: 325 (65 FE) TAB at 14:22

## 2025-08-01 RX ADMIN — PANTOPRAZOLE SODIUM 40 MG: 40 TABLET, DELAYED RELEASE ORAL at 14:23

## 2025-08-01 RX ADMIN — ROSUVASTATIN 5 MG: 10 TABLET, FILM COATED ORAL at 20:35

## 2025-08-01 RX ADMIN — ANTACID TABLETS 1250 MG: 500 TABLET, CHEWABLE ORAL at 14:21

## 2025-08-01 RX ADMIN — NIFEDIPINE 60 MG: 30 TABLET, EXTENDED RELEASE ORAL at 14:26

## 2025-08-01 RX ADMIN — SEVELAMER CARBONATE 800 MG: 800 TABLET, FILM COATED ORAL at 14:32

## 2025-08-01 RX ADMIN — METOPROLOL TARTRATE 25 MG: 25 TABLET, FILM COATED ORAL at 14:26

## 2025-08-01 RX ADMIN — HYDRALAZINE HYDROCHLORIDE 25 MG: 25 TABLET ORAL at 20:37

## 2025-08-01 RX ADMIN — HYDROCODONE BITARTRATE AND ACETAMINOPHEN 1 TABLET: 10; 325 TABLET ORAL at 20:35

## 2025-08-01 RX ADMIN — FERROUS SULFATE TAB 325 MG (65 MG ELEMENTAL FE) 325 MG: 325 (65 FE) TAB at 18:14

## 2025-08-01 RX ADMIN — DIPHENHYDRAMINE HYDROCHLORIDE 25 MG: 50 INJECTION INTRAMUSCULAR; INTRAVENOUS at 12:20

## 2025-08-01 RX ADMIN — CALCITRIOL CAPSULES 0.25 MCG 0.5 MCG: 0.25 CAPSULE ORAL at 20:35

## 2025-08-01 RX ADMIN — NIFEDIPINE 60 MG: 30 TABLET, EXTENDED RELEASE ORAL at 20:35

## 2025-08-01 RX ADMIN — HYDRALAZINE HYDROCHLORIDE 25 MG: 25 TABLET ORAL at 06:23

## 2025-08-01 RX ADMIN — PANTOPRAZOLE SODIUM 40 MG: 40 TABLET, DELAYED RELEASE ORAL at 06:23

## 2025-08-01 RX ADMIN — CALCITRIOL CAPSULES 0.25 MCG 0.5 MCG: 0.25 CAPSULE ORAL at 14:22

## 2025-08-01 RX ADMIN — Medication 5 MG: at 20:37

## 2025-08-01 RX ADMIN — SEVELAMER CARBONATE 800 MG: 800 TABLET, FILM COATED ORAL at 18:14

## 2025-08-01 RX ADMIN — HYDROCODONE BITARTRATE AND ACETAMINOPHEN 1 TABLET: 10; 325 TABLET ORAL at 02:17

## 2025-08-01 RX ADMIN — CINACALCET HYDROCHLORIDE 30 MG: 30 TABLET, FILM COATED ORAL at 14:21

## 2025-08-01 SDOH — ECONOMIC STABILITY: FOOD INSECURITY: WITHIN THE PAST 12 MONTHS, YOU WORRIED THAT YOUR FOOD WOULD RUN OUT BEFORE YOU GOT MONEY TO BUY MORE.: NEVER TRUE

## 2025-08-01 SDOH — ECONOMIC STABILITY: INCOME INSECURITY: HOW HARD IS IT FOR YOU TO PAY FOR THE VERY BASICS LIKE FOOD, HOUSING, MEDICAL CARE, AND HEATING?: NOT HARD AT ALL

## 2025-08-01 SDOH — ECONOMIC STABILITY: INCOME INSECURITY: IN THE PAST 12 MONTHS, HAS THE ELECTRIC, GAS, OIL, OR WATER COMPANY THREATENED TO SHUT OFF SERVICE IN YOUR HOME?: NO

## 2025-08-01 ASSESSMENT — PAIN DESCRIPTION - ORIENTATION
ORIENTATION: LOWER;MID
ORIENTATION: RIGHT;LEFT

## 2025-08-01 ASSESSMENT — PAIN DESCRIPTION - LOCATION
LOCATION: BACK
LOCATION: BACK
LOCATION: LEG

## 2025-08-01 ASSESSMENT — PAIN SCALES - GENERAL
PAINLEVEL_OUTOF10: 3
PAINLEVEL_OUTOF10: 0
PAINLEVEL_OUTOF10: 0
PAINLEVEL_OUTOF10: 9
PAINLEVEL_OUTOF10: 6
PAINLEVEL_OUTOF10: 9
PAINLEVEL_OUTOF10: 7
PAINLEVEL_OUTOF10: 7
PAINLEVEL_OUTOF10: 0

## 2025-08-01 ASSESSMENT — PATIENT HEALTH QUESTIONNAIRE - PHQ9
SUM OF ALL RESPONSES TO PHQ QUESTIONS 1-9: 0
SUM OF ALL RESPONSES TO PHQ QUESTIONS 1-9: 0
2. FEELING DOWN, DEPRESSED OR HOPELESS: NOT AT ALL
SUM OF ALL RESPONSES TO PHQ QUESTIONS 1-9: 0
1. LITTLE INTEREST OR PLEASURE IN DOING THINGS: NOT AT ALL
SUM OF ALL RESPONSES TO PHQ QUESTIONS 1-9: 0

## 2025-08-01 ASSESSMENT — PAIN DESCRIPTION - DESCRIPTORS
DESCRIPTORS: ACHING;DISCOMFORT
DESCRIPTORS: ACHING;DISCOMFORT;NAGGING

## 2025-08-01 ASSESSMENT — PAIN SCALES - WONG BAKER: WONGBAKER_NUMERICALRESPONSE: NO HURT

## 2025-08-01 ASSESSMENT — ENCOUNTER SYMPTOMS: BACK PAIN: 1

## 2025-08-01 NOTE — PROGRESS NOTES
Pt now returned from dialysis to room by bed. Pt refused to be placed to monitors, stating he wanted to walk around. I will try to get 1 set of VS. Pt is very alert and oriented. Electronically signed by Reema Duncan RN on 8/1/2025 at 1:38 PM

## 2025-08-01 NOTE — PROGRESS NOTES
[facility]      Patient:  Hema Gallagher  YOB: 1970  Date of Service: 8/1/2025  MRN: [id number]   Acct: [id number]   Primary Care Physician: Aletha Del Cid APRN - CNP  Advance Directive: Full Code  Admit Date: 7/30/2025       Hospital Day: 2  Portions of this note have been copied forward, however, changed to reflect the most current clinical status of this patient.    CHIEF COMPLAINT Back pain    SUBJECTIVE:  He is seen in dialysis. Metal status has improved. Back pain has improved, but continues. VSS. Currently on 2LNC.     CUMULATIVE HOSPITAL STAY:  The patient is a 55 yo male with a PMH of ESRD on home HD 4x's weekly, anemia due to CKD, HTN, and CHF who presented to [facility] ED on 7/30/2025 with c/o back pain. He reported that he had right sided back pain that was similar to sciatic nerve pain he had experienced in the past after he was drowsy and fell out of his home recliner several times. He did strike his face during one of these falls. He additionally reported that his home dialysis machine has been showing a code that indicated that it was not working correctly when he performed his home dialysis ~3 days prior to his admission. He denied any melena, rectal bleeding, or hematochezia. He has not had abdominal pain. Further ED work-up revealed-Chest x-ray-no acute findings in the chest. BNP35,610, LA-1.1. CT of the head No acute intracranial abnormality. CT lumbar spine-no acute fracture of lumbar spine. NA-144, K-4.7, BUN-104, creatinine-16.7. WBC-6.7, H&H-6.6/19.8 and plt-130. The patient was admitted to hospital medicine for further treatment and work-up.    AM hgb-7.2. He has received 1 unit of PRBC's since admission. Recrit 10,000 units MWF ordered. Anemia is felt to be secondary to CKD, not acute blood loss anemia. He additionally tells me that he had residual blood in his home HD machine, which could be an exacerbating factor. He reports his daughter is working toward getting him a

## 2025-08-01 NOTE — PROGRESS NOTES
Pt ate [employer] for supper meal. Only ate his Italian Ice off his tray. Pt stated he wasn't eating that \"stuff\" on his tray (clear liquids). He is a grown man and I can't fix this. I explained once this AM. Electronically signed by Reema Duncan RN on 8/1/2025 at 6:52 PM

## 2025-08-01 NOTE — PLAN OF CARE
Problem: Chronic Conditions and Co-morbidities  Goal: Patient's chronic conditions and co-morbidity symptoms are monitored and maintained or improved  Outcome: Progressing  Flowsheets  Taken 8/1/2025 0156 by Jocelyne Jerry RN  Care Plan - Patient's Chronic Conditions and Co-Morbidity Symptoms are Monitored and Maintained or Improved:   Monitor and assess patient's chronic conditions and comorbid symptoms for stability, deterioration, or improvement   Collaborate with multidisciplinary team to address chronic and comorbid conditions and prevent exacerbation or deterioration   Update acute care plan with appropriate goals if chronic or comorbid symptoms are exacerbated and prevent overall improvement and discharge  Taken 7/31/2025 1800 by Elsa Napoles RN  Care Plan - Patient's Chronic Conditions and Co-Morbidity Symptoms are Monitored and Maintained or Improved: Monitor and assess patient's chronic conditions and comorbid symptoms for stability, deterioration, or improvement     Problem: Discharge Planning  Goal: Discharge to home or other facility with appropriate resources  Outcome: Progressing  Flowsheets  Taken 8/1/2025 0156 by Jocelyne Jerry RN  Discharge to home or other facility with appropriate resources:   Identify barriers to discharge with patient and caregiver   Arrange for needed discharge resources and transportation as appropriate   Identify discharge learning needs (meds, wound care, etc)   Refer to discharge planning if patient needs post-hospital services based on physician order or complex needs related to functional status, cognitive ability or social support system  Taken 7/31/2025 1800 by Elsa Napoles RN  Discharge to home or other facility with appropriate resources: Identify barriers to discharge with patient and caregiver     Problem: Safety - Adult  Goal: Free from fall injury  Outcome: Progressing

## 2025-08-01 NOTE — PROGRESS NOTES
Hema Gallagher arrived to room # 221.   Presented with: acute on chronic anemia  Mental Status: Patient is oriented, alert, coherent, and able to concentrate and follow conversation.   Vitals:    07/31/25 1829   BP:    Pulse: 68   Resp: 28   Temp: 97.2 °F (36.2 °C)   SpO2: 90%     Patient safety contract and falls prevention contract reviewed with patient Yes.  Oriented Patient to room.  Call light within reach. Yes.  Needs, issues or concerns expressed at this time: no.      Electronically signed by Elsa Napoles RN on 7/31/2025 at 7:06 PM

## 2025-08-01 NOTE — PROGRESS NOTES
Pt now to dialysis on bed with transport. Pt is alert and oriented. No complaints. HR sinus 64 /74 RR 21. Sats 96% on NC 2L. Lungs CTA diminished in lower lobes. Pulses palpable. Electronically signed by Reema Duncan RN on 8/1/2025 at 9:04 AM

## 2025-08-01 NOTE — PROGRESS NOTES
Pretty sure pt's girlfriend brought in pt lunch. [employer] package on table. Pt stated he had not had any N/V, that when he came in he was coughing up some phlegm. Pt c/o back pain on return to room after dialysis and Tylenol 650mg PO given. Will monitor or pain relief. Pt had no other complaint. Electronically signed by Reema Duncan RN on 8/1/2025 at 3:18 PM
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
## 2025-08-02 VITALS
OXYGEN SATURATION: 91 % | HEART RATE: 65 BPM | SYSTOLIC BLOOD PRESSURE: 155 MMHG | TEMPERATURE: 97.5 F | RESPIRATION RATE: 16 BRPM | DIASTOLIC BLOOD PRESSURE: 48 MMHG | BODY MASS INDEX: 41.04 KG/M2 | HEIGHT: 67 IN

## 2025-08-02 LAB
ANION GAP SERPL CALCULATED.3IONS-SCNC: 13 MMOL/L (ref 8–16)
BASOPHILS # BLD: 0 K/UL (ref 0–0.2)
BASOPHILS NFR BLD: 0.4 % (ref 0–1)
BUN SERPL-MCNC: 35 MG/DL (ref 6–20)
CALCIUM SERPL-MCNC: 9.5 MG/DL (ref 8.6–10)
CHLORIDE SERPL-SCNC: 101 MMOL/L (ref 98–107)
CO2 SERPL-SCNC: 27 MMOL/L (ref 22–29)
CREAT SERPL-MCNC: 9 MG/DL (ref 0.7–1.2)
EOSINOPHIL # BLD: 0.2 K/UL (ref 0–0.6)
EOSINOPHIL NFR BLD: 3.4 % (ref 0–5)
ERYTHROCYTE [DISTWIDTH] IN BLOOD BY AUTOMATED COUNT: 18.9 % (ref 11.5–14.5)
GLUCOSE BLD-MCNC: 151 MG/DL (ref 70–99)
GLUCOSE SERPL-MCNC: 142 MG/DL (ref 70–99)
HCT VFR BLD AUTO: 22.8 % (ref 42–52)
HGB BLD-MCNC: 7.3 G/DL (ref 14–18)
IMM GRANULOCYTES # BLD: 0 K/UL
LYMPHOCYTES # BLD: 1 K/UL (ref 1.1–4.5)
LYMPHOCYTES NFR BLD: 19.4 % (ref 20–40)
MCH RBC QN AUTO: 29.4 PG (ref 27–31)
MCHC RBC AUTO-ENTMCNC: 32 G/DL (ref 33–37)
MCV RBC AUTO: 91.9 FL (ref 80–94)
MONOCYTES # BLD: 0.8 K/UL (ref 0–0.9)
MONOCYTES NFR BLD: 15.4 % (ref 0–10)
NEUTROPHILS # BLD: 3.2 K/UL (ref 1.5–7.5)
NEUTS SEG NFR BLD: 60.8 % (ref 50–65)
PLATELET # BLD AUTO: 129 K/UL (ref 130–400)
PMV BLD AUTO: 10.3 FL (ref 9.4–12.4)
POTASSIUM SERPL-SCNC: 4.4 MMOL/L (ref 3.5–5)
RBC # BLD AUTO: 2.48 M/UL (ref 4.7–6.1)
SODIUM SERPL-SCNC: 141 MMOL/L (ref 136–145)
WBC # BLD AUTO: 5.3 K/UL (ref 4.8–10.8)

## 2025-08-02 PROCEDURE — 80048 BASIC METABOLIC PNL TOTAL CA: CPT

## 2025-08-02 PROCEDURE — 36415 COLL VENOUS BLD VENIPUNCTURE: CPT

## 2025-08-02 PROCEDURE — 6370000000 HC RX 637 (ALT 250 FOR IP): Performed by: NURSE PRACTITIONER

## 2025-08-02 PROCEDURE — 85025 COMPLETE CBC W/AUTO DIFF WBC: CPT

## 2025-08-02 PROCEDURE — 8010000000 HC HEMODIALYSIS ACUTE INPT

## 2025-08-02 PROCEDURE — 6360000002 HC RX W HCPCS: Performed by: INTERNAL MEDICINE

## 2025-08-02 PROCEDURE — 82962 GLUCOSE BLOOD TEST: CPT

## 2025-08-02 PROCEDURE — 6370000000 HC RX 637 (ALT 250 FOR IP): Performed by: HOSPITALIST

## 2025-08-02 RX ORDER — DIPHENHYDRAMINE HYDROCHLORIDE 50 MG/ML
25 INJECTION, SOLUTION INTRAMUSCULAR; INTRAVENOUS
Status: COMPLETED | OUTPATIENT
Start: 2025-08-02 | End: 2025-08-02

## 2025-08-02 RX ORDER — CYCLOBENZAPRINE HCL 10 MG
10 TABLET ORAL ONCE
Status: COMPLETED | OUTPATIENT
Start: 2025-08-02 | End: 2025-08-02

## 2025-08-02 RX ORDER — HYDROCODONE BITARTRATE AND ACETAMINOPHEN 10; 325 MG/1; MG/1
1 TABLET ORAL EVERY 8 HOURS PRN
Qty: 9 TABLET | Refills: 0 | Status: SHIPPED | OUTPATIENT
Start: 2025-08-02 | End: 2025-08-05

## 2025-08-02 RX ADMIN — DIPHENHYDRAMINE HYDROCHLORIDE 25 MG: 50 INJECTION INTRAMUSCULAR; INTRAVENOUS at 10:15

## 2025-08-02 RX ADMIN — METOPROLOL TARTRATE 25 MG: 25 TABLET, FILM COATED ORAL at 13:02

## 2025-08-02 RX ADMIN — CINACALCET HYDROCHLORIDE 30 MG: 30 TABLET, FILM COATED ORAL at 13:03

## 2025-08-02 RX ADMIN — HYDROCODONE BITARTRATE AND ACETAMINOPHEN 1 TABLET: 10; 325 TABLET ORAL at 10:10

## 2025-08-02 RX ADMIN — CALCITRIOL CAPSULES 0.25 MCG 0.5 MCG: 0.25 CAPSULE ORAL at 13:03

## 2025-08-02 RX ADMIN — CYCLOBENZAPRINE 10 MG: 10 TABLET, FILM COATED ORAL at 02:06

## 2025-08-02 RX ADMIN — FERROUS SULFATE TAB 325 MG (65 MG ELEMENTAL FE) 325 MG: 325 (65 FE) TAB at 13:03

## 2025-08-02 RX ADMIN — SEVELAMER CARBONATE 800 MG: 800 TABLET, FILM COATED ORAL at 13:03

## 2025-08-02 RX ADMIN — HYDRALAZINE HYDROCHLORIDE 25 MG: 25 TABLET ORAL at 13:03

## 2025-08-02 RX ADMIN — PANTOPRAZOLE SODIUM 40 MG: 40 TABLET, DELAYED RELEASE ORAL at 06:14

## 2025-08-02 RX ADMIN — VITAM B12 100 MCG: 100 TAB at 13:03

## 2025-08-02 RX ADMIN — ANTACID TABLETS 1250 MG: 500 TABLET, CHEWABLE ORAL at 13:02

## 2025-08-02 RX ADMIN — HYDRALAZINE HYDROCHLORIDE 25 MG: 25 TABLET ORAL at 06:14

## 2025-08-02 RX ADMIN — NIFEDIPINE 60 MG: 30 TABLET, EXTENDED RELEASE ORAL at 13:03

## 2025-08-02 ASSESSMENT — PAIN DESCRIPTION - ORIENTATION: ORIENTATION: LEFT;RIGHT

## 2025-08-02 ASSESSMENT — PAIN SCALES - GENERAL
PAINLEVEL_OUTOF10: 0
PAINLEVEL_OUTOF10: 5
PAINLEVEL_OUTOF10: 9

## 2025-08-02 ASSESSMENT — PAIN - FUNCTIONAL ASSESSMENT: PAIN_FUNCTIONAL_ASSESSMENT: ACTIVITIES ARE NOT PREVENTED

## 2025-08-02 ASSESSMENT — PAIN DESCRIPTION - LOCATION: LOCATION: LEG

## 2025-08-02 ASSESSMENT — PAIN DESCRIPTION - DESCRIPTORS: DESCRIPTORS: ACHING

## 2025-08-02 NOTE — PLAN OF CARE
Problem: Chronic Conditions and Co-morbidities  Goal: Patient's chronic conditions and co-morbidity symptoms are monitored and maintained or improved  8/2/2025 0936 by Maria G Cho RN  Outcome: Progressing  8/2/2025 0041 by Yulisa Salas RN  Outcome: Progressing  Flowsheets (Taken 8/1/2025 2000)  Care Plan - Patient's Chronic Conditions and Co-Morbidity Symptoms are Monitored and Maintained or Improved: Monitor and assess patient's chronic conditions and comorbid symptoms for stability, deterioration, or improvement     Problem: Discharge Planning  Goal: Discharge to home or other facility with appropriate resources  8/2/2025 0936 by Maria G Cho RN  Outcome: Progressing  8/2/2025 0041 by Yulisa Salas RN  Outcome: Progressing  Flowsheets (Taken 8/1/2025 2000)  Discharge to home or other facility with appropriate resources: Identify barriers to discharge with patient and caregiver     Problem: Safety - Adult  Goal: Free from fall injury  8/2/2025 0936 by Maria G Cho RN  Outcome: Progressing  8/2/2025 0041 by Yulisa Salas RN  Outcome: Progressing     Problem: Pain  Goal: Verbalizes/displays adequate comfort level or baseline comfort level  8/2/2025 0936 by Maria G Cho RN  Outcome: Progressing  8/2/2025 0041 by Yulisa Salas RN  Outcome: Progressing  Flowsheets (Taken 8/1/2025 2000)  Verbalizes/displays adequate comfort level or baseline comfort level: Encourage patient to monitor pain and request assistance
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
## 2025-08-02 NOTE — CARE COORDINATION
08/02/25 1304   IMM Letter   IMM Letter given to Patient/Family/Significant other/Guardian/POA/by: given to patient by goldie mclain   IMM Letter date given: 08/02/25   IMM Letter time given: 1235     Second IMM given to patient and explained with patient verbalizing understanding.  All questions and concerns addressed     Signed letter placed in pt soft chart  Patient declined waiting 4 hr period prior to discharge.  Electronically signed by Goldie Jensen on 8/2/2025 at 1:05 PM

## 2025-08-02 NOTE — DISCHARGE SUMMARY
Hema Gallagher  :  1970  MRN:  [id number]    Admit date:  2025  Discharge date:  2025    Discharging Physician:  Dr. Maxine Paredes    Advance Directive: Full Code    Consults: IP CONSULT TO NEPHROLOGY  PALLIATIVE CARE EVAL     Primary Care Physician:  Aletha Del Cid, APRN - CNP    Discharge Diagnoses:  Principal Problem:    Acute on chronic anemia  Active Problems:    Essential hypertension    ESRD needing dialysis (HCC)    Volume overload    Low back pain    GERD (gastroesophageal reflux disease)  Resolved Problems:    * No resolved hospital problems. *      Portions of this note have been copied forward, however, changed to reflect the most current clinical status of this patient.    Hospital Course:   he patient is a 55 yo male with a PMH of ESRD on home HD 4x's weekly, anemia due to CKD, HTN, and CHF who presented to [facility] ED on 2025 with c/o back pain. He reported that he had right sided back pain that was similar to sciatic nerve pain he had experienced in the past after he was drowsy and fell out of his home recliner several times. He did strike his face during one of these falls. He additionally reported that his home dialysis machine has been showing a code that indicated that it was not working correctly when he performed his home dialysis ~3 days prior to his admission. He denied any melena, rectal bleeding, or hematochezia. He has not had abdominal pain. Further ED work-up revealed-Chest x-ray-no acute findings in the chest. BNP35,610, LA-1.1. CT of the head No acute intracranial abnormality. CT lumbar spine-no acute fracture of lumbar spine. NA-144, K-4.7, BUN-104, creatinine-16.7. WBC-6.7, H&H-6.6/19.8 and plt-130. The patient was admitted to hospital medicine for further treatment and work-up.     AM hgb-7.3. He has received 1 unit of PRBC's since admission. Recrit 10,000 units MWF ordered, which will be continued at discharge. Anemia is felt to be secondary to CKD, not acute

## 2025-08-02 NOTE — PLAN OF CARE
Problem: Chronic Conditions and Co-morbidities  Goal: Patient's chronic conditions and co-morbidity symptoms are monitored and maintained or improved  Outcome: Progressing  Flowsheets (Taken 8/1/2025 2000)  Care Plan - Patient's Chronic Conditions and Co-Morbidity Symptoms are Monitored and Maintained or Improved: Monitor and assess patient's chronic conditions and comorbid symptoms for stability, deterioration, or improvement     Problem: Discharge Planning  Goal: Discharge to home or other facility with appropriate resources  Outcome: Progressing  Flowsheets (Taken 8/1/2025 2000)  Discharge to home or other facility with appropriate resources: Identify barriers to discharge with patient and caregiver     Problem: Safety - Adult  Goal: Free from fall injury  Outcome: Progressing     Problem: Pain  Goal: Verbalizes/displays adequate comfort level or baseline comfort level  Outcome: Progressing  Flowsheets (Taken 8/1/2025 2000)  Verbalizes/displays adequate comfort level or baseline comfort level: Encourage patient to monitor pain and request assistance

## 2025-08-02 NOTE — DIALYSIS
[facility] [place]ALYSIS [place]REATMENT SUMMARY      Note: Consult with the attending physician for patient treatment orders, this document is not a physician order.      Patient Information   Patient Hema Gallagher   Date of Birth November 15, 1970   Chart Number [id number]   Location [facility] MRN [id number]   Gender Male   SSN (last 4) 1921     Treatment Information   Treatment Type Hemodialysis   Treatment Id [id number]   Start Time August 02, 2025 08:47   End Time August 02, 2025 12:20   Acutal Duration 03:33     Treatment Balances   Total Saline Administered 500   Net Fluid Balance -3500    Hemodialysis Orders   Therapy Standard   Orders Verified Time 08/02/2025 08:00    Date Verified 08/02/2025   Duration 3:30   Isolated UF/Bypass No   BFR (mL) 450   DFR (mL) 700   Dialyzer Type OPTIFLUX 160NR   UF Order UF Goal Ordered   UF Goal Ordered (mL) 4000   With BP Parameters Yes   As Tolerated Yes   Crit-Line used No   Heparin Initial Units Bolus No   Heparin IV Maintenance Bolus No   Heparin IV Infusion No   Potassium (mEq/L) 2   Calcium (mEq/L) 2.5   Bicarb (mg/dL) 35   Sodium (mEq/L) 138   Clinician Emery Green RN    AV Access   Access Type AV Access   AV Access   Access Location Upper Arm - R   Needle 15g      Vitals   Pre-Treatment Vitals   Time Is BP being recorded? Pre BP Map BP Method Pulse RR Temp How was Weight Obtained? Pre Weight Previous Dry Weight Previous Post Weight Metric Target Fluid Removal (mL) Dialysate Confirmed Clinician    08/02/2025 08:38 BP/Map 124/85 (98) Noninvasive 63 18 97.8 How Obtained: Reported Floor Weight 116.1 114  Kgs 3500 Yes Emery Green RN    Comments: pt stable for dialysis      Post Treatment Vitals   Time Is BP being recorded? BP Map Pulse RR Temp How was Weight Obtained? Post Weight Metric BVP UF Goal Ordered NSS Given Intra-Procedure Total Machine UF Removed (mL) Crit-Line Ending Profile Crit-Line Refill Crit-Line Ending HCT

## 2025-08-02 NOTE — PROGRESS NOTES
Nephrology ([facility]) Consult Note      Patient:  Hema Gallagher  YOB: 1970  Date of Service: 8/2/2025  MRN: [id number]   Acct: [id number]   Primary Care Physician: Aletha Del Cid APRN - CNP  Advance Directive: Full Code  Admit Date: 7/30/2025       Hospital Day: 3  Referring Provider: Maxine Pat MD    Patient independently seen and examined, Chart, Consults, Notes, Operative notes, Labs, Cardiology, and Radiology studies reviewed as available.        Subjective:  Hema Gallagher is a 54 y.o. male for whom we were consulted for evaluation and treatment of end-stage renal disease.  He is a home hemodialysis patient but had recent issues with his machine.  His last treatment was Monday prior to admission.  Presented for evaluation is found to have hemoglobin of 6.6 which was below prior baseline.  He was admitted for the hospitalist service and we were asked to consult for dialysis needs.  He was initially seen on and tolerating dialysis without further complaint at that time.  He did later sign off early due to complaints of sciatica.  He denied any active bleeding or other complaints upon questioning.  Denied current chest pain, shortness of air at rest, nausea or vomiting.    Today, no overnight events.  No new complaints elucidated.  Tolerating dialysis without issue so far today.  Hoping for discharge later today.    Dialysis   Pt was seen on renal replacement therapy and I have personally seen and evaluated the patient and directed the therapy  Modality: Hemodialysis  Access: Arterial Venous Fistula  Location: right upper  QB: 450  QD: 700  UF: 1010      Allergies:  Banana, Atorvastatin, and Lisinopril    Medicines:  Current Facility-Administered Medications   Medication Dose Route Frequency Provider Last Rate Last Admin    sodium chloride flush 0.9 % injection 5-40 mL  5-40 mL IntraVENous 2 times per day Darrick Arango MD   10 mL at 08/01/25 1433    sodium  Occupational Health - Occupational Stress Questionnaire     Feeling of Stress : Not at all   Social Connections: Socially Integrated (8/1/2025)    Social Connections ([facility] HRSN)     If for any reason you need help with day-to-day activities such as bathing, preparing meals, shopping, managing finances, etc., do you get the help you need?: I get all the help I need   Intimate Partner Violence: Unknown (10/10/2023)    [facility], [facility]    Abuse Screen     Unsafe at Home or Work/School: Not on file     Feels Threatened by Someone?: Not on file     Does Anyone Keep You from Contacting Others or Doint Things Outside the Home?: Not on file     Physical Sign of Abuse Present: Not on file   Housing Stability: Not on file (8/1/2025)         Review of Systems:  History obtained from chart review and the patient  General ROS: No fever or chills  Respiratory ROS: No cough, shortness of breath, wheezing  Cardiovascular ROS: No chest pain or palpitations  Gastrointestinal ROS: No abdominal pain or melena  Genito-Urinary ROS: No dysuria or hematuria  Musculoskeletal ROS: No joint pain or swelling   14 point ROS reviewed with the patient and negative except as noted above and in the HPI unless unable to obtain.    Objective:  Patient Vitals for the past 24 hrs:   BP Temp Temp src Pulse Resp SpO2 Height   08/02/25 1237 (!) 173/77 -- -- 65 -- -- --   08/02/25 1220 (!) 131/57 -- -- 69 -- -- --   08/02/25 1200 (!) 140/62 -- -- 65 -- -- --   08/02/25 1130 (!) 141/61 -- -- 67 -- -- --   08/02/25 1100 (!) 142/53 -- -- 66 -- -- --   08/02/25 1030 (!) 140/66 -- -- 69 -- -- --   08/02/25 1010 -- -- -- -- 18 -- --   08/02/25 1000 (!) 150/70 -- -- 82 -- -- --   08/02/25 0930 (!) 149/62 -- -- 68 -- -- --   08/02/25 0900 (!) 157/70 -- -- 72 -- -- --   08/02/25 0847 137/86 -- -- 75 -- -- --   08/02/25 0838 124/85 -- -- 83 -- -- --   08/02/25 0820 (!) 152/65 -- -- -- -- -- --   08/02/25 0701 -- 97.5

## 2025-08-04 ENCOUNTER — TELEPHONE (OUTPATIENT)
Dept: PRIMARY CARE CLINIC | Age: 55
End: 2025-08-04

## 2025-08-06 ENCOUNTER — OFFICE VISIT (OUTPATIENT)
Dept: PRIMARY CARE CLINIC | Age: 55
End: 2025-08-06

## 2025-08-06 ENCOUNTER — OFFICE VISIT (OUTPATIENT)
Dept: PULMONOLOGY | Age: 55
End: 2025-08-06
Payer: MEDICARE

## 2025-08-06 VITALS
SYSTOLIC BLOOD PRESSURE: 107 MMHG | WEIGHT: 258 LBS | DIASTOLIC BLOOD PRESSURE: 58 MMHG | HEART RATE: 63 BPM | HEIGHT: 67 IN | BODY MASS INDEX: 40.49 KG/M2 | TEMPERATURE: 97.7 F | OXYGEN SATURATION: 96 %

## 2025-08-06 VITALS
HEIGHT: 67 IN | OXYGEN SATURATION: 96 % | BODY MASS INDEX: 40.27 KG/M2 | SYSTOLIC BLOOD PRESSURE: 116 MMHG | TEMPERATURE: 97.1 F | WEIGHT: 256.6 LBS | HEART RATE: 65 BPM | DIASTOLIC BLOOD PRESSURE: 62 MMHG

## 2025-08-06 DIAGNOSIS — E66.01 MORBID OBESITY (HCC): ICD-10-CM

## 2025-08-06 DIAGNOSIS — J45.20 MILD INTERMITTENT ASTHMA WITHOUT COMPLICATION: ICD-10-CM

## 2025-08-06 DIAGNOSIS — Z78.9 NEVER SMOKED ANY SUBSTANCE: ICD-10-CM

## 2025-08-06 DIAGNOSIS — G47.10 HYPERSOMNIA: ICD-10-CM

## 2025-08-06 DIAGNOSIS — D63.1 ANEMIA DUE TO CHRONIC KIDNEY DISEASE, ON CHRONIC DIALYSIS (HCC): ICD-10-CM

## 2025-08-06 DIAGNOSIS — I11.9 RIGHT VENTRICULAR (RV) HYPERTENSION: ICD-10-CM

## 2025-08-06 DIAGNOSIS — N18.6 ESRD (END STAGE RENAL DISEASE) (HCC): ICD-10-CM

## 2025-08-06 DIAGNOSIS — R06.09 DOE (DYSPNEA ON EXERTION): ICD-10-CM

## 2025-08-06 DIAGNOSIS — N18.6 ANEMIA DUE TO CHRONIC KIDNEY DISEASE, ON CHRONIC DIALYSIS (HCC): ICD-10-CM

## 2025-08-06 DIAGNOSIS — R09.02 HYPOXIA: Primary | ICD-10-CM

## 2025-08-06 DIAGNOSIS — N18.9 ANEMIA DUE TO CHRONIC KIDNEY DISEASE, UNSPECIFIED CKD STAGE: ICD-10-CM

## 2025-08-06 DIAGNOSIS — Z99.2 ANEMIA DUE TO CHRONIC KIDNEY DISEASE, ON CHRONIC DIALYSIS (HCC): ICD-10-CM

## 2025-08-06 DIAGNOSIS — Z09 HOSPITAL DISCHARGE FOLLOW-UP: Primary | ICD-10-CM

## 2025-08-06 DIAGNOSIS — D63.1 ANEMIA DUE TO CHRONIC KIDNEY DISEASE, UNSPECIFIED CKD STAGE: ICD-10-CM

## 2025-08-06 PROCEDURE — G8427 DOCREV CUR MEDS BY ELIG CLIN: HCPCS | Performed by: NURSE PRACTITIONER

## 2025-08-06 PROCEDURE — 3078F DIAST BP <80 MM HG: CPT | Performed by: NURSE PRACTITIONER

## 2025-08-06 PROCEDURE — 3017F COLORECTAL CA SCREEN DOC REV: CPT | Performed by: NURSE PRACTITIONER

## 2025-08-06 PROCEDURE — 3074F SYST BP LT 130 MM HG: CPT | Performed by: NURSE PRACTITIONER

## 2025-08-06 PROCEDURE — 1111F DSCHRG MED/CURRENT MED MERGE: CPT | Performed by: NURSE PRACTITIONER

## 2025-08-06 PROCEDURE — 1036F TOBACCO NON-USER: CPT | Performed by: NURSE PRACTITIONER

## 2025-08-06 PROCEDURE — 99214 OFFICE O/P EST MOD 30 MIN: CPT | Performed by: NURSE PRACTITIONER

## 2025-08-06 PROCEDURE — G8417 CALC BMI ABV UP PARAM F/U: HCPCS | Performed by: NURSE PRACTITIONER

## 2025-08-06 ASSESSMENT — ENCOUNTER SYMPTOMS
COUGH: 1
SHORTNESS OF BREATH: 1
ALLERGIC/IMMUNOLOGIC NEGATIVE: 1
EYES NEGATIVE: 1
GASTROINTESTINAL NEGATIVE: 1

## 2025-08-07 LAB
EKG P AXIS: 55 DEGREES
EKG P-R INTERVAL: 154 MS
EKG Q-T INTERVAL: 416 MS
EKG QRS DURATION: 94 MS
EKG QTC CALCULATION (BAZETT): 456 MS
EKG T AXIS: 37 DEGREES

## 2025-08-08 ENCOUNTER — HOSPITAL ENCOUNTER (OUTPATIENT)
Dept: SLEEP CENTER | Age: 55
Discharge: HOME | End: 2025-08-10
Payer: MEDICARE

## 2025-08-08 DIAGNOSIS — G47.33 OBSTRUCTIVE SLEEP APNEA: ICD-10-CM

## 2025-08-10 PROCEDURE — G0399 HOME SLEEP TEST/TYPE 3 PORTA: HCPCS

## 2025-08-11 NOTE — PROGRESS NOTES
Physician Progress Note      PATIENT:               DANIELE COLBERT  CSN #:                  [id number]  :                       1970  ADMIT DATE:       2025 9:58 PM  DISCH DATE:        2025 2:30 PM  RESPONDING  PROVIDER #:        JASWANT BLACK          QUERY TEXT:    Congestive Heart Failure is documented in the medical record in H&P.  Please   document the type and acuity:    The clinical indicators include:  --PMH: ESRD, CHF  --H&P-  Pt has history of ESRD on home dialysis, anemia, hypertension,   hyperlipidemia and heart failure.In ED, cxr-No acute finding in the chest. Bnp   35,610  --PN -Extremities: no cyanosis, ble edema. ([place])  --Per ED note: Patient has been swelling all over his body for a few weeks per   fianc?.  Patient sees Dr. Rayo, nephrologist.  Patient states that he gets   short of breath with exertion and cannot lie flat.  --  ESRD needing dialysis (HCC)/Volume overload. (- Beth)  --CXR-No acute finding in the chest.  Options provided:  -- Chronic Systolic CHF/HFrEF  -- Chronic Diastolic CHF/HFpEF  -- Chronic Systolic and Diastolic CHF  -- Other - I will add my own diagnosis  -- Disagree - Not applicable / Not valid  -- Disagree - Clinically unable to determine / Unknown  -- Refer to Clinical Documentation Reviewer    PROVIDER RESPONSE TEXT:    This patient has chronic systolic and diastolic CHF.    Query created by: Wilfredo Ron on 2025 10:28 AM      Electronically signed by:  JASWANT BLACK 2025 10:37 AM

## 2025-08-14 ENCOUNTER — TELEPHONE (OUTPATIENT)
Dept: CARDIOLOGY CLINIC | Age: 55
End: 2025-08-14

## 2025-08-16 DIAGNOSIS — G47.33 OSA (OBSTRUCTIVE SLEEP APNEA): Primary | ICD-10-CM

## 2025-08-18 ENCOUNTER — OFFICE VISIT (OUTPATIENT)
Dept: CARDIOLOGY CLINIC | Age: 55
End: 2025-08-18
Payer: MEDICARE

## 2025-08-18 VITALS
DIASTOLIC BLOOD PRESSURE: 80 MMHG | HEART RATE: 83 BPM | BODY MASS INDEX: 40.65 KG/M2 | WEIGHT: 259 LBS | SYSTOLIC BLOOD PRESSURE: 188 MMHG | OXYGEN SATURATION: 90 % | HEIGHT: 67 IN

## 2025-08-18 DIAGNOSIS — N18.6 ESRD ON HEMODIALYSIS (HCC): Primary | ICD-10-CM

## 2025-08-18 DIAGNOSIS — Z99.2 ESRD ON HEMODIALYSIS (HCC): Primary | ICD-10-CM

## 2025-08-18 DIAGNOSIS — I35.1 NONRHEUMATIC AORTIC VALVE INSUFFICIENCY: ICD-10-CM

## 2025-08-18 DIAGNOSIS — I50.32 CHRONIC DIASTOLIC CONGESTIVE HEART FAILURE (HCC): ICD-10-CM

## 2025-08-18 DIAGNOSIS — I10 ESSENTIAL HYPERTENSION: ICD-10-CM

## 2025-08-18 PROCEDURE — 1036F TOBACCO NON-USER: CPT | Performed by: CLINICAL NURSE SPECIALIST

## 2025-08-18 PROCEDURE — 1111F DSCHRG MED/CURRENT MED MERGE: CPT | Performed by: CLINICAL NURSE SPECIALIST

## 2025-08-18 PROCEDURE — G8427 DOCREV CUR MEDS BY ELIG CLIN: HCPCS | Performed by: CLINICAL NURSE SPECIALIST

## 2025-08-18 PROCEDURE — 3017F COLORECTAL CA SCREEN DOC REV: CPT | Performed by: CLINICAL NURSE SPECIALIST

## 2025-08-18 PROCEDURE — 99213 OFFICE O/P EST LOW 20 MIN: CPT | Performed by: CLINICAL NURSE SPECIALIST

## 2025-08-18 PROCEDURE — 3079F DIAST BP 80-89 MM HG: CPT | Performed by: CLINICAL NURSE SPECIALIST

## 2025-08-18 PROCEDURE — 3077F SYST BP >= 140 MM HG: CPT | Performed by: CLINICAL NURSE SPECIALIST

## 2025-08-18 PROCEDURE — G8417 CALC BMI ABV UP PARAM F/U: HCPCS | Performed by: CLINICAL NURSE SPECIALIST

## 2025-08-19 ENCOUNTER — TELEPHONE (OUTPATIENT)
Dept: CARDIOLOGY CLINIC | Age: 55
End: 2025-08-19

## 2025-08-19 DIAGNOSIS — N18.6 ESRD ON HEMODIALYSIS (HCC): Primary | ICD-10-CM

## 2025-08-19 DIAGNOSIS — R06.09 DOE (DYSPNEA ON EXERTION): ICD-10-CM

## 2025-08-19 DIAGNOSIS — R06.02 SHORTNESS OF BREATH: ICD-10-CM

## 2025-08-19 DIAGNOSIS — Z99.2 ESRD ON HEMODIALYSIS (HCC): Primary | ICD-10-CM

## 2025-08-20 ENCOUNTER — FOLLOWUP TELEPHONE ENCOUNTER (OUTPATIENT)
Dept: SLEEP CENTER | Age: 55
End: 2025-08-20

## 2025-08-25 ENCOUNTER — TELEPHONE (OUTPATIENT)
Dept: PRIMARY CARE CLINIC | Age: 55
End: 2025-08-25

## 2025-08-25 ENCOUNTER — HOSPITAL ENCOUNTER (OUTPATIENT)
Dept: MRI IMAGING | Age: 55
Discharge: HOME | End: 2025-08-25
Payer: MEDICARE

## 2025-08-25 DIAGNOSIS — R97.20 ELEVATED PSA: ICD-10-CM

## 2025-08-25 PROCEDURE — 72197 MRI PELVIS W/O & W/DYE: CPT

## 2025-08-25 PROCEDURE — A9577 INJ MULTIHANCE: HCPCS | Performed by: NURSE PRACTITIONER

## 2025-08-25 PROCEDURE — 6360000004 HC RX CONTRAST MEDICATION: Performed by: NURSE PRACTITIONER

## 2025-08-25 RX ADMIN — GADOBENATE DIMEGLUMINE 20 ML: 529 INJECTION, SOLUTION INTRAVENOUS at 08:39

## 2025-08-26 ENCOUNTER — HOSPITAL ENCOUNTER (OUTPATIENT)
Age: 55
Setting detail: OUTPATIENT SURGERY
Discharge: HOME | End: 2025-08-26
Attending: INTERNAL MEDICINE | Admitting: INTERNAL MEDICINE
Payer: MEDICARE

## 2025-08-26 ENCOUNTER — APPOINTMENT (OUTPATIENT)
Dept: VASCULAR LAB | Age: 55
End: 2025-08-26
Attending: SURGERY
Payer: MEDICARE

## 2025-08-26 VITALS
BODY MASS INDEX: 40.81 KG/M2 | WEIGHT: 260 LBS | HEIGHT: 67 IN | RESPIRATION RATE: 19 BRPM | TEMPERATURE: 96.6 F | OXYGEN SATURATION: 95 % | DIASTOLIC BLOOD PRESSURE: 72 MMHG | HEART RATE: 62 BPM | SYSTOLIC BLOOD PRESSURE: 185 MMHG

## 2025-08-26 DIAGNOSIS — R06.02 SHORTNESS OF BREATH: ICD-10-CM

## 2025-08-26 DIAGNOSIS — D64.9 ACUTE ON CHRONIC ANEMIA: Primary | ICD-10-CM

## 2025-08-26 LAB
ANION GAP SERPL CALCULATED.3IONS-SCNC: 18 MMOL/L (ref 8–16)
BUN SERPL-MCNC: 70 MG/DL (ref 6–20)
CALCIUM SERPL-MCNC: 10.1 MG/DL (ref 8.6–10)
CHLORIDE SERPL-SCNC: 97 MMOL/L (ref 98–107)
CO2 SERPL-SCNC: 26 MMOL/L (ref 22–29)
CREAT SERPL-MCNC: 13.3 MG/DL (ref 0.7–1.2)
ECHO BSA: 2.36 M2
EKG P AXIS: 56 DEGREES
EKG P-R INTERVAL: 188 MS
EKG Q-T INTERVAL: 456 MS
EKG QRS DURATION: 96 MS
EKG QTC CALCULATION (BAZETT): 464 MS
EKG T AXIS: 42 DEGREES
ERYTHROCYTE [DISTWIDTH] IN BLOOD BY AUTOMATED COUNT: 22.1 % (ref 11.5–14.5)
GLUCOSE SERPL-MCNC: 114 MG/DL (ref 70–99)
HCT VFR BLD AUTO: 24.1 % (ref 42–52)
HGB BLD-MCNC: 7.9 G/DL (ref 14–18)
MCH RBC QN AUTO: 30.5 PG (ref 27–31)
MCHC RBC AUTO-ENTMCNC: 32.8 G/DL (ref 33–37)
MCV RBC AUTO: 93.1 FL (ref 80–94)
PLATELET # BLD AUTO: 204 K/UL (ref 130–400)
PMV BLD AUTO: 9.8 FL (ref 9.4–12.4)
POTASSIUM SERPL-SCNC: 5.3 MMOL/L (ref 3.5–5.1)
RBC # BLD AUTO: 2.59 M/UL (ref 4.7–6.1)
SODIUM SERPL-SCNC: 141 MMOL/L (ref 136–145)
WBC # BLD AUTO: 9.6 K/UL (ref 4.8–10.8)

## 2025-08-26 PROCEDURE — 36415 COLL VENOUS BLD VENIPUNCTURE: CPT

## 2025-08-26 PROCEDURE — 2500000003 HC RX 250 WO HCPCS: Performed by: INTERNAL MEDICINE

## 2025-08-26 PROCEDURE — 2709999900 HC NON-CHARGEABLE SUPPLY: Performed by: INTERNAL MEDICINE

## 2025-08-26 PROCEDURE — 6360000004 HC RX CONTRAST MEDICATION: Performed by: INTERNAL MEDICINE

## 2025-08-26 PROCEDURE — 85027 COMPLETE CBC AUTOMATED: CPT

## 2025-08-26 PROCEDURE — 6370000000 HC RX 637 (ALT 250 FOR IP): Performed by: INTERNAL MEDICINE

## 2025-08-26 PROCEDURE — 76937 US GUIDE VASCULAR ACCESS: CPT | Performed by: INTERNAL MEDICINE

## 2025-08-26 PROCEDURE — 7100000010 HC PHASE II RECOVERY - FIRST 15 MIN: Performed by: INTERNAL MEDICINE

## 2025-08-26 PROCEDURE — C1725 CATH, TRANSLUMIN NON-LASER: HCPCS | Performed by: INTERNAL MEDICINE

## 2025-08-26 PROCEDURE — 99152 MOD SED SAME PHYS/QHP 5/>YRS: CPT | Performed by: INTERNAL MEDICINE

## 2025-08-26 PROCEDURE — C1894 INTRO/SHEATH, NON-LASER: HCPCS | Performed by: INTERNAL MEDICINE

## 2025-08-26 PROCEDURE — 93880 EXTRACRANIAL BILAT STUDY: CPT

## 2025-08-26 PROCEDURE — 7100000011 HC PHASE II RECOVERY - ADDTL 15 MIN: Performed by: INTERNAL MEDICINE

## 2025-08-26 PROCEDURE — 2580000003 HC RX 258: Performed by: INTERNAL MEDICINE

## 2025-08-26 PROCEDURE — 6360000002 HC RX W HCPCS: Performed by: INTERNAL MEDICINE

## 2025-08-26 PROCEDURE — 99223 1ST HOSP IP/OBS HIGH 75: CPT | Performed by: SURGERY

## 2025-08-26 PROCEDURE — 94640 AIRWAY INHALATION TREATMENT: CPT

## 2025-08-26 PROCEDURE — 99153 MOD SED SAME PHYS/QHP EA: CPT | Performed by: INTERNAL MEDICINE

## 2025-08-26 PROCEDURE — 93005 ELECTROCARDIOGRAM TRACING: CPT

## 2025-08-26 PROCEDURE — 93460 R&L HRT ART/VENTRICLE ANGIO: CPT | Performed by: INTERNAL MEDICINE

## 2025-08-26 PROCEDURE — 93567 NJX CAR CTH SPRVLV AORTGRPHY: CPT | Performed by: INTERNAL MEDICINE

## 2025-08-26 PROCEDURE — C1769 GUIDE WIRE: HCPCS | Performed by: INTERNAL MEDICINE

## 2025-08-26 PROCEDURE — 80048 BASIC METABOLIC PNL TOTAL CA: CPT

## 2025-08-26 RX ORDER — NIFEDIPINE 60 MG/1
60 TABLET, EXTENDED RELEASE ORAL ONCE
Status: COMPLETED | OUTPATIENT
Start: 2025-08-26 | End: 2025-08-26

## 2025-08-26 RX ORDER — SODIUM CHLORIDE 0.9 % (FLUSH) 0.9 %
5-40 SYRINGE (ML) INJECTION EVERY 12 HOURS SCHEDULED
Status: DISCONTINUED | OUTPATIENT
Start: 2025-08-26 | End: 2025-08-26 | Stop reason: HOSPADM

## 2025-08-26 RX ORDER — MIDAZOLAM HYDROCHLORIDE 1 MG/ML
INJECTION, SOLUTION INTRAMUSCULAR; INTRAVENOUS PRN
Status: DISCONTINUED | OUTPATIENT
Start: 2025-08-26 | End: 2025-08-26 | Stop reason: HOSPADM

## 2025-08-26 RX ORDER — VERAPAMIL HYDROCHLORIDE 2.5 MG/ML
INJECTION INTRAVENOUS PRN
Status: DISCONTINUED | OUTPATIENT
Start: 2025-08-26 | End: 2025-08-26 | Stop reason: HOSPADM

## 2025-08-26 RX ORDER — IODIXANOL 320 MG/ML
INJECTION, SOLUTION INTRAVASCULAR PRN
Status: DISCONTINUED | OUTPATIENT
Start: 2025-08-26 | End: 2025-08-26 | Stop reason: HOSPADM

## 2025-08-26 RX ORDER — FENTANYL CITRATE 50 UG/ML
INJECTION, SOLUTION INTRAMUSCULAR; INTRAVENOUS PRN
Status: DISCONTINUED | OUTPATIENT
Start: 2025-08-26 | End: 2025-08-26 | Stop reason: HOSPADM

## 2025-08-26 RX ORDER — HEPARIN SODIUM 1000 [USP'U]/ML
INJECTION, SOLUTION INTRAVENOUS; SUBCUTANEOUS PRN
Status: DISCONTINUED | OUTPATIENT
Start: 2025-08-26 | End: 2025-08-26 | Stop reason: HOSPADM

## 2025-08-26 RX ORDER — ALBUTEROL SULFATE 90 UG/1
2 INHALANT RESPIRATORY (INHALATION) EVERY 6 HOURS PRN
Status: DISCONTINUED | OUTPATIENT
Start: 2025-08-26 | End: 2025-08-26 | Stop reason: HOSPADM

## 2025-08-26 RX ORDER — ASPIRIN 81 MG/1
81 TABLET ORAL ONCE
Status: COMPLETED | OUTPATIENT
Start: 2025-08-26 | End: 2025-08-26

## 2025-08-26 RX ORDER — SODIUM CHLORIDE 9 MG/ML
INJECTION, SOLUTION INTRAVENOUS CONTINUOUS
Status: DISCONTINUED | OUTPATIENT
Start: 2025-08-26 | End: 2025-08-26 | Stop reason: HOSPADM

## 2025-08-26 RX ORDER — NITROGLYCERIN 20 MG/100ML
INJECTION INTRAVENOUS PRN
Status: DISCONTINUED | OUTPATIENT
Start: 2025-08-26 | End: 2025-08-26 | Stop reason: HOSPADM

## 2025-08-26 RX ORDER — METOPROLOL TARTRATE 50 MG
25 TABLET ORAL ONCE
Status: COMPLETED | OUTPATIENT
Start: 2025-08-26 | End: 2025-08-26

## 2025-08-26 RX ORDER — HYDRALAZINE HYDROCHLORIDE 25 MG/1
25 TABLET, FILM COATED ORAL ONCE
Status: COMPLETED | OUTPATIENT
Start: 2025-08-26 | End: 2025-08-26

## 2025-08-26 RX ORDER — SODIUM CHLORIDE 0.9 % (FLUSH) 0.9 %
5-40 SYRINGE (ML) INJECTION PRN
Status: DISCONTINUED | OUTPATIENT
Start: 2025-08-26 | End: 2025-08-26 | Stop reason: HOSPADM

## 2025-08-26 RX ORDER — SODIUM CHLORIDE 9 MG/ML
INJECTION, SOLUTION INTRAVENOUS PRN
Status: DISCONTINUED | OUTPATIENT
Start: 2025-08-26 | End: 2025-08-26 | Stop reason: HOSPADM

## 2025-08-26 RX ORDER — ONDANSETRON 2 MG/ML
4 INJECTION INTRAMUSCULAR; INTRAVENOUS EVERY 6 HOURS PRN
Status: DISCONTINUED | OUTPATIENT
Start: 2025-08-26 | End: 2025-08-26 | Stop reason: HOSPADM

## 2025-08-26 RX ADMIN — SODIUM CHLORIDE 15 ML/HR: 0.9 INJECTION, SOLUTION INTRAVENOUS at 07:44

## 2025-08-26 RX ADMIN — METOPROLOL TARTRATE 25 MG: 50 TABLET, FILM COATED ORAL at 12:37

## 2025-08-26 RX ADMIN — ASPIRIN 81 MG: 81 TABLET, COATED ORAL at 08:14

## 2025-08-26 RX ADMIN — HYDRALAZINE HYDROCHLORIDE 25 MG: 25 TABLET ORAL at 12:38

## 2025-08-26 RX ADMIN — ALBUTEROL SULFATE 2 PUFF: 90 AEROSOL, METERED RESPIRATORY (INHALATION) at 12:04

## 2025-08-26 RX ADMIN — NIFEDIPINE 60 MG: 60 TABLET, EXTENDED RELEASE ORAL at 12:38

## 2025-08-26 ASSESSMENT — ENCOUNTER SYMPTOMS
CHEST TIGHTNESS: 0
CONSTIPATION: 0
COUGH: 0
VOMITING: 0
BACK PAIN: 0
ABDOMINAL PAIN: 0
DIARRHEA: 0
SHORTNESS OF BREATH: 1
NAUSEA: 0

## 2025-08-28 LAB
ECHO BSA: 2.36 M2
VAS LEFT ARM BP DIA: 67 MMHG
VAS LEFT ARM BP: 192 MMHG
VAS LEFT CCA MID EDV: 11.8 CM/S
VAS LEFT CCA MID PSV: 100 CM/S
VAS LEFT CCA PROX EDV: 12.4 CM/S
VAS LEFT CCA PROX PSV: 114 CM/S
VAS LEFT ECA PSV: 83.9 CM/S
VAS LEFT ICA DIST EDV: 17.6 CM/S
VAS LEFT ICA DIST PSV: 91.6 CM/S
VAS LEFT ICA MID EDV: 20.9 CM/S
VAS LEFT ICA MID PSV: 87.8 CM/S
VAS LEFT ICA PROX EDV: 7.9 CM/S
VAS LEFT ICA PROX PSV: 48.2 CM/S
VAS LEFT VERTEBRAL EDV: 11.5 CM/S
VAS LEFT VERTEBRAL PSV: 68 CM/S
VAS RIGHT CCA MID EDV: 10.4 CM/S
VAS RIGHT CCA MID PSV: 75.2 CM/S
VAS RIGHT CCA PROX EDV: 13 CM/S
VAS RIGHT CCA PROX PSV: 95.1 CM/S
VAS RIGHT ECA PSV: 81.8 CM/S
VAS RIGHT ICA DIST EDV: 25.2 CM/S
VAS RIGHT ICA DIST PSV: 111 CM/S
VAS RIGHT ICA MID EDV: 18.9 CM/S
VAS RIGHT ICA MID PSV: 73.3 CM/S
VAS RIGHT ICA PROX EDV: 15.7 CM/S
VAS RIGHT ICA PROX PSV: 56.8 CM/S
VAS RIGHT VERTEBRAL EDV: 11 CM/S
VAS RIGHT VERTEBRAL PSV: 60.1 CM/S

## 2025-09-02 ENCOUNTER — TELEPHONE (OUTPATIENT)
Dept: CARDIOLOGY CLINIC | Age: 55
End: 2025-09-02

## 2025-09-02 DIAGNOSIS — E11.22 TYPE 2 DIABETES MELLITUS WITH CHRONIC KIDNEY DISEASE ON CHRONIC DIALYSIS, WITHOUT LONG-TERM CURRENT USE OF INSULIN (HCC): ICD-10-CM

## 2025-09-02 DIAGNOSIS — N18.6 ESRD ON HEMODIALYSIS (HCC): ICD-10-CM

## 2025-09-02 DIAGNOSIS — I10 ESSENTIAL HYPERTENSION: ICD-10-CM

## 2025-09-02 DIAGNOSIS — Z99.2 TYPE 2 DIABETES MELLITUS WITH CHRONIC KIDNEY DISEASE ON CHRONIC DIALYSIS, WITHOUT LONG-TERM CURRENT USE OF INSULIN (HCC): ICD-10-CM

## 2025-09-02 DIAGNOSIS — N18.6 TYPE 2 DIABETES MELLITUS WITH CHRONIC KIDNEY DISEASE ON CHRONIC DIALYSIS, WITHOUT LONG-TERM CURRENT USE OF INSULIN (HCC): ICD-10-CM

## 2025-09-02 DIAGNOSIS — Z99.2 ESRD ON HEMODIALYSIS (HCC): ICD-10-CM

## 2025-09-02 DIAGNOSIS — Z80.42 FAMILY HX OF PROSTATE CANCER: ICD-10-CM

## 2025-09-02 DIAGNOSIS — N18.9 ANEMIA DUE TO CHRONIC KIDNEY DISEASE, UNSPECIFIED CKD STAGE: Primary | ICD-10-CM

## 2025-09-02 DIAGNOSIS — R97.20 ELEVATED PSA: Primary | ICD-10-CM

## 2025-09-02 DIAGNOSIS — D63.1 ANEMIA DUE TO CHRONIC KIDNEY DISEASE, UNSPECIFIED CKD STAGE: Primary | ICD-10-CM

## 2025-09-02 PROBLEM — I35.0 SEVERE AORTIC STENOSIS: Status: ACTIVE | Noted: 2025-09-02

## 2025-09-03 ENCOUNTER — TELEPHONE (OUTPATIENT)
Dept: UROLOGY | Age: 55
End: 2025-09-03

## 2025-09-04 ENCOUNTER — OFFICE VISIT (OUTPATIENT)
Dept: UROLOGY | Age: 55
End: 2025-09-04
Payer: MEDICARE

## 2025-09-04 VITALS — TEMPERATURE: 98.1 F | HEIGHT: 67 IN | WEIGHT: 257.6 LBS | BODY MASS INDEX: 40.43 KG/M2

## 2025-09-04 DIAGNOSIS — Z99.2 ESRD NEEDING DIALYSIS (HCC): ICD-10-CM

## 2025-09-04 DIAGNOSIS — N18.6 ESRD NEEDING DIALYSIS (HCC): ICD-10-CM

## 2025-09-04 DIAGNOSIS — Z80.42 FAMILY HX OF PROSTATE CANCER: ICD-10-CM

## 2025-09-04 DIAGNOSIS — R97.20 ELEVATED PSA: Primary | ICD-10-CM

## 2025-09-04 DIAGNOSIS — N50.811 TESTICULAR PAIN, RIGHT: ICD-10-CM

## 2025-09-04 PROCEDURE — 99214 OFFICE O/P EST MOD 30 MIN: CPT | Performed by: NURSE PRACTITIONER

## 2025-09-04 PROCEDURE — 1036F TOBACCO NON-USER: CPT | Performed by: NURSE PRACTITIONER

## 2025-09-04 PROCEDURE — 51798 US URINE CAPACITY MEASURE: CPT | Performed by: NURSE PRACTITIONER

## 2025-09-04 PROCEDURE — G8427 DOCREV CUR MEDS BY ELIG CLIN: HCPCS | Performed by: NURSE PRACTITIONER

## 2025-09-04 PROCEDURE — G8417 CALC BMI ABV UP PARAM F/U: HCPCS | Performed by: NURSE PRACTITIONER

## 2025-09-04 PROCEDURE — 3017F COLORECTAL CA SCREEN DOC REV: CPT | Performed by: NURSE PRACTITIONER

## 2025-09-04 ASSESSMENT — ENCOUNTER SYMPTOMS
NAUSEA: 0
BACK PAIN: 0
ABDOMINAL PAIN: 0
ABDOMINAL DISTENTION: 0
VOMITING: 0

## 2025-09-05 ENCOUNTER — TELEPHONE (OUTPATIENT)
Dept: PULMONOLOGY | Age: 55
End: 2025-09-05

## 2025-09-05 ENCOUNTER — HOSPITAL ENCOUNTER (OUTPATIENT)
Dept: GENERAL RADIOLOGY | Age: 55
Discharge: HOME | End: 2025-09-05
Payer: MEDICARE

## 2025-09-05 PROCEDURE — 71046 X-RAY EXAM CHEST 2 VIEWS: CPT

## (undated) DEVICE — PATCHES NAVIGATION PATIENT

## (undated) DEVICE — SOLUTION IV 500ML 0.9% SOD CHL PH 5 INJ USP VIAFLX PLAS

## (undated) DEVICE — RADIFOCUS GLIDEWIRE: Brand: GLIDEWIRE

## (undated) DEVICE — Device

## (undated) DEVICE — PROVE COVER: Brand: UNBRANDED

## (undated) DEVICE — SUTURE PROL SZ 6-0 L30IN NONABSORBABLE BLU L9.3MM BV-1 3/8 M8709

## (undated) DEVICE — CATHETER DIAG 6FR L100CM LUMN ID0.056IN JL4 CRV 0 SIDE H

## (undated) DEVICE — CATHETER KIT 5 FR 21 GAX7 CM MICROINTRODUCER GUIDEWIRE STIFF

## (undated) DEVICE — NAVICROSS SUPPORT CATHETER: Brand: NAVICROSS

## (undated) DEVICE — KT CATH TAL VENATRAC PALINDROM INSRT STY 28CM

## (undated) DEVICE — ELECTROSURGICAL PENCIL BUTTON SWITCH NON COATED BLADE ELECTRODE 10 FT (3 M) CORD HOLSTER: Brand: MEGADYNE

## (undated) DEVICE — KIT MFLD ISOLATN NACL CNTRST PRT TBNG SPIK W/ PRSS TRNSDUC

## (undated) DEVICE — SUT SILK 2/0 SUTUPAK TIES 24IN SA75H

## (undated) DEVICE — VALVE SHEATH BRONCHOSCOPE

## (undated) DEVICE — LARYNGOSCOPE VID MILLER 2 MTL BLADE M HNDL CURAPLEX

## (undated) DEVICE — BAND COMPR L24CM REG CLR PLAS HEMSTAT EXT HK AND LOOP RETEN

## (undated) DEVICE — SINGLE USE SUCTION VALVE MAJ-209: Brand: SINGLE USE SUCTION VALVE (STERILE)

## (undated) DEVICE — PINNACLE INTRODUCER SHEATH: Brand: PINNACLE

## (undated) DEVICE — CLIP INT SM WIDE RED TI TRNSVRS GRV CHEVRON SHP W/ PRECIS

## (undated) DEVICE — TRY PREP SCRB VAG PVP

## (undated) DEVICE — ENDO KIT,LOURDES HOSPITAL: Brand: MEDLINE INDUSTRIES, INC.

## (undated) DEVICE — SNAP KOVER: Brand: UNBRANDED

## (undated) DEVICE — ULTRACLEAN ACCESSORY ELECTRODE 1" (2.54 CM) COATED BLADE: Brand: ULTRACLEAN

## (undated) DEVICE — PK TURNOVER RM ADV

## (undated) DEVICE — SKIN AFFIX SURG ADHESIVE 72/CS 0.55ML: Brand: MEDLINE

## (undated) DEVICE — DRAPE,UTILITY,XL,4/PK,STERILE: Brand: MEDLINE

## (undated) DEVICE — SYRINGE MED 10ML LUERLOCK TIP W/O SFTY DISP

## (undated) DEVICE — SYR PRECISIONGLIDE LL 5CC 20X1 1/2IN

## (undated) DEVICE — DRESSING TRNSPAR W5XL4.5IN FLM SHT SEMIPERMEABLE WIND

## (undated) DEVICE — CHLORAPREP 26ML ORANGE

## (undated) DEVICE — ELECTRD BLD EDGE/INSUL1P 2.4X5.1MM STRL

## (undated) DEVICE — ANGIOGRAPHY PK

## (undated) DEVICE — MEDIA CONTRAST INJ VISIPAQUE 150ML 320MG

## (undated) DEVICE — TOWEL,OR,DSP,ST,BLUE,DLX,4/PK,20PK/CS: Brand: MEDLINE

## (undated) DEVICE — GLOVE SURG SZ 7 L12IN FNGR THK79MIL GRN LTX FREE

## (undated) DEVICE — SUTURE PROL SZ 6-0 L24IN NONABSORBABLE BLU L9.3MM BV-1 3/8 8805H

## (undated) DEVICE — PENCIL BTTN S S CAUT TIP W HOLSTER 25 50

## (undated) DEVICE — LIQUIBAND RAPID ADHESIVE 36/CS 0.8ML: Brand: MEDLINE

## (undated) DEVICE — SUTURE ETHILON SZ 3-0 L18IN NONABSORBABLE BLK L24MM FS-1 3/8 663G

## (undated) DEVICE — KIT INTRODUCER BRONCHOSCOPE PATIENT

## (undated) DEVICE — SUTURE MONOCRYL STRATAFIX SPRL + SZ 4-0 L12IN ABSRB UD PS-2 SXMP1B117

## (undated) DEVICE — SUTURE VICRYL + SZ 2-0 L18IN ABSRB CLR TIE POLYGLACTIN 910 VCP111G

## (undated) DEVICE — Z INACTIVE USE 2535480 CLIP LIG M BLU TI HRT SHP WIRE HORZ 180 PER BX

## (undated) DEVICE — CATHETER PRESSURE WEDGE BLLN 6FRX110CM

## (undated) DEVICE — DRAPE SHEET: Brand: UNBRANDED

## (undated) DEVICE — CONQUEST® 40 PTA DILATATION CATHETER 9 MM X 40 MM, 75 CM CATHETER: Brand: CONQUEST® 40

## (undated) DEVICE — SUTURE ABSORBABLE MONOFILAMENT 3-0 SH 27 IN UD PDS + PDP416H

## (undated) DEVICE — INFLATION DEVICE: Brand: ENCORE™ 26

## (undated) DEVICE — SYR SLP TP 10ML DISP

## (undated) DEVICE — SOLUTION IV IRRIG POUR BRL 0.9% SODIUM CHL 2F7124

## (undated) DEVICE — SPNG GZ 2S 2X2 8PLY STRL PK/2

## (undated) DEVICE — BLANKET WRM W40.2XL55.9IN IORT LO BODY + MISTRAL AIR

## (undated) DEVICE — 3M™ IOBAN™ 2 ANTIMICROBIAL INCISE DRAPE 6650EZ: Brand: IOBAN™ 2

## (undated) DEVICE — GLOVE SURG SZ 7 L12IN FNGR THK94MIL TRNSLUC YEL LTX HYDRGEL

## (undated) DEVICE — SUTURE VICRYL + ABSORBABLE BRAIDED 3-0 12X18 IN COAT UD VCP110G

## (undated) DEVICE — NEEDLE HYPO 18GA L1.5IN PNK POLYPR HUB S STL REG BVL STR

## (undated) DEVICE — NDL HYPO PRECISIONGLIDE REG 25G 1 1/2

## (undated) DEVICE — KT CATH TAL VENATRAC PALINDROM 14.5F 19CM

## (undated) DEVICE — COVER PRB 48X6 IN 20 CC W/BND TAPE STRL GEL FLEXI-FEEL

## (undated) DEVICE — GLIDESHEATH BASIC HYDROPHILIC COATED INTRODUCER SHEATH: Brand: GLIDESHEATH

## (undated) DEVICE — SOLUTION IV 1000ML 0.9% SOD CHL PH 5 INJ USP VIAFLX PLAS

## (undated) DEVICE — KIT ANGIO W/ AT P65 PREM HND CTRL FOR CNTRST DEL ANGIOTOUCH

## (undated) DEVICE — SUT ETHLN 3/0 FS1 30IN 669H

## (undated) DEVICE — FORCEPS BIOPSY SMOOTH CUP

## (undated) DEVICE — RADIFOCUS GLIDECATH: Brand: GLIDECATH

## (undated) DEVICE — 3M™ STERI-STRIP™ REINFORCED ADHESIVE SKIN CLOSURES, R1546, 1/4 IN X 4 IN (6 MM X 100 MM), 10 STRIPS/ENVELOPE: Brand: 3M™ STERI-STRIP™

## (undated) DEVICE — COVER US PRB W15XL120CM W/ GEL RUBBERBAND TAPE STRP FLD GEN

## (undated) DEVICE — GLIDEPATH HEMODIALYSIS CATH, ST, DL, 14.5 FR. 23CM: Brand: GLIDEPATH LONG-TERM HEMODIALYSIS CATHETER WITH PRELOADED STYLET

## (undated) DEVICE — TUBE ET 8.5MM NSL ORAL BASIC CUF INTMED MURPHY EYE RADPQ

## (undated) DEVICE — SHEATH INTRO 6FR L10CM MINI GWIRE L45CM 0.035IN PERIPH KINK

## (undated) DEVICE — OPTIFOAM GENTLE SA, POSTOP, 4X8: Brand: MEDLINE

## (undated) DEVICE — SUTURE VICRYL + SZ 4-0 L18IN ABSRB WHT TIE POLYGLACTIN 910 VCP109G

## (undated) DEVICE — MASK VENTILATOR MED AD SUPERNOVA ET

## (undated) DEVICE — PAD,ARMBOARD,CONV,FOAM,2X8X20",12PR/CS: Brand: MEDLINE

## (undated) DEVICE — SINGLE USE BIOPSY VALVE MAJ-210: Brand: SINGLE USE BIOPSY VALVE (STERILE)

## (undated) DEVICE — SNARE ENDOSCP L240CM LOOP W13MM SHTH DIA2.4MM SM OVL FLX

## (undated) DEVICE — 3M™ STERI-DRAPE™ INSTRUMENT POUCH 1018: Brand: STERI-DRAPE™

## (undated) DEVICE — AMBU AURA-I U SIZE 4, DISPOSABLE LARYNGEAL MASK: Brand: AURA-I

## (undated) DEVICE — NG KIT, 21 GA, 10 PACK: Brand: SITE-RITE

## (undated) DEVICE — SURGICAL PROCEDURE PACK VASC LOURDES HOSP

## (undated) DEVICE — PACK,UNIVERSAL,NO GOWNS: Brand: MEDLINE

## (undated) DEVICE — CVR PROB GEN PURP W ISOSILK 6X48

## (undated) DEVICE — GLV SURG DERMASSURE GRN LF PF 8.0

## (undated) DEVICE — SUTURE MNCRYL STRATAFIX PS 4-0 30CM

## (undated) DEVICE — GLV SURG TRIUMPH MICRO PF LTX 7.5 STRL

## (undated) DEVICE — TUBING FLUIDICS BRONCHOSCOPE

## (undated) DEVICE — SYR LUERLOK 20CC BX/50

## (undated) DEVICE — GLV SURG SENSICARE W/ALOE PF LF 7.5 STRL

## (undated) DEVICE — SYRINGE 20ML LL S/C 50

## (undated) DEVICE — C-ARM: Brand: UNBRANDED

## (undated) DEVICE — ADAPTER TBNG DIA15MM SWVL FBROPT BRONCHSCP TERM 2 AXIS PEEP

## (undated) DEVICE — PAD MINOR UNIVERSAL: Brand: MEDLINE INDUSTRIES, INC.

## (undated) DEVICE — TUBE ET 7.5MM NSL ORAL BASIC CUF INTMED MURPHY EYE RADPQ

## (undated) DEVICE — APPL CHLORAPREP HI/LITE 26ML ORNG

## (undated) DEVICE — ELECTRODE DEFIB AD RADIOTRANSPLANTED AD MULTIFUNCTIONAL PHY CTRL

## (undated) DEVICE — SOLUTION IV 250ML 0.9% SOD CHL PH 5 INJ USP VIAFLX PLAS

## (undated) DEVICE — DRSNG SURESITE WNDW 4X4.5

## (undated) DEVICE — 5F PLUS 80CM OVER-THE-WIRE EMBOLECTOMY CATHETER, EIFU: Brand: LEMAITRE EMBOLECTOMY CATHETER

## (undated) DEVICE — MAX-A-L MAX ADULT LONG PROBE: Brand: MEDLINE

## (undated) DEVICE — INTHRILL THROMBECTOMY CATHETER: Brand: INTHRILL THROMBECTOMY CATHETER

## (undated) DEVICE — SUTURE VCRL SZ 3-0 L18IN ABSRB UD W/O NDL POLYGLACTIN 910 J110T

## (undated) DEVICE — SENSOR PLSE OXMTR AD CBL L3FT ADH TRANSMISSIVE

## (undated) DEVICE — BLADE LARYNSCP STERILE SZ 4 TI DISP SPECTRM DVM

## (undated) DEVICE — GUIDEWIRE VASC L260CM DIA0038IN TIP L3MM PTFE J TIP FIX COR

## (undated) DEVICE — SUT MNCRYL 4/0 PS2 27IN UD MCP426H

## (undated) DEVICE — SUTURE NONABSORBABLE MONOFILAMENT 5-0 C-1 1X24 IN PROLENE 8725H

## (undated) DEVICE — 3 ML SYRINGE WITH HYPODERMIC SAFETY NEEDLE: Brand: MAGELLAN

## (undated) DEVICE — RADIFOCUS GLIDEWIRE ADVANTAGE GUIDEWIRE: Brand: GLIDEWIRE ADVANTAGE

## (undated) DEVICE — SUT VIC 3/0 RB1 27IN UD VCP215H

## (undated) DEVICE — CANNULA NSL W/ O2 DEL AD 4 M

## (undated) DEVICE — CATHETER DIAG 6FR L100CM LUMN ID0.056IN JR4 CRV 0 SIDE H

## (undated) DEVICE — SYRINGE CATH TIP 50ML

## (undated) DEVICE — SUT VIC 3/0 SUTUPAK TIES 18IN J910T

## (undated) DEVICE — SUTURE VCRL SZ 4-0 L18IN ABSRB UD VCRL POLYGLACTIN 910 COAT J109T

## (undated) DEVICE — SUT SILK 2/0 SH CR8 18IN CR8 C012D

## (undated) DEVICE — IMMOBILIZER ORTH CONTACT CLOSURE STRP LG 18X9 IN PROCARE

## (undated) DEVICE — SUT VIC 4/0 P3 18IN UD VCP494H

## (undated) DEVICE — BLANKET WRM W29.9XL79.1IN UP BODY FORC AIR MISTRAL-AIR

## (undated) DEVICE — BRONCHOSCOPES MONARCH

## (undated) DEVICE — PROXIMATE RH ROTATING HEAD SKIN STAPLERS (35 WIDE) CONTAINS 35 STAINLESS STEEL STAPLES: Brand: PROXIMATE

## (undated) DEVICE — GLOVE SURG SZ 65 L12IN FNGR THK79MIL GRN LTX FREE

## (undated) DEVICE — INTENDED FOR TISSUE SEPARATION, AND OTHER PROCEDURES THAT REQUIRE A SHARP SURGICAL BLADE TO PUNCTURE OR CUT.: Brand: BARD-PARKER ® STAINLESS STEEL BLADES

## (undated) DEVICE — GOWN,PREVENTION PLUS,XL,ST,24/CS: Brand: MEDLINE

## (undated) DEVICE — AGENT HEMSTAT W4XL4IN OXIDIZED REGENERATED CELOS STRUCTURED

## (undated) DEVICE — INTHRILL SHEATH (INTHRILL THROMBECTOMY SYSTEM): Brand: INTHRILL SHEATH

## (undated) DEVICE — ADHS LIQ MASTISOL 2/3ML